# Patient Record
Sex: MALE | Race: WHITE | Employment: UNEMPLOYED
[De-identification: names, ages, dates, MRNs, and addresses within clinical notes are randomized per-mention and may not be internally consistent; named-entity substitution may affect disease eponyms.]

---

## 2017-02-24 ENCOUNTER — OFFICE VISIT (OUTPATIENT)
Dept: FAMILY MEDICINE CLINIC | Facility: CLINIC | Age: 77
End: 2017-02-24

## 2017-02-24 VITALS
HEART RATE: 90 BPM | WEIGHT: 240.38 LBS | DIASTOLIC BLOOD PRESSURE: 60 MMHG | OXYGEN SATURATION: 95 % | SYSTOLIC BLOOD PRESSURE: 104 MMHG | BODY MASS INDEX: 30.86 KG/M2

## 2017-02-24 DIAGNOSIS — J40 BRONCHITIS: ICD-10-CM

## 2017-02-24 DIAGNOSIS — R05.9 COUGH: Primary | ICD-10-CM

## 2017-02-24 PROCEDURE — 99213 OFFICE O/P EST LOW 20 MIN: CPT | Performed by: FAMILY MEDICINE

## 2017-02-24 RX ORDER — AMOXICILLIN 875 MG/1
875 TABLET, COATED ORAL 2 TIMES DAILY
Qty: 20 TABLET | Refills: 0 | Status: SHIPPED | OUTPATIENT
Start: 2017-02-24 | End: 2017-03-06

## 2017-02-24 RX ORDER — BENZONATATE 200 MG/1
200 CAPSULE ORAL 3 TIMES DAILY PRN
Qty: 30 CAPSULE | Refills: 0 | Status: SHIPPED | OUTPATIENT
Start: 2017-02-24 | End: 2017-03-06

## 2017-03-15 ENCOUNTER — HOSPITAL ENCOUNTER (OUTPATIENT)
Age: 77
Setting detail: SPECIMEN
Discharge: HOME | End: 2017-03-15

## 2017-03-15 LAB — TRIGL SERPL-MCNC: 188 MG/DL

## 2017-03-15 PROCEDURE — 84478 ASSAY OF TRIGLYCERIDES: CPT

## 2017-03-21 ENCOUNTER — HOSPITAL ENCOUNTER (OUTPATIENT)
Age: 77
Setting detail: SPECIMEN
Discharge: HOME | End: 2017-03-21

## 2017-03-21 LAB — AMMONIA: 48 UMOL/L (ref 16–60)

## 2017-03-21 PROCEDURE — 87086 URINE CULTURE/COLONY COUNT: CPT

## 2017-03-21 PROCEDURE — 87088 URINE BACTERIA CULTURE: CPT

## 2017-03-21 PROCEDURE — 87186 SC STD MICRODIL/AGAR DIL: CPT

## 2017-03-21 PROCEDURE — 82140 ASSAY OF AMMONIA: CPT

## 2017-03-23 LAB
CULTURE: ABNORMAL
CULTURE: ABNORMAL
Lab: ABNORMAL
Lab: ABNORMAL
ORGANISM: ABNORMAL
SPECIMEN DESCRIPTION: ABNORMAL
SPECIMEN DESCRIPTION: ABNORMAL
STATUS: ABNORMAL

## 2017-04-03 ENCOUNTER — HOSPITAL ENCOUNTER (OUTPATIENT)
Age: 77
Setting detail: SPECIMEN
Discharge: HOME | End: 2017-04-03
Payer: MEDICARE

## 2017-04-03 LAB — TSH SERPL DL<=0.05 MIU/L-ACNC: 6.69 MIU/L (ref 0.3–5)

## 2017-04-03 PROCEDURE — 84443 ASSAY THYROID STIM HORMONE: CPT

## 2017-05-02 RX ORDER — DIFLUNISAL 500 MG/1
TABLET, FILM COATED ORAL
Qty: 180 TABLET | Refills: 3 | Status: SHIPPED | OUTPATIENT
Start: 2017-05-02 | End: 2017-05-17 | Stop reason: SDUPTHER

## 2017-05-05 RX ORDER — LISINOPRIL 20 MG/1
TABLET ORAL
Qty: 90 TABLET | Refills: 0 | Status: SHIPPED | OUTPATIENT
Start: 2017-05-05 | End: 2017-05-22

## 2017-05-17 RX ORDER — DIFLUNISAL 500 MG/1
TABLET, FILM COATED ORAL
Qty: 180 TABLET | Refills: 3 | Status: SHIPPED | OUTPATIENT
Start: 2017-05-17 | End: 2017-12-14 | Stop reason: ALTCHOICE

## 2017-05-22 ENCOUNTER — OFFICE VISIT (OUTPATIENT)
Dept: FAMILY MEDICINE CLINIC | Age: 77
End: 2017-05-22
Payer: MEDICARE

## 2017-05-22 VITALS
BODY MASS INDEX: 28.44 KG/M2 | SYSTOLIC BLOOD PRESSURE: 112 MMHG | DIASTOLIC BLOOD PRESSURE: 66 MMHG | OXYGEN SATURATION: 97 % | HEART RATE: 63 BPM | WEIGHT: 221.6 LBS | HEIGHT: 74 IN

## 2017-05-22 DIAGNOSIS — K56.60 INTESTINAL OBSTRUCTION, UNSPECIFIED TYPE: Primary | ICD-10-CM

## 2017-05-22 PROCEDURE — 99213 OFFICE O/P EST LOW 20 MIN: CPT | Performed by: FAMILY MEDICINE

## 2017-05-22 ASSESSMENT — PATIENT HEALTH QUESTIONNAIRE - PHQ9
1. LITTLE INTEREST OR PLEASURE IN DOING THINGS: 0
SUM OF ALL RESPONSES TO PHQ QUESTIONS 1-9: 0
SUM OF ALL RESPONSES TO PHQ9 QUESTIONS 1 & 2: 0
2. FEELING DOWN, DEPRESSED OR HOPELESS: 0

## 2017-08-03 RX ORDER — LISINOPRIL 20 MG/1
TABLET ORAL
Qty: 90 TABLET | Refills: 0 | Status: SHIPPED | OUTPATIENT
Start: 2017-08-03 | End: 2018-02-14 | Stop reason: ALTCHOICE

## 2017-12-07 ENCOUNTER — TELEPHONE (OUTPATIENT)
Dept: FAMILY MEDICINE CLINIC | Age: 77
End: 2017-12-07

## 2017-12-07 NOTE — TELEPHONE ENCOUNTER
The patient was notified of the doctors response. Says he will go to the ED. No further questions at this time.

## 2017-12-14 ENCOUNTER — OFFICE VISIT (OUTPATIENT)
Dept: FAMILY MEDICINE CLINIC | Age: 77
End: 2017-12-14
Payer: MEDICARE

## 2017-12-14 VITALS
HEIGHT: 74 IN | WEIGHT: 227.38 LBS | SYSTOLIC BLOOD PRESSURE: 118 MMHG | BODY MASS INDEX: 29.18 KG/M2 | DIASTOLIC BLOOD PRESSURE: 70 MMHG | HEART RATE: 84 BPM | OXYGEN SATURATION: 88 %

## 2017-12-14 DIAGNOSIS — K25.0 ACUTE GASTRIC ULCER WITH HEMORRHAGE: Primary | ICD-10-CM

## 2017-12-14 LAB
ALBUMIN SERPL-MCNC: 4 G/DL
ALP BLD-CCNC: 63 U/L
ALT SERPL-CCNC: 15 U/L
ANION GAP SERPL CALCULATED.3IONS-SCNC: 11 MMOL/L
AST SERPL-CCNC: 16 U/L
BASOPHILS ABSOLUTE: 0.1 /ΜL
BASOPHILS RELATIVE PERCENT: 1.1 %
BILIRUB SERPL-MCNC: 0.5 MG/DL (ref 0.1–1.4)
BUN BLDV-MCNC: 26 MG/DL
CALCIUM SERPL-MCNC: 9.3 MG/DL
CHLORIDE BLD-SCNC: 107 MMOL/L
CO2: 22 MMOL/L
CREAT SERPL-MCNC: 1.65 MG/DL
EOSINOPHILS ABSOLUTE: 0.3 /ΜL
EOSINOPHILS RELATIVE PERCENT: 5.1 %
GFR CALCULATED: 41
GLUCOSE BLD-MCNC: 146 MG/DL
HCT VFR BLD CALC: 31.7 % (ref 41–53)
HEMOGLOBIN: 10.8 G/DL (ref 13.5–17.5)
LYMPHOCYTES ABSOLUTE: 1.2 /ΜL
LYMPHOCYTES RELATIVE PERCENT: 18.3 %
MCH RBC QN AUTO: 29 PG
MCHC RBC AUTO-ENTMCNC: 34 G/DL
MCV RBC AUTO: 85 FL
MONOCYTES ABSOLUTE: 0.4 /ΜL
MONOCYTES RELATIVE PERCENT: 5.7 %
NEUTROPHILS ABSOLUTE: 4.4 /ΜL
NEUTROPHILS RELATIVE PERCENT: 69.8 %
PDW BLD-RTO: 17 %
PLATELET # BLD: 214 K/ΜL
PMV BLD AUTO: 8.5 FL
POTASSIUM SERPL-SCNC: 4.3 MMOL/L
RBC # BLD: 3.72 10^6/ΜL
SODIUM BLD-SCNC: 140 MMOL/L
TOTAL PROTEIN: 6.8
WBC # BLD: 6.3 10^3/ML

## 2017-12-14 PROCEDURE — 99214 OFFICE O/P EST MOD 30 MIN: CPT | Performed by: FAMILY MEDICINE

## 2017-12-14 RX ORDER — OMEPRAZOLE 20 MG/1
20 CAPSULE, DELAYED RELEASE ORAL DAILY
Qty: 30 CAPSULE | Refills: 11 | Status: SHIPPED | OUTPATIENT
Start: 2017-12-14 | End: 2018-02-14 | Stop reason: ALTCHOICE

## 2017-12-14 RX ORDER — ACETAMINOPHEN 500 MG
1000 TABLET ORAL EVERY 4 HOURS PRN
COMMUNITY

## 2017-12-20 DIAGNOSIS — K25.0 ACUTE GASTRIC ULCER WITH HEMORRHAGE: ICD-10-CM

## 2018-02-14 ENCOUNTER — OFFICE VISIT (OUTPATIENT)
Dept: FAMILY MEDICINE CLINIC | Age: 78
End: 2018-02-14
Payer: MEDICARE

## 2018-02-14 VITALS
BODY MASS INDEX: 30.04 KG/M2 | WEIGHT: 234 LBS | DIASTOLIC BLOOD PRESSURE: 75 MMHG | SYSTOLIC BLOOD PRESSURE: 107 MMHG | HEART RATE: 56 BPM

## 2018-02-14 DIAGNOSIS — M19.042 PRIMARY OSTEOARTHRITIS OF LEFT HAND: Primary | ICD-10-CM

## 2018-02-14 PROCEDURE — 99213 OFFICE O/P EST LOW 20 MIN: CPT | Performed by: FAMILY MEDICINE

## 2018-02-14 NOTE — PROGRESS NOTES
Visit Information    Have you changed or started any medications since your last visit including any over-the-counter medicines, vitamins, or herbal medicines? no   Have you stopped taking any of your medications? Is so, why? -  no  Are you having any side effects from any of your medications? - no    Have you seen any other physician or provider since your last visit?  no   Have you had any other diagnostic tests since your last visit?  no   Have you been seen in the emergency room and/or had an admission in a hospital since we last saw you?  no   Have you had your routine dental cleaning in the past 6 months? No    Do you have an active MyChart account? If no, what is the barrier?   No    Patient Care Team:  Traci Duran MD as PCP - General (Family Medicine)  Khari Olivarez MD as Consulting Physician (Urology)  Luzma Orona MD as Surgeon (General Surgery)    Medical History Review  Past Medical, Family, and Social History reviewed and  contribute to the patient presenting condition    Health Maintenance   Topic Date Due    DTaP/Tdap/Td vaccine (1 - Tdap) 03/09/1959    Zostavax vaccine  03/09/2000    Pneumococcal low/med risk (2 of 2 - PPSV23) 05/03/2017    Flu vaccine (1) 09/25/2018 (Originally 9/1/2017)    Potassium monitoring  12/14/2018    Creatinine monitoring  12/14/2018    Lipid screen  05/09/2021

## 2018-02-14 NOTE — PROGRESS NOTES
Subjective:  Melissa Interiano presents for   Chief Complaint   Patient presents with    Hoarse    Blood Pressure Check     low BP Tuesday. Better today. Dizziness on m[place] but feels better today. Feels normal today    Had been sick the past few days  His bp reads 10 points lower than ours    Has some hand stiffness since he could't take nsaids anymore due to gi bleed. Patient Active Problem List   Diagnosis    Bladder cancer    DJD (degenerative joint disease)    Essential hypertension       Objective:  Physical Exam   Vitals:   Vitals:    02/14/18 1110 02/14/18 1114   BP: 134/76 107/75   Pulse: 56    Weight: 234 lb (106.1 kg)      Wt Readings from Last 3 Encounters:   02/14/18 234 lb (106.1 kg)   12/14/17 227 lb 6 oz (103.1 kg)   05/22/17 221 lb 9.6 oz (100.5 kg)     Ht Readings from Last 3 Encounters:   12/14/17 6' 2\" (1.88 m)   05/22/17 6' 2\" (1.88 m)   11/22/16 6' 2\" (1.88 m)     Body mass index is 30.04 kg/m². Constitutional: He is oriented to person, place, and time. He appears well-developed and well-nourished and in no acute distress. Answers all my questions appropriately. Head: Normocephalic and atraumatic. Eyes:conjunctiva appear normal.  Right Ear: External ear normal. TM is clear  Left Ear: External ear normal. TM is clear  Nose: pink, non-edematous mucosa. No polyps. No septal deviation  Throat: no erythema, tonsillar hypertrophy or exudate. No ulcerations noted. Lips/Teeth/Gums all appear normal.  Neck: Normal range of motion. Neck supple. No tracheal deviation present. No abnormal lymphadenopathy. No JVD noted. Carotids are clear bilaterally. No thyroid masses noted. Heart: RRR without murmur. No S3, S4, or gallop noted. Chest: Clear to auscultation bilaterally. Good breath sounds noted. No rales, wheezes, or rhonchi noted. No respiratory retractions noted. Wall has symmetrical movement with respirations. Assessment:   Encounter Diagnosis   Name Primary?     Primary osteoarthritis of left hand Yes         Plan:   Use tylnol, heat and bengay    Push fluds and food    Continue chekcing bp

## 2018-04-27 ENCOUNTER — OFFICE VISIT (OUTPATIENT)
Dept: FAMILY MEDICINE CLINIC | Age: 78
End: 2018-04-27
Payer: MEDICARE

## 2018-04-27 VITALS
DIASTOLIC BLOOD PRESSURE: 72 MMHG | SYSTOLIC BLOOD PRESSURE: 124 MMHG | WEIGHT: 235.4 LBS | HEART RATE: 64 BPM | BODY MASS INDEX: 30.22 KG/M2 | OXYGEN SATURATION: 98 %

## 2018-04-27 DIAGNOSIS — K43.9 HERNIA OF ABDOMINAL WALL: ICD-10-CM

## 2018-04-27 DIAGNOSIS — Z87.19 H/O: GI BLEED: ICD-10-CM

## 2018-04-27 DIAGNOSIS — M19.90 OSTEOARTHRITIS, UNSPECIFIED OSTEOARTHRITIS TYPE, UNSPECIFIED SITE: Primary | ICD-10-CM

## 2018-04-27 DIAGNOSIS — R05.9 COUGH: ICD-10-CM

## 2018-04-27 PROCEDURE — 99214 OFFICE O/P EST MOD 30 MIN: CPT | Performed by: FAMILY MEDICINE

## 2018-04-27 RX ORDER — AMOXICILLIN 875 MG/1
875 TABLET, COATED ORAL 2 TIMES DAILY
Qty: 20 TABLET | Refills: 0 | Status: SHIPPED | OUTPATIENT
Start: 2018-04-27 | End: 2018-05-07

## 2018-04-27 RX ORDER — CELECOXIB 200 MG/1
200 CAPSULE ORAL DAILY PRN
Qty: 30 CAPSULE | Refills: 11 | Status: SHIPPED | OUTPATIENT
Start: 2018-04-27 | End: 2018-06-28

## 2018-04-27 RX ORDER — OMEPRAZOLE 20 MG/1
20 CAPSULE, DELAYED RELEASE ORAL DAILY
Qty: 30 CAPSULE | Refills: 11 | Status: SHIPPED | OUTPATIENT
Start: 2018-04-27 | End: 2018-06-28

## 2018-06-28 ENCOUNTER — OFFICE VISIT (OUTPATIENT)
Dept: FAMILY MEDICINE CLINIC | Age: 78
End: 2018-06-28
Payer: MEDICARE

## 2018-06-28 VITALS
WEIGHT: 238 LBS | SYSTOLIC BLOOD PRESSURE: 138 MMHG | OXYGEN SATURATION: 97 % | DIASTOLIC BLOOD PRESSURE: 80 MMHG | BODY MASS INDEX: 30.56 KG/M2 | HEART RATE: 66 BPM

## 2018-06-28 DIAGNOSIS — M19.90 OSTEOARTHRITIS, UNSPECIFIED OSTEOARTHRITIS TYPE, UNSPECIFIED SITE: Primary | ICD-10-CM

## 2018-06-28 DIAGNOSIS — K43.2 INCISIONAL HERNIA OF ANTERIOR ABDOMINAL WALL WITHOUT OBSTRUCTION OR GANGRENE: Chronic | ICD-10-CM

## 2018-06-28 DIAGNOSIS — N28.89 RENAL MASS: Chronic | ICD-10-CM

## 2018-06-28 PROCEDURE — 99213 OFFICE O/P EST LOW 20 MIN: CPT | Performed by: FAMILY MEDICINE

## 2018-06-28 ASSESSMENT — PATIENT HEALTH QUESTIONNAIRE - PHQ9
1. LITTLE INTEREST OR PLEASURE IN DOING THINGS: 0
SUM OF ALL RESPONSES TO PHQ QUESTIONS 1-9: 0
2. FEELING DOWN, DEPRESSED OR HOPELESS: 0
SUM OF ALL RESPONSES TO PHQ9 QUESTIONS 1 & 2: 0

## 2018-11-02 ENCOUNTER — HOSPITAL ENCOUNTER (EMERGENCY)
Age: 78
Discharge: HOME | End: 2018-11-02
Attending: EMERGENCY MEDICINE
Payer: MEDICARE

## 2018-11-02 ENCOUNTER — APPOINTMENT (OUTPATIENT)
Dept: GENERAL RADIOLOGY | Age: 78
End: 2018-11-02
Payer: MEDICARE

## 2018-11-02 ENCOUNTER — TELEPHONE (OUTPATIENT)
Dept: FAMILY MEDICINE CLINIC | Age: 78
End: 2018-11-02

## 2018-11-02 VITALS
RESPIRATION RATE: 16 BRPM | TEMPERATURE: 99 F | HEART RATE: 62 BPM | SYSTOLIC BLOOD PRESSURE: 150 MMHG | WEIGHT: 238 LBS | BODY MASS INDEX: 30.54 KG/M2 | OXYGEN SATURATION: 100 % | HEIGHT: 74 IN | DIASTOLIC BLOOD PRESSURE: 90 MMHG

## 2018-11-02 DIAGNOSIS — M19.031 PRIMARY OSTEOARTHRITIS OF RIGHT WRIST: ICD-10-CM

## 2018-11-02 DIAGNOSIS — S66.911A WRIST STRAIN, RIGHT, INITIAL ENCOUNTER: Primary | ICD-10-CM

## 2018-11-02 PROCEDURE — 99283 EMERGENCY DEPT VISIT LOW MDM: CPT

## 2018-11-02 PROCEDURE — 73110 X-RAY EXAM OF WRIST: CPT

## 2018-11-02 RX ORDER — ONDANSETRON 4 MG/1
4 TABLET, ORALLY DISINTEGRATING ORAL EVERY 8 HOURS PRN
Qty: 20 TABLET | Refills: 0 | Status: SHIPPED | OUTPATIENT
Start: 2018-11-02 | End: 2019-09-10

## 2018-11-02 RX ORDER — OXYCODONE HYDROCHLORIDE AND ACETAMINOPHEN 5; 325 MG/1; MG/1
1-2 TABLET ORAL EVERY 6 HOURS PRN
Qty: 20 TABLET | Refills: 0 | Status: SHIPPED | OUTPATIENT
Start: 2018-11-02 | End: 2018-11-07

## 2018-11-02 ASSESSMENT — PAIN SCALES - GENERAL: PAINLEVEL_OUTOF10: 8

## 2018-11-05 ENCOUNTER — TELEPHONE (OUTPATIENT)
Dept: FAMILY MEDICINE CLINIC | Age: 78
End: 2018-11-05

## 2018-11-05 NOTE — TELEPHONE ENCOUNTER
[facility] Initial Follow Up Call    Outreach made within 2 business days of discharge: Yes    Patient: Efrain Ayala Patient : 1940   MRN: [id number]  Reason for Admission: There are no discharge diagnoses documented for the most recent discharge. Discharge Date: 18       Spoke with: Yanna Cortés    Discharge department/facility: [facility]    TCM Interactive Patient Contact:  Was patient able to fill all prescriptions: Yes  Was patient instructed to bring all medications to the follow-up visit: Yes  Is patient taking all medications as directed in the discharge summary? Yes  Does patient understand their discharge instructions: Yes  Does patient have questions or concerns that need addressed prior to 7-14 day follow up office visit: no    Scheduled appointment with PCP within 7-14 days    Follow Up  No future appointments.     Marian Slaughter, [address]

## 2018-11-06 NOTE — TELEPHONE ENCOUNTER
1.  He needs to take the maximum dose of tylenol as listed on the bottle he has. 2.use otc lidocaine on hands as directed.   3.  Use heat on the handsfor 30 minutes qid    Do the above for 2 weeks then call back

## 2019-09-10 ENCOUNTER — OFFICE VISIT (OUTPATIENT)
Dept: FAMILY MEDICINE CLINIC | Age: 79
End: 2019-09-10
Payer: MEDICARE

## 2019-09-10 VITALS
DIASTOLIC BLOOD PRESSURE: 80 MMHG | OXYGEN SATURATION: 96 % | BODY MASS INDEX: 31.97 KG/M2 | HEART RATE: 68 BPM | WEIGHT: 249 LBS | SYSTOLIC BLOOD PRESSURE: 120 MMHG

## 2019-09-10 DIAGNOSIS — R42 DIZZINESS: ICD-10-CM

## 2019-09-10 DIAGNOSIS — R49.0 HOARSENESS: ICD-10-CM

## 2019-09-10 DIAGNOSIS — K21.9 GASTROESOPHAGEAL REFLUX DISEASE WITHOUT ESOPHAGITIS: ICD-10-CM

## 2019-09-10 DIAGNOSIS — R06.09 DOE (DYSPNEA ON EXERTION): Primary | ICD-10-CM

## 2019-09-10 DIAGNOSIS — K43.9 ABDOMINAL WALL HERNIA AT PREVIOUS STOMA SITE: ICD-10-CM

## 2019-09-10 DIAGNOSIS — R11.0 NAUSEA: ICD-10-CM

## 2019-09-10 PROCEDURE — 99214 OFFICE O/P EST MOD 30 MIN: CPT | Performed by: FAMILY MEDICINE

## 2019-09-10 RX ORDER — OMEPRAZOLE 20 MG/1
20 CAPSULE, DELAYED RELEASE ORAL DAILY
Qty: 30 CAPSULE | Refills: 11 | Status: SHIPPED | OUTPATIENT
Start: 2019-09-10 | End: 2020-07-15

## 2019-09-10 ASSESSMENT — PATIENT HEALTH QUESTIONNAIRE - PHQ9
SUM OF ALL RESPONSES TO PHQ QUESTIONS 1-9: 1
SUM OF ALL RESPONSES TO PHQ QUESTIONS 1-9: 1
2. FEELING DOWN, DEPRESSED OR HOPELESS: 1
1. LITTLE INTEREST OR PLEASURE IN DOING THINGS: 0
SUM OF ALL RESPONSES TO PHQ9 QUESTIONS 1 & 2: 1

## 2019-09-10 NOTE — PROGRESS NOTES
Subjective:  Nate Peterson presents for   Chief Complaint   Patient presents with    Shortness of Breath     with short distance walking.  Dizziness     w/ positional changes.  Hoarse     hoarse throat x 3 month    Emesis     sometimes his food comes back up. even when drinking water    Pain     rt. knee,left shoulder and hands ache. These sx have been going on for 3-4 months. Nothing is diffeernt. He can do all of his daily chores and he feels fien. He states he can walk without any problem    Gets some gerd sx when eating at hs. Eating food definitly creates this issues. He never did go to [facility] per dr. 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 Constitutional: He is oriented to person, place, and time. He appears well-developed and well-nourished and in no acute distress. Answers all my questions appropriately. Head: Normocephalic and atraumatic. Eyes:conjunctiva appear normal.  Right Ear: External ear normal. TM is clear  Left Ear: External ear normal. TM is clear  Nose: pink, non-edematous mucosa. No polyps. No septal deviation  Throat: no erythema, tonsillar hypertrophy or exudate. No ulcerations noted. Lips/Teeth/Gums all appear normal.  Neck: Normal range of motion. Neck supple. No tracheal deviation present. No abnormal lymphadenopathy.   No symptoms are worse after you eat a certain food, you may want to stop eating that food to see if your symptoms get better. · Do not smoke or chew tobacco. Smoking can make GERD worse. If you need help quitting, talk to your doctor about stop-smoking programs and medicines. These can increase your chances of quitting for good. · If you have GERD symptoms at night, raise the head of your bed 6 to 8 inches by putting the frame on blocks or placing a foam wedge under the head of your mattress. (Adding extra pillows does not work.)  · Do not wear tight clothing around your middle. · Lose weight if you need to. Losing just 5 to 10 pounds can help. When should you call for help? Call your doctor now or seek immediate medical care if:    · You have new or different belly pain.     · Your stools are black and tarlike or have streaks of blood.    Watch closely for changes in your health, and be sure to contact your doctor if:    · Your symptoms have not improved after 2 days.     · Food seems to catch in your throat or chest.   Where can you learn more? Go to https://Demandforce.EatWith. org and sign in to your [employer] account. Enter S822 in the [employer] box to learn more about \"Gastroesophageal Reflux Disease (GERD): Care Instructions. \"     If you do not have an account, please click on the \"Sign Up Now\" link. Current as of: November 7, 2018  Content Version: 12.1  © 4063-5457 [employer]. Care instructions adapted under license by [facility] ([facility]). If you have questions about a medical condition or this instruction, always ask your healthcare professional. [place] [address] any warranty or liability for your use of this information. Refer to [facility] in [address] . Dr. Ludwig akins these sx are very vague and his description is very vague. , will start testing nad go form there    He is to call me in 2 weeks about the gered.

## 2019-09-12 ENCOUNTER — HOSPITAL ENCOUNTER (OUTPATIENT)
Age: 79
Discharge: HOME | End: 2019-09-14
Payer: MEDICARE

## 2019-09-12 ENCOUNTER — HOSPITAL ENCOUNTER (OUTPATIENT)
Age: 79
Discharge: HOME | End: 2019-09-12
Payer: MEDICARE

## 2019-09-12 ENCOUNTER — HOSPITAL ENCOUNTER (OUTPATIENT)
Dept: GENERAL RADIOLOGY | Age: 79
Discharge: HOME | End: 2019-09-14
Payer: MEDICARE

## 2019-09-12 DIAGNOSIS — R06.09 DOE (DYSPNEA ON EXERTION): ICD-10-CM

## 2019-09-12 LAB
ABSOLUTE EOS #: 0.29 K/UL (ref 0–0.44)
ABSOLUTE IMMATURE GRANULOCYTE: <0.03 K/UL (ref 0–0.3)
ABSOLUTE LYMPH #: 1.08 K/UL (ref 1.1–3.7)
ABSOLUTE MONO #: 0.44 K/UL (ref 0.1–1.2)
ALBUMIN SERPL-MCNC: 4.1 G/DL (ref 3.5–5.2)
ALBUMIN/GLOBULIN RATIO: 1.3 (ref 1–2.5)
ALP BLD-CCNC: 62 U/L (ref 40–129)
ALT SERPL-CCNC: 34 U/L (ref 5–41)
ANION GAP SERPL CALCULATED.3IONS-SCNC: 14 MMOL/L (ref 9–17)
AST SERPL-CCNC: 40 U/L
BASOPHILS # BLD: 1 % (ref 0–2)
BASOPHILS ABSOLUTE: 0.06 K/UL (ref 0–0.2)
BILIRUB SERPL-MCNC: 0.69 MG/DL (ref 0.3–1.2)
BUN BLDV-MCNC: 28 MG/DL (ref 8–23)
BUN/CREAT BLD: ABNORMAL (ref 9–20)
CALCIUM SERPL-MCNC: 9.7 MG/DL (ref 8.6–10.4)
CHLORIDE BLD-SCNC: 106 MMOL/L (ref 98–107)
CO2: 22 MMOL/L (ref 20–31)
CREAT SERPL-MCNC: 1.54 MG/DL (ref 0.7–1.2)
DIFFERENTIAL TYPE: ABNORMAL
EOSINOPHILS RELATIVE PERCENT: 4 % (ref 1–4)
GFR AFRICAN AMERICAN: 53 ML/MIN
GFR NON-AFRICAN AMERICAN: 44 ML/MIN
GFR SERPL CREATININE-BSD FRML MDRD: ABNORMAL ML/MIN/{1.73_M2}
GFR SERPL CREATININE-BSD FRML MDRD: ABNORMAL ML/MIN/{1.73_M2}
GLUCOSE BLD-MCNC: 153 MG/DL (ref 70–99)
HCT VFR BLD CALC: 46.5 % (ref 40.7–50.3)
HEMOGLOBIN: 14.5 G/DL (ref 13–17)
IMMATURE GRANULOCYTES: 0 %
LYMPHOCYTES # BLD: 15 % (ref 24–43)
MCH RBC QN AUTO: 27.6 PG (ref 25.2–33.5)
MCHC RBC AUTO-ENTMCNC: 31.2 G/DL (ref 28.4–34.8)
MCV RBC AUTO: 88.6 FL (ref 82.6–102.9)
MONOCYTES # BLD: 6 % (ref 3–12)
NRBC AUTOMATED: 0 PER 100 WBC
PDW BLD-RTO: 15.4 % (ref 11.8–14.4)
PLATELET # BLD: 198 K/UL (ref 138–453)
PLATELET ESTIMATE: ABNORMAL
PMV BLD AUTO: 10.7 FL (ref 8.1–13.5)
POTASSIUM SERPL-SCNC: 4.6 MMOL/L (ref 3.7–5.3)
RBC # BLD: 5.25 M/UL (ref 4.21–5.77)
RBC # BLD: ABNORMAL 10*6/UL
SEG NEUTROPHILS: 74 % (ref 36–65)
SEGMENTED NEUTROPHILS ABSOLUTE COUNT: 5.22 K/UL (ref 1.5–8.1)
SODIUM BLD-SCNC: 142 MMOL/L (ref 135–144)
TOTAL PROTEIN: 7.3 G/DL (ref 6.4–8.3)
TSH SERPL DL<=0.05 MIU/L-ACNC: 3.26 MIU/L (ref 0.3–5)
WBC # BLD: 7.1 K/UL (ref 3.5–11.3)
WBC # BLD: ABNORMAL 10*3/UL

## 2019-09-12 PROCEDURE — 85025 COMPLETE CBC W/AUTO DIFF WBC: CPT

## 2019-09-12 PROCEDURE — 71046 X-RAY EXAM CHEST 2 VIEWS: CPT

## 2019-09-12 PROCEDURE — 84443 ASSAY THYROID STIM HORMONE: CPT

## 2019-09-12 PROCEDURE — 36415 COLL VENOUS BLD VENIPUNCTURE: CPT

## 2019-09-12 PROCEDURE — 80053 COMPREHEN METABOLIC PANEL: CPT

## 2019-10-07 PROBLEM — N20.1 RIGHT URETERAL CALCULUS: Status: ACTIVE | Noted: 2019-10-07

## 2019-10-07 PROBLEM — N20.0 BILATERAL KIDNEY STONES: Status: ACTIVE | Noted: 2019-10-07

## 2019-10-08 ENCOUNTER — OFFICE VISIT (OUTPATIENT)
Dept: FAMILY MEDICINE CLINIC | Age: 79
End: 2019-10-08
Payer: MEDICARE

## 2019-10-08 VITALS
BODY MASS INDEX: 31.63 KG/M2 | SYSTOLIC BLOOD PRESSURE: 124 MMHG | HEART RATE: 63 BPM | WEIGHT: 246.38 LBS | DIASTOLIC BLOOD PRESSURE: 70 MMHG | OXYGEN SATURATION: 97 %

## 2019-10-08 DIAGNOSIS — R06.02 SOB (SHORTNESS OF BREATH) ON EXERTION: ICD-10-CM

## 2019-10-08 DIAGNOSIS — R05.9 COUGH: Primary | ICD-10-CM

## 2019-10-08 DIAGNOSIS — N28.89 RENAL MASS: ICD-10-CM

## 2019-10-08 PROCEDURE — 99214 OFFICE O/P EST MOD 30 MIN: CPT | Performed by: FAMILY MEDICINE

## 2020-01-06 ENCOUNTER — OFFICE VISIT (OUTPATIENT)
Dept: FAMILY MEDICINE CLINIC | Age: 80
End: 2020-01-06
Payer: MEDICARE

## 2020-01-06 VITALS
HEART RATE: 66 BPM | WEIGHT: 239.4 LBS | DIASTOLIC BLOOD PRESSURE: 82 MMHG | OXYGEN SATURATION: 98 % | SYSTOLIC BLOOD PRESSURE: 124 MMHG | BODY MASS INDEX: 30.74 KG/M2

## 2020-01-06 PROCEDURE — 99213 OFFICE O/P EST LOW 20 MIN: CPT | Performed by: FAMILY MEDICINE

## 2020-01-06 PROCEDURE — 90688 IIV4 VACCINE SPLT 0.5 ML IM: CPT | Performed by: FAMILY MEDICINE

## 2020-01-06 PROCEDURE — G0008 ADMIN INFLUENZA VIRUS VAC: HCPCS | Performed by: FAMILY MEDICINE

## 2020-01-06 ASSESSMENT — PATIENT HEALTH QUESTIONNAIRE - PHQ9
2. FEELING DOWN, DEPRESSED OR HOPELESS: 0
SUM OF ALL RESPONSES TO PHQ QUESTIONS 1-9: 0
1. LITTLE INTEREST OR PLEASURE IN DOING THINGS: 0
SUM OF ALL RESPONSES TO PHQ9 QUESTIONS 1 & 2: 0
SUM OF ALL RESPONSES TO PHQ QUESTIONS 1-9: 0

## 2020-01-06 NOTE — PROGRESS NOTES
Subjective:  Jackson Khan presents for   Chief Complaint   Patient presents with    Dizziness    Cough    Pharyngitis     Started 12/26. Is much bettter. Slight cough    But still has laryngitis for the past 3-4 months    Gets food stuck in his throat with eating and has to vomitn daily. 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 Objective:  Physical Exam   Vitals:   Vitals:    01/06/20 1330   BP: 124/82   Pulse: 66   SpO2: 98%   Weight: 239 lb 6.4 oz (108.6 kg)     Wt Readings from Last 3 Encounters:   01/06/20 239 lb 6.4 oz (108.6 kg)   10/09/19 249 lb (112.9 kg)   10/08/19 246 lb 6 oz (111.8 kg)     Ht Readings from Last 3 Encounters:   10/09/19 6' 2\" (1.88 m)   10/07/19 6' 2\" (1.88 m)   11/02/18 6' 2\" (1.88 m)     Body mass index is 30.74 kg/m². Constitutional: He is oriented to person, place, and time. He appears well-developed and well-nourished and in no acute distress. Answers all my questions appropriately. Head: Normocephalic and atraumatic. Eyes:conjunctiva appear normal.  Nose: pink, non-edematous mucosa. No polyps. No septal deviation  Throat: no erythema, tonsillar hypertrophy or exudate. No ulcerations noted. Lips/Teeth/Gums all appear normal.  Neck: Normal range of motion. Neck supple. No tracheal deviation present. No abnormal lymphadenopathy. No JVD noted. Carotids are clear bilaterally. No thyroid masses noted. Heart: RRR . No S3, S4, or gallop noted. Chest: Clear to auscultation bilaterally. Good breath sounds noted. No rales, wheezes, or rhonchi noted. No respiratory retractions noted. Wall has symmetrical movement with respirations.     Abdomen: No distension

## 2020-01-27 ENCOUNTER — HOSPITAL ENCOUNTER (OUTPATIENT)
Age: 80
Setting detail: SPECIMEN
Discharge: HOME | End: 2020-01-27
Payer: MEDICARE

## 2020-02-05 LAB — SURGICAL PATHOLOGY REPORT: NORMAL

## 2020-06-29 ENCOUNTER — TELEPHONE (OUTPATIENT)
Dept: FAMILY MEDICINE CLINIC | Age: 80
End: 2020-06-29

## 2020-06-29 NOTE — TELEPHONE ENCOUNTER
Patient came into the office today stating that he has had a cough for 2-3 months. He is coughing up phlem and sometimes will loose his voice. He stated he saw  for this before. He was referred to gastro previously. He stated that he tried a medication they prescribed which didn't work so gastro changed his medication. He stated he never took that medication because of the side effects and warnings of the medication but did not notify gastro. He then stated that he is short of breath when walking short distance at home in his house. He stated this is something new. Informed him that he needs to let gastro know about the medication he never started. Advised he be seen at the flu clinic since we are not able to see patients with the symptoms he is having. Pt. Verbally understood and was given a copy of the flu clinic locations.

## 2020-07-15 ENCOUNTER — HOSPITAL ENCOUNTER (OUTPATIENT)
Dept: GENERAL RADIOLOGY | Age: 80
Discharge: HOME | End: 2020-07-17
Payer: MEDICARE

## 2020-07-15 ENCOUNTER — HOSPITAL ENCOUNTER (OUTPATIENT)
Age: 80
Discharge: HOME | End: 2020-07-15
Payer: MEDICARE

## 2020-07-15 ENCOUNTER — OFFICE VISIT (OUTPATIENT)
Dept: FAMILY MEDICINE CLINIC | Age: 80
End: 2020-07-15
Payer: MEDICARE

## 2020-07-15 ENCOUNTER — HOSPITAL ENCOUNTER (OUTPATIENT)
Age: 80
Discharge: HOME | End: 2020-07-17
Payer: MEDICARE

## 2020-07-15 VITALS
WEIGHT: 235.5 LBS | DIASTOLIC BLOOD PRESSURE: 72 MMHG | OXYGEN SATURATION: 98 % | SYSTOLIC BLOOD PRESSURE: 110 MMHG | HEART RATE: 68 BPM | TEMPERATURE: 97.9 F | BODY MASS INDEX: 30.24 KG/M2

## 2020-07-15 LAB
ABSOLUTE EOS #: 0.24 K/UL (ref 0–0.44)
ABSOLUTE IMMATURE GRANULOCYTE: <0.03 K/UL (ref 0–0.3)
ABSOLUTE LYMPH #: 1.25 K/UL (ref 1.1–3.7)
ABSOLUTE MONO #: 0.57 K/UL (ref 0.1–1.2)
ALBUMIN SERPL-MCNC: 3.9 G/DL (ref 3.5–5.2)
ALBUMIN/GLOBULIN RATIO: 1.1 (ref 1–2.5)
ALP BLD-CCNC: 64 U/L (ref 40–129)
ALT SERPL-CCNC: 17 U/L (ref 5–41)
ANION GAP SERPL CALCULATED.3IONS-SCNC: 16 MMOL/L (ref 9–17)
AST SERPL-CCNC: 24 U/L
BASOPHILS # BLD: 1 % (ref 0–2)
BASOPHILS ABSOLUTE: 0.06 K/UL (ref 0–0.2)
BILIRUB SERPL-MCNC: 0.47 MG/DL (ref 0.3–1.2)
BUN BLDV-MCNC: 29 MG/DL (ref 8–23)
BUN/CREAT BLD: ABNORMAL (ref 9–20)
CALCIUM SERPL-MCNC: 9.6 MG/DL (ref 8.6–10.4)
CHLORIDE BLD-SCNC: 104 MMOL/L (ref 98–107)
CO2: 19 MMOL/L (ref 20–31)
CREAT SERPL-MCNC: 2.12 MG/DL (ref 0.7–1.2)
DIFFERENTIAL TYPE: ABNORMAL
EOSINOPHILS RELATIVE PERCENT: 3 % (ref 1–4)
GFR AFRICAN AMERICAN: 37 ML/MIN
GFR NON-AFRICAN AMERICAN: 30 ML/MIN
GFR SERPL CREATININE-BSD FRML MDRD: ABNORMAL ML/MIN/{1.73_M2}
GFR SERPL CREATININE-BSD FRML MDRD: ABNORMAL ML/MIN/{1.73_M2}
GLUCOSE BLD-MCNC: 100 MG/DL (ref 70–99)
HCT VFR BLD CALC: 43.1 % (ref 40.7–50.3)
HEMOGLOBIN: 13.3 G/DL (ref 13–17)
IMMATURE GRANULOCYTES: 0 %
LYMPHOCYTES # BLD: 15 % (ref 24–43)
MCH RBC QN AUTO: 27.5 PG (ref 25.2–33.5)
MCHC RBC AUTO-ENTMCNC: 30.9 G/DL (ref 28.4–34.8)
MCV RBC AUTO: 89 FL (ref 82.6–102.9)
MONOCYTES # BLD: 7 % (ref 3–12)
NRBC AUTOMATED: 0 PER 100 WBC
PDW BLD-RTO: 16.3 % (ref 11.8–14.4)
PLATELET # BLD: 235 K/UL (ref 138–453)
PLATELET ESTIMATE: ABNORMAL
PMV BLD AUTO: 10.1 FL (ref 8.1–13.5)
POTASSIUM SERPL-SCNC: 4.4 MMOL/L (ref 3.7–5.3)
PREALBUMIN: 20.7 MG/DL (ref 20–40)
RBC # BLD: 4.84 M/UL (ref 4.21–5.77)
RBC # BLD: ABNORMAL 10*6/UL
SEG NEUTROPHILS: 74 % (ref 36–65)
SEGMENTED NEUTROPHILS ABSOLUTE COUNT: 5.98 K/UL (ref 1.5–8.1)
SODIUM BLD-SCNC: 139 MMOL/L (ref 135–144)
TOTAL PROTEIN: 7.5 G/DL (ref 6.4–8.3)
TSH SERPL DL<=0.05 MIU/L-ACNC: 2.83 MIU/L (ref 0.3–5)
WBC # BLD: 8.1 K/UL (ref 3.5–11.3)
WBC # BLD: ABNORMAL 10*3/UL

## 2020-07-15 PROCEDURE — 90715 TDAP VACCINE 7 YRS/> IM: CPT | Performed by: FAMILY MEDICINE

## 2020-07-15 PROCEDURE — 99214 OFFICE O/P EST MOD 30 MIN: CPT | Performed by: FAMILY MEDICINE

## 2020-07-15 PROCEDURE — 85025 COMPLETE CBC W/AUTO DIFF WBC: CPT

## 2020-07-15 PROCEDURE — 84134 ASSAY OF PREALBUMIN: CPT

## 2020-07-15 PROCEDURE — 84443 ASSAY THYROID STIM HORMONE: CPT

## 2020-07-15 PROCEDURE — 36415 COLL VENOUS BLD VENIPUNCTURE: CPT

## 2020-07-15 PROCEDURE — 90471 IMMUNIZATION ADMIN: CPT | Performed by: FAMILY MEDICINE

## 2020-07-15 PROCEDURE — 71046 X-RAY EXAM CHEST 2 VIEWS: CPT

## 2020-07-15 PROCEDURE — 80053 COMPREHEN METABOLIC PANEL: CPT

## 2020-07-15 RX ORDER — AMOXICILLIN AND CLAVULANATE POTASSIUM 875; 125 MG/1; MG/1
1 TABLET, FILM COATED ORAL 2 TIMES DAILY
Qty: 20 TABLET | Refills: 0 | Status: SHIPPED | OUTPATIENT
Start: 2020-07-15 | End: 2020-07-25

## 2020-07-15 NOTE — PROGRESS NOTES
normal. TM is clear  Left Ear: External ear normal. TM is clear  Nose: pink, non-edematous mucosa. No polyps. No septal deviation  Throat: no erythema, tonsillar hypertrophy or exudate. No ulcerations noted. Lips/Teeth/Gums all appear normal.  Neck: Normal range of motion. Neck supple. No tracheal deviation present. No abnormal lymphadenopathy. No JVD noted. Carotids are clear bilaterally. No thyroid masses noted. Heart: RRR without murmur. No S3, S4, or gallop noted. Chest: Clear to auscultation bilaterally. Good breath sounds noted. No rales, wheezes, or rhonchi noted. No respiratory retractions noted. Wall has symmetrical movement with respirations. No pedal edema    Assessment:   Encounter Diagnoses   Name Primary?  Cough Yes    Dizziness     Hoarse voice quality     Weight loss          Plan:   Medications Discontinued During This Encounter   Medication Reason    omeprazole (PRILOSEC) 20 MG delayed release capsule      THE ABOVE NOTED DISCONTINUED MEDS MAY ONLY BE FROM 'CLEANING UP' THE MED LIST AND WERE NOT ACTUALLY CANCELED;  SEE CHART FOR DETAILS!   Orders Placed This Encounter   Medications    amoxicillin-clavulanate (AUGMENTIN) 875-125 MG per tablet     Sig: Take 1 tablet by mouth 2 times daily for 10 days     Dispense:  20 tablet     Refill:  0     Orders Placed This Encounter   Procedures    XR CHEST STANDARD (2 VW)     Standing Status:   Future     Standing Expiration Date:   7/15/2021     Order Specific Question:   Reason for exam:     Answer:   See ICDM-10 code attached    Tdap (age 6y and older) IM ([address])    CBC Auto Differential     Standing Status:   Future     Standing Expiration Date:   7/15/2021    Comprehensive Metabolic Panel     Standing Status:   Future     Standing Expiration Date:   7/15/2021    TSH with Reflex     Standing Status:   Future     Standing Expiration Date:   7/15/2021    Prealbumin     Standing Status:   Future     Standing Expiration Date:

## 2020-09-10 ENCOUNTER — OFFICE VISIT (OUTPATIENT)
Dept: FAMILY MEDICINE CLINIC | Age: 80
End: 2020-09-10
Payer: MEDICARE

## 2020-09-10 VITALS
SYSTOLIC BLOOD PRESSURE: 104 MMHG | BODY MASS INDEX: 30.3 KG/M2 | WEIGHT: 236 LBS | DIASTOLIC BLOOD PRESSURE: 70 MMHG | OXYGEN SATURATION: 98 % | TEMPERATURE: 97.3 F | HEART RATE: 80 BPM

## 2020-09-10 PROCEDURE — 99214 OFFICE O/P EST MOD 30 MIN: CPT | Performed by: FAMILY MEDICINE

## 2020-09-10 RX ORDER — ALBUTEROL SULFATE 90 UG/1
2 POWDER, METERED RESPIRATORY (INHALATION) EVERY 4 HOURS PRN
Qty: 1 INHALER | Refills: 11 | Status: SHIPPED | OUTPATIENT
Start: 2020-09-10 | End: 2022-03-10

## 2020-09-10 ASSESSMENT — PATIENT HEALTH QUESTIONNAIRE - PHQ9
SUM OF ALL RESPONSES TO PHQ QUESTIONS 1-9: 0
SUM OF ALL RESPONSES TO PHQ QUESTIONS 1-9: 0
1. LITTLE INTEREST OR PLEASURE IN DOING THINGS: 0
2. FEELING DOWN, DEPRESSED OR HOPELESS: 0
SUM OF ALL RESPONSES TO PHQ9 QUESTIONS 1 & 2: 0

## 2020-09-10 NOTE — PROGRESS NOTES
Subjective:  Gloria Reed presents for   Chief Complaint   Patient presents with    Cough     has not gone away. sometimes is productive.  Hoarse     still has hoarse voice.  Dizziness     has improved but still has it.    head is fine. No pnd. Minimal drainage. No head pressure. Rarely blows his nose. No sob. no wheezing  No change in his activity due to his lungs. No cough at night. After the antibiotic nothing really        Will spit ups hard little balls on occ. He is conscious of eating routinely and now his weight has stabilized. 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 · Respiratory: no cough, pleuritic chest pain, dyspnea, or wheezing  · Cardiovascular: no pain, CROWELL, orthopnea, palpitations or claudication  · Gastrointestinal: no chronic nausea, vomiting, heartburn, diarrhea, constipation, bloating, or abdominal pain. No bloody or black stools. Objective:  Physical Exam   Vitals:   Vitals:    09/10/20 1303   BP: 104/70   Pulse: 80   Temp: 97.3 °F (36.3 °C)   TempSrc: Temporal   SpO2: 98%   Weight: 236 lb (107 kg)     Wt Readings from Last 3 Encounters:   09/10/20 236 lb (107 kg)   07/15/20 235 lb 8 oz (106.8 kg)   01/06/20 239 lb 6.4 oz (108.6 kg)     Ht Readings from Last 3 Encounters:   10/09/19 6' 2\" (1.88 m)   10/07/19 6' 2\" (1.88 m)   11/02/18 6' 2\" (1.88 m)     Body mass index is 30.3 kg/m². Constitutional: He is oriented to person, place, and time. He appears well-developed and well-nourished and in no acute distress. Answers all my questions appropriately. Head: Normocephalic and atraumatic.    Eyes:conjunctiva appear normal.  Right Ear: External ear normal. TM is clear  Left Ear: External ear normal. TM is clear  Nose: pink, non-edematous mucosa. No polyps. No septal deviation  Throat: no erythema, tonsillar hypertrophy or exudate. No ulcerations noted. Lips/Teeth/Gums all appear normal.  Neck: Normal range of motion. Neck supple. No tracheal deviation present. No abnormal lymphadenopathy. No JVD noted. Carotids are clear bilaterally. No thyroid masses noted. Heart: RRR without murmur. No S3, S4, or gallop noted. Chest: Clear to auscultation bilaterally. Good breath sounds noted. No rales, wheezes, or rhonchi noted. No respiratory retractions noted. Wall has symmetrical movement with respirations. Assessment:   Encounter Diagnoses   Name Primary?  Cough Yes    Hoarse voice quality          Plan:   There are no discontinued medications. THE ABOVE NOTED DISCONTINUED MEDS MAY ONLY BE FROM 'CLEANING UP' THE MED LIST AND WERE NOT ACTUALLY CANCELED;  SEE CHART FOR DETAILS! Orders Placed This Encounter   Medications    albuterol sulfate (PROAIR RESPICLICK) 991 (90 Base) MCG/ACT aerosol powder inhalation     Sig: Inhale 2 puffs into the lungs every 4 hours as needed for Wheezing or Shortness of Breath     Dispense:  1 Inhaler     Refill:  11     Orders Placed This Encounter   Procedures   Chris Corrigan, Ida Ballard DO, Pulmonology, [place]     Referral Priority:   Routine     Referral Type:   Eval and Treat     Referral Reason:   Specialty Services Required     Referred to Provider:   Adan Frias DO     Requested Specialty:   Pulmonology     Number of Visits Requested:   1     Return in about 6 months (around 3/10/2021). There are no Patient Instructions on file for this visit.   Data Unavailable      Use the inhaler till he see's pulm    Normally I would order a pft , btu due to covid can't do that

## 2020-09-14 ENCOUNTER — TELEPHONE (OUTPATIENT)
Dept: FAMILY MEDICINE CLINIC | Age: 80
End: 2020-09-14

## 2020-09-14 RX ORDER — ALBUTEROL SULFATE 90 UG/1
2 AEROSOL, METERED RESPIRATORY (INHALATION) 4 TIMES DAILY PRN
Qty: 3 INHALER | Refills: 1 | Status: ON HOLD | OUTPATIENT
Start: 2020-09-14 | End: 2021-05-01

## 2020-09-14 NOTE — TELEPHONE ENCOUNTER
Received notification from [employer] that ProAir RespiClick powder is not covered. Spoke with Ceasar Siddiqui at [facility] who stated that there is no generic. Albuterol Sulfate(HFAA) and levalbuterol Tartrate are covered. How would you like to proceed? Rejection notification is attached.

## 2020-09-14 NOTE — TELEPHONE ENCOUNTER
MIREYA stating that one of his medications was not covered by his insurance but  sent one in that is covered. Any questions to call our office.

## 2020-09-16 ENCOUNTER — TELEPHONE (OUTPATIENT)
Dept: FAMILY MEDICINE CLINIC | Age: 80
End: 2020-09-16

## 2020-09-16 NOTE — TELEPHONE ENCOUNTER
Pt needs another referral for a pulmonologist. Enoc Hendrix, who he was referred to, is not in his insurance network.     Pt has [facility] [facility] ESSENTIAL/PLUS

## 2020-09-21 ENCOUNTER — TELEPHONE (OUTPATIENT)
Dept: FAMILY MEDICINE CLINIC | Age: 80
End: 2020-09-21

## 2020-10-30 ENCOUNTER — OFFICE VISIT (OUTPATIENT)
Dept: FAMILY MEDICINE CLINIC | Age: 80
End: 2020-10-30
Payer: MEDICARE

## 2020-10-30 VITALS
SYSTOLIC BLOOD PRESSURE: 122 MMHG | TEMPERATURE: 97.2 F | DIASTOLIC BLOOD PRESSURE: 78 MMHG | HEART RATE: 71 BPM | BODY MASS INDEX: 29.38 KG/M2 | WEIGHT: 228.8 LBS

## 2020-10-30 PROCEDURE — 99213 OFFICE O/P EST LOW 20 MIN: CPT | Performed by: FAMILY MEDICINE

## 2020-10-30 ASSESSMENT — PATIENT HEALTH QUESTIONNAIRE - PHQ9
SUM OF ALL RESPONSES TO PHQ9 QUESTIONS 1 & 2: 0
1. LITTLE INTEREST OR PLEASURE IN DOING THINGS: 0
SUM OF ALL RESPONSES TO PHQ QUESTIONS 1-9: 0
2. FEELING DOWN, DEPRESSED OR HOPELESS: 0
SUM OF ALL RESPONSES TO PHQ QUESTIONS 1-9: 0
SUM OF ALL RESPONSES TO PHQ QUESTIONS 1-9: 0

## 2020-10-30 NOTE — PROGRESS NOTES
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 Constitutional: He is oriented to person, place, and time. He appears well-developed and well-nourished and in no acute distress. Answers all my questions appropriately. Head: Normocephalic and atraumatic. Eyes:conjunctiva appear normal.  Nose: pink, non-edematous mucosa. No polyps. No septal deviation  Throat: no erythema, tonsillar hypertrophy or exudate. No ulcerations noted. Lips/Teeth/Gums all appear normal.  Neck: Normal range of motion. Neck supple. No tracheal deviation present. No abnormal lymphadenopathy. No JVD noted. Carotids are clear bilaterally. No thyroid masses noted. Heart: RRR without murmur. No S3, S4, or gallop noted. Chest: Clear to auscultation bilaterally. Good breath sounds noted. No rales, wheezes, or rhonchi noted. No respiratory retractions noted. Wall has symmetrical movement with respirations. Assessment:   Encounter Diagnoses   Name Primary?     Cough Yes  Hoarse voice quality          Plan:   lewis does want to see a new pumonologist    There are no discontinued medications. THE ABOVE NOTED DISCONTINUED MEDS MAY ONLY BE FROM 'CLEANING UP' THE MED LIST AND WERE NOT ACTUALLY CANCELED;  SEE CHART FOR DETAILS! No orders of the defined types were placed in this encounter. Orders Placed This Encounter   Procedures    Amb External Referral To Pulmonology     Referral Priority:   Routine     Referral Reason:   Specialty Services Required     Referred to Provider:   Marco A Reilly MD     Requested Specialty:   Pulmonology     Number of Visits Requested:   1     No follow-ups on file. There are no Patient Instructions on file for this visit.   Data Unavailable

## 2020-11-10 ENCOUNTER — TELEPHONE (OUTPATIENT)
Dept: FAMILY MEDICINE CLINIC | Age: 80
End: 2020-11-10

## 2020-11-10 NOTE — TELEPHONE ENCOUNTER
Patient came into office stating his cough has not gotten better, he can not talk well due to his cough and he has seen the pulmonologist but they said he is okay.

## 2020-11-10 NOTE — TELEPHONE ENCOUNTER
When he was here on 10/30, he told me this and I referred him to another pulmonologist, dr. Stacia Tesfaye -  I still haven't received anything from the first pulmonologist.    Has he not heard from  yet?

## 2020-11-13 ENCOUNTER — TELEPHONE (OUTPATIENT)
Dept: FAMILY MEDICINE CLINIC | Age: 80
End: 2020-11-13

## 2020-11-13 NOTE — TELEPHONE ENCOUNTER
LMOM to call our office to f/u up in regards to pulmonologist.    Per Leah Bruno he has reviewed the note from  and he agrees with his eval and proposal work up. He wants him to stay with him and finish the work up.

## 2021-04-13 ENCOUNTER — TELEPHONE (OUTPATIENT)
Dept: FAMILY MEDICINE CLINIC | Age: 81
End: 2021-04-13

## 2021-04-30 ENCOUNTER — APPOINTMENT (OUTPATIENT)
Dept: CT IMAGING | Age: 81
DRG: 329 | End: 2021-04-30
Payer: MEDICARE

## 2021-04-30 ENCOUNTER — HOSPITAL ENCOUNTER (INPATIENT)
Age: 81
LOS: 14 days | Discharge: NURSING, REHAB OR LONG-TERM CARE | DRG: 329 | End: 2021-05-14
Attending: EMERGENCY MEDICINE | Admitting: INTERNAL MEDICINE
Payer: MEDICARE

## 2021-04-30 ENCOUNTER — TELEPHONE (OUTPATIENT)
Dept: FAMILY MEDICINE CLINIC | Age: 81
End: 2021-04-30

## 2021-04-30 ENCOUNTER — APPOINTMENT (OUTPATIENT)
Dept: GENERAL RADIOLOGY | Age: 81
DRG: 329 | End: 2021-04-30
Payer: MEDICARE

## 2021-04-30 ENCOUNTER — ANESTHESIA EVENT (OUTPATIENT)
Dept: OPERATING ROOM | Age: 81
DRG: 329 | End: 2021-04-30
Payer: MEDICARE

## 2021-04-30 ENCOUNTER — ANESTHESIA (OUTPATIENT)
Dept: OPERATING ROOM | Age: 81
DRG: 329 | End: 2021-04-30
Payer: MEDICARE

## 2021-04-30 VITALS — OXYGEN SATURATION: 98 % | DIASTOLIC BLOOD PRESSURE: 72 MMHG | SYSTOLIC BLOOD PRESSURE: 116 MMHG | TEMPERATURE: 97.7 F

## 2021-04-30 DIAGNOSIS — K56.600 PARTIAL SMALL BOWEL OBSTRUCTION (HCC): Primary | ICD-10-CM

## 2021-04-30 DIAGNOSIS — N18.9 CHRONIC KIDNEY DISEASE, UNSPECIFIED CKD STAGE: ICD-10-CM

## 2021-04-30 DIAGNOSIS — K43.6 VENTRAL HERNIA WITH BOWEL OBSTRUCTION: ICD-10-CM

## 2021-04-30 PROBLEM — N17.9 AKI (ACUTE KIDNEY INJURY) (HCC): Status: ACTIVE | Noted: 2021-04-30

## 2021-04-30 LAB
-: ABNORMAL
ABSOLUTE EOS #: 0.22 K/UL (ref 0–0.44)
ABSOLUTE IMMATURE GRANULOCYTE: 0.03 K/UL (ref 0–0.3)
ABSOLUTE LYMPH #: 1.1 K/UL (ref 1.1–3.7)
ABSOLUTE MONO #: 0.46 K/UL (ref 0.1–1.2)
ALBUMIN SERPL-MCNC: 4.1 G/DL (ref 3.5–5.2)
ALBUMIN/GLOBULIN RATIO: NORMAL (ref 1–2.5)
ALP BLD-CCNC: 77 U/L (ref 40–129)
ALT SERPL-CCNC: 11 U/L (ref 5–41)
AMORPHOUS: ABNORMAL
ANION GAP SERPL CALCULATED.3IONS-SCNC: 15 MMOL/L (ref 9–17)
AST SERPL-CCNC: 16 U/L
BACTERIA: ABNORMAL
BASOPHILS # BLD: 1 % (ref 0–2)
BASOPHILS ABSOLUTE: 0.06 K/UL (ref 0–0.2)
BILIRUB SERPL-MCNC: 0.35 MG/DL (ref 0.3–1.2)
BILIRUBIN DIRECT: 0.08 MG/DL
BILIRUBIN URINE: NEGATIVE
BILIRUBIN, INDIRECT: 0.27 MG/DL (ref 0–1)
BUN BLDV-MCNC: 42 MG/DL (ref 8–23)
BUN/CREAT BLD: 15 (ref 9–20)
CALCIUM SERPL-MCNC: 9.7 MG/DL (ref 8.6–10.4)
CASTS UA: ABNORMAL /LPF
CHLORIDE BLD-SCNC: 106 MMOL/L (ref 98–107)
CO2: 17 MMOL/L (ref 20–31)
COLOR: YELLOW
COMMENT UA: ABNORMAL
CREAT SERPL-MCNC: 2.85 MG/DL (ref 0.7–1.2)
CRYSTALS, UA: ABNORMAL /HPF
DIFFERENTIAL TYPE: ABNORMAL
EOSINOPHILS RELATIVE PERCENT: 3 % (ref 1–4)
EPITHELIAL CELLS UA: ABNORMAL /HPF (ref 0–5)
GFR AFRICAN AMERICAN: 26 ML/MIN
GFR NON-AFRICAN AMERICAN: 21 ML/MIN
GFR SERPL CREATININE-BSD FRML MDRD: ABNORMAL ML/MIN/{1.73_M2}
GFR SERPL CREATININE-BSD FRML MDRD: ABNORMAL ML/MIN/{1.73_M2}
GLOBULIN: NORMAL G/DL (ref 1.5–3.8)
GLUCOSE BLD-MCNC: 131 MG/DL (ref 70–99)
GLUCOSE BLD-MCNC: 156 MG/DL (ref 75–110)
GLUCOSE URINE: NEGATIVE
HCT VFR BLD CALC: 39.1 % (ref 40.7–50.3)
HEMOGLOBIN: 12.1 G/DL (ref 13–17)
IMMATURE GRANULOCYTES: 0 %
KETONES, URINE: NEGATIVE
LACTIC ACID: 0.9 MMOL/L (ref 0.5–2.2)
LACTIC ACID: 1 MMOL/L (ref 0.5–2.2)
LEUKOCYTE ESTERASE, URINE: ABNORMAL
LIPASE: 36 U/L (ref 13–60)
LYMPHOCYTES # BLD: 14 % (ref 24–43)
MCH RBC QN AUTO: 27.6 PG (ref 25.2–33.5)
MCHC RBC AUTO-ENTMCNC: 30.9 G/DL (ref 28.4–34.8)
MCV RBC AUTO: 89.1 FL (ref 82.6–102.9)
MONOCYTES # BLD: 6 % (ref 3–12)
MUCUS: ABNORMAL
NITRITE, URINE: NEGATIVE
NRBC AUTOMATED: 0 PER 100 WBC
OTHER OBSERVATIONS UA: ABNORMAL
PDW BLD-RTO: 16.5 % (ref 11.8–14.4)
PH UA: 8.5 (ref 5–8)
PLATELET # BLD: 246 K/UL (ref 138–453)
PLATELET ESTIMATE: ABNORMAL
PMV BLD AUTO: 9.3 FL (ref 8.1–13.5)
POTASSIUM SERPL-SCNC: 4.5 MMOL/L (ref 3.7–5.3)
PROTEIN UA: ABNORMAL
RBC # BLD: 4.39 M/UL (ref 4.21–5.77)
RBC # BLD: ABNORMAL 10*6/UL
RBC UA: ABNORMAL /HPF (ref 0–2)
RENAL EPITHELIAL, UA: ABNORMAL /HPF
SARS-COV-2, RAPID: NOT DETECTED
SEG NEUTROPHILS: 76 % (ref 36–65)
SEGMENTED NEUTROPHILS ABSOLUTE COUNT: 6.29 K/UL (ref 1.5–8.1)
SODIUM BLD-SCNC: 138 MMOL/L (ref 135–144)
SPECIFIC GRAVITY UA: 1.01 (ref 1–1.03)
SPECIMEN DESCRIPTION: NORMAL
TOTAL PROTEIN: 8.2 G/DL (ref 6.4–8.3)
TRICHOMONAS: ABNORMAL
TURBIDITY: ABNORMAL
URINE HGB: ABNORMAL
UROBILINOGEN, URINE: NORMAL
WBC # BLD: 8.2 K/UL (ref 3.5–11.3)
WBC # BLD: ABNORMAL 10*3/UL
WBC UA: ABNORMAL /HPF (ref 0–5)
YEAST: ABNORMAL

## 2021-04-30 PROCEDURE — 2500000003 HC RX 250 WO HCPCS: Performed by: NURSE ANESTHETIST, CERTIFIED REGISTERED

## 2021-04-30 PROCEDURE — 0D9670Z DRAINAGE OF STOMACH WITH DRAINAGE DEVICE, VIA NATURAL OR ARTIFICIAL OPENING: ICD-10-PCS | Performed by: FAMILY MEDICINE

## 2021-04-30 PROCEDURE — 3700000000 HC ANESTHESIA ATTENDED CARE: Performed by: SURGERY

## 2021-04-30 PROCEDURE — 6370000000 HC RX 637 (ALT 250 FOR IP): Performed by: STUDENT IN AN ORGANIZED HEALTH CARE EDUCATION/TRAINING PROGRAM

## 2021-04-30 PROCEDURE — 0DN80ZZ RELEASE SMALL INTESTINE, OPEN APPROACH: ICD-10-PCS | Performed by: SURGERY

## 2021-04-30 PROCEDURE — 0WQF0ZZ REPAIR ABDOMINAL WALL, OPEN APPROACH: ICD-10-PCS | Performed by: SURGERY

## 2021-04-30 PROCEDURE — 80076 HEPATIC FUNCTION PANEL: CPT

## 2021-04-30 PROCEDURE — 83690 ASSAY OF LIPASE: CPT

## 2021-04-30 PROCEDURE — 74176 CT ABD & PELVIS W/O CONTRAST: CPT

## 2021-04-30 PROCEDURE — 2709999900 HC NON-CHARGEABLE SUPPLY: Performed by: SURGERY

## 2021-04-30 PROCEDURE — 2720000010 HC SURG SUPPLY STERILE: Performed by: SURGERY

## 2021-04-30 PROCEDURE — 96374 THER/PROPH/DIAG INJ IV PUSH: CPT

## 2021-04-30 PROCEDURE — 2500000003 HC RX 250 WO HCPCS: Performed by: STUDENT IN AN ORGANIZED HEALTH CARE EDUCATION/TRAINING PROGRAM

## 2021-04-30 PROCEDURE — 2580000003 HC RX 258: Performed by: NURSE PRACTITIONER

## 2021-04-30 PROCEDURE — 99223 1ST HOSP IP/OBS HIGH 75: CPT | Performed by: NURSE PRACTITIONER

## 2021-04-30 PROCEDURE — 6360000002 HC RX W HCPCS

## 2021-04-30 PROCEDURE — 74018 RADEX ABDOMEN 1 VIEW: CPT

## 2021-04-30 PROCEDURE — 96375 TX/PRO/DX INJ NEW DRUG ADDON: CPT

## 2021-04-30 PROCEDURE — 3600000002 HC SURGERY LEVEL 2 BASE: Performed by: SURGERY

## 2021-04-30 PROCEDURE — 2580000003 HC RX 258: Performed by: STUDENT IN AN ORGANIZED HEALTH CARE EDUCATION/TRAINING PROGRAM

## 2021-04-30 PROCEDURE — 99283 EMERGENCY DEPT VISIT LOW MDM: CPT

## 2021-04-30 PROCEDURE — 6360000002 HC RX W HCPCS: Performed by: EMERGENCY MEDICINE

## 2021-04-30 PROCEDURE — 6360000002 HC RX W HCPCS: Performed by: STUDENT IN AN ORGANIZED HEALTH CARE EDUCATION/TRAINING PROGRAM

## 2021-04-30 PROCEDURE — 83605 ASSAY OF LACTIC ACID: CPT

## 2021-04-30 PROCEDURE — 88307 TISSUE EXAM BY PATHOLOGIST: CPT

## 2021-04-30 PROCEDURE — 0DTA0ZZ RESECTION OF JEJUNUM, OPEN APPROACH: ICD-10-PCS | Performed by: SURGERY

## 2021-04-30 PROCEDURE — 7100000000 HC PACU RECOVERY - FIRST 15 MIN: Performed by: SURGERY

## 2021-04-30 PROCEDURE — 2000000000 HC ICU R&B

## 2021-04-30 PROCEDURE — 6360000002 HC RX W HCPCS: Performed by: NURSE PRACTITIONER

## 2021-04-30 PROCEDURE — 2580000003 HC RX 258: Performed by: EMERGENCY MEDICINE

## 2021-04-30 PROCEDURE — 3700000001 HC ADD 15 MINUTES (ANESTHESIA): Performed by: SURGERY

## 2021-04-30 PROCEDURE — 2580000003 HC RX 258: Performed by: NURSE ANESTHETIST, CERTIFIED REGISTERED

## 2021-04-30 PROCEDURE — 3600000012 HC SURGERY LEVEL 2 ADDTL 15MIN: Performed by: SURGERY

## 2021-04-30 PROCEDURE — 82947 ASSAY GLUCOSE BLOOD QUANT: CPT

## 2021-04-30 PROCEDURE — 80048 BASIC METABOLIC PNL TOTAL CA: CPT

## 2021-04-30 PROCEDURE — 6360000002 HC RX W HCPCS: Performed by: NURSE ANESTHETIST, CERTIFIED REGISTERED

## 2021-04-30 PROCEDURE — 96376 TX/PRO/DX INJ SAME DRUG ADON: CPT

## 2021-04-30 PROCEDURE — 81001 URINALYSIS AUTO W/SCOPE: CPT

## 2021-04-30 PROCEDURE — 7100000001 HC PACU RECOVERY - ADDTL 15 MIN: Performed by: SURGERY

## 2021-04-30 PROCEDURE — 87635 SARS-COV-2 COVID-19 AMP PRB: CPT

## 2021-04-30 PROCEDURE — P9041 ALBUMIN (HUMAN),5%, 50ML: HCPCS | Performed by: NURSE ANESTHETIST, CERTIFIED REGISTERED

## 2021-04-30 PROCEDURE — 85025 COMPLETE CBC W/AUTO DIFF WBC: CPT

## 2021-04-30 RX ORDER — ONDANSETRON 2 MG/ML
INJECTION INTRAMUSCULAR; INTRAVENOUS PRN
Status: DISCONTINUED | OUTPATIENT
Start: 2021-04-30 | End: 2021-04-30 | Stop reason: SDUPTHER

## 2021-04-30 RX ORDER — 0.9 % SODIUM CHLORIDE 0.9 %
1000 INTRAVENOUS SOLUTION INTRAVENOUS ONCE
Status: COMPLETED | OUTPATIENT
Start: 2021-04-30 | End: 2021-04-30

## 2021-04-30 RX ORDER — MIDAZOLAM HYDROCHLORIDE 1 MG/ML
INJECTION INTRAMUSCULAR; INTRAVENOUS PRN
Status: DISCONTINUED | OUTPATIENT
Start: 2021-04-30 | End: 2021-04-30 | Stop reason: SDUPTHER

## 2021-04-30 RX ORDER — ALBUTEROL SULFATE 90 UG/1
2 AEROSOL, METERED RESPIRATORY (INHALATION) EVERY 4 HOURS PRN
Status: DISCONTINUED | OUTPATIENT
Start: 2021-04-30 | End: 2021-05-14 | Stop reason: HOSPADM

## 2021-04-30 RX ORDER — SODIUM CHLORIDE 9 MG/ML
25 INJECTION, SOLUTION INTRAVENOUS PRN
Status: DISCONTINUED | OUTPATIENT
Start: 2021-04-30 | End: 2021-05-14 | Stop reason: HOSPADM

## 2021-04-30 RX ORDER — SODIUM CHLORIDE 0.9 % (FLUSH) 0.9 %
10 SYRINGE (ML) INJECTION PRN
Status: DISCONTINUED | OUTPATIENT
Start: 2021-04-30 | End: 2021-05-04 | Stop reason: SDUPTHER

## 2021-04-30 RX ORDER — ACETAMINOPHEN 160 MG/5ML
1000 SOLUTION ORAL EVERY 8 HOURS SCHEDULED
Status: DISCONTINUED | OUTPATIENT
Start: 2021-04-30 | End: 2021-05-06

## 2021-04-30 RX ORDER — LIDOCAINE HYDROCHLORIDE 20 MG/ML
INJECTION, SOLUTION EPIDURAL; INFILTRATION; INTRACAUDAL; PERINEURAL PRN
Status: DISCONTINUED | OUTPATIENT
Start: 2021-04-30 | End: 2021-04-30

## 2021-04-30 RX ORDER — SODIUM CHLORIDE 9 MG/ML
INJECTION, SOLUTION INTRAVENOUS CONTINUOUS
Status: DISCONTINUED | OUTPATIENT
Start: 2021-04-30 | End: 2021-04-30

## 2021-04-30 RX ORDER — ALBUMIN, HUMAN INJ 5% 5 %
SOLUTION INTRAVENOUS PRN
Status: DISCONTINUED | OUTPATIENT
Start: 2021-04-30 | End: 2021-04-30 | Stop reason: SDUPTHER

## 2021-04-30 RX ORDER — LIDOCAINE 4 G/G
2 PATCH TOPICAL EVERY 12 HOURS
Status: DISCONTINUED | OUTPATIENT
Start: 2021-04-30 | End: 2021-05-14 | Stop reason: HOSPADM

## 2021-04-30 RX ORDER — PROPOFOL 10 MG/ML
INJECTION, EMULSION INTRAVENOUS PRN
Status: DISCONTINUED | OUTPATIENT
Start: 2021-04-30 | End: 2021-04-30 | Stop reason: SDUPTHER

## 2021-04-30 RX ORDER — FENTANYL CITRATE 50 UG/ML
INJECTION, SOLUTION INTRAMUSCULAR; INTRAVENOUS PRN
Status: DISCONTINUED | OUTPATIENT
Start: 2021-04-30 | End: 2021-04-30 | Stop reason: SDUPTHER

## 2021-04-30 RX ORDER — SODIUM CHLORIDE 0.9 % (FLUSH) 0.9 %
5-40 SYRINGE (ML) INJECTION EVERY 12 HOURS SCHEDULED
Status: DISCONTINUED | OUTPATIENT
Start: 2021-04-30 | End: 2021-05-14 | Stop reason: HOSPADM

## 2021-04-30 RX ORDER — SODIUM CHLORIDE, SODIUM LACTATE, POTASSIUM CHLORIDE, CALCIUM CHLORIDE 600; 310; 30; 20 MG/100ML; MG/100ML; MG/100ML; MG/100ML
INJECTION, SOLUTION INTRAVENOUS CONTINUOUS
Status: DISCONTINUED | OUTPATIENT
Start: 2021-04-30 | End: 2021-05-01

## 2021-04-30 RX ORDER — METHOCARBAMOL 500 MG/1
500 TABLET, FILM COATED ORAL EVERY 6 HOURS SCHEDULED
Status: DISCONTINUED | OUTPATIENT
Start: 2021-04-30 | End: 2021-05-05

## 2021-04-30 RX ORDER — ONDANSETRON 2 MG/ML
4 INJECTION INTRAMUSCULAR; INTRAVENOUS EVERY 6 HOURS PRN
Status: DISCONTINUED | OUTPATIENT
Start: 2021-04-30 | End: 2021-05-14 | Stop reason: HOSPADM

## 2021-04-30 RX ORDER — SUCCINYLCHOLINE/SOD CL,ISO/PF 100 MG/5ML
SYRINGE (ML) INTRAVENOUS PRN
Status: DISCONTINUED | OUTPATIENT
Start: 2021-04-30 | End: 2021-04-30 | Stop reason: SDUPTHER

## 2021-04-30 RX ORDER — ONDANSETRON 2 MG/ML
4 INJECTION INTRAMUSCULAR; INTRAVENOUS ONCE
Status: COMPLETED | OUTPATIENT
Start: 2021-04-30 | End: 2021-04-30

## 2021-04-30 RX ORDER — SODIUM CHLORIDE 9 MG/ML
25 INJECTION, SOLUTION INTRAVENOUS PRN
Status: DISCONTINUED | OUTPATIENT
Start: 2021-04-30 | End: 2021-04-30

## 2021-04-30 RX ORDER — HYDROMORPHONE HYDROCHLORIDE 1 MG/ML
1 INJECTION, SOLUTION INTRAMUSCULAR; INTRAVENOUS; SUBCUTANEOUS ONCE
Status: COMPLETED | OUTPATIENT
Start: 2021-04-30 | End: 2021-04-30

## 2021-04-30 RX ORDER — KETAMINE HCL IN NACL, ISO-OSM 100MG/10ML
SYRINGE (ML) INJECTION PRN
Status: DISCONTINUED | OUTPATIENT
Start: 2021-04-30 | End: 2021-04-30 | Stop reason: SDUPTHER

## 2021-04-30 RX ORDER — ONDANSETRON 2 MG/ML
4 INJECTION INTRAMUSCULAR; INTRAVENOUS EVERY 6 HOURS PRN
Status: DISCONTINUED | OUTPATIENT
Start: 2021-04-30 | End: 2021-04-30

## 2021-04-30 RX ORDER — ACETAMINOPHEN 325 MG/1
200 TABLET ORAL PRN
Status: ON HOLD | COMMUNITY
End: 2021-05-01

## 2021-04-30 RX ORDER — SODIUM CHLORIDE 0.9 % (FLUSH) 0.9 %
5-40 SYRINGE (ML) INJECTION EVERY 12 HOURS SCHEDULED
Status: DISCONTINUED | OUTPATIENT
Start: 2021-04-30 | End: 2021-05-04 | Stop reason: SDUPTHER

## 2021-04-30 RX ORDER — VECURONIUM BROMIDE 1 MG/ML
INJECTION, POWDER, LYOPHILIZED, FOR SOLUTION INTRAVENOUS PRN
Status: DISCONTINUED | OUTPATIENT
Start: 2021-04-30 | End: 2021-04-30 | Stop reason: SDUPTHER

## 2021-04-30 RX ORDER — PHENYLEPHRINE HCL IN 0.9% NACL 1 MG/10 ML
SYRINGE (ML) INTRAVENOUS PRN
Status: DISCONTINUED | OUTPATIENT
Start: 2021-04-30 | End: 2021-04-30 | Stop reason: SDUPTHER

## 2021-04-30 RX ORDER — POTASSIUM CHLORIDE 20 MEQ/1
40 TABLET, EXTENDED RELEASE ORAL PRN
Status: DISCONTINUED | OUTPATIENT
Start: 2021-04-30 | End: 2021-05-01

## 2021-04-30 RX ORDER — SODIUM CHLORIDE 9 MG/ML
INJECTION, SOLUTION INTRAVENOUS CONTINUOUS PRN
Status: DISCONTINUED | OUTPATIENT
Start: 2021-04-30 | End: 2021-04-30 | Stop reason: SDUPTHER

## 2021-04-30 RX ORDER — NICOTINE 21 MG/24HR
1 PATCH, TRANSDERMAL 24 HOURS TRANSDERMAL DAILY PRN
Status: CANCELLED | OUTPATIENT
Start: 2021-04-30

## 2021-04-30 RX ORDER — POTASSIUM CHLORIDE 7.45 MG/ML
10 INJECTION INTRAVENOUS PRN
Status: DISCONTINUED | OUTPATIENT
Start: 2021-04-30 | End: 2021-05-01

## 2021-04-30 RX ORDER — OXYCODONE HYDROCHLORIDE 5 MG/1
5 TABLET ORAL EVERY 4 HOURS PRN
Status: DISCONTINUED | OUTPATIENT
Start: 2021-04-30 | End: 2021-05-03

## 2021-04-30 RX ORDER — SODIUM CHLORIDE 0.9 % (FLUSH) 0.9 %
5-40 SYRINGE (ML) INJECTION PRN
Status: DISCONTINUED | OUTPATIENT
Start: 2021-04-30 | End: 2021-05-14 | Stop reason: HOSPADM

## 2021-04-30 RX ORDER — HYDROMORPHONE HYDROCHLORIDE 1 MG/ML
1 INJECTION, SOLUTION INTRAMUSCULAR; INTRAVENOUS; SUBCUTANEOUS
Status: DISCONTINUED | OUTPATIENT
Start: 2021-04-30 | End: 2021-04-30

## 2021-04-30 RX ORDER — HEPARIN SODIUM 5000 [USP'U]/ML
5000 INJECTION, SOLUTION INTRAVENOUS; SUBCUTANEOUS EVERY 8 HOURS SCHEDULED
Status: DISCONTINUED | OUTPATIENT
Start: 2021-05-01 | End: 2021-05-14 | Stop reason: HOSPADM

## 2021-04-30 RX ORDER — MAGNESIUM SULFATE 1 G/100ML
1000 INJECTION INTRAVENOUS PRN
Status: DISCONTINUED | OUTPATIENT
Start: 2021-04-30 | End: 2021-05-01

## 2021-04-30 RX ADMIN — ALBUMIN (HUMAN) 500 ML: 12.5 INJECTION, SOLUTION INTRAVENOUS at 18:37

## 2021-04-30 RX ADMIN — Medication 100 MCG: at 18:56

## 2021-04-30 RX ADMIN — SODIUM CHLORIDE, POTASSIUM CHLORIDE, SODIUM LACTATE AND CALCIUM CHLORIDE: 600; 310; 30; 20 INJECTION, SOLUTION INTRAVENOUS at 20:27

## 2021-04-30 RX ADMIN — ONDANSETRON 4 MG: 2 INJECTION INTRAMUSCULAR; INTRAVENOUS at 16:25

## 2021-04-30 RX ADMIN — METHOCARBAMOL TABLETS 500 MG: 500 TABLET, COATED ORAL at 21:56

## 2021-04-30 RX ADMIN — Medication 200 MCG: at 18:29

## 2021-04-30 RX ADMIN — SODIUM CHLORIDE: 9 INJECTION, SOLUTION INTRAVENOUS at 17:56

## 2021-04-30 RX ADMIN — Medication 200 MCG: at 18:05

## 2021-04-30 RX ADMIN — ONDANSETRON 4 MG: 2 INJECTION, SOLUTION INTRAMUSCULAR; INTRAVENOUS at 18:43

## 2021-04-30 RX ADMIN — HYDROMORPHONE HYDROCHLORIDE 1 MG: 1 INJECTION, SOLUTION INTRAMUSCULAR; INTRAVENOUS; SUBCUTANEOUS at 14:55

## 2021-04-30 RX ADMIN — HYDROMORPHONE HYDROCHLORIDE 0.5 MG: 1 INJECTION, SOLUTION INTRAMUSCULAR; INTRAVENOUS; SUBCUTANEOUS at 20:20

## 2021-04-30 RX ADMIN — PROPOFOL 160 MG: 10 INJECTION, EMULSION INTRAVENOUS at 17:24

## 2021-04-30 RX ADMIN — CEFOXITIN SODIUM 2000 MG: 2 POWDER, FOR SOLUTION INTRAVENOUS at 16:26

## 2021-04-30 RX ADMIN — FAMOTIDINE 10 MG: 10 INJECTION, SOLUTION INTRAVENOUS at 21:57

## 2021-04-30 RX ADMIN — SODIUM CHLORIDE: 9 INJECTION, SOLUTION INTRAVENOUS at 16:26

## 2021-04-30 RX ADMIN — Medication 50 MG: at 17:38

## 2021-04-30 RX ADMIN — SODIUM CHLORIDE: 9 INJECTION, SOLUTION INTRAVENOUS at 17:15

## 2021-04-30 RX ADMIN — ONDANSETRON 4 MG: 2 INJECTION INTRAMUSCULAR; INTRAVENOUS at 12:39

## 2021-04-30 RX ADMIN — SODIUM CHLORIDE, PRESERVATIVE FREE 10 ML: 5 INJECTION INTRAVENOUS at 21:56

## 2021-04-30 RX ADMIN — HYDROMORPHONE HYDROCHLORIDE 0.5 MG: 1 INJECTION, SOLUTION INTRAMUSCULAR; INTRAVENOUS; SUBCUTANEOUS at 12:40

## 2021-04-30 RX ADMIN — Medication 100 MCG: at 17:23

## 2021-04-30 RX ADMIN — Medication 100 MG: at 17:24

## 2021-04-30 RX ADMIN — Medication 200 MCG: at 18:18

## 2021-04-30 RX ADMIN — Medication 200 MCG: at 17:58

## 2021-04-30 RX ADMIN — Medication 200 MCG: at 18:12

## 2021-04-30 RX ADMIN — SODIUM CHLORIDE, PRESERVATIVE FREE 10 ML: 5 INJECTION INTRAVENOUS at 22:02

## 2021-04-30 RX ADMIN — ACETAMINOPHEN ORAL SOLUTION 1000 MG: 325 SOLUTION ORAL at 21:56

## 2021-04-30 RX ADMIN — OXYCODONE 5 MG: 5 TABLET ORAL at 22:55

## 2021-04-30 RX ADMIN — SUGAMMADEX 200 MG: 100 INJECTION, SOLUTION INTRAVENOUS at 18:47

## 2021-04-30 RX ADMIN — SODIUM CHLORIDE 1000 ML: 9 INJECTION, SOLUTION INTRAVENOUS at 12:39

## 2021-04-30 RX ADMIN — VECURONIUM BROMIDE 3 MG: 1 INJECTION, POWDER, LYOPHILIZED, FOR SOLUTION INTRAVENOUS at 18:02

## 2021-04-30 RX ADMIN — MIDAZOLAM 2 MG: 1 INJECTION INTRAMUSCULAR; INTRAVENOUS at 17:23

## 2021-04-30 RX ADMIN — HYDROMORPHONE HYDROCHLORIDE 0.5 MG: 1 INJECTION, SOLUTION INTRAMUSCULAR; INTRAVENOUS; SUBCUTANEOUS at 23:24

## 2021-04-30 RX ADMIN — HYDROMORPHONE HYDROCHLORIDE 1 MG: 1 INJECTION, SOLUTION INTRAMUSCULAR; INTRAVENOUS; SUBCUTANEOUS at 14:12

## 2021-04-30 RX ADMIN — VECURONIUM BROMIDE 5 MG: 1 INJECTION, POWDER, LYOPHILIZED, FOR SOLUTION INTRAVENOUS at 17:32

## 2021-04-30 RX ADMIN — Medication 100 MCG: at 17:50

## 2021-04-30 ASSESSMENT — PAIN DESCRIPTION - LOCATION
LOCATION: ABDOMEN
LOCATION: ABDOMEN

## 2021-04-30 ASSESSMENT — PAIN DESCRIPTION - PROGRESSION
CLINICAL_PROGRESSION: NOT CHANGED
CLINICAL_PROGRESSION: NOT CHANGED

## 2021-04-30 ASSESSMENT — PULMONARY FUNCTION TESTS
PIF_VALUE: 19
PIF_VALUE: 19
PIF_VALUE: 20
PIF_VALUE: 19
PIF_VALUE: 17
PIF_VALUE: 19
PIF_VALUE: 0
PIF_VALUE: 18
PIF_VALUE: 20
PIF_VALUE: 20
PIF_VALUE: 17
PIF_VALUE: 19
PIF_VALUE: 19
PIF_VALUE: 2
PIF_VALUE: 19
PIF_VALUE: 19
PIF_VALUE: 16
PIF_VALUE: 19
PIF_VALUE: 18
PIF_VALUE: 13
PIF_VALUE: 19
PIF_VALUE: 20
PIF_VALUE: 16
PIF_VALUE: 0
PIF_VALUE: 20
PIF_VALUE: 17
PIF_VALUE: 19
PIF_VALUE: 15
PIF_VALUE: 19
PIF_VALUE: 20
PIF_VALUE: 19
PIF_VALUE: 18
PIF_VALUE: 1
PIF_VALUE: 19
PIF_VALUE: 20
PIF_VALUE: 17
PIF_VALUE: 16
PIF_VALUE: 17
PIF_VALUE: 19
PIF_VALUE: 20
PIF_VALUE: 20
PIF_VALUE: 19
PIF_VALUE: 16
PIF_VALUE: 20
PIF_VALUE: 19

## 2021-04-30 ASSESSMENT — ENCOUNTER SYMPTOMS
RHINORRHEA: 0
SHORTNESS OF BREATH: 0
COUGH: 0
DIARRHEA: 0
EYE DISCHARGE: 0
NAUSEA: 0
SORE THROAT: 0
EYE REDNESS: 0
COLOR CHANGE: 0
VOMITING: 0
SHORTNESS OF BREATH: 0
ABDOMINAL PAIN: 1

## 2021-04-30 ASSESSMENT — PAIN DESCRIPTION - DESCRIPTORS
DESCRIPTORS: ACHING
DESCRIPTORS: ACHING

## 2021-04-30 ASSESSMENT — PAIN DESCRIPTION - ORIENTATION
ORIENTATION: RIGHT;LOWER
ORIENTATION: MID

## 2021-04-30 ASSESSMENT — PAIN DESCRIPTION - FREQUENCY
FREQUENCY: CONTINUOUS
FREQUENCY: CONTINUOUS

## 2021-04-30 ASSESSMENT — PAIN SCALES - GENERAL
PAINLEVEL_OUTOF10: 10
PAINLEVEL_OUTOF10: 9
PAINLEVEL_OUTOF10: 9

## 2021-04-30 ASSESSMENT — PAIN DESCRIPTION - ONSET
ONSET: ON-GOING
ONSET: ON-GOING

## 2021-04-30 NOTE — H&P
[facility]  Office: [address], DO, Alex Welch, DO, [place], DO, Kailee Bautista Blood, DO, Raissa Metzger MD, Oni Ryan MD, Felix Beal MD, Abiodun Duval MD, Ruth Dougherty MD, Nettie Sprague MD, Kateryna Rushing MD, Jc Magallon MD, Anahi Robertson, DO, Hi Barney MD, Jose Andrew, DO, Kandis Padilla MD,  Karlie nOeill DO, Brien Chauhan MD, Poncho Alegria MD, Sarkis Sylvester MD, Zak Elkins MD, Gaston Gallagher, [facility], [facility]ah, CNP, Jason Aguayo, CNP, Jeannie Croft, CNS, Scarlet Santana, CNP, Emy Kimbrough, CNP, Clifton Salgado, CNP, Velasquez Walker, CNP, Dipika Monsalve, CNP, Pilar Rodriguez PA-C, Daniella Palmer, [facility], Felix Monsalve, CNP, Stephon Osborn, CNP, Ingris Hooper, CNP, Maya Watson, CNP, Seamus Gomez, CNP, Frances Mock, [facility]    HISTORY AND PHYSICAL EXAMINATION            Date:   4/30/2021  Patient name:  Juan David Zhao  Date of admission:  4/30/2021 12:15 PM  MRN:   [id number]  Account:  [de-identified]  YOB: 1940  PCP:    Megan Grissom MD  Room:   [place] [id number]  Code Status:    Full Code    Chief Complaint:     Chief Complaint   Patient presents with    Hernia     LOWER RIGHT ABDOMEN. recent increase in pain and growth       History Obtained From:     patient    History of Present Illness:     Juan David Zhao is a 80 y.o. Non-/non  male  with a history of ventral hernia who presents progressively worsening RLQ pain and increase in size of hernia and is admitted to the hospital for the management of Ventral hernia with bowel obstruction noted on CT abd/pelvis. States it has gotten progressively bigger over several years but he had severe pain that started last night with some intermittent nausea but no vomiting. He denies any fever chills, chest pain, shortness of breath.  He does have a known history of a right renal mass with cryoablation in 2019, kidneys stones and bladder and prostate cancer in 2006, s/p prostatectomy and chemo. General surgery was consulted in ED and suggested manual reduction which was unsuccessful. Rapid covid ordered for presurgical testing which was negative     Creatinine noted to be 2.85, based on review of labs in epic he appears to have some history of CKD although patient denies, likely CKD 3. Past Medical History:     Past Medical History:   Diagnosis Date    Arthritis     Caffeine use     2 coffee, 2 cans soda/day    Cancer [facility]) 2006    bladder et prostate, 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    GERD (gastroesophageal reflux disease)     Hernia, inguinal, right     Hypertension     Kidney stone     Presence of urostomy ([facility] [address]     Retinal detachment         Past Surgical History:     Past Surgical History:   Procedure Laterality Date    BLADDER REMOVAL  2006    cancer, s/p urostomy     CATARACT REMOVAL Bilateral     COLONOSCOPY      EYE SURGERY Right 2000    macular hole    HERNIA REPAIR      x2    INCISIONAL HERNIA REPAIR  12/7/2015    parastomal hernia repair - Dr. Diego Farooq  11/2014   [address]  2006    removal    VITRECTOMY Left 02/02/16        Medications Prior to Admission:     Prior to Admission medications    Medication Sig Start Date End Date Taking?  Authorizing Provider   acetaminophen (TYLENOL) 325 MG tablet Take 200 mg by mouth as needed    Historical Provider, MD   albuterol sulfate HFA (VENTOLIN HFA) 108 (90 Base) MCG/ACT inhaler Inhale 2 puffs into the lungs 4 times daily as needed for Wheezing 9/14/20   Grayson Kim MD   albuterol sulfate (PROAIR RESPICLICK) 199 (90 Base) MCG/ACT aerosol powder inhalation Inhale 2 puffs into the lungs every 4 hours as needed for Wheezing or Shortness of Breath 9/10/20   Grayson Kim MD   acetaminophen (TYLENOL) 500 MG tablet Take 500 mg by mouth daily 2 tablers once daily    Historical Provider, MD        Allergies:     Nsaids and Tetracyclines & related    Social History:     Tobacco:    reports that he quit smoking about 41 years ago. His smoking use included cigarettes. He has a 10.00 pack-year smoking history. He has never used smokeless tobacco.  Alcohol:      reports no history of alcohol use. Drug Use:  reports no history of drug use. Family History:     Family History   Problem Relation Age of Onset    Diabetes Mother     Diabetes Sister        Review of Systems:     Positive and Negative as described in HPI. CONSTITUTIONAL:  negative for fevers, chills, sweats, fatigue, weight loss  HEENT:  negative for vision, hearing changes, runny nose, throat pain  RESPIRATORY:  negative for shortness of breath, cough, congestion, wheezing  CARDIOVASCULAR:  negative for chest pain, palpitations  GASTROINTESTINAL: Positive for nausea, abdominal pain. Negative for vomiting, diarrhea, constipation, change in bowel habits  GENITOURINARY:  negative for difficulty of urination, burning with urination, frequency   INTEGUMENT:  negative for rash, skin lesions, easy bruising   HEMATOLOGIC/LYMPHATIC:  negative for swelling/edema   ALLERGIC/IMMUNOLOGIC:  negative for urticaria , itching  ENDOCRINE:  negative increase in drinking, increase in urination, hot or cold intolerance  MUSCULOSKELETAL:  negative joint pains, muscle aches, swelling of joints  NEUROLOGICAL:  negative for headaches, dizziness, lightheadedness, numbness, pain, tingling extremities  BEHAVIOR/PSYCH:  negative for depression, anxiety    Physical Exam:   BP (!) 128/94   Pulse 74   Temp 98.1 °F (36.7 °C) (Oral)   Resp 18   Ht 6' 1\" (1.854 m)   Wt 225 lb (102.1 kg)   SpO2 100%   BMI 29.69 kg/m²   Temp (24hrs), Av.1 °F (36.7 °C), Min:98.1 °F (36.7 °C), Max:98.1 °F (36.7 °C)    No results for input(s): POCGLU in the last 72 hours.   No intake or output data in the 24 hours ending 21 0953    General Appearance:  alert, appears uncomfortable but in no acute distress  Mental status: oriented to person, place, and time  Head:  normocephalic, atraumatic  Eye: no icterus, redness, pupils equal and reactive, extraocular eye movements intact, conjunctiva clear  Ear: normal external ear, no discharge, hearing intact  Nose:  no drainage noted  Mouth: mucous membranes moist  Neck: supple, no carotid bruits, thyroid not palpable  Lungs: Bilateral equal air entry, clear to auscultation, no wheezing, rales or rhonchi, normal effort  Cardiovascular: normal rate, regular rhythm, no murmur, gallop, rub.   Abdomen: Soft, very large right lower abdominal hernia, tender to palpation, + guarding, normal bowel sounds, no hepatomegaly or splenomegaly  Neurologic: There are no new focal motor or sensory deficits, normal muscle tone and bulk, no abnormal sensation, normal speech, cranial nerves II through XII grossly intact  Skin: No gross lesions, rashes, bruising or bleeding on exposed skin area  Extremities:  peripheral pulses palpable, no pedal edema or calf pain with palpation  Psych: normal affect     Investigations:      Laboratory Testing:  Recent Results (from the past 24 hour(s))   Basic Metabolic Panel    Collection Time: 04/30/21 12:30 PM   Result Value Ref Range    Glucose 131 (H) 70 - 99 mg/dL    BUN 42 (H) 8 - 23 mg/dL    CREATININE 2.85 (H) 0.70 - 1.20 mg/dL    Bun/Cre Ratio 15 9 - 20    Calcium 9.7 8.6 - 10.4 mg/dL    Sodium 138 135 - 144 mmol/L    Potassium 4.5 3.7 - 5.3 mmol/L    Chloride 106 98 - 107 mmol/L    CO2 17 (L) 20 - 31 mmol/L    Anion Gap 15 9 - 17 mmol/L    GFR Non-African American 21 (L) >60 mL/min    GFR  26 (L) >60 mL/min    GFR Comment          GFR Staging NOT REPORTED    CBC Auto Differential    Collection Time: 04/30/21 12:30 PM   Result Value Ref Range    WBC 8.2 3.5 - 11.3 k/uL    RBC 4.39 4.21 - 5.77 m/uL    Hemoglobin 12.1 (L) 13.0 - 17.0 g/dL    Hematocrit 39.1 (L) 40.7 - 50.3 %    MCV 89.1 82.6 - 102.9 fL    MCH 27.6 25.2 - 33.5 pg MCHC 30.9 28.4 - 34.8 g/dL    RDW 16.5 (H) 11.8 - 14.4 %    Platelets 614 373 - 355 k/uL    MPV 9.3 8.1 - 13.5 fL    NRBC Automated 0.0 0.0 per 100 WBC    Differential Type NOT REPORTED     Seg Neutrophils 76 (H) 36 - 65 %    Lymphocytes 14 (L) 24 - 43 %    Monocytes 6 3 - 12 %    Eosinophils % 3 1 - 4 %    Basophils 1 0 - 2 %    Immature Granulocytes 0 0 %    Segs Absolute 6.29 1.50 - 8.10 k/uL    Absolute Lymph # 1.10 1.10 - 3.70 k/uL    Absolute Mono # 0.46 0.10 - 1.20 k/uL    Absolute Eos # 0.22 0.00 - 0.44 k/uL    Basophils Absolute 0.06 0.00 - 0.20 k/uL    Absolute Immature Granulocyte 0.03 0.00 - 0.30 k/uL    WBC Morphology NOT REPORTED     RBC Morphology ANISOCYTOSIS PRESENT     Platelet Estimate NOT REPORTED    Hepatic Function Panel    Collection Time: 04/30/21 12:30 PM   Result Value Ref Range    Albumin 4.1 3.5 - 5.2 g/dL    Alkaline Phosphatase 77 40 - 129 U/L    ALT 11 5 - 41 U/L    AST 16 <40 U/L    Total Bilirubin 0.35 0.3 - 1.2 mg/dL    Bilirubin, Direct 0.08 <0.31 mg/dL    Bilirubin, Indirect 0.27 0.00 - 1.00 mg/dL    Total Protein 8.2 6.4 - 8.3 g/dL    Globulin NOT REPORTED 1.5 - 3.8 g/dL    Albumin/Globulin Ratio NOT REPORTED 1.0 - 2.5   Lipase    Collection Time: 04/30/21 12:30 PM   Result Value Ref Range    Lipase 36 13 - 60 U/L   Lactic Acid    Collection Time: 04/30/21 12:30 PM   Result Value Ref Range    Lactic Acid 1.0 0.5 - 2.2 mmol/L   Urinalysis    Collection Time: 04/30/21  1:35 PM   Result Value Ref Range    Color, UA YELLOW YELLOW    Turbidity UA TURBID (A) CLEAR    Glucose, Ur NEGATIVE NEGATIVE    Bilirubin Urine NEGATIVE NEGATIVE    Ketones, Urine NEGATIVE NEGATIVE    Specific Gravity, UA 1.015 1.005 - 1.030    Urine Hgb 3+ (A) NEGATIVE    pH, UA 8.5 (H) 5.0 - 8.0    Protein, UA 2+ (A) NEGATIVE    Urobilinogen, Urine Normal Normal    Nitrite, Urine NEGATIVE NEGATIVE    Leukocyte Esterase, Urine MOD (A) NEGATIVE    Urinalysis Comments NOT REPORTED    Microscopic Urinalysis Collection Time: 04/30/21  1:35 PM   Result Value Ref Range    -          WBC, UA 10 TO 20 0 - 5 /HPF    RBC, UA 5 TO 10 0 - 2 /HPF    Casts UA NOT REPORTED /LPF    Crystals, UA NOT REPORTED None /HPF    Epithelial Cells UA 0 TO 2 0 - 5 /HPF    Renal Epithelial, UA NOT REPORTED 0 /HPF    Bacteria, UA MANY (A) None    Mucus, UA NOT REPORTED None    Trichomonas, UA NOT REPORTED None    Amorphous, UA NOT REPORTED None    Other Observations UA NOT REPORTED NOT REQ. Yeast, UA NOT REPORTED None   Lactic Acid    Collection Time: 04/30/21  2:15 PM   Result Value Ref Range    Lactic Acid 0.9 0.5 - 2.2 mmol/L   COVID-19, Rapid    Collection Time: 04/30/21  3:00 PM    Specimen: Nasopharyngeal Swab   Result Value Ref Range    Specimen Description . NASOPHARYNGEAL SWAB     SARS-CoV-2, Rapid Not Detected Not Detected       Imaging/Diagnostics:  Ct Abdomen Pelvis Wo Contrast Additional Contrast? None    Result Date: 4/30/2021  1. Partial small bowel obstruction with a transition point located at the mouth of the ventral hernia. 2. Extensive stone burden identified in the bilateral kidneys, left greater than right. 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 3. Enlargement of the right upper pole mass, likely representing renal cell carcinoma. 4. Enlarged left para-aortic lymph node measuring 27 x 20 mm. Finding is nonspecific and may represent metastatic disease or reactive adenopathy. 5. Severe diverticulosis. 6. Cholelithiasis. Assessment :      Hospital Problems           Last Modified POA    * (Principal) Ventral hernia with bowel obstruction 4/30/2021 Yes    Essential hypertension (Chronic) 4/30/2021 Yes    Renal mass (Chronic) 4/30/2021 Yes    KARMA (acute kidney injury) ([facility] [address] 4/30/2021 Yes          Plan:     Patient status inpatient in the Med/Surge    1.  Resume

## 2021-04-30 NOTE — CARE COORDINATION
Case Management Initial Discharge Plan  Levon Carbone,         Readmission Risk              Risk of Unplanned Readmission:        0             Met with:patient or family member patient and son Leslye Warren and daughter in law to discuss discharge plans. Information verified: address, contacts, phone number, , insurance Yes  PCP: Nick Esteban MD    Date of last visit: saw another provider today that called EMS    Insurance Provider: [employer]    Discharge Planning  Current Residence:   house  Living Arrangements:   alone   Home has 1 stories/2-3 stairs to climb  Support Systems:   family  Current Services PTA:   no Supplier: none  Patient able to perform ADL's:Independent  DME used to aid ambulation prior to admission: 2ww, wheelchair, cane, grab bars bathroom  During admission: none    Potential Assistance Needed:   home care vs snf    Pharmacy: [facility]   Potential Assistance Purchasing Medications:   no  Does patient want to participate in local refill/ meds to beds program?   no    Patient agreeable to home care: tbd  [place] of choice provided:  yes      Type of Home Care Services:     Patient expects to be discharged to:   home    Prior SNF/Rehab Placement and Facility: no  Agreeable to SNF/Rehab: tbd  [place] of choice provided: yes   Evaluation: yes    Expected Discharge date:   21  Follow Up Appointment: Best Day/ Time:      Transportation provider: family vs [facility]  Transportation arrangements needed for discharge: tbd    Discharge Plan:   Patient lives at home alone in 1 story home with 2-3 steps to enter. Patient was independent with adl's and has dme at home. Per patient's son, patient has had several falls in the last 30-60 days. They stated patient will take Tylenol pm to help him sleep because the cat bugs him. Son stated patient will wake up an hour later and take another one.  They stated patient will wake up on the floor or be found on the floor and not know how he got there. Family stated they have thrown away many Tylenol pm bottles but patient will go out and buy another one. Son stated that patient's pcp is Dr. Frederick Salinas and without an appt will drive to his office to be seen. Son stated if Dr. Frederick Salinas is not in the office he will drive to another Dr. Jamaal Montesinos and is what happened today. Due to patient's Hernia and pain level, EMS was called and brought in to ED. Patient is [facility] and ex-wife Martina Longoria is POA-POA/Living Will paperwork requested. Family notified Jan. Patient is admitted for Hernia. Home care vs snf.         Electronically signed by Georgianne Nissen, LSW on 4/30/21 at 3:02 PM EDT

## 2021-04-30 NOTE — CONSULTS
General Surgery:  Consult Note        PATIENT NAME: French Felix   YOB: 1940    ADMISSION DATE: 4/30/2021 12:15 PM     Consulted Physician: Dr. Brandon Villeda DATE: 4/30/2021    Chief Complaint:  Abdominal pain  Consult Regarding:  Incarcerated parastomal hernia     HISTORY OF PRESENT ILLNESS:  The patient is a 80 y.o. male  H/o ckd, L RCC s/p ablation, bladder ca s/p cystectomy w/ creation of ileoconduit in 2006, sbo s/p exlap sbr, recurrent parastomal hernia presents with progressively enlarging parastomal hernia which now measures 20 x 25 cm in the right lower quadrant. Pt states its always large but after lifting a trailer on Monday it has progressively gotten large, becoming hard and very painful yesterday. Patient is seen and evaluated in the emergency department at [facility].  Was unable to sleep last night. Today pain worsening and it was rock hard prompting him to seek evaluation. He has been nauseated, and started having emesis in the ED. Last bm yesterday, no flatus since. Pain is worsened with movement or touch. No alleviating factors. Has been evaluated at [place] with prior attempt at parastomal hernia repair. Denies fever, chills, cp, sob. Lives on own, still drives. Denies prior history of MI, CVA, or pulmonary disease. Only takes tylenol.        Past Medical History:        Diagnosis Date    Arthritis     Caffeine use     2 coffee, 2 cans soda/day    Cancer [facility]) 2006    bladder et prostate, 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    GERD (gastroesophageal reflux disease)     Hernia, inguinal, right     Hypertension     Kidney stone     Presence of urostomy ([address]     Retinal detachment        Past Surgical History:        Procedure Laterality Date    BLADDER REMOVAL  2006    cancer, s/p urostomy     CATARACT REMOVAL Bilateral     COLONOSCOPY      EYE SURGERY Right 2000    macular hole    HERNIA REPAIR      x2    INCISIONAL HERNIA REPAIR  12/7/2015    parastomal hernia repair - Dr. Daniel Frias  2014   [address]  2006    removal    VITRECTOMY Left 16       Medications Prior to Admission: See epic list  Not in a hospital admission. Allergies:  Nsaids and Tetracyclines & related    Social History:   Social History     Socioeconomic History    Marital status:      Spouse name: Not on file    Number of children: 2    Years of education: 11.5    Highest education level: Not on file   Occupational History    Occupation: retired   Social Needs    Financial resource strain: Not on file    Food insecurity     Worry: Not on file     Inability: Not on file   [employer] needs     Medical: Not on file     Non-medical: Not on file   Tobacco Use    Smoking status: Former Smoker     Packs/day: 0.50     Years: 20.00     Pack years: 10.00     Types: Cigarettes     Quit date: 1979     Years since quittin.8    Smokeless tobacco: Never Used    Tobacco comment: quit in    Substance and Sexual Activity    Alcohol use: No     Alcohol/week: 0.0 standard drinks    Drug use: No    Sexual activity: Not on file   Lifestyle    Physical activity     Days per week: Not on file     Minutes per session: Not on file    Stress: Not on file   Relationships    Social connections     Talks on phone: Not on file     Gets together: Not on file     Attends Yazidi service: Not on file     Active member of club or organization: Not on file     Attends meetings of clubs or organizations: Not on file     Relationship status: Not on file    Intimate partner violence     Fear of current or ex partner: Not on file     Emotionally abused: Not on file     Physically abused: Not on file     Forced sexual activity: Not on file   Other Topics Concern    Not on file   Social History Narrative    Diet - terrible.   [employer] every morning, goes to [employer]    Caffeine intake per day - 2 cups per day q am    Exercise pattern - none, 246 04/30/2021    MCV 89.1 04/30/2021    MCH 27.6 04/30/2021    MCHC 30.9 04/30/2021    RDW 16.5 04/30/2021    LYMPHOPCT 14 04/30/2021    LYMPHOPCT 18.3 12/14/2017    MONOPCT 6 04/30/2021    MONOPCT 5.7 12/14/2017    EOSPCT 5.1 12/14/2017    BASOPCT 1 04/30/2021    BASOPCT 1.1 12/14/2017    MONOSABS 0.46 04/30/2021    MONOSABS 0.4 12/14/2017    LYMPHSABS 1.10 04/30/2021    LYMPHSABS 1.2 12/14/2017    EOSABS 0.22 04/30/2021    EOSABS 0.3 12/14/2017    BASOSABS 0.06 04/30/2021    DIFFTYPE NOT REPORTED 04/30/2021     CMP:    Lab Results   Component Value Date     04/30/2021    K 4.5 04/30/2021     04/30/2021    CO2 17 04/30/2021    BUN 42 04/30/2021    CREATININE 2.85 04/30/2021    GFRAA 26 04/30/2021    LABGLOM 21 04/30/2021    GLUCOSE 131 04/30/2021    PROT 8.2 04/30/2021    LABALBU 4.1 04/30/2021    CALCIUM 9.7 04/30/2021    BILITOT 0.35 04/30/2021    ALKPHOS 77 04/30/2021    AST 16 04/30/2021    ALT 11 04/30/2021     BMP:    Lab Results   Component Value Date     04/30/2021    K 4.5 04/30/2021     04/30/2021    CO2 17 04/30/2021    BUN 42 04/30/2021    LABALBU 4.1 04/30/2021    CREATININE 2.85 04/30/2021    CALCIUM 9.7 04/30/2021    GFRAA 26 04/30/2021    LABGLOM 21 04/30/2021    GLUCOSE 131 04/30/2021     Hepatic Function Panel:    Lab Results   Component Value Date    ALKPHOS 77 04/30/2021    ALT 11 04/30/2021    AST 16 04/30/2021    PROT 8.2 04/30/2021    BILITOT 0.35 04/30/2021    BILIDIR 0.08 04/30/2021    IBILI 0.27 04/30/2021    LABALBU 4.1 04/30/2021     PT/INR:    Lab Results   Component Value Date    PROTIME 10.6 05/17/2016    INR 1.0 05/17/2016     Troponin:  No results found for: TROPONINI  Last 3 Troponin:  No results found for: TROPONINI  AMYLASE:  No results found for: AMYLASE  LIPASE:    Lab Results   Component Value Date    LIPASE 36 04/30/2021       Pertinent Radiology:   Ct Abdomen Pelvis Wo Contrast Additional Contrast? None    Result Date: 4/30/2021  EXAMINATION: CT OF THE ABDOMEN AND PELVIS WITHOUT CONTRAST, 4/30/2021 1:08 pm TECHNIQUE: CT of the abdomen and pelvis was performed without the administration of intravenous contrast. Multiplanar reformatted images are provided for review. Dose modulation, iterative reconstruction, and/or weight based adjustment of the mA/kV was utilized to reduce the radiation dose to as low as reasonably achievable. COMPARISON: December 9, 2016 HISTORY: ORDERING SYSTEM PROVIDED HISTORY:  Right lower abdominal hernia. TECHNOLOGIST PROVIDED HISTORY: Right lower abdominal hernia. Decision Support Exception - unselect if not a suspected or confirmed emergency medical condition->Emergency Medical Condition (MA) Reason for Exam:  RLQ pain swelling, hernia. Acuity: Acute Type of Exam: Initial FINDINGS: Lower Chest:  High density material is identified in the bilateral lower lobes which may represent aspirated barium. Organs:  Cholelithiasis is present. No intra or extrahepatic biliary dilatation. The liver, spleen, pancreas, and adrenal glands demonstrate no acute abnormality. 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 Coarse calcifications in the bilateral adrenal glands may represent granulomatous disease or sequelae of infection. Right kidney demonstrates interval enlargement of a right upper pole mass measuring 5.2 x 4.6 cm, previously 4.3 x 4.2 cm. No obvious solid left renal mass. A left renal cyst measures 3.7 cm. Mild right hydronephrosis and hydroureter are present. No obstructing stone. Significant stone burden is identified in the right kidney. Staghorn calculi are identified in the left kidney with mild hydronephrosis and hydroureter. Uroepithelial wall thickening is also seen. Inflammatory changes are seen along the collecting system. GI/Bowel: The stomach is normal.  Small bowel is fluid-filled with mild distention, measuring up to 3.6 cm. Portion of the small bowel is located in a large right-sided hernia.   Findings seem to be related to a partial small bowel obstruction with a transition point near the hiatal hernia (series 2, image 125). The small bowel distal to this is decompressed. The colon is normal in caliber without evidence of wall thickening or obstruction. Severe diverticulosis. Normal appendix. Pelvis:  Status post cystectomy and prostatectomy. An ileal conduit is seen in the right lower quadrant. Peritoneum/Retroperitoneum: Inflammatory stranding is seen in the mesentery located in the ventral hernia. No loculated fluid collections to suggest abscess. Aorta and its branches are normal in course and caliber with severe atherosclerosis. Enlarged left para-aortic lymph node measures 27 x 20 mm. Bones/Soft Tissues: No acute or aggressive osseous lesion. 1. Partial small bowel obstruction with a transition point located at the mouth of the ventral hernia. 2. Extensive stone burden identified in the bilateral kidneys, left greater than right. 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 3. Enlargement of the right upper pole mass, likely representing renal cell carcinoma. 4. Enlarged left para-aortic lymph node measuring 27 x 20 mm. Finding is nonspecific and may represent metastatic disease or reactive adenopathy. 5. Severe diverticulosis. 6. Cholelithiasis. Xr Abdomen For Ng/og/ne Tube Placement    Result Date: 4/30/2021  EXAMINATION: ONE SUPINE XRAY VIEW(S) OF THE ABDOMEN 4/30/2021 4:06 pm COMPARISON: Abdomen pelvis CT 04/30/2021, acute abdominal series 10/27/2006 HISTORY: ORDERING SYSTEM PROVIDED HISTORY: Confirmation of course of NG/OG/NE tube and location of tip of tube TECHNOLOGIST PROVIDED HISTORY: Confirmation of course of NG/OG/NE tube and location of tip of tube Portable? ->Yes Reason for Exam: Confirmation of course of NG/OG/NE tube and location of tip of tube Acuity: Acute Type of Exam: Initial FINDINGS: New gastric tube with the tip looping in the distal thoracic esophagus and directed superiorly near the level of the sideport. Overlying heart monitor leads. Exclusion of the lower abdomen and entire pelvis from the field of view. Calcification or other metallic density projecting over the lung bases especially on the left, suggesting prior aspiration. Nonobstructive included bowel gas pattern. No included stool. No included abdominal calcifications. No acute fracture. A newly placed gastric tube loops in the distal thoracic esophagus, where both the tip and sideport are located. Recommend repositioning before use. ASSESSMENT:  Active Hospital Problems    Diagnosis Date Noted    Ventral hernia with bowel obstruction [K43.6] 04/30/2021    KARMA (acute kidney injury) ([facility] [address] [N17.9] 04/30/2021    Renal mass [N28.89] 06/28/2018    Essential hypertension [I10] 05/03/2016       1. 79 yo M with incarcerated possible strangulated parastomal hernia   2. CKD w/ KARMA    Plan:  - Admission to medicine/ICU  - OR for emergent exploratory laparotomy. The risk, benefits and alternatives were discussed with the patient and his family. All questions answered and informed consent signed. Risk including bleeding, infection, scar pain, injury to sb, colon, ileal conduit, need for additional surgery, heart attack, prolonged intubation, stroke and ultimately death discussed.   - NGT to LIWS  - NPO  - IVF  - 2g cefoxitin   - rec nephrology consult   - Post op recommend admission to ICU    Electronically signed by Stefany Navarrete DO  on 4/30/2021 at 4:28 PM   I attest that I was present with the resident emergency department at [facility] during the patient's evaluation and agree with the description of findings and plan as dictated above.   Terry Varela MD

## 2021-04-30 NOTE — ED PROVIDER NOTES
EMERGENCY DEPARTMENT ENCOUNTER    Pt Name: Wayne Barahona  MRN: [id number]  [place] 1940  Date of evaluation: 4/30/21  CHIEF COMPLAINT       Chief Complaint   Patient presents with    Hernia     LOWER RIGHT ABDOMEN. recent increase in pain and growth     HISTORY OF PRESENT ILLNESS   This is an 80-year-old male with a history of previous bladder surgery that presents with complaints of an increasing incisional hernia size and pain. Patient states over the past few months he has been having some increasing pain and discomfort and swelling in the right lower abdomen where he has a large hernia. The patient describes his symptoms as severe, he describes his pain is severe, his pain is without any specific aggravating alleviating factors. He denies any nausea or vomiting at this time, he has no fevers or chills. He describes no bloody stool. REVIEW OF SYSTEMS     Review of Systems   Constitutional: Negative for chills and fever. HENT: Negative for rhinorrhea and sore throat. Eyes: Negative for discharge, redness and visual disturbance. Respiratory: Negative for cough and shortness of breath. Cardiovascular: Negative for chest pain, palpitations and leg swelling. Gastrointestinal: Positive for abdominal pain. Negative for diarrhea, nausea and vomiting. Genitourinary: Negative for dysuria and hematuria. Musculoskeletal: Negative for arthralgias, myalgias and neck pain. Skin: Negative for color change and rash. Neurological: Negative for seizures, weakness and headaches. Psychiatric/Behavioral: Negative for hallucinations, self-injury and suicidal ideas.      PASTMEDICAL HISTORY     Past Medical History:   Diagnosis Date    Arthritis     Caffeine use     2 coffee, 2 cans soda/day    Cancer [facility]) 2006    bladder et prostate, 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    GERD (gastroesophageal reflux disease)     Hernia, inguinal, right     Hypertension     Kidney stone     Presence of urostomy 41.8    Smokeless tobacco: Never Used    Tobacco comment: quit in    Substance Use Topics    Alcohol use: No     Alcohol/week: 0.0 standard drinks    Drug use: No     PHYSICAL EXAM     INITIAL VITALS: BP (!) 128/94   Pulse 74   Temp 98.1 °F (36.7 °C) (Oral)   Resp 18   Ht 6' 1\" (1.854 m)   Wt 225 lb (102.1 kg)   SpO2 100%   BMI 29.69 kg/m²    Physical Exam  Constitutional:       Appearance: Normal appearance. He is well-developed. He is not ill-appearing or toxic-appearing. HENT:      Head: Normocephalic and atraumatic. Eyes:      Conjunctiva/sclera: Conjunctivae normal.      Pupils: Pupils are equal, round, and reactive to light. Neck:      Musculoskeletal: Normal range of motion and neck supple. Trachea: Trachea normal.   Cardiovascular:      Rate and Rhythm: Normal rate and regular rhythm. Heart sounds: S1 normal and S2 normal. No murmur. Pulmonary:      Effort: Pulmonary effort is normal. No accessory muscle usage or respiratory distress. Breath sounds: Normal breath sounds. Chest:      Chest wall: No deformity or tenderness. Abdominal:      General: Bowel sounds are normal. There is no distension or abdominal bruit. Palpations: Abdomen is not rigid. Tenderness: There is abdominal tenderness in the right lower quadrant. There is guarding. There is no rebound. Negative signs include Sy's sign and McBurney's sign. Skin:     General: Skin is warm. Findings: No rash. Neurological:      Mental Status: He is alert and oriented to person, place, and time. GCS: GCS eye subscore is 4. GCS verbal subscore is 5. GCS motor subscore is 6. Psychiatric:         Speech: Speech normal.         MEDICAL DECISION MAKIN-year-old male presents with complaints of a right lower abdominal hernia that is increasing in pain and size. Plan is basic labs CT scan and reevaluation.     4:12 PM EDT  Evaluated by general surgery, plan is for operative intervention later today,  Will provide 2 g of IV cefoxitin for antibiotic prophylaxis. CRITICAL CARE:       PROCEDURES:    Procedures    DIAGNOSTIC RESULTS   EKG:All EKG's are interpreted by the Emergency Department Physician who either signs or Co-signs this chart in the absence of a cardiologist.        RADIOLOGY:All plain film, CT, MRI, and formal ultrasound images (except ED bedside ultrasound) are read by the radiologist, see reports below, unless otherwisenoted in MDM or here. CT ABDOMEN PELVIS WO CONTRAST Additional Contrast? None   Preliminary Result   1. Partial small bowel obstruction with a transition point located at the   mouth of the ventral hernia. 2. Extensive stone burden identified in the bilateral kidneys, left greater   than right. Uroepithelial wall thickening with inflammatory stranding is   seen involving the left collecting system which may be related to underlying   infection or chronic inflammation related to ileal conduit. 3. Enlargement of the right upper pole mass, likely representing renal cell   carcinoma. 4. Enlarged left para-aortic lymph node measuring 27 x 20 mm. Finding is   nonspecific and may represent metastatic disease or reactive adenopathy. 5. Severe diverticulosis. 6. Cholelithiasis. XR ABDOMEN FOR NG/OG/NE TUBE PLACEMENT    (Results Pending)     LABS: All lab results were reviewed by myself, and all abnormals are listed below.   Labs Reviewed   BASIC METABOLIC PANEL - Abnormal; Notable for the following components:       Result Value    Glucose 131 (*)     BUN 42 (*)     CREATININE 2.85 (*)     CO2 17 (*)     GFR Non- 21 (*)     GFR  26 (*)     All other components within normal limits   CBC WITH AUTO DIFFERENTIAL - Abnormal; Notable for the following components:    Hemoglobin 12.1 (*)     Hematocrit 39.1 (*)     RDW 16.5 (*)     Seg Neutrophils 76 (*)     Lymphocytes 14 (*)     All other components within normal limits   URINALYSIS - Abnormal; Notable for the following components:    Turbidity UA TURBID (*)     Urine Hgb 3+ (*)     pH, UA 8.5 (*)     Protein, UA 2+ (*)     Leukocyte Esterase, Urine MOD (*)     All other components within normal limits   MICROSCOPIC URINALYSIS - Abnormal; Notable for the following components:    Bacteria, UA MANY (*)     All other components within normal limits   COVID-19, RAPID   HEPATIC FUNCTION PANEL   LIPASE   LACTIC ACID   LACTIC ACID       EMERGENCY DEPARTMENTCOURSE:         Vitals:    Vitals:    04/30/21 1215   BP: (!) 128/94   Pulse: 74   Resp: 18   Temp: 98.1 °F (36.7 °C)   TempSrc: Oral   SpO2: 100%   Weight: 225 lb (102.1 kg)   Height: 6' 1\" (1.854 m)       The patient was given the following medications while in the emergency department:  Orders Placed This Encounter   Medications    0.9 % sodium chloride bolus    HYDROmorphone (DILAUDID) injection 0.5 mg    ondansetron (ZOFRAN) injection 4 mg    HYDROmorphone HCl PF (DILAUDID) injection 1 mg    albuterol sulfate  (90 Base) MCG/ACT inhaler 2 puff    sodium chloride flush 0.9 % injection 5-40 mL    sodium chloride flush 0.9 % injection 10 mL    0.9 % sodium chloride infusion    OR Linked Order Group     potassium chloride (KLOR-CON M) extended release tablet 40 mEq     potassium bicarb-citric acid (EFFER-K) effervescent tablet 40 mEq     potassium chloride 10 mEq/100 mL IVPB (Peripheral Line)    magnesium sulfate 1000 mg in dextrose 5% 100 mL IVPB    famotidine (PEPCID) injection 20 mg    enoxaparin (LOVENOX) injection 30 mg    0.9 % sodium chloride infusion    ondansetron (ZOFRAN) injection 4 mg    OR Linked Order Group     HYDROmorphone (DILAUDID) injection 0.5 mg     HYDROmorphone HCl PF (DILAUDID) injection 1 mg    cefOXitin (MEFOXIN) 2000 mg in dextrose 5% 50 mL (mini-bag)     Order Specific Question:   Antimicrobial Indications     Answer:   Intra-Abdominal Infection     CONSULTS:  IP CONSULT TO GENERAL SURGERY  IP CONSULT TO HOSPITALIST    FINAL IMPRESSION      1. Partial small bowel obstruction ([address]    2. Ventral hernia with bowel obstruction    3. Chronic kidney disease, unspecified CKD stage          DISPOSITION/PLAN   DISPOSITION Admitted 04/30/2021 02:40:57 PM      PATIENT REFERRED TO:  No follow-up provider specified.   DISCHARGE MEDICATIONS:  New Prescriptions    No medications on file     Shawnee Jones MD  Attending Emergency Physician                   Shanwee Jones MD  04/30/21 3016

## 2021-04-30 NOTE — ANESTHESIA PRE PROCEDURE
magnesium sulfate 1000 mg in dextrose 5% 100 mL IVPB  1,000 mg Intravenous PRN Mary Nay, APRN - NP        famotidine (PEPCID) injection 20 mg  20 mg Intravenous Nightly Mary Nay, APRN - NP        0.9 % sodium chloride infusion   Intravenous Continuous Norlin Strang, APRN -  mL/hr at 04/30/21 1626 New Bag at 04/30/21 1626    ondansetron (ZOFRAN) injection 4 mg  4 mg Intravenous Q6H PRN Mary Nay, APRN - NP   4 mg at 04/30/21 1625    HYDROmorphone (DILAUDID) injection 0.5 mg  0.5 mg Intravenous Q3H PRN Mary Nay, APRN - NP        Or    HYDROmorphone HCl PF (DILAUDID) injection 1 mg  1 mg Intravenous Q3H PRN Norlin Strang, APRN - NP   1 mg at 04/30/21 1455    [START ON 5/1/2021] heparin (porcine) injection 5,000 Units  5,000 Units Subcutaneous 3 times per day Mary Nay, APRN - NP         Current Outpatient Medications   Medication Sig Dispense Refill    acetaminophen (TYLENOL) 325 MG tablet Take 200 mg by mouth as needed      albuterol sulfate HFA (VENTOLIN HFA) 108 (90 Base) MCG/ACT inhaler Inhale 2 puffs into the lungs 4 times daily as needed for Wheezing 3 Inhaler 1    albuterol sulfate (PROAIR RESPICLICK) 026 (90 Base) MCG/ACT aerosol powder inhalation Inhale 2 puffs into the lungs every 4 hours as needed for Wheezing or Shortness of Breath 1 Inhaler 11    acetaminophen (TYLENOL) 500 MG tablet Take 500 mg by mouth daily 2 tablers once daily         Allergies:     Allergies   Allergen Reactions    Nsaids Anaphylaxis     GI bleed    Tetracyclines & Related        Problem List:    Patient Active Problem List   Diagnosis Code    Bladder cancer C67.9    DJD (degenerative joint disease) M19.90    Essential hypertension I10    Renal mass N28.89    Incisional hernia of anterior abdominal wall without obstruction or gangrene K43.2    Bilateral kidney stones N20.0    Right ureteral calculus N20.1    Partial small bowel obstruction ([address] K56.600    Ventral hernia with bowel obstruction K43.6    GERD (gastroesophageal reflux disease) K21.9    KARMA (acute kidney injury) ([facility] N17.9       Past Medical History:        Diagnosis Date    Arthritis     Caffeine use     2 coffee, 2 cans soda/day    Cancer [facility]) 2006    bladder et prostate, 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    GERD (gastroesophageal reflux disease)     Hernia, inguinal, right     Hypertension     Kidney stone     Presence of urostomy ([facility] [facility]     Retinal detachment        Past Surgical History:        Procedure Laterality Date    BLADDER REMOVAL  2006    cancer, s/p urostomy     CATARACT REMOVAL Bilateral     COLONOSCOPY      EYE SURGERY Right     macular hole    HERNIA REPAIR      x2    INCISIONAL HERNIA REPAIR  2015    parastomal hernia repair - Dr. Terry Peabody  2014   [address]  2006    removal    VITRECTOMY Left 16       Social History:    Social History     Tobacco Use    Smoking status: Former Smoker     Packs/day: 0.50     Years: 20.00     Pack years: 10.00     Types: Cigarettes     Quit date: 1979     Years since quittin.8    Smokeless tobacco: Never Used    Tobacco comment: quit in    Substance Use Topics    Alcohol use: No     Alcohol/week: 0.0 standard drinks                                Counseling given: Not Answered  Comment: quit in       Vital Signs (Current):   Vitals:    21 1215 21 1614   BP: (!) 128/94 (!) 173/106   Pulse: 74 95   Resp: 18 18   Temp: 98.1 °F (36.7 °C)    TempSrc: Oral    SpO2: 100% 96%   Weight: 225 lb (102.1 kg)    Height: 6' 1\" (1.854 m)                                               BP Readings from Last 3 Encounters:   21 (!) 173/106   10/30/20 122/78   09/10/20 104/70       NPO Status:                                                                                 BMI:   Wt Readings from Last 3 Encounters:   21 225 lb (102.1 kg)   10/30/20 228 lb 12.8 oz (103.8 kg) 09/10/20 236 lb (107 kg)     Body mass index is 29.69 kg/m². CBC:   Lab Results   Component Value Date    WBC 8.2 04/30/2021    RBC 4.39 04/30/2021    HGB 12.1 04/30/2021    HCT 39.1 04/30/2021    MCV 89.1 04/30/2021    RDW 16.5 04/30/2021     04/30/2021       CMP:   Lab Results   Component Value Date     04/30/2021    K 4.5 04/30/2021     04/30/2021    CO2 17 04/30/2021    BUN 42 04/30/2021    CREATININE 2.85 04/30/2021    GFRAA 26 04/30/2021    LABGLOM 21 04/30/2021    GLUCOSE 131 04/30/2021    PROT 8.2 04/30/2021    CALCIUM 9.7 04/30/2021    BILITOT 0.35 04/30/2021    ALKPHOS 77 04/30/2021    AST 16 04/30/2021    ALT 11 04/30/2021       POC Tests: No results for input(s): POCGLU, POCNA, POCK, POCCL, POCBUN, POCHEMO, POCHCT in the last 72 hours.     Coags:   Lab Results   Component Value Date    PROTIME 10.6 05/17/2016    INR 1.0 05/17/2016       HCG (If Applicable): No results found for: PREGTESTUR, PREGSERUM, HCG, HCGQUANT     ABGs: No results found for: PHART, PO2ART, LQP7JIX, LEN8WCJ, BEART, Y3VUBVVO     Type & Screen (If Applicable):  No results found for: LABABO, LABRH    Drug/Infectious Status (If Applicable):  No results found for: HIV, HEPCAB    COVID-19 Screening (If Applicable):   Lab Results   Component Value Date    COVID19 Not Detected 04/30/2021           Anesthesia Evaluation    Airway: Mallampati: I  TM distance: >3 FB   Neck ROM: full  Mouth opening: > = 3 FB Dental:          Pulmonary:       (-) shortness of breath                           Cardiovascular:    (+) hypertension:,     (-)  angina and murmur      Rhythm: regular    Echocardiogram reviewed                  Neuro/Psych:      (-) CVA           GI/Hepatic/Renal:   (+) renal disease: CRI,           Endo/Other:                     Abdominal:           Vascular:                                        Anesthesia Plan      general     ASA 3             Anesthetic plan and risks discussed with

## 2021-04-30 NOTE — OP NOTE
concerning for incarcerated hernia. On exam patient was peritoneal concern for strangulated hernia it was recommended that he undergo exploratory laparotomy and any indicated procedure. The risk, benefits alternatives were discussed with him all questions were answered and informed consent was signed. Patient was taken back to the operating room and transferred the operating room table in the supine position. Bilateral lower extremity sequential compression devices were applied and activated. He underwent general anesthesia per anesthesia guidelines. Antibiotics were administered according to SCIP guidelines. A timeout was called identifying correct patient, position and procedure to be performed and verified by all present. His abdomen was then prepped and draped in usual sterile fashion. A midline incision was made superior to the umbilicus with a #63 blade scalpel. Dissection was carried down through subcutaneous tissue with Bovie electro cautery. Sutures were noted from his prior laparotomy these were removed with hemostats. The fascia was grasped on the left and right side of midline elevated and entered sharply. Upon entry there was no adhesions along the superior aspect of the incision. Incision was opened up the entire length and then we were able to open it inferior to the umbilicus as there was no midline adhesions. Upon entry there was hemorrhagic ascites fluid. The bowel was noted to be dilated and a large portion of the small intestines was noted to be within the parastomal hernia. With pushing from external and pulling on small bowel internally it was partially reduced there was apparent to be a acute component with an area of necrosis. A warm lap was placed around the bowel and this was monitored. There was a chronic component to the parastomal hernia as a segment of the small bowel was adhered within the hernia sac.   In order to help free this up we made a centimeter counterincision in the midline of the hernia sac on the right lower abdomen. The adhesions small bowel the hernia sac were carefully lysed requiring greater than two thirds of the 1.5-hour operation. The complete hernia was then reduced. The small bowel was ran from the ligament of Treitz to the terminal ileum. The area of focal necrosis did not appear to resolve and we elected to resect this section of small bowel. Prior to resection we lysed extensive adhesions throughout the small bowel within the hernia sac. Once all adhesions were freed up we turned our attention to the small bowel resection. The small bowel was looped on itself with a focal area of necrosis at this point  a ERICK-75 millimeter with blue load was placed through the mesentery of the proximal and distal loop. The small bowel was transected and LigaSure used to divide the mesentery specimen was handed off for pathologic evaluation. The bowel was aligned in an isoperistaltic fashion and enterotomy made at the corner of the staple lines. An additional blue load with a 75 mm ERICK stapler was used to make the anastomosis along the antimesenteric of the small bowel. The common enterotomy was then closed with a third staple cartridge . The staple line was then reinforced with 3-0 silk suture in Lembert fashion. A crotch stitch was applied of 3-0 silk proximally and distally. The mesenteric defect was closed with 3-0 Vicryl suture. The anastomosis was palpated noted to be widely patent to 3 finger breaths. We then turned our attention we ran the small bowel from the ligament of Treitz to the terminal ileum. The ileal conduit was noted to be intact. This time we are able to straighten the ileal conduit and we placed a Walter with in the conduit and got a large amount of urine return. We turned our attention to repairing the parastomal hernia. The hernia was repaired  primarily using 1 Prolene suture in figure-of-eight fashion.   Multiple several interrupted figure-of-eight sutures were applied approximating the muscle and peritoneum with snug closure around the ileal conduit. It was closed in order to only fit a DeBakey through the fascia and ileal conduit. This was done we freed up the omentum and brought it down over the midline incision and turned our attention to closing the abdomen. Prior to closure we placed a 10 Polish flat MARIE drain within the hernia sac brought out through a counterincision in the right lateral inferior aspect of the sac. The drain was secured with a 3-0 nylon. The abdomen was suction and we ensured hemostasis. We then closed the midline incision with 2 number 0 looped PDS suture in running fashion from superior to inferior and inferior to superior. The skin was then approximated with staples. The skin incision over the hernia sac was closed in 2 layers. First the dermal layer was approximated with 3-0 Vicryl suture. The skin was then approximated with staples. Sterile dressing was applied to both incisions. The Walter catheter was removed and ileostomy appliance applied. The patient was then extubated and transported to the PACU in stable condition. Instrument sponge and needle counts were correct. Dr. Milton Brittle was scrubbed and present for the entirety of the procedure. Sandoval [id number]    Electronically signed by Supa Love DO on 4/30/2021 at 7:18 PM   I attest that I was present throughout the operation and agree with the description of procedure and findings as stated above.   Sharif Cardenas MD

## 2021-04-30 NOTE — TELEPHONE ENCOUNTER
Patient stopped into the office today after being advised to see PCP from Urology office. He came in c/o pain due to hernia and said it felt like something was going to explode in that area. He c/o nausea and confusion. He mentioned he knew he was on [address] and the address specifically but doesn't remember how he got here even though he drove himself. He was evaluated by Dr. Kaykay Spring who said it looked like an incarcerated hernia and wanted him to be taken by EMS to hospital. Patient agreed and EMS was called and he was transported to [facility] [address].

## 2021-04-30 NOTE — ANESTHESIA POSTPROCEDURE EVALUATION
Department of Anesthesiology  Postprocedure Note    Patient: Chris Burkett  MRN: [id number]  YOB: 1940  Date of evaluation: 4/30/2021  Time:  7:06 PM     Procedure Summary     Date: 04/30/21 Room / Location: [address] [place] [address] / [facility] - INPATIENT    Anesthesia Start: 1719 Anesthesia Stop: 1906    Procedure: EXPLORATORY LAPAROTOMY, EXTENSIVE LYSIS OF ADHESIONS, SMALL BOWEL RESECTION, REDUCTION OF STRANGULATED HERNIA (N/A Abdomen) Diagnosis: (DX INCARCERATED PARASTOMAL HERNIA)    Surgeons: Carmella Samuels DO Responsible Provider: Yannick Main MD    Anesthesia Type: general ASA Status: 3          Anesthesia Type: general    Nedra Phase I:      Nedra Phase II:      Last vitals: Reviewed and per EMR flowsheets.        Anesthesia Post Evaluation    Complications: no

## 2021-04-30 NOTE — ED NOTES
Bed: 15  Expected date:   Expected time:   Means of arrival:   Comments:     Kumar Noriega [facility]  04/30/21 [id number]

## 2021-05-01 ENCOUNTER — APPOINTMENT (OUTPATIENT)
Dept: ULTRASOUND IMAGING | Age: 81
DRG: 329 | End: 2021-05-01
Payer: MEDICARE

## 2021-05-01 PROBLEM — D64.9 NORMOCHROMIC NORMOCYTIC ANEMIA: Status: ACTIVE | Noted: 2021-05-01

## 2021-05-01 LAB
ABSOLUTE EOS #: 0 K/UL (ref 0–0.44)
ABSOLUTE IMMATURE GRANULOCYTE: 0 K/UL (ref 0–0.3)
ABSOLUTE LYMPH #: 0.65 K/UL (ref 1.1–3.7)
ABSOLUTE MONO #: 0.47 K/UL (ref 0.1–1.2)
ANION GAP SERPL CALCULATED.3IONS-SCNC: 15 MMOL/L (ref 9–17)
BASOPHILS # BLD: 0 % (ref 0–2)
BASOPHILS ABSOLUTE: 0 K/UL (ref 0–0.2)
BUN BLDV-MCNC: 49 MG/DL (ref 8–23)
BUN/CREAT BLD: 14 (ref 9–20)
CALCIUM SERPL-MCNC: 8.5 MG/DL (ref 8.6–10.4)
CHLORIDE BLD-SCNC: 113 MMOL/L (ref 98–107)
CO2: 14 MMOL/L (ref 20–31)
CREAT SERPL-MCNC: 3.62 MG/DL (ref 0.7–1.2)
CREATININE URINE: 38.3 MG/DL (ref 39–259)
DIFFERENTIAL TYPE: ABNORMAL
EOSINOPHILS RELATIVE PERCENT: 0 % (ref 1–4)
FERRITIN: 338 UG/L (ref 30–400)
GFR AFRICAN AMERICAN: 20 ML/MIN
GFR NON-AFRICAN AMERICAN: 16 ML/MIN
GFR SERPL CREATININE-BSD FRML MDRD: ABNORMAL ML/MIN/{1.73_M2}
GFR SERPL CREATININE-BSD FRML MDRD: ABNORMAL ML/MIN/{1.73_M2}
GLUCOSE BLD-MCNC: 157 MG/DL (ref 70–99)
HCT VFR BLD CALC: 38.5 % (ref 40.7–50.3)
HEMOGLOBIN: 11.5 G/DL (ref 13–17)
IMMATURE GRANULOCYTES: 0 %
IRON SATURATION: 10 % (ref 20–55)
IRON: 19 UG/DL (ref 59–158)
LYMPHOCYTES # BLD: 7 % (ref 24–43)
MAGNESIUM: 1.8 MG/DL (ref 1.6–2.6)
MCH RBC QN AUTO: 27.1 PG (ref 25.2–33.5)
MCHC RBC AUTO-ENTMCNC: 29.9 G/DL (ref 28.4–34.8)
MCV RBC AUTO: 90.8 FL (ref 82.6–102.9)
MONOCYTES # BLD: 5 % (ref 3–12)
NRBC AUTOMATED: 0 PER 100 WBC
PDW BLD-RTO: 16.8 % (ref 11.8–14.4)
PLATELET # BLD: 227 K/UL (ref 138–453)
PLATELET ESTIMATE: ABNORMAL
PMV BLD AUTO: 9.3 FL (ref 8.1–13.5)
POTASSIUM SERPL-SCNC: 5.3 MMOL/L (ref 3.7–5.3)
RBC # BLD: 4.24 M/UL (ref 4.21–5.77)
RBC # BLD: ABNORMAL 10*6/UL
SEG NEUTROPHILS: 88 % (ref 36–65)
SEGMENTED NEUTROPHILS ABSOLUTE COUNT: 8.18 K/UL (ref 1.5–8.1)
SODIUM BLD-SCNC: 142 MMOL/L (ref 135–144)
SODIUM,UR: 119 MMOL/L
TOTAL CK: 102 U/L (ref 39–308)
TOTAL IRON BINDING CAPACITY: 192 UG/DL (ref 250–450)
TOTAL PROTEIN, URINE: 970 MG/DL
UNSATURATED IRON BINDING CAPACITY: 173 UG/DL (ref 112–347)
URIC ACID: 7 MG/DL (ref 3.4–7)
WBC # BLD: 9.3 K/UL (ref 3.5–11.3)
WBC # BLD: ABNORMAL 10*3/UL

## 2021-05-01 PROCEDURE — 82728 ASSAY OF FERRITIN: CPT

## 2021-05-01 PROCEDURE — 80048 BASIC METABOLIC PNL TOTAL CA: CPT

## 2021-05-01 PROCEDURE — 2500000003 HC RX 250 WO HCPCS: Performed by: STUDENT IN AN ORGANIZED HEALTH CARE EDUCATION/TRAINING PROGRAM

## 2021-05-01 PROCEDURE — 85025 COMPLETE CBC W/AUTO DIFF WBC: CPT

## 2021-05-01 PROCEDURE — 6360000002 HC RX W HCPCS: Performed by: STUDENT IN AN ORGANIZED HEALTH CARE EDUCATION/TRAINING PROGRAM

## 2021-05-01 PROCEDURE — 2580000003 HC RX 258: Performed by: SURGERY

## 2021-05-01 PROCEDURE — 6360000002 HC RX W HCPCS: Performed by: SURGERY

## 2021-05-01 PROCEDURE — 6370000000 HC RX 637 (ALT 250 FOR IP): Performed by: SURGERY

## 2021-05-01 PROCEDURE — 83550 IRON BINDING TEST: CPT

## 2021-05-01 PROCEDURE — 99232 SBSQ HOSP IP/OBS MODERATE 35: CPT | Performed by: INTERNAL MEDICINE

## 2021-05-01 PROCEDURE — 36415 COLL VENOUS BLD VENIPUNCTURE: CPT

## 2021-05-01 PROCEDURE — 84300 ASSAY OF URINE SODIUM: CPT

## 2021-05-01 PROCEDURE — 6370000000 HC RX 637 (ALT 250 FOR IP): Performed by: STUDENT IN AN ORGANIZED HEALTH CARE EDUCATION/TRAINING PROGRAM

## 2021-05-01 PROCEDURE — 84156 ASSAY OF PROTEIN URINE: CPT

## 2021-05-01 PROCEDURE — 82570 ASSAY OF URINE CREATININE: CPT

## 2021-05-01 PROCEDURE — P9047 ALBUMIN (HUMAN), 25%, 50ML: HCPCS | Performed by: SURGERY

## 2021-05-01 PROCEDURE — 83540 ASSAY OF IRON: CPT

## 2021-05-01 PROCEDURE — 2580000003 HC RX 258: Performed by: NURSE PRACTITIONER

## 2021-05-01 PROCEDURE — 2060000000 HC ICU INTERMEDIATE R&B

## 2021-05-01 PROCEDURE — 2580000003 HC RX 258: Performed by: STUDENT IN AN ORGANIZED HEALTH CARE EDUCATION/TRAINING PROGRAM

## 2021-05-01 PROCEDURE — 82550 ASSAY OF CK (CPK): CPT

## 2021-05-01 PROCEDURE — 83735 ASSAY OF MAGNESIUM: CPT

## 2021-05-01 PROCEDURE — 76775 US EXAM ABDO BACK WALL LIM: CPT

## 2021-05-01 PROCEDURE — 84550 ASSAY OF BLOOD/URIC ACID: CPT

## 2021-05-01 RX ORDER — ALBUMIN (HUMAN) 12.5 G/50ML
25 SOLUTION INTRAVENOUS EVERY 8 HOURS
Status: COMPLETED | OUTPATIENT
Start: 2021-05-01 | End: 2021-05-02

## 2021-05-01 RX ORDER — DEXTROSE AND SODIUM CHLORIDE 5; .45 G/100ML; G/100ML
INJECTION, SOLUTION INTRAVENOUS CONTINUOUS
Status: DISCONTINUED | OUTPATIENT
Start: 2021-05-01 | End: 2021-05-02

## 2021-05-01 RX ORDER — BISACODYL 10 MG
10 SUPPOSITORY, RECTAL RECTAL DAILY
Status: COMPLETED | OUTPATIENT
Start: 2021-05-01 | End: 2021-05-05

## 2021-05-01 RX ORDER — METOCLOPRAMIDE HYDROCHLORIDE 5 MG/ML
5 INJECTION INTRAMUSCULAR; INTRAVENOUS EVERY 6 HOURS
Status: COMPLETED | OUTPATIENT
Start: 2021-05-01 | End: 2021-05-04

## 2021-05-01 RX ADMIN — ACETAMINOPHEN ORAL SOLUTION 1000 MG: 325 SOLUTION ORAL at 14:52

## 2021-05-01 RX ADMIN — METOCLOPRAMIDE 5 MG: 5 INJECTION, SOLUTION INTRAMUSCULAR; INTRAVENOUS at 14:52

## 2021-05-01 RX ADMIN — ACETAMINOPHEN ORAL SOLUTION 1000 MG: 325 SOLUTION ORAL at 06:06

## 2021-05-01 RX ADMIN — METHOCARBAMOL TABLETS 500 MG: 500 TABLET, COATED ORAL at 18:23

## 2021-05-01 RX ADMIN — ALBUMIN (HUMAN) 25 G: 0.25 INJECTION, SOLUTION INTRAVENOUS at 09:06

## 2021-05-01 RX ADMIN — METOCLOPRAMIDE 5 MG: 5 INJECTION, SOLUTION INTRAMUSCULAR; INTRAVENOUS at 09:08

## 2021-05-01 RX ADMIN — HEPARIN SODIUM 5000 UNITS: 5000 INJECTION INTRAVENOUS; SUBCUTANEOUS at 20:38

## 2021-05-01 RX ADMIN — HYDROMORPHONE HYDROCHLORIDE 0.5 MG: 1 INJECTION, SOLUTION INTRAMUSCULAR; INTRAVENOUS; SUBCUTANEOUS at 05:03

## 2021-05-01 RX ADMIN — METHOCARBAMOL TABLETS 500 MG: 500 TABLET, COATED ORAL at 00:08

## 2021-05-01 RX ADMIN — OXYCODONE 5 MG: 5 TABLET ORAL at 09:05

## 2021-05-01 RX ADMIN — OXYCODONE 5 MG: 5 TABLET ORAL at 16:27

## 2021-05-01 RX ADMIN — BISACODYL 10 MG: 10 SUPPOSITORY RECTAL at 09:07

## 2021-05-01 RX ADMIN — FAMOTIDINE 10 MG: 10 INJECTION, SOLUTION INTRAVENOUS at 20:38

## 2021-05-01 RX ADMIN — ACETAMINOPHEN ORAL SOLUTION 1000 MG: 325 SOLUTION ORAL at 20:39

## 2021-05-01 RX ADMIN — SODIUM CHLORIDE, PRESERVATIVE FREE 10 ML: 5 INJECTION INTRAVENOUS at 09:24

## 2021-05-01 RX ADMIN — ALBUMIN (HUMAN) 25 G: 0.25 INJECTION, SOLUTION INTRAVENOUS at 16:26

## 2021-05-01 RX ADMIN — METHOCARBAMOL TABLETS 500 MG: 500 TABLET, COATED ORAL at 11:44

## 2021-05-01 RX ADMIN — OXYCODONE 5 MG: 5 TABLET ORAL at 02:57

## 2021-05-01 RX ADMIN — METOCLOPRAMIDE 5 MG: 5 INJECTION, SOLUTION INTRAMUSCULAR; INTRAVENOUS at 20:38

## 2021-05-01 RX ADMIN — ONDANSETRON 4 MG: 2 INJECTION INTRAMUSCULAR; INTRAVENOUS at 02:57

## 2021-05-01 RX ADMIN — SODIUM CHLORIDE, PRESERVATIVE FREE 10 ML: 5 INJECTION INTRAVENOUS at 09:16

## 2021-05-01 RX ADMIN — METHOCARBAMOL TABLETS 500 MG: 500 TABLET, COATED ORAL at 06:06

## 2021-05-01 RX ADMIN — FAMOTIDINE 10 MG: 10 INJECTION, SOLUTION INTRAVENOUS at 09:06

## 2021-05-01 RX ADMIN — HEPARIN SODIUM 5000 UNITS: 5000 INJECTION INTRAVENOUS; SUBCUTANEOUS at 14:50

## 2021-05-01 RX ADMIN — DEXTROSE AND SODIUM CHLORIDE: 5; 450 INJECTION, SOLUTION INTRAVENOUS at 08:30

## 2021-05-01 RX ADMIN — HYDROMORPHONE HYDROCHLORIDE 0.5 MG: 1 INJECTION, SOLUTION INTRAMUSCULAR; INTRAVENOUS; SUBCUTANEOUS at 21:37

## 2021-05-01 RX ADMIN — SODIUM CHLORIDE, PRESERVATIVE FREE 10 ML: 5 INJECTION INTRAVENOUS at 09:15

## 2021-05-01 RX ADMIN — SODIUM CHLORIDE, POTASSIUM CHLORIDE, SODIUM LACTATE AND CALCIUM CHLORIDE: 600; 310; 30; 20 INJECTION, SOLUTION INTRAVENOUS at 06:05

## 2021-05-01 ASSESSMENT — PAIN SCALES - GENERAL
PAINLEVEL_OUTOF10: 9
PAINLEVEL_OUTOF10: 7
PAINLEVEL_OUTOF10: 10
PAINLEVEL_OUTOF10: 3
PAINLEVEL_OUTOF10: 5
PAINLEVEL_OUTOF10: 5
PAINLEVEL_OUTOF10: 9
PAINLEVEL_OUTOF10: 3
PAINLEVEL_OUTOF10: 6
PAINLEVEL_OUTOF10: 6

## 2021-05-01 ASSESSMENT — PAIN - FUNCTIONAL ASSESSMENT
PAIN_FUNCTIONAL_ASSESSMENT: PREVENTS OR INTERFERES WITH ALL ACTIVE AND SOME PASSIVE ACTIVITIES
PAIN_FUNCTIONAL_ASSESSMENT: PREVENTS OR INTERFERES WITH ALL ACTIVE AND SOME PASSIVE ACTIVITIES

## 2021-05-01 ASSESSMENT — PAIN DESCRIPTION - ORIENTATION
ORIENTATION: MID

## 2021-05-01 ASSESSMENT — PAIN DESCRIPTION - LOCATION
LOCATION: ABDOMEN

## 2021-05-01 ASSESSMENT — PAIN DESCRIPTION - PROGRESSION
CLINICAL_PROGRESSION: GRADUALLY IMPROVING
CLINICAL_PROGRESSION: NOT CHANGED

## 2021-05-01 ASSESSMENT — PAIN DESCRIPTION - PAIN TYPE
TYPE: ACUTE PAIN;SURGICAL PAIN

## 2021-05-01 ASSESSMENT — PAIN DESCRIPTION - FREQUENCY
FREQUENCY: CONTINUOUS

## 2021-05-01 ASSESSMENT — PAIN DESCRIPTION - ONSET
ONSET: ON-GOING
ONSET: ON-GOING

## 2021-05-01 ASSESSMENT — PAIN DESCRIPTION - DESCRIPTORS
DESCRIPTORS: ACHING;SHARP;STABBING
DESCRIPTORS: ACHING;STABBING

## 2021-05-01 NOTE — PROGRESS NOTES
[facility]  Office: [address], DO, Denisha Dines, DO, Savita Lorelei, DO, Smiley White Blood, DO, Amilcar Berrios MD, Sosa Langley MD, Geovanna Winchester MD, Ming Rondon MD, Vinnie Toledo MD, Diana Goode MD, Sulema Llanos MD, Radha Jones MD, Annemarie Silver, DO, Ben Sherman MD, Pratima Beal DO, Shanel Jimenez MD,  Tatum Prado DO, Marianela Bernal MD, Maryanne Loza MD, Corinna Del Toro MD, Rory Rockwell MD, Patrick Quinones [facility], [facility], CNP, Brennan Amas, CNP, Ya Pulse, CNS, Gustavo Sanchezed, CNP, Josefina Swift, CNP, Skyla Boateng, CNP, Carey Pulse, CNP, Marilyn Loza, CNP, Kathy Sierra PA-C, [place] , [facility], [place]), Paul Willett, CNP, Tahira Duval, CNP, Marcel Rm, CNP, Reji Amaro, CNP, Telly Fuentes, [facility], Cory Rubio, [facility]    Progress Note    5/1/2021    10:57 AM    Name:   Lucero Kruse  MRN:     [id number]     Acct:      [de-identified]   Room:   [address] [place] [address] Day:  1  Admit Date:  4/30/2021 12:15 PM    PCP:   Froylan Wills MD  Code Status:  Full Code    Subjective:     C/C:   Chief Complaint   Patient presents with    Hernia     LOWER RIGHT ABDOMEN. recent increase in pain and growth     Interval History Status: improved. Pt seen and examined this morning. POD # 1 from ex lap with extensive lysis of adhesions, small bowel resection, and reduction of strangulated hernia. He is feeling well this morning. Sitting up in bed. Complaining of incisional pain at his surgical site. He is complaining of dry mouth. Vitals stable. Takes no chronic medications besides tylenol twice daily.     Brief History:     80 y.o. male with a history of L RCC s/p ablation, bladder cancer s/p cystectomy w/ creation of ileoconduit in 2006, sbo s/p exlap sbr, recurrent parastomal hernia presents with progressively enlarging parastomal hernia which now measures 20 x 25 cm in the right lower quadrant. Pt states its always large but after lifting a trailer on Monday it has progressively gotten larger, becoming hard and very painful on the day prior to admission. Pain worsening and it was rock hard prompting him to seek evaluation. He has been nauseated, and started having emesis in the ED. Last bm yesterday, no flatus since. Pain is worsened with movement or touch. No alleviating factors. Has been evaluated at [place] with prior attempt at parastomal hernia repair. Review of Systems:     Constitutional:  negative for chills, fevers, sweats  Respiratory:  negative for cough, dyspnea on exertion, shortness of breath, wheezing  HENT: reports dry mouth  Cardiovascular:  negative for chest pain, chest pressure/discomfort, lower extremity edema, palpitations  Gastrointestinal: reports pain at his incisional site; negative for  constipation, diarrhea, nausea, vomiting  Neurological:  negative for dizziness, headache    Medications: Allergies:     Allergies   Allergen Reactions    Nsaids Anaphylaxis     GI bleed    Tetracyclines & Related        Current Meds:   Scheduled Meds:    bisacodyl  10 mg Rectal Daily    albumin human  25 g Intravenous Q8H    metoclopramide  5 mg Intravenous Q6H    sodium chloride flush  5-40 mL Intravenous 2 times per day    heparin (porcine)  5,000 Units Subcutaneous 3 times per day    sodium chloride flush  5-40 mL Intravenous 2 times per day    acetaminophen  1,000 mg Oral 3 times per day    methocarbamol  500 mg Oral 4 times per day    lidocaine  2 patch Topical Q12H    famotidine (PEPCID) injection  10 mg Intravenous BID     Continuous Infusions:    dextrose 5 % and 0.45 % NaCl      sodium chloride       PRN Meds: albuterol sulfate HFA, sodium chloride flush, sodium chloride flush, sodium chloride, oxyCODONE, HYDROmorphone, ondansetron    Data:     Past Medical History:   has a past medical history of Arthritis, Caffeine use, Cancer ([address] GERD (gastroesophageal reflux disease), Hernia, inguinal, right, Hypertension, Kidney stone, Presence of urostomy ([address] and Retinal detachment. Social History:   reports that he quit smoking about 41 years ago. His smoking use included cigarettes. He has a 10.00 pack-year smoking history. He has never used smokeless tobacco. He reports that he does not drink alcohol or use drugs. Family History:   Family History   Problem Relation Age of Onset    Diabetes Mother     Diabetes Sister        Vitals:  BP 99/67   Pulse 83   Temp 98.9 °F (37.2 °C) (Oral)   Resp 27   Ht 6' 1\" (1.854 m)   Wt 225 lb (102.1 kg)   SpO2 93%   BMI 29.69 kg/m²   Temp (24hrs), Av.6 °F (36.4 °C), Min:97.3 °F (36.3 °C), Max:98.9 °F (37.2 °C)    Recent Labs     21   POCGLU 156*       I/O (24Hr):     Intake/Output Summary (Last 24 hours) at 2021 1057  Last data filed at 2021 0924  Gross per 24 hour   Intake 2267 ml   Output 1345 ml   Net 922 ml       Labs:  Hematology:  Recent Labs     21  0501   WBC 8.2 9.3   RBC 4.39 4.24   HGB 12.1* 11.5*   HCT 39.1* 38.5*   MCV 89.1 90.8   MCH 27.6 27.1   MCHC 30.9 29.9   RDW 16.5* 16.8*    227   MPV 9.3 9.3     Chemistry:  Recent Labs     21  0501    142   K 4.5 5.3    113*   CO2 17* 14*   GLUCOSE 131* 157*   BUN 42* 49*   CREATININE 2.85* 3.62*   MG  --  1.8   ANIONGAP 15 15   LABGLOM 21* 16*   GFRAA 26* 20*   CALCIUM 9.7 8.5*   CKTOTAL  --  102     Recent Labs     21  1230 216 21  0501   PROT 8.2  --   --    LABALBU 4.1  --   --    AST 16  --   --    ALT 11  --   --    ALKPHOS 77  --   --    BILITOT 0.35  --   --    BILIDIR 0.08  --   --    LIPASE 36  --   --    URICACID  --   --  7.0   POCGLU  --  156*  --      ABG:No results found for: POCPH, PHART, PH, POCPCO2, OUA8NFW, PCO2, POCPO2, PO2ART, PO2, POCHCO3, UZW3PIG, HCO3, NBEA, PBEA, BEART, BE, THGBART, THB, AXX3CCY, YIMC9XZG, R7CLTCHZ, O2SAT, FIO2  Lab Results   Component Value Date/Time    SPECIAL  03/21/2017 03:00 AM      Performed at [facility] - INPATIENT [address], [address] [facility] [phone] 03/21/2017 03:00 AM     Lab Results   Component Value Date/Time    CULTURE (A) 03/21/2017 03:00 AM     VANCOMYCIN RESISTANT ENTEROCOCCUS FAECIUM >040336 CFU/ML    CULTURE  03/21/2017 03:00 AM     Performed at [facility], [address] [place] [address] [phone]       Radiology:  Ct Abdomen Pelvis Wo Contrast Additional Contrast? None    Result Date: 4/30/2021  1. Partial small bowel obstruction with a transition point located at the mouth of the ventral hernia. 2. Extensive stone burden identified in the bilateral kidneys, left greater than right. 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 3. Enlargement of the right upper pole mass, likely representing renal cell carcinoma. 4. Enlarged left para-aortic lymph node measuring 27 x 20 mm. Finding is nonspecific and may represent metastatic disease or reactive adenopathy. 5. Severe diverticulosis. 6. Cholelithiasis. Xr Abdomen For Ng/og/ne Tube Placement    Result Date: 4/30/2021  A newly placed gastric tube loops in the distal thoracic esophagus, where both the tip and sideport are located. Recommend repositioning before use. Us Retroperitoneal Limited    Result Date: 5/1/2021  1. Recently demonstrated suspicious lesion arising from the superior pole the right kidney is not well delineated on this exam. 2.  Right hydronephrosis and nephrolithiasis again demonstrated. 3.  Multiple stones throughout the left intrarenal collecting system, which likely obscures the associated hydronephrosis demonstrated on recent CT exam. 4.  Cholelithiasis.        Physical Examination:        General appearance:  alert, cooperative and no distress, elderly  gentleman  HENT: right nare NG tube present, oral mucosa dry, EOMI, PERRLA, sclera are anti-icteric  Mental Status:  oriented to person, place and time and normal affect  Lungs:  clear to auscultation bilaterally, normal effort  Heart:  regular rate and rhythm, no murmur  Abdomen:  soft, nontender, nondistended, normal bowel sounds, no masses, hepatomegaly, splenomegaly, right lower quadrant ileostomy present, midline incision present with surgical gauze covering   Extremities:  no edema, redness, tenderness in the calves  Skin:  no gross lesions, rashes, induration    Assessment:        Hospital Problems           Last Modified POA    * (Principal) Ventral hernia with bowel obstruction 5/1/2021 Yes    KARMA (acute kidney injury) ([address] 5/1/2021 Yes    Essential hypertension (Chronic) 5/1/2021 Yes    Renal mass (Chronic) 5/1/2021 Yes    Normochromic normocytic anemia 5/1/2021 Yes          Plan:        1. Strangulated ventral hernia - POD # 1 from ex lap with small bowel resection and reduction of strangulated hernia - postop care per general surgery  2. Acute kidney injury - nephrology following. Creat worsening today. Continue IVF at present. Urine studies ordered. CPK/uric acid WNL. Monitor urine output  3. History of bladder cancer / RCC with creation of ileostomy  4. Normo/normo anemia - monitor daily CBC. Check iron studies  5. Pain control - lidocaine patch, prn dilaudid, prn roxicodone  6. Bowel regimen - dulcolax suppository, reglan IV  7.  PT/OT    Marialuisa Salamanca,   5/1/2021  10:57 AM

## 2021-05-01 NOTE — PROGRESS NOTES
[facility] GENERAL SURGERY  PROGRESS NOTE  Date: 21  Patient Name: Renea Velasco  MRN: [id number]    Hospital Problem List:  Patient Active Problem List   Diagnosis    Bladder cancer    DJD (degenerative joint disease)    Essential hypertension    Renal mass    Incisional hernia of anterior abdominal wall without obstruction or gangrene    Bilateral kidney stones    Right ureteral calculus    Partial small bowel obstruction (HCC)    Ventral hernia with bowel obstruction    GERD (gastroesophageal reflux disease)    KARMA (acute kidney injury) ([facility] [address]       SUBJECTIVE  Patient seen and chart reviewed. Postop day 1 from ex lap, FELICE, reduction of parastomal hernia, and small bowel resection. No acute events overnight. Afebrile, normotensive, normal sinus rhythm, and saturating >95% on RA. Ileal conduit with 700 cc urine recorded. MARIE drain with 40 cc serosang out overnight. Denies fever or chills  Denies SOB or cough  Denies palpitations or CP  +abdominal pain, denies nausea, vomiting, or diarrhea    OBJECTIVE    Vitals:  Temp  Av.6 °F (36.4 °C)  Min: 97.3 °F (36.3 °C)  Max: 98.9 °F (97.0 °C)  Systolic (59XWQ), GRA:205 , Min:80 , VUX:560   Diastolic (36DJZ), BZC:24, Min:52, Max:106  Pulse  Av.8  Min: 74  Max: 115  Resp  Av.7  Min: 0  Max: 31  SpO2: 93 % SpO2  Av.6 %  Min: 86 %  Max: 100 %    Physical Exam:  Gen: No acute distress. A&Ox3. HEENT:  NC/AT. Conjunctiva moist without icterus. Ears are symmetric. Nares are patent. Oral mucus membranes are moist.  Neck:  Supple. No LAD. CV:  Regular rate and rhythm. Warm, well perfused. Resp:  Equal chest rise bilaterally. No wheezes, stridor, or increased work of breathing. Abd:  Soft, appropriately tender to palpation, no rebound, no guarding, or peritoneal signs. Ileal conduit with 700 cc urine. MARIE drain in place with 40 cc serosanguineous fluid. Neuro: Motor and sensory grossly intact. Ext:  Warm, dry, and well perfused. Limbs without apparent deformity. Skin:  No rashes or lesions. I/O's                                                                             I/O last 3 completed shifts: In: 1300 [I.V.:1300]  Out: 740 [Urine:700; Drains:40]     Labs    CBC:   Recent Labs     04/30/21  1230 05/01/21  0501   WBC 8.2 9.3   HGB 12.1* 11.5*   HCT 39.1* 38.5*   MCV 89.1 90.8    227     BMP:   Recent Labs     04/30/21  1230 05/01/21  0501    142   K 4.5 5.3    113*   CO2 17* 14*   BUN 42* 49*   CREATININE 2.85* 3.62*   GLUCOSE 131* 157*     COAGS: No results for input(s): PTT, INR in the last 72 hours. Invalid input(s): PT    Radiology:  Ct Abdomen Pelvis Wo Contrast Additional Contrast? None    Result Date: 4/30/2021  1. Partial small bowel obstruction with a transition point located at the mouth of the ventral hernia. 2. Extensive stone burden identified in the bilateral kidneys, left greater than right. 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 3. Enlargement of the right upper pole mass, likely representing renal cell carcinoma. 4. Enlarged left para-aortic lymph node measuring 27 x 20 mm. Finding is nonspecific and may represent metastatic disease or reactive adenopathy. 5. Severe diverticulosis. 6. Cholelithiasis. Xr Abdomen For Ng/og/ne Tube Placement    Result Date: 4/30/2021  A newly placed gastric tube loops in the distal thoracic esophagus, where both the tip and sideport are located. Recommend repositioning before use. ASSESSMENT  80year old male w/ recurrent parastomal hernia presenting with incarcerated/strangulated parastomal hernia s/p ex lap, FELICE, reduction of parastomal hernia, and small bowel resection (4/30/21-Bhargav). PLAN  -Pain and nausea control PRN  -Encourage IS, deep breathing, and cough.   -Encourage up to chair and ambulation as able. -Monitor I/O's  -Continue NGT to LIWS. Monitor for return of bowel function  -Continue NPO on mIVF at this time until evidence of bowel function.  -Continue MARIE to bulb suction. Monitor output. -Monitor urine output from ileal conduit.   -Appreciate medical management    Moises Bamberger, MD  General Surgery PGY3  Available via [employer]

## 2021-05-01 NOTE — PLAN OF CARE
Problem: Falls - Risk of:  Goal: Will remain free from falls  Description: Will remain free from falls  Outcome: Ongoing  Goal: Absence of physical injury  Description: Absence of physical injury  Outcome: Ongoing     Problem: Skin Integrity:  Goal: Will show no infection signs and symptoms  Description: Will show no infection signs and symptoms  Outcome: Ongoing  Goal: Absence of new skin breakdown  Description: Absence of new skin breakdown  Outcome: Ongoing  Goal: Signs of wound healing will improve  Description: Signs of wound healing will improve  Outcome: Ongoing     Problem: Infection - Surgical Site:  Goal: Will show no infection signs and symptoms  Description: Will show no infection signs and symptoms  Outcome: Ongoing     Problem: Activity:  Goal: Ability to tolerate increased activity will improve  Description: Ability to tolerate increased activity will improve  Outcome: Ongoing     Problem:  Bowel/Gastric:  Goal: Gastrointestinal status for postoperative course will improve  Description: Gastrointestinal status for postoperative course will improve  Outcome: Ongoing     Problem: Coping:  Goal: Level of anxiety will decrease  Description: Level of anxiety will decrease  Outcome: Ongoing     Problem: Sensory:  Goal: Pain level will decrease  Description: Pain level will decrease  Outcome: Ongoing

## 2021-05-01 NOTE — PROGRESS NOTES
General Surgery Progress Note    Subjective:     Patient without complaints. No nausea or vomiting. Slightly low urine output from ileal conduit. Increased creatinine this morning. Dressings clean and intact. MARIE stripped with serosanguineous output. Scheduled Meds:   bisacodyl  10 mg Rectal Daily    albumin human  25 g Intravenous Q8H    metoclopramide  5 mg Intravenous Q6H    sodium chloride flush  5-40 mL Intravenous 2 times per day    heparin (porcine)  5,000 Units Subcutaneous 3 times per day    sodium chloride flush  5-40 mL Intravenous 2 times per day    acetaminophen  1,000 mg Oral 3 times per day    methocarbamol  500 mg Oral 4 times per day    lidocaine  2 patch Topical Q12H    famotidine (PEPCID) injection  10 mg Intravenous BID     Continuous Infusions:   sodium chloride      lactated ringers 125 mL/hr at 05/01/21 0605     PRN Meds:albuterol sulfate HFA, sodium chloride flush, sodium chloride flush, sodium chloride, oxyCODONE, HYDROmorphone, ondansetron    Objective:   BP 99/67   Pulse 83   Temp 98.9 °F (37.2 °C) (Oral)   Resp 27   Ht 6' 1\" (1.854 m)   Wt 225 lb (102.1 kg)   SpO2 93%   BMI 29.69 kg/m²     I/O last 3 completed shifts: In: 1300 [I.V.:1300]  Out: 740 [Urine:700; Drains:40]    Chest: Breath sounds were clear and equal with no rales, wheezes, or rhonchi. Respiratory effort was normal with no retractions or use of accessory muscles. Cardiovascular: Heart sounds were normal with a regular rate and rhythm. There were no murmurs or gallops. Abdomen: Dressings were clean and intact. Urostomy with slightly bloody urine output. MARIE with serosanguineous output.     LABS:  Lab Results   Component Value Date    WBC 9.3 05/01/2021    RBC 4.24 05/01/2021    HGB 11.5 05/01/2021    HCT 38.5 05/01/2021    MCV 90.8 05/01/2021    MCH 27.1 05/01/2021    MCHC 29.9 05/01/2021    RDW 16.8 05/01/2021     05/01/2021    MPV 9.3 05/01/2021       Lab Results   Component Value Date     05/01/2021    K 5.3 05/01/2021     05/01/2021    CO2 14 05/01/2021    BUN 49 05/01/2021    CREATININE 3.62 05/01/2021    GLUCOSE 157 05/01/2021    CALCIUM 8.5 05/01/2021       Assessment/Plan:   1. Status post reduction and repair of strangulated parastomal hernia with small bowel resection. NG tube with clear output. 2. Dressings intact. Increased creatinine. Continue IV fluids increase activity out of bed to chair. 3. Could move to progressive unit.         Electronically signed by Nayeli Santoyo DO  on 5/1/2021 at 8:37 AM

## 2021-05-01 NOTE — PROGRESS NOTES
The potassium/magnesium sliding scale has been automatically discontinued per [facility] approved policy because the patient has decreased renal function (CrCl<30 ml/min). The patient's current K/Mag levels are currently:    Recent Labs     04/30/21  1230 05/01/21  0501   K 4.5 5.3   MG  --  1.8       Estimated Creatinine Clearance: 20 mL/min (A) (based on SCr of 3.62 mg/dL (H)). For patients with decreased renal function (below 30ml/min) needing potassium/magnesium supplementation, please order individual bolus doses with appropriate monitoring. Please contact the inpatient pharmacy at [phone] with any concerns. Thank you.     TRINA DOWNING  5/1/2021  6:33 AM

## 2021-05-01 NOTE — PROGRESS NOTES
Transitions of Care Pharmacy Service   Medication Review    The patient's list of current home medications has been reviewed and updated. Source(s) of information: Patient    Please feel free to call with any questions about this encounter. Thank you. Regan Cabrales, [address] [facility]  Transitions of Care Pharmacy Service  Phone:  [phone]  Fax: [phone]      Prior to Admission medications    Medication Sig Start Date End Date Taking?  Authorizing Provider   acetaminophen (TYLENOL) 500 MG tablet Take 500-1,000 mg by mouth every 4 hours as needed for Pain    Yes Historical Provider, MD           albuterol sulfate HFA (VENTOLIN HFA) 108 (90 Base) MCG/ACT inhaler Inhale 2 puffs into the lungs 4 times daily as needed for Wheezing 9/14/20 5/1/21  Shane Sadler MD

## 2021-05-01 NOTE — PLAN OF CARE
Problem: Falls - Risk of:  Goal: Will remain free from falls  Description: Will remain free from falls  Outcome: Ongoing  Patient has remained free from falls this shift. Bed locked, low and bed alarm activated. Problem: Infection - Surgical Site:  Goal: Will show no infection signs and symptoms  Description: Will show no infection signs and symptoms  Outcome: Ongoing   Surgical site, well approximated, scant sanginous drainage between staples. Surgical dressing in place. Problem: Activity:  Goal: Ability to tolerate increased activity will improve  Description: Ability to tolerate increased activity will improve  Outcome: Ongoing  Patient very painful, not tolerating rolling or boosting in bed with out significant pain. Problem: Bowel/Gastric:  Goal: Gastrointestinal status for postoperative course will improve  Description: Gastrointestinal status for postoperative course will improve  Outcome: Ongoing  Hypoactive bowel sounds, no bowel movements or flatulence this shift.

## 2021-05-02 ENCOUNTER — APPOINTMENT (OUTPATIENT)
Dept: GENERAL RADIOLOGY | Age: 81
DRG: 329 | End: 2021-05-02
Payer: MEDICARE

## 2021-05-02 PROBLEM — G92.8 TOXIC METABOLIC ENCEPHALOPATHY: Status: ACTIVE | Noted: 2021-05-02

## 2021-05-02 PROBLEM — D50.9 IRON DEFICIENCY ANEMIA: Status: ACTIVE | Noted: 2021-05-02

## 2021-05-02 LAB
ABSOLUTE EOS #: 0.13 K/UL (ref 0–0.44)
ABSOLUTE IMMATURE GRANULOCYTE: 0.07 K/UL (ref 0–0.3)
ABSOLUTE LYMPH #: 0.52 K/UL (ref 1.1–3.7)
ABSOLUTE MONO #: 0.33 K/UL (ref 0.1–1.2)
ALBUMIN SERPL-MCNC: 3.8 G/DL (ref 3.5–5.2)
ALBUMIN/GLOBULIN RATIO: NORMAL (ref 1–2.5)
ALP BLD-CCNC: 55 U/L (ref 40–129)
ALT SERPL-CCNC: 11 U/L (ref 5–41)
AMMONIA: 21 UMOL/L (ref 16–60)
ANION GAP SERPL CALCULATED.3IONS-SCNC: 15 MMOL/L (ref 9–17)
AST SERPL-CCNC: 19 U/L
BASOPHILS # BLD: 0 % (ref 0–2)
BASOPHILS ABSOLUTE: 0 K/UL (ref 0–0.2)
BILIRUB SERPL-MCNC: 0.5 MG/DL (ref 0.3–1.2)
BILIRUBIN DIRECT: 0.29 MG/DL
BILIRUBIN, INDIRECT: 0.21 MG/DL (ref 0–1)
BUN BLDV-MCNC: 55 MG/DL (ref 8–23)
BUN/CREAT BLD: 14 (ref 9–20)
CALCIUM SERPL-MCNC: 8.7 MG/DL (ref 8.6–10.4)
CHLORIDE BLD-SCNC: 111 MMOL/L (ref 98–107)
CO2: 14 MMOL/L (ref 20–31)
CREAT SERPL-MCNC: 3.99 MG/DL (ref 0.7–1.2)
DIFFERENTIAL TYPE: ABNORMAL
EOSINOPHILS RELATIVE PERCENT: 2 % (ref 1–4)
FIO2: 32
FOLATE: 4 NG/ML
GFR AFRICAN AMERICAN: 18 ML/MIN
GFR NON-AFRICAN AMERICAN: 15 ML/MIN
GFR SERPL CREATININE-BSD FRML MDRD: ABNORMAL ML/MIN/{1.73_M2}
GFR SERPL CREATININE-BSD FRML MDRD: ABNORMAL ML/MIN/{1.73_M2}
GLOBULIN: NORMAL G/DL (ref 1.5–3.8)
GLUCOSE BLD-MCNC: 144 MG/DL (ref 70–99)
HCT VFR BLD CALC: 33.7 % (ref 40.7–50.3)
HEMOGLOBIN: 9.9 G/DL (ref 13–17)
IMMATURE GRANULOCYTES: 1 %
LACTIC ACID, WHOLE BLOOD: NORMAL MMOL/L (ref 0.7–2.1)
LACTIC ACID: 1.1 MMOL/L (ref 0.5–2.2)
LYMPHOCYTES # BLD: 8 % (ref 24–43)
MCH RBC QN AUTO: 27 PG (ref 25.2–33.5)
MCHC RBC AUTO-ENTMCNC: 29.4 G/DL (ref 28.4–34.8)
MCV RBC AUTO: 92.1 FL (ref 82.6–102.9)
MONOCYTES # BLD: 5 % (ref 3–12)
NEGATIVE BASE EXCESS, ART: 11 (ref 0–2)
NRBC AUTOMATED: 0 PER 100 WBC
O2 DEVICE/FLOW/%: ABNORMAL
PATIENT TEMP: ABNORMAL
PDW BLD-RTO: 16.8 % (ref 11.8–14.4)
PLATELET # BLD: 171 K/UL (ref 138–453)
PLATELET ESTIMATE: ABNORMAL
PMV BLD AUTO: 9.3 FL (ref 8.1–13.5)
POC HCO3: 16.9 MMOL/L (ref 22–27)
POC O2 SATURATION: 90 %
POC PCO2 TEMP: ABNORMAL MM HG
POC PCO2: 44 MM HG (ref 32–45)
POC PH TEMP: ABNORMAL
POC PH: 7.19 (ref 7.35–7.45)
POC PO2 TEMP: ABNORMAL MM HG
POC PO2: 74 MM HG (ref 75–95)
POSITIVE BASE EXCESS, ART: ABNORMAL (ref 0–2)
POTASSIUM SERPL-SCNC: 4.9 MMOL/L (ref 3.7–5.3)
RBC # BLD: 3.66 M/UL (ref 4.21–5.77)
RBC # BLD: ABNORMAL 10*6/UL
SEG NEUTROPHILS: 84 % (ref 36–65)
SEGMENTED NEUTROPHILS ABSOLUTE COUNT: 5.45 K/UL (ref 1.5–8.1)
SODIUM BLD-SCNC: 140 MMOL/L (ref 135–144)
TCO2 (CALC), ART: 18 MMOL/L (ref 23–28)
TOTAL PROTEIN: 7.3 G/DL (ref 6.4–8.3)
TSH SERPL DL<=0.05 MIU/L-ACNC: 1.91 MIU/L (ref 0.3–5)
VITAMIN B-12: 224 PG/ML (ref 232–1245)
WBC # BLD: 6.5 K/UL (ref 3.5–11.3)
WBC # BLD: ABNORMAL 10*3/UL

## 2021-05-02 PROCEDURE — 99233 SBSQ HOSP IP/OBS HIGH 50: CPT | Performed by: INTERNAL MEDICINE

## 2021-05-02 PROCEDURE — P9047 ALBUMIN (HUMAN), 25%, 50ML: HCPCS | Performed by: SURGERY

## 2021-05-02 PROCEDURE — 36415 COLL VENOUS BLD VENIPUNCTURE: CPT

## 2021-05-02 PROCEDURE — 6360000002 HC RX W HCPCS: Performed by: INTERNAL MEDICINE

## 2021-05-02 PROCEDURE — 6370000000 HC RX 637 (ALT 250 FOR IP): Performed by: STUDENT IN AN ORGANIZED HEALTH CARE EDUCATION/TRAINING PROGRAM

## 2021-05-02 PROCEDURE — 2000000000 HC ICU R&B

## 2021-05-02 PROCEDURE — 82803 BLOOD GASES ANY COMBINATION: CPT

## 2021-05-02 PROCEDURE — 2700000000 HC OXYGEN THERAPY PER DAY

## 2021-05-02 PROCEDURE — 80076 HEPATIC FUNCTION PANEL: CPT

## 2021-05-02 PROCEDURE — 2500000003 HC RX 250 WO HCPCS: Performed by: STUDENT IN AN ORGANIZED HEALTH CARE EDUCATION/TRAINING PROGRAM

## 2021-05-02 PROCEDURE — 36600 WITHDRAWAL OF ARTERIAL BLOOD: CPT

## 2021-05-02 PROCEDURE — 84443 ASSAY THYROID STIM HORMONE: CPT

## 2021-05-02 PROCEDURE — 85025 COMPLETE CBC W/AUTO DIFF WBC: CPT

## 2021-05-02 PROCEDURE — 74018 RADEX ABDOMEN 1 VIEW: CPT

## 2021-05-02 PROCEDURE — 2500000003 HC RX 250 WO HCPCS: Performed by: INTERNAL MEDICINE

## 2021-05-02 PROCEDURE — 80048 BASIC METABOLIC PNL TOTAL CA: CPT

## 2021-05-02 PROCEDURE — 94761 N-INVAS EAR/PLS OXIMETRY MLT: CPT

## 2021-05-02 PROCEDURE — 6370000000 HC RX 637 (ALT 250 FOR IP): Performed by: SURGERY

## 2021-05-02 PROCEDURE — 6360000002 HC RX W HCPCS: Performed by: SURGERY

## 2021-05-02 PROCEDURE — 2580000003 HC RX 258: Performed by: SURGERY

## 2021-05-02 PROCEDURE — 87040 BLOOD CULTURE FOR BACTERIA: CPT

## 2021-05-02 PROCEDURE — 82746 ASSAY OF FOLIC ACID SERUM: CPT

## 2021-05-02 PROCEDURE — 6360000002 HC RX W HCPCS: Performed by: STUDENT IN AN ORGANIZED HEALTH CARE EDUCATION/TRAINING PROGRAM

## 2021-05-02 PROCEDURE — 2580000003 HC RX 258: Performed by: INTERNAL MEDICINE

## 2021-05-02 PROCEDURE — 71045 X-RAY EXAM CHEST 1 VIEW: CPT

## 2021-05-02 PROCEDURE — 82607 VITAMIN B-12: CPT

## 2021-05-02 PROCEDURE — 83605 ASSAY OF LACTIC ACID: CPT

## 2021-05-02 PROCEDURE — 82140 ASSAY OF AMMONIA: CPT

## 2021-05-02 RX ORDER — FUROSEMIDE 10 MG/ML
40 INJECTION INTRAMUSCULAR; INTRAVENOUS ONCE
Status: COMPLETED | OUTPATIENT
Start: 2021-05-02 | End: 2021-05-02

## 2021-05-02 RX ADMIN — FAMOTIDINE 10 MG: 10 INJECTION, SOLUTION INTRAVENOUS at 08:58

## 2021-05-02 RX ADMIN — METOCLOPRAMIDE 5 MG: 5 INJECTION, SOLUTION INTRAMUSCULAR; INTRAVENOUS at 14:47

## 2021-05-02 RX ADMIN — METHOCARBAMOL TABLETS 500 MG: 500 TABLET, COATED ORAL at 08:57

## 2021-05-02 RX ADMIN — HEPARIN SODIUM 5000 UNITS: 5000 INJECTION INTRAVENOUS; SUBCUTANEOUS at 05:51

## 2021-05-02 RX ADMIN — METHOCARBAMOL TABLETS 500 MG: 500 TABLET, COATED ORAL at 01:52

## 2021-05-02 RX ADMIN — METOCLOPRAMIDE 5 MG: 5 INJECTION, SOLUTION INTRAMUSCULAR; INTRAVENOUS at 04:17

## 2021-05-02 RX ADMIN — METOCLOPRAMIDE 5 MG: 5 INJECTION, SOLUTION INTRAMUSCULAR; INTRAVENOUS at 08:57

## 2021-05-02 RX ADMIN — HEPARIN SODIUM 5000 UNITS: 5000 INJECTION INTRAVENOUS; SUBCUTANEOUS at 22:00

## 2021-05-02 RX ADMIN — ACETAMINOPHEN ORAL SOLUTION 1000 MG: 325 SOLUTION ORAL at 14:10

## 2021-05-02 RX ADMIN — ALBUMIN (HUMAN) 25 G: 0.25 INJECTION, SOLUTION INTRAVENOUS at 01:47

## 2021-05-02 RX ADMIN — METOCLOPRAMIDE 5 MG: 5 INJECTION, SOLUTION INTRAMUSCULAR; INTRAVENOUS at 21:00

## 2021-05-02 RX ADMIN — SODIUM BICARBONATE 50 MEQ: 84 INJECTION INTRAVENOUS at 14:53

## 2021-05-02 RX ADMIN — FAMOTIDINE 10 MG: 10 INJECTION, SOLUTION INTRAVENOUS at 21:00

## 2021-05-02 RX ADMIN — ACETAMINOPHEN ORAL SOLUTION 1000 MG: 325 SOLUTION ORAL at 05:50

## 2021-05-02 RX ADMIN — BISACODYL 10 MG: 10 SUPPOSITORY RECTAL at 08:57

## 2021-05-02 RX ADMIN — DEXTROSE AND SODIUM CHLORIDE: 5; 450 INJECTION, SOLUTION INTRAVENOUS at 05:54

## 2021-05-02 RX ADMIN — HEPARIN SODIUM 5000 UNITS: 5000 INJECTION INTRAVENOUS; SUBCUTANEOUS at 14:10

## 2021-05-02 RX ADMIN — ACETAMINOPHEN ORAL SOLUTION 1000 MG: 325 SOLUTION ORAL at 22:00

## 2021-05-02 RX ADMIN — FUROSEMIDE 40 MG: 10 INJECTION, SOLUTION INTRAMUSCULAR; INTRAVENOUS at 14:47

## 2021-05-02 RX ADMIN — SODIUM BICARBONATE: 84 INJECTION INTRAVENOUS at 11:10

## 2021-05-02 ASSESSMENT — PAIN SCALES - GENERAL
PAINLEVEL_OUTOF10: 0
PAINLEVEL_OUTOF10: 0
PAINLEVEL_OUTOF10: 6
PAINLEVEL_OUTOF10: 0
PAINLEVEL_OUTOF10: 6
PAINLEVEL_OUTOF10: 0
PAINLEVEL_OUTOF10: 0

## 2021-05-02 ASSESSMENT — PAIN DESCRIPTION - PAIN TYPE: TYPE: SURGICAL PAIN

## 2021-05-02 NOTE — PROGRESS NOTES
Surgical resident rounding on the patient. Writer updated the MD on the patient's status- including his increasing confusion and hematuria. No new orders were received after the MD was notified. Writer asked specifically about SQ Heparin- MD instructed writer to continue. Writer was instructed to limit the use of narcotics and encourage rest.  The bed alarm is on at all times.

## 2021-05-02 NOTE — CONSULTS
Reason for Consult:  Acute kidney injury. Requesting Physician:  Mel Rey DO    HISTORY OF PRESENT ILLNESS:    The patient is a 80 y.o. male had multiple abdominal surgeries presented with enlarging incarcerated/strangulated parastomal hernia, who underwent exploratory laparotomy with reduction of the hernia and small bowel resection on 4/30/2021. He had history of bladder cancer s/p cystectomy and ileoconduit formation in 2006. His baseline creatinine has been between 1.5-2.1, it was 2.85 at presentation and increased this morning to 3.99. Patient had a CT scan of the abdomen upon presentation that was done without contrast, it was remarkable for extensive stone burden in both kidneys greater on the left, it showed uroepithelial wall thickening with inflammatory stranding involving the left collecting system, was also remarkable for enlargement of the right upper pole mass. Renal ultrasound was done yesterday showed right hydronephrosis and nephrolithiasis.   Patient has been nonoliguric, urine output over the last 24 hours was 2.7 L      Review Of Systems:      Unable to obtain as he is confused  Past Medical History:   Diagnosis Date    Arthritis     Caffeine use     2 coffee, 2 cans soda/day    Cancer [facility]) 2006    bladder et prostate, 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    GERD (gastroesophageal reflux disease)     Hernia, inguinal, right     Hypertension     Kidney stone     Presence of urostomy ([address]     Retinal detachment        Past Surgical History:   Procedure Laterality Date    BLADDER REMOVAL  2006    cancer, s/p urostomy     CATARACT REMOVAL Bilateral     COLONOSCOPY      EYE SURGERY Right 2000    macular hole    HERNIA REPAIR      x2    INCISIONAL HERNIA REPAIR  12/7/2015    parastomal hernia repair - Dr. Guicho Truong  11/2014   [address] [facility]  2006    removal    VITRECTOMY Left 02/02/16       Prior to Admission medications    Medication Sig Start Date End Date Taking?  Authorizing Provider   acetaminophen (TYLENOL) 500 MG tablet Take 500-1,000 mg by mouth every 4 hours as needed for Pain    Yes Historical Provider, MD   albuterol sulfate (PROAIR RESPICLICK) 661 (90 Base) MCG/ACT aerosol powder inhalation Inhale 2 puffs into the lungs every 4 hours as needed for Wheezing or Shortness of Breath 9/10/20   Gladys Tuttle MD       Scheduled Meds:   bisacodyl  10 mg Rectal Daily    metoclopramide  5 mg Intravenous Q6H    sodium chloride flush  5-40 mL Intravenous 2 times per day    heparin (porcine)  5,000 Units Subcutaneous 3 times per day    sodium chloride flush  5-40 mL Intravenous 2 times per day    acetaminophen  1,000 mg Oral 3 times per day    [Held by provider] methocarbamol  500 mg Oral 4 times per day    lidocaine  2 patch Topical Q12H    famotidine (PEPCID) injection  10 mg Intravenous BID     Continuous Infusions:   sodium bicarbonate infusion 100 mL/hr at 21 1110    sodium chloride       PRN Meds:albuterol sulfate HFA, sodium chloride flush, sodium chloride flush, sodium chloride, oxyCODONE, HYDROmorphone, ondansetron    Allergies   Allergen Reactions    Nsaids Anaphylaxis     GI bleed    Tetracyclines & Related        Social History     Socioeconomic History    Marital status:      Spouse name: Not on file    Number of children: 2    Years of education: 11.5    Highest education level: Not on file   Occupational History    Occupation: retired   Social Needs    Financial resource strain: Not on file    Food insecurity     Worry: Not on file     Inability: Not on file   [employer] needs     Medical: Not on file     Non-medical: Not on file   Tobacco Use    Smoking status: Former Smoker     Packs/day: 0.50     Years: 20.00     Pack years: 10.00     Types: Cigarettes     Quit date: 1979     Years since quittin.8    Smokeless tobacco: Never Used    Tobacco comment: quit in    Substance and Sexual Activity palpation. Musculoskeletal: No cyanosis or clubbing. Integumentary: Pink, warm and dry. Free from rash or lesions. CNS: Confused face symmetrical. No tremor.    Psych: Unable to assess continued confusion    Data:  CBC:   Lab Results   Component Value Date    WBC 6.5 05/02/2021    HGB 9.9 (L) 05/02/2021    HCT 33.7 (L) 05/02/2021    MCV 92.1 05/02/2021     05/02/2021     BMP:    Lab Results   Component Value Date     05/02/2021     05/01/2021     04/30/2021    K 4.9 05/02/2021    K 5.3 05/01/2021    K 4.5 04/30/2021     (H) 05/02/2021     (H) 05/01/2021     04/30/2021    CO2 14 (L) 05/02/2021    CO2 14 (L) 05/01/2021    CO2 17 (L) 04/30/2021    BUN 55 (H) 05/02/2021    BUN 49 (H) 05/01/2021    BUN 42 (H) 04/30/2021    CREATININE 3.99 (H) 05/02/2021    CREATININE 3.62 (H) 05/01/2021    CREATININE 2.85 (H) 04/30/2021    GLUCOSE 144 (H) 05/02/2021    GLUCOSE 157 (H) 05/01/2021    GLUCOSE 131 (H) 04/30/2021     CMP:   Lab Results   Component Value Date     05/02/2021    K 4.9 05/02/2021     05/02/2021    CO2 14 05/02/2021    BUN 55 05/02/2021    CREATININE 3.99 05/02/2021    GLUCOSE 144 05/02/2021    CALCIUM 8.7 05/02/2021    PROT 8.2 04/30/2021    LABALBU 4.1 04/30/2021    BILITOT 0.35 04/30/2021    ALKPHOS 77 04/30/2021    AST 16 04/30/2021    ALT 11 04/30/2021      Hepatic:   Lab Results   Component Value Date    AST 16 04/30/2021    AST 24 07/15/2020    AST 40 (H) 09/12/2019    ALT 11 04/30/2021    ALT 17 07/15/2020    ALT 34 09/12/2019    BILITOT 0.35 04/30/2021    BILITOT 0.47 07/15/2020    BILITOT 0.69 09/12/2019    ALKPHOS 77 04/30/2021    ALKPHOS 64 07/15/2020    ALKPHOS 62 09/12/2019     BNP: No results found for: BNP  Lipids:   Lab Results   Component Value Date    CHOL 139 05/09/2016    HDL 26 (A) 05/09/2016     INR:   Lab Results   Component Value Date    INR 1.0 05/17/2016     PTH: No results found for: PTH  Phosphorus:  No results found for: PHOS  Ionized Calcium: No results found for: IONCA  Magnesium:   Lab Results   Component Value Date    MG 1.8 05/01/2021     Albumin:   Lab Results   Component Value Date    LABALBU 4.1 04/30/2021     Last 3 CK, CKMB, Troponin: @LABRCNT(CKTOTAL:3,CKMB:3,TROPONINI:3)       URINE:)No results found for: Kaylynn Zimmerman     Radiology:   Renal ultrasound  1.  Recently demonstrated suspicious lesion arising from the superior pole   the right kidney is not well delineated on this exam.       2.  Right hydronephrosis and nephrolithiasis again demonstrated.       3.  Multiple stones throughout the left intrarenal collecting system, which   likely obscures the associated hydronephrosis demonstrated on recent CT exam.       4.  Cholelithiasis. Assessment:  1. Acute kidney injury, most likely secondary to ATN in the setting of ileus and incarcerated hernia  2. Hydronephrosis, most likely chronic, will need further evaluation by urology  3. Metabolic acidosis  4. S/p hernia repair  5. Anemia  6. History of bladder cancer and renal cell carcinoma, s/p cystectomy and creation of ileaoconduit  7. History of renal mass s/p cryoablation in 2019  8. CKD 3b    Plan:  Creatinine seems to be plateauing  Change IV fluids to isotonic sodium bicarb solution, at 100 mL/h  Urine studies are suggestive of ATN  Consult urology for further evaluation of hydronephrosis and kidney mass. Confusion is most likely related to opiates  Avoid nephrotoxic drugs and IV contrast exposure. Follow up chemistries ordered for AM.    Thank you for the consultation. Please do not hesitate to contact us for any further questions/concerns. We will continue to follow along with you.        Electronically signed by Ricardo Long MD  on 5/2/2021 at 11:16 AM

## 2021-05-02 NOTE — PROGRESS NOTES
[facility]  Office: [address], DO, Farzad Thacker, DO, Jaquan Rui, DO, Chikis Feng Bajwa, DO, Brian Lee MD, Alvino Jay MD, Brian Piña MD, Yamilet Varner MD, Pablo Pizano MD, Raúl Mendes MD, Yosvany Dietz MD, Vinny Vitale MD, Brianda Barney, DO, Savanah Cooper MD, Delma Ruiz DO, Chato Archuleta MD,  César Giordano DO, Yusef Blake MD, Derald Jeans, MD, Kyree Haley MD, Val Agosto MD, Adriana Melchor, [facility], [facility], [facility], Clifford Kruse, CNP, Cuong Mackenzie, CNS, Rena Das, CNP, Starr Keen, CNP, Fanny Liao, CNP, Suzan Hirsch, CNP, Bogdan Pendleton, CNP, Wang Grey PA-C, Carlyle Rhodes, [facility], Elodia Jimenez, CNP, Amalia Victor, CNP, Tunde Camarillo, CNP, Antoine Holter, CNP, Brando Spears, CNP, [facility]    Progress Note    5/2/2021    1:04 PM    Name:   Daniel Harrell  MRN:     [id number]     [place]:      [de-identified]   Room:   [facility] Day:  2  Admit Date:  4/30/2021 12:15 PM    PCP:   Barbara Ruiz MD  Code Status:  Full Code    Subjective:     C/C:   Chief Complaint   Patient presents with    Hernia     LOWER RIGHT ABDOMEN. recent increase in pain and growth     Interval History Status: Significantly worsened. Pt seen and examined this morning. POD # 2 from ex lap with extensive lysis of adhesions, small bowel resection, and reduction of strangulated hernia. He has become confused and more restless over the last 24 hours. His speech is now garbled. He is also hypoxic, requiring supplemental oxygen. He did receive Dilaudid over the past 24 hours. Currently requiring 2 L of oxygen via nasal cannula.     Brief History:     80 y.o. male with a history of L RCC s/p ablation, bladder cancer s/p cystectomy w/ creation of ileoconduit in 2006, sbo s/p exlap sbr, recurrent parastomal hernia presents with progressively enlarging parastomal hernia pack-year smoking history. He has never used smokeless tobacco. He reports that he does not drink alcohol or use drugs. Family History:   Family History   Problem Relation Age of Onset    Diabetes Mother     Diabetes Sister        Vitals:  BP (!) 142/71   Pulse 114   Temp 99.3 °F (37.4 °C) (Oral)   Resp 28   Ht 6' 1\" (1.854 m)   Wt 254 lb 3 oz (115.3 kg)   SpO2 91%   BMI 33.54 kg/m²   Temp (24hrs), Av.2 °F (36.8 °C), Min:97.3 °F (36.3 °C), Max:99.3 °F (37.4 °C)    Recent Labs     21   POCGLU 156*       I/O (24Hr):     Intake/Output Summary (Last 24 hours) at 2021 1304  Last data filed at 2021 1109  Gross per 24 hour   Intake 2175 ml   Output 2791 ml   Net -616 ml       Labs:  Hematology:  Recent Labs     21  1230 21  0501 21  0602   WBC 8.2 9.3 6.5   RBC 4.39 4.24 3.66*   HGB 12.1* 11.5* 9.9*   HCT 39.1* 38.5* 33.7*   MCV 89.1 90.8 92.1   MCH 27.6 27.1 27.0   MCHC 30.9 29.9 29.4   RDW 16.5* 16.8* 16.8*    227 171   MPV 9.3 9.3 9.3     Chemistry:  Recent Labs     21  1230 21  0501 21  0602    142 140   K 4.5 5.3 4.9    113* 111*   CO2 17* 14* 14*   GLUCOSE 131* 157* 144*   BUN 42* 49* 55*   CREATININE 2.85* 3.62* 3.99*   MG  --  1.8  --    ANIONGAP 15 15 15   LABGLOM 21* 16* 15*   GFRAA 26* 20* 18*   CALCIUM 9.7 8.5* 8.7   CKTOTAL  --  102  --      Recent Labs     21  1230 21  19221  0501   PROT 8.2  --   --    LABALBU 4.1  --   --    AST 16  --   --    ALT 11  --   --    ALKPHOS 77  --   --    BILITOT 0.35  --   --    BILIDIR 0.08  --   --    LIPASE 36  --   --    URICACID  --   --  7.0   POCGLU  --  156*  --      ABG:No results found for: POCPH, PHART, PH, POCPCO2, NWH2QPX, PCO2, POCPO2, PO2ART, PO2, POCHCO3, MJM1FCY, HCO3, NBEA, PBEA, BEART, BE, THGBART, THB, UWY4YKU, FLWU2BWS, C8TBZOVL, O2SAT, FIO2  Lab Results   Component Value Date/Time    SPECIAL  2017 03:00 AM      Performed at [facility] [address] Aaron Swann, [address] [facility] [phone] 03/21/2017 03:00 AM     Lab Results   Component Value Date/Time    CULTURE (A) 03/21/2017 03:00 AM     VANCOMYCIN RESISTANT ENTEROCOCCUS FAECIUM >629060 CFU/ML    CULTURE  03/21/2017 03:00 AM     Performed at [facility], [address] [place] [address] [phone]       Radiology:  Ct Abdomen Pelvis Wo Contrast Additional Contrast? None    Result Date: 4/30/2021  1. Partial small bowel obstruction with a transition point located at the mouth of the ventral hernia. 2. Extensive stone burden identified in the bilateral kidneys, left greater than right. 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 3. Enlargement of the right upper pole mass, likely representing renal cell carcinoma. 4. Enlarged left para-aortic lymph node measuring 27 x 20 mm. Finding is nonspecific and may represent metastatic disease or reactive adenopathy. 5. Severe diverticulosis. 6. Cholelithiasis. Xr Abdomen For Ng/og/ne Tube Placement    Result Date: 4/30/2021  A newly placed gastric tube loops in the distal thoracic esophagus, where both the tip and sideport are located. Recommend repositioning before use. Us Retroperitoneal Limited    Result Date: 5/1/2021  1. Recently demonstrated suspicious lesion arising from the superior pole the right kidney is not well delineated on this exam. 2.  Right hydronephrosis and nephrolithiasis again demonstrated. 3.  Multiple stones throughout the left intrarenal collecting system, which likely obscures the associated hydronephrosis demonstrated on recent CT exam. 4.  Cholelithiasis.        Physical Examination:        General appearance:  alert, cooperative and in mild distress, elderly  gentleman  HENT: right nare NG tube present, oral mucosa dry, EOMI, PERRLA, sclera are anti-icteric  Mental Status:  oriented to person only today  Lungs: Diminished breath sounds, no wheezing, short and shallow breathing. Heart: Tachycardia with regular rhythm, no murmur  Abdomen:  soft, nontender, nondistended, normal bowel sounds, no masses, hepatomegaly, splenomegaly, right lower quadrant ileostomy present, midline incision with staples present. Incision is clean/dry/intact  Extremities:  no edema, redness, tenderness in the calves  Skin:  no gross lesions, rashes, induration    Assessment:        Hospital Problems           Last Modified POA    * (Principal) Ventral hernia with bowel obstruction 5/1/2021 Yes    KARMA (acute kidney injury) ([facility] [address] 5/1/2021 Yes    Toxic metabolic encephalopathy 8/0/0802 No    Essential hypertension (Chronic) 5/1/2021 Yes    Renal mass (Chronic) 5/1/2021 Yes    Normochromic normocytic anemia 5/1/2021 Yes    Iron deficiency anemia 5/2/2021 Yes          Plan:        1. Strangulated ventral hernia - POD # 2 from ex lap with small bowel resection and reduction of strangulated hernia - postop care per general surgery  2. Acute kidney injury - nephrology consulted today due to incorrect physician placed in initial consult. Discussed with nephrology. Fluids changed to bicarbonate containing fluids. Urine studies ordered. CPK/uric acid WNL. Monitor urine output (over 3 L out overnight)  3. AMS - TME likely secondary to IV dilaudid in the setting of KARMA. Possibly with CO2 retention. Checking ABG, CXR, ammonia, vitamin B12, folate, lactic acid. 4. History of bladder cancer / RCC with creation of ileostomy  5. Urology consult today for nephrolithiasis and hydronephrosis  6. Normo/normo anemia - MAHESH suggested. Start FeSo4 at discharge  7. Pain control - lidocaine patch, prn dilaudid, prn roxicodone (holding these secondary to AMS)  8. Bowel regimen - dulcolax suppository, reglan IV  9. PT/OT when able  10.  Will discuss with family today    Michael Mantle, DO  5/2/2021  1:04 PM

## 2021-05-02 NOTE — PROGRESS NOTES
[facility] GENERAL SURGERY  PROGRESS NOTE  Date: 21  Patient Name: Jenkins Romberg  MRN: [id number]    Hospital Problem List:  Patient Active Problem List   Diagnosis    Bladder cancer    DJD (degenerative joint disease)    Essential hypertension    Renal mass    Incisional hernia of anterior abdominal wall without obstruction or gangrene    Bilateral kidney stones    Right ureteral calculus    Partial small bowel obstruction (HCC)    Ventral hernia with bowel obstruction    GERD (gastroesophageal reflux disease)    KARMA (acute kidney injury) ([facility] [address]    Normochromic normocytic anemia       SUBJECTIVE  Patient seen and chart reviewed. Confused overnight but directable. Afebrile, normotensive, normal sinus rhythm, and saturating >95% on 2 L NC. Urostomy with some blood tinged urine in the bag. Urine output was 2710. Pain controlled.  cc out over past 24 hours. Drains put out 41 cc serosanguineous. Hemoglobin 9.9 from 11.5. Creatinine up to 3.99 and BUN up to 55 today. Denies fever or chills  Denies SOB or cough  Denies palpitations or CP  +abdominal pain, denies nausea, vomiting, or diarrhea    OBJECTIVE    Vitals:  Temp  Av.4 °F (36.9 °C)  Min: 97.7 °F (36.5 °C)  Max: 99 °F (87.3 °C)  Systolic (61JOI), YES:707 , Min:92 , CFP:845   Diastolic (35VIY), SQZ:81, Min:62, Max:83  Pulse  Av.6  Min: 77  Max: 92  Resp  Av.1  Min: 16  Max: 27  SpO2: 96 % SpO2  Av.8 %  Min: 86 %  Max: 97 %    Physical Exam:  Gen: No acute distress. A&Ox3. HEENT:  NC/AT. Conjunctiva moist without icterus. Ears are symmetric. Nares are patent. Oral mucus membranes are moist.  Neck:  Supple. No LAD. CV:  Regular rate and rhythm. Warm, well perfused. Resp:  Equal chest rise bilaterally. No wheezes, stridor, or increased work of breathing. Abd:  Soft, appropriately tender to palpation, no rebound, no guarding, or peritoneal signs. Ileal conduit with some scant hematuria noted.  MARIE drain in place containing serosanguineous fluid. Neuro: Motor and sensory grossly intact. Ext:  Warm, dry, and well perfused. Limbs without apparent deformity. Skin:  No rashes or lesions. I/O's                                                                             I/O last 3 completed shifts: In: 2032 [I.V.:2032]  Out: 1961 [Urine:1610; Emesis/NG output:310; Drains:41]     Labs    CBC:   Recent Labs     04/30/21  1230 05/01/21  0501   WBC 8.2 9.3   HGB 12.1* 11.5*   HCT 39.1* 38.5*   MCV 89.1 90.8    227     BMP:   Recent Labs     04/30/21  1230 05/01/21  0501    142   K 4.5 5.3    113*   CO2 17* 14*   BUN 42* 49*   CREATININE 2.85* 3.62*   GLUCOSE 131* 157*     COAGS: No results for input(s): PTT, INR in the last 72 hours. Invalid input(s): PT    Radiology:  Ct Abdomen Pelvis Wo Contrast Additional Contrast? None    Result Date: 4/30/2021  1. Partial small bowel obstruction with a transition point located at the mouth of the ventral hernia. 2. Extensive stone burden identified in the bilateral kidneys, left greater than right. 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 3. Enlargement of the right upper pole mass, likely representing renal cell carcinoma. 4. Enlarged left para-aortic lymph node measuring 27 x 20 mm. Finding is nonspecific and may represent metastatic disease or reactive adenopathy. 5. Severe diverticulosis. 6. Cholelithiasis. Xr Abdomen For Ng/og/ne Tube Placement    Result Date: 4/30/2021  A newly placed gastric tube loops in the distal thoracic esophagus, where both the tip and sideport are located. Recommend repositioning before use.        ASSESSMENT  80year old male w/ recurrent parastomal hernia presenting with incarcerated/strangulated parastomal hernia s/p ex lap, FELICE, reduction of parastomal hernia, and

## 2021-05-02 NOTE — CARE COORDINATION
Discharge planning    Patient chart reviewed. Appreciate prior ED SS iniital  assessment. Per SS patient lives at home alone but has had several falls in last 1-2 months. He has 2ww , w/c, cane and grab bars. Patient admitted with ventral hernia with SBO. General surgery is following and patient is s/p reduction and repair of strangulated hernia and small bowel resection. Patient currently with NG tube and on 2 liters of 02. He has surgical incision with urostomy & MARIE. Urostomy is not new per night RN. Also per RN patient has had episodes of confusion. SS also notes on assessment patient randomly driving to doctor office to be seen with out an appointment. Therapy pending and will anticipate need for home care vs snf and follow up with patient later in day.

## 2021-05-02 NOTE — FLOWSHEET NOTE
Patient expressed no major needs or prayers.  leaves prayer card for possible follow up.     05/02/21 8100   Encounter Summary   Services provided to: Patient   Referral/Consult From: [facility]   (5-2-21)   Complexity of Encounter Low   Length of Encounter 15 minutes   Spiritual Assessment Completed Yes   Routine   Type Initial   Assessment Passive   Intervention Explored feelings, thoughts, concerns; Discussed illness/injury and it's impact   Outcome Expressed feelings of rodney, peace, and/or awe

## 2021-05-02 NOTE — PROGRESS NOTES
Physical Therapy  DATE: 2021    NAME: Renea Velasco  MRN: [id number]   : 1940    Patient not seen this date for Physical Therapy due to:  [] Blood transfusion in progress  [x] Cancel by RN  [] Hemodialysis  []  Refusal by Patient   [] Spine Precautions   [] Strict Bedrest  [] Surgery  [] Testing      [x] Other RN wanted patient to rest due to increased confusion        [] PT being discontinued at this time. Patient independent. No further needs. [] PT being discontinued at this time as the patient has been transferred to hospice care. No further needs.     Minh Rebolledo, PT

## 2021-05-02 NOTE — FLOWSHEET NOTE
Pt moved to room 2031 via bed . Benny Culp Left message with son Aristides Black notifying of change in room to 2031 and to call back PCU with any questions.  Left phone number

## 2021-05-02 NOTE — FLOWSHEET NOTE
At approximately 1815 writer/RN discovered that patient had removed his nasogastric tube as well as removed his oxygen. Pt was confused and could not be reoriented. A new 16 fr NGT was placed in the left nare. Dr. Yash Ferguson was made aware and writer/RN received an order for portable Xray to confirm placement as well as an order for bilateral soft wrist restraints. Portable Xray was ordered and bilateral soft wrist restraints were applied.

## 2021-05-02 NOTE — FLOWSHEET NOTE
Notified Dr Brian Cast and Dr Maribell Osborne upon rounding of pt's restlessness and state of intermittent confusion. .informed of pulling tubes and attempting to get out of bed.  Continue to monitor closely

## 2021-05-03 ENCOUNTER — ANESTHESIA (OUTPATIENT)
Dept: OPERATING ROOM | Age: 81
DRG: 329 | End: 2021-05-03
Payer: MEDICARE

## 2021-05-03 ENCOUNTER — APPOINTMENT (OUTPATIENT)
Dept: GENERAL RADIOLOGY | Age: 81
DRG: 329 | End: 2021-05-03
Payer: MEDICARE

## 2021-05-03 ENCOUNTER — ANESTHESIA EVENT (OUTPATIENT)
Dept: OPERATING ROOM | Age: 81
DRG: 329 | End: 2021-05-03
Payer: MEDICARE

## 2021-05-03 ENCOUNTER — APPOINTMENT (OUTPATIENT)
Dept: INTERVENTIONAL RADIOLOGY/VASCULAR | Age: 81
DRG: 329 | End: 2021-05-03
Payer: MEDICARE

## 2021-05-03 VITALS — OXYGEN SATURATION: 85 % | DIASTOLIC BLOOD PRESSURE: 58 MMHG | SYSTOLIC BLOOD PRESSURE: 110 MMHG

## 2021-05-03 PROBLEM — N13.39 OTHER HYDRONEPHROSIS: Status: ACTIVE | Noted: 2021-05-03

## 2021-05-03 PROBLEM — E53.8 VITAMIN B 12 DEFICIENCY: Status: ACTIVE | Noted: 2021-05-03

## 2021-05-03 PROBLEM — E87.20 METABOLIC ACIDOSIS: Status: ACTIVE | Noted: 2021-05-03

## 2021-05-03 PROBLEM — J96.01 ACUTE RESPIRATORY FAILURE WITH HYPOXIA (HCC): Status: ACTIVE | Noted: 2021-05-03

## 2021-05-03 PROBLEM — E53.8 FOLATE DEFICIENCY: Status: ACTIVE | Noted: 2021-05-03

## 2021-05-03 LAB
ABSOLUTE EOS #: 0.13 K/UL (ref 0–0.4)
ABSOLUTE IMMATURE GRANULOCYTE: 0.06 K/UL (ref 0–0.3)
ABSOLUTE LYMPH #: 0.57 K/UL (ref 1–4.8)
ABSOLUTE MONO #: 0.38 K/UL (ref 0.2–0.8)
ANION GAP SERPL CALCULATED.3IONS-SCNC: 15 MMOL/L (ref 9–17)
BASOPHILS # BLD: 0 %
BASOPHILS ABSOLUTE: 0 K/UL (ref 0–0.2)
BUN BLDV-MCNC: 61 MG/DL (ref 8–23)
BUN/CREAT BLD: 15 (ref 9–20)
CALCIUM SERPL-MCNC: 9.3 MG/DL (ref 8.6–10.4)
CHLORIDE BLD-SCNC: 112 MMOL/L (ref 98–107)
CO2: 17 MMOL/L (ref 20–31)
CREAT SERPL-MCNC: 4.01 MG/DL (ref 0.7–1.2)
DIFFERENTIAL TYPE: ABNORMAL
EOSINOPHILS RELATIVE PERCENT: 2 % (ref 1–4)
GFR AFRICAN AMERICAN: 18 ML/MIN
GFR NON-AFRICAN AMERICAN: 14 ML/MIN
GFR SERPL CREATININE-BSD FRML MDRD: ABNORMAL ML/MIN/{1.73_M2}
GFR SERPL CREATININE-BSD FRML MDRD: ABNORMAL ML/MIN/{1.73_M2}
GLUCOSE BLD-MCNC: 104 MG/DL (ref 75–110)
GLUCOSE BLD-MCNC: 124 MG/DL (ref 70–99)
HCT VFR BLD CALC: 33.8 % (ref 40.7–50.3)
HEMOGLOBIN: 10 G/DL (ref 13–17)
IMMATURE GRANULOCYTES: 1 %
INR BLD: 1.2
LYMPHOCYTES # BLD: 9 % (ref 24–44)
MCH RBC QN AUTO: 27 PG (ref 25.2–33.5)
MCHC RBC AUTO-ENTMCNC: 29.6 G/DL (ref 28.4–34.8)
MCV RBC AUTO: 91.1 FL (ref 82.6–102.9)
MONOCYTES # BLD: 6 % (ref 1–7)
NRBC AUTOMATED: 0 PER 100 WBC
PARTIAL THROMBOPLASTIN TIME: 35.8 SEC (ref 23.9–33.8)
PDW BLD-RTO: 16.7 % (ref 11.8–14.4)
PLATELET # BLD: 225 K/UL (ref 138–453)
PLATELET ESTIMATE: ABNORMAL
PMV BLD AUTO: 9.5 FL (ref 8.1–13.5)
POTASSIUM SERPL-SCNC: 4.6 MMOL/L (ref 3.7–5.3)
PROTHROMBIN TIME: 14.8 SEC (ref 11.5–14.2)
RBC # BLD: 3.71 M/UL (ref 4.21–5.77)
RBC # BLD: ABNORMAL 10*6/UL
SEG NEUTROPHILS: 82 % (ref 36–66)
SEGMENTED NEUTROPHILS ABSOLUTE COUNT: 5.16 K/UL (ref 1.8–7.7)
SODIUM BLD-SCNC: 144 MMOL/L (ref 135–144)
WBC # BLD: 6.3 K/UL (ref 3.5–11.3)
WBC # BLD: ABNORMAL 10*3/UL

## 2021-05-03 PROCEDURE — 85610 PROTHROMBIN TIME: CPT

## 2021-05-03 PROCEDURE — 7100000001 HC PACU RECOVERY - ADDTL 15 MIN: Performed by: SPECIALIST

## 2021-05-03 PROCEDURE — 0TJB8ZZ INSPECTION OF BLADDER, VIA NATURAL OR ARTIFICIAL OPENING ENDOSCOPIC: ICD-10-PCS | Performed by: SPECIALIST

## 2021-05-03 PROCEDURE — 6360000002 HC RX W HCPCS: Performed by: SPECIALIST

## 2021-05-03 PROCEDURE — 6360000002 HC RX W HCPCS: Performed by: SURGERY

## 2021-05-03 PROCEDURE — 2500000003 HC RX 250 WO HCPCS: Performed by: STUDENT IN AN ORGANIZED HEALTH CARE EDUCATION/TRAINING PROGRAM

## 2021-05-03 PROCEDURE — 50433 PLMT NEPHROURETERAL CATHETER: CPT | Performed by: RADIOLOGY

## 2021-05-03 PROCEDURE — 2580000003 HC RX 258: Performed by: SPECIALIST

## 2021-05-03 PROCEDURE — 36415 COLL VENOUS BLD VENIPUNCTURE: CPT

## 2021-05-03 PROCEDURE — 2500000003 HC RX 250 WO HCPCS: Performed by: NURSE ANESTHETIST, CERTIFIED REGISTERED

## 2021-05-03 PROCEDURE — 7100000000 HC PACU RECOVERY - FIRST 15 MIN: Performed by: SPECIALIST

## 2021-05-03 PROCEDURE — 83516 IMMUNOASSAY NONANTIBODY: CPT

## 2021-05-03 PROCEDURE — 99233 SBSQ HOSP IP/OBS HIGH 50: CPT | Performed by: INTERNAL MEDICINE

## 2021-05-03 PROCEDURE — 2709999900 IR GUIDED NEPHROSTOMY CATH PLACEMENT LEFT

## 2021-05-03 PROCEDURE — 3600000012 HC SURGERY LEVEL 2 ADDTL 15MIN: Performed by: SPECIALIST

## 2021-05-03 PROCEDURE — C1769 GUIDE WIRE: HCPCS | Performed by: SPECIALIST

## 2021-05-03 PROCEDURE — 2580000003 HC RX 258: Performed by: NURSE PRACTITIONER

## 2021-05-03 PROCEDURE — 74018 RADEX ABDOMEN 1 VIEW: CPT

## 2021-05-03 PROCEDURE — 2580000003 HC RX 258: Performed by: STUDENT IN AN ORGANIZED HEALTH CARE EDUCATION/TRAINING PROGRAM

## 2021-05-03 PROCEDURE — 6360000002 HC RX W HCPCS: Performed by: STUDENT IN AN ORGANIZED HEALTH CARE EDUCATION/TRAINING PROGRAM

## 2021-05-03 PROCEDURE — 6360000004 HC RX CONTRAST MEDICATION: Performed by: RADIOLOGY

## 2021-05-03 PROCEDURE — 2060000000 HC ICU INTERMEDIATE R&B

## 2021-05-03 PROCEDURE — 80048 BASIC METABOLIC PNL TOTAL CA: CPT

## 2021-05-03 PROCEDURE — 82941 ASSAY OF GASTRIN: CPT

## 2021-05-03 PROCEDURE — 3700000000 HC ANESTHESIA ATTENDED CARE: Performed by: SPECIALIST

## 2021-05-03 PROCEDURE — 85025 COMPLETE CBC W/AUTO DIFF WBC: CPT

## 2021-05-03 PROCEDURE — 6370000000 HC RX 637 (ALT 250 FOR IP): Performed by: STUDENT IN AN ORGANIZED HEALTH CARE EDUCATION/TRAINING PROGRAM

## 2021-05-03 PROCEDURE — 3600000002 HC SURGERY LEVEL 2 BASE: Performed by: SPECIALIST

## 2021-05-03 PROCEDURE — 85730 THROMBOPLASTIN TIME PARTIAL: CPT

## 2021-05-03 PROCEDURE — 86340 INTRINSIC FACTOR ANTIBODY: CPT

## 2021-05-03 PROCEDURE — 0TJ53ZZ INSPECTION OF KIDNEY, PERCUTANEOUS APPROACH: ICD-10-PCS | Performed by: RADIOLOGY

## 2021-05-03 PROCEDURE — 2580000003 HC RX 258: Performed by: NURSE ANESTHETIST, CERTIFIED REGISTERED

## 2021-05-03 PROCEDURE — 6360000002 HC RX W HCPCS: Performed by: INTERNAL MEDICINE

## 2021-05-03 PROCEDURE — 6360000002 HC RX W HCPCS: Performed by: NURSE ANESTHETIST, CERTIFIED REGISTERED

## 2021-05-03 PROCEDURE — 2500000003 HC RX 250 WO HCPCS: Performed by: SPECIALIST

## 2021-05-03 PROCEDURE — 2709999900 HC NON-CHARGEABLE SUPPLY: Performed by: SPECIALIST

## 2021-05-03 PROCEDURE — 6370000000 HC RX 637 (ALT 250 FOR IP): Performed by: SPECIALIST

## 2021-05-03 PROCEDURE — 3700000001 HC ADD 15 MINUTES (ANESTHESIA): Performed by: SPECIALIST

## 2021-05-03 PROCEDURE — 82947 ASSAY GLUCOSE BLOOD QUANT: CPT

## 2021-05-03 PROCEDURE — 6370000000 HC RX 637 (ALT 250 FOR IP): Performed by: SURGERY

## 2021-05-03 RX ORDER — HYDRALAZINE HYDROCHLORIDE 20 MG/ML
5 INJECTION INTRAMUSCULAR; INTRAVENOUS EVERY 10 MIN PRN
Status: DISCONTINUED | OUTPATIENT
Start: 2021-05-03 | End: 2021-05-03 | Stop reason: HOSPADM

## 2021-05-03 RX ORDER — SODIUM CHLORIDE 9 MG/ML
INJECTION, SOLUTION INTRAVENOUS CONTINUOUS PRN
Status: DISCONTINUED | OUTPATIENT
Start: 2021-05-03 | End: 2021-05-03 | Stop reason: SDUPTHER

## 2021-05-03 RX ORDER — LANOLIN ALCOHOL/MO/W.PET/CERES
3 CREAM (GRAM) TOPICAL NIGHTLY
Status: DISCONTINUED | OUTPATIENT
Start: 2021-05-03 | End: 2021-05-14 | Stop reason: HOSPADM

## 2021-05-03 RX ORDER — PROPOFOL 10 MG/ML
INJECTION, EMULSION INTRAVENOUS PRN
Status: DISCONTINUED | OUTPATIENT
Start: 2021-05-03 | End: 2021-05-03 | Stop reason: SDUPTHER

## 2021-05-03 RX ORDER — FUROSEMIDE 10 MG/ML
40 INJECTION INTRAMUSCULAR; INTRAVENOUS ONCE
Status: COMPLETED | OUTPATIENT
Start: 2021-05-03 | End: 2021-05-03

## 2021-05-03 RX ORDER — LABETALOL HYDROCHLORIDE 5 MG/ML
5 INJECTION, SOLUTION INTRAVENOUS EVERY 10 MIN PRN
Status: DISCONTINUED | OUTPATIENT
Start: 2021-05-03 | End: 2021-05-03 | Stop reason: HOSPADM

## 2021-05-03 RX ORDER — ONDANSETRON 2 MG/ML
4 INJECTION INTRAMUSCULAR; INTRAVENOUS
Status: DISCONTINUED | OUTPATIENT
Start: 2021-05-03 | End: 2021-05-03 | Stop reason: HOSPADM

## 2021-05-03 RX ORDER — HYDROMORPHONE HYDROCHLORIDE 1 MG/ML
0.25 INJECTION, SOLUTION INTRAMUSCULAR; INTRAVENOUS; SUBCUTANEOUS EVERY 5 MIN PRN
Status: DISCONTINUED | OUTPATIENT
Start: 2021-05-03 | End: 2021-05-03 | Stop reason: HOSPADM

## 2021-05-03 RX ORDER — OXYCODONE HYDROCHLORIDE 5 MG/1
2.5 TABLET ORAL EVERY 6 HOURS PRN
Status: DISCONTINUED | OUTPATIENT
Start: 2021-05-03 | End: 2021-05-11

## 2021-05-03 RX ORDER — LIDOCAINE HYDROCHLORIDE 20 MG/ML
INJECTION, SOLUTION EPIDURAL; INFILTRATION; INTRACAUDAL; PERINEURAL PRN
Status: DISCONTINUED | OUTPATIENT
Start: 2021-05-03 | End: 2021-05-03 | Stop reason: SDUPTHER

## 2021-05-03 RX ADMIN — PROPOFOL 30 MG: 10 INJECTION, EMULSION INTRAVENOUS at 12:18

## 2021-05-03 RX ADMIN — SODIUM CHLORIDE, PRESERVATIVE FREE 10 ML: 5 INJECTION INTRAVENOUS at 09:23

## 2021-05-03 RX ADMIN — METOCLOPRAMIDE 5 MG: 5 INJECTION, SOLUTION INTRAMUSCULAR; INTRAVENOUS at 17:45

## 2021-05-03 RX ADMIN — SODIUM CHLORIDE, PRESERVATIVE FREE 10 ML: 5 INJECTION INTRAVENOUS at 20:48

## 2021-05-03 RX ADMIN — FAMOTIDINE 10 MG: 10 INJECTION, SOLUTION INTRAVENOUS at 09:16

## 2021-05-03 RX ADMIN — SODIUM CHLORIDE, PRESERVATIVE FREE 10 ML: 5 INJECTION INTRAVENOUS at 20:47

## 2021-05-03 RX ADMIN — IOPAMIDOL 3 ML: 612 INJECTION, SOLUTION INTRAVENOUS at 16:56

## 2021-05-03 RX ADMIN — HEPARIN SODIUM 5000 UNITS: 5000 INJECTION INTRAVENOUS; SUBCUTANEOUS at 05:57

## 2021-05-03 RX ADMIN — SODIUM CHLORIDE, PRESERVATIVE FREE 10 ML: 5 INJECTION INTRAVENOUS at 09:17

## 2021-05-03 RX ADMIN — SODIUM CHLORIDE: 9 INJECTION, SOLUTION INTRAVENOUS at 12:09

## 2021-05-03 RX ADMIN — FUROSEMIDE 40 MG: 10 INJECTION, SOLUTION INTRAMUSCULAR; INTRAVENOUS at 09:15

## 2021-05-03 RX ADMIN — ACETAMINOPHEN ORAL SOLUTION 1000 MG: 325 SOLUTION ORAL at 20:34

## 2021-05-03 RX ADMIN — PROPOFOL 30 MG: 10 INJECTION, EMULSION INTRAVENOUS at 12:28

## 2021-05-03 RX ADMIN — IOPAMIDOL 3 ML: 612 INJECTION, SOLUTION INTRAVENOUS at 16:43

## 2021-05-03 RX ADMIN — BISACODYL 10 MG: 10 SUPPOSITORY RECTAL at 09:24

## 2021-05-03 RX ADMIN — METOCLOPRAMIDE 5 MG: 5 INJECTION, SOLUTION INTRAMUSCULAR; INTRAVENOUS at 03:00

## 2021-05-03 RX ADMIN — HEPARIN SODIUM 5000 UNITS: 5000 INJECTION INTRAVENOUS; SUBCUTANEOUS at 21:35

## 2021-05-03 RX ADMIN — PROPOFOL 20 MG: 10 INJECTION, EMULSION INTRAVENOUS at 12:25

## 2021-05-03 RX ADMIN — LIDOCAINE HYDROCHLORIDE 100 MG: 20 INJECTION, SOLUTION EPIDURAL; INFILTRATION; INTRACAUDAL; PERINEURAL at 12:18

## 2021-05-03 RX ADMIN — SODIUM CHLORIDE, PRESERVATIVE FREE 10 ML: 5 INJECTION INTRAVENOUS at 20:46

## 2021-05-03 RX ADMIN — PROPOFOL 20 MG: 10 INJECTION, EMULSION INTRAVENOUS at 12:21

## 2021-05-03 RX ADMIN — METOCLOPRAMIDE 5 MG: 5 INJECTION, SOLUTION INTRAMUSCULAR; INTRAVENOUS at 20:34

## 2021-05-03 RX ADMIN — FAMOTIDINE 10 MG: 10 INJECTION, SOLUTION INTRAVENOUS at 20:34

## 2021-05-03 RX ADMIN — ACETAMINOPHEN ORAL SOLUTION 1000 MG: 325 SOLUTION ORAL at 06:25

## 2021-05-03 RX ADMIN — METOCLOPRAMIDE 5 MG: 5 INJECTION, SOLUTION INTRAMUSCULAR; INTRAVENOUS at 09:16

## 2021-05-03 ASSESSMENT — PULMONARY FUNCTION TESTS
PIF_VALUE: 1
PIF_VALUE: 0
PIF_VALUE: 1
PIF_VALUE: 0
PIF_VALUE: 1

## 2021-05-03 ASSESSMENT — PAIN SCALES - GENERAL
PAINLEVEL_OUTOF10: 0

## 2021-05-03 ASSESSMENT — PAIN - FUNCTIONAL ASSESSMENT: PAIN_FUNCTIONAL_ASSESSMENT: FLACC

## 2021-05-03 ASSESSMENT — LIFESTYLE VARIABLES: SMOKING_STATUS: 0

## 2021-05-03 NOTE — ANESTHESIA PRE PROCEDURE
Department of Anesthesiology  Preprocedure Note       Name:  Federico Kay   Age:  80 y.o.  :  1940                                          MRN:  [id number]         Date:  5/3/2021      Surgeon: Baldo Maza):  Rosalinda Duval MD    Procedure: Procedure(s):  CYSTOSCOPY CATH PLACEMENT - FLEX SCOPE    Medications prior to admission:   Prior to Admission medications    Medication Sig Start Date End Date Taking?  Authorizing Provider   acetaminophen (TYLENOL) 500 MG tablet Take 500-1,000 mg by mouth every 4 hours as needed for Pain    Yes Historical Provider, MD   albuterol sulfate (PROAIR RESPICLICK) 669 (90 Base) MCG/ACT aerosol powder inhalation Inhale 2 puffs into the lungs every 4 hours as needed for Wheezing or Shortness of Breath 9/10/20   Vida Kirkpatrick MD       Current medications:    Current Facility-Administered Medications   Medication Dose Route Frequency Provider Last Rate Last Admin    [Held by provider] oxyCODONE (ROXICODONE) immediate release tablet 2.5 mg  2.5 mg Oral Q6H PRN Analilia J Imel, DO        melatonin tablet 3 mg  3 mg Oral Nightly Analilia J Imel, DO        magnesium hydroxide (MILK OF MAGNESIA) 400 MG/5ML suspension 30 mL  30 mL Oral Once Analilia J Imel, DO        bisacodyl (DULCOLAX) suppository 10 mg  10 mg Rectal Daily Maria Luz Durant, DO   10 mg at 21 5310    metoclopramide (REGLAN) injection 5 mg  5 mg Intravenous Q6H Andres Durant, DO   5 mg at 21 0916    albuterol sulfate  (90 Base) MCG/ACT inhaler 2 puff  2 puff Inhalation Q4H PRN Mary Nay, APRN - NP        sodium chloride flush 0.9 % injection 5-40 mL  5-40 mL Intravenous 2 times per day Marbin [place], APRN - NP   10 mL at 21 0923    sodium chloride flush 0.9 % injection 10 mL  10 mL Intravenous PRN Marbin Viktor, APRN - NP   10 mL at 21 0916    heparin (porcine) injection 5,000 Units  5,000 Units Subcutaneous 3 times per day Kosta Penta, DO   5,000 Units at 21 3910    sodium chloride flush 0.9 % injection 5-40 mL  5-40 mL Intravenous 2 times per day Achilles Devante, DO   10 mL at 05/03/21 8709    sodium chloride flush 0.9 % injection 5-40 mL  5-40 mL Intravenous PRN Achilles Devante, DO        0.9 % sodium chloride infusion  25 mL Intravenous PRN Achilles Devante, DO        acetaminophen (TYLENOL) 160 MG/5ML solution 1,000 mg  1,000 mg Oral 3 times per day Achilles Devante, DO   1,000 mg at 05/03/21 1269    [Held by provider] methocarbamol (ROBAXIN) tablet 500 mg  500 mg Oral 4 times per day Achilles Devante, DO   500 mg at 05/02/21 0857    lidocaine 4 % external patch 2 patch  2 patch Topical Q12H Achilles Devante, DO   2 patch at 05/03/21 2691    [Held by provider] HYDROmorphone (DILAUDID) injection 0.5 mg  0.5 mg Intravenous Q3H PRN Achilles Devante, DO   0.5 mg at 05/01/21 2137    ondansetron (ZOFRAN) injection 4 mg  4 mg Intravenous Q6H PRN Achilles Devante, DO   4 mg at 05/01/21 0257    famotidine (PEPCID) injection 10 mg  10 mg Intravenous BID Achilles Devante, DO   10 mg at 05/03/21 2966       Allergies:     Allergies   Allergen Reactions    Nsaids Anaphylaxis     GI bleed    Tetracyclines & Related        Problem List:    Patient Active Problem List   Diagnosis Code    Bladder cancer C67.9    DJD (degenerative joint disease) M19.90    Essential hypertension I10    Renal mass N28.89    Incisional hernia of anterior abdominal wall without obstruction or gangrene K43.2    Bilateral kidney stones N20.0    Right ureteral calculus N20.1    Partial small bowel obstruction ([address] K56.600    Ventral hernia with bowel obstruction K43.6    GERD (gastroesophageal reflux disease) K21.9    KARMA (acute kidney injury) ([address] N17.9    Normochromic normocytic anemia D64.9    Iron deficiency anemia D50.9    Toxic metabolic encephalopathy G77    Other hydronephrosis N13.39    Vitamin B 12 deficiency E53.8    Folate deficiency E53.8    Acute respiratory failure with hypoxia ([facility] [address] I85.54    Metabolic acidosis O04.7       Past Medical History:        Diagnosis Date    Arthritis     Caffeine use     2 coffee, 2 cans soda/day    Cancer [facility]) 2006    bladder et prostate, 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    GERD (gastroesophageal reflux disease)     Hernia, inguinal, right     Hypertension     Kidney stone     Presence of urostomy ([facility] [address]     Retinal detachment        Past Surgical History:        Procedure Laterality Date    BLADDER REMOVAL  2006    cancer, s/p urostomy     CATARACT REMOVAL Bilateral     COLONOSCOPY      EYE SURGERY Right 2000    macular hole    HERNIA REPAIR      x2    INCISIONAL HERNIA REPAIR  2015    parastomal hernia repair - Dr. Evie Robles  2014    LAPAROSCOPY N/A 2021    EXPLORATORY LAPAROTOMY, EXTENSIVE LYSIS OF ADHESIONS, SMALL BOWEL RESECTION, REDUCTION OF STRANGULATED HERNIA performed by Kan Guerrero DO at [address] [place] [address]      removal    VITRECTOMY Left 16       Social History:    Social History     Tobacco Use    Smoking status: Former Smoker     Packs/day: 0.50     Years: 20.00     Pack years: 10.00     Types: Cigarettes     Quit date: 1979     Years since quittin.8    Smokeless tobacco: Never Used    Tobacco comment: quit in    Substance Use Topics    Alcohol use: No     Alcohol/week: 0.0 standard drinks                                Counseling given: Not Answered  Comment: quit in       Vital Signs (Current):   Vitals:    21 0900 21 1000 21 1049 21 1100   BP: 123/74  137/84 130/83   Pulse: 79 85 89 86   Resp:  15   Temp:  97.9 °F (36.6 °C)     TempSrc:  Oral     SpO2: 96%   97%   Weight:       Height:                                                  BP Readings from Last 3 Encounters:   21 130/83   21 116/72   10/30/20 122/78       NPO Status:                                                                                 BMI: Wt Readings from Last 3 Encounters:   05/02/21 254 lb 3 oz (115.3 kg)   10/30/20 228 lb 12.8 oz (103.8 kg)   09/10/20 236 lb (107 kg)     Body mass index is 33.54 kg/m².     CBC:   Lab Results   Component Value Date    WBC 6.3 05/03/2021    RBC 3.71 05/03/2021    HGB 10.0 05/03/2021    HCT 33.8 05/03/2021    MCV 91.1 05/03/2021    RDW 16.7 05/03/2021     05/03/2021       CMP:   Lab Results   Component Value Date     05/03/2021    K 4.6 05/03/2021     05/03/2021    CO2 17 05/03/2021    BUN 61 05/03/2021    CREATININE 4.01 05/03/2021    GFRAA 18 05/03/2021    LABGLOM 14 05/03/2021    GLUCOSE 124 05/03/2021    PROT 7.3 05/02/2021    CALCIUM 9.3 05/03/2021    BILITOT 0.50 05/02/2021    ALKPHOS 55 05/02/2021    AST 19 05/02/2021    ALT 11 05/02/2021       POC Tests:   Recent Labs     04/30/21  1926   POCGLU 156*       Coags:   Lab Results   Component Value Date    PROTIME 10.6 05/17/2016    INR 1.0 05/17/2016       HCG (If Applicable): No results found for: PREGTESTUR, PREGSERUM, HCG, HCGQUANT     ABGs: No results found for: PHART, PO2ART, SXK7PKY, FJH0CEQ, BEART, J3QLCWRE     Type & Screen (If Applicable):  No results found for: LABABO, LABRH    Drug/Infectious Status (If Applicable):  No results found for: HIV, HEPCAB    COVID-19 Screening (If Applicable):   Lab Results   Component Value Date    COVID19 Not Detected 04/30/2021           Anesthesia Evaluation  Patient summary reviewed and Nursing notes reviewed no history of anesthetic complications:   Airway: Mallampati: III  TM distance: >3 FB   Neck ROM: full  Mouth opening: > = 3 FB Dental: normal exam         Pulmonary:normal exam  breath sounds clear to auscultation      (-) COPD, asthma, sleep apnea and not a current smoker                          ROS comment: Acute respiratory failure with hypoxia   Cardiovascular:    (+) hypertension:,     (-) past MI, CAD, CABG/stent, dysrhythmias,  angina and  CHF (echo 2019 normal LV fcn asc aorta appears mod dilated)    ECG reviewed  Rhythm: regular  Rate: normal  Echocardiogram reviewed                  Neuro/Psych:   (+) psychiatric history (Toxic metabolic encephalopathy):   (-) seizures, TIA and CVA            ROS comment: transferred to the ICU yesterday afternoon for acidosis and confusion GI/Hepatic/Renal:   (+) GERD (NGT in place):, renal disease (history of L RCC s/p ablation, bladder cancer s/p cystectomy w/ creation of ileoconduit in 2006; Worsening renal failure ): ARF,      (-) liver disease      ROS comment: POD # 3 from ex lap with extensive lysis of adhesions, small bowel resection, and reduction of strangulated hernia. Endo/Other:    (+) blood dyscrasia (Iron deficiency anemia)::., .    (-) diabetes mellitus, hypothyroidism, hyperthyroidism               Abdominal:   (+) obese,         Vascular:                                        Anesthesia Plan      MAC     ASA 4     (Full consent obtained via phone from son/hpradha, Bhupendra Brewer, also DNR suspended perioperatively per son/hpoa)  Induction: intravenous. Anesthetic plan and risks discussed with patient, child/children and healthcare power of . Plan discussed with CRNA.                   Robert Garcia MD   5/3/2021

## 2021-05-03 NOTE — PROGRESS NOTES
[facility]  Office: [address], DO, Maciellin Ganser, DO, Elidia Juana, DO, Edmundo Hester Blood, DO, Gabriela Woodard MD, Venessa Becker MD, Wayne Trimble MD, Marialuisa Sotelo MD, Randy Ferrer MD, Echo Panda MD, Casandra Weiner MD, Gareth Collins MD, Bob Giang, DO, Reena Luna MD, Owen Kuo, DO, Abby Nguyen MD,  Braden Manzo, DO, Sunny Belle MD, Pierre Macdonald MD, Axel Hansen MD, Africa Charles MD, Karon Nguyen, [facility], [facility] Cas[facility], CNP, Colleen Hassan, CNP, Adolph Morales, CNS, Ashley Carlson, CNP, Marybeth Orozco, CNP, Jaclyn Mcfarlane, CNP, Jocelynn Drew, CNP, Renetta Wayne, CNP, Arielle Childress, PA-C, Sha Rosales, [facility], Hema Kulkarni, CNP, Cherry Johnson, CNP, Brice Smith, CNP, Cem Rayo, CNP, Campos Pacheco, CNP, Marilin Gomezdler, [facility]    Progress Note    5/3/2021    8:46 AM    Name:   Jennie Gutiérrez  MRN:     [id number]     [place]:      [de-identified]   Room:   2031/2031-01   Day:  3  Admit Date:  4/30/2021 12:15 PM    PCP:   Meagan Mathis MD  Code Status:  DNR-CCA    Subjective:     C/C:   Chief Complaint   Patient presents with    Hernia     LOWER RIGHT ABDOMEN. recent increase in pain and growth     Interval History Status: Significantly worsened. Pt seen and examined this morning. POD # 3 from ex lap with extensive lysis of adhesions, small bowel resection, and reduction of strangulated hernia. He was transferred to the ICU yesterday afternoon for acidosis and confusion. Discussed with patient's daughter-in-law who relays that this exact thing (renal failure, respiratory failure, encephalopathy) all happened after his last surgery approximately two years ago. Overnight, he was placed in bilateral wrist restraints, as he had removed his NG tube upon arrival to the floor. Since that time, his mentation has slightly improved. He his having sensible conversations at times.  He also has been weaned down in regards to his oxygen requirements. Brief History:     80 y.o. male with a history of L RCC s/p ablation, bladder cancer s/p cystectomy w/ creation of ileoconduit in 2006, sbo s/p exlap sbr, recurrent parastomal hernia presents with progressively enlarging parastomal hernia which now measures 20 x 25 cm in the right lower quadrant. Pt states its always large but after lifting a trailer on Monday it has progressively gotten larger, becoming hard and very painful on the day prior to admission. Pain worsening and it was rock hard prompting him to seek evaluation. He has been nauseated, and started having emesis in the ED. Last bm yesterday, no flatus since. Pain is worsened with movement or touch. No alleviating factors. Has been evaluated at [place] with prior attempt at parastomal hernia repair. Worsening renal failure on postop day #1 and 2. Nephrology consulted. Urology consulted as well secondary to hydronephrosis and nephrolithiasis. Urology attempted to dilate the patient's urostomy without success. To OR today. Review of Systems:     Unable to obtain today as patient is confused    Medications: Allergies:     Allergies   Allergen Reactions    Nsaids Anaphylaxis     GI bleed    Tetracyclines & Related        Current Meds:   Scheduled Meds:    melatonin  3 mg Oral Nightly    magnesium hydroxide  30 mL Oral Once    bisacodyl  10 mg Rectal Daily    metoclopramide  5 mg Intravenous Q6H    sodium chloride flush  5-40 mL Intravenous 2 times per day    heparin (porcine)  5,000 Units Subcutaneous 3 times per day    sodium chloride flush  5-40 mL Intravenous 2 times per day    acetaminophen  1,000 mg Oral 3 times per day    [Held by provider] methocarbamol  500 mg Oral 4 times per day    lidocaine  2 patch Topical Q12H    famotidine (PEPCID) injection  10 mg Intravenous BID     Continuous Infusions:    sodium chloride       PRN Meds: oxyCODONE, albuterol sulfate HFA, sodium chloride flush, sodium chloride flush, sodium chloride, HYDROmorphone, ondansetron    Data:     Past Medical History:   has a past medical history of Arthritis, Caffeine use, Cancer ([facility] [address] GERD (gastroesophageal reflux disease), Hernia, inguinal, right, Hypertension, Kidney stone, Presence of urostomy ([facility] [address] and Retinal detachment. Social History:   reports that he quit smoking about 41 years ago. His smoking use included cigarettes. He has a 10.00 pack-year smoking history. He has never used smokeless tobacco. He reports that he does not drink alcohol or use drugs. Family History:   Family History   Problem Relation Age of Onset    Diabetes Mother     Diabetes Sister        Vitals:  /77   Pulse 89   Temp 97.7 °F (36.5 °C) (Temporal)   Resp 20   Ht 6' 1\" (1.854 m)   Wt 254 lb 3 oz (115.3 kg)   SpO2 96%   BMI 33.54 kg/m²   Temp (24hrs), Av.9 °F (36.6 °C), Min:97.1 °F (36.2 °C), Max:99.3 °F (37.4 °C)    Recent Labs     21  1926   POCGLU 156*       I/O (24Hr):     Intake/Output Summary (Last 24 hours) at 5/3/2021 0846  Last data filed at 5/3/2021 0400  Gross per 24 hour   Intake 30 ml   Output 2320 ml   Net -2290 ml       Labs:  Hematology:  Recent Labs     21  0501 21  0602 21  0352   WBC 9.3 6.5 6.3   RBC 4.24 3.66* 3.71*   HGB 11.5* 9.9* 10.0*   HCT 38.5* 33.7* 33.8*   MCV 90.8 92.1 91.1   MCH 27.1 27.0 27.0   MCHC 29.9 29.4 29.6   RDW 16.8* 16.8* 16.7*    171 225   MPV 9.3 9.3 9.5     Chemistry:  Recent Labs     21  0501 21  0602 21  1249 21  0352    140  --  144   K 5.3 4.9  --  4.6   * 111*  --  112*   CO2 14* 14*  --  17*   GLUCOSE 157* 144*  --  124*   BUN 49* 55*  --  61*   CREATININE 3.62* 3.99*  --  4.01*   MG 1.8  --   --   --    ANIONGAP 15 15  --  15   LABGLOM 16* 15*  --  14*   GFRAA 20* 18*  --  18*   CALCIUM 8.5* 8.7  --  9.3   CKTOTAL 102  --   --   --    LACTACIDWB  --   --  NOT REPORTED  -- Recent Labs     04/30/21  1230 04/30/21  1926 05/01/21  0501 05/02/21  1249   PROT 8.2  --   --  7.3   LABALBU 4.1  --   --  3.8   TSH  --   --   --  1.91   AST 16  --   --  19   ALT 11  --   --  11   ALKPHOS 77  --   --  55   BILITOT 0.35  --   --  0.50   BILIDIR 0.08  --   --  0.29   AMMONIA  --   --   --  21   LIPASE 36  --   --   --    URICACID  --   --  7.0  --    POCGLU  --  156*  --   --      ABG:  Lab Results   Component Value Date    POCPH 7.19 05/02/2021    POCPCO2 44 05/02/2021    POCPO2 74 05/02/2021    POCHCO3 16.9 05/02/2021    NBEA 11 05/02/2021    PBEA NOT REPORTED 05/02/2021    DVB0PLD 18 05/02/2021    MWGI2HEG 90 05/02/2021    FIO2 32.0 05/02/2021     Lab Results   Component Value Date/Time    SPECIAL LT [facility] 05/02/2021 12:58 PM     Lab Results   Component Value Date/Time    CULTURE NO GROWTH 15 HOURS 05/02/2021 12:58 PM       Radiology:  Ct Abdomen Pelvis Wo Contrast Additional Contrast? None    Result Date: 4/30/2021  1. Partial small bowel obstruction with a transition point located at the mouth of the ventral hernia. 2. Extensive stone burden identified in the bilateral kidneys, left greater than right. 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 3. Enlargement of the right upper pole mass, likely representing renal cell carcinoma. 4. Enlarged left para-aortic lymph node measuring 27 x 20 mm. Finding is nonspecific and may represent metastatic disease or reactive adenopathy. 5. Severe diverticulosis. 6. Cholelithiasis. Xr Abdomen For Ng/og/ne Tube Placement    Result Date: 4/30/2021  A newly placed gastric tube loops in the distal thoracic esophagus, where both the tip and sideport are located. Recommend repositioning before use. Us Retroperitoneal Limited    Result Date: 5/1/2021  1.   Recently demonstrated suspicious lesion arising from the superior pole the right kidney is not well delineated on this exam. 2.  Right hydronephrosis and nephrolithiasis again demonstrated. 3.  Multiple stones throughout the left intrarenal collecting system, which likely obscures the associated hydronephrosis demonstrated on recent CT exam. 4.  Cholelithiasis. Physical Examination:        General appearance:  alert, cooperative and in mild distress, elderly  gentleman  HENT: NG tube present, oral mucosa dry, EOMI, PERRLA, sclera are anti-icteric  Mental Status:  oriented to person only   Lungs: Diminished breath sounds, but clear. No ronchi, no wheezing  Heart: Regular rate with regular rhythm, no murmur  Abdomen:  soft, nontender, nondistended, normal bowel sounds, no masses, hepatomegaly, splenomegaly, right lower quadrant ileostomy present, midline incision with staples present. Incision is clean/dry/intact  Extremities:  no edema, redness, tenderness in the calves  Skin:  no gross lesions, rashes, induration    Assessment:        Hospital Problems           Last Modified POA    * (Principal) Ventral hernia with bowel obstruction 5/1/2021 Yes    KARMA (acute kidney injury) ([address] 5/1/2021 Yes    Toxic metabolic encephalopathy 7/8/5072 No    Other hydronephrosis 5/3/2021 Yes    Acute respiratory failure with hypoxia ([address] 9/8/1327 No    Metabolic acidosis 9/8/5481 No    Essential hypertension (Chronic) 5/1/2021 Yes    Renal mass (Chronic) 5/1/2021 Yes    Normochromic normocytic anemia 5/1/2021 Yes    Iron deficiency anemia 5/2/2021 Yes    Vitamin B 12 deficiency 5/3/2021 Yes    Folate deficiency 5/3/2021 Yes          Plan:        1. Strangulated ventral hernia - POD # 3 from ex lap with small bowel resection and reduction of strangulated hernia - postop care per general surgery. Possibly starting trickle feeds today. 2. Acute kidney injury - appreciate nephrology input. HL IVF yesterday due to worsening confusion and respiratory status. Bicarb push was given. Lasix was given as well. Urine studies reviewed. CPK/uric acid WNL. Monitor urine output (over 2.5 L out overnight)  3. Toxic metabolic encephalopathy - Likely secondary to IV dilaudid in the setting of renal failure. Folate and Vitamin B12 deficiency noted. 4. Metabolic acidosis - secondary to renal failure. Bicarbonate was given on 5/2.  5. Hydronephrosis - to OR today with urology to attempt decompression of ureters. Patient may need nephrostomy tubes if this fails. 6. Vitamin B12 and folate deficiency - begin replacement once tolerating PO intake. Pernicious anemia workup ordered  7. History of bladder cancer / RCC with creation of ileostomy  8. Iron deficiency anemia - Start FeSo4 once tolerating oral intake  9. Bowel regimen - dulcolax suppository, reglan IV  10. PT/OT when able  11. OK to transfer back to stepdown  12. Discussed with family on 5/2. Patient has a DNR in place. They relay that this exact clinical scenario occurred two years ago after his last hernia surgery.     Bronwyn Beltran, DO  5/3/2021  8:46 AM

## 2021-05-03 NOTE — PROGRESS NOTES
Ubaldo Ng MD FACS    Urology Progress Note    Subjective: Patient confused and a little combative. Unable to give any further history.     Patient Vitals for the past 24 hrs:   BP Temp Temp src Pulse Resp SpO2   05/03/21 0600 136/77 -- -- 89 20 96 %   05/03/21 0500 121/76 -- -- 88 22 --   05/03/21 0419 -- 97.7 °F (36.5 °C) Temporal -- -- --   05/03/21 0400 128/71 97.7 °F (36.5 °C) Temporal 86 17 96 %   05/03/21 0300 126/75 -- -- 88 19 95 %   05/03/21 0200 102/68 -- -- 92 16 95 %   05/03/21 0100 139/79 -- -- 98 21 92 %   05/03/21 0017 -- 97.1 °F (36.2 °C) Temporal -- -- --   05/03/21 0000 116/79 97.2 °F (36.2 °C) Temporal 90 18 100 %   05/02/21 2300 128/82 -- -- 97 26 --   05/02/21 2200 (!) 141/88 -- -- 97 25 --   05/02/21 2100 (!) 141/84 -- -- 93 26 96 %   05/02/21 2024 -- 97.6 °F (36.4 °C) Temporal -- -- --   05/02/21 2000 (!) 143/84 97.5 °F (36.4 °C) -- 93 21 98 %   05/02/21 1900 113/70 -- -- 89 22 99 %   05/02/21 1800 110/63 -- -- 93 24 91 %   05/02/21 1700 122/72 -- -- 92 24 94 %   05/02/21 1640 116/72 98.7 °F (37.1 °C) Temporal 92 18 94 %   05/02/21 1532 125/68 98.5 °F (36.9 °C) Oral 95 18 96 %   05/02/21 1139 (!) 142/71 99.3 °F (37.4 °C) Oral 114 28 91 %   05/02/21 0752 110/73 97.3 °F (36.3 °C) Oral 105 20 93 %       Intake/Output Summary (Last 24 hours) at 5/3/2021 0750  Last data filed at 5/3/2021 0400  Gross per 24 hour   Intake 30 ml   Output 2520 ml   Net -2490 ml       Recent Labs     05/01/21  0501 05/02/21  0602 05/03/21  0352   WBC 9.3 6.5 6.3   HGB 11.5* 9.9* 10.0*   HCT 38.5* 33.7* 33.8*   MCV 90.8 92.1 91.1    171 225     Recent Labs     05/01/21  0501 05/02/21  0602 05/03/21  0352    140 144   K 5.3 4.9 4.6   * 111* 112*   CO2 14* 14* 17*   BUN 49* 55* 61*   CREATININE 3.62* 3.99* 4.01*       Recent Labs     04/30/21  1335   COLORU YELLOW   PHUR 8.5*   WBCUA 10 TO 20   RBCUA 5 TO 10   MUCUS NOT REPORTED   TRICHOMONAS NOT REPORTED   YEAST NOT REPORTED   BACTERIA MANY*   SPECGRAV 1.015   LEUKOCYTESUR MOD*   UROBILINOGEN Normal   BILIRUBINUR NEGATIVE       Additional Lab/culture results:    Physical Exam: Attempted to place a indwelling holder catheter via his ileal conduit but this was unsuccessful. Will attempt later today in OR using flexible cystoscopy. Interval Imaging Findings:  4/30/21 CT-scan of abdomen and pelvis independently reviewed and radiology report verified demonstrating:     Right kidney demonstrates interval enlargement of a right upper pole mass   measuring 5.2 x 4.6 cm, previously 4.3 x 4.2 cm.  No obvious solid left renal   mass.  A left renal cyst measures 3.7 cm.       Mild right hydronephrosis and hydroureter are present.  No obstructing stone. Significant stone burden is identified in the right kidney.       Staghorn calculi are identified in the left kidney with mild hydronephrosis   and hydroureter.  Uroepithelial wall thickening is also seen.  Inflammatory   changes are seen along the collecting system.         Review of 4/30/21 CT shows bilateral hydronephrosis down to a dilated ileal conduit which suggests obstruction at the level of the fascia. Impression:    Patient Active Problem List   Diagnosis    Bladder cancer    DJD (degenerative joint disease)    Essential hypertension    Renal mass    Incisional hernia of anterior abdominal wall without obstruction or gangrene    Bilateral kidney stones    Right ureteral calculus    Partial small bowel obstruction (HCC)    Ventral hernia with bowel obstruction    GERD (gastroesophageal reflux disease)    KARMA (acute kidney injury) ([address]    Normochromic normocytic anemia    Iron deficiency anemia    Toxic metabolic encephalopathy       Plan:   Review of 4/30/21 CT shows bilateral hydronephrosis down to a dilated ileal conduit which suggests obstruction at the level of the fascia. Attempted to place a indwelling holder catheter via his ileal conduit but this was unsuccessful.   Will attempt later today in OR at 12 noon using flexible cystoscopy. If successful, his renal function should improve dramatically. If unsuccessful, he will need bilateral percutaneous nephrostomy tubes. Thank you for allowing me to participate in the care of this patient. Feel free to contact me at [phone] if you have any questions or concerns.      Rahat Ramírez  7:50 AM 5/3/2021

## 2021-05-03 NOTE — PROGRESS NOTES
Physical Therapy  DATE: 5/3/2021    NAME: Xiomara Boggs  MRN: [id number]   : 1940    Patient not seen this date for Physical Therapy due to:  [] Blood transfusion in progress  [] Cancel by RN  [] Hemodialysis  []  Refusal by Patient   [] Spine Precautions   [] Strict Bedrest  [x] Surgery, out for EGD  [] Testing      [] Other        [] PT being discontinued at this time. Patient independent. No further needs. [] PT being discontinued at this time as the patient has been transferred to hospice care. No further needs.     [address], PT

## 2021-05-03 NOTE — PROGRESS NOTES
Patient transferred from OR to PCU room 1005. VSS. Connected to heart monitor. Bilateral wrist restraints in place. NG left nare noted. Staples to abdomen C/D/I. Urostomy connected to holder bag and site C/D/I.  MARIE site right abdomen C/D/I. Patient is drowsy but arouses easily. Confusion. Oxygen sats WNL's on 4L/NC. Will monitor patient closely.

## 2021-05-03 NOTE — ANESTHESIA POSTPROCEDURE EVALUATION
Department of Anesthesiology  Postprocedure Note    Patient: Grace Stephen  MRN: [id number]  YOB: 1940  Date of evaluation: 5/3/2021  Time:  12:47 PM     Procedure Summary     Date: 05/03/21 Room / Location: [facility] - INPATIENT    Anesthesia Start: 1209 Anesthesia Stop: 3215    Procedure: CYSTOSCOPY (N/A ) Diagnosis: (DX BOWEL OBSTRUCTION)    Surgeons: Cade Schroeder MD Responsible Provider: Marilia Doe MD    Anesthesia Type: MAC ASA Status: 4          Anesthesia Type: MAC    Nedra Phase I: Nedra Score: 9    Nedra Phase II:      Last vitals: Reviewed and per EMR flowsheets.        Anesthesia Post Evaluation    Patient location during evaluation: PACU  Patient participation: complete - patient cannot participate  Level of consciousness: confused  Airway patency: patent  Nausea & Vomiting: no vomiting and no nausea  Complications: no  Cardiovascular status: hemodynamically stable  Respiratory status: acceptable  Hydration status: stable

## 2021-05-03 NOTE — CONSULTS
Chely [facility]  Urology Consultation    Patient:  Renea Velasco  MRN: [id number]  YOB: 1940    CHIEF COMPLAINT:  Patient with ileal loop urinary diversion, with stones and hydronephrosis of the right kidney    HISTORY OF PRESENT ILLNESS:   The patient is a 80 y.o. male who presents symptoms as mentioned above, with acute kidney injury, enlarging parastomal hernia, this patient has had prior hernia repair by Dr. Mateus Reyna     Patient's old records, notes and chart reviewed and summarized above.     Past Medical History:    Past Medical History:   Diagnosis Date    Arthritis     Caffeine use     2 coffee, 2 cans soda/day    Cancer [facility]) 2006    bladder et prostate, 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    GERD (gastroesophageal reflux disease)     Hernia, inguinal, right     Hypertension     Kidney stone     Presence of urostomy ([address]     Retinal detachment        Past Surgical History:    Past Surgical History:   Procedure Laterality Date    BLADDER REMOVAL  2006    cancer, s/p urostomy     CATARACT REMOVAL Bilateral     COLONOSCOPY      EYE SURGERY Right 2000    macular hole    HERNIA REPAIR      x2    INCISIONAL HERNIA REPAIR  12/7/2015    parastomal hernia repair - Dr. Terry Peabody  11/2014   [address]  2006    removal    VITRECTOMY Left 02/02/16     Previous  surgery: radical cystectomy prostatectomy with ileal loop urinary diversion     Medications:    Scheduled Meds:   bisacodyl  10 mg Rectal Daily    metoclopramide  5 mg Intravenous Q6H    sodium chloride flush  5-40 mL Intravenous 2 times per day    heparin (porcine)  5,000 Units Subcutaneous 3 times per day    sodium chloride flush  5-40 mL Intravenous 2 times per day    acetaminophen  1,000 mg Oral 3 times per day    [Held by provider] methocarbamol  500 mg Oral 4 times per day    lidocaine  2 patch Topical Q12H    famotidine (PEPCID) injection  10 mg Intravenous BID     Continuous Infusions:   sodium chloride       PRN Meds:.albuterol sulfate HFA, sodium chloride flush, sodium chloride flush, sodium chloride, oxyCODONE, HYDROmorphone, ondansetron    Allergies:  Nsaids and Tetracyclines & related    Social History:    Social History     Socioeconomic History    Marital status:      Spouse name: Not on file    Number of children: 2    Years of education: 11.5    Highest education level: Not on file   Occupational History    Occupation: retired   Social Needs    Financial resource strain: Not on file    Food insecurity     Worry: Not on file     Inability: Not on file   Incentive Targeting needs     Medical: Not on file     Non-medical: Not on file   Tobacco Use    Smoking status: Former Smoker     Packs/day: 0.50     Years: 20.00     Pack years: 10.00     Types: Cigarettes     Quit date: 1979     Years since quittin.8    Smokeless tobacco: Never Used    Tobacco comment: quit in    Substance and Sexual Activity    Alcohol use: No     Alcohol/week: 0.0 standard drinks    Drug use: No    Sexual activity: Not on file   Lifestyle    Physical activity     Days per week: Not on file     Minutes per session: Not on file    Stress: Not on file   Relationships    Social connections     Talks on phone: Not on file     Gets together: Not on file     Attends Confucianist service: Not on file     Active member of club or organization: Not on file     Attends meetings of clubs or organizations: Not on file     Relationship status: Not on file    Intimate partner violence     Fear of current or ex partner: Not on file     Emotionally abused: Not on file     Physically abused: Not on file     Forced sexual activity: Not on file   Other Topics Concern    Not on file   Social History Narrative    Diet - terrible.   [employer] every morning, goes to [employer]    Caffeine intake per day - 2 cups per day q am    Exercise pattern - none, but care for self and house    Living situation - ranch and a cat. Family History:    Family History   Problem Relation Age of Onset    Diabetes Mother     Diabetes Sister      Previous Urologic Family history: none    REVIEW OF SYSTEMS:  Constitutional: negative  Eyes: negative  Respiratory: negative  Cardiovascular: negative  Gastrointestinal: negative  Genitourinary: see HPI  Musculoskeletal: negative  Skin: negative   Neurological: negative  Hematological/Lymphatic: negative  Psychological: negative    Physical Exam:    This a 80 y.o. male   Patient Vitals for the past 24 hrs:   BP Temp Temp src Pulse Resp SpO2 Weight   05/02/21 1900 113/70 -- -- 89 22 99 % --   05/02/21 1800 110/63 -- -- 93 24 91 % --   05/02/21 1700 122/72 -- -- 92 24 94 % --   05/02/21 1640 116/72 98.7 °F (37.1 °C) Temporal 92 18 94 % --   05/02/21 1532 125/68 98.5 °F (36.9 °C) Oral 95 18 96 % --   05/02/21 1139 (!) 142/71 99.3 °F (37.4 °C) Oral 114 28 91 % --   05/02/21 0752 110/73 97.3 °F (36.3 °C) Oral 105 20 93 % --   05/02/21 0347 113/62 97.7 °F (36.5 °C) Oral 92 20 96 % 254 lb 3 oz (115.3 kg)   05/01/21 2346 114/63 98.1 °F (36.7 °C) Oral 80 20 93 % --     Constitutional: Patient in no acute distress; Neuro: alert and oriented to person place and time. Psych: Mood and affect normal.  Skin: Normal  Lungs: Respiratory effort normal  Cardiovascular:  Normal peripheral pulses  Abdomen: Soft, non-tender, non-distended with no CVA, flank pain, hepatosplenomegaly or hernia. Kidneys normal.  Bladder non-tender and not distended.   Lymphatics: no palpable lymphadenopathy  Penis normal and circumcised  Urethral meatus normal  Scrotal exam normal  Testicles normal bilaterally  Epididymis normal bilaterally  Radical cystectomy prostatectomy with ileal loop urinary diversion and bag covering stoma collecting urine properly   LABS:  Recent Labs     04/30/21  1230 05/01/21  0501 05/02/21  0602   WBC 8.2 9.3 6.5   HGB 12.1* 11.5* 9.9*   HCT 39.1* 38.5* 33.7*   MCV 89.1 90.8 92.1    227 171 Recent Labs     04/30/21  1230 05/01/21  0501 05/02/21  0602    142 140   K 4.5 5.3 4.9    113* 111*   CO2 17* 14* 14*   BUN 42* 49* 55*   CREATININE 2.85* 3.62* 3.99*     Lab Results   Component Value Date    PSA <0.10 01/07/2012       Additional Lab/culture results:    Urinalysis:   Recent Labs     04/30/21  1335   COLORU YELLOW   PHUR 8.5*   [address]   RBCUA 5 TO 10   MUCUS NOT REPORTED   TRICHOMONAS NOT REPORTED   YEAST NOT REPORTED   BACTERIA MANY*   SPECGRAV 1.015   LEUKOCYTESUR MOD*   UROBILINOGEN Normal   BILIRUBINUR NEGATIVE        -----------------------------------------------------------------  Imaging Results:  Organs:  Cholelithiasis is present.  No intra or extrahepatic biliary   dilatation.  The liver, spleen, pancreas, and adrenal glands demonstrate no   acute abnormality.  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  Coarse   calcifications in the bilateral adrenal glands may represent granulomatous   disease or sequelae of infection.       Right kidney demonstrates interval enlargement of a right upper pole mass   measuring 5.2 x 4.6 cm, previously 4.3 x 4.2 cm.  No obvious solid left renal   mass.  A left renal cyst measures 3.7 cm.       Mild right hydronephrosis and hydroureter are present.  No obstructing stone. Significant stone burden is identified in the right kidney.       Staghorn calculi are identified in the left kidney with mild hydronephrosis   and hydroureter.  Uroepithelial wall thickening is also seen.  Inflammatory   changes are seen along the collecting system.       GI/Bowel:  The stomach is normal.  Small bowel is fluid-filled with mild   distention, measuring up to 3.6 cm.  Portion of the small bowel is located in   a large right-sided hernia.  Findings seem to be related to a partial small   bowel obstruction with a transition point near the hiatal hernia (series 2,   image 125).   The small bowel distal to this is decompressed.       The colon is normal in caliber without evidence of wall thickening or   obstruction.  Severe diverticulosis.       Normal appendix.       Pelvis:  Status post cystectomy and prostatectomy.  An ileal conduit is seen   in the right lower quadrant.       Peritoneum/Retroperitoneum: Inflammatory stranding is seen in the mesentery   located in the ventral hernia.  No loculated fluid collections to suggest   abscess.  Aorta and its branches are normal in course and caliber with severe   atherosclerosis.       Enlarged left para-aortic lymph node measures 27 x 20 mm.       Bones/Soft Tissues: No acute or aggressive osseous lesion.           Impression   1. Partial small bowel obstruction with a transition point located at the   mouth of the ventral hernia. 2. Extensive stone burden identified in the bilateral kidneys, left greater   than right.  Uroepithelial wall thickening with inflammatory stranding is   seen involving the left collecting system which may be related to underlying   infection or chronic inflammation related to ileal conduit. 3. Enlargement of the right upper pole mass, likely representing renal cell   carcinoma. 4. Enlarged left para-aortic lymph node measuring 27 x 20 mm.  Finding is   nonspecific and may represent metastatic disease or reactive adenopathy. 5. Severe diverticulosis.    6. Cholelithiasis         Assessment and Plan   Impression: bladder cancer with ileal loop urinary diversion, history of stone disease, history of renal mass adenopathy as mentioned above measuring 2 cm para-aortic   Patient Active Problem List   Diagnosis    Bladder cancer    DJD (degenerative joint disease)    Essential hypertension    Renal mass    Incisional hernia of anterior abdominal wall without obstruction or gangrene    Bilateral kidney stones    Right ureteral calculus    Partial small bowel obstruction (HCC)    Ventral hernia with bowel obstruction    GERD (gastroesophageal reflux disease)    [facility]acute kidney [facility]) [address]    Normochromic normocytic anemia    Iron deficiency anemia    Toxic metabolic encephalopathy       Plan: will discuss with Dr Karmen De La Cruz his primary urologist    Katina Mae  8:21 PM 5/2/2021

## 2021-05-03 NOTE — FLOWSHEET NOTE
Pt's son Pepe Kohler notified per his request and updated on IR procedure, informed that only the left side was completed today r/t pt's agitation and combativeness son informed that pt would return to IR tomorrow to have right sided tube placed.

## 2021-05-03 NOTE — CONSULTS
Reason for follow-up :  Acute kidney injury. Subjective  Patient seen and examined at the bedside  Patient has a bilateral hydronephrosis and urology plans noted   Patient serum creatinine remains elevated due to the above reasons    HISTORY OF PRESENT ILLNESS:    The patient is a 80 y.o. male had multiple abdominal surgeries presented with enlarging incarcerated/strangulated parastomal hernia, who underwent exploratory laparotomy with reduction of the hernia and small bowel resection on 4/30/2021. He had history of bladder cancer s/p cystectomy and ileoconduit formation in 2006. His baseline creatinine has been between 1.5-2.1, it was 2.85 at presentation and increased this morning to 3.99. Patient had a CT scan of the abdomen upon presentation that was done without contrast, it was remarkable for extensive stone burden in both kidneys greater on the left, it showed uroepithelial wall thickening with inflammatory stranding involving the left collecting system, was also remarkable for enlargement of the right upper pole mass. Renal ultrasound was done yesterday showed right hydronephrosis and nephrolithiasis.   Patient has been nonoliguric, urine output over the last 24 hours was 2.7 L      Review Of Systems:      Unable to obtain as he is confused  Past Medical History:   Diagnosis Date    Arthritis     Caffeine use     2 coffee, 2 cans soda/day    Cancer [facility]) 2006    bladder et prostate, 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    GERD (gastroesophageal reflux disease)     Hernia, inguinal, right     Hypertension     Kidney stone     Presence of urostomy ([address]     Retinal detachment        Past Surgical History:   Procedure Laterality Date    BLADDER REMOVAL  2006    cancer, s/p urostomy     CATARACT REMOVAL Bilateral     COLONOSCOPY      EYE SURGERY Right 2000    macular hole    HERNIA REPAIR      x2    INCISIONAL HERNIA REPAIR  12/7/2015    parastomal hernia repair - Dr. Mesha Alberts 11/2014    LAPAROSCOPY N/A 4/30/2021    EXPLORATORY LAPAROTOMY, EXTENSIVE LYSIS OF ADHESIONS, SMALL BOWEL RESECTION, REDUCTION OF STRANGULATED HERNIA performed by Cristy Andrews DO at [address] [place] [address]    removal    VITRECTOMY Left 02/02/16       Prior to Admission medications    Medication Sig Start Date End Date Taking?  Authorizing Provider   acetaminophen (TYLENOL) 500 MG tablet Take 500-1,000 mg by mouth every 4 hours as needed for Pain    Yes Historical Provider, MD   albuterol sulfate (PROAIR RESPICLICK) 672 (90 Base) MCG/ACT aerosol powder inhalation Inhale 2 puffs into the lungs every 4 hours as needed for Wheezing or Shortness of Breath 9/10/20   Erik Putnam MD       Scheduled Meds:   melatonin  3 mg Oral Nightly    magnesium hydroxide  30 mL Oral Once    bisacodyl  10 mg Rectal Daily    metoclopramide  5 mg Intravenous Q6H    sodium chloride flush  5-40 mL Intravenous 2 times per day    heparin (porcine)  5,000 Units Subcutaneous 3 times per day    sodium chloride flush  5-40 mL Intravenous 2 times per day    acetaminophen  1,000 mg Oral 3 times per day    [Held by provider] methocarbamol  500 mg Oral 4 times per day    lidocaine  2 patch Topical Q12H    famotidine (PEPCID) injection  10 mg Intravenous BID     Continuous Infusions:   sodium chloride       PRN Meds:[Held by provider] oxyCODONE, albuterol sulfate HFA, sodium chloride flush, sodium chloride flush, sodium chloride, [Held by provider] HYDROmorphone, ondansetron    Allergies   Allergen Reactions    Nsaids Anaphylaxis     GI bleed    Tetracyclines & Related        Social History     Socioeconomic History    Marital status:      Spouse name: Not on file    Number of children: 2    Years of education: 11.5    Highest education level: Not on file   Occupational History    Occupation: retired   Social Needs    Financial resource strain: Not on file    Food insecurity     Worry: Not on file and moist oral mucosa. Neck supple. No JVD. Chest: Bilateral air entry, clear to auscultation, no wheezing, rhonchi or rales. Cardiovascular: RRR, S1S2, no murmur, rub or gallop. No lower extremity edema. Abdomen: Soft, non tender to palpation. Musculoskeletal: No cyanosis or clubbing. Integumentary: Pink, warm and dry. Free from rash or lesions. CNS: Confused face symmetrical. No tremor.      Data:  CBC:   Lab Results   Component Value Date    WBC 6.3 05/03/2021    HGB 10.0 (L) 05/03/2021    HCT 33.8 (L) 05/03/2021    MCV 91.1 05/03/2021     05/03/2021     BMP:    Lab Results   Component Value Date     05/03/2021     05/02/2021     05/01/2021    K 4.6 05/03/2021    K 4.9 05/02/2021    K 5.3 05/01/2021     (H) 05/03/2021     (H) 05/02/2021     (H) 05/01/2021    CO2 17 (L) 05/03/2021    CO2 14 (L) 05/02/2021    CO2 14 (L) 05/01/2021    BUN 61 (H) 05/03/2021    BUN 55 (H) 05/02/2021    BUN 49 (H) 05/01/2021    CREATININE 4.01 (H) 05/03/2021    CREATININE 3.99 (H) 05/02/2021    CREATININE 3.62 (H) 05/01/2021    GLUCOSE 124 (H) 05/03/2021    GLUCOSE 144 (H) 05/02/2021    GLUCOSE 157 (H) 05/01/2021     CMP:   Lab Results   Component Value Date     05/03/2021    K 4.6 05/03/2021     05/03/2021    CO2 17 05/03/2021    BUN 61 05/03/2021    CREATININE 4.01 05/03/2021    GLUCOSE 124 05/03/2021    CALCIUM 9.3 05/03/2021    PROT 7.3 05/02/2021    LABALBU 3.8 05/02/2021    BILITOT 0.50 05/02/2021    ALKPHOS 55 05/02/2021    AST 19 05/02/2021    ALT 11 05/02/2021      Hepatic:   Lab Results   Component Value Date    AST 19 05/02/2021    AST 16 04/30/2021    AST 24 07/15/2020    ALT 11 05/02/2021    ALT 11 04/30/2021    ALT 17 07/15/2020    BILITOT 0.50 05/02/2021    BILITOT 0.35 04/30/2021    BILITOT 0.47 07/15/2020    ALKPHOS 55 05/02/2021    ALKPHOS 77 04/30/2021    ALKPHOS 64 07/15/2020     BNP: No results found for: BNP  Lipids:   Lab Results   Component Value Date    CHOL 139 05/09/2016    HDL 26 (A) 05/09/2016     INR:   Lab Results   Component Value Date    INR 1.0 05/17/2016     PTH: No results found for: PTH  Phosphorus:  No results found for: PHOS  Ionized Calcium: No results found for: IONCA  Magnesium:   Lab Results   Component Value Date    MG 1.8 05/01/2021     Albumin:   Lab Results   Component Value Date    LABALBU 3.8 05/02/2021     Last 3 CK, CKMB, Troponin: @LABRCNT(CKTOTAL:3,CKMB:3,TROPONINI:3)       URINE:)No results found for: [facility]     Radiology:   Renal ultrasound  1.  Recently demonstrated suspicious lesion arising from the superior pole   the right kidney is not well delineated on this exam.       2.  Right hydronephrosis and nephrolithiasis again demonstrated.       3.  Multiple stones throughout the left intrarenal collecting system, which   likely obscures the associated hydronephrosis demonstrated on recent CT exam.       4.  Cholelithiasis. Assessment:  1. Acute kidney injury, most likely secondary to ATN in the setting of ileus and incarcerated hernia  2. Hydronephrosis, most likely chronic, will need further evaluation by urology  3. Metabolic acidosis  4. S/p hernia repair  5. Anemia  6. History of bladder cancer and renal cell carcinoma, s/p cystectomy and creation of ileaoconduit  7. History of renal mass s/p cryoablation in 2019  8. CKD 3b    Plan:    Urology intervention planned for bilateral hydronephrosis down to the level of dilated ileal conduit  If not successful then percutaneous nephrostomy tubes will be needed   Although urine studies suggestive of ATN however major component at this stage seems to be obstructive uropathy  Continue IV fluids   Some degree of metabolic encephalopathy present   Avoid nephrotoxic drugs and IV contrast exposure.   Follow up chemistries ordered for AM.    Electronically signed by Jcarlos Elmore MD  on 5/3/2021 at 12:05 PM

## 2021-05-03 NOTE — PLAN OF CARE
Nutrition Problem #1: Altered GI function  Intervention: Food and/or Nutrient Delivery: Continue NPO, Start Oral Nutrition Supplement  Nutritional Goals: Enteral nutrition will meet greater than 85% of estimated nutrition needs

## 2021-05-03 NOTE — BRIEF OP NOTE
Brief Postoperative Note    Wayne Barahona  YOB: 1940  [id number]    Pre-operative Diagnosis: H/o bladder cancer s/p cystectomy with large parastomal ventral hernia and bilateral nephrolithiasis, including staghorn on left; bilat hydro with evidence inflammation or infection on the left. Uncooperative patient. Post-operative Diagnosis: Same    Procedure: Left PCN    Medications Given: none    Anesthesia: Local    Surgeons/Assistants: Andres Kamara MD    Estimated Blood Loss: minimal    Complications: none    Specimens: were not obtained    Findings: 10 F Left PCN inserted under US and fluoro with considerable difficulty due to uncooperativeness of the patient. Right side will be attempted tomorrow. PCN sutured to the skin.        Electronically signed by Andres Kamara on 5/3/2021 at 5:19 PM

## 2021-05-03 NOTE — PLAN OF CARE
Problem: Falls - Risk of:  Goal: Will remain free from falls  Description: Will remain free from falls  Outcome: Ongoing  Goal: Absence of physical injury  Description: Absence of physical injury  Outcome: Ongoing     Problem: Skin Integrity:  Goal: Will show no infection signs and symptoms  Description: Will show no infection signs and symptoms  Outcome: Ongoing  Goal: Absence of new skin breakdown  Description: Absence of new skin breakdown  Outcome: Ongoing  Goal: Signs of wound healing will improve  Description: Signs of wound healing will improve  Outcome: Ongoing     Problem: Infection - Surgical Site:  Goal: Will show no infection signs and symptoms  Description: Will show no infection signs and symptoms  Outcome: Ongoing     Problem: Activity:  Goal: Ability to tolerate increased activity will improve  Description: Ability to tolerate increased activity will improve  Outcome: Ongoing     Problem:  Bowel/Gastric:  Goal: Gastrointestinal status for postoperative course will improve  Description: Gastrointestinal status for postoperative course will improve  Outcome: Ongoing     Problem: Coping:  Goal: Level of anxiety will decrease  Description: Level of anxiety will decrease  Outcome: Ongoing     Problem: Sensory:  Goal: Pain level will decrease  Description: Pain level will decrease  Outcome: Ongoing     Problem: Pain:  Goal: Pain level will decrease  Description: Pain level will decrease  Outcome: Ongoing  Goal: Control of acute pain  Description: Control of acute pain  Outcome: Ongoing  Goal: Control of chronic pain  Description: Control of chronic pain  Outcome: Ongoing     Problem: Non-Violent Restraints  Goal: Removal from restraints as soon as assessed to be safe  Outcome: Ongoing  Goal: No harm/injury to patient while restraints in use  Outcome: Ongoing  Goal: Patient's dignity will be maintained  Outcome: Ongoing

## 2021-05-03 NOTE — OP NOTE
Appearance Brown 05/02/21 1021   Residual Volume (ml) 0 ml 05/02/21 1021   Output (mL) 10 ml 05/01/21 1600       [REMOVED] Urethral Catheter Non-latex;Straight-tip 16 fr (Removed)       Findings: Ileal conduit stoma obstructed by a blue suture. Unable to pass cystoscope or catheter into proximal ileal conduit    Detailed Description of Procedure:   Cystoscopy Operative Note (5/3/21)  The patient was prepped and draped in the usual sterile fashion. The flexible cystoscope was advanced through the opening of the ileal conduit. The distal end was patent but farther inside the conduit lumen could not be visualized and thus, no indwelling holder catheter could be placed. In this area was a blue suture which appears to be obstructing the ileal conduit outflow. The cystoscope was removed. The patient tolerated the procedure well. Discussed with IR, will need emergent placement of bilateral percutaneous nephrostomy tubes. Discussed with Dr. Blanca Funez.         Electronically signed by Antionette Llanos MD on 5/3/2021 at 12:37 PM

## 2021-05-03 NOTE — CARE COORDINATION
Patient in OR for a cystoscopy. PT/OT to eval. Probable SNF placement. Will speak with patient when he returns to the floor.

## 2021-05-03 NOTE — PROGRESS NOTES
Comprehensive Nutrition Assessment    Type and Reason for Visit:  Initial    Nutrition Recommendations/Plan:   1. Continue NPO status  2. Start trickle feed Osmolite 1.5 Gigi at 10 mL/hr (360 kcal and 15 gm of protein)  3. Recommend goal rate Osmolite 1.5 Gigi goal rate 63 mL/hr (2268 kcal, 95 gm of protein and 1512 mL total volume). Water flush 200 mL 6x/day  4. Monitor tube feeding start, tolerance, tube feeding advancement and labs    Nutrition Assessment:  Patient admission is related to partial small bowel obstruction. Patient is status post exploratory laparotomy with reduction of the hernia and small bowel resection (4/30/2021). Patient will have cystoscopy today. Patient is NPO and trickle tube feeds will start today. Will start Osmolite 1.5 Gigi at 10 mL/hr. Recommend goal rate of 63 mL/hr (2268 kcal, 95 gm of protein and 1512 mL total volume). Monitor tube feeding start, tolerance, tube feeding advancement and labs. Malnutrition Assessment:  Malnutrition Status:  Insufficient data      Estimated Daily Nutrient Needs:  Energy (kcal):  2295 kcal ([place]. Jeor 1.2); Weight Used for Energy Requirements:  Current     Protein (g):  100-117 gm (1.2-1.4 gm/kg); Weight Used for Protein Requirements:  Ideal        Fluid (ml/day):  2300 mL/day; Method Used for Fluid Requirements:  1 ml/kcal      Nutrition Related Findings:  No edema. GI: abdominal bloating and distention. NG tube. Urostomy.  Status post exploratory laparotomy with reduction of the hernia and small bowel resection on 4/30/2021      Wounds:  None       Current Nutrition Therapies:    DIET TUBE FEED CONTINUOUS/CYCLIC NPO; 1.5 Calorie with Fiber; Nasoenteric; Continuous; 10; 10; Exceptions are: Ice Chips, Sips of Water with Meds  Current Tube Feeding (TF) Orders:  · Feeding Route: Nasogastric  · Formula: 1.5 Calorie without Fiber  · Schedule: Continuous  · Water Flushes: 350 mL flush 6x/day  · Current TF & Flush Orders Provides: 360 kcal and 15 gm

## 2021-05-03 NOTE — PROGRESS NOTES
General Surgery:  Daily Progress Note                   PATIENT NAME: Chris Burkett     TODAY'S DATE: 5/3/2021, 7:02 AM  CC:  Confusion    SUBJECTIVE:     Pt seen and examined at bedside. Remains confused concerning for delirium versus metabolic encephalopathy uptrending BUN. Urine output clearing up. Minimal out of NG tube. OBJECTIVE:   VITALS:  /77   Pulse 89   Temp 97.7 °F (36.5 °C) (Temporal)   Resp 20   Ht 6' 1\" (1.854 m)   Wt 254 lb 3 oz (115.3 kg)   SpO2 96%   BMI 33.54 kg/m²      INTAKE/OUTPUT:      Intake/Output Summary (Last 24 hours) at 5/3/2021 4060  Last data filed at 5/3/2021 0400  Gross per 24 hour   Intake 30 ml   Output 2520 ml   Net -2490 ml       PHYSICAL EXAM:  General Appearance: Awake, confused, restless  HEENT:  Normocephalic, atraumatic, mucus membranes moist.  NG tube in place  Heart: Heart regular rate and rhythm  Lungs: No acute distress  Abdomen: Soft, nondistended. Tenderness to palpation around midline incision. Incision clean, dry, intact. Extremities: No cyanosis, pitting edema, rashes noted. Skin: Skin color, texture, turgor normal. No rashes or lesions.     Data:  CBC with Differential:    Lab Results   Component Value Date    WBC 6.3 05/03/2021    RBC 3.71 05/03/2021    HGB 10.0 05/03/2021    HCT 33.8 05/03/2021     05/03/2021    MCV 91.1 05/03/2021    MCH 27.0 05/03/2021    MCHC 29.6 05/03/2021    RDW 16.7 05/03/2021    LYMPHOPCT PENDING 05/03/2021    LYMPHOPCT 18.3 12/14/2017    MONOPCT PENDING 05/03/2021    MONOPCT 5.7 12/14/2017    EOSPCT 5.1 12/14/2017    BASOPCT PENDING 05/03/2021    BASOPCT 1.1 12/14/2017    MONOSABS PENDING 05/03/2021    MONOSABS 0.4 12/14/2017    LYMPHSABS PENDING 05/03/2021    LYMPHSABS 1.2 12/14/2017    EOSABS PENDING 05/03/2021    EOSABS 0.3 12/14/2017    BASOSABS PENDING 05/03/2021    DIFFTYPE NOT REPORTED 05/03/2021     BMP:    Lab Results   Component Value Date     05/03/2021    K 4.6 05/03/2021

## 2021-05-04 LAB
ABSOLUTE EOS #: 0.32 K/UL (ref 0–0.4)
ABSOLUTE IMMATURE GRANULOCYTE: 0 K/UL (ref 0–0.3)
ABSOLUTE LYMPH #: 0.26 K/UL (ref 1–4.8)
ABSOLUTE MONO #: 0.26 K/UL (ref 0.2–0.8)
ANION GAP SERPL CALCULATED.3IONS-SCNC: 16 MMOL/L (ref 9–17)
BASOPHILS # BLD: 0 %
BASOPHILS ABSOLUTE: 0 K/UL (ref 0–0.2)
BUN BLDV-MCNC: 81 MG/DL (ref 8–23)
BUN/CREAT BLD: 20 (ref 9–20)
CALCIUM SERPL-MCNC: 9.3 MG/DL (ref 8.6–10.4)
CHLORIDE BLD-SCNC: 115 MMOL/L (ref 98–107)
CO2: 18 MMOL/L (ref 20–31)
CREAT SERPL-MCNC: 4.14 MG/DL (ref 0.7–1.2)
DIFFERENTIAL TYPE: ABNORMAL
EOSINOPHILS RELATIVE PERCENT: 5 % (ref 1–4)
GFR AFRICAN AMERICAN: 17 ML/MIN
GFR NON-AFRICAN AMERICAN: 14 ML/MIN
GFR SERPL CREATININE-BSD FRML MDRD: ABNORMAL ML/MIN/{1.73_M2}
GFR SERPL CREATININE-BSD FRML MDRD: ABNORMAL ML/MIN/{1.73_M2}
GLUCOSE BLD-MCNC: 123 MG/DL (ref 75–110)
GLUCOSE BLD-MCNC: 123 MG/DL (ref 75–110)
GLUCOSE BLD-MCNC: 128 MG/DL (ref 75–110)
GLUCOSE BLD-MCNC: 131 MG/DL (ref 75–110)
GLUCOSE BLD-MCNC: 135 MG/DL (ref 70–99)
GLUCOSE BLD-MCNC: 186 MG/DL (ref 75–110)
HCT VFR BLD CALC: 34.1 % (ref 40.7–50.3)
HEMOGLOBIN: 10.3 G/DL (ref 13–17)
IMMATURE GRANULOCYTES: 0 %
LYMPHOCYTES # BLD: 4 % (ref 24–44)
MCH RBC QN AUTO: 27.4 PG (ref 25.2–33.5)
MCHC RBC AUTO-ENTMCNC: 30.2 G/DL (ref 28.4–34.8)
MCV RBC AUTO: 90.7 FL (ref 82.6–102.9)
MONOCYTES # BLD: 4 % (ref 1–7)
MORPHOLOGY: ABNORMAL
NRBC AUTOMATED: 0 PER 100 WBC
PDW BLD-RTO: 16.3 % (ref 11.8–14.4)
PLATELET # BLD: 220 K/UL (ref 138–453)
PLATELET ESTIMATE: ABNORMAL
PMV BLD AUTO: 9.2 FL (ref 8.1–13.5)
POTASSIUM SERPL-SCNC: 4.2 MMOL/L (ref 3.7–5.3)
RBC # BLD: 3.76 M/UL (ref 4.21–5.77)
RBC # BLD: ABNORMAL 10*6/UL
SEG NEUTROPHILS: 87 % (ref 36–66)
SEGMENTED NEUTROPHILS ABSOLUTE COUNT: 5.56 K/UL (ref 1.8–7.7)
SODIUM BLD-SCNC: 149 MMOL/L (ref 135–144)
SURGICAL PATHOLOGY REPORT: NORMAL
WBC # BLD: 6.4 K/UL (ref 3.5–11.3)
WBC # BLD: ABNORMAL 10*3/UL

## 2021-05-04 PROCEDURE — 6370000000 HC RX 637 (ALT 250 FOR IP): Performed by: STUDENT IN AN ORGANIZED HEALTH CARE EDUCATION/TRAINING PROGRAM

## 2021-05-04 PROCEDURE — 6360000002 HC RX W HCPCS: Performed by: SPECIALIST

## 2021-05-04 PROCEDURE — 6370000000 HC RX 637 (ALT 250 FOR IP): Performed by: SPECIALIST

## 2021-05-04 PROCEDURE — 36415 COLL VENOUS BLD VENIPUNCTURE: CPT

## 2021-05-04 PROCEDURE — 2580000003 HC RX 258: Performed by: SPECIALIST

## 2021-05-04 PROCEDURE — 99233 SBSQ HOSP IP/OBS HIGH 50: CPT | Performed by: STUDENT IN AN ORGANIZED HEALTH CARE EDUCATION/TRAINING PROGRAM

## 2021-05-04 PROCEDURE — 2500000003 HC RX 250 WO HCPCS: Performed by: SPECIALIST

## 2021-05-04 PROCEDURE — 2060000000 HC ICU INTERMEDIATE R&B

## 2021-05-04 PROCEDURE — 85025 COMPLETE CBC W/AUTO DIFF WBC: CPT

## 2021-05-04 PROCEDURE — 80048 BASIC METABOLIC PNL TOTAL CA: CPT

## 2021-05-04 PROCEDURE — 82947 ASSAY GLUCOSE BLOOD QUANT: CPT

## 2021-05-04 RX ORDER — OLANZAPINE 5 MG/1
5 TABLET ORAL NIGHTLY
Status: COMPLETED | OUTPATIENT
Start: 2021-05-04 | End: 2021-05-04

## 2021-05-04 RX ORDER — ALPRAZOLAM 0.5 MG/1
0.5 TABLET ORAL ONCE
Status: DISCONTINUED | OUTPATIENT
Start: 2021-05-04 | End: 2021-05-04

## 2021-05-04 RX ORDER — DIPHENHYDRAMINE HYDROCHLORIDE 50 MG/ML
25 INJECTION INTRAMUSCULAR; INTRAVENOUS ONCE
Status: DISCONTINUED | OUTPATIENT
Start: 2021-05-04 | End: 2021-05-04

## 2021-05-04 RX ORDER — ALPRAZOLAM 0.5 MG/1
0.5 TABLET ORAL NIGHTLY PRN
Status: DISCONTINUED | OUTPATIENT
Start: 2021-05-04 | End: 2021-05-04

## 2021-05-04 RX ADMIN — BISACODYL 10 MG: 10 SUPPOSITORY RECTAL at 07:38

## 2021-05-04 RX ADMIN — Medication 3 MG: at 19:44

## 2021-05-04 RX ADMIN — FAMOTIDINE 10 MG: 10 INJECTION, SOLUTION INTRAVENOUS at 19:43

## 2021-05-04 RX ADMIN — SODIUM CHLORIDE, PRESERVATIVE FREE 10 ML: 5 INJECTION INTRAVENOUS at 19:44

## 2021-05-04 RX ADMIN — ACETAMINOPHEN ORAL SOLUTION 1000 MG: 325 SOLUTION ORAL at 21:04

## 2021-05-04 RX ADMIN — Medication 3 MG: at 00:30

## 2021-05-04 RX ADMIN — SODIUM CHLORIDE, PRESERVATIVE FREE 10 ML: 5 INJECTION INTRAVENOUS at 08:00

## 2021-05-04 RX ADMIN — SODIUM CHLORIDE, PRESERVATIVE FREE 10 ML: 5 INJECTION INTRAVENOUS at 07:55

## 2021-05-04 RX ADMIN — METOCLOPRAMIDE 5 MG: 5 INJECTION, SOLUTION INTRAMUSCULAR; INTRAVENOUS at 03:10

## 2021-05-04 RX ADMIN — HEPARIN SODIUM 5000 UNITS: 5000 INJECTION INTRAVENOUS; SUBCUTANEOUS at 21:05

## 2021-05-04 RX ADMIN — HEPARIN SODIUM 5000 UNITS: 5000 INJECTION INTRAVENOUS; SUBCUTANEOUS at 13:54

## 2021-05-04 RX ADMIN — FAMOTIDINE 10 MG: 10 INJECTION, SOLUTION INTRAVENOUS at 07:38

## 2021-05-04 RX ADMIN — ACETAMINOPHEN ORAL SOLUTION 1000 MG: 325 SOLUTION ORAL at 13:55

## 2021-05-04 RX ADMIN — OLANZAPINE 5 MG: 5 TABLET, FILM COATED ORAL at 19:44

## 2021-05-04 ASSESSMENT — PAIN SCALES - GENERAL
PAINLEVEL_OUTOF10: 3
PAINLEVEL_OUTOF10: 4
PAINLEVEL_OUTOF10: 4

## 2021-05-04 NOTE — PLAN OF CARE
Problem: Falls - Risk of:  Goal: Will remain free from falls  Description: Will remain free from falls  Outcome: Ongoing     Problem: Falls - Risk of:  Goal: Absence of physical injury  Description: Absence of physical injury  Outcome: Ongoing     Problem: Skin Integrity:  Goal: Will show no infection signs and symptoms  Description: Will show no infection signs and symptoms  Outcome: Ongoing     Problem: Skin Integrity:  Goal: Absence of new skin breakdown  Description: Absence of new skin breakdown  Outcome: Ongoing     Problem: Infection - Surgical Site:  Goal: Will show no infection signs and symptoms  Description: Will show no infection signs and symptoms  Outcome: Ongoing     Problem:  Bowel/Gastric:  Goal: Gastrointestinal status for postoperative course will improve  Description: Gastrointestinal status for postoperative course will improve  Outcome: Ongoing     Problem: Sensory:  Goal: Pain level will decrease  Description: Pain level will decrease  Outcome: Ongoing     Problem: Non-Violent Restraints  Goal: Removal from restraints as soon as assessed to be safe  Outcome: Ongoing  Goal: No harm/injury to patient while restraints in use  Outcome: Ongoing  Goal: Patient's dignity will be maintained  Outcome: Ongoing

## 2021-05-04 NOTE — CARE COORDINATION
Social Work-Spoke with patient's son regarding dc plans. Discussed short term SNF for rehab. He is agreeable. The Plan for Transition of Care is related to the following treatment goals: SNF with PT/OT and skilled nursing    The Patient and/or patient representative son, Bhupendra Brewer was provided with a choice of provider and agrees   with the discharge plan. [x] Yes [] No    Freedom of choice list was provided with basic dialogue that supports the patient's individualized plan of care/goals, treatment preferences and shares the quality data associated with the providers. [x] Yes [] No. Patient has been at [facility] and he would like a referral sent there.  Muriel Urbina

## 2021-05-04 NOTE — PROGRESS NOTES
Physical Therapy  DATE: 2021    NAME: Eben Tomlinson  MRN: [id number]   : 1940    Patient not seen this date for Physical Therapy due to:  [] Blood transfusion in progress  [x] Cancel by RN, pt is 1:1 supervision & in [employer]ish wrist restraints, N/A for PT this date  [] Hemodialysis  []  Refusal by Patient   [] Spine Precautions   [] Strict Bedrest  [] Surgery  [] Testing      [] Other        [] PT being discontinued at this time. Patient independent. No further needs. [] PT being discontinued at this time as the patient has been transferred to hospice care. No further needs.     [address], PT

## 2021-05-04 NOTE — CONSULTS
Reason for follow-up :  Acute kidney injury. Subjective    Patient seen and examined at the bedside  He is confused somewhat restless  Urology notes reviewed  Cystoscopy assisted Walter catheter could not be passed  Patient needs a percutaneous nephrostomy tube placed  Patient is currently restless and hence IR unable to do the procedure    HISTORY OF PRESENT ILLNESS:    The patient is a 80 y.o. male had multiple abdominal surgeries presented with enlarging incarcerated/strangulated parastomal hernia, who underwent exploratory laparotomy with reduction of the hernia and small bowel resection on 4/30/2021. He had history of bladder cancer s/p cystectomy and ileoconduit formation in 2006. His baseline creatinine has been between 1.5-2.1, it was 2.85 at presentation and increased this morning to 3.99. Patient had a CT scan of the abdomen upon presentation that was done without contrast, it was remarkable for extensive stone burden in both kidneys greater on the left, it showed uroepithelial wall thickening with inflammatory stranding involving the left collecting system, was also remarkable for enlargement of the right upper pole mass. Renal ultrasound was done yesterday showed right hydronephrosis and nephrolithiasis.   Patient has been nonoliguric, urine output over the last 24 hours was 2.7 L      Review Of Systems:      Unable to obtain as he is confused  Past Medical History:   Diagnosis Date    Arthritis     Caffeine use     2 coffee, 2 cans soda/day    Cancer [facility]) 2006    bladder et prostate, 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    GERD (gastroesophageal reflux disease)     Hernia, inguinal, right     Hypertension     Kidney stone     Presence of urostomy ([address]     Retinal detachment        Past Surgical History:   Procedure Laterality Date    BLADDER REMOVAL  2006    cancer, s/p urostomy     CATARACT REMOVAL Bilateral     COLONOSCOPY      CYSTOSCOPY N/A 5/3/2021    CYSTOSCOPY performed by Scott Pierson MD at [facility] Right 2000    macular hole    HERNIA REPAIR      x2    INCISIONAL HERNIA REPAIR  12/7/2015    parastomal hernia repair - Dr. Pb Forde  11/2014    LAPAROSCOPY N/A 4/30/2021    EXPLORATORY LAPAROTOMY, EXTENSIVE LYSIS OF ADHESIONS, SMALL BOWEL RESECTION, REDUCTION OF STRANGULATED HERNIA [facility] [address]  2006    removal    VITRECTOMY Left 02/02/16       Prior to Admission medications    Medication Sig Start Date End Date Taking?  Authorizing Provider   acetaminophen (TYLENOL) 500 MG tablet Take 500-1,000 mg by mouth every 4 hours as needed for Pain    Yes Historical Provider, MD   albuterol sulfate (PROAIR RESPICLICK) 496 (90 Base) MCG/ACT aerosol powder inhalation Inhale 2 puffs into the lungs every 4 hours as needed for Wheezing or Shortness of Breath 9/10/20   Paulette Martins MD       Scheduled Meds:   diphenhydrAMINE  25 mg Intravenous Once    melatonin  3 mg Oral Nightly    magnesium hydroxide  30 mL Oral Once    bisacodyl  10 mg Rectal Daily    sodium chloride flush  5-40 mL Intravenous 2 times per day    heparin (porcine)  5,000 Units Subcutaneous 3 times per day    sodium chloride flush  5-40 mL Intravenous 2 times per day    acetaminophen  1,000 mg Oral 3 times per day    [Held by provider] methocarbamol  500 mg Oral 4 times per day    lidocaine  2 patch Topical Q12H    famotidine (PEPCID) injection  10 mg Intravenous BID     Continuous Infusions:   sodium chloride       PRN Meds:[Held by provider] oxyCODONE, albuterol sulfate HFA, sodium chloride flush, sodium chloride flush, sodium chloride, [Held by provider] HYDROmorphone, ondansetron    Allergies   Allergen Reactions    Nsaids Anaphylaxis     GI bleed    Tetracyclines & Related        Social History     Socioeconomic History    Marital status:      Spouse name: Not on file    Number of children: 2    Years of education: 11.5    Highest education level: Not on file   Occupational History    Occupation: retired   Social Needs    Financial resource strain: Not on file    Food insecurity     Worry: Not on file     Inability: Not on file   [employer] needs     Medical: Not on file     Non-medical: Not on file   Tobacco Use    Smoking status: Former Smoker     Packs/day: 0.50     Years: 20.00     Pack years: 10.00     Types: Cigarettes     Quit date: 1979     Years since quittin.8    Smokeless tobacco: Never Used    Tobacco comment: quit in    Substance and Sexual Activity    Alcohol use: No     Alcohol/week: 0.0 standard drinks    Drug use: No    Sexual activity: Not on file   Lifestyle    Physical activity     Days per week: Not on file     Minutes per session: Not on file    Stress: Not on file   Relationships    Social connections     Talks on phone: Not on file     Gets together: Not on file     Attends Judaism service: Not on file     Active member of club or organization: Not on file     Attends meetings of clubs or organizations: Not on file     Relationship status: Not on file    Intimate partner violence     Fear of current or ex partner: Not on file     Emotionally abused: Not on file     Physically abused: Not on file     Forced sexual activity: Not on file   Other Topics Concern    Not on file   Social History Narrative    Diet - terrible. [employer] every morning, goes to [employer]    Caffeine intake per day - 2 cups per day q am    Exercise pattern - none, but care for self and house    Living situation - ranch and a cat.        Family History   Problem Relation Age of Onset    Diabetes Mother     Diabetes Sister          Physical Exam:  Vitals:    21 0414 21 0614 21 0913 21 1120   BP: (!) 141/85  (!) 142/83 123/72   Pulse: 95  89 84   Resp:    Temp: 98.1 °F (36.7 °C)  98.2 °F (36.8 °C) 97.9 °F (36.6 °C)   TempSrc: Axillary  Oral Oral   SpO2: 97%  95% 96%   Weight:  243 lb (110.2 kg)     Height:         I/O last 3 completed shifts: In: 311 [I.V.:200; NG/GT:111]  Out: 2487 [Urine:2465; Drains:22]    General:  Awake, confused not in distress. Restless . HEENT: Atraumatic, normocephalic. Anicteric sclera. Pink and moist oral mucosa. Neck supple. No JVD. Chest: Bilateral air entry, clear to auscultation, no wheezing, rhonchi or rales. Cardiovascular: RRR, S1S2, no murmur, rub or gallop. No lower extremity edema. Abdomen: Soft, non tender to palpation. Musculoskeletal: No cyanosis or clubbing. Integumentary: Pink, warm and dry. Free from rash or lesions. CNS: Confused face symmetrical. No tremor.      Data:  CBC:   Lab Results   Component Value Date    WBC 6.4 05/04/2021    HGB 10.3 (L) 05/04/2021    HCT 34.1 (L) 05/04/2021    MCV 90.7 05/04/2021     05/04/2021     BMP:    Lab Results   Component Value Date     (H) 05/04/2021     05/03/2021     05/02/2021    K 4.2 05/04/2021    K 4.6 05/03/2021    K 4.9 05/02/2021     (H) 05/04/2021     (H) 05/03/2021     (H) 05/02/2021    CO2 18 (L) 05/04/2021    CO2 17 (L) 05/03/2021    CO2 14 (L) 05/02/2021    BUN 81 (H) 05/04/2021    BUN 61 (H) 05/03/2021    BUN 55 (H) 05/02/2021    CREATININE 4.14 (H) 05/04/2021    CREATININE 4.01 (H) 05/03/2021    CREATININE 3.99 (H) 05/02/2021    GLUCOSE 135 (H) 05/04/2021    GLUCOSE 124 (H) 05/03/2021    GLUCOSE 144 (H) 05/02/2021     CMP:   Lab Results   Component Value Date     05/04/2021    K 4.2 05/04/2021     05/04/2021    CO2 18 05/04/2021    BUN 81 05/04/2021    CREATININE 4.14 05/04/2021    GLUCOSE 135 05/04/2021    CALCIUM 9.3 05/04/2021    PROT 7.3 05/02/2021    LABALBU 3.8 05/02/2021    BILITOT 0.50 05/02/2021    ALKPHOS 55 05/02/2021    AST 19 05/02/2021    ALT 11 05/02/2021      Hepatic:   Lab Results   Component Value Date    AST 19 05/02/2021    AST 16 04/30/2021    AST 24 07/15/2020    ALT 11 05/02/2021    ALT 11 04/30/2021    ALT 17 07/15/2020    BILITOT 0.50 05/02/2021    BILITOT 0.35 04/30/2021    BILITOT 0.47 07/15/2020    ALKPHOS 55 05/02/2021    ALKPHOS 77 04/30/2021    ALKPHOS 64 07/15/2020     BNP: No results found for: BNP  Lipids:   Lab Results   Component Value Date    CHOL 139 05/09/2016    HDL 26 (A) 05/09/2016     INR:   Lab Results   Component Value Date    INR 1.2 05/03/2021    INR 1.0 05/17/2016     PTH: No results found for: PTH  Phosphorus:  No results found for: PHOS  Ionized Calcium: No results found for: IONCA  Magnesium:   Lab Results   Component Value Date    MG 1.8 05/01/2021     Albumin:   Lab Results   Component Value Date    LABALBU 3.8 05/02/2021     Last 3 CK, CKMB, Troponin: @LABRCNT(CKTOTAL:3,CKMB:3,TROPONINI:3)       URINE:)No results found for: Henrry Regan     Radiology:   Renal ultrasound  1.  Recently demonstrated suspicious lesion arising from the superior pole   the right kidney is not well delineated on this exam.       2.  Right hydronephrosis and nephrolithiasis again demonstrated.       3.  Multiple stones throughout the left intrarenal collecting system, which   likely obscures the associated hydronephrosis demonstrated on recent CT exam.       4.  Cholelithiasis. Assessment:    1. Acute kidney injury secondary to obstructive uropathy  2. Obstructive uropathy  3. Metabolic acidosis  4. S/p hernia repair  5. Anemia  6. Bladder cancer and renal cell carcinoma, s/p cystectomy and creation of ileaoconduit  7. Enlarging right renal mass s/p cryoablation in 2019  8. Rt Sinclair  9. Left staghorn calculi  10.  Metabolic encephalopathy multifactorial    Plan:    Patient does have obstructive uropathy but in addition likely has a component of ATN as well   Patient needs right percutaneous nephrostomy tube per IR   Patient has multifactorial issues including renal cell cancer with enlarging renal mass, encephalopathy obstructive uropathy, as well as ATN   Patient's renal functions may not significantly improve in the setting of ATN even after right percutaneous nephrostomy tube  Patient's global goals of care may need to be addressed. Patient may benefit from kidney replacement therapy with the intermittent hemodialysis if we are to continue full supportive and treatment.    Follow up labs     Electronically signed by Vianney Berrios MD  on 5/4/2021 at 12:09 PM

## 2021-05-04 NOTE — PROGRESS NOTES
DIFFTYPE NOT REPORTED 05/04/2021     BMP:    Lab Results   Component Value Date     05/04/2021    K 4.2 05/04/2021     05/04/2021    CO2 18 05/04/2021    BUN 81 05/04/2021    LABALBU 3.8 05/02/2021    CREATININE 4.14 05/04/2021    CALCIUM 9.3 05/04/2021    GFRAA 17 05/04/2021    LABGLOM 14 05/04/2021    GLUCOSE 135 05/04/2021       Radiology Review:      ASSESSMENT:  Active Hospital Problems    Diagnosis Date Noted    Other hydronephrosis [N13.39] 05/03/2021     Priority: High    Acute respiratory failure with hypoxia (HCC) [J96.01] 05/03/2021     Priority: High    Metabolic acidosis [J44.5] 05/03/2021     Priority: High    Toxic metabolic encephalopathy [W30] 05/02/2021     Priority: High    Vitamin B 12 deficiency [E53.8] 05/03/2021     Priority: Medium    Folate deficiency [E53.8] 05/03/2021     Priority: Medium    Iron deficiency anemia [D50.9] 05/02/2021     Priority: Medium    Normochromic normocytic anemia [D64.9] 05/01/2021     Priority: Medium    Ventral hernia with bowel obstruction [K43.6] 04/30/2021    KARMA (acute kidney injury) ([facility] [address] [N17.9] 04/30/2021    Renal mass [N28.89] 06/28/2018    Essential hypertension [I10] 05/03/2016     80year old male w/ recurrent parastomal hernia presenting with incarcerated/strangulated parastomal hernia s/p ex lap, FELICE, reduction of parastomal hernia, and small bowel resection (4/30/21-Bhargav). Confused  KARMA    Plan:  1. Diet: Keep n.p.o. for plans for right nephrostomy tube. 2. Recommend sleep hygiene  3. Delirium/agitation likely multifactorial with metabolic derangements of BUN elevated to 81 today, lack of sleep, multiple medications. 4. Recommend melatonin at night. If patient still restless tonight, would recommend 50 mg of trazodone for sleep. 5. Keep awake during the day and up to a chair  6. Appreciate nephrology and urology recommendations  7. General surgery to follow along.     Electronically signed by DO astrid Leonard 5/4/2021 at 6:45 AM

## 2021-05-04 NOTE — FLOWSHEET NOTE
Pt scheduled to have right sided neph tube placed by IR upon exam pt is restless, confused and agitated pt has sitter at bedside who states that pt is constantly attempting to get out of bed and is too confused to reorient. Pt's mental status and agitation discussed with IR physician who states that procedure will need to be postponed until pt can safely lay on exam table without moving. Charge nurse informed that IR should be notified when pt is calmer.

## 2021-05-04 NOTE — PROGRESS NOTES
[facility]  Office: [address], DO, Jeannie Berman, DO, Kennerdellmarc Leo, DO, Oscar Bajwa, DO, Kanika Dash MD, Omar Cevallos MD, Inocencia Crain MD, Maira Quinones MD, Arielle Pineda MD, Damaris Whittington MD, Anna Maxwell MD, Westley Crooks MD, Laura Lazo, DO, Trudi Stubbs MD, Mennie Holter, DO, Mackenzie Lincoln MD,  Guilherme Rendon, DO, Chitra Roque MD, Luanne Keller MD, Harshad Cummins MD, aMtty Meadows MD, Jyoti Barnard, [facility], [facility] Hermelindo, CNP, Davion Nunn, [facility], Saniya Hollins, CNS, Annika Salinas, CNP, Izzy Branch, CNP, Eloy Zurita, CNP, Marbin Hoang, CNP, Maura Alexis, CNP, KESHIA Wu-C, Jaja Vallecillo, [facility] [place], Peggy Perez, CNP, Brooks Kelley, CNP, Sebastian Cardenas, CNP, Kelly Santos, CNP, Christian Luke, [facility], Crystal Combs [facility]    Progress Note    5/4/2021    11:16 AM    Name:   Federico Kay  MRN:     [id number]     [place]:      [de-identified]   Room:   [address] [place] [address] Day:  4  Admit Date:  4/30/2021 12:15 PM    PCP:   Vida Kirkpatrick MD  Code Status:  DNR-CCA    Subjective:     C/C:   Chief Complaint   Patient presents with    Hernia     LOWER RIGHT ABDOMEN. recent increase in pain and growth     Interval History Status: worsened. Patient seen and examined currently requiring restraints due to agitation. Constantly trying to get out of bed. Will not answer my questions, is a little bit jumpy when I tried to reorient the patient verbally. Brief History:     40-year-old male past medical history of left renal cell carcinoma status post ablation, bladder cancer status post cystectomy with creation of ileal conduit in 2006, prior small bowel obstruction and exploratory laparotomy, recurrent hernia presents with incarcerated parastomal hernia requiring ex lap with small bowel resection, reduction of strangulated hernia, lysis of adhesions performed on 4/30/2021.   Patient also has ongoing problem of acute kidney injury and moderate urine output however worsening creatinine. .  Patient was taken for cystoscopy on 5/3/2021 which showed obstruction of ileal conduit due to prior suture. Patient underwent left PCN insertion on 5/3/2021, with a plan for right-sided PCN to be determined. Patient also combative and agitated after anesthesia and procedures from yesterday. Review of Systems:     Unable to obtain due to lack of cooperation. Medications: Allergies: Allergies   Allergen Reactions    Nsaids Anaphylaxis     GI bleed    Tetracyclines & Related        Current Meds:   Scheduled Meds:    diphenhydrAMINE  25 mg Intravenous Once    melatonin  3 mg Oral Nightly    magnesium hydroxide  30 mL Oral Once    bisacodyl  10 mg Rectal Daily    sodium chloride flush  5-40 mL Intravenous 2 times per day    heparin (porcine)  5,000 Units Subcutaneous 3 times per day    sodium chloride flush  5-40 mL Intravenous 2 times per day    acetaminophen  1,000 mg Oral 3 times per day    [Held by provider] methocarbamol  500 mg Oral 4 times per day    lidocaine  2 patch Topical Q12H    famotidine (PEPCID) injection  10 mg Intravenous BID     Continuous Infusions:    sodium chloride       PRN Meds: [Held by provider] oxyCODONE, albuterol sulfate HFA, sodium chloride flush, sodium chloride flush, sodium chloride, [Held by provider] HYDROmorphone, ondansetron    Data:     Past Medical History:   has a past medical history of Arthritis, Caffeine use, Cancer ([facility] [address] GERD (gastroesophageal reflux disease), Hernia, inguinal, right, Hypertension, Kidney stone, Presence of urostomy ([facility] [address] and Retinal detachment. Social History:   reports that he quit smoking about 41 years ago. His smoking use included cigarettes. He has a 10.00 pack-year smoking history. He has never used smokeless tobacco. He reports that he does not drink alcohol or use drugs.      Family History:   Family History   Problem Relation Age of Onset    Diabetes Mother     Diabetes Sister        Vitals:  BP (!) 142/83   Pulse 89   Temp 98.2 °F (36.8 °C) (Oral)   Resp 18   Ht 6' 1\" (1.854 m)   Wt 243 lb (110.2 kg)   SpO2 95%   BMI 32.06 kg/m²   Temp (24hrs), Av.8 °F (36.6 °C), Min:97.2 °F (36.2 °C), Max:98.2 °F (36.8 °C)    Recent Labs     21  0015 21  0555 21  0709   POCGLU 104 123* 131* 123*       I/O (24Hr):     Intake/Output Summary (Last 24 hours) at 2021 1116  Last data filed at 2021 0801  Gross per 24 hour   Intake 411 ml   Output 2637 ml   Net -2226 ml       Labs:  Hematology:  Recent Labs     21  0621  0352 21  1353 21  0517   WBC 6.5 6.3  --  6.4   RBC 3.66* 3.71*  --  3.76*   HGB 9.9* 10.0*  --  10.3*   HCT 33.7* 33.8*  --  34.1*   MCV 92.1 91.1  --  90.7   MCH 27.0 27.0  --  27.4   MCHC 29.4 29.6  --  30.2   RDW 16.8* 16.7*  --  16.3*    225  --  220   MPV 9.3 9.5  --  9.2   INR  --   --  1.2  --      Chemistry:  Recent Labs     21  0621  1249 21  0352 21  0517     --  144 149*   K 4.9  --  4.6 4.2   *  --  112* 115*   CO2 14*  --  17* 18*   GLUCOSE 144*  --  124* 135*   BUN 55*  --  61* 81*   CREATININE 3.99*  --  4.01* 4.14*   ANIONGAP 15  --  15 16   LABGLOM 15*  --  14* 14*   GFRAA 18*  --  18* 17*   CALCIUM 8.7  --  9.3 9.3   LACTACIDWB  --  NOT REPORTED  --   --      Recent Labs     21  1249 21  0015 21  0555 21  0709   PROT 7.3  --   --   --   --    LABALBU 3.8  --   --   --   --    TSH 1.91  --   --   --   --    AST 19  --   --   --   --    ALT 11  --   --   --   --    ALKPHOS 55  --   --   --   --    BILITOT 0.50  --   --   --   --    BILIDIR 0.29  --   --   --   --    AMMONIA 21  --   --   --   --    POCGLU  --  104 123* 131* 123*     ABG:  Lab Results   Component Value Date    POCPH 7.19 2021    POCPCO2 44 2021    POCPO2 74 2021    POCHCO3 there is a telemetry leads within the midline, consider moving prior to repeat x-ray. Addition, please consider centering the x-ray over the GE junction epigastric region. Xr Abdomen For Ng/og/ne Tube Placement    Result Date: 4/30/2021  A newly placed gastric tube loops in the distal thoracic esophagus, where both the tip and sideport are located. Recommend repositioning before use. Us Retroperitoneal Limited    Result Date: 5/1/2021  1. Recently demonstrated suspicious lesion arising from the superior pole the right kidney is not well delineated on this exam. 2.  Right hydronephrosis and nephrolithiasis again demonstrated. 3.  Multiple stones throughout the left intrarenal collecting system, which likely obscures the associated hydronephrosis demonstrated on recent CT exam. 4.  Cholelithiasis. Physical Examination:        General appearance: Chronically ill-appearing, agitated  Mental Status: Oriented to person, not place not time  Lungs:  clear to auscultation bilaterally, normal effort  Heart:  regular rate and rhythm, no murmur  Abdomen:  soft, nontender, abdominal binder in place, well-healing incision  Extremities:  no edema, redness, tenderness in the calves  Skin:  no gross lesions, rashes, induration    Assessment:        Hospital Problems           Last Modified POA    * (Principal) Ventral hernia with bowel obstruction 5/1/2021 Yes    Toxic metabolic encephalopathy 2/0/1618 No    Other hydronephrosis 5/3/2021 Yes    Acute respiratory failure with hypoxia (HCC) 3/5/6687 No    Metabolic acidosis 1/7/8176 No    Normochromic normocytic anemia 5/1/2021 Yes    Iron deficiency anemia 5/2/2021 Yes    Vitamin B 12 deficiency 5/3/2021 Yes    Folate deficiency 5/3/2021 Yes    Essential hypertension (Chronic) 5/1/2021 Yes    Renal mass (Chronic) 5/1/2021 Yes    KARMA (acute kidney injury) ([address] 5/1/2021 Yes          Plan:        1.  Strangulated ventral hernia status post exploratory laparotomy small bowel resection on 4/30/2021. Appreciate general surgery recommendations. We will try to start tube feeds again if no intervention planned by radiology  2. Acute kidney injury, hydronephrosis, nonoliguric, past history of renal cell carcinoma, bladder cell cancer, underwent cystoscopy on 5/3/2021, and left PCN on 5/3/2021. [address] nephrology, urology, interventional radiology recommendations. It appears that patient does have adequate urine output from left nephrostomy tube, urostomy also draining some urine. Plan for right-sided nephrostomy tube hopefully tomorrow. Appreciate nephrology recommendations and concern for possibly needing dialysis if nephrostomy tubes do not work to improve kidney function  3. B12, iron, folate deficiency. Continue replacement  4. Acute delirium secondary to anesthesia , renal failure, metabolic encephalopathy. Continue to reorient patient, will try 1 dose of Zyprexa to see if it will help tonight. 5. Patient is currently DNR CCA. 6. I did call the son today and did discuss the current status of patient, as well as possible need of dialysis. Son is not sure if patient would want, however would like some time to think about dialysis. Patient son also told me that his father has had issues in the past after his last hernia surgery where he was requiring restraints and that possibly Ativan may have worked. I discussed my concern of using possibly Ativan because it may make his delirium worse. I have discussed my concerns about patient's overall status as well as what his road to recovery would be. Son states he understands wants to keep him a DNR CCA and will continue to reevaluate patient status and will continue to monitor renal function.     Drew Kellogg MD  5/4/2021  11:16 AM

## 2021-05-05 ENCOUNTER — ANESTHESIA EVENT (OUTPATIENT)
Dept: INTERVENTIONAL RADIOLOGY/VASCULAR | Age: 81
DRG: 329 | End: 2021-05-05
Payer: MEDICARE

## 2021-05-05 ENCOUNTER — APPOINTMENT (OUTPATIENT)
Dept: INTERVENTIONAL RADIOLOGY/VASCULAR | Age: 81
DRG: 329 | End: 2021-05-05
Payer: MEDICARE

## 2021-05-05 ENCOUNTER — ANESTHESIA (OUTPATIENT)
Dept: INTERVENTIONAL RADIOLOGY/VASCULAR | Age: 81
DRG: 329 | End: 2021-05-05
Payer: MEDICARE

## 2021-05-05 VITALS — OXYGEN SATURATION: 89 % | DIASTOLIC BLOOD PRESSURE: 85 MMHG | SYSTOLIC BLOOD PRESSURE: 121 MMHG

## 2021-05-05 LAB
ABSOLUTE EOS #: 0.13 K/UL (ref 0–0.44)
ABSOLUTE IMMATURE GRANULOCYTE: 0.06 K/UL (ref 0–0.3)
ABSOLUTE LYMPH #: 0.91 K/UL (ref 1.1–3.7)
ABSOLUTE MONO #: 0.66 K/UL (ref 0.1–1.2)
ANION GAP SERPL CALCULATED.3IONS-SCNC: 18 MMOL/L (ref 9–17)
BASOPHILS # BLD: 0 % (ref 0–2)
BASOPHILS ABSOLUTE: <0.03 K/UL (ref 0–0.2)
BUN BLDV-MCNC: 100 MG/DL (ref 8–23)
BUN/CREAT BLD: 24 (ref 9–20)
CALCIUM SERPL-MCNC: 9.8 MG/DL (ref 8.6–10.4)
CHLORIDE BLD-SCNC: 117 MMOL/L (ref 98–107)
CO2: 16 MMOL/L (ref 20–31)
CREAT SERPL-MCNC: 4.09 MG/DL (ref 0.7–1.2)
DIFFERENTIAL TYPE: ABNORMAL
EOSINOPHILS RELATIVE PERCENT: 2 % (ref 1–4)
GASTRIC PARIETAL CELL ANTIBODY: 2 UNITS (ref 0–24.9)
GASTRIN: 54 PG/ML (ref 0–100)
GFR AFRICAN AMERICAN: 17 ML/MIN
GFR NON-AFRICAN AMERICAN: 14 ML/MIN
GFR SERPL CREATININE-BSD FRML MDRD: ABNORMAL ML/MIN/{1.73_M2}
GFR SERPL CREATININE-BSD FRML MDRD: ABNORMAL ML/MIN/{1.73_M2}
GLUCOSE BLD-MCNC: 117 MG/DL (ref 75–110)
GLUCOSE BLD-MCNC: 117 MG/DL (ref 75–110)
GLUCOSE BLD-MCNC: 142 MG/DL (ref 70–99)
HCT VFR BLD CALC: 37.7 % (ref 40.7–50.3)
HEMOGLOBIN: 11.2 G/DL (ref 13–17)
IMMATURE GRANULOCYTES: 1 %
INTRINSIC FACTOR AB: NEGATIVE
LYMPHOCYTES # BLD: 11 % (ref 24–43)
MCH RBC QN AUTO: 26.9 PG (ref 25.2–33.5)
MCHC RBC AUTO-ENTMCNC: 29.7 G/DL (ref 28.4–34.8)
MCV RBC AUTO: 90.6 FL (ref 82.6–102.9)
MONOCYTES # BLD: 8 % (ref 3–12)
NRBC AUTOMATED: 0 PER 100 WBC
PDW BLD-RTO: 16.2 % (ref 11.8–14.4)
PLATELET # BLD: 269 K/UL (ref 138–453)
PLATELET ESTIMATE: ABNORMAL
PMV BLD AUTO: 9.2 FL (ref 8.1–13.5)
POTASSIUM SERPL-SCNC: 4.2 MMOL/L (ref 3.7–5.3)
RBC # BLD: 4.16 M/UL (ref 4.21–5.77)
RBC # BLD: ABNORMAL 10*6/UL
SEG NEUTROPHILS: 78 % (ref 36–65)
SEGMENTED NEUTROPHILS ABSOLUTE COUNT: 6.38 K/UL (ref 1.5–8.1)
SODIUM BLD-SCNC: 151 MMOL/L (ref 135–144)
WBC # BLD: 8.2 K/UL (ref 3.5–11.3)
WBC # BLD: ABNORMAL 10*3/UL

## 2021-05-05 PROCEDURE — 3700000000 HC ANESTHESIA ATTENDED CARE

## 2021-05-05 PROCEDURE — 2500000003 HC RX 250 WO HCPCS: Performed by: SPECIALIST

## 2021-05-05 PROCEDURE — 99232 SBSQ HOSP IP/OBS MODERATE 35: CPT | Performed by: STUDENT IN AN ORGANIZED HEALTH CARE EDUCATION/TRAINING PROGRAM

## 2021-05-05 PROCEDURE — 50432 PLMT NEPHROSTOMY CATHETER: CPT | Performed by: RADIOLOGY

## 2021-05-05 PROCEDURE — 0T9030Z DRAINAGE OF RIGHT KIDNEY WITH DRAINAGE DEVICE, PERCUTANEOUS APPROACH: ICD-10-PCS | Performed by: RADIOLOGY

## 2021-05-05 PROCEDURE — 7100000001 HC PACU RECOVERY - ADDTL 15 MIN

## 2021-05-05 PROCEDURE — 93005 ELECTROCARDIOGRAM TRACING: CPT | Performed by: STUDENT IN AN ORGANIZED HEALTH CARE EDUCATION/TRAINING PROGRAM

## 2021-05-05 PROCEDURE — 36415 COLL VENOUS BLD VENIPUNCTURE: CPT

## 2021-05-05 PROCEDURE — 6370000000 HC RX 637 (ALT 250 FOR IP): Performed by: SPECIALIST

## 2021-05-05 PROCEDURE — C1729 CATH, DRAINAGE: HCPCS

## 2021-05-05 PROCEDURE — 2580000003 HC RX 258: Performed by: SURGERY

## 2021-05-05 PROCEDURE — 2060000000 HC ICU INTERMEDIATE R&B

## 2021-05-05 PROCEDURE — 3700000001 HC ADD 15 MINUTES (ANESTHESIA)

## 2021-05-05 PROCEDURE — 2580000003 HC RX 258: Performed by: SPECIALIST

## 2021-05-05 PROCEDURE — 82947 ASSAY GLUCOSE BLOOD QUANT: CPT

## 2021-05-05 PROCEDURE — 2580000003 HC RX 258: Performed by: INTERNAL MEDICINE

## 2021-05-05 PROCEDURE — 2500000003 HC RX 250 WO HCPCS

## 2021-05-05 PROCEDURE — 6360000002 HC RX W HCPCS: Performed by: SPECIALIST

## 2021-05-05 PROCEDURE — 80048 BASIC METABOLIC PNL TOTAL CA: CPT

## 2021-05-05 PROCEDURE — 85025 COMPLETE CBC W/AUTO DIFF WBC: CPT

## 2021-05-05 PROCEDURE — 2500000003 HC RX 250 WO HCPCS: Performed by: INTERNAL MEDICINE

## 2021-05-05 PROCEDURE — 6360000002 HC RX W HCPCS

## 2021-05-05 PROCEDURE — 7100000000 HC PACU RECOVERY - FIRST 15 MIN

## 2021-05-05 RX ORDER — PROPOFOL 10 MG/ML
INJECTION, EMULSION INTRAVENOUS PRN
Status: DISCONTINUED | OUTPATIENT
Start: 2021-05-05 | End: 2021-05-05 | Stop reason: SDUPTHER

## 2021-05-05 RX ORDER — PROPOFOL 10 MG/ML
INJECTION, EMULSION INTRAVENOUS CONTINUOUS PRN
Status: DISCONTINUED | OUTPATIENT
Start: 2021-05-05 | End: 2021-05-05 | Stop reason: SDUPTHER

## 2021-05-05 RX ORDER — ONDANSETRON 2 MG/ML
4 INJECTION INTRAMUSCULAR; INTRAVENOUS
Status: ACTIVE | OUTPATIENT
Start: 2021-05-05 | End: 2021-05-05

## 2021-05-05 RX ORDER — DEXTROSE AND SODIUM CHLORIDE 5; .45 G/100ML; G/100ML
INJECTION, SOLUTION INTRAVENOUS CONTINUOUS
Status: DISCONTINUED | OUTPATIENT
Start: 2021-05-05 | End: 2021-05-05

## 2021-05-05 RX ORDER — ACETAMINOPHEN 325 MG/1
650 TABLET ORAL EVERY 4 HOURS PRN
Status: DISCONTINUED | OUTPATIENT
Start: 2021-05-05 | End: 2021-05-14 | Stop reason: HOSPADM

## 2021-05-05 RX ORDER — KETAMINE HYDROCHLORIDE 100 MG/ML
INJECTION, SOLUTION INTRAMUSCULAR; INTRAVENOUS PRN
Status: DISCONTINUED | OUTPATIENT
Start: 2021-05-05 | End: 2021-05-05 | Stop reason: SDUPTHER

## 2021-05-05 RX ADMIN — PROPOFOL 20 MG: 10 INJECTION, EMULSION INTRAVENOUS at 15:26

## 2021-05-05 RX ADMIN — DEXTROSE AND SODIUM CHLORIDE: 5; 450 INJECTION, SOLUTION INTRAVENOUS at 07:33

## 2021-05-05 RX ADMIN — KETAMINE HYDROCHLORIDE 5 MG: 100 INJECTION INTRAMUSCULAR; INTRAVENOUS at 15:30

## 2021-05-05 RX ADMIN — PROPOFOL 50 MCG/KG/MIN: 10 INJECTION, EMULSION INTRAVENOUS at 15:26

## 2021-05-05 RX ADMIN — KETAMINE HYDROCHLORIDE 5 MG: 100 INJECTION INTRAMUSCULAR; INTRAVENOUS at 15:40

## 2021-05-05 RX ADMIN — FAMOTIDINE 10 MG: 10 INJECTION, SOLUTION INTRAVENOUS at 10:04

## 2021-05-05 RX ADMIN — KETAMINE HYDROCHLORIDE 5 MG: 100 INJECTION INTRAMUSCULAR; INTRAVENOUS at 16:00

## 2021-05-05 RX ADMIN — SODIUM CHLORIDE, PRESERVATIVE FREE 10 ML: 5 INJECTION INTRAVENOUS at 10:04

## 2021-05-05 RX ADMIN — Medication 3 MG: at 20:54

## 2021-05-05 RX ADMIN — KETAMINE HYDROCHLORIDE 5 MG: 100 INJECTION INTRAMUSCULAR; INTRAVENOUS at 16:05

## 2021-05-05 RX ADMIN — KETAMINE HYDROCHLORIDE 5 MG: 100 INJECTION INTRAMUSCULAR; INTRAVENOUS at 15:45

## 2021-05-05 RX ADMIN — KETAMINE HYDROCHLORIDE 5 MG: 100 INJECTION INTRAMUSCULAR; INTRAVENOUS at 16:10

## 2021-05-05 RX ADMIN — KETAMINE HYDROCHLORIDE 10 MG: 100 INJECTION INTRAMUSCULAR; INTRAVENOUS at 15:26

## 2021-05-05 RX ADMIN — KETAMINE HYDROCHLORIDE 5 MG: 100 INJECTION INTRAMUSCULAR; INTRAVENOUS at 15:35

## 2021-05-05 RX ADMIN — HEPARIN SODIUM 5000 UNITS: 5000 INJECTION INTRAVENOUS; SUBCUTANEOUS at 20:55

## 2021-05-05 RX ADMIN — SODIUM BICARBONATE 400 ML: 84 INJECTION INTRAVENOUS at 16:21

## 2021-05-05 RX ADMIN — KETAMINE HYDROCHLORIDE 5 MG: 100 INJECTION INTRAMUSCULAR; INTRAVENOUS at 15:50

## 2021-05-05 RX ADMIN — FAMOTIDINE 10 MG: 10 INJECTION, SOLUTION INTRAVENOUS at 20:54

## 2021-05-05 RX ADMIN — BISACODYL 10 MG: 10 SUPPOSITORY RECTAL at 07:33

## 2021-05-05 RX ADMIN — SODIUM BICARBONATE: 84 INJECTION INTRAVENOUS at 14:04

## 2021-05-05 ASSESSMENT — PULMONARY FUNCTION TESTS
PIF_VALUE: 1

## 2021-05-05 ASSESSMENT — PAIN SCALES - GENERAL
PAINLEVEL_OUTOF10: 0
PAINLEVEL_OUTOF10: 0

## 2021-05-05 NOTE — PLAN OF CARE
Problem: Falls - Risk of:  Goal: Will remain free from falls  Description: Will remain free from falls  Outcome: Ongoing     Problem: Skin Integrity:  Goal: Absence of new skin breakdown  Description: Absence of new skin breakdown  Outcome: Ongoing     Problem: Infection - Surgical Site:  Goal: Will show no infection signs and symptoms  Description: Will show no infection signs and symptoms  Outcome: Ongoing     Problem:  Bowel/Gastric:  Goal: Gastrointestinal status for postoperative course will improve  Description: Gastrointestinal status for postoperative course will improve  Outcome: Ongoing     Problem: Non-Violent Restraints  Goal: Removal from restraints as soon as assessed to be safe  Outcome: Ongoing     Problem: Non-Violent Restraints  Goal: No harm/injury to patient while restraints in use  Outcome: Ongoing     Problem: Non-Violent Restraints  Goal: Patient's dignity will be maintained  Outcome: Ongoing  Problem: Nutrition  Goal: Optimal nutrition therapy  Outcome: Ongoing

## 2021-05-05 NOTE — PLAN OF CARE
Problem: Falls - Risk of:  Goal: Will remain free from falls  Description: Will remain free from falls  5/5/2021 1322 by Abraham Renteria RN  Outcome: Ongoing     Problem: Skin Integrity:  Goal: Will show no infection signs and symptoms  Description: Will show no infection signs and symptoms  5/5/2021 1322 by Abraham Renteria RN  Outcome: Ongoing     Problem: Infection - Surgical Site:  Goal: Will show no infection signs and symptoms  Description: Will show no infection signs and symptoms  5/5/2021 1322 by Abraham Renteria RN  Outcome: Ongoing     Problem:  Bowel/Gastric:  Goal: Gastrointestinal status for postoperative course will improve  Description: Gastrointestinal status for postoperative course will improve  5/5/2021 1322 by Abraham Renteria RN  Outcome: Ongoing     Problem: Non-Violent Restraints  Goal: Removal from restraints as soon as assessed to be safe  5/5/2021 1322 by Abraham Renteria RN  Outcome: Ongoing     Problem: Non-Violent Restraints  Goal: No harm/injury to patient while restraints in use  5/5/2021 1322 by Abraham Renteria RN  Outcome: Ongoing     Problem: Non-Violent Restraints  Goal: Patient's dignity will be maintained  5/5/2021 1322 by Abraham Renteria RN  Outcome: Ongoing

## 2021-05-05 NOTE — PROGRESS NOTES
General Surgery:  Daily Progress Note                   PATIENT NAME: Tomas Fuller     TODAY'S DATE: 5/5/2021, 6:56 AM  CC:  Confusion    SUBJECTIVE:     Pt seen and examined at bedside. Did not receive nephrostomy tube yesterday due to continued agitation. Patient still restless this morning with no sleep per bedside sitter. No bowel function documented. OBJECTIVE:   VITALS:  /69   Pulse 72   Temp 98 °F (36.7 °C) (Axillary)   Resp 18   Ht 6' 1\" (1.854 m)   Wt 238 lb 4.8 oz (108.1 kg)   SpO2 96%   BMI 31.44 kg/m²      INTAKE/OUTPUT:      Intake/Output Summary (Last 24 hours) at 5/5/2021 0656  Last data filed at 5/5/2021 0531  Gross per 24 hour   Intake 525 ml   Output 2580 ml   Net -2055 ml       PHYSICAL EXAM:  General Appearance: Awake, confused, restless and agitated  HEENT:  Normocephalic, atraumatic, mucus membranes moist.  NG tube in place  Heart: Heart regular rate and rhythm  Lungs: No acute distress  Abdomen: Soft, nondistended. Tenderness to palpation around midline incision. Incision clean, dry, intact. Left nephrostomy with clear urine. Extremities: No cyanosis, pitting edema, rashes noted. Skin: Skin color, texture, turgor normal. No rashes or lesions.     Data:  CBC with Differential:    Lab Results   Component Value Date    WBC 8.2 05/05/2021    RBC 4.16 05/05/2021    HGB 11.2 05/05/2021    HCT 37.7 05/05/2021     05/05/2021    MCV 90.6 05/05/2021    MCH 26.9 05/05/2021    MCHC 29.7 05/05/2021    RDW 16.2 05/05/2021    LYMPHOPCT 11 05/05/2021    LYMPHOPCT 18.3 12/14/2017    MONOPCT 8 05/05/2021    MONOPCT 5.7 12/14/2017    EOSPCT 5.1 12/14/2017    BASOPCT 0 05/05/2021    BASOPCT 1.1 12/14/2017    MONOSABS 0.66 05/05/2021    MONOSABS 0.4 12/14/2017    LYMPHSABS 0.91 05/05/2021    LYMPHSABS 1.2 12/14/2017    EOSABS 0.13 05/05/2021    EOSABS 0.3 12/14/2017    BASOSABS <0.03 05/05/2021    DIFFTYPE NOT REPORTED 05/05/2021     BMP:    Lab Results   Component Value Date  05/05/2021    K 4.2 05/05/2021     05/05/2021    CO2 16 05/05/2021     05/05/2021    LABALBU 3.8 05/02/2021    CREATININE 4.09 05/05/2021    CALCIUM 9.8 05/05/2021    GFRAA 17 05/05/2021    LABGLOM 14 05/05/2021    GLUCOSE 142 05/05/2021       Radiology Review:      ASSESSMENT:  Active Hospital Problems    Diagnosis Date Noted    Other hydronephrosis [N13.39] 05/03/2021     Priority: High    Acute respiratory failure with hypoxia (HCC) [J96.01] 05/03/2021     Priority: High    Metabolic acidosis [M13.4] 05/03/2021     Priority: High    Toxic metabolic encephalopathy [L61] 05/02/2021     Priority: High    Vitamin B 12 deficiency [E53.8] 05/03/2021     Priority: Medium    Folate deficiency [E53.8] 05/03/2021     Priority: Medium    Iron deficiency anemia [D50.9] 05/02/2021     Priority: Medium    Normochromic normocytic anemia [D64.9] 05/01/2021     Priority: Medium    Ventral hernia with bowel obstruction [K43.6] 04/30/2021    KARMA (acute kidney injury) ([facility] [address] [N17.9] 04/30/2021    Renal mass [N28.89] 06/28/2018    Essential hypertension [I10] 05/03/2016     80year old male w/ recurrent parastomal hernia presenting with incarcerated/strangulated parastomal hernia s/p ex lap, FELICE, reduction of parastomal hernia, and small bowel resection (4/30/21-Bhargav). Confused  KARMA    Plan:  1. Diet: From surgery standpoint, needs nutrition. If no procedures planned, recommend tube feeds via NGT. 2. Recommend sleep hygiene  3. Delirium/agitation likely multifactorial with metabolic derangements of BUN elevated to 100 today, lack of sleep, multiple medications - nephrology consulted but son may not agree to HD. 4. Recommend melatonin at night. If patient still restless tonight, would recommend 50 mg of trazodone for sleep. 5. Keep awake during the day and up to a chair  6. Appreciate nephrology and urology recommendations  7. General surgery to follow along.     Electronically signed by Becky Estrada NAJMA Bailey DO  on 5/5/2021 at 6:56 AM

## 2021-05-05 NOTE — PROGRESS NOTES
Anand Albarran MD FACS    Urology Progress Note    Subjective: Patient agitated and confused. Patient Vitals for the past 24 hrs:   BP Temp Temp src Pulse Resp SpO2 Weight   05/05/21 0729 132/83 97.7 °F (36.5 °C) Axillary 73 18 97 % --   05/05/21 0627 -- -- -- -- -- -- 238 lb 4.8 oz (108.1 kg)   05/05/21 0455 134/69 98 °F (36.7 °C) Axillary 72 18 96 % --   05/05/21 0038 130/74 97.3 °F (36.3 °C) Axillary 71 18 96 % --   05/04/21 1932 136/77 97.9 °F (36.6 °C) Axillary 76 20 93 % --   05/04/21 1543 129/77 97.2 °F (36.2 °C) Oral 77 18 95 % --   05/04/21 1120 123/72 97.9 °F (36.6 °C) Oral 84 18 96 % --   05/04/21 0913 (!) 142/83 98.2 °F (36.8 °C) Oral 89 18 95 % --       Intake/Output Summary (Last 24 hours) at 5/5/2021 0753  Last data filed at 5/5/2021 0531  Gross per 24 hour   Intake 525 ml   Output 2580 ml   Net -2055 ml       Recent Labs     05/03/21  0352 05/04/21  0517 05/05/21  0530   WBC 6.3 6.4 8.2   HGB 10.0* 10.3* 11.2*   HCT 33.8* 34.1* 37.7*   MCV 91.1 90.7 90.6    220 269     Recent Labs     05/03/21  0352 05/04/21  0517 05/05/21  0530    149* 151*   K 4.6 4.2 4.2   * 115* 117*   CO2 17* 18* 16*   BUN 61* 81* 100*   CREATININE 4.01* 4.14* 4.09*       No results for input(s): COLORU, PHUR, LABCAST, WBCUA, RBCUA, MUCUS, TRICHOMONAS, YEAST, BACTERIA, CLARITYU, SPECGRAV, LEUKOCYTESUR, UROBILINOGEN, BILIRUBINUR, BLOODU in the last 72 hours. Invalid input(s): NITRATE, GLUCOSEUKETONESUAMORPHOUS    Additional Lab/culture results:    Physical Exam: Agitated and confused.   Urine clear via Left percutaneous nephrostomy tube     Interval Imaging Findings:    Impression:    Patient Active Problem List   Diagnosis    Bladder cancer    DJD (degenerative joint disease)    Essential hypertension    Renal mass    Incisional hernia of anterior abdominal wall without obstruction or gangrene    Bilateral kidney stones    Right ureteral calculus    Partial small bowel obstruction ([address]  Ventral hernia with bowel obstruction    GERD (gastroesophageal reflux disease)    KARMA (acute kidney injury) (HCC)    Normochromic normocytic anemia    Iron deficiency anemia    Toxic metabolic encephalopathy    Other hydronephrosis    Vitamin B 12 deficiency    Folate deficiency    Acute respiratory failure with hypoxia (HCC)    Metabolic acidosis       Plan:   Patient still confused and there is little improvement in renal function despite Left percutaneous nephrostomy tube. Recommend a Right percutaneous nephrostomy tube be placed today even if that means that IR does this under anesthesia. Thank you for allowing me to participate in the care of this patient. Feel free to contact me at [phone] if you have any questions or concerns.      Emiliano Mercado  7:53 AM 5/5/2021

## 2021-05-05 NOTE — PROGRESS NOTES
strangulated hernia, lysis of adhesions performed on 4/30/2021. Patient also has ongoing problem of acute kidney injury and moderate urine output however worsening creatinine. .  Patient was taken for cystoscopy on 5/3/2021 which showed obstruction of ileal conduit due to prior suture. Patient underwent left PCN insertion on 5/3/2021, with a plan for right-sided PCN to be determined. Patient also combative and agitated after anesthesia and procedures from yesterday. Review of Systems:     Unable to obtain due to lack of cooperation. Medications: Allergies: Allergies   Allergen Reactions    Nsaids Anaphylaxis     GI bleed    Tetracyclines & Related        Current Meds:   Scheduled Meds:    melatonin  3 mg Oral Nightly    magnesium hydroxide  30 mL Oral Once    heparin (porcine)  5,000 Units Subcutaneous 3 times per day    sodium chloride flush  5-40 mL Intravenous 2 times per day    acetaminophen  1,000 mg Oral 3 times per day    lidocaine  2 patch Topical Q12H    famotidine (PEPCID) injection  10 mg Intravenous BID     Continuous Infusions:    dextrose 5 % and 0.45 % NaCl 75 mL/hr at 05/05/21 0733    sodium chloride       PRN Meds: [Held by provider] oxyCODONE, albuterol sulfate HFA, sodium chloride flush, sodium chloride, ondansetron    Data:     Past Medical History:   has a past medical history of Arthritis, Caffeine use, Cancer ([address] GERD (gastroesophageal reflux disease), Hernia, inguinal, right, Hypertension, Kidney stone, Presence of urostomy ([address] and Retinal detachment. Social History:   reports that he quit smoking about 41 years ago. His smoking use included cigarettes. He has a 10.00 pack-year smoking history. He has never used smokeless tobacco. He reports that he does not drink alcohol or use drugs.      Family History:   Family History   Problem Relation Age of Onset    Diabetes Mother     Diabetes Sister        Vitals:  /83   Pulse 73   Temp 97.7 °F (36.5 °C) (Axillary)   Resp 18   Ht 6' 1\" (1.854 m)   Wt 238 lb 4.8 oz (108.1 kg)   SpO2 97%   BMI 31.44 kg/m²   Temp (24hrs), Av.6 °F (36.4 °C), Min:97.2 °F (36.2 °C), Max:98 °F (36.7 °C)    Recent Labs     21  0709 21  1110 21  1806 21  0041   POCGLU 123* 186* 128* 117*       I/O (24Hr): Intake/Output Summary (Last 24 hours) at 2021 1135  Last data filed at 2021 1033  Gross per 24 hour   Intake 525 ml   Output 1905 ml   Net -1380 ml       Labs:  Hematology:  Recent Labs     21  0352 21  1353 21  0517 21  0530   WBC 6.3  --  6.4 8.2   RBC 3.71*  --  3.76* 4.16*   HGB 10.0*  --  10.3* 11.2*   HCT 33.8*  --  34.1* 37.7*   MCV 91.1  --  90.7 90.6   MCH 27.0  --  27.4 26.9   MCHC 29.6  --  30.2 29.7   RDW 16.7*  --  16.3* 16.2*     --  220 269   MPV 9.5  --  9.2 9.2   INR  --  1.2  --   --      Chemistry:  Recent Labs     21  1249 21  0352 21  0517 21  0530   NA  --  144 149* 151*   K  --  4.6 4.2 4.2   CL  --  112* 115* 117*   CO2  --  17* 18* 16*   GLUCOSE  --  124* 135* 142*   BUN  --  61* 81* 100*   CREATININE  --  4.01* 4.14* 4.09*   ANIONGAP  --  15 16 18*   LABGLOM  --  14* 14* 14*   GFRAA  --  18* 17* 17*   CALCIUM  --  9.3 9.3 9.8   LACTACIDWB NOT REPORTED  --   --   --      Recent Labs     21  1249 21  1249 21  0015 21  0555 21  0709 21  1110 21  1806 21  0041   PROT 7.3  --   --   --   --   --   --   --    LABALBU 3.8  --   --   --   --   --   --   --    TSH 1.91  --   --   --   --   --   --   --    AST 19  --   --   --   --   --   --   --    ALT 11  --   --   --   --   --   --   --    ALKPHOS 55  --   --   --   --   --   --   --    BILITOT 0.50  --   --   --   --   --   --   --    BILIDIR 0.29  --   --   --   --   --   --   --    AMMONIA 21  --   --   --   --   --   --   --    POCGLU  --    < > 123* 131* 123* 186* 128* 117*    < > = values in this interval not displayed. Essential hypertension (Chronic) 5/1/2021 Yes    Renal mass (Chronic) 5/1/2021 Yes    KARMA (acute kidney injury) ([facility] [address] 5/1/2021 Yes          Plan:        1. Strangulated ventral hernia status post exploratory laparotomy small bowel resection on 4/30/2021. Appreciate general surgery recommendations. We will try to start tube feeds again if no intervention planned by radiology  2. Acute kidney injury, hydronephrosis, nonoliguric, past history of renal cell carcinoma, bladder cell cancer, underwent cystoscopy on 5/3/2021, and left PCN on 5/3/2021. [address] nephrology, urology, interventional radiology recommendations. Patient needs right sided nephrostomy tube, is less agitated than yesterday, appreciate urology recommendations, would need nephrostomy tube placed with general anesthesia. Concern for possibly needing dialysis if nephrostomy tubes do not work to improve kidney function  3. B12, iron, folate deficiency. Continue replacement  4. Acute delirium secondary to anesthesia , renal failure, metabolic encephalopathy. Slightly less agitated today, continue zyprexa again  5. Patient is DNR CCA.   6. PT/OT when mentation improves    Gilberto Nicholas MD  5/5/2021  11:35 AM

## 2021-05-05 NOTE — ANESTHESIA PRE PROCEDURE
Department of Anesthesiology  Preprocedure Note       Name:  Natali Mackay   Age:  80 y.o.  :  1940                                          MRN:  [id number]         Date:  2021      Surgeon: * No surgeons listed *    Procedure: * No procedures listed *    Medications prior to admission:   Prior to Admission medications    Medication Sig Start Date End Date Taking?  Authorizing Provider   acetaminophen (TYLENOL) 500 MG tablet Take 500-1,000 mg by mouth every 4 hours as needed for Pain    Yes Historical Provider, MD   albuterol sulfate (PROAIR RESPICLICK) 540 (90 Base) MCG/ACT aerosol powder inhalation Inhale 2 puffs into the lungs every 4 hours as needed for Wheezing or Shortness of Breath 9/10/20   Marielle Rodriguez MD       Current medications:    Current Facility-Administered Medications   Medication Dose Route Frequency Provider Last Rate Last Admin    sodium bicarbonate 50 mEq in dextrose 5 % 1,000 mL infusion   Intravenous Continuous Will Magaña  mL/hr at 21 1404 400 mL at 21 1621    acetaminophen (TYLENOL) tablet 650 mg  650 mg Oral Q4H PRN Karuna Agarwal MD        [Held by provider] oxyCODONE (ROXICODONE) immediate release tablet 2.5 mg  2.5 mg Oral Q6H PRN Roxana Evans MD        melatonin tablet 3 mg  3 mg Oral Nightly Roxana Evans MD   3 mg at 21 194    magnesium hydroxide (MILK OF MAGNESIA) 400 MG/5ML suspension 30 mL  30 mL Oral Once Roxana Evans MD        albuterol sulfate  (90 Base) MCG/ACT inhaler 2 puff  2 puff Inhalation Q4H PRN Roxana Evans MD        heparin (porcine) injection 5,000 Units  5,000 Units Subcutaneous 3 times per day Roxana Evans MD   5,000 Units at 21 2105    sodium chloride flush 0.9 % injection 5-40 mL  5-40 mL Intravenous 2 times per day Roxana Evans MD   10 mL at 21 1004    sodium chloride flush 0.9 % injection 5-40 mL  5-40 mL Intravenous PRN Roxana Evans MD        0.9 % sodium chloride infusion  25 mL Intravenous PRN Emiliano Mercado MD        acetaminophen (TYLENOL) 160 MG/5ML solution 1,000 mg  1,000 mg Oral 3 times per day Emiliano Mercado MD   1,000 mg at 05/04/21 2104    lidocaine 4 % external patch 2 patch  2 patch Topical Q12H Emiliano Mercado MD   2 patch at 05/05/21 0733    ondansetron (ZOFRAN) injection 4 mg  4 mg Intravenous Q6H PRN Emiliano Mercado MD   4 mg at 05/01/21 0257    famotidine (PEPCID) injection 10 mg  10 mg Intravenous BID Emiliano Mercado MD   10 mg at 05/05/21 1004       Allergies:     Allergies   Allergen Reactions    Nsaids Anaphylaxis     GI bleed    Tetracyclines & Related        Problem List:    Patient Active Problem List   Diagnosis Code    Bladder cancer C67.9    DJD (degenerative joint disease) M19.90    Essential hypertension I10    Renal mass N28.89    Incisional hernia of anterior abdominal wall without obstruction or gangrene K43.2    Bilateral kidney stones N20.0    Right ureteral calculus N20.1    Partial small bowel obstruction ([address] K56.600    Ventral hernia with bowel obstruction K43.6    GERD (gastroesophageal reflux disease) K21.9    KARMA (acute kidney injury) ([address] N17.9    Normochromic normocytic anemia D64.9    Iron deficiency anemia D50.9    Toxic metabolic encephalopathy W02    Other hydronephrosis N13.39    Vitamin B 12 deficiency E53.8    Folate deficiency E53.8    Acute respiratory failure with hypoxia (HCC) Z73.83    Metabolic acidosis S76.6       Past Medical History:        Diagnosis Date    Arthritis     Caffeine use     2 coffee, 2 cans soda/day    Cancer [facility]) 2006    bladder et prostate, 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    GERD (gastroesophageal reflux disease)     Hernia, inguinal, right     Hypertension     Kidney stone     Presence of urostomy ([address]     Retinal detachment        Past Surgical History:        Procedure Laterality Date    BLADDER REMOVAL  2006 cancer, s/p urostomy     CATARACT REMOVAL Bilateral     COLONOSCOPY      CYSTOSCOPY N/A 5/3/2021    CYSTOSCOPY performed by Scott Pierson MD at [address] Right 2000    macular hole    HERNIA REPAIR      x2    INCISIONAL HERNIA REPAIR  2015    parastomal hernia repair - Dr. Lola Cabezas  2014    IR NEPHROSTOMY PERCUTANEOUS LEFT  5/3/2021    IR NEPHROSTOMY PERCUTANEOUS LEFT 5/3/2021 [facility]    LAPAROSCOPY N/A 2021    EXPLORATORY LAPAROTOMY, EXTENSIVE LYSIS OF ADHESIONS, SMALL BOWEL RESECTION, REDUCTION OF STRANGULATED HERNIA performed by Rosio Verde DO at [facility].  2006    removal    VITRECTOMY Left 16       Social History:    Social History     Tobacco Use    Smoking status: Former Smoker     Packs/day: 0.50     Years: 20.00     Pack years: 10.00     Types: Cigarettes     Quit date: 1979     Years since quittin.8    Smokeless tobacco: Never Used    Tobacco comment: quit in    Substance Use Topics    Alcohol use: No     Alcohol/week: 0.0 standard drinks                                Counseling given: Not Answered  Comment: quit in       Vital Signs (Current):   Vitals:    21 0627 21 0729 21 1159 21 1628   BP:  132/83 130/86 (!) 144/74   Pulse:  73 77 84   Resp:  18 16 17   Temp:  97.7 °F (36.5 °C) 97.5 °F (36.4 °C) 97 °F (36.1 °C)   TempSrc:  Axillary Axillary Temporal   SpO2:  97% 94% 93%   Weight: 238 lb 4.8 oz (108.1 kg)      Height:                                                  BP Readings from Last 3 Encounters:   21 (!) 144/74   21 121/85   21 (!) 110/58       NPO Status:                                                                                 BMI:   Wt Readings from Last 3 Encounters:   21 238 lb 4.8 oz (108.1 kg)   10/30/20 228 lb 12.8 oz (103.8 kg)   09/10/20 236 lb (107 kg)     Body mass index is 31.44 kg/m².     CBC: Lab Results   Component Value Date    WBC 8.2 05/05/2021    RBC 4.16 05/05/2021    HGB 11.2 05/05/2021    HCT 37.7 05/05/2021    MCV 90.6 05/05/2021    RDW 16.2 05/05/2021     05/05/2021       CMP:   Lab Results   Component Value Date     05/05/2021    K 4.2 05/05/2021     05/05/2021    CO2 16 05/05/2021     05/05/2021    CREATININE 4.09 05/05/2021    GFRAA 17 05/05/2021    LABGLOM 14 05/05/2021    GLUCOSE 142 05/05/2021    PROT 7.3 05/02/2021    CALCIUM 9.8 05/05/2021    BILITOT 0.50 05/02/2021    ALKPHOS 55 05/02/2021    AST 19 05/02/2021    ALT 11 05/02/2021       POC Tests:   Recent Labs     05/05/21  0041   POCGLU 117*       Coags:   Lab Results   Component Value Date    PROTIME 14.8 05/03/2021    INR 1.2 05/03/2021    APTT 35.8 05/03/2021       HCG (If Applicable): No results found for: PREGTESTUR, PREGSERUM, HCG, HCGQUANT     ABGs: No results found for: PHART, PO2ART, VRR2POZ, PIP7RCW, BEART, U8DIEQIO     Type & Screen (If Applicable):  No results found for: LABABO, LABRH    Drug/Infectious Status (If Applicable):  No results found for: HIV, HEPCAB    COVID-19 Screening (If Applicable):   Lab Results   Component Value Date    COVID19 Not Detected 04/30/2021           Anesthesia Evaluation    Airway: Mallampati: Unable to assess / NA        Dental:          Pulmonary:                              Cardiovascular:    (+) hypertension:,                   Neuro/Psych:               GI/Hepatic/Renal:             Endo/Other:                     Abdominal:           Vascular:                                        Anesthesia Plan      MAC     ASA 4 - emergent             Anesthetic plan and risks discussed with healthcare power of  and child/children.                       Oziel Sosa MD   5/5/2021

## 2021-05-05 NOTE — PROGRESS NOTES
Reason for follow-up :  Acute kidney injury. Subjective    Patient seen and examined at the bedside  He is confused   Creatinine showed no significant change, BUN increased to 100  Serum sodium increased to 151  Made 2.5 L from the left nephrostomy tube  Urology notes reviewed    HISTORY OF PRESENT ILLNESS:    The patient is a 80 y.o. male had multiple abdominal surgeries presented with enlarging incarcerated/strangulated parastomal hernia, who underwent exploratory laparotomy with reduction of the hernia and small bowel resection on 4/30/2021. He had history of bladder cancer s/p cystectomy and ileoconduit formation in 2006. His baseline creatinine has been between 1.5-2.1, it was 2.85 at presentation and increased this morning to 3.99. Patient had a CT scan of the abdomen upon presentation that was done without contrast, it was remarkable for extensive stone burden in both kidneys greater on the left, it showed uroepithelial wall thickening with inflammatory stranding involving the left collecting system, was also remarkable for enlargement of the right upper pole mass. Renal ultrasound was done yesterday showed right hydronephrosis and nephrolithiasis.   Patient has been nonoliguric, urine output over the last 24 hours was 2.7 L      Review Of Systems:      Unable to obtain as he is confused   Scheduled Meds:   melatonin  3 mg Oral Nightly    magnesium hydroxide  30 mL Oral Once    heparin (porcine)  5,000 Units Subcutaneous 3 times per day    sodium chloride flush  5-40 mL Intravenous 2 times per day    acetaminophen  1,000 mg Oral 3 times per day    lidocaine  2 patch Topical Q12H    famotidine (PEPCID) injection  10 mg Intravenous BID     Continuous Infusions:   sodium bicarbonate infusion      sodium chloride       PRN Meds:[Held by provider] oxyCODONE, albuterol sulfate HFA, sodium chloride flush, sodium chloride, ondansetron     Physical Exam:  Vitals:    05/05/21 0455 05/05/21 0627 05/05/21 ALKPHOS 55 05/02/2021    AST 19 05/02/2021    ALT 11 05/02/2021      Hepatic:   Lab Results   Component Value Date    AST 19 05/02/2021    AST 16 04/30/2021    AST 24 07/15/2020    ALT 11 05/02/2021    ALT 11 04/30/2021    ALT 17 07/15/2020    BILITOT 0.50 05/02/2021    BILITOT 0.35 04/30/2021    BILITOT 0.47 07/15/2020    ALKPHOS 55 05/02/2021    ALKPHOS 77 04/30/2021    ALKPHOS 64 07/15/2020     BNP: No results found for: BNP  Lipids:   Lab Results   Component Value Date    CHOL 139 05/09/2016    HDL 26 (A) 05/09/2016     INR:   Lab Results   Component Value Date    INR 1.2 05/03/2021    INR 1.0 05/17/2016     PTH: No results found for: PTH  Phosphorus:  No results found for: PHOS  Ionized Calcium: No results found for: IONCA  Magnesium:   Lab Results   Component Value Date    MG 1.8 05/01/2021     Albumin:   Lab Results   Component Value Date    LABALBU 3.8 05/02/2021     Last 3 CK, CKMB, Troponin: @LABRCNT(CKTOTAL:3,CKMB:3,TROPONINI:3)       URINE:)No results found for: Isela Turner     Radiology:   Renal ultrasound  1.  Recently demonstrated suspicious lesion arising from the superior pole   the right kidney is not well delineated on this exam.       2.  Right hydronephrosis and nephrolithiasis again demonstrated.       3.  Multiple stones throughout the left intrarenal collecting system, which   likely obscures the associated hydronephrosis demonstrated on recent CT exam.       4.  Cholelithiasis. Assessment:    1. Acute kidney injury secondary to obstructive uropathy  2. Obstructive uropathy  3. Metabolic acidosis  4. S/p hernia repair  5. Anemia  6. Bladder cancer and renal cell carcinoma, s/p cystectomy and creation of ileaoconduit  7. Enlarging right renal mass s/p cryoablation in 2019  8. Rt [place]  9. Left staghorn calculi  10. Metabolic encephalopathy multifactorial  11.   Hypernatremia    Plan:  Patient seems to be dehydrated by physical exam and based on his blood work  Will start hypotonic sodium bicarb replacement  Will need a right nephrostomy tube placement  We will recheck labs in the afternoon  If renal function and azotemia continue to get worse we may consider starting dialysis  Patient does have obstructive uropathy but in addition likely has a component of ATN as well   Patient has multifactorial issues including renal cell cancer with enlarging renal mass, encephalopathy obstructive uropathy, as well as ATN   Patient's global goals of care may need to be addressed.    Follow up labs     Electronically signed by Brian Pedroaz MD  on 5/5/2021 at 1:53 PM

## 2021-05-05 NOTE — FLOWSHEET NOTE
Pt to IR to have right sided neph tube placed please see anesthesia notes for vitals and medications given

## 2021-05-06 LAB
ABSOLUTE EOS #: 0.06 K/UL (ref 0–0.44)
ABSOLUTE IMMATURE GRANULOCYTE: 0.11 K/UL (ref 0–0.3)
ABSOLUTE LYMPH #: 0.91 K/UL (ref 1.1–3.7)
ABSOLUTE MONO #: 0.78 K/UL (ref 0.1–1.2)
ANION GAP SERPL CALCULATED.3IONS-SCNC: 16 MMOL/L (ref 9–17)
ANION GAP SERPL CALCULATED.3IONS-SCNC: 17 MMOL/L (ref 9–17)
BASOPHILS # BLD: 0 % (ref 0–2)
BASOPHILS ABSOLUTE: 0.04 K/UL (ref 0–0.2)
BUN BLDV-MCNC: 101 MG/DL (ref 8–23)
BUN BLDV-MCNC: 104 MG/DL (ref 8–23)
BUN/CREAT BLD: 28 (ref 9–20)
BUN/CREAT BLD: 30 (ref 9–20)
CALCIUM SERPL-MCNC: 9.4 MG/DL (ref 8.6–10.4)
CALCIUM SERPL-MCNC: 9.5 MG/DL (ref 8.6–10.4)
CHLORIDE BLD-SCNC: 116 MMOL/L (ref 98–107)
CHLORIDE BLD-SCNC: 119 MMOL/L (ref 98–107)
CO2: 18 MMOL/L (ref 20–31)
CO2: 20 MMOL/L (ref 20–31)
CREAT SERPL-MCNC: 3.39 MG/DL (ref 0.7–1.2)
CREAT SERPL-MCNC: 3.71 MG/DL (ref 0.7–1.2)
DIFFERENTIAL TYPE: ABNORMAL
EKG ATRIAL RATE: 78 BPM
EKG Q-T INTERVAL: 374 MS
EKG QRS DURATION: 86 MS
EKG QTC CALCULATION (BAZETT): 439 MS
EKG R AXIS: 0 DEGREES
EKG T AXIS: 11 DEGREES
EKG VENTRICULAR RATE: 83 BPM
EOSINOPHILS RELATIVE PERCENT: 1 % (ref 1–4)
GFR AFRICAN AMERICAN: 19 ML/MIN
GFR AFRICAN AMERICAN: 21 ML/MIN
GFR NON-AFRICAN AMERICAN: 16 ML/MIN
GFR NON-AFRICAN AMERICAN: 18 ML/MIN
GFR SERPL CREATININE-BSD FRML MDRD: ABNORMAL ML/MIN/{1.73_M2}
GLUCOSE BLD-MCNC: 128 MG/DL (ref 75–110)
GLUCOSE BLD-MCNC: 150 MG/DL (ref 75–110)
GLUCOSE BLD-MCNC: 165 MG/DL (ref 70–99)
GLUCOSE BLD-MCNC: 168 MG/DL (ref 70–99)
HCT VFR BLD CALC: 38 % (ref 40.7–50.3)
HEMOGLOBIN: 11.4 G/DL (ref 13–17)
IMMATURE GRANULOCYTES: 1 %
LYMPHOCYTES # BLD: 10 % (ref 24–43)
MCH RBC QN AUTO: 27.2 PG (ref 25.2–33.5)
MCHC RBC AUTO-ENTMCNC: 30 G/DL (ref 28.4–34.8)
MCV RBC AUTO: 90.7 FL (ref 82.6–102.9)
MONOCYTES # BLD: 8 % (ref 3–12)
NRBC AUTOMATED: 0 PER 100 WBC
PDW BLD-RTO: 16.3 % (ref 11.8–14.4)
PLATELET # BLD: 263 K/UL (ref 138–453)
PLATELET ESTIMATE: ABNORMAL
PMV BLD AUTO: 9.4 FL (ref 8.1–13.5)
POTASSIUM SERPL-SCNC: 4.2 MMOL/L (ref 3.7–5.3)
POTASSIUM SERPL-SCNC: 4.3 MMOL/L (ref 3.7–5.3)
RBC # BLD: 4.19 M/UL (ref 4.21–5.77)
RBC # BLD: ABNORMAL 10*6/UL
SEG NEUTROPHILS: 80 % (ref 36–65)
SEGMENTED NEUTROPHILS ABSOLUTE COUNT: 7.69 K/UL (ref 1.5–8.1)
SODIUM BLD-SCNC: 152 MMOL/L (ref 135–144)
SODIUM BLD-SCNC: 154 MMOL/L (ref 135–144)
WBC # BLD: 9.6 K/UL (ref 3.5–11.3)
WBC # BLD: ABNORMAL 10*3/UL

## 2021-05-06 PROCEDURE — 99232 SBSQ HOSP IP/OBS MODERATE 35: CPT | Performed by: STUDENT IN AN ORGANIZED HEALTH CARE EDUCATION/TRAINING PROGRAM

## 2021-05-06 PROCEDURE — 82947 ASSAY GLUCOSE BLOOD QUANT: CPT

## 2021-05-06 PROCEDURE — 2500000003 HC RX 250 WO HCPCS: Performed by: SPECIALIST

## 2021-05-06 PROCEDURE — 93010 ELECTROCARDIOGRAM REPORT: CPT | Performed by: INTERNAL MEDICINE

## 2021-05-06 PROCEDURE — 2060000000 HC ICU INTERMEDIATE R&B

## 2021-05-06 PROCEDURE — 6370000000 HC RX 637 (ALT 250 FOR IP): Performed by: STUDENT IN AN ORGANIZED HEALTH CARE EDUCATION/TRAINING PROGRAM

## 2021-05-06 PROCEDURE — 85025 COMPLETE CBC W/AUTO DIFF WBC: CPT

## 2021-05-06 PROCEDURE — 2580000003 HC RX 258: Performed by: INTERNAL MEDICINE

## 2021-05-06 PROCEDURE — 2500000003 HC RX 250 WO HCPCS: Performed by: INTERNAL MEDICINE

## 2021-05-06 PROCEDURE — 6370000000 HC RX 637 (ALT 250 FOR IP): Performed by: SPECIALIST

## 2021-05-06 PROCEDURE — 6360000002 HC RX W HCPCS: Performed by: SPECIALIST

## 2021-05-06 PROCEDURE — 80048 BASIC METABOLIC PNL TOTAL CA: CPT

## 2021-05-06 PROCEDURE — 36415 COLL VENOUS BLD VENIPUNCTURE: CPT

## 2021-05-06 PROCEDURE — 2580000003 HC RX 258: Performed by: SPECIALIST

## 2021-05-06 RX ORDER — ACETAMINOPHEN 160 MG/5ML
1000 SOLUTION ORAL EVERY 12 HOURS SCHEDULED
Status: DISCONTINUED | OUTPATIENT
Start: 2021-05-06 | End: 2021-05-14 | Stop reason: HOSPADM

## 2021-05-06 RX ORDER — BISACODYL 10 MG
10 SUPPOSITORY, RECTAL RECTAL DAILY
Status: COMPLETED | OUTPATIENT
Start: 2021-05-06 | End: 2021-05-10

## 2021-05-06 RX ADMIN — FAMOTIDINE 10 MG: 10 INJECTION, SOLUTION INTRAVENOUS at 21:45

## 2021-05-06 RX ADMIN — FAMOTIDINE 10 MG: 10 INJECTION, SOLUTION INTRAVENOUS at 07:21

## 2021-05-06 RX ADMIN — SODIUM CHLORIDE, PRESERVATIVE FREE 10 ML: 5 INJECTION INTRAVENOUS at 07:26

## 2021-05-06 RX ADMIN — MAGNESIUM HYDROXIDE 30 ML: 400 SUSPENSION ORAL at 07:21

## 2021-05-06 RX ADMIN — ACETAMINOPHEN ORAL SOLUTION 1000 MG: 325 SOLUTION ORAL at 21:45

## 2021-05-06 RX ADMIN — BISACODYL 10 MG: 10 SUPPOSITORY RECTAL at 07:21

## 2021-05-06 RX ADMIN — HEPARIN SODIUM 5000 UNITS: 5000 INJECTION INTRAVENOUS; SUBCUTANEOUS at 21:46

## 2021-05-06 RX ADMIN — SODIUM BICARBONATE: 84 INJECTION INTRAVENOUS at 07:49

## 2021-05-06 RX ADMIN — HEPARIN SODIUM 5000 UNITS: 5000 INJECTION INTRAVENOUS; SUBCUTANEOUS at 06:25

## 2021-05-06 RX ADMIN — SODIUM BICARBONATE: 84 INJECTION INTRAVENOUS at 01:07

## 2021-05-06 RX ADMIN — Medication 3 MG: at 21:46

## 2021-05-06 ASSESSMENT — PAIN SCALES - GENERAL
PAINLEVEL_OUTOF10: 0
PAINLEVEL_OUTOF10: 0

## 2021-05-06 NOTE — PROGRESS NOTES
Comprehensive Nutrition Assessment    Type and Reason for Visit:  Consult(Tube feedings order and management)    Nutrition Recommendations/Plan:   1. Continue NPO status  2. Start Osmolite 1.5 Gigi goal rate 61 mL/hr (2196 kcal, 92 gm of protein and 1464 mL total volume). Water flush 180 mL 6x/day  3. Monitor tube feeding start, tolerance, tube feeding advancement and labs    Nutrition Assessment:  Patient is status post bilateral hydro and nephrolithiasis (5/5). Nutrition consult received for tube feedings order and management. Will start Osmolite 1.5 Gigi today. Recommend goal rate 61 mL/hr continuously which provides 2196 kcal, 92 gm of protein and 1464 mL total volume. Monitor tube feeding start, tolerance, tube feeding advancement and labs. Malnutrition Assessment:  Malnutrition Status:  Insufficient data        Estimated Daily Nutrient Needs:  Energy (kcal):  2184 kcal ([place]-St. Jeor 1.2 factor); Weight Used for Energy Requirements:  Current     Protein (g):  100-117 gm (1.2-1.4 gm/kg); Weight Used for Protein Requirements:  Ideal        Fluid (ml/day):  2200 mL/day; Method Used for Fluid Requirements:  1 ml/kcal      Nutrition Related Findings:  No edema. S/P Bilateral hydro and nephrolithiasis. Right nephrostomy tube (5/5). NG tube feedings      Wounds:  None       Current Nutrition Therapies:    DIET TUBE FEED CONTINUOUS/CYCLIC NPO; STANDARD WITHOUT FIBER; Nasoenteric; Continuous; 15; 61; 24; Exceptions are: [employer], Sips of Water with Meds    Anthropometric Measures:  · Height: 6' 1\" (185.4 cm)  · Current Body Weight: 234 lb (106.1 kg)   · Admission Body Weight: 225 lb (102.1 kg)(stated)    · Usual Body Weight: 235 lb (106.6 kg)     · Ideal Body Weight: 184 lbs; % Ideal Body Weight 127.2 %   · BMI: 30.9  · BMI Categories: Obese Class 1 (BMI 30.0-34. 9)       Nutrition Diagnosis:   · Altered GI function related to altered GI function(partial bowel obstruction) as evidenced by GI abnormality, NPO or clear liquid status due to medical condition, nutrition support - enteral nutrition      Nutrition Interventions:   Food and/or Nutrient Delivery:  Start Oral Nutrition Supplement, Continue NPO  Nutrition Education/Counseling:  Education not indicated   Coordination of Nutrition Care:  Continue to monitor while inpatient    Goals:  Enteral nutrition will meet greater than 85% of estimated nutrition needs       Nutrition Monitoring and Evaluation:   Food/Nutrient Intake Outcomes:  Enteral Nutrition Intake/Tolerance  Physical Signs/Symptoms Outcomes:  Biochemical Data, Fluid Status or Edema, Skin, Weight, Nausea or Vomiting, Diarrhea     Discharge Planning:     Too soon to determine         Ash YUN, RDN, LDN  Lead Clinical Dietitian  RD Office Phone [phone]

## 2021-05-06 NOTE — PROGRESS NOTES
in 2006, prior small bowel obstruction and exploratory laparotomy, recurrent hernia presents with incarcerated parastomal hernia requiring ex lap with small bowel resection, reduction of strangulated hernia, lysis of adhesions performed on 4/30/2021. Patient also has ongoing problem of acute kidney injury and moderate urine output however worsening creatinine. .  Patient was taken for cystoscopy on 5/3/2021 which showed obstruction of ileal conduit due to prior suture. Patient underwent left PCN insertion on 5/3/2021. Right-sided nephrostomy tube placed 5/5/2021. Patient still in acute renal failure and is requiring bicarb drip and having issues with his mentation and requiring tube feeds. Review of Systems:     Unable to obtain due to lack of cooperation. Medications: Allergies: Allergies   Allergen Reactions    Nsaids Anaphylaxis     GI bleed    Tetracyclines & Related        Current Meds:   Scheduled Meds:    bisacodyl  10 mg Rectal Daily    acetaminophen  1,000 mg Oral 2 times per day    melatonin  3 mg Oral Nightly    heparin (porcine)  5,000 Units Subcutaneous 3 times per day    sodium chloride flush  5-40 mL Intravenous 2 times per day    lidocaine  2 patch Topical Q12H    famotidine (PEPCID) injection  10 mg Intravenous BID     Continuous Infusions:    sodium bicarbonate infusion 150 mL/hr at 05/06/21 0749    sodium chloride       PRN Meds: acetaminophen, [Held by provider] oxyCODONE, albuterol sulfate HFA, sodium chloride flush, sodium chloride, ondansetron    Data:     Past Medical History:   has a past medical history of Arthritis, Caffeine use, Cancer ([address] GERD (gastroesophageal reflux disease), Hernia, inguinal, right, Hypertension, Kidney stone, Presence of urostomy ([address] and Retinal detachment. Social History:   reports that he quit smoking about 41 years ago. His smoking use included cigarettes. He has a 10.00 pack-year smoking history.  He has never used smokeless tobacco. He reports that he does not drink alcohol or use drugs. Family History:   Family History   Problem Relation Age of Onset    Diabetes Mother     Diabetes Sister        Vitals:  /69   Pulse 69   Temp 98.1 °F (36.7 °C) (Axillary)   Resp 20   Ht 6' 1\" (1.854 m)   Wt 234 lb 1.6 oz (106.2 kg)   SpO2 93%   BMI 30.89 kg/m²   Temp (24hrs), Av.6 °F (36.4 °C), Min:96.5 °F (35.8 °C), Max:98.4 °F (36.9 °C)    Recent Labs     21  1806 21  0041 21  0702   POCGLU 128* 117* 117* 150*       I/O (24Hr):     Intake/Output Summary (Last 24 hours) at 2021 1022  Last data filed at 2021 0815  Gross per 24 hour   Intake 1635 ml   Output 2360 ml   Net -725 ml       Labs:  Hematology:  Recent Labs     21  1353 21  0634   WBC  --  6.4 8.2 9.6   RBC  --  3.76* 4.16* 4.19*   HGB  --  10.3* 11.2* 11.4*   HCT  --  34.1* 37.7* 38.0*   MCV  --  90.7 90.6 90.7   MCH  --  27.4 26.9 27.2   MCHC  --  30.2 29.7 30.0   RDW  --  16.3* 16.2* 16.3*   PLT  --  220 269 263   MPV  --  9.2 9.2 9.4   INR 1.2  --   --   --      Chemistry:  Recent Labs     21  0521  0521  0634   * 151* 154*   K 4.2 4.2 4.2   * 117* 119*   CO2 18* 16* 18*   GLUCOSE 135* 142* 165*   BUN 81* 100* 104*   CREATININE 4.14* 4.09* 3.71*   ANIONGAP 16 18* 17   LABGLOM 14* 14* 16*   GFRAA 17* 17* 19*   CALCIUM 9.3 9.8 9.4     Recent Labs     21  0709 21  1110 21  1806 21  0041 21  0702   POCGLU 123* 186* 128* 117* 117* 150*     ABG:  Lab Results   Component Value Date    POCPH 7.19 2021    POCPCO2 44 2021    POCPO2 74 2021    POCHCO3 16.9 2021    NBEA 11 2021    PBEA NOT REPORTED 2021    DZL2WHQ 18 2021    ZHKZ4BUM 90 2021    FIO2 32.0 2021     Lab Results   Component Value Date/Time    [facility] 2021 12:58 PM     Lab Results   Component Value Date/Time    CULTURE NO GROWTH 4 DAYS 05/02/2021 12:58 PM       Radiology:  Ct Abdomen Pelvis Wo Contrast Additional Contrast? None    Result Date: 4/30/2021  1. Partial small bowel obstruction with a transition point located at the mouth of the ventral hernia. 2. Extensive stone burden identified in the bilateral kidneys, left greater than right. 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 3. Enlargement of the right upper pole mass, likely representing renal cell carcinoma. 4. Enlarged left para-aortic lymph node measuring 27 x 20 mm. Finding is nonspecific and may represent metastatic disease or reactive adenopathy. 5. Severe diverticulosis. 6. Cholelithiasis. Xr Chest (single View Frontal)    Result Date: 5/2/2021  Shallow inflation. Right basilar opacity may represent developing consolidation in the appropriate clinical setting. The enteric tube courses off the field of view in the upper abdomen. Ir Guided Nephrostomy Cath Placement Left    Result Date: 5/3/2021  Difficult but successful percutaneous left nephrostomy tube placement, as above. Right PCN will be attempted tomorrow. Xr Abdomen For Ng/og/ne Tube Placement    Result Date: 5/3/2021  Enteric tube in the stomach as above. No evidence of bowel obstruction. Xr Abdomen For Ng/og/ne Tube Placement    Result Date: 5/2/2021  Satisfactory position enteric tube     Xr Abdomen For Ng/og/ne Tube Placement    Result Date: 5/2/2021  Enteric tube tip suspected to be at the level the GE junction. Consider advancement 5-10 cm. Additionally there is a telemetry leads within the midline, consider moving prior to repeat x-ray. Addition, please consider centering the x-ray over the GE junction epigastric region.      Xr Abdomen For Ng/og/ne Tube Placement    Result Date: 4/30/2021  A newly placed gastric tube loops in the distal thoracic esophagus, where both the tip and sideport are located. Recommend repositioning before use. Us Retroperitoneal Limited    Result Date: 5/1/2021  1. Recently demonstrated suspicious lesion arising from the superior pole the right kidney is not well delineated on this exam. 2.  Right hydronephrosis and nephrolithiasis again demonstrated. 3.  Multiple stones throughout the left intrarenal collecting system, which likely obscures the associated hydronephrosis demonstrated on recent CT exam. 4.  Cholelithiasis. Physical Examination:        General appearance: Chronically ill-appearing, drowsy  Mental Status: not oriented to person place nor time  Lungs:  clear to auscultation bilaterally, normal effort  Heart:  regular rate and rhythm, no murmur  Abdomen:  soft, nontender, abdominal binder in place, well-healing incisions nephrostomy tubes both draining well. Left-sided clear jordyn fluid right-sided slightly darker brown in color. Extremities:  no edema, redness, tenderness in the calves  Skin:  2 incisones, both held with stables, ostomy tube in place and appears to be draining. Left nephrostomy tube draining well. Assessment:        Hospital Problems           Last Modified POA    * (Principal) Ventral hernia with bowel obstruction 5/1/2021 Yes    Toxic metabolic encephalopathy 2/8/8219 No    Other hydronephrosis 5/3/2021 Yes    Acute respiratory failure with hypoxia ([address] 8/3/4963 No    Metabolic acidosis 5/5/3903 No    Normochromic normocytic anemia 5/1/2021 Yes    Iron deficiency anemia 5/2/2021 Yes    Vitamin B 12 deficiency 5/3/2021 Yes    Folate deficiency 5/3/2021 Yes    Essential hypertension (Chronic) 5/1/2021 Yes    Renal mass (Chronic) 5/1/2021 Yes    KARMA (acute kidney injury) ([address] 5/1/2021 Yes          Plan:        1. Strangulated ventral hernia status post exploratory laparotomy small bowel resection on 4/30/2021.   Appreciate general surgery recommendations. Monitor mentation. We will try to start tube feeds today to help with patient's nutrition  2. Acute kidney injury, hydronephrosis, nonoliguric, past history of renal cell carcinoma, bladder cell cancer, underwent cystoscopy on 5/3/2021, and left PCN on 5/3/2021. Status post right PCN 5/5/2021. [address] nephrology, urology, interventional radiology recommendations. 3. B12, iron, folate deficiency. Continue replacement  4. Acute delirium secondary to anesthesia , renal failure, metabolic encephalopathy. Continue to monitor  5. Patient is DNR CCA. 6. PT/OT when mentation improves  7.  Can try bedside swallow if his mentation improves    Moris Sheppard MD  5/6/2021  10:22 AM

## 2021-05-06 NOTE — PROGRESS NOTES
BMP:    Lab Results   Component Value Date     05/05/2021    K 4.2 05/05/2021     05/05/2021    CO2 16 05/05/2021     05/05/2021    LABALBU 3.8 05/02/2021    CREATININE 4.09 05/05/2021    CALCIUM 9.8 05/05/2021    GFRAA 17 05/05/2021    LABGLOM 14 05/05/2021    GLUCOSE 142 05/05/2021       Radiology Review:      ASSESSMENT:  Active Hospital Problems    Diagnosis Date Noted    Other hydronephrosis [N13.39] 05/03/2021     Priority: High    Acute respiratory failure with hypoxia (HCC) [J96.01] 05/03/2021     Priority: High    Metabolic acidosis [K99.7] 05/03/2021     Priority: High    Toxic metabolic encephalopathy [P31] 05/02/2021     Priority: High    Vitamin B 12 deficiency [E53.8] 05/03/2021     Priority: Medium    Folate deficiency [E53.8] 05/03/2021     Priority: Medium    Iron deficiency anemia [D50.9] 05/02/2021     Priority: Medium    Normochromic normocytic anemia [D64.9] 05/01/2021     Priority: Medium    Ventral hernia with bowel obstruction [K43.6] 04/30/2021    KARMA (acute kidney injury) ([address] [N17.9] 04/30/2021    Renal mass [N28.89] 06/28/2018    Essential hypertension [I10] 05/03/2016     80year old male w/ recurrent parastomal hernia presenting with incarcerated/strangulated parastomal hernia s/p ex lap, FELICE, reduction of parastomal hernia, and small bowel resection (4/30/21-Bhargav). Confused  KARMA    Plan:  1. Diet: From surgery standpoint, needs nutrition. If no procedures planned, recommend tube feeds via NGT. 2. Recommend sleep hygiene  3. Defer treatment with nephrostomy tubes to urology and need for HD to nephrology. 4. Will give suppository and laxative today through NGT  5. From surgery standpoint, we will continue to follow along.    6. Medical management per primary team    Electronically signed by Leeanne Gipson DO  on 5/6/2021 at 6:23 AM

## 2021-05-06 NOTE — FLOWSHEET NOTE
Situation:  Writer visited with Mr. Nat Pavon and Family member. Manish Falcon, was at bedside. Assessment:  Mr. Nat Pavon was lying in bed. His PCA, Claudia Lundberg, was sitting next to him. His daughter-in-law was sitting next to him on the other side of the bed. Patient looked at Fazal Marcio. His ability to communicate has been affected by his condition. DIL and PCA shared that Pt is in the process of recovering. DIL noted that this is the first day that Pt has been able to have visitors. PCA and DIL offered assurance to Pt. PCA asked Pt if he would like prayer. He nodded his head. PCA and DIL joined as writer offered prayer. DIL affirmed Pt's strengths, noting that he likes to be active and wants to return home to his cat. Intervention:  Writer provided supportive presence and explored Pt's and Family's coping and needs; affirmed Pt's and Family's strengths; offered words of support and encouragement; prayed for Pt and Family. Outcome:  DIL and Pt thanked writer. 05/06/21 1543   Encounter Summary   Services provided to: Patient and family together   Referral/Consult From: [facility] Family members; Children   Continue Visiting   (5/6/21)   Complexity of Encounter Low   Length of Encounter 15 minutes   Spiritual Assessment Completed Yes   Routine   Type Follow up   Assessment Calm; Approachable; Hopeful;Coping   Intervention Active listening;Explored feelings, thoughts, concerns;Explored coping resources;Sustaining presence/ Ministry of presence; Discussed meaning/purpose;Prayer   Outcome Coping;Expressed feelings/needs/concerns;Engaged in conversation;Expressed gratitude     Electronically signed by Florentin Simeon [address] [facility] [address], [address] [facility]  5/6/2021  3:45 PM

## 2021-05-06 NOTE — PROGRESS NOTES
Reason for follow-up :  Acute kidney injury. Subjective    Patient seen and examined at the bedside  Had right nephrostomy tube placed yesterday under general anesthesia  Had about 610 mL output from the right nephrostomy  And 1600 mL from the left nephrostomy  Serum sodium increased to 154. Free water replacement was started  Creatinine improved to 3.71 from 4.09  BUN increased from 100-1 04  Bicarb is improving  Ental status is improving but continued to have confusion    HISTORY OF PRESENT ILLNESS:    The patient is a 80 y.o. male had multiple abdominal surgeries presented with enlarging incarcerated/strangulated parastomal hernia, who underwent exploratory laparotomy with reduction of the hernia and small bowel resection on 4/30/2021. He had history of bladder cancer s/p cystectomy and ileoconduit formation in 2006. His baseline creatinine has been between 1.5-2.1, it was 2.85 at presentation and increased this morning to 3.99. Patient had a CT scan of the abdomen upon presentation that was done without contrast, it was remarkable for extensive stone burden in both kidneys greater on the left, it showed uroepithelial wall thickening with inflammatory stranding involving the left collecting system, was also remarkable for enlargement of the right upper pole mass. Renal ultrasound was done yesterday showed right hydronephrosis and nephrolithiasis.   Patient has been nonoliguric, urine output over the last 24 hours was 2.7 L      Review Of Systems:      Unable to obtain as he is confused   Scheduled Meds:   bisacodyl  10 mg Rectal Daily    acetaminophen  1,000 mg Oral 2 times per day    melatonin  3 mg Oral Nightly    heparin (porcine)  5,000 Units Subcutaneous 3 times per day    sodium chloride flush  5-40 mL Intravenous 2 times per day    lidocaine  2 patch Topical Q12H    famotidine (PEPCID) injection  10 mg Intravenous BID     Continuous Infusions:   sodium bicarbonate infusion 100 mL/hr at cryoablation in 2019  8. [place]  9. Left staghorn calculi  10. Metabolic encephalopathy multifactorial  11. Hypernatremia    Plan:  Rechecking BMP   Continue free water replacement   Continue hypotonic sodium bicarb replacement   Renal function is improving  Azotemia is expected to improve with volume replacement  No indication for renal replacement therapy   patient does have obstructive uropathy but in addition likely has a component of ATN as well   Patient has multifactorial issues including renal cell cancer with enlarging renal mass, encephalopathy obstructive uropathy, as well as ATN   Patient's global goals of care may need to be addressed.    Follow up labs     Electronically signed by Dorene Devi MD  on 5/6/2021 at 3:44 PM

## 2021-05-07 LAB
ABSOLUTE EOS #: 0.2 K/UL (ref 0–0.44)
ABSOLUTE IMMATURE GRANULOCYTE: 0.08 K/UL (ref 0–0.3)
ABSOLUTE LYMPH #: 1.03 K/UL (ref 1.1–3.7)
ABSOLUTE MONO #: 0.58 K/UL (ref 0.1–1.2)
ANION GAP SERPL CALCULATED.3IONS-SCNC: 15 MMOL/L (ref 9–17)
BASOPHILS # BLD: 0 % (ref 0–2)
BASOPHILS ABSOLUTE: 0.03 K/UL (ref 0–0.2)
BUN BLDV-MCNC: 99 MG/DL (ref 8–23)
BUN/CREAT BLD: 29 (ref 9–20)
CALCIUM SERPL-MCNC: 8.8 MG/DL (ref 8.6–10.4)
CHLORIDE BLD-SCNC: 116 MMOL/L (ref 98–107)
CO2: 22 MMOL/L (ref 20–31)
CREAT SERPL-MCNC: 3.38 MG/DL (ref 0.7–1.2)
DIFFERENTIAL TYPE: ABNORMAL
EOSINOPHILS RELATIVE PERCENT: 2 % (ref 1–4)
GFR AFRICAN AMERICAN: 21 ML/MIN
GFR NON-AFRICAN AMERICAN: 18 ML/MIN
GFR SERPL CREATININE-BSD FRML MDRD: ABNORMAL ML/MIN/{1.73_M2}
GFR SERPL CREATININE-BSD FRML MDRD: ABNORMAL ML/MIN/{1.73_M2}
GLUCOSE BLD-MCNC: 136 MG/DL (ref 75–110)
GLUCOSE BLD-MCNC: 155 MG/DL (ref 75–110)
GLUCOSE BLD-MCNC: 161 MG/DL (ref 75–110)
GLUCOSE BLD-MCNC: 187 MG/DL (ref 75–110)
GLUCOSE BLD-MCNC: 192 MG/DL (ref 70–99)
GLUCOSE BLD-MCNC: 192 MG/DL (ref 75–110)
HCT VFR BLD CALC: 36 % (ref 40.7–50.3)
HEMOGLOBIN: 10.7 G/DL (ref 13–17)
IMMATURE GRANULOCYTES: 1 %
LYMPHOCYTES # BLD: 11 % (ref 24–43)
MCH RBC QN AUTO: 27.3 PG (ref 25.2–33.5)
MCHC RBC AUTO-ENTMCNC: 29.7 G/DL (ref 28.4–34.8)
MCV RBC AUTO: 91.8 FL (ref 82.6–102.9)
MONOCYTES # BLD: 6 % (ref 3–12)
NRBC AUTOMATED: 0 PER 100 WBC
PDW BLD-RTO: 16.1 % (ref 11.8–14.4)
PLATELET # BLD: 217 K/UL (ref 138–453)
PLATELET ESTIMATE: ABNORMAL
PMV BLD AUTO: 9.3 FL (ref 8.1–13.5)
POTASSIUM SERPL-SCNC: 4 MMOL/L (ref 3.7–5.3)
RBC # BLD: 3.92 M/UL (ref 4.21–5.77)
RBC # BLD: ABNORMAL 10*6/UL
SEG NEUTROPHILS: 80 % (ref 36–65)
SEGMENTED NEUTROPHILS ABSOLUTE COUNT: 7.37 K/UL (ref 1.5–8.1)
SODIUM BLD-SCNC: 153 MMOL/L (ref 135–144)
WBC # BLD: 9.3 K/UL (ref 3.5–11.3)
WBC # BLD: ABNORMAL 10*3/UL

## 2021-05-07 PROCEDURE — 6370000000 HC RX 637 (ALT 250 FOR IP): Performed by: STUDENT IN AN ORGANIZED HEALTH CARE EDUCATION/TRAINING PROGRAM

## 2021-05-07 PROCEDURE — 2580000003 HC RX 258: Performed by: INTERNAL MEDICINE

## 2021-05-07 PROCEDURE — 2500000003 HC RX 250 WO HCPCS: Performed by: SPECIALIST

## 2021-05-07 PROCEDURE — 6370000000 HC RX 637 (ALT 250 FOR IP): Performed by: SPECIALIST

## 2021-05-07 PROCEDURE — 2060000000 HC ICU INTERMEDIATE R&B

## 2021-05-07 PROCEDURE — 99232 SBSQ HOSP IP/OBS MODERATE 35: CPT | Performed by: STUDENT IN AN ORGANIZED HEALTH CARE EDUCATION/TRAINING PROGRAM

## 2021-05-07 PROCEDURE — 2580000003 HC RX 258: Performed by: SPECIALIST

## 2021-05-07 PROCEDURE — 2500000003 HC RX 250 WO HCPCS: Performed by: INTERNAL MEDICINE

## 2021-05-07 PROCEDURE — 80048 BASIC METABOLIC PNL TOTAL CA: CPT

## 2021-05-07 PROCEDURE — 85025 COMPLETE CBC W/AUTO DIFF WBC: CPT

## 2021-05-07 PROCEDURE — 82947 ASSAY GLUCOSE BLOOD QUANT: CPT

## 2021-05-07 PROCEDURE — 36415 COLL VENOUS BLD VENIPUNCTURE: CPT

## 2021-05-07 PROCEDURE — 6360000002 HC RX W HCPCS: Performed by: SPECIALIST

## 2021-05-07 RX ORDER — POLYETHYLENE GLYCOL 3350 17 G/17G
17 POWDER, FOR SOLUTION ORAL DAILY
Status: DISCONTINUED | OUTPATIENT
Start: 2021-05-07 | End: 2021-05-14 | Stop reason: HOSPADM

## 2021-05-07 RX ORDER — DEXTROSE MONOHYDRATE 50 MG/ML
INJECTION, SOLUTION INTRAVENOUS CONTINUOUS
Status: DISCONTINUED | OUTPATIENT
Start: 2021-05-07 | End: 2021-05-08

## 2021-05-07 RX ADMIN — FAMOTIDINE 10 MG: 10 INJECTION, SOLUTION INTRAVENOUS at 21:25

## 2021-05-07 RX ADMIN — FAMOTIDINE 10 MG: 10 INJECTION, SOLUTION INTRAVENOUS at 10:14

## 2021-05-07 RX ADMIN — HEPARIN SODIUM 5000 UNITS: 5000 INJECTION INTRAVENOUS; SUBCUTANEOUS at 06:13

## 2021-05-07 RX ADMIN — ACETAMINOPHEN ORAL SOLUTION 1000 MG: 325 SOLUTION ORAL at 10:13

## 2021-05-07 RX ADMIN — ACETAMINOPHEN ORAL SOLUTION 1000 MG: 325 SOLUTION ORAL at 21:24

## 2021-05-07 RX ADMIN — POLYETHYLENE GLYCOL (3350) 17 G: 17 POWDER, FOR SOLUTION ORAL at 10:14

## 2021-05-07 RX ADMIN — Medication 3 MG: at 21:25

## 2021-05-07 RX ADMIN — SODIUM BICARBONATE: 84 INJECTION INTRAVENOUS at 02:29

## 2021-05-07 RX ADMIN — SODIUM CHLORIDE, PRESERVATIVE FREE 10 ML: 5 INJECTION INTRAVENOUS at 21:25

## 2021-05-07 RX ADMIN — DEXTROSE MONOHYDRATE: 50 INJECTION, SOLUTION INTRAVENOUS at 21:24

## 2021-05-07 RX ADMIN — HEPARIN SODIUM 5000 UNITS: 5000 INJECTION INTRAVENOUS; SUBCUTANEOUS at 21:25

## 2021-05-07 RX ADMIN — HEPARIN SODIUM 5000 UNITS: 5000 INJECTION INTRAVENOUS; SUBCUTANEOUS at 14:53

## 2021-05-07 RX ADMIN — SODIUM BICARBONATE: 84 INJECTION INTRAVENOUS at 14:53

## 2021-05-07 RX ADMIN — BISACODYL 10 MG: 10 SUPPOSITORY RECTAL at 10:14

## 2021-05-07 RX ADMIN — SODIUM CHLORIDE, PRESERVATIVE FREE 10 ML: 5 INJECTION INTRAVENOUS at 10:14

## 2021-05-07 ASSESSMENT — ENCOUNTER SYMPTOMS
COUGH: 0
DIARRHEA: 0
EYE REDNESS: 0
VOICE CHANGE: 0
BACK PAIN: 0
CONSTIPATION: 1
APNEA: 0
EYE DISCHARGE: 0
SORE THROAT: 0
NAUSEA: 0
ABDOMINAL DISTENTION: 0

## 2021-05-07 ASSESSMENT — PAIN SCALES - GENERAL
PAINLEVEL_OUTOF10: 0
PAINLEVEL_OUTOF10: 1

## 2021-05-07 NOTE — PLAN OF CARE
Problem: Falls - Risk of:  Goal: Will remain free from falls  Description: Will remain free from falls  5/7/2021 0215 by Jasmin Cortés RN  Outcome: Ongoing  Note: Fall risk assessment completed. Patient instructed to use call light. Bed locked and in lowest position, side rails up 2/4, call light and bedside table within reach, clutter removed, and non-skid footwear on when pt out of bed. Hourly rounds will continue. Bed alarm on at this time     Problem: Skin Integrity:  Goal: Will show no infection signs and symptoms  Description: Will show no infection signs and symptoms  5/7/2021 0213 by Jasmin Cortés RN  Outcome: Ongoing     Problem: Skin Integrity:  Goal: Absence of new skin breakdown  Description: Absence of new skin breakdown  5/7/2021 0213 by Jasmin Cortés RN  Outcome: Ongoing  Note: Turn and reposition in bed, pressure reducing mattress, monitoring skin with every assessment and prn.        Problem: Infection - Surgical Site:  Goal: Will show no infection signs and symptoms  Description: Will show no infection signs and symptoms  5/7/2021 0213 by Jasmin Cortés RN  Outcome: Ongoing     Problem: Non-Violent Restraints  Goal: Removal from restraints as soon as assessed to be safe  5/7/2021 0213 by Jasmin Cortés RN  Outcome: Ongoing     Problem: Non-Violent Restraints  Goal: No harm/injury to patient while restraints in use  5/7/2021 0213 by Jasmin Cortés RN  Outcome: Ongoing     Problem: Non-Violent Restraints  Goal: Patient's dignity will be maintained  5/7/2021 0213 by aJsmin Cortés RN  Outcome: Ongoing

## 2021-05-07 NOTE — PROGRESS NOTES
05/07/2021    BUN 99 05/07/2021    LABALBU 3.8 05/02/2021    CREATININE 3.38 05/07/2021    CALCIUM 8.8 05/07/2021    GFRAA 21 05/07/2021    LABGLOM 18 05/07/2021    GLUCOSE 192 05/07/2021       Radiology Review:      ASSESSMENT:  Active Hospital Problems    Diagnosis Date Noted    Other hydronephrosis [N13.39] 05/03/2021     Priority: High    Acute respiratory failure with hypoxia (HCC) [J96.01] 05/03/2021     Priority: High    Metabolic acidosis [R05.4] 05/03/2021     Priority: High    Toxic metabolic encephalopathy [T85] 05/02/2021     Priority: High    Vitamin B 12 deficiency [E53.8] 05/03/2021     Priority: Medium    Folate deficiency [E53.8] 05/03/2021     Priority: Medium    Iron deficiency anemia [D50.9] 05/02/2021     Priority: Medium    Normochromic normocytic anemia [D64.9] 05/01/2021     Priority: Medium    Ventral hernia with bowel obstruction [K43.6] 04/30/2021    KARMA (acute kidney injury) ([facility] [address] [N17.9] 04/30/2021    Renal mass [N28.89] 06/28/2018    Essential hypertension [I10] 05/03/2016     80year old male w/ recurrent parastomal hernia presenting with incarcerated/strangulated parastomal hernia s/p ex lap, FELICE, reduction of parastomal hernia, and small bowel resection (4/30/21-Bhargav). Confused  KARMA    Plan:  1. Diet: Continue NGT and tube feeds  2. Recommend sleep hygiene  3. Defer treatment with nephrostomy tubes to urology and need for HD to nephrology. 4. Continue suppository daily and will add miralax through NGT  5. From surgery standpoint, we will continue to follow along.    6. Medical management per primary team    Electronically signed by Delicia Medrano DO  on 5/7/2021 at 6:47 AM

## 2021-05-07 NOTE — PROGRESS NOTES
HFA, sodium chloride flush, sodium chloride, ondansetron    Data:     Past Medical History:   has a past medical history of Arthritis, Caffeine use, Cancer ([facility] [address] GERD (gastroesophageal reflux disease), Hernia, inguinal, right, Hypertension, Kidney stone, Presence of urostomy ([facility] [address] and Retinal detachment. Social History:   reports that he quit smoking about 41 years ago. His smoking use included cigarettes. He has a 10.00 pack-year smoking history. He has never used smokeless tobacco. He reports that he does not drink alcohol or use drugs. Family History:   Family History   Problem Relation Age of Onset    Diabetes Mother     Diabetes Sister        Vitals:  /67   Pulse 63   Temp 97.6 °F (36.4 °C) (Oral)   Resp 18   Ht 6' 1\" (1.854 m)   Wt 248 lb 1 oz (112.5 kg)   SpO2 93%   BMI 32.73 kg/m²   Temp (24hrs), Av.8 °F (36.6 °C), Min:97.1 °F (36.2 °C), Max:98.3 °F (36.8 °C)    Recent Labs     21  0702 21  1107 21  0007 21  0742   POCGLU 150* 128* 155* 161*       I/O (24Hr):     Intake/Output Summary (Last 24 hours) at 2021 1115  Last data filed at 2021 0914  Gross per 24 hour   Intake 4354 ml   Output 2490 ml   Net 1864 ml       Labs:  Hematology:  Recent Labs     21  0530 21  0634 21  0529   WBC 8.2 9.6 9.3   RBC 4.16* 4.19* 3.92*   HGB 11.2* 11.4* 10.7*   HCT 37.7* 38.0* 36.0*   MCV 90.6 90.7 91.8   MCH 26.9 27.2 27.3   MCHC 29.7 30.0 29.7   RDW 16.2* 16.3* 16.1*    263 217   MPV 9.2 9.4 9.3     Chemistry:  Recent Labs     21  0634 21  1545 21  0529   * 152* 153*   K 4.2 4.3 4.0   * 116* 116*   CO2 18* 20 22   GLUCOSE 165* 168* 192*   * 101* 99*   CREATININE 3.71* 3.39* 3.38*   ANIONGAP 17 16 15   LABGLOM 16* 18* 18*   GFRAA 19* 21* 21*   CALCIUM 9.4 9.5 8.8     Recent Labs     21  0041 21  0702 21  1107 21  0007 21  0742   POCGLU 117* 117* 150* 128* 155* 161*     ABG:  Lab Results   Component Value Date    POCPH 7.19 05/02/2021    POCPCO2 44 05/02/2021    POCPO2 74 05/02/2021    POCHCO3 16.9 05/02/2021    NBEA 11 05/02/2021    PBEA NOT REPORTED 05/02/2021    ACH3NCD 18 05/02/2021    IKED9LWT 90 05/02/2021    FIO2 32.0 05/02/2021     Lab Results   Component Value Date/Time    [facility] 05/02/2021 12:58 PM     Lab Results   Component Value Date/Time    CULTURE NO GROWTH 5 DAYS 05/02/2021 12:58 PM       Radiology:  Ct Abdomen Pelvis Wo Contrast Additional Contrast? None    Result Date: 4/30/2021  1. Partial small bowel obstruction with a transition point located at the mouth of the ventral hernia. 2. Extensive stone burden identified in the bilateral kidneys, left greater than right. 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 3. Enlargement of the right upper pole mass, likely representing renal cell carcinoma. 4. Enlarged left para-aortic lymph node measuring 27 x 20 mm. Finding is nonspecific and may represent metastatic disease or reactive adenopathy. 5. Severe diverticulosis. 6. Cholelithiasis. Xr Chest (single View Frontal)    Result Date: 5/2/2021  Shallow inflation. Right basilar opacity may represent developing consolidation in the appropriate clinical setting. The enteric tube courses off the field of view in the upper abdomen. Ir Guided Nephrostomy Cath Placement Left    Result Date: 5/3/2021  Difficult but successful percutaneous left nephrostomy tube placement, as above. Right PCN will be attempted tomorrow. Xr Abdomen For Ng/og/ne Tube Placement    Result Date: 5/3/2021  Enteric tube in the stomach as above. No evidence of bowel obstruction.      Xr Abdomen For Ng/og/ne Tube Placement    Result Date: 5/2/2021  Satisfactory position enteric tube     Xr Abdomen For Ng/og/ne Tube Placement    Result Date: 5/2/2021  Enteric tube tip suspected to be at the level the GE junction. Consider advancement 5-10 cm. Additionally there is a telemetry leads within the midline, consider moving prior to repeat x-ray. Addition, please consider centering the x-ray over the GE junction epigastric region. Xr Abdomen For Ng/og/ne Tube Placement    Result Date: 4/30/2021  A newly placed gastric tube loops in the distal thoracic esophagus, where both the tip and sideport are located. Recommend repositioning before use. Us Retroperitoneal Limited    Result Date: 5/1/2021  1. Recently demonstrated suspicious lesion arising from the superior pole the right kidney is not well delineated on this exam. 2.  Right hydronephrosis and nephrolithiasis again demonstrated. 3.  Multiple stones throughout the left intrarenal collecting system, which likely obscures the associated hydronephrosis demonstrated on recent CT exam. 4.  Cholelithiasis. Physical Examination:        General appearance: Chronically ill-appearing, alert, mentation better, NG tube in place  Mental Status:alert to person and place, not time  Lungs:  clear to auscultation bilaterally, normal effort  Heart:  regular rate and rhythm, no murmur  Abdomen:  soft, nontender, abdominal binder in place, well-healing incisions nephrostomy tubes both draining well. Left-sided clear jordyn fluid right-sided slightly darker brown in color. Extremities:  no edema, redness, tenderness in the calves  Skin:  2 incisones, both held with stables, ostomy tube in place and appears to be draining. Left nephrostomy tube draining well.      Assessment:        Hospital Problems           Last Modified POA    * (Principal) Ventral hernia with bowel obstruction 5/1/2021 Yes    Toxic metabolic encephalopathy 3/4/3057 No    Other hydronephrosis 5/3/2021 Yes    Acute respiratory failure with hypoxia (HCC) 1/6/7104 No    Metabolic acidosis 6/4/0742 No    Normochromic normocytic anemia 5/1/2021 Yes    Iron deficiency anemia 5/2/2021 Yes    Vitamin B 12 deficiency 5/3/2021 Yes    Folate deficiency 5/3/2021 Yes    Essential hypertension (Chronic) 5/1/2021 Yes    Renal mass (Chronic) 5/1/2021 Yes    KARMA (acute kidney injury) ([facility] [address] 5/1/2021 Yes          Plan:        1. Strangulated ventral hernia status post exploratory laparotomy small bowel resection on 4/30/2021. Appreciate general surgery recommendations. Monitor mentation. Continue tube feeds  2. Acute kidney injury, hydronephrosis, nonoliguric, past history of renal cell carcinoma, bladder cell cancer, underwent cystoscopy on 5/3/2021, and left PCN on 5/3/2021. Status post right PCN 5/5/2021. [address] nephrology, urology, interventional radiology recommendations. Free water flushes, bicarb drip. 3. B12, iron, folate deficiency. Continue replacement  4. Acute delirium secondary to anesthesia , renal failure, metabolic encephalopathy. Continue to monitor  5. Patient is DNR CCA. 6. PT/OT  7.  Can try bedside swallow if his mentation improves    Efrem Penn MD  5/7/2021  11:15 AM

## 2021-05-07 NOTE — PLAN OF CARE
Problem: Skin Integrity:  Goal: Will show no infection signs and symptoms  Description: Will show no infection signs and symptoms  5/7/2021 1118 by Charlene Da Silva RN  Outcome: Ongoing  Turn and reposition in bed, pressure reducing mattress, monitoring skin with every assessment and prn.

## 2021-05-07 NOTE — PROGRESS NOTES
Reason for follow-up :  Acute kidney injury. Subjective  Patient seen and examined at the bedside  Had right nephrostomy tube placed 5/5 under general anesthesia  Serum sodium ranging 151-154. Free water replacement was started  Creatinine improved to 3.38 from 3.71  BUN decreased from 104 to 99  Bicarb is improving  Mental status is improving but continued to have confusion    HISTORY OF PRESENT ILLNESS:    The patient is a 80 y.o. male had multiple abdominal surgeries presented with enlarging incarcerated/strangulated parastomal hernia, who underwent exploratory laparotomy with reduction of the hernia and small bowel resection on 4/30/2021. He had history of bladder cancer s/p cystectomy and ileoconduit formation in 2006. His baseline creatinine has been between 1.5-2.1, it was 2.85 at presentation and increased this morning to 3.99. Patient had a CT scan of the abdomen upon presentation that was done without contrast, it was remarkable for extensive stone burden in both kidneys greater on the left, it showed uroepithelial wall thickening with inflammatory stranding involving the left collecting system, was also remarkable for enlargement of the right upper pole mass. Renal ultrasound was done yesterday showed right hydronephrosis and nephrolithiasis.   Patient has been nonoliguric, urine output over the last 24 hours was 2.7 L      Review Of Systems:   Unable to obtain as he is confused     Scheduled Meds:   polyethylene glycol  17 g Oral Daily    bisacodyl  10 mg Rectal Daily    acetaminophen  1,000 mg Oral 2 times per day    melatonin  3 mg Oral Nightly    heparin (porcine)  5,000 Units Subcutaneous 3 times per day    sodium chloride flush  5-40 mL Intravenous 2 times per day    lidocaine  2 patch Topical Q12H    famotidine (PEPCID) injection  10 mg Intravenous BID     Continuous Infusions:   sodium bicarbonate infusion 100 mL/hr at 05/07/21 1453    sodium chloride       PRN Meds:acetaminophen, [Held by provider] oxyCODONE, albuterol sulfate HFA, sodium chloride flush, sodium chloride, ondansetron     Physical Exam:  Vitals:    05/07/21 0635 05/07/21 0715 05/07/21 1142 05/07/21 1549   BP:  130/67 115/70 122/73   Pulse:  63 68 67   Resp:  18 18 18   Temp:  97.6 °F (36.4 °C) 97.6 °F (36.4 °C) 98.2 °F (36.8 °C)   TempSrc:  Oral Oral Oral   SpO2:  93% 95% 96%   Weight: 248 lb 1 oz (112.5 kg)      Height:         I/O last 3 completed shifts: In: 0950 [I.V.:2985; NG/GT:1709]  Out: 2560 [Urine:2550; Drains:10]    General:  Awake, confused not in distress. Restless . HEENT: Atraumatic, normocephalic. Anicteric sclera. Pink and moist oral mucosa. Neck supple. No JVD. Chest: Bilateral air entry, clear to auscultation, no wheezing, rhonchi or rales. Cardiovascular: RRR, S1S2, no murmur, rub or gallop. No lower extremity edema. Abdomen: Soft, non tender to palpation. Musculoskeletal: No cyanosis or clubbing. Integumentary: Pink, warm and dry. Free from rash or lesions. CNS: Confused face symmetrical. No tremor.      Data:  CBC:   Lab Results   Component Value Date    WBC 9.3 05/07/2021    HGB 10.7 (L) 05/07/2021    HCT 36.0 (L) 05/07/2021    MCV 91.8 05/07/2021     05/07/2021     BMP:    Lab Results   Component Value Date     (H) 05/07/2021     (H) 05/06/2021     (H) 05/06/2021    K 4.0 05/07/2021    K 4.3 05/06/2021    K 4.2 05/06/2021     (H) 05/07/2021     (H) 05/06/2021     (H) 05/06/2021    CO2 22 05/07/2021    CO2 20 05/06/2021    CO2 18 (L) 05/06/2021    BUN 99 (HH) 05/07/2021     (HH) 05/06/2021     (HH) 05/06/2021    CREATININE 3.38 (H) 05/07/2021    CREATININE 3.39 (H) 05/06/2021    CREATININE 3.71 (H) 05/06/2021    GLUCOSE 192 (H) 05/07/2021    GLUCOSE 168 (H) 05/06/2021    GLUCOSE 165 (H) 05/06/2021     CMP:   Lab Results   Component Value Date     05/07/2021    K 4.0 05/07/2021     05/07/2021    CO2 22 05/07/2021    BUN 99 05/07/2021    CREATININE 3.38 05/07/2021    GLUCOSE 192 05/07/2021    CALCIUM 8.8 05/07/2021    PROT 7.3 05/02/2021    LABALBU 3.8 05/02/2021    BILITOT 0.50 05/02/2021    ALKPHOS 55 05/02/2021    AST 19 05/02/2021    ALT 11 05/02/2021      Hepatic:   Lab Results   Component Value Date    AST 19 05/02/2021    AST 16 04/30/2021    AST 24 07/15/2020    ALT 11 05/02/2021    ALT 11 04/30/2021    ALT 17 07/15/2020    BILITOT 0.50 05/02/2021    BILITOT 0.35 04/30/2021    BILITOT 0.47 07/15/2020    ALKPHOS 55 05/02/2021    ALKPHOS 77 04/30/2021    ALKPHOS 64 07/15/2020     BNP: No results found for: BNP  Lipids:   Lab Results   Component Value Date    CHOL 139 05/09/2016    HDL 26 (A) 05/09/2016     INR:   Lab Results   Component Value Date    INR 1.2 05/03/2021    INR 1.0 05/17/2016     PTH: No results found for: PTH  Phosphorus:  No results found for: PHOS  Ionized Calcium: No results found for: IONCA  Magnesium:   Lab Results   Component Value Date    MG 1.8 05/01/2021     Albumin:   Lab Results   Component Value Date    LABALBU 3.8 05/02/2021     Last 3 CK, CKMB, Troponin: @LABRCNT(CKTOTAL:3,CKMB:3,TROPONINI:3)       URINE:)No results found for: Jas Milligan     Radiology:   Reviewed.     Assessment:  Acute kidney injury secondary to obstructive uropathy and ATN, Cr slowly improving  Hypernatremia  Metabolic acidosis  S/p hernia repair  Anemia  Bladder cancer and renal cell carcinoma, s/p cystectomy and creation of ileal conduit  Enlarging right renal mass s/p cryoablation in 2019  R hydronephrosis s/p PCN placement  Left staghorn calculi  Metabolic encephalopathy multifactorial      Plan:  Continue free water replacement   Will change IVF to D5W at 100 ml/hr  Azotemia is expected to improve with volume replacement  No indication for renal replacement therapy   Patient has multifactorial issues including renal cell cancer with enlarging renal mass, encephalopathy obstructive uropathy, as well as ATN   Patient's global goals of care may need to be addressed.    Follow up labs     Electronically signed by Ingris Campos MD  on 5/7/2021 at 7:16 PM

## 2021-05-08 LAB
ABSOLUTE EOS #: 0.24 K/UL (ref 0–0.44)
ABSOLUTE IMMATURE GRANULOCYTE: 0.11 K/UL (ref 0–0.3)
ABSOLUTE LYMPH #: 0.92 K/UL (ref 1.1–3.7)
ABSOLUTE MONO #: 0.56 K/UL (ref 0.1–1.2)
ANION GAP SERPL CALCULATED.3IONS-SCNC: 12 MMOL/L (ref 9–17)
ANION GAP SERPL CALCULATED.3IONS-SCNC: 12 MMOL/L (ref 9–17)
ANION GAP SERPL CALCULATED.3IONS-SCNC: 13 MMOL/L (ref 9–17)
BASOPHILS # BLD: 0 % (ref 0–2)
BASOPHILS ABSOLUTE: 0.04 K/UL (ref 0–0.2)
BUN BLDV-MCNC: 85 MG/DL (ref 8–23)
BUN BLDV-MCNC: 85 MG/DL (ref 8–23)
BUN BLDV-MCNC: 92 MG/DL (ref 8–23)
BUN/CREAT BLD: 30 (ref 9–20)
BUN/CREAT BLD: 30 (ref 9–20)
BUN/CREAT BLD: 31 (ref 9–20)
CALCIUM SERPL-MCNC: 9 MG/DL (ref 8.6–10.4)
CHLORIDE BLD-SCNC: 110 MMOL/L (ref 98–107)
CHLORIDE BLD-SCNC: 111 MMOL/L (ref 98–107)
CHLORIDE BLD-SCNC: 113 MMOL/L (ref 98–107)
CO2: 25 MMOL/L (ref 20–31)
CO2: 25 MMOL/L (ref 20–31)
CO2: 26 MMOL/L (ref 20–31)
CREAT SERPL-MCNC: 2.73 MG/DL (ref 0.7–1.2)
CREAT SERPL-MCNC: 2.82 MG/DL (ref 0.7–1.2)
CREAT SERPL-MCNC: 3.09 MG/DL (ref 0.7–1.2)
CULTURE: NORMAL
CULTURE: NORMAL
DIFFERENTIAL TYPE: ABNORMAL
EOSINOPHILS RELATIVE PERCENT: 3 % (ref 1–4)
GFR AFRICAN AMERICAN: 24 ML/MIN
GFR AFRICAN AMERICAN: 26 ML/MIN
GFR AFRICAN AMERICAN: 27 ML/MIN
GFR NON-AFRICAN AMERICAN: 20 ML/MIN
GFR NON-AFRICAN AMERICAN: 22 ML/MIN
GFR NON-AFRICAN AMERICAN: 23 ML/MIN
GFR SERPL CREATININE-BSD FRML MDRD: ABNORMAL ML/MIN/{1.73_M2}
GLUCOSE BLD-MCNC: 155 MG/DL (ref 75–110)
GLUCOSE BLD-MCNC: 163 MG/DL (ref 75–110)
GLUCOSE BLD-MCNC: 174 MG/DL (ref 75–110)
GLUCOSE BLD-MCNC: 177 MG/DL (ref 75–110)
GLUCOSE BLD-MCNC: 181 MG/DL (ref 75–110)
GLUCOSE BLD-MCNC: 194 MG/DL (ref 70–99)
GLUCOSE BLD-MCNC: 198 MG/DL (ref 70–99)
GLUCOSE BLD-MCNC: 226 MG/DL (ref 70–99)
HCT VFR BLD CALC: 35.1 % (ref 40.7–50.3)
HEMOGLOBIN: 10.4 G/DL (ref 13–17)
IMMATURE GRANULOCYTES: 1 %
LYMPHOCYTES # BLD: 10 % (ref 24–43)
Lab: NORMAL
Lab: NORMAL
MAGNESIUM: 2.6 MG/DL (ref 1.6–2.6)
MCH RBC QN AUTO: 27.1 PG (ref 25.2–33.5)
MCHC RBC AUTO-ENTMCNC: 29.6 G/DL (ref 28.4–34.8)
MCV RBC AUTO: 91.4 FL (ref 82.6–102.9)
MONOCYTES # BLD: 6 % (ref 3–12)
NRBC AUTOMATED: 0 PER 100 WBC
PDW BLD-RTO: 15.9 % (ref 11.8–14.4)
PHOSPHORUS: 3.6 MG/DL (ref 2.5–4.5)
PLATELET # BLD: 208 K/UL (ref 138–453)
PLATELET ESTIMATE: ABNORMAL
PMV BLD AUTO: 9.5 FL (ref 8.1–13.5)
POTASSIUM SERPL-SCNC: 3.9 MMOL/L (ref 3.7–5.3)
POTASSIUM SERPL-SCNC: 4 MMOL/L (ref 3.7–5.3)
POTASSIUM SERPL-SCNC: 4.1 MMOL/L (ref 3.7–5.3)
RBC # BLD: 3.84 M/UL (ref 4.21–5.77)
RBC # BLD: ABNORMAL 10*6/UL
SEG NEUTROPHILS: 80 % (ref 36–65)
SEGMENTED NEUTROPHILS ABSOLUTE COUNT: 7.03 K/UL (ref 1.5–8.1)
SODIUM BLD-SCNC: 147 MMOL/L (ref 135–144)
SODIUM BLD-SCNC: 148 MMOL/L (ref 135–144)
SODIUM BLD-SCNC: 152 MMOL/L (ref 135–144)
SPECIMEN DESCRIPTION: NORMAL
SPECIMEN DESCRIPTION: NORMAL
WBC # BLD: 8.9 K/UL (ref 3.5–11.3)
WBC # BLD: ABNORMAL 10*3/UL

## 2021-05-08 PROCEDURE — 2580000003 HC RX 258: Performed by: SPECIALIST

## 2021-05-08 PROCEDURE — 2500000003 HC RX 250 WO HCPCS: Performed by: SPECIALIST

## 2021-05-08 PROCEDURE — 6370000000 HC RX 637 (ALT 250 FOR IP): Performed by: SPECIALIST

## 2021-05-08 PROCEDURE — 97167 OT EVAL HIGH COMPLEX 60 MIN: CPT

## 2021-05-08 PROCEDURE — 83735 ASSAY OF MAGNESIUM: CPT

## 2021-05-08 PROCEDURE — 85025 COMPLETE CBC W/AUTO DIFF WBC: CPT

## 2021-05-08 PROCEDURE — 6370000000 HC RX 637 (ALT 250 FOR IP): Performed by: STUDENT IN AN ORGANIZED HEALTH CARE EDUCATION/TRAINING PROGRAM

## 2021-05-08 PROCEDURE — 97535 SELF CARE MNGMENT TRAINING: CPT

## 2021-05-08 PROCEDURE — 82947 ASSAY GLUCOSE BLOOD QUANT: CPT

## 2021-05-08 PROCEDURE — 80048 BASIC METABOLIC PNL TOTAL CA: CPT

## 2021-05-08 PROCEDURE — 97112 NEUROMUSCULAR REEDUCATION: CPT

## 2021-05-08 PROCEDURE — 36415 COLL VENOUS BLD VENIPUNCTURE: CPT

## 2021-05-08 PROCEDURE — 6360000002 HC RX W HCPCS: Performed by: SPECIALIST

## 2021-05-08 PROCEDURE — 99232 SBSQ HOSP IP/OBS MODERATE 35: CPT | Performed by: STUDENT IN AN ORGANIZED HEALTH CARE EDUCATION/TRAINING PROGRAM

## 2021-05-08 PROCEDURE — 84100 ASSAY OF PHOSPHORUS: CPT

## 2021-05-08 PROCEDURE — 2060000000 HC ICU INTERMEDIATE R&B

## 2021-05-08 PROCEDURE — 97163 PT EVAL HIGH COMPLEX 45 MIN: CPT

## 2021-05-08 RX ORDER — NICOTINE POLACRILEX 4 MG
15 LOZENGE BUCCAL PRN
Status: DISCONTINUED | OUTPATIENT
Start: 2021-05-08 | End: 2021-05-14 | Stop reason: HOSPADM

## 2021-05-08 RX ORDER — DEXTROSE MONOHYDRATE 50 MG/ML
100 INJECTION, SOLUTION INTRAVENOUS PRN
Status: DISCONTINUED | OUTPATIENT
Start: 2021-05-08 | End: 2021-05-14 | Stop reason: HOSPADM

## 2021-05-08 RX ORDER — DEXTROSE MONOHYDRATE 25 G/50ML
12.5 INJECTION, SOLUTION INTRAVENOUS PRN
Status: DISCONTINUED | OUTPATIENT
Start: 2021-05-08 | End: 2021-05-14 | Stop reason: HOSPADM

## 2021-05-08 RX ADMIN — SODIUM CHLORIDE, PRESERVATIVE FREE 10 ML: 5 INJECTION INTRAVENOUS at 20:32

## 2021-05-08 RX ADMIN — ACETAMINOPHEN ORAL SOLUTION 1000 MG: 325 SOLUTION ORAL at 20:24

## 2021-05-08 RX ADMIN — INSULIN LISPRO 2 UNITS: 100 INJECTION, SOLUTION INTRAVENOUS; SUBCUTANEOUS at 08:44

## 2021-05-08 RX ADMIN — INSULIN LISPRO 2 UNITS: 100 INJECTION, SOLUTION INTRAVENOUS; SUBCUTANEOUS at 12:19

## 2021-05-08 RX ADMIN — INSULIN LISPRO 2 UNITS: 100 INJECTION, SOLUTION INTRAVENOUS; SUBCUTANEOUS at 23:26

## 2021-05-08 RX ADMIN — Medication 3 MG: at 20:26

## 2021-05-08 RX ADMIN — FAMOTIDINE 10 MG: 10 INJECTION, SOLUTION INTRAVENOUS at 20:25

## 2021-05-08 RX ADMIN — INSULIN LISPRO 2 UNITS: 100 INJECTION, SOLUTION INTRAVENOUS; SUBCUTANEOUS at 20:25

## 2021-05-08 RX ADMIN — HEPARIN SODIUM 5000 UNITS: 5000 INJECTION INTRAVENOUS; SUBCUTANEOUS at 15:23

## 2021-05-08 RX ADMIN — INSULIN LISPRO 2 UNITS: 100 INJECTION, SOLUTION INTRAVENOUS; SUBCUTANEOUS at 16:37

## 2021-05-08 RX ADMIN — FAMOTIDINE 10 MG: 10 INJECTION, SOLUTION INTRAVENOUS at 08:44

## 2021-05-08 RX ADMIN — ACETAMINOPHEN ORAL SOLUTION 1000 MG: 325 SOLUTION ORAL at 08:43

## 2021-05-08 RX ADMIN — HEPARIN SODIUM 5000 UNITS: 5000 INJECTION INTRAVENOUS; SUBCUTANEOUS at 20:25

## 2021-05-08 RX ADMIN — HEPARIN SODIUM 5000 UNITS: 5000 INJECTION INTRAVENOUS; SUBCUTANEOUS at 06:13

## 2021-05-08 RX ADMIN — POLYETHYLENE GLYCOL (3350) 17 G: 17 POWDER, FOR SOLUTION ORAL at 08:44

## 2021-05-08 RX ADMIN — BISACODYL 10 MG: 10 SUPPOSITORY RECTAL at 10:04

## 2021-05-08 ASSESSMENT — PAIN SCALES - GENERAL
PAINLEVEL_OUTOF10: 3
PAINLEVEL_OUTOF10: 0
PAINLEVEL_OUTOF10: 0

## 2021-05-08 NOTE — PROGRESS NOTES
05/08/2021    CO2 26 05/08/2021    BUN 92 05/08/2021    LABALBU 3.8 05/02/2021    CREATININE 3.09 05/08/2021    CALCIUM 9.0 05/08/2021    GFRAA 24 05/08/2021    LABGLOM 20 05/08/2021    GLUCOSE 226 05/08/2021       Radiology Review:      ASSESSMENT:  Active Hospital Problems    Diagnosis Date Noted    Other hydronephrosis [N13.39] 05/03/2021     Priority: High    Acute respiratory failure with hypoxia (HCC) [J96.01] 05/03/2021     Priority: High    Metabolic acidosis [V71.3] 05/03/2021     Priority: High    Toxic metabolic encephalopathy [U05] 05/02/2021     Priority: High    Vitamin B 12 deficiency [E53.8] 05/03/2021     Priority: Medium    Folate deficiency [E53.8] 05/03/2021     Priority: Medium    Iron deficiency anemia [D50.9] 05/02/2021     Priority: Medium    Normochromic normocytic anemia [D64.9] 05/01/2021     Priority: Medium    Ventral hernia with bowel obstruction [K43.6] 04/30/2021    KARMA (acute kidney injury) ([facility] [address] [N17.9] 04/30/2021    Renal mass [N28.89] 06/28/2018    Essential hypertension [I10] 05/03/2016     80year old male w/ recurrent parastomal hernia presenting with incarcerated/strangulated parastomal hernia s/p ex lap, FELICE, reduction of parastomal hernia, and small bowel resection (4/30/21-Bhargav). Confused  KARMA    Plan:  1. Diet: Continue NGT and tube feeds  2. Recommend sleep hygiene  3. Defer treatment with nephrostomy tubes to urology and need for HD to nephrology. 4. Continue suppository daily and will add miralax through NGT  1. Will order enema today  5. From surgery standpoint, we will continue to follow along.    6. Medical management per primary team    Electronically signed by Geoffry Bence, DO  on 5/8/2021 at 6:58 AM

## 2021-05-08 NOTE — PROGRESS NOTES
Writer updated Dr. Mitchell Valencia on bmp results. Order received to stop d5 and call with bmp results @1800.

## 2021-05-08 NOTE — PROGRESS NOTES
detachment. Social History:   reports that he quit smoking about 41 years ago. His smoking use included cigarettes. He has a 10.00 pack-year smoking history. He has never used smokeless tobacco. He reports that he does not drink alcohol or use drugs. Family History:   Family History   Problem Relation Age of Onset    Diabetes Mother     Diabetes Sister        Vitals:  /60   Pulse 59   Temp 97.5 °F (36.4 °C) (Axillary)   Resp 18   Ht 6' 1\" (1.854 m)   Wt 245 lb (111.1 kg)   SpO2 93%   BMI 32.32 kg/m²   Temp (24hrs), Av.8 °F (36.6 °C), Min:97.5 °F (36.4 °C), Max:98.4 °F (36.9 °C)    Recent Labs     21  1221 21  1738 21  0725   POCGLU 136* 187* 192* 181*       I/O (24Hr):     Intake/Output Summary (Last 24 hours) at 2021 1023  Last data filed at 2021 0630  Gross per 24 hour   Intake 400 ml   Output 1945 ml   Net -1545 ml       Labs:  Hematology:  Recent Labs     21  0634 21  0529 21  0536   WBC 9.6 9.3 8.9   RBC 4.19* 3.92* 3.84*   HGB 11.4* 10.7* 10.4*   HCT 38.0* 36.0* 35.1*   MCV 90.7 91.8 91.4   MCH 27.2 27.3 27.1   MCHC 30.0 29.7 29.6   RDW 16.3* 16.1* 15.9*    217 208   MPV 9.4 9.3 9.5     Chemistry:  Recent Labs     21  1545 21  0529 21  0536   * 153* 152*   K 4.3 4.0 4.0   * 116* 113*   CO2 20 22 26   GLUCOSE 168* 192* 226*   * 99* 92*   CREATININE 3.39* 3.38* 3.09*   MG  --   --  2.6   ANIONGAP 16 15 13   LABGLOM 18* 18* 20*   GFRAA 21* 21* 24*   CALCIUM 9.5 8.8 9.0   PHOS  --   --  3.6     Recent Labs     21  0007 21  0742 21  1221 21  1738 21  0725   POCGLU 155* 161* 136* 187* 192* 181*     ABG:  Lab Results   Component Value Date    POCPH 7.19 2021    POCPCO2 44 2021    POCPO2 74 2021    POCHCO3 16.9 2021    NBEA 11 2021    PBEA NOT REPORTED 2021    LFT3GMZ 18 2021    CHYA1CGB 90 2021 FIO2 32.0 05/02/2021     Lab Results   Component Value Date/Time    SPECIAL LT [facility] 05/02/2021 12:58 PM     Lab Results   Component Value Date/Time    CULTURE NO GROWTH 6 DAYS 05/02/2021 12:58 PM       Radiology:  Ct Abdomen Pelvis Wo Contrast Additional Contrast? None    Result Date: 4/30/2021  1. Partial small bowel obstruction with a transition point located at the mouth of the ventral hernia. 2. Extensive stone burden identified in the bilateral kidneys, left greater than right. 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 3. Enlargement of the right upper pole mass, likely representing renal cell carcinoma. 4. Enlarged left para-aortic lymph node measuring 27 x 20 mm. Finding is nonspecific and may represent metastatic disease or reactive adenopathy. 5. Severe diverticulosis. 6. Cholelithiasis. Xr Chest (single View Frontal)    Result Date: 5/2/2021  Shallow inflation. Right basilar opacity may represent developing consolidation in the appropriate clinical setting. The enteric tube courses off the field of view in the upper abdomen. Ir Guided Nephrostomy Cath Placement Left    Result Date: 5/3/2021  Difficult but successful percutaneous left nephrostomy tube placement, as above. Right PCN will be attempted tomorrow. Xr Abdomen For Ng/og/ne Tube Placement    Result Date: 5/3/2021  Enteric tube in the stomach as above. No evidence of bowel obstruction. Xr Abdomen For Ng/og/ne Tube Placement    Result Date: 5/2/2021  Satisfactory position enteric tube     Xr Abdomen For Ng/og/ne Tube Placement    Result Date: 5/2/2021  Enteric tube tip suspected to be at the level the GE junction. Consider advancement 5-10 cm. Additionally there is a telemetry leads within the midline, consider moving prior to repeat x-ray.   Addition, please consider centering the x-ray over the GE junction epigastric region. Xr Abdomen For Ng/og/ne Tube Placement    Result Date: 4/30/2021  A newly placed gastric tube loops in the distal thoracic esophagus, where both the tip and sideport are located. Recommend repositioning before use. Us Retroperitoneal Limited    Result Date: 5/1/2021  1. Recently demonstrated suspicious lesion arising from the superior pole the right kidney is not well delineated on this exam. 2.  Right hydronephrosis and nephrolithiasis again demonstrated. 3.  Multiple stones throughout the left intrarenal collecting system, which likely obscures the associated hydronephrosis demonstrated on recent CT exam. 4.  Cholelithiasis. Physical Examination:        General appearance: Chronically ill-appearing, having periods of confusion  Mental Status:alert to person not place or time  Lungs:  clear to auscultation bilaterally, normal effort  Heart:  regular rate and rhythm, no murmur  Abdomen:  soft, nontender, abdominal binder in place, well-healing incisions nephrostomy tubes both draining well. Bilateral nephrostomy tubes clear or draining compared to yesterday  Extremities:  no edema, redness, tenderness in the calves  Skin:  2 incisions, both held with stables, ostomy tube in place and appears to be draining. Left nephrostomy tube draining well.      Assessment:        Hospital Problems           Last Modified POA    * (Principal) Ventral hernia with bowel obstruction 5/1/2021 Yes    Toxic metabolic encephalopathy 4/5/6483 No    Other hydronephrosis 5/3/2021 Yes    Acute respiratory failure with hypoxia (HCC) 0/1/3165 No    Metabolic acidosis 7/5/0155 No    Normochromic normocytic anemia 5/1/2021 Yes    Iron deficiency anemia 5/2/2021 Yes    Vitamin B 12 deficiency 5/3/2021 Yes    Folate deficiency 5/3/2021 Yes    Essential hypertension (Chronic) 5/1/2021 Yes    Renal mass (Chronic) 5/1/2021 Yes    KARMA (acute kidney injury) ([address] 5/1/2021 Yes Plan:        1. Strangulated ventral hernia status post exploratory laparotomy small bowel resection on 4/30/2021. Appreciate general surgery recommendations. Monitor mentation. Continue tube feeds, continue free water flushes  2. Acute kidney injury, hydronephrosis, nonoliguric, past history of renal cell carcinoma, bladder cell cancer, underwent cystoscopy on 5/3/2021, and left PCN on 5/3/2021. Status post right PCN 5/5/2021. [address] nephrology, urology, interventional radiology recommendations. Free water flushes  3. B12, iron, folate deficiency. Continue replacement  4. Acute delirium secondary to anesthesia , renal failure, metabolic encephalopathy. Continue to monitor  5. Patient is DNR CCA. 6. PT/OT  7. Continue to monitor mentation, suspect that patient will improve once creatinine improves and anesthetics wear off.     Aria Marrufo MD  5/8/2021  10:23 AM

## 2021-05-08 NOTE — PROGRESS NOTES
Intravenous BID     Continuous Infusions:   dextrose      dextrose 100 mL/hr at 05/07/21 2124    sodium chloride       PRN Meds:glucose, dextrose, glucagon (rDNA), dextrose, acetaminophen, [Held by provider] oxyCODONE, albuterol sulfate HFA, sodium chloride flush, sodium chloride, ondansetron     Physical Exam:  Vitals:    05/07/21 2334 05/08/21 0339 05/08/21 0610 05/08/21 0812   BP: (!) 100/56 127/67  117/60   Pulse: 68 60  59   Resp: 16 16  18   Temp: 97.7 °F (36.5 °C) 98.4 °F (36.9 °C)  97.5 °F (36.4 °C)   TempSrc: Oral Oral  Axillary   SpO2: 93% 92%  93%   Weight:   245 lb (111.1 kg)    Height:         I/O last 3 completed shifts: In: 2355 [I.V.:1293; NG/GT:1062]  Out: 2941 [Urine:2435; Drains:10]    General:  Awake, confused not in distress. Restless . HEENT: Atraumatic, normocephalic. Anicteric sclera. Pink and dry. Neck supple. No JVD. Chest: Bilateral air entry, clear to auscultation, no wheezing, rhonchi or rales. Cardiovascular: RRR, S1S2, no murmur, rub or gallop. No lower extremity edema. Abdomen: Soft, non tender to palpation. Musculoskeletal: No cyanosis or clubbing. Integumentary: Pink, warm and dry. Free from rash or lesions. CNS: Confused face symmetrical. No tremor.      Data:  CBC:   Lab Results   Component Value Date    WBC 8.9 05/08/2021    HGB 10.4 (L) 05/08/2021    HCT 35.1 (L) 05/08/2021    MCV 91.4 05/08/2021     05/08/2021     BMP:    Lab Results   Component Value Date     (H) 05/08/2021     (H) 05/07/2021     (H) 05/06/2021    K 4.0 05/08/2021    K 4.0 05/07/2021    K 4.3 05/06/2021     (H) 05/08/2021     (H) 05/07/2021     (H) 05/06/2021    CO2 26 05/08/2021    CO2 22 05/07/2021    CO2 20 05/06/2021    BUN 92 (HH) 05/08/2021    BUN 99 (HH) 05/07/2021     (HH) 05/06/2021    CREATININE 3.09 (H) 05/08/2021    CREATININE 3.38 (H) 05/07/2021    CREATININE 3.39 (H) 05/06/2021    GLUCOSE 226 (H) 05/08/2021    GLUCOSE 192 (H) 05/07/2021    GLUCOSE 168 (H) 05/06/2021     CMP:   Lab Results   Component Value Date     05/08/2021    K 4.0 05/08/2021     05/08/2021    CO2 26 05/08/2021    BUN 92 05/08/2021    CREATININE 3.09 05/08/2021    GLUCOSE 226 05/08/2021    CALCIUM 9.0 05/08/2021    PROT 7.3 05/02/2021    LABALBU 3.8 05/02/2021    BILITOT 0.50 05/02/2021    ALKPHOS 55 05/02/2021    AST 19 05/02/2021    ALT 11 05/02/2021      Hepatic:   Lab Results   Component Value Date    AST 19 05/02/2021    AST 16 04/30/2021    AST 24 07/15/2020    ALT 11 05/02/2021    ALT 11 04/30/2021    ALT 17 07/15/2020    BILITOT 0.50 05/02/2021    BILITOT 0.35 04/30/2021    BILITOT 0.47 07/15/2020    ALKPHOS 55 05/02/2021    ALKPHOS 77 04/30/2021    ALKPHOS 64 07/15/2020     BNP: No results found for: BNP  Lipids:   Lab Results   Component Value Date    CHOL 139 05/09/2016    HDL 26 (A) 05/09/2016     INR:   Lab Results   Component Value Date    INR 1.2 05/03/2021    INR 1.0 05/17/2016     PTH: No results found for: PTH  Phosphorus:    Lab Results   Component Value Date    PHOS 3.6 05/08/2021     Ionized Calcium: No results found for: IONCA  Magnesium:   Lab Results   Component Value Date    MG 2.6 05/08/2021     Albumin:   Lab Results   Component Value Date    LABALBU 3.8 05/02/2021     Last 3 CK, CKMB, Troponin: @LABRCNT(CKTOTAL:3,CKMB:3,TROPONINI:3)       URINE:)No results found for: Rasheeda Shanks     Radiology:   Reviewed.     Assessment:  Acute kidney injury secondary to obstructive uropathy and ATN, Cr slowly improving  Hypernatremia  Metabolic acidosis  S/p hernia repair  Anemia  Bladder cancer and renal cell carcinoma, s/p cystectomy and creation of ileal conduit  Enlarging right renal mass s/p cryoablation in 2019  R hydronephrosis s/p PCN placement  Left staghorn calculi  Metabolic encephalopathy multifactorial      Plan:  Continue free water replacement, is currently receiving approx 300 ml 4 x per day   Will increase D5W rate to 125 ml/hr  Azotemia is expected to improve with volume replacement  No indication for renal replacement therapy   Patient has multifactorial issues including renal cell cancer with enlarging renal mass, encephalopathy obstructive uropathy, as well as ATN   Patient's global goals of care may need to be addressed. Follow up labs every 6 hours  Further recommendations on hypernatremia per Dr Israel Edward  Electronically signed by MARKIE Zimmerman CNP  on 5/8/2021 at 9:03 AM     Patient seen and examined. Agree with above note. Above note was modified. We will continue to follow up with you. Electronically signed by Remi Mathew MD on 5/8/2021 at [facility].   Ph: [phone]

## 2021-05-08 NOTE — PROGRESS NOTES
Enema given with poor results, pt pulling on lines. States he needs to go to the bank. Pt. Bathed , repositioned and restrained.

## 2021-05-08 NOTE — PROGRESS NOTES
Physical Therapy    Facility/Department: [facility] PROGRESSIVE CARE  Initial Assessment    NAME: Janeth Pedroza  : 1940  MRN: [id number]    Date of Service: 2021    Discharge Recommendations:  Patient would benefit from continued therapy after discharge   Pt currently functioning below baseline. Would suggest additional therapy at time of discharge to maximize long term outcomes and prevent re-admission. Please refer to AM-PAC score for current level of function. Arelis Lincoln is a 80 y.o. Non-/non  male  with a history of ventral hernia who presents progressively worsening RLQ pain and increase in size of hernia and is admitted to the hospital for the management of Ventral hernia with bowel obstruction noted on CT abd/pelvis. States it has gotten progressively bigger over several years but he had severe pain that started last night with some intermittent nausea but no vomiting. He denies any fever chills, chest pain, shortness of breath. He does have a known history of a right renal mass with cryoablation in , kidneys stones and bladder and prostate cancer in 2006, s/p prostatectomy and chemo. \"  Procedure : IR NEPHROSTOMY PERCUTANEOUS RIGHT  RN reports patient is medically stable for therapy treatment this date. Chart reviewed prior to treatment and patient is agreeable for therapy. All lines intact and patient positioned comfortably at end of treatment. All patient needs addressed prior to ending therapy session. Re-assess transfers/gait when appropriate. Assessment   Body structures, Functions, Activity limitations: Decreased functional mobility ; Decreased cognition;Decreased endurance;Decreased ADL status; Decreased coordination;Decreased strength;Decreased balance;Decreased safe awareness  Assessment: Pt will benefit from continued skilled PT to address balance, strength, safe mobility and activity tolerance.   Specific instructions for Next Treatment: RE-ASSESS transfers/gait  Prognosis: Good  Decision Making: High Complexity  Exam: ROM, MMT, endurance, AM-PAC  Clinical Presentation: unstable  PT Education: PT Role;Plan of Care;General Safety; Functional Mobility Training  Patient Education: Pt not receptive towards education due to cognitive status. Barriers to Learning: cognitive status  REQUIRES PT FOLLOW UP: Yes  Activity Tolerance  Activity Tolerance: Patient limited by fatigue;Patient limited by cognitive status;Treatment limited secondary to agitation       Patient Diagnosis(es): The primary encounter diagnosis was Partial small bowel obstruction ([address] Diagnoses of Ventral hernia with bowel obstruction and Chronic kidney disease, unspecified CKD stage were also pertinent to this visit. has a past medical history of Arthritis, Caffeine use, Cancer ([address] GERD (gastroesophageal reflux disease), Hernia, inguinal, right, Hypertension, Kidney stone, Presence of urostomy ([address] and Retinal detachment. has a past surgical history that includes hernia repair; Bladder removal (2006); Prostate surgery (2006); Cataract removal (Bilateral); Colonoscopy; Eye surgery (Right, 2000); vitrectomy (Left, 02/02/16); Incisional hernia repair (12/7/2015); Incisional hernia repair (11/2014); laparoscopy (N/A, 4/30/2021); Cystoscopy (N/A, 5/3/2021); IR GUIDED NEPHROSTOMY CATH PLACEMENT LEFT (5/3/2021); and IR GUIDED NEPHROSTOMY CATH PLACEMENT RIGHT (5/5/2021).     Restrictions  Restrictions/Precautions  Restrictions/Precautions: Fall Risk, Up as Tolerated, General Precautions  Position Activity Restriction  Other position/activity restrictions: B nephrostomy tubes, MARIE drain, IV, NG tube, soft restraints  Vision/Hearing  Vision: (unable to fully assess)     Subjective  General  Chart Reviewed: Yes  Patient assessed for rehabilitation services?: Yes  Response To Previous Treatment: Not applicable  Family / Caregiver Present: No  Follows Commands: Within Functional Limits  Subjective  Subjective: Pt denies pain this date  Pain Screening  Patient Currently in Pain: Denies          Orientation  Orientation  Overall Orientation Status: Impaired  Orientation Level: Oriented to person;Disoriented to place; Disoriented to time;Disoriented to situation  Social/Functional History  Social/Functional History  Lives With: Alone  Type of Home: House  Home Equipment: Rolling walker  Additional Comments: pt is very confused, unable to provide any history. Per notes, pt lives alone and has DME at home  Cognition   Cognition  Overall Cognitive Status: Exceptions  Arousal/Alertness: Delayed responses to stimuli;Inconsistent responses to stimuli  Following Commands: Inconsistently follows commands  Attention Span: Unable to maintain attention  Memory: Decreased recall of biographical Information;Decreased recall of precautions;Decreased recall of recent events;Decreased short term memory  Safety Judgement: Decreased awareness of need for assistance;Decreased awareness of need for safety  Problem Solving: Assistance required to generate solutions;Assistance required to implement solutions;Decreased awareness of errors;Assistance required to correct errors made  Insights: Not aware of deficits  Initiation: Requires cues for all  Sequencing: Requires cues for all    Objective     Observation/Palpation  Posture: Poor  Observation: Pt is in bed, soft wrist restraints d/t pulling at tubes/NG. Very confused.  Attempting to reorient throughout eval.    AROM RLE (degrees)  RLE AROM: WFL  AROM LLE (degrees)  LLE AROM : WFL  AROM RUE (degrees)  RUE General AROM: see OT eval  AROM LUE (degrees)  LUE General AROM: see OT eval  Strength RLE  Comment: unable to fully assess MMT due to confusion and unable to follow commands, +SLR  Strength LLE  Comment: unable to fully assess MMT, + SLR  Strength RUE  Comment: see OT eval  Strength LUE  Comment: see OT eval  Tone RLE  RLE Tone: Normotonic  Tone LLE  LLE Tone: Normotonic  Sensation  Overall Sensation Status: (unable to assess)  Bed mobility  Supine to Sit: Maximum assistance;2 Person assistance;Dependent/Total  Sit to Supine: Maximum assistance;2 Person assistance;Dependent/Total  Scooting: Dependent/Total;Maximal assistance;2 Person assistance  Comment: Pt resistive to all movement, max verbal/tactile cues for sequencing/use of handrails. Pt is unable to follow all commands due to cognitive status. Pt boosted with HOB elevated >30 degrees and retraints back in place. Transfers  Comment: Not appropriate this date  Ambulation  Ambulation?: No     Balance  Posture: Poor  Sitting - Static: Poor  Sitting - Dynamic: Poor  Comments: Pt required max cues for safety while sitting EOB. MaxA required while sitting EOB as pt demo'd severe posterior lean with no self correction. Pt became agitated, attempting to pull at lines. Pt returned to supine in bed. Plan   Plan  Times per week: 1-2x day / 6-7 days per week  Specific instructions for Next Treatment: RE-ASSESS transfers/gait  Current Treatment Recommendations: Strengthening, Endurance Training, Neuromuscular Re-education, Patient/Caregiver Education & Training, Balance Training, Home Exercise Program, Functional Mobility Training, Safety Education & Training, Positioning(until re-assess is complete, current tx will be updated)  Safety Devices  Type of devices:  All fall risk precautions in place, Bed alarm in place, Call light within reach, Nurse notified, Left in bed  Restraints  Initially in place: Yes  Restraints: bilat UEs    OutComes Score  -PAC Score  AM-PAC Inpatient Mobility Raw Score : 8 (05/08/21 1321)  AM-PAC Inpatient T-Scale Score : 28.52 (05/08/21 1321)  Mobility Inpatient CMS 0-100% Score: 86.62 (05/08/21 1321)  Mobility Inpatient CMS G-Code Modifier : CM (05/08/21 1321)          Goals  Short term goals  Time Frame for Short term goals: 12 visits  Short term goal 1: Pt to be [place] for all bed mobility  Short term goal 2: Pt will tolerate 25mins of PT including therex, theract, gait training and NMR  Short term goal 3: When appropriate, transfers/gait to be re-assessed  Short term goal 4: Pt will improve sitting balance to Good to improve core strength and decrease fall risk  Patient Goals   Patient goals : Pt unable to state       Therapy Time   Individual Concurrent Group Co-treatment   Time In 1052+10min chart review/RN communication         Time Out 1107         Minutes 25             Co-treatment with OT warranted secondary to decreased safety and independence requiring 2 skilled therapy professionals to address individual discipline's goals.          Jori Multani, PT

## 2021-05-08 NOTE — PROGRESS NOTES
Occupational Therapy   Occupational Therapy Initial Assessment  Date: 2021   Patient Name: Chris Burkett  MRN: [id number]     : 1940    Date of Service: 2021    Discharge Recommendations:  Patient would benefit from continued therapy after discharge    Pt currently functioning below baseline. Would suggest additional therapy at time of discharge to maximize long term outcomes and prevent re-admission. Please refer to AM-PAC score for current level of function. This is an 80-year-old male with a history of previous bladder surgery that presents with complaints of an increasing incisional hernia size and pain. Patient states over the past few months he has been having some increasing pain and discomfort and swelling in the right lower abdomen where he has a large hernia. The patient describes his symptoms as severe, he describes his pain is severe, his pain is without any specific aggravating alleviating factors. He denies any nausea or vomiting at this time, he has no fevers or chills. He describes no bloody stool.         RN reports patient is medically stable for therapy treatment this date. Chart reviewed prior to treatment and patient is agreeable for therapy. All lines intact and patient positioned comfortably at end of treatment. All patient needs addressed prior to ending therapy session. Assessment   Performance deficits / Impairments: Decreased functional mobility ; Decreased ADL status; Decreased strength;Decreased safe awareness;Decreased cognition;Decreased endurance;Decreased sensation;Decreased balance;Decreased posture  Prognosis: Fair;Good  Decision Making: High Complexity  OT Education: OT Role;Plan of Care;Home Exercise Program;Precautions;Transfer Training;ADL Adaptive Strategies; Energy Conservation;Equipment; Family Education  REQUIRES OT FOLLOW UP: Yes  Activity Tolerance  Activity Tolerance: Treatment limited secondary to decreased cognition;Treatment limited secondary to agitation  Activity Tolerance: pt becoming somewhat agitated/resistive in sitting. Assisted back to bed  Safety Devices  Safety Devices in place: Yes  Type of devices: Call light within reach;Nurse notified; Left in bed;Gait belt;Patient at risk for falls; Bed alarm in place  Restraints  Initially in place: Yes  Restraints: solf wrist restraints           Patient Diagnosis(es): The primary encounter diagnosis was Partial small bowel obstruction ([address] Diagnoses of Ventral hernia with bowel obstruction and Chronic kidney disease, unspecified CKD stage were also pertinent to this visit. has a past medical history of Arthritis, Caffeine use, Cancer ([address] GERD (gastroesophageal reflux disease), Hernia, inguinal, right, Hypertension, Kidney stone, Presence of urostomy ([address] and Retinal detachment. has a past surgical history that includes hernia repair; Bladder removal (2006); Prostate surgery (2006); Cataract removal (Bilateral); Colonoscopy; Eye surgery (Right, 2000); vitrectomy (Left, 02/02/16); Incisional hernia repair (12/7/2015); Incisional hernia repair (11/2014); laparoscopy (N/A, 4/30/2021); Cystoscopy (N/A, 5/3/2021); IR GUIDED NEPHROSTOMY CATH PLACEMENT LEFT (5/3/2021); and IR GUIDED NEPHROSTOMY CATH PLACEMENT RIGHT (5/5/2021).            Restrictions  Restrictions/Precautions  Restrictions/Precautions: General Precautions, Fall Risk  Position Activity Restriction  Other position/activity restrictions: B nephrostomy tubes, MARIE drain, IV, NG tube, soft restraints    Subjective   General  Chart Reviewed: Yes  Patient assessed for rehabilitation services?: Yes  Family / Caregiver Present: No  Patient Currently in Pain: Denies  Vital Signs  Temp: 97.3 °F (36.3 °C)  Temp Source: Axillary  Pulse: 65  Heart Rate Source: Monitor  Resp: 18  BP: 117/63  BP Location: Right Arm  MAP (mmHg): 75  Patient Currently in Pain: Denies  Oxygen Therapy  SpO2: 93 %  O2 Device: None (Room air)  Social/Functional History  Social/Functional History  Lives With: Alone  Type of Home: House  Home Equipment: Rolling walker  Additional Comments: pt is very confused, unable to provide any history. Per notes, pt lives alone and has DME at home       Objective   Vision: (unable to fully assess)    Orientation  Overall Orientation Status: Impaired  Orientation Level: Oriented to person;Disoriented to situation;Disoriented to time;Disoriented to place(pt is very confused, thinks he in [place]/leaving now. Completely unaware of being in hospital)  Observation/Palpation  Posture: Poor  Observation: Pt is in bed, soft wrist restraints d/t pulling at tubes/NG. Very confused. Attempting to reorient throughout eval.  Balance  Sitting Balance: Maximum assistance(x2 to sit EOB. Pt pushing back heavily in resistance/lack of trunk control)  Standing Balance: Unable to assess(comment)(not safe to trial standing this date. Pt too confused and unable to maintain sitting balance.)  Functional Mobility  Functional Mobility Comments: unable to safely assess  ADL  Feeding: NPO(NG)  Grooming: Maximum assistance  UE Bathing: Maximum assistance;Dependent/Total  LE Bathing: Dependent/Total  UE Dressing: Maximum assistance;Dependent/Total  LE Dressing: Dependent/Total  Toileting: Dependent/Total  Additional Comments: Pt is unable to follow any directions to complete functional mob  Tone RUE  RUE Tone: Normotonic  Tone LUE  LUE Tone: Normotonic     Bed mobility  Supine to Sit: Maximum assistance;2 Person assistance;Dependent/Total  Sit to Supine: Maximum assistance;2 Person assistance;Dependent/Total  Scooting: Dependent/Total;Maximal assistance;2 Person assistance  Comment: pt is resistive to all movement, max verbal cues to attempt to assist with reaching for rails, etc. Pt is unable to follow through d/t cognition.   Transfers  Sit to stand: Unable to assess  Stand to sit: Unable to assess     Cognition  Overall Cognitive Status: Exceptions  Following Commands: Inconsistently follows commands  Attention Span: Unable to maintain attention  Memory: Decreased recall of biographical Information;Decreased recall of precautions;Decreased recall of recent events;Decreased short term memory  Safety Judgement: Decreased awareness of need for assistance;Decreased awareness of need for safety  Problem Solving: Assistance required to generate solutions;Assistance required to implement solutions;Decreased awareness of errors;Assistance required to correct errors made  Insights: Not aware of deficits  Initiation: Requires cues for all  Sequencing: Requires cues for all  Perception  Overall Perceptual Status: Impaired  Initiation: Hand over hand to initiate tasks  Motor Planning: Hand over hand to sequence tasks     Sensation  Overall Sensation Status: (unable to assess)        LUE PROM (degrees)  LUE General PROM: Pt is unable to follow directions for MMT/ROM.   LUE Strength  LUE Strength Comment: pt is unable to follow directions for MMT                   Plan   Plan  Times per week: 4-5x/week, 1-2x/day  Current Treatment Recommendations: Strengthening, Balance Training, Functional Mobility Training, Endurance Training, Safety Education & Training, Patient/Caregiver Education & Training, Self-Care / ADL, Positioning, Equipment Evaluation, Education, & procurement, Cognitive Reorientation, Cognitive/Perceptual Training       AM-PAC Inpatient Daily Activity Raw Score: 8 (05/08/21 1243)  AM-PAC Inpatient ADL T-Scale Score : 22.86 (05/08/21 1243)  ADL Inpatient CMS 0-100% Score: 85.69 (05/08/21 1243)  ADL Inpatient CMS G-Code Modifier : CM (05/08/21 1243)    Goals  Short term goals  Time Frame for Short term goals: by discharge, pt will  Short term goal 1: tolerate reassessment of ADL transfers/mobility  Short term goal 2: demo mod A x 1 for bed mobility with rails/controls  Short term goal 3: demo min A with simple grooming tasks following set up with min cues for initiation/sequecing  Short term goal 4: demo CGA with sitting balance at EOB x 15 min for inc. particiopation in ADL tasks  Short term goal 5: demo min A with UB ADLs at seated level and asssess LB when pt is able to stand  Patient Goals   Patient goals : not stated       Therapy Time   Individual Concurrent Group Co-treatment   Time In 1050         Time Out 1106(+10 min chart review)         Minutes 26          . treatment min: 12    Co-treatment with PT warranted secondary to decreased safety and independence requiring 2 skilled therapy professionals to address individual discipline's goals. OT addressing preparation for ADL transfer, sitting balance for increased ADL performance, sitting/activity tolerance, functional reaching, environmental safety/scanning, fall prevention, ability to sequence and follow directions and functional UE strength.     Ada Denny, OT

## 2021-05-09 LAB
ABSOLUTE EOS #: 0.2 K/UL (ref 0–0.44)
ABSOLUTE IMMATURE GRANULOCYTE: 0.1 K/UL (ref 0–0.3)
ABSOLUTE LYMPH #: 1.09 K/UL (ref 1.1–3.7)
ABSOLUTE MONO #: 0.59 K/UL (ref 0.1–1.2)
ANION GAP SERPL CALCULATED.3IONS-SCNC: 11 MMOL/L (ref 9–17)
ANION GAP SERPL CALCULATED.3IONS-SCNC: 12 MMOL/L (ref 9–17)
ANION GAP SERPL CALCULATED.3IONS-SCNC: 13 MMOL/L (ref 9–17)
ANION GAP SERPL CALCULATED.3IONS-SCNC: 15 MMOL/L (ref 9–17)
BASOPHILS # BLD: 0 % (ref 0–2)
BASOPHILS ABSOLUTE: 0 K/UL (ref 0–0.2)
BUN BLDV-MCNC: 84 MG/DL (ref 8–23)
BUN BLDV-MCNC: 85 MG/DL (ref 8–23)
BUN BLDV-MCNC: 89 MG/DL (ref 8–23)
BUN BLDV-MCNC: 90 MG/DL (ref 8–23)
BUN/CREAT BLD: 30 (ref 9–20)
BUN/CREAT BLD: 31 (ref 9–20)
CALCIUM SERPL-MCNC: 9 MG/DL (ref 8.6–10.4)
CALCIUM SERPL-MCNC: 9.3 MG/DL (ref 8.6–10.4)
CHLORIDE BLD-SCNC: 109 MMOL/L (ref 98–107)
CHLORIDE BLD-SCNC: 110 MMOL/L (ref 98–107)
CHLORIDE BLD-SCNC: 113 MMOL/L (ref 98–107)
CHLORIDE BLD-SCNC: 114 MMOL/L (ref 98–107)
CO2: 23 MMOL/L (ref 20–31)
CO2: 24 MMOL/L (ref 20–31)
CO2: 25 MMOL/L (ref 20–31)
CO2: 27 MMOL/L (ref 20–31)
CREAT SERPL-MCNC: 2.75 MG/DL (ref 0.7–1.2)
CREAT SERPL-MCNC: 2.82 MG/DL (ref 0.7–1.2)
CREAT SERPL-MCNC: 2.97 MG/DL (ref 0.7–1.2)
CREAT SERPL-MCNC: 3.03 MG/DL (ref 0.7–1.2)
DIFFERENTIAL TYPE: ABNORMAL
EOSINOPHILS RELATIVE PERCENT: 2 % (ref 1–4)
GFR AFRICAN AMERICAN: 24 ML/MIN
GFR AFRICAN AMERICAN: 25 ML/MIN
GFR AFRICAN AMERICAN: 26 ML/MIN
GFR AFRICAN AMERICAN: 27 ML/MIN
GFR NON-AFRICAN AMERICAN: 20 ML/MIN
GFR NON-AFRICAN AMERICAN: 20 ML/MIN
GFR NON-AFRICAN AMERICAN: 22 ML/MIN
GFR NON-AFRICAN AMERICAN: 22 ML/MIN
GFR SERPL CREATININE-BSD FRML MDRD: ABNORMAL ML/MIN/{1.73_M2}
GLUCOSE BLD-MCNC: 148 MG/DL (ref 75–110)
GLUCOSE BLD-MCNC: 155 MG/DL (ref 70–99)
GLUCOSE BLD-MCNC: 161 MG/DL (ref 75–110)
GLUCOSE BLD-MCNC: 170 MG/DL (ref 70–99)
GLUCOSE BLD-MCNC: 171 MG/DL (ref 75–110)
GLUCOSE BLD-MCNC: 173 MG/DL (ref 75–110)
GLUCOSE BLD-MCNC: 180 MG/DL (ref 75–110)
GLUCOSE BLD-MCNC: 206 MG/DL (ref 70–99)
GLUCOSE BLD-MCNC: 220 MG/DL (ref 70–99)
HCT VFR BLD CALC: 39.9 % (ref 40.7–50.3)
HEMOGLOBIN: 11.2 G/DL (ref 13–17)
IMMATURE GRANULOCYTES: 1 %
LYMPHOCYTES # BLD: 11 % (ref 24–43)
MAGNESIUM: 2.8 MG/DL (ref 1.6–2.6)
MCH RBC QN AUTO: 27.1 PG (ref 25.2–33.5)
MCHC RBC AUTO-ENTMCNC: 28.1 G/DL (ref 28.4–34.8)
MCV RBC AUTO: 96.6 FL (ref 82.6–102.9)
MONOCYTES # BLD: 6 % (ref 3–12)
MORPHOLOGY: ABNORMAL
NRBC AUTOMATED: 0 PER 100 WBC
PDW BLD-RTO: 16.1 % (ref 11.8–14.4)
PHOSPHORUS: 5.1 MG/DL (ref 2.5–4.5)
PLATELET # BLD: 221 K/UL (ref 138–453)
PLATELET ESTIMATE: ABNORMAL
PMV BLD AUTO: 10.1 FL (ref 8.1–13.5)
POTASSIUM SERPL-SCNC: 4.1 MMOL/L (ref 3.7–5.3)
POTASSIUM SERPL-SCNC: 4.6 MMOL/L (ref 3.7–5.3)
RBC # BLD: 4.13 M/UL (ref 4.21–5.77)
RBC # BLD: ABNORMAL 10*6/UL
SEG NEUTROPHILS: 80 % (ref 36–65)
SEGMENTED NEUTROPHILS ABSOLUTE COUNT: 7.92 K/UL (ref 1.5–8.1)
SODIUM BLD-SCNC: 146 MMOL/L (ref 135–144)
SODIUM BLD-SCNC: 148 MMOL/L (ref 135–144)
SODIUM BLD-SCNC: 150 MMOL/L (ref 135–144)
SODIUM BLD-SCNC: 152 MMOL/L (ref 135–144)
WBC # BLD: 9.9 K/UL (ref 3.5–11.3)
WBC # BLD: ABNORMAL 10*3/UL

## 2021-05-09 PROCEDURE — 2500000003 HC RX 250 WO HCPCS: Performed by: SPECIALIST

## 2021-05-09 PROCEDURE — 80048 BASIC METABOLIC PNL TOTAL CA: CPT

## 2021-05-09 PROCEDURE — 83735 ASSAY OF MAGNESIUM: CPT

## 2021-05-09 PROCEDURE — 85025 COMPLETE CBC W/AUTO DIFF WBC: CPT

## 2021-05-09 PROCEDURE — 6370000000 HC RX 637 (ALT 250 FOR IP): Performed by: STUDENT IN AN ORGANIZED HEALTH CARE EDUCATION/TRAINING PROGRAM

## 2021-05-09 PROCEDURE — 36415 COLL VENOUS BLD VENIPUNCTURE: CPT

## 2021-05-09 PROCEDURE — 84100 ASSAY OF PHOSPHORUS: CPT

## 2021-05-09 PROCEDURE — APPNB60 APP NON BILLABLE TIME 46-60 MINS: Performed by: NURSE PRACTITIONER

## 2021-05-09 PROCEDURE — 82947 ASSAY GLUCOSE BLOOD QUANT: CPT

## 2021-05-09 PROCEDURE — 2060000000 HC ICU INTERMEDIATE R&B

## 2021-05-09 PROCEDURE — 6370000000 HC RX 637 (ALT 250 FOR IP): Performed by: SPECIALIST

## 2021-05-09 PROCEDURE — 6360000002 HC RX W HCPCS: Performed by: SPECIALIST

## 2021-05-09 PROCEDURE — 99232 SBSQ HOSP IP/OBS MODERATE 35: CPT | Performed by: STUDENT IN AN ORGANIZED HEALTH CARE EDUCATION/TRAINING PROGRAM

## 2021-05-09 PROCEDURE — 2580000003 HC RX 258: Performed by: INTERNAL MEDICINE

## 2021-05-09 PROCEDURE — 2580000003 HC RX 258: Performed by: SPECIALIST

## 2021-05-09 PROCEDURE — 97530 THERAPEUTIC ACTIVITIES: CPT

## 2021-05-09 PROCEDURE — 97112 NEUROMUSCULAR REEDUCATION: CPT

## 2021-05-09 PROCEDURE — 93005 ELECTROCARDIOGRAM TRACING: CPT | Performed by: NURSE PRACTITIONER

## 2021-05-09 RX ORDER — DEXTROSE MONOHYDRATE 50 MG/ML
INJECTION, SOLUTION INTRAVENOUS CONTINUOUS
Status: DISCONTINUED | OUTPATIENT
Start: 2021-05-09 | End: 2021-05-11

## 2021-05-09 RX ADMIN — FAMOTIDINE 10 MG: 10 INJECTION, SOLUTION INTRAVENOUS at 09:07

## 2021-05-09 RX ADMIN — SODIUM CHLORIDE, PRESERVATIVE FREE 10 ML: 5 INJECTION INTRAVENOUS at 20:28

## 2021-05-09 RX ADMIN — INSULIN LISPRO 2 UNITS: 100 INJECTION, SOLUTION INTRAVENOUS; SUBCUTANEOUS at 15:44

## 2021-05-09 RX ADMIN — INSULIN LISPRO 2 UNITS: 100 INJECTION, SOLUTION INTRAVENOUS; SUBCUTANEOUS at 21:38

## 2021-05-09 RX ADMIN — SODIUM CHLORIDE, PRESERVATIVE FREE 10 ML: 5 INJECTION INTRAVENOUS at 09:09

## 2021-05-09 RX ADMIN — DEXTROSE MONOHYDRATE: 50 INJECTION, SOLUTION INTRAVENOUS at 21:38

## 2021-05-09 RX ADMIN — INSULIN LISPRO 2 UNITS: 100 INJECTION, SOLUTION INTRAVENOUS; SUBCUTANEOUS at 11:16

## 2021-05-09 RX ADMIN — FAMOTIDINE 10 MG: 10 INJECTION, SOLUTION INTRAVENOUS at 20:28

## 2021-05-09 RX ADMIN — BISACODYL 10 MG: 10 SUPPOSITORY RECTAL at 09:07

## 2021-05-09 RX ADMIN — HEPARIN SODIUM 5000 UNITS: 5000 INJECTION INTRAVENOUS; SUBCUTANEOUS at 21:38

## 2021-05-09 RX ADMIN — INSULIN LISPRO 2 UNITS: 100 INJECTION, SOLUTION INTRAVENOUS; SUBCUTANEOUS at 09:17

## 2021-05-09 RX ADMIN — POLYETHYLENE GLYCOL (3350) 17 G: 17 POWDER, FOR SOLUTION ORAL at 09:06

## 2021-05-09 RX ADMIN — INSULIN LISPRO 2 UNITS: 100 INJECTION, SOLUTION INTRAVENOUS; SUBCUTANEOUS at 03:19

## 2021-05-09 RX ADMIN — HEPARIN SODIUM 5000 UNITS: 5000 INJECTION INTRAVENOUS; SUBCUTANEOUS at 05:41

## 2021-05-09 RX ADMIN — HEPARIN SODIUM 5000 UNITS: 5000 INJECTION INTRAVENOUS; SUBCUTANEOUS at 14:45

## 2021-05-09 RX ADMIN — ACETAMINOPHEN ORAL SOLUTION 1000 MG: 325 SOLUTION ORAL at 09:16

## 2021-05-09 RX ADMIN — MAGNESIUM HYDROXIDE 30 ML: 400 SUSPENSION ORAL at 09:06

## 2021-05-09 RX ADMIN — ACETAMINOPHEN ORAL SOLUTION 1000 MG: 325 SOLUTION ORAL at 20:26

## 2021-05-09 RX ADMIN — Medication 3 MG: at 20:26

## 2021-05-09 ASSESSMENT — PAIN SCALES - GENERAL
PAINLEVEL_OUTOF10: 1
PAINLEVEL_OUTOF10: 0

## 2021-05-09 NOTE — PROGRESS NOTES
General Surgery:  Daily Progress Note                   PATIENT NAME: Levon Carbone     TODAY'S DATE: 5/9/2021, 8:21 AM  CC:  Confusion    SUBJECTIVE:     Pt seen and examined at bedside. Overnight had an episode of bradycardia down to the 20s subsequent desaturation to the mid 80s. Was easily aroused and his heart rate returned to the high 50s and placed on 2 L of nasal cannula which is O2 saturation rise to high 90s. Patient continues to be confused this morning however is oriented to himself. Not many complaints. Tolerating tube feeds. Not have any bowel movements. OBJECTIVE:   VITALS:  /70   Pulse 69   Temp 97.3 °F (36.3 °C) (Axillary)   Resp 20   Ht 6' 1\" (1.854 m)   Wt 245 lb 6.4 oz (111.3 kg)   SpO2 97%   BMI 32.38 kg/m²      INTAKE/OUTPUT:      Intake/Output Summary (Last 24 hours) at 5/9/2021 4647  Last data filed at 5/9/2021 0546  Gross per 24 hour   Intake 3526 ml   Output 1810 ml   Net 1716 ml       PHYSICAL EXAM:  General Appearance: Sleepy  HEENT:  Normocephalic, atraumatic, mucus membranes moist.  NG tube in place with tube feeds  Heart: Heart regular rate and rhythm  Lungs: No acute distress  Abdomen: Soft, nondistended. Does not appear to be tender to palpation. b/l nephrostomy tubes with dark fluid and sediment -- improved from yesterday. Extremities: No cyanosis, pitting edema, rashes noted. Skin: Skin color, texture, turgor normal. No rashes or lesions.     Data:  CBC with Differential:    Lab Results   Component Value Date    WBC 9.9 05/09/2021    RBC 4.13 05/09/2021    HGB 11.2 05/09/2021    HCT 39.9 05/09/2021     05/09/2021    MCV 96.6 05/09/2021    MCH 27.1 05/09/2021    MCHC 28.1 05/09/2021    RDW 16.1 05/09/2021    LYMPHOPCT 11 05/09/2021    LYMPHOPCT 18.3 12/14/2017    MONOPCT 6 05/09/2021    MONOPCT 5.7 12/14/2017    EOSPCT 5.1 12/14/2017    BASOPCT 0 05/09/2021    BASOPCT 1.1 12/14/2017    MONOSABS 0.59 05/09/2021    MONOSABS 0.4 12/14/2017 continue to follow along.    7. Medical management per primary team    Electronically signed by Aidee Lees DO  on 5/9/2021 at 8:21 AM

## 2021-05-09 NOTE — PROGRESS NOTES
Reason for follow-up :  Acute kidney injury. Subjective  Patient seen and examined at the bedside  Had right nephrostomy tube placed 5/5 under general anesthesia with clear yellow urine  Serum sodium with minimal change at 152 from 153. Free water replacement was started/gets about 300 ml/day  Tube feeding tolerating at 61 ml/hour  Creatinine continues to improved to 3.09 from 3.38   BUN continues to improve from 99 to 92  Bicarb is improving  Continues to have confusion    HISTORY OF PRESENT ILLNESS:    The patient is a 80 y.o. male had multiple abdominal surgeries presented with enlarging incarcerated/strangulated parastomal hernia, who underwent exploratory laparotomy with reduction of the hernia and small bowel resection on 4/30/2021. He had history of bladder cancer s/p cystectomy and ileoconduit formation in 2006. His baseline creatinine has been between 1.5-2.1, it was 2.85 at presentation and increased this morning to 3.99. Patient had a CT scan of the abdomen upon presentation that was done without contrast, it was remarkable for extensive stone burden in both kidneys greater on the left, it showed uroepithelial wall thickening with inflammatory stranding involving the left collecting system, was also remarkable for enlargement of the right upper pole mass. Renal ultrasound was done yesterday showed right hydronephrosis and nephrolithiasis.   Patient has been nonoliguric, urine output over the last 24 hours was 2.7 L      Review Of Systems:   Unable to obtain as he is confused     Scheduled Meds:   magnesium hydroxide  30 mL Per NG tube Daily    insulin lispro  0-12 Units Subcutaneous Q4H    polyethylene glycol  17 g Oral Daily    bisacodyl  10 mg Rectal Daily    acetaminophen  1,000 mg Oral 2 times per day    melatonin  3 mg Oral Nightly    heparin (porcine)  5,000 Units Subcutaneous 3 times per day    sodium chloride flush  5-40 mL Intravenous 2 times per day    lidocaine  2 patch Topical Q12H    famotidine (PEPCID) injection  10 mg Intravenous BID     Continuous Infusions:   dextrose      sodium chloride       PRN Meds:glucose, dextrose, glucagon (rDNA), dextrose, acetaminophen, [Held by provider] oxyCODONE, albuterol sulfate HFA, sodium chloride flush, sodium chloride, ondansetron     Physical Exam:  Vitals:    05/09/21 0722 05/09/21 1201 05/09/21 1724 05/09/21 1930   BP: 117/70 104/68 (!) 103/57 (!) 107/57   Pulse: 69 78 53 55   Resp: 20 20 18 20   Temp: 97.3 °F (36.3 °C) 98 °F (36.7 °C) 97.5 °F (36.4 °C) 97.5 °F (36.4 °C)   TempSrc: Axillary Axillary Axillary Oral   SpO2: 97% 98% 97% 98%   Weight:       Height:         I/O last 3 completed shifts: In: 4226 [I.V.:1000; NG/GT:3226]  Out: 2610 [Urine:2600; Drains:10]    General:  Awake, confused not in distress. Restless . HEENT: Atraumatic, normocephalic. Anicteric sclera. Pink and dry. Neck supple. No JVD. Chest: Bilateral air entry, clear to auscultation, no wheezing, rhonchi or rales. Cardiovascular: RRR, S1S2, no murmur, rub or gallop. No lower extremity edema. Abdomen: Soft, non tender to palpation. Musculoskeletal: No cyanosis or clubbing. Integumentary: Pink, warm and dry. Free from rash or lesions. CNS: Confused face symmetrical. No tremor.      Data:  CBC:   Lab Results   Component Value Date    WBC 9.9 05/09/2021    HGB 11.2 (L) 05/09/2021    HCT 39.9 (L) 05/09/2021    MCV 96.6 05/09/2021     05/09/2021     BMP:    Lab Results   Component Value Date     (H) 05/09/2021     (H) 05/09/2021     (H) 05/09/2021    K 4.6 05/09/2021    K 4.6 05/09/2021    K 4.6 05/09/2021     (H) 05/09/2021     (H) 05/09/2021     (H) 05/09/2021    CO2 27 05/09/2021    CO2 23 05/09/2021    CO2 24 05/09/2021    BUN 85 (H) 05/09/2021    BUN 89 (H) 05/09/2021    BUN 90 (H) 05/09/2021    CREATININE 2.82 (H) 05/09/2021    CREATININE 2.97 (H) 05/09/2021    CREATININE 3.03 (H) 05/09/2021    GLUCOSE 155 (H) 05/09/2021    GLUCOSE 206 (H) 05/09/2021    GLUCOSE 170 (H) 05/09/2021     CMP:   Lab Results   Component Value Date     05/09/2021    K 4.6 05/09/2021     05/09/2021    CO2 27 05/09/2021    BUN 85 05/09/2021    CREATININE 2.82 05/09/2021    GLUCOSE 155 05/09/2021    CALCIUM 9.3 05/09/2021    PROT 7.3 05/02/2021    LABALBU 3.8 05/02/2021    BILITOT 0.50 05/02/2021    ALKPHOS 55 05/02/2021    AST 19 05/02/2021    ALT 11 05/02/2021      Hepatic:   Lab Results   Component Value Date    AST 19 05/02/2021    AST 16 04/30/2021    AST 24 07/15/2020    ALT 11 05/02/2021    ALT 11 04/30/2021    ALT 17 07/15/2020    BILITOT 0.50 05/02/2021    BILITOT 0.35 04/30/2021    BILITOT 0.47 07/15/2020    ALKPHOS 55 05/02/2021    ALKPHOS 77 04/30/2021    ALKPHOS 64 07/15/2020     BNP: No results found for: BNP  Lipids:   Lab Results   Component Value Date    CHOL 139 05/09/2016    HDL 26 (A) 05/09/2016     INR:   Lab Results   Component Value Date    INR 1.2 05/03/2021    INR 1.0 05/17/2016     PTH: No results found for: PTH  Phosphorus:    Lab Results   Component Value Date    PHOS 5.1 05/09/2021     Ionized Calcium: No results found for: IONCA  Magnesium:   Lab Results   Component Value Date    MG 2.8 05/09/2021     Albumin:   Lab Results   Component Value Date    LABALBU 3.8 05/02/2021     Last 3 CK, CKMB, Troponin: @LABRCNT(CKTOTAL:3,CKMB:3,TROPONINI:3)       URINE:)No results found for: Brisa Chase     Radiology:   Reviewed.     Assessment:  Acute kidney injury secondary to obstructive uropathy and ATN, Cr slowly improving  Hypernatremia  Metabolic acidosis  S/p hernia repair  Anemia  Bladder cancer and renal cell carcinoma, s/p cystectomy and creation of ileal conduit  Enlarging right renal mass s/p cryoablation in 2019  R hydronephrosis s/p PCN placement  Left staghorn calculi  Metabolic encephalopathy multifactorial      Plan:  Continue free water replacement, is currently receiving approx 300 ml 4 x per day   Will

## 2021-05-09 NOTE — PROGRESS NOTES
[facility]  Office: [address], DO, Apollofabio Fuchs, DO, Nyla Askew, DO, Vinnie Gaona Blood, DO, Talya Bond MD, Nura Vega MD, Eboni Pop MD, Deisi Cortez MD, Barbra Parisi MD, Akosua Gabriel MD, Chikis Finnegan MD, Lu Nation MD, Lanette Kurtz, DO, Bri Beltran MD, Son Yeh DO, Casper Dubon MD,  Nirmala Hernandez, DO, Carolyn Delgado MD, Hayden Yuan MD, Shaylee Pham MD, Elysia Serrano MD, Henrietta King, [facility], [facility], CNP, Joey Adelr, CNP, Josef Aldridge, CNS, Gabriele Chinchilla, CNP, Thompson Lombard, CNP, Orlando Liner, CNP, Brad Taylor, CNP, Bijal Gutierrez, CNP, KESHIA Morris-C, Rafael German, [facility], Michael Hester, CNP, Dylan Menjivar, CNP, Herminio Abiola, CNP, Faith Eis, CNP, Joesph Hood, CNP, Rochelle Rawls, [facility]    Progress Note    5/9/2021    9:41 AM    Name:   Eben Tomlinson  MRN:     [id number]     Acct:      [de-identified]   Room:   [address] [place] [address] Day:  9  Admit Date:  4/30/2021 12:15 PM    PCP:   Grayson Kim MD  Code Status:  DNR-CCA    Subjective:     C/C:   Chief Complaint   Patient presents with    Hernia     LOWER RIGHT ABDOMEN. recent increase in pain and growth     Interval History Status: improvement  Patient examined bedside. Not alert to place or time. opens eyes to commands, more coherent with his answers today, responds with minimal sternal rub. Small bowel movement today    Brief History:     80-year-old male past medical history of left renal cell carcinoma status post ablation, bladder cancer status post cystectomy with creation of ileal conduit in 2006, prior small bowel obstruction and exploratory laparotomy, recurrent hernia presents with incarcerated parastomal hernia requiring ex lap with small bowel resection, reduction of strangulated hernia, lysis of adhesions performed on 4/30/2021.   Patient also has ongoing problem of acute kidney injury and moderate urine output however worsening creatinine. .  Patient was taken for cystoscopy on 5/3/2021 which showed obstruction of ileal conduit due to prior suture. Patient underwent left PCN insertion on 5/3/2021. Right-sided nephrostomy tube placed 5/5/2021. Patient still in acute renal failure and is requiring bicarb drip and having issues with his mentation and requiring tube feeds. Review of Systems:   Patient alert to person and place, not time, but seems to be more understanding today    Review of Systems   Unable to perform ROS: Mental status change           Medications: Allergies: Allergies   Allergen Reactions    Nsaids Anaphylaxis     GI bleed    Tetracyclines & Related        Current Meds:   Scheduled Meds:    magnesium hydroxide  30 mL Per NG tube Daily    insulin lispro  0-12 Units Subcutaneous Q4H    polyethylene glycol  17 g Oral Daily    bisacodyl  10 mg Rectal Daily    acetaminophen  1,000 mg Oral 2 times per day    melatonin  3 mg Oral Nightly    heparin (porcine)  5,000 Units Subcutaneous 3 times per day    sodium chloride flush  5-40 mL Intravenous 2 times per day    lidocaine  2 patch Topical Q12H    famotidine (PEPCID) injection  10 mg Intravenous BID     Continuous Infusions:    dextrose      sodium chloride       PRN Meds: glucose, dextrose, glucagon (rDNA), dextrose, acetaminophen, [Held by provider] oxyCODONE, albuterol sulfate HFA, sodium chloride flush, sodium chloride, ondansetron    Data:     Past Medical History:   has a past medical history of Arthritis, Caffeine use, Cancer ([facility] [address] GERD (gastroesophageal reflux disease), Hernia, inguinal, right, Hypertension, Kidney stone, Presence of urostomy ([facility] [address] and Retinal detachment. Social History:   reports that he quit smoking about 41 years ago. His smoking use included cigarettes. He has a 10.00 pack-year smoking history.  He has never used smokeless tobacco. He reports that he does not drink alcohol or use drugs. Family History:   Family History   Problem Relation Age of Onset    Diabetes Mother     Diabetes Sister        Vitals:  /70   Pulse 69   Temp 97.3 °F (36.3 °C) (Axillary)   Resp 20   Ht 6' 1\" (1.854 m)   Wt 245 lb 6.4 oz (111.3 kg)   SpO2 97%   BMI 32.38 kg/m²   Temp (24hrs), Av.5 °F (36.4 °C), Min:97.3 °F (36.3 °C), Max:97.9 °F (36.6 °C)    Recent Labs     21  2310 21  0306 21  0714   POCGLU 177* 163* 171* 148*       I/O (24Hr): Intake/Output Summary (Last 24 hours) at 2021 0941  Last data filed at 2021 0546  Gross per 24 hour   Intake 3526 ml   Output 1810 ml   Net 1716 ml       Labs:  Hematology:  Recent Labs     21  0529 21  0536 21  0611   WBC 9.3 8.9 9.9   RBC 3.92* 3.84* 4.13*   HGB 10.7* 10.4* 11.2*   HCT 36.0* 35.1* 39.9*   MCV 91.8 91.4 96.6   MCH 27.3 27.1 27.1   MCHC 29.7 29.6 28.1*   RDW 16.1* 15.9* 16.1*    208 221   MPV 9.3 9.5 10.1     Chemistry:  Recent Labs     21  0536 21  0536 21  1832 21  0032 21  0611   *   < > 148* 146* 150*   K 4.0   < > 4.1 4.1 4.6   *   < > 111* 109* 113*   CO2 26   < > 25 25 24   GLUCOSE 226*   < > 194* 220* 170*   BUN 92*   < > 85* 84* 90*   CREATININE 3.09*   < > 2.73* 2.75* 3.03*   MG 2.6  --   --   --  2.8*   ANIONGAP 13   < > 12 12 13   LABGLOM 20*   < > 23* 22* 20*   GFRAA 24*   < > 27* 27* 24*   CALCIUM 9.0   < > 9.0 9.0 9.0   PHOS 3.6  --   --   --  5.1*    < > = values in this interval not displayed.      Recent Labs     21  1205 21  1621 21  2020 21  2310 21  0306 21  0714   POCGLU 174* 155* 177* 163* 171* 148*     ABG:  Lab Results   Component Value Date    POCPH 7.19 2021    POCPCO2 44 2021    POCPO2 74 2021    POCHCO3 16.9 2021    NBEA 11 2021    PBEA NOT REPORTED 2021    XPI4LHL 18 2021    ZLIE9BRB 90 2021    FIO2 32.0 2021 Lab Results   Component Value Date/Time    SPECIAL LT [facility] 05/02/2021 12:58 PM     Lab Results   Component Value Date/Time    CULTURE NO GROWTH 6 DAYS 05/02/2021 12:58 PM       Radiology:  Ct Abdomen Pelvis Wo Contrast Additional Contrast? None    Result Date: 4/30/2021  1. Partial small bowel obstruction with a transition point located at the mouth of the ventral hernia. 2. Extensive stone burden identified in the bilateral kidneys, left greater than right. 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 3. Enlargement of the right upper pole mass, likely representing renal cell carcinoma. 4. Enlarged left para-aortic lymph node measuring 27 x 20 mm. Finding is nonspecific and may represent metastatic disease or reactive adenopathy. 5. Severe diverticulosis. 6. Cholelithiasis. Xr Chest (single View Frontal)    Result Date: 5/2/2021  Shallow inflation. Right basilar opacity may represent developing consolidation in the appropriate clinical setting. The enteric tube courses off the field of view in the upper abdomen. Ir Guided Nephrostomy Cath Placement Left    Result Date: 5/3/2021  Difficult but successful percutaneous left nephrostomy tube placement, as above. Right PCN will be attempted tomorrow. Xr Abdomen For Ng/og/ne Tube Placement    Result Date: 5/3/2021  Enteric tube in the stomach as above. No evidence of bowel obstruction. Xr Abdomen For Ng/og/ne Tube Placement    Result Date: 5/2/2021  Satisfactory position enteric tube     Xr Abdomen For Ng/og/ne Tube Placement    Result Date: 5/2/2021  Enteric tube tip suspected to be at the level the GE junction. Consider advancement 5-10 cm. Additionally there is a telemetry leads within the midline, consider moving prior to repeat x-ray.   Addition, please consider centering the x-ray over the GE junction epigastric region. Xr Abdomen For Ng/og/ne Tube Placement    Result Date: 4/30/2021  A newly placed gastric tube loops in the distal thoracic esophagus, where both the tip and sideport are located. Recommend repositioning before use. Us Retroperitoneal Limited    Result Date: 5/1/2021  1. Recently demonstrated suspicious lesion arising from the superior pole the right kidney is not well delineated on this exam. 2.  Right hydronephrosis and nephrolithiasis again demonstrated. 3.  Multiple stones throughout the left intrarenal collecting system, which likely obscures the associated hydronephrosis demonstrated on recent CT exam. 4.  Cholelithiasis. Physical Examination:        General appearance: Chronically ill-appearing, having periods of confusion, mentation better compared to yesterday  Mental Status:alert to person not place or time  Lungs:  clear to auscultation bilaterally, normal effort  Heart:  regular rate and rhythm, no murmur  Abdomen:  soft, nontender, abdominal binder in place, well-healing incisions nephrostomy tubes both draining well. Bilateral nephrostomy tubes clear or draining compared to yesterday  Extremities:  no edema, redness, tenderness in the calves  Skin:  2 incisions, both held with stables, ostomy tube in place and appears to be draining. Left nephrostomy tube draining well.      Assessment:        Hospital Problems           Last Modified POA    * (Principal) Ventral hernia with bowel obstruction 5/1/2021 Yes    Toxic metabolic encephalopathy 2/6/4764 No    Other hydronephrosis 5/3/2021 Yes    Acute respiratory failure with hypoxia (HCC) 8/4/3258 No    Metabolic acidosis 0/9/5195 No    Normochromic normocytic anemia 5/1/2021 Yes    Iron deficiency anemia 5/2/2021 Yes    Vitamin B 12 deficiency 5/3/2021 Yes    Folate deficiency 5/3/2021 Yes    Essential hypertension (Chronic) 5/1/2021 Yes    Renal mass (Chronic) 5/1/2021 Yes    KARMA (acute kidney injury) ([address]

## 2021-05-09 NOTE — PLAN OF CARE
[facility]  Office: [address], DO, Maciellin Ganser, DO, Elidia Juana, DO, Edmundo Murilloodore Blood, DO, Gabriela Woodard MD, Venessa Becker MD, Wayne Trimble MD, Marialuisa Sotelo MD, Randy Ferrer MD, Echo Panda MD, Casandra Weiner MD, Gareth Collins MD, Bob Giang, DO, Reena Luna MD, Owen Kuo, DO, Abby Nguyen MD,  Braden Manzo DO, Sunny Belle MD, Pierre Macdonald MD, Axel Hansen MD, Africa Charles MD, Karon Nguyen, CNP, [facility], CNP, Colleen Hassan, CNP, Adolph Morales, CNS, Ashley Carlson, CNP, Marybeth Orozco, CNP, Jaclyn Mcfarlane, CNP, Jocelynn Drew, CNP, Renetta Wayne, CNP, Arielle Childress PA-C, Sha Rosales, [facility], Hema Kulkarni, CNP, Cherry Johnson, CNP, Brice Smith, CNP, Cem Rayo, CNP, Campos Pacheco, CNP, Marilin Martinez, Theodore. [facility]   Nighttime In-House Nurse Practitioner      5/9/2021    2:22 AM    Name:   Jennie Gutiérrez  MRN:     [id number]     [place]:      [de-identified]   Room:   [address] [place] [address] Day:  9  Admit Date:  4/30/2021 12:15 PM    PCP:   Meagan Mathis MD  Code Status:  DNR-CCA    Called to the floor by staff to evaluate Jennie Gutiérrez in [address] [place] [address] at 2:22 AM with complaint of bradycardia. Assessment/Plan:     Asked to evaluate patient with report of bradycardia. RN reports that the patient's heart rate decreased into the 20's while he was sleeping. Upon entering the room the patient's oxygen saturation is 87% on room air. It recovered to 90% when the patient was woken up. The patient is oriented to self, nursing reports this is not an acute change. Vital signs: 87% on room air, /62, P 58. Patient recovered to 97% on 2L supplemental oxygen. Suspect there may be a componant of underlying sleep apnea. EKG and cardiology consult previously ordered by remote NP on call. Will add q4h neuro checks. Patient has continuous pulse oximetry in place.      Physical Examination:        General appearance:  alert, cooperative and no distress  Mental Status:  oriented to person, disoriented to place and time and normal affect  Lungs:  diminished to auscultation bilaterally, normal effort  Heart:  bradycardic rate and regular rhythm  Abdomen:  soft, nontender, nondistended, abdominal binder in place. Extremities:  no edema, redness, tenderness in the calves  Skin:  no gross lesions, rashes, induration    Problem List:        Hospital Problems           Last Modified POA    * (Principal) Ventral hernia with bowel obstruction 5/1/2021 Yes    Toxic metabolic encephalopathy 3/7/5250 No    Other hydronephrosis 5/3/2021 Yes    Acute respiratory failure with hypoxia (HCC) 6/6/0838 No    Metabolic acidosis 0/1/7648 No    Normochromic normocytic anemia 5/1/2021 Yes    Iron deficiency anemia 5/2/2021 Yes    Vitamin B 12 deficiency 5/3/2021 Yes    Folate deficiency 5/3/2021 Yes    Essential hypertension (Chronic) 5/1/2021 Yes    Renal mass (Chronic) 5/1/2021 Yes    KARMA (acute kidney injury) ([facility] [address] 5/1/2021 Yes          Medications: Allergies:     Allergies   Allergen Reactions    Nsaids Anaphylaxis     GI bleed    Tetracyclines & Related        Current Meds:   Scheduled Meds:    insulin lispro  0-12 Units Subcutaneous Q4H    polyethylene glycol  17 g Oral Daily    bisacodyl  10 mg Rectal Daily    acetaminophen  1,000 mg Oral 2 times per day    melatonin  3 mg Oral Nightly    heparin (porcine)  5,000 Units Subcutaneous 3 times per day    sodium chloride flush  5-40 mL Intravenous 2 times per day    lidocaine  2 patch Topical Q12H    famotidine (PEPCID) injection  10 mg Intravenous BID     Continuous Infusions:    dextrose      sodium chloride       PRN Meds: glucose, dextrose, glucagon (rDNA), dextrose, acetaminophen, [Held by provider] oxyCODONE, albuterol sulfate HFA, sodium chloride flush, sodium chloride, ondansetron    Data:     Past Medical History:   has a past medical history of Arthritis, Caffeine use, Cancer ([facility] [address] GERD (gastroesophageal reflux disease), Hernia, inguinal, right, Hypertension, Kidney stone, Presence of urostomy ([facility] [address] and Retinal detachment. Vitals:  /61   Pulse 57   Temp 97.3 °F (36.3 °C) (Axillary)   Resp 16   Ht 6' 1\" (1.854 m)   Wt 245 lb (111.1 kg)   SpO2 91%   BMI 32.32 kg/m²   Temp (24hrs), Av.7 °F (36.5 °C), Min:97.3 °F (36.3 °C), Max:98.4 °F (36.9 °C)    Recent Labs     21  1205 21  1621 21  2020 21  2310   POCGLU 174* 155* 177* 163*       I/O (24Hr): Intake/Output Summary (Last 24 hours) at 2021 0222  Last data filed at 2021 2101  Gross per 24 hour   Intake 2600 ml   Output 1750 ml   Net 850 ml       Labs:    Hematology:  Recent Labs     21  0634 21  0529 21  0536   WBC 9.6 9.3 8.9   RBC 4.19* 3.92* 3.84*   HGB 11.4* 10.7* 10.4*   HCT 38.0* 36.0* 35.1*   MCV 90.7 91.8 91.4   MCH 27.2 27.3 27.1   MCHC 30.0 29.7 29.6   RDW 16.3* 16.1* 15.9*    217 208   MPV 9.4 9.3 9.5   SEGS 80* 80* 80*   LYMPHOPCT 10* 11* 10*   MONOPCT 8 6 6   EOSRELPCT 1 2 3   BASOPCT 0 0 0     Chemistry:  Recent Labs     21  0536 21  1210 21  1832 21  0032   * 147* 148* 146*   K 4.0 3.9 4.1 4.1   * 110* 111* 109*   CO2 26 25 25 25   GLUCOSE 226* 198* 194* 220*   BUN 92* 85* 85* 84*   CREATININE 3.09* 2.82* 2.73* 2.75*   MG 2.6  --   --   --    ANIONGAP 13 12 12 12   LABGLOM 20* 22* 23* 22*   GFRAA 24* 26* 27* 27*   CALCIUM 9.0 9.0 9.0 9.0   PHOS 3.6  --   --   --      No results for input(s): PROT, LABALBU, EAG, I3AJEQS, M7ZRFDU, FT4, TSH, CORTISOL, PROLACTIN, AST, ALT, LDH, GGT, ALKPHOS, LABGGT, BILITOT, BILIDIR, AMMONIA, AMYLASE, LIPASE, LACTATE, CHOL, HDL, LDLCHOLESTEROL, CHOLHDLRATIO, TRIG, VLDL, PHENYTOIN, PHENYF, VALPROATE in the last 72 hours.   Glucose:  Recent Labs     21  1951 21  0725 21  1205 21  1621 21  2020 21  2310   POCGLU 192* 181* 174*

## 2021-05-09 NOTE — PLAN OF CARE
Ongoing  Goal: Mental status will be restored to baseline  Description: Mental status will be restored to baseline  Outcome: Ongoing     Problem: Injury - Risk of, Physical Injury:  Goal: Will remain free from falls  Description: Will remain free from falls  Outcome: Ongoing  Goal: Absence of physical injury  Description: Absence of physical injury  Outcome: Ongoing

## 2021-05-09 NOTE — PROGRESS NOTES
Physical Therapy  Facility/Department: [facility]  Daily Treatment Note  NAME: Natali Mackay  : 1940  MRN: [id number]    Date of Service: 2021    Discharge Recommendations:  Patient would benefit from continued therapy after discharge     Pt currently functioning below baseline. Would suggest additional therapy at time of discharge to maximize long term outcomes and prevent re-admission. Please refer to AM-PAC score for current level of function. Assessment   Body structures, Functions, Activity limitations: Decreased functional mobility ; Decreased cognition;Decreased endurance;Decreased ADL status; Decreased coordination;Decreased strength;Decreased balance;Decreased safe awareness  Assessment: Pt will benefit from continued skilled PT to address balance, strength, safe mobility and activity tolerance. Patient currently max x 2 for all bed mobility. Prognosis: Good  Decision Making: High Complexity  Patient Education: attempted to orient patient, but patient unable to retain information at this time. REQUIRES PT FOLLOW UP: Yes  Activity Tolerance  Activity Tolerance: Patient limited by cognitive status     Patient Diagnosis(es): The primary encounter diagnosis was Partial small bowel obstruction ([address] Diagnoses of Ventral hernia with bowel obstruction and Chronic kidney disease, unspecified CKD stage were also pertinent to this visit. has a past medical history of Arthritis, Caffeine use, Cancer ([address] GERD (gastroesophageal reflux disease), Hernia, inguinal, right, Hypertension, Kidney stone, Presence of urostomy ([address] and Retinal detachment. has a past surgical history that includes hernia repair; Bladder removal (); Prostate surgery (); Cataract removal (Bilateral); Colonoscopy; Eye surgery (Right, ); vitrectomy (Left, 16); Incisional hernia repair (2015); Incisional hernia repair (2014); laparoscopy (N/A, 2021); Cystoscopy (N/A, 5/3/2021);  IR GUIDED NEPHROSTOMY CATH PLACEMENT LEFT (5/3/2021); and IR GUIDED NEPHROSTOMY CATH PLACEMENT RIGHT (5/5/2021). Restrictions  Restrictions/Precautions  Restrictions/Precautions: Fall Risk, Up as Tolerated, General Precautions  Position Activity Restriction  Other position/activity restrictions: B nephrostomy tubes, MARIE drain, IV, NG tube, soft restraints  Subjective   General  Chart Reviewed: Yes  Response To Previous Treatment: Patient with no complaints from previous session. Family / Caregiver Present: No  Subjective  Subjective: Patient only minimally verbal this date. General Comment  Comments: Leti Henriquez RN reports patient medically appropriate for PT. Orientation  Orientation  Orientation Level: Oriented to place;Oriented to time;Oriented to situation;Oriented to person  Cognition   Cognition  Overall Cognitive Status: Exceptions  Arousal/Alertness: Inconsistent responses to stimuli  Following Commands: Does not follow commands  Attention Span: Unable to maintain attention  Memory: Decreased recall of biographical Information;Decreased recall of precautions;Decreased recall of recent events;Decreased short term memory  Safety Judgement: Decreased awareness of need for assistance;Decreased awareness of need for safety  Problem Solving: Decreased awareness of errors  Insights: Not aware of deficits  Initiation: Requires cues for all  Sequencing: Requires cues for all  Objective   Bed mobility  Rolling to Left: Maximum assistance;2 Person assistance;Dependent/Total  Supine to Sit: Maximum assistance;2 Person assistance;Dependent/Total  Sit to Supine: Maximum assistance;2 Person assistance;Dependent/Total  Scooting: Dependent/Total;Maximal assistance;2 Person assistance  Comment: Patient unable to follow verbal commands. Only minimally resistive to initial supine to sit.   Transfers  Comment: Not appropriate this date  Ambulation  Ambulation?: No  Neuromuscular Education  Neuromuscular Comments: Manual cues to facilitate upright posture, decrease posterior lean, and to try and get patient to lift head. Patient ,ax assist to lift head. Balance  Sitting - Static: Poor  Sitting - Dynamic: Poor  Comments: Pt required max verbal and manual cues for safety while sitting EOB. MaxA required while sitting EOB as pt demo'd moderate to severe posterior lean, patient did attempt to maintain seated balance after being upright for a few minutes. OutComes Score    AM-PAC Score  AM-PAC Inpatient Mobility Raw Score : 8 (05/09/21 1259)  AM-PAC Inpatient T-Scale Score : 28.52 (05/09/21 1259)  Mobility Inpatient CMS 0-100% Score: 86.62 (05/09/21 1259)  Mobility Inpatient CMS G-Code Modifier : CM (05/09/21 1259)          Goals  Short term goals  Time Frame for Short term goals: 12 visits  Short term goal 1: Pt to be [place] for all bed mobility  Short term goal 2: Pt will tolerate 25mins of PT including therex, theract, gait training and NMR  Short term goal 3: When appropriate, transfers/gait to be re-assessed  Short term goal 4: Pt will improve sitting balance to Good to improve core strength and decrease fall risk  Patient Goals   Patient goals : Pt unable to state    Plan    Plan  Times per week: 1-2x day / 6-7 days per week  Specific instructions for Next Treatment: RE-ASSESS transfers/gait  Current Treatment Recommendations: Strengthening, Endurance Training, Neuromuscular Re-education, Patient/Caregiver Education & Training, Balance Training, Home Exercise Program, Functional Mobility Training, Safety Education & Training, Positioning  Safety Devices  Type of devices: All fall risk precautions in place, Bed alarm in place, Call light within reach, Nurse notified, Left in bed  Restraints  Initially in place: Yes  Restraints: bilat UEs : restraints released for treatment and replaced at end of treatment.    Co-treatment with OT warranted secondary to decreased safety and independence requiring 2 skilled therapy professionals to address individual discipline's goals. PT addressing pre gait trunk strengthening, weight shifting prior to transfers and postural control in sitting.   Therapy Time   Individual Concurrent Group Co-treatment   Time In 0841         Time Out 0907         Minutes [address] [place]

## 2021-05-09 NOTE — PROGRESS NOTES
Occupational Therapy  Facility/Department: [facility] PROGRESSIVE CARE  Daily Treatment Note  NAME: Donna Rankin  : 1940  MRN: [id number]    Date of Service: 2021    Discharge Recommendations:  Patient would benefit from continued therapy after discharge      Pt currently functioning below baseline. Would suggest additional therapy at time of discharge to maximize long term outcomes and prevent re-admission. Please refer to AM-PAC score for current level of function. Assessment    Pt will require a reassessment for ADL transfers and functional mobility when appropriate. Performance deficits / Impairments: Decreased functional mobility ; Decreased ADL status; Decreased strength;Decreased safe awareness;Decreased cognition;Decreased endurance;Decreased sensation;Decreased balance;Decreased posture  Prognosis: Fair;Good  Decision Making: High Complexity  OT Education: OT Role;Plan of Care;Transfer Training;ADL Adaptive Strategies; Energy Conservation  Patient Education: safety in function, pressure relief, proper bed mobility tech, recommendations for continued therapy  Barriers to Learning: cogniton/fatigue  REQUIRES OT FOLLOW UP: Yes  Activity Tolerance  Activity Tolerance: Treatment limited secondary to decreased cognition;Patient limited by fatigue  Activity Tolerance: poor -  Safety Devices  Safety Devices in place: Yes  Type of devices: Call light within reach;Nurse notified; Left in bed;Gait belt;Patient at risk for falls; Bed alarm in place(elevated BLE's on pillow for skin integrity/edema mgmt. B wrist restraints placed on pt and RN in room at end of session)  Restraints  Initially in place: Yes  Restraints: solf wrist restraints         Patient Diagnosis(es): The primary encounter diagnosis was Partial small bowel obstruction ([facility] [address] Diagnoses of Ventral hernia with bowel obstruction and Chronic kidney disease, unspecified CKD stage were also pertinent to this visit.       has a past medical history of Arthritis, Caffeine use, Cancer ([facility] [address] GERD (gastroesophageal reflux disease), Hernia, inguinal, right, Hypertension, Kidney stone, Presence of urostomy ([facility] [address] and Retinal detachment. has a past surgical history that includes hernia repair; Bladder removal (2006); Prostate surgery (2006); Cataract removal (Bilateral); Colonoscopy; Eye surgery (Right, 2000); vitrectomy (Left, 02/02/16); Incisional hernia repair (12/7/2015); Incisional hernia repair (11/2014); laparoscopy (N/A, 4/30/2021); Cystoscopy (N/A, 5/3/2021); IR GUIDED NEPHROSTOMY CATH PLACEMENT LEFT (5/3/2021); and IR GUIDED NEPHROSTOMY CATH PLACEMENT RIGHT (5/5/2021). Restrictions  Restrictions/Precautions  Restrictions/Precautions: Fall Risk, Up as Tolerated, General Precautions  Position Activity Restriction  Other position/activity restrictions: B nephrostomy tubes, MARIE drain, IV, NG tube, soft restraints     Subjective   General  Chart Reviewed: Yes  Patient assessed for rehabilitation services?: Yes  Family / Caregiver Present: No  Subjective  Subjective: Patient only minimally verbal this date. Vital Signs  Patient Currently in Pain: No     Objective     Balance  Sitting Balance: Maximum assistance(x2 staff assist for safety and balance when sitting EOB.)  Standing Balance  Comment: Once EOB Pt had a posterior lean required physical assist/tactile cues for upright posture, lifting head and decreasing posterior lean. Functional Mobility  Functional Mobility Comments: N/T No safe or approriate this date  Bed mobility  Rolling to Left: Maximum assistance;2 Person assistance;Dependent/Total  Supine to Sit: Maximum assistance;2 Person assistance;Dependent/Total  Sit to Supine: Maximum assistance;2 Person assistance;Dependent/Total  Scooting: Dependent/Total;Maximal assistance;2 Person assistance  Comment: Pt resistive duirng initial supine to sit. Pt completed one roll <> for optimal positioning/linen adjustment.  Pt required MAX tactile assist/hand over hand assist/verbal cues for hand placement on bedrails, sequencing, initation, proper bed mobilty tech and awareness/assist with MULTIPLE lines all to increase safety. Boost pt up in bed Depx2 staff assist for optimal postioning. Cognition  Overall Cognitive Status: Exceptions  Arousal/Alertness: Inconsistent responses to stimuli  Following Commands: Does not follow commands  Attention Span: Unable to maintain attention  Memory: Decreased recall of biographical Information;Decreased recall of precautions;Decreased recall of recent events;Decreased short term memory  Safety Judgement: Decreased awareness of need for assistance;Decreased awareness of need for safety  Problem Solving: Decreased awareness of errors  Insights: Not aware of deficits  Initiation: Requires cues for all  Sequencing: Requires cues for all  Cognition Comment: Pt minimally verbal this session.      Perception  Overall Perceptual Status: Impaired  Initiation: Hand over hand to initiate tasks  Motor Planning: Hand over hand to sequence tasks              Plan   Plan  Times per week: 4-5x/week, 1-2x/day  Current Treatment Recommendations: Strengthening, Balance Training, Functional Mobility Training, Endurance Training, Safety Education & Training, Patient/Caregiver Education & Training, Self-Care / ADL, Positioning, Equipment Evaluation, Education, & procurement, Cognitive Reorientation, Cognitive/Perceptual Training    AM-PAC Score        AM-[facility] Inpatient Daily Activity Raw Score: 8 (05/09/21 1341)  AM-PAC Inpatient ADL T-Scale Score : 22.86 (05/09/21 1341)  ADL Inpatient CMS 0-100% Score: 85.69 (05/09/21 1341)  ADL Inpatient CMS G-Code Modifier : CM (05/09/21 1341)    Goals  Short term goals  Time Frame for Short term goals: by discharge, pt will  Short term goal 1: tolerate reassessment of ADL transfers/mobility  Short term goal 2: demo mod A x 1 for bed mobility with rails/controls  Short term goal 3: demo min A with simple grooming tasks following set up with min cues for initiation/sequecing  Short term goal 4: demo CGA with sitting balance at EOB x 15 min for inc. particiopation in ADL tasks  Short term goal 5: demo min A with UB ADLs at seated level and asssess LB when pt is able to stand  Patient Goals   Patient goals : not stated       Therapy Time   Individual Concurrent Group Co-treatment   Time In 0848         Time Out 0916         Minutes 28              Co-treatment with PT warranted secondary to decreased safety and independence requiring 2 skilled therapy professionals to address individual discipline's goals.        Katja Chua, OT

## 2021-05-10 ENCOUNTER — APPOINTMENT (OUTPATIENT)
Dept: GENERAL RADIOLOGY | Age: 81
DRG: 329 | End: 2021-05-10
Payer: MEDICARE

## 2021-05-10 LAB
ABSOLUTE EOS #: 0.24 K/UL (ref 0–0.44)
ABSOLUTE IMMATURE GRANULOCYTE: 0.08 K/UL (ref 0–0.3)
ABSOLUTE LYMPH #: 0.99 K/UL (ref 1.1–3.7)
ABSOLUTE MONO #: 0.55 K/UL (ref 0.1–1.2)
ANION GAP SERPL CALCULATED.3IONS-SCNC: 11 MMOL/L (ref 9–17)
ANION GAP SERPL CALCULATED.3IONS-SCNC: 12 MMOL/L (ref 9–17)
BASOPHILS # BLD: 0 % (ref 0–2)
BASOPHILS ABSOLUTE: 0.04 K/UL (ref 0–0.2)
BUN BLDV-MCNC: 84 MG/DL (ref 8–23)
BUN BLDV-MCNC: 85 MG/DL (ref 8–23)
BUN/CREAT BLD: 30 (ref 9–20)
BUN/CREAT BLD: 31 (ref 9–20)
CALCIUM SERPL-MCNC: 8.8 MG/DL (ref 8.6–10.4)
CALCIUM SERPL-MCNC: 9.2 MG/DL (ref 8.6–10.4)
CHLORIDE BLD-SCNC: 109 MMOL/L (ref 98–107)
CHLORIDE BLD-SCNC: 110 MMOL/L (ref 98–107)
CO2: 25 MMOL/L (ref 20–31)
CO2: 28 MMOL/L (ref 20–31)
CREAT SERPL-MCNC: 2.76 MG/DL (ref 0.7–1.2)
CREAT SERPL-MCNC: 2.84 MG/DL (ref 0.7–1.2)
DIFFERENTIAL TYPE: ABNORMAL
EKG ATRIAL RATE: 67 BPM
EKG P AXIS: 62 DEGREES
EKG P-R INTERVAL: 236 MS
EKG Q-T INTERVAL: 406 MS
EKG QRS DURATION: 108 MS
EKG QTC CALCULATION (BAZETT): 429 MS
EKG R AXIS: -12 DEGREES
EKG T AXIS: 9 DEGREES
EKG VENTRICULAR RATE: 67 BPM
EOSINOPHILS RELATIVE PERCENT: 2 % (ref 1–4)
GFR AFRICAN AMERICAN: 26 ML/MIN
GFR AFRICAN AMERICAN: 27 ML/MIN
GFR NON-AFRICAN AMERICAN: 22 ML/MIN
GFR NON-AFRICAN AMERICAN: 22 ML/MIN
GFR SERPL CREATININE-BSD FRML MDRD: ABNORMAL ML/MIN/{1.73_M2}
GLUCOSE BLD-MCNC: 156 MG/DL (ref 75–110)
GLUCOSE BLD-MCNC: 182 MG/DL (ref 75–110)
GLUCOSE BLD-MCNC: 183 MG/DL (ref 75–110)
GLUCOSE BLD-MCNC: 189 MG/DL (ref 70–99)
GLUCOSE BLD-MCNC: 190 MG/DL (ref 75–110)
GLUCOSE BLD-MCNC: 198 MG/DL (ref 75–110)
GLUCOSE BLD-MCNC: 201 MG/DL (ref 75–110)
GLUCOSE BLD-MCNC: 230 MG/DL (ref 70–99)
HCT VFR BLD CALC: 37.6 % (ref 40.7–50.3)
HEMOGLOBIN: 10.7 G/DL (ref 13–17)
IMMATURE GRANULOCYTES: 1 %
LV EF: 60 %
LVEF MODALITY: NORMAL
LYMPHOCYTES # BLD: 10 % (ref 24–43)
MAGNESIUM: 2.8 MG/DL (ref 1.6–2.6)
MCH RBC QN AUTO: 27.1 PG (ref 25.2–33.5)
MCHC RBC AUTO-ENTMCNC: 28.5 G/DL (ref 28.4–34.8)
MCV RBC AUTO: 95.2 FL (ref 82.6–102.9)
MONOCYTES # BLD: 6 % (ref 3–12)
NRBC AUTOMATED: 0 PER 100 WBC
PDW BLD-RTO: 16 % (ref 11.8–14.4)
PHOSPHORUS: 4.4 MG/DL (ref 2.5–4.5)
PLATELET # BLD: 210 K/UL (ref 138–453)
PLATELET ESTIMATE: ABNORMAL
PMV BLD AUTO: 10.3 FL (ref 8.1–13.5)
POTASSIUM SERPL-SCNC: 4.6 MMOL/L (ref 3.7–5.3)
POTASSIUM SERPL-SCNC: 4.9 MMOL/L (ref 3.7–5.3)
RBC # BLD: 3.95 M/UL (ref 4.21–5.77)
RBC # BLD: ABNORMAL 10*6/UL
SEG NEUTROPHILS: 81 % (ref 36–65)
SEGMENTED NEUTROPHILS ABSOLUTE COUNT: 8.12 K/UL (ref 1.5–8.1)
SODIUM BLD-SCNC: 147 MMOL/L (ref 135–144)
SODIUM BLD-SCNC: 148 MMOL/L (ref 135–144)
WBC # BLD: 10 K/UL (ref 3.5–11.3)
WBC # BLD: ABNORMAL 10*3/UL

## 2021-05-10 PROCEDURE — 6370000000 HC RX 637 (ALT 250 FOR IP): Performed by: STUDENT IN AN ORGANIZED HEALTH CARE EDUCATION/TRAINING PROGRAM

## 2021-05-10 PROCEDURE — 6370000000 HC RX 637 (ALT 250 FOR IP): Performed by: SPECIALIST

## 2021-05-10 PROCEDURE — 97530 THERAPEUTIC ACTIVITIES: CPT

## 2021-05-10 PROCEDURE — 85025 COMPLETE CBC W/AUTO DIFF WBC: CPT

## 2021-05-10 PROCEDURE — 99232 SBSQ HOSP IP/OBS MODERATE 35: CPT | Performed by: STUDENT IN AN ORGANIZED HEALTH CARE EDUCATION/TRAINING PROGRAM

## 2021-05-10 PROCEDURE — 92610 EVALUATE SWALLOWING FUNCTION: CPT

## 2021-05-10 PROCEDURE — 2500000003 HC RX 250 WO HCPCS: Performed by: SPECIALIST

## 2021-05-10 PROCEDURE — 2060000000 HC ICU INTERMEDIATE R&B

## 2021-05-10 PROCEDURE — 6360000002 HC RX W HCPCS: Performed by: SPECIALIST

## 2021-05-10 PROCEDURE — 93010 ELECTROCARDIOGRAM REPORT: CPT | Performed by: INTERNAL MEDICINE

## 2021-05-10 PROCEDURE — 2580000003 HC RX 258: Performed by: INTERNAL MEDICINE

## 2021-05-10 PROCEDURE — 36415 COLL VENOUS BLD VENIPUNCTURE: CPT

## 2021-05-10 PROCEDURE — 74018 RADEX ABDOMEN 1 VIEW: CPT

## 2021-05-10 PROCEDURE — 83735 ASSAY OF MAGNESIUM: CPT

## 2021-05-10 PROCEDURE — 93306 TTE W/DOPPLER COMPLETE: CPT

## 2021-05-10 PROCEDURE — 80048 BASIC METABOLIC PNL TOTAL CA: CPT

## 2021-05-10 PROCEDURE — 82947 ASSAY GLUCOSE BLOOD QUANT: CPT

## 2021-05-10 PROCEDURE — C8929 TTE W OR WO FOL WCON,DOPPLER: HCPCS

## 2021-05-10 PROCEDURE — 2580000003 HC RX 258: Performed by: SPECIALIST

## 2021-05-10 PROCEDURE — 97116 GAIT TRAINING THERAPY: CPT

## 2021-05-10 PROCEDURE — 6360000002 HC RX W HCPCS: Performed by: STUDENT IN AN ORGANIZED HEALTH CARE EDUCATION/TRAINING PROGRAM

## 2021-05-10 PROCEDURE — 97535 SELF CARE MNGMENT TRAINING: CPT

## 2021-05-10 PROCEDURE — 84100 ASSAY OF PHOSPHORUS: CPT

## 2021-05-10 RX ORDER — INSULIN GLARGINE 100 [IU]/ML
10 INJECTION, SOLUTION SUBCUTANEOUS DAILY
Status: DISCONTINUED | OUTPATIENT
Start: 2021-05-10 | End: 2021-05-14 | Stop reason: HOSPADM

## 2021-05-10 RX ORDER — MAGNESIUM CARB/ALUMINUM HYDROX 105-160MG
30 TABLET,CHEWABLE ORAL DAILY PRN
Status: DISCONTINUED | OUTPATIENT
Start: 2021-05-10 | End: 2021-05-14 | Stop reason: HOSPADM

## 2021-05-10 RX ADMIN — ACETAMINOPHEN ORAL SOLUTION 1000 MG: 325 SOLUTION ORAL at 21:02

## 2021-05-10 RX ADMIN — ACETAMINOPHEN ORAL SOLUTION 1000 MG: 325 SOLUTION ORAL at 09:49

## 2021-05-10 RX ADMIN — FAMOTIDINE 10 MG: 10 INJECTION, SOLUTION INTRAVENOUS at 21:01

## 2021-05-10 RX ADMIN — HEPARIN SODIUM 5000 UNITS: 5000 INJECTION INTRAVENOUS; SUBCUTANEOUS at 05:39

## 2021-05-10 RX ADMIN — BISACODYL 10 MG: 10 SUPPOSITORY RECTAL at 09:32

## 2021-05-10 RX ADMIN — INSULIN LISPRO 2 UNITS: 100 INJECTION, SOLUTION INTRAVENOUS; SUBCUTANEOUS at 21:52

## 2021-05-10 RX ADMIN — FAMOTIDINE 10 MG: 10 INJECTION, SOLUTION INTRAVENOUS at 09:27

## 2021-05-10 RX ADMIN — INSULIN LISPRO 2 UNITS: 100 INJECTION, SOLUTION INTRAVENOUS; SUBCUTANEOUS at 14:02

## 2021-05-10 RX ADMIN — INSULIN LISPRO 4 UNITS: 100 INJECTION, SOLUTION INTRAVENOUS; SUBCUTANEOUS at 05:39

## 2021-05-10 RX ADMIN — DEXTROSE MONOHYDRATE: 50 INJECTION, SOLUTION INTRAVENOUS at 21:59

## 2021-05-10 RX ADMIN — DEXTROSE MONOHYDRATE: 50 INJECTION, SOLUTION INTRAVENOUS at 11:50

## 2021-05-10 RX ADMIN — INSULIN LISPRO 2 UNITS: 100 INJECTION, SOLUTION INTRAVENOUS; SUBCUTANEOUS at 18:10

## 2021-05-10 RX ADMIN — INSULIN GLARGINE 10 UNITS: 100 INJECTION, SOLUTION SUBCUTANEOUS at 09:41

## 2021-05-10 RX ADMIN — SODIUM CHLORIDE, PRESERVATIVE FREE 10 ML: 5 INJECTION INTRAVENOUS at 09:29

## 2021-05-10 RX ADMIN — HEPARIN SODIUM 5000 UNITS: 5000 INJECTION INTRAVENOUS; SUBCUTANEOUS at 21:52

## 2021-05-10 RX ADMIN — POLYETHYLENE GLYCOL (3350) 17 G: 17 POWDER, FOR SOLUTION ORAL at 09:27

## 2021-05-10 RX ADMIN — MAGNESIUM HYDROXIDE 30 ML: 400 SUSPENSION ORAL at 09:27

## 2021-05-10 RX ADMIN — HEPARIN SODIUM 5000 UNITS: 5000 INJECTION INTRAVENOUS; SUBCUTANEOUS at 14:02

## 2021-05-10 RX ADMIN — PERFLUTREN 1.43 MG: 6.52 INJECTION, SUSPENSION INTRAVENOUS at 13:30

## 2021-05-10 RX ADMIN — Medication 3 MG: at 21:01

## 2021-05-10 RX ADMIN — INSULIN LISPRO 2 UNITS: 100 INJECTION, SOLUTION INTRAVENOUS; SUBCUTANEOUS at 01:32

## 2021-05-10 RX ADMIN — INSULIN LISPRO 2 UNITS: 100 INJECTION, SOLUTION INTRAVENOUS; SUBCUTANEOUS at 09:41

## 2021-05-10 ASSESSMENT — ENCOUNTER SYMPTOMS
ABDOMINAL DISTENTION: 0
PHOTOPHOBIA: 0
ABDOMINAL PAIN: 0
APNEA: 0
SINUS PRESSURE: 1
DIARRHEA: 0
SHORTNESS OF BREATH: 0
EYE DISCHARGE: 0
SORE THROAT: 0

## 2021-05-10 ASSESSMENT — PAIN DESCRIPTION - DESCRIPTORS: DESCRIPTORS: ACHING

## 2021-05-10 ASSESSMENT — PAIN SCALES - GENERAL
PAINLEVEL_OUTOF10: 0
PAINLEVEL_OUTOF10: 0

## 2021-05-10 ASSESSMENT — PAIN DESCRIPTION - FREQUENCY: FREQUENCY: INTERMITTENT

## 2021-05-10 NOTE — PLAN OF CARE
Problem: Falls - Risk of:  Goal: Will remain free from falls  Description: Will remain free from falls  Outcome: Ongoing   Patient remains free from falls and injuries, freq. Rounding, call light at reach, bed alarm armed, personal items within reach, bed in lowest position with wheels locked, intermittent confusion noted, no attempts to get out of bed unassisted, falling star program in place and patients safety maintained

## 2021-05-10 NOTE — PROGRESS NOTES
Reason for follow-up :  Acute kidney injury. Subjective    Patient seen and examined at the bedside. Patient has bilateral nephrostomy tubes. RN reported left nephrostomy tube urine output of 300 mL right nephrostomy tube urine output of 159 and urostomy output of 50 mL since this morning  Patient is pleasantly confused  Not agitated    Results for Cathy Gonzáles (MRN [id number]) as of 5/10/2021 10:34   Ref. Range 5/9/2021 06:11 5/9/2021 12:10 5/9/2021 17:58 5/9/2021 23:58 5/10/2021 05:30   Creatinine Latest Ref Range: 0.70 - 1.20 mg/dL 3.03 (H) 2.97 (H) 2.82 (H) 2.76 (H) 2.84 (H)       HISTORY OF PRESENT ILLNESS:    The patient is a 80 y.o. male had multiple abdominal surgeries presented with enlarging incarcerated/strangulated parastomal hernia, who underwent exploratory laparotomy with reduction of the hernia and small bowel resection on 4/30/2021. He had history of bladder cancer s/p cystectomy and ileoconduit formation in 2006. His baseline creatinine has been between 1.5-2.1, it was 2.85 at presentation and increased this morning to 3.99. Patient had a CT scan of the abdomen upon presentation that was done without contrast, it was remarkable for extensive stone burden in both kidneys greater on the left, it showed uroepithelial wall thickening with inflammatory stranding involving the left collecting system, was also remarkable for enlargement of the right upper pole mass. Renal ultrasound was done yesterday showed right hydronephrosis and nephrolithiasis.   Patient has been nonoliguric, urine output over the last 24 hours was 2.7 L      Review Of Systems:   Unable to obtain as he is confused     Scheduled Meds:   insulin glargine  10 Units Subcutaneous Daily    magnesium hydroxide  30 mL Per NG tube Daily    insulin lispro  0-12 Units Subcutaneous Q4H    polyethylene glycol  17 g Oral Daily    acetaminophen  1,000 mg Oral 2 times per day    melatonin  3 mg Oral Nightly    heparin (porcine) 5,000 Units Subcutaneous 3 times per day    sodium chloride flush  5-40 mL Intravenous 2 times per day    lidocaine  2 patch Topical Q12H    famotidine (PEPCID) injection  10 mg Intravenous BID     Continuous Infusions:   dextrose 100 mL/hr at 05/09/21 2138    dextrose      sodium chloride       PRN Meds:mineral oil, glucose, dextrose, glucagon (rDNA), dextrose, acetaminophen, [Held by provider] oxyCODONE, albuterol sulfate HFA, sodium chloride flush, sodium chloride, ondansetron     Physical Exam:  Vitals:    05/09/21 2343 05/10/21 0408 05/10/21 0527 05/10/21 0903   BP: (!) 121/55 (!) 108/59  (!) 95/41   Pulse: 54 68  60   Resp: 18 18  16   Temp: 98.1 °F (36.7 °C) 98.1 °F (36.7 °C)  97.3 °F (36.3 °C)   TempSrc: Oral Oral  Oral   SpO2: 97% 97%  100%   Weight:   241 lb 2 oz (109.4 kg)    Height:         I/O last 3 completed shifts: In: 2700 [NG/GT:2700]  Out: 1410 [Urine:1400; Drains:10]    General:  Awake, confused not in distress. HEENT: Atraumatic, normocephalic. Anicteric sclera. Pink and dry. Neck supple. No JVD. Chest: Bilateral air entry, clear to auscultation, no wheezing, rhonchi or rales. Cardiovascular: RRR, S1S2, no murmur, rub or gallop. No lower extremity edema. Abdomen: Soft, non tender to palpation. Musculoskeletal: No cyanosis or clubbing. Integumentary: Pink, warm and dry. Free from rash or lesions. CNS: Confused face symmetrical. No tremor.      Data:  CBC:   Lab Results   Component Value Date    WBC 10.0 05/10/2021    HGB 10.7 (L) 05/10/2021    HCT 37.6 (L) 05/10/2021    MCV 95.2 05/10/2021     05/10/2021     BMP:    Lab Results   Component Value Date     (H) 05/10/2021     (H) 05/09/2021     (H) 05/09/2021    K 4.6 05/10/2021    K 4.9 05/09/2021    K 4.6 05/09/2021     (H) 05/10/2021     (H) 05/09/2021     (H) 05/09/2021    CO2 25 05/10/2021    CO2 28 05/09/2021    CO2 27 05/09/2021    BUN 84 (H) 05/10/2021    BUN 85 (H) 05/09/2021 placement  Left staghorn calculi  Metabolic encephalopathy multifactorial    Plan:    Patient is showing improvement in terms of serum creatinine and EGFR   BUN is improving as well   We will continue free water supplementation with D5 water   Serum sodium slowly and appropriately improving  No indication for renal replacement therapy   Patient has multifactorial issues including renal cell cancer with enlarging renal mass, encephalopathy obstructive uropathy, as well as component of ATN   Decrease the frequency of follow-up labs to daily labs    Electronically signed by Jennifer Hooper MD on 5/10/2021 at 10:34 AM  [facility].   Ph: [phone]

## 2021-05-10 NOTE — FLOWSHEET NOTE
Dr Parminder Forde informed of results of bedside swallow study, informed that it is recommended that patient has a video swallow study

## 2021-05-10 NOTE — PLAN OF CARE
Siderails up x 2  Hourly rounding  Call light in reach  Instructed to call for assist before attempting out of bed. Remains free from falls and accidental injury at this time   Floor free from obstacles  Bed is locked and in lowest position  Adequate lighting provided  Bed alarm on, [place] star and Stay with Me signs posted    Checked for incontinence every 2 hours and prn. Pericare as needed. Assisted to reposition off back frequently. On waffle mattress. Heels off bed with pillows.

## 2021-05-10 NOTE — PLAN OF CARE
Nutrition Problem #1: Altered GI function  Intervention: Food and/or Nutrient Delivery: Continue NPO, Continue Current Tube Feeding  Nutritional Goals: Enteral nutrition will meet greater than 85% of estimated nutrition needs

## 2021-05-10 NOTE — PROGRESS NOTES
Nutrition Assessment     Type and Reason for Visit: Reassess    Nutrition Recommendations/Plan:   1. Diet advanced to Dysphagia Pureed; Renal. Tube feedings discontinued  2. Start Magic Cup 2x/day  3. Monitor p.o intakes, diet tolerance and labs. Nutrition Assessment:  Patient was NPO and receiving NG tube feeding Osmolite 1.5 Gigi at goal rate 61 mL/hr. Patient wants to eat and is status post bedside swallow study. Recommendation for Pureed solids, no liquids and modified barium swallow study. Monitor tube feeding rate, tolerance, labs and swallow study results. Malnutrition Assessment:  Malnutrition Status: Insufficient data    Estimated Daily Nutrient Needs:  Energy (kcal): 2184 kcal ([place]-St. Jeor 1.2 factor); Weight Used for Energy Requirements:  Other (Comment)(108.1 kg)     Protein (g): 100-117 gm (1.2-1.4 gm/kg); Weight Used for Protein Requirements:  Ideal        Fluid (ml/day): 2200 mL/day; Weight Used for Fluid Requirements:  1 ml/kcal      Nutrition Related Findings: No edema. S/P Bilateral hydro and nephrolithiasis. Right nephrostomy tube (5/5). NG tube feedings      Current Nutrition Therapies:    DIET DYSPHAGIA PUREED;  Renal    Anthropometric Measures:  · Height: 6' 1\" (185.4 cm)  · Current Body Wt: 241 lb (109.3 kg)   · BMI: 31.8    Nutrition Diagnosis:   · Altered GI function related to altered GI function(partial bowel obstruction) as evidenced by GI abnormality, NPO or clear liquid status due to medical condition, nutrition support - enteral nutrition      Nutrition Interventions:   Food and/or Nutrient Delivery:  Continue NPO, Continue Current Tube Feeding  Nutrition Education/Counseling:  Education not indicated   Coordination of Nutrition Care:  Continue to monitor while inpatient    Goals:  Enteral nutrition will meet greater than 85% of estimated nutrition needs       Nutrition Monitoring and Evaluation:   Food/Nutrient Intake Outcomes:  Enteral Nutrition Intake/Tolerance  Physical Signs/Symptoms Outcomes:  Biochemical Data, Fluid Status or Edema, Chewing or Swallowing, Weight, Skin     Discharge Planning:     Too soon to determine         Mavis YUN, RDN, LDN  Lead Clinical Dietitian  RD Office Phone [phone]

## 2021-05-10 NOTE — PROGRESS NOTES
Patient is more alert and talking with us. He was able to tell us that he needed his mouth suctioned. Will continue to monitor.

## 2021-05-10 NOTE — PROGRESS NOTES
Patient resting comfortably in bed. At the beginning of the writer's shift, I asked what he wanted to listen to on television? I suggest sports and he said no. I suggest History channel and he said that he liked that. He mostly is resting with his eyes closed. Continuous monitoring of patient as well as tubes and lines. He did have a small Bowel movement, which was dark brown in color. Placed a Mepilex on Coccyx. Will continue to monitor.

## 2021-05-10 NOTE — PROGRESS NOTES
Physical Therapy  Facility/Department: [facility] PROGRESSIVE CARE  Daily Treatment Note  NAME: Eben Tomlinson  : 1940  MRN: [id number]    Date of Service: 5/10/2021    Discharge Recommendations:  Patient would benefit from continued therapy after discharge     Pt presenting with new musculoskeletal dysfunction and would benefit from additional therapy at time of discharge. Please refer to the AM-PAC score for current functional status. Assessment   Body structures, Functions, Activity limitations: Decreased functional mobility ; Decreased cognition;Decreased endurance;Decreased ADL status; Decreased coordination;Decreased strength;Decreased balance;Decreased safe awareness  Assessment: Pt will benefit from continued skilled PT to address balance, strength, safe mobility and activity tolerance. Patient currently mod x 2 for all balance and pre gait activities. Specific instructions for Next Treatment: RE-ASSESS transfers/gait  Prognosis: Good  Decision Making: High Complexity  Exam: ROM, MMT, endurance, AM-PAC  Clinical Presentation: unstable  PT Education: PT Role;Plan of Care;General Safety; Functional Mobility Training  Patient Education: Patient educated on log roll techniques for safe bed mobility tasks. REQUIRES PT FOLLOW UP: Yes  Activity Tolerance  Activity Tolerance: Patient limited by cognitive status; Patient limited by endurance; Patient limited by pain; Patient limited by fatigue     Patient Diagnosis(es): The primary encounter diagnosis was Partial small bowel obstruction ([address] Diagnoses of Ventral hernia with bowel obstruction and Chronic kidney disease, unspecified CKD stage were also pertinent to this visit. has a past medical history of Arthritis, Caffeine use, Cancer ([address] GERD (gastroesophageal reflux disease), Hernia, inguinal, right, Hypertension, Kidney stone, Presence of urostomy ([address] and Retinal detachment.    has a past surgical history that includes hernia repair; Bladder removal becomes fixated on lines and bandages and trys to remove them requires increased reminders to leave them in place. Transfers  Sit to Stand: Minimal Assistance; Moderate Assistance;2 Person Assistance  Stand to sit: Minimal Assistance; Moderate Assistance;2 Person Assistance  Lateral Transfers: Minimal Assistance; Moderate Assistance;2 Person Assistance  Comment: Patient with initial anterior lean upon standing. Patient requires tactile and vc's for self correction. Patient requires min/mod of 2 assist to maximize balance and stability, patient relies on increased UE support on RW during all stance activities. Patient requires vc's and assist for proper placement of RW to promote lateral transfer to [address]. Patient requires increased assist for line management and awareness throughout treatment. 9  Ambulation  Ambulation?: Yes  Ambulation 1  Surface: level tile  Device: Rolling Walker  Assistance: Minimal assistance; Moderate assistance;2 Person assistance  Quality of Gait: Slow shuffling steps with education for progression and sequencing to move LE and for positioning of Assistive device. Gait Deviations: Slow Nadya;Decreased step height;Decreased step length;Shuffles  Distance: 2 feet to [address]  Comments: patient with anterior lean and requires vc's for posture correction to promote balance and stability to maximize independence in mobility tasks. Patient with fair to poor carry over. Stairs/Curb  Stairs?: No  Neuromuscular Education  NDT Treatment: Gait ;Lower extremity; Sitting;Standing  Neuromuscular Comments: [place] requires tactile and vc's to maximize balance and stability and decrease anterior lean to maintain stance posture and maximize mobility  Balance  Posture: Poor  Sitting - Static: Fair;-  Sitting - Dynamic: Poor;+  Standing - Static: Poor  Standing - Dynamic: Poor  Comments: Patient with increased posterior lean in sitting able to self correct with vc's.   Patient with increased anterior lean in standing requiring min to mod assist for safe stance activities to promote pre gait and balance awareness. Comment: Patient able to initiate bed mobility tasks to assist with pericare. AM-PAC Score  AM-PAC Inpatient Mobility Raw Score : 10 (05/10/21 1139)  AM-PAC Inpatient T-Scale Score : 32.29 (05/10/21 1139)  Mobility Inpatient CMS 0-100% Score: 76.75 (05/10/21 1139)  Mobility Inpatient CMS G-Code Modifier : CL (05/10/21 1139)          Goals  Short term goals  Time Frame for Short term goals: 12 visits  Short term goal 1: Pt to be [place] for all bed mobility  Short term goal 2: Pt will tolerate 25mins of PT including therex, theract, gait training and NMR  Short term goal 3: When appropriate, transfers/gait to be re-assessed  Short term goal 4: Pt will improve sitting balance to Good to improve core strength and decrease fall risk  Patient Goals   Patient goals : Pt unable to state    Plan    Plan  Times per week: 1-2x day / 6-7 days per week  Specific instructions for Next Treatment: RE-ASSESS transfers/gait  Current Treatment Recommendations: Strengthening, Endurance Training, Neuromuscular Re-education, Patient/Caregiver Education & Training, Balance Training, Home Exercise Program, Functional Mobility Training, Safety Education & Training, Positioning  Safety Devices  Type of devices:  All fall risk precautions in place, Bed alarm in place, Call light within reach, Nurse notified, Left in bed  Restraints  Initially in place: Yes  Restraints: bilat UEs     Therapy Time   Individual Concurrent Group Co-treatment   Time In [address] [place] [address]

## 2021-05-10 NOTE — FLOWSHEET NOTE
Family visiting at bedside, informed that patient had pulled NG tube out, writer in, NG tube reinserted, Dr Rivas Ramires notified and request for x-ray to verify placement, patient tolerated re-insertion without complaints, patient incontinent of lg soft stool, incontinent care provided at this time, tube feed held pending xray results

## 2021-05-10 NOTE — PROGRESS NOTES
05/10/2021    K 4.6 05/10/2021     05/10/2021    CO2 25 05/10/2021    BUN 84 05/10/2021    LABALBU 3.8 05/02/2021    CREATININE 2.84 05/10/2021    CALCIUM 8.8 05/10/2021    GFRAA 26 05/10/2021    LABGLOM 22 05/10/2021    GLUCOSE 230 05/10/2021       Radiology Review:      ASSESSMENT:  Active Hospital Problems    Diagnosis Date Noted    Other hydronephrosis [N13.39] 05/03/2021     Priority: High    Acute respiratory failure with hypoxia (HCC) [J96.01] 05/03/2021     Priority: High    Metabolic acidosis [G84.8] 05/03/2021     Priority: High    Toxic metabolic encephalopathy [L72] 05/02/2021     Priority: High    Vitamin B 12 deficiency [E53.8] 05/03/2021     Priority: Medium    Folate deficiency [E53.8] 05/03/2021     Priority: Medium    Iron deficiency anemia [D50.9] 05/02/2021     Priority: Medium    Normochromic normocytic anemia [D64.9] 05/01/2021     Priority: Medium    Ventral hernia with bowel obstruction [K43.6] 04/30/2021    KARMA (acute kidney injury) ([facility] [address] [N17.9] 04/30/2021    Renal mass [N28.89] 06/28/2018    Essential hypertension [I10] 05/03/2016     80year old male w/ recurrent parastomal hernia presenting with incarcerated/strangulated parastomal hernia s/p ex lap, FELICE, reduction of parastomal hernia, and small bowel resection (4/30/21-Bhargav). Confused  KARMA    Plan:  1. Diet: Continue NGT and tube feeds  2. Continue sleep hygiene   3. Defer treatment with nephrostomy tubes to urology and need for HD to nephrology. 4. Continue suppository daily and miralax through NGT daily  5. From surgery standpoint, we will continue to follow along.    6. Medical management per primary team    Electronically signed by Kaylah Triplett DO  on 5/10/2021 at 6:16 AM

## 2021-05-10 NOTE — PROGRESS NOTES
Occupational Therapy  Facility/Department: [facility] PROGRESSIVE CARE  Daily Treatment Note  NAME: Elliot Leary  : 1940  MRN: [id number]    Date of Service: 5/10/2021    Discharge Recommendations:  Patient would benefit from continued therapy after discharge     Pt currently functioning below baseline. Would suggest additional therapy at time of discharge to maximize long term outcomes and prevent re-admission. Please refer to AM-PAC score for current level of function. Assessment   Performance deficits / Impairments: Decreased functional mobility ; Decreased ADL status; Decreased strength;Decreased safe awareness;Decreased cognition;Decreased endurance;Decreased sensation;Decreased balance;Decreased posture  Prognosis: Fair;Good  OT Education: OT Role;Plan of Care;Transfer Training;ADL Adaptive Strategies; Energy Conservation  REQUIRES OT FOLLOW UP: Yes  Activity Tolerance  Activity Tolerance: Treatment limited secondary to decreased cognition;Patient limited by fatigue  Safety Devices  Safety Devices in place: Yes  Type of devices: Call light within reach;Nurse notified; Left in bed;Gait belt;Patient at risk for falls; Bed alarm in place  Restraints  Initially in place: Yes  Restraints: solf wrist restraints         Patient Diagnosis(es): The primary encounter diagnosis was Partial small bowel obstruction ([address] Diagnoses of Ventral hernia with bowel obstruction and Chronic kidney disease, unspecified CKD stage were also pertinent to this visit. has a past medical history of Arthritis, Caffeine use, Cancer ([address] GERD (gastroesophageal reflux disease), Hernia, inguinal, right, Hypertension, Kidney stone, Presence of urostomy ([address] and Retinal detachment. has a past surgical history that includes hernia repair; Bladder removal (); Prostate surgery (); Cataract removal (Bilateral); Colonoscopy; Eye surgery (Right, ); vitrectomy (Left, 16); Incisional hernia repair (2015);  Incisional assistance  Comment: Patient completed supine<>sit with MAX A x2. Hand over hand assist and MAX cues for patient to roll and bring UE to bed rail with Max cues to hold onto bed rail and MAX A to move BLE's toward EOB and off side of bed. MAX A to acheive good postioning seated EOB scooting forward. Transfers  Sit to stand: Moderate assistance;2 Person assistance  Stand to sit: Moderate assistance;2 Person assistance  Transfer Comments: Verbal cues for sequencing/technique, controlled sit<>stand, and RW safety      Cognition  Overall Cognitive Status: Exceptions  Arousal/Alertness: Inconsistent responses to stimuli;Delayed responses to stimuli  Following Commands: Inconsistently follows commands  Attention Span: Difficulty attending to directions; Attends with cues to redirect  Memory: Decreased recall of biographical Information;Decreased recall of precautions;Decreased recall of recent events;Decreased short term memory  Safety Judgement: Decreased awareness of need for assistance;Decreased awareness of need for safety  Problem Solving: Decreased awareness of errors;Assistance required to generate solutions;Assistance required to identify errors made;Assistance required to implement solutions;Assistance required to correct errors made  Insights: Not aware of deficits  Initiation: Requires cues for all  Sequencing: Requires cues for all     Perception  Overall Perceptual Status: Impaired  Initiation: Hand over hand to initiate tasks  Motor Planning: Hand over hand to sequence tasks      Plan   Plan  Times per week: 4-5x/week, 1-2x/day  Current Treatment Recommendations: Strengthening, Balance Training, Functional Mobility Training, Endurance Training, Safety Education & Training, Patient/Caregiver Education & Training, Self-Care / ADL, Positioning, Equipment Evaluation, Education, & procurement, Cognitive Reorientation, Cognitive/Perceptual Training  AM-PAC Score        AM-PAC Inpatient Daily Activity Raw Score: 8 (05/10/21 1122)  AM-PAC Inpatient ADL T-Scale Score : 22.86 (05/10/21 1122)  ADL Inpatient CMS 0-100% Score: 85.69 (05/10/21 1122)  ADL Inpatient CMS G-Code Modifier : CM (05/10/21 1122)    Goals  Short term goals  Time Frame for Short term goals: by discharge, pt will  Short term goal 1: demo ADL transfers and functional mobility to CGA with use of AD as needed.(modified)  Short term goal 2: demo mod A x 1 for bed mobility with rails/controls  Short term goal 3: demo min A with simple grooming tasks following set up with min cues for initiation/sequecing  Short term goal 4: demo CGA with sitting balance at EOB x 15 min for inc. participation in ADL tasks  Short term goal 5: demo min A with UB ADLs at seated level and asssess LB when pt is able to stand  Patient Goals   Patient goals : not stated       Therapy Time   Individual Concurrent Group Co-treatment   Time In       1040   Time Out       1104   Minutes       24     Co-treatment with PT warranted secondary to decreased safety and independence requiring 2 skilled therapy professionals to address individual discipline's goals. OT addressing preparation for ADL transfer, sitting balance for increased ADL performance, sitting/activity tolerance, functional reaching, environmental safety/scanning, fall prevention, functional mobility for ADL transfers, ability to sequence and follow directions and functional UE strength. Upon writer exit, call light within reach, pt retired to bed. All lines intact and patient positioned comfortably. All patient needs addressed prior to ending therapy session. Chart reviewed prior to treatment and patient is agreeable for therapy. RN reports patient is medically stable for therapy treatment this date.       ABHI Funez/STEPHANY

## 2021-05-10 NOTE — DISCHARGE INSTR - COC
Continuity of Care Form    Patient Name: Jonathan Roberto   :  1940  MRN:  [id number]    [facility] date:  2021  Discharge date:  2021    Code Status Order: DNR-CCA   Advance Directives:   Advance Care Flowsheet Documentation       Date/Time Healthcare Directive Type of Healthcare Directive Copy in [address] Agent's Name Healthcare Agent's Phone Number    21 0219  No, patient does not have an advance directive for healthcare treatment -- -- -- -- --            Admitting Physician:  Mala Barajas DO  PCP: Eileen Roy MD    Discharging Nurse: [facility] Unit/Room#: 1005/1005-01  Discharging Unit Phone Number: [phone]    Emergency Contact:   Extended Emergency Contact Information  Primary Emergency Contact: [address] [place] [address] Phone: [phone]  Relation: Child    Past Surgical History:  Past Surgical History:   Procedure Laterality Date    BLADDER REMOVAL  2006    cancer, s/p urostomy     CATARACT REMOVAL Bilateral     COLONOSCOPY      CYSTOSCOPY N/A 5/3/2021    CYSTOSCOPY performed by Cabrera Calero MD [place] [facility]    macular hole   [address]      x2    [facility]  2015    parastomal hernia repair - Dr. Juan Antonio Costello  2014    IR NEPHROSTOMY PERCUTANEOUS LEFT  5/3/2021    IR NEPHROSTOMY PERCUTANEOUS LEFT 5/3/2021 [facility]    IR NEPHROSTOMY PERCUTANEOUS RIGHT  2021    IR NEPHROSTOMY PERCUTANEOUS RIGHT 2021 Poncho Naqvi MD [facility]    LAPAROSCOPY N/A 2021    EXPLORATORY LAPAROTOMY, EXTENSIVE LYSIS OF ADHESIONS, SMALL BOWEL RESECTION, REDUCTION OF STRANGULATED HERNIA performed by Horace Prince DO at [facility]  2006    removal    VITRECTOMY Left 16       Immunization History:   Immunization History   Administered Date(s) Administered    Influenza Virus Vaccine 10/09/2012, 01/17/2014, 11/17/2015, 09/24/2020    Influenza, Jeremiah Fields, IM, (6 mo and older Fluzone, Flulaval, Fluarix and 3 yrs and older Afluria) 11/22/2016, 01/06/2020    Pneumococcal Conjugate 13-valent (Elois ) 05/03/2016    Tdap (Boostrix, Adacel) 07/15/2020       Active Problems:  Patient Active Problem List   Diagnosis Code    Bladder cancer C67.9    DJD (degenerative joint disease) M19.90    Essential hypertension I10    Renal mass N28.89    Incisional hernia of anterior abdominal wall without obstruction or gangrene K43.2    Bilateral kidney stones N20.0    Right ureteral calculus N20.1    Partial small bowel obstruction (HCC) K56.600    Ventral hernia with bowel obstruction K43.6    GERD (gastroesophageal reflux disease) K21.9    KARMA (acute kidney injury) [facility] N17.9    Normochromic normocytic anemia D64.9    Iron deficiency anemia D50.9    Toxic metabolic encephalopathy W87    Other hydronephrosis N13.39    Vitamin B 12 deficiency E53.8    Folate deficiency E53.8    Acute respiratory failure with hypoxia (HCC) W60.83    Metabolic acidosis A15.6       Isolation/Infection:   Isolation            Contact          Patient Infection Status       Infection Onset Added Last Indicated Last Indicated By Review Planned Expiration Resolved Resolved By    VRE  04/20/17 04/20/17 Sandi Reyes RN        Urine - 3/2017              Nurse Assessment:  Last Vital Signs: BP (!) 108/59   Pulse 68   Temp 98.1 °F (36.7 °C) (Oral)   Resp 18   Ht 6' 1\" (1.854 m)   Wt 245 lb 6.4 oz (111.3 kg)   SpO2 97%   BMI 32.38 kg/m²     Last documented pain score (0-10 scale): Pain Level: 1  Last Weight:   Wt Readings from Last 1 Encounters:   05/09/21 245 lb 6.4 oz (111.3 kg)     Mental Status:  alert, able to concentrate and follow conversation and very forgetful, disoriented to place and time    IV Access:  - None    Nursing Mobility/ADLs:  Walking   Assisted  Transfer  Assisted  Bathing  Assisted  Dressing 4/30    Readmission Risk Assessment Score:  Readmission Risk              Risk of Unplanned Readmission:        26           Discharging to Facility/ Agency    Name: Reena SCOTT:  [phone]  · F:  [phone]  · Address:  · Phone:  · Fax:    Dialysis Facility (if applicable)   · Name:  · Address:  · Dialysis Schedule:  · Phone:  · Fax:    / signature: Electronically signed by JOHANA Mccormick on 5/14/21 at 2:47 PM EDT    PHYSICIAN SECTION    Prognosis: Guarded    Condition at Discharge: Stable    Rehab Potential (if transferring to Rehab): Fair    Recommended Labs or Other Treatments After Discharge: PT/OT, nursing evaluation and treatment, monitoring of glucose. SLP evaluation and treatment, wound care, monitor Renal function, bilateral nephrostomy tubes are in placed. Physician Certification: I certify the above information and transfer of Xiomara Boggs  is necessary for the continuing treatment of the diagnosis listed and that he requires [place] for less 30 days.      Update Admission H&P: No change in H&P    PHYSICIAN SIGNATURE:  Electronically signed by Marty Kee DO on 5/14/21 at 2:33 PM EDT

## 2021-05-10 NOTE — PROGRESS NOTES
[facility]  Office: [address], DO, Cassidy Meadows, DO, Reena Shrestha, DO, Thomas Schroeder Blood, DO, Natan Duarte MD, Claudine Cummins MD, Sandra Mcgregor MD, Bruce Duckworth MD, Melisa Goldmann, MD, Dorothy Hyde MD, Abhi Becker MD, Shelbi Celestin MD, Pedro Branch DO, Ricarda Parker MD, Sidney Segovia DO, Raya Pastor MD,  Grace Sherman DO, Jose Estrada MD, Marta Mace MD, Gregg Kirkpatrick MD, Tyra Mercado MD, [facility], [facility], CNP, Roland Alvarez, CNP, Eric Cardona, CNS, Silke So, CNP, May Tapia, CNP, Elizabeth Yañez, CNP, Renea Hayes, CNP, Bakari Fox, CNP, Susannah Hernandez PA-C, Scott Rios, [facility], Kassy Sotelo, CNP, Sundar Alvarado, CNP, Jona Stewart, CNP, Agnes Ham, CNP, Delfin Singh, CNP, Claudette David, [facility]    Progress Note    5/10/2021    8:53 AM    Name:   Pamela Cherry  MRN:     [id number]     [place]:      [de-identified]   Room:   [address] [place] [address] Day:  10  Admit Date:  4/30/2021 12:15 PM    PCP:   Mariluz Gary MD  Code Status:  DNR-CCA    Subjective:     C/C:   Chief Complaint   Patient presents with    Hernia     LOWER RIGHT ABDOMEN. recent increase in pain and growth     Interval History Status: improvement  Patient examined bedside. Alert and oriented to person, place and time. Patient following all commands. States he is hungry wants some scrambled eggs and coffee. Not sure of the exact date, but knows he had surgery. Brief History:     26-year-old male past medical history of left renal cell carcinoma status post ablation, bladder cancer status post cystectomy with creation of ileal conduit in 2006, prior small bowel obstruction and exploratory laparotomy, recurrent hernia presents with incarcerated parastomal hernia requiring ex lap with small bowel resection, reduction of strangulated hernia, lysis of adhesions performed on 4/30/2021.   Patient also has ongoing problem of acute kidney injury and moderate urine output however worsening creatinine. .  Patient was taken for cystoscopy on 5/3/2021 which showed obstruction of ileal conduit due to prior suture. Patient underwent left PCN insertion on 5/3/2021. Right-sided nephrostomy tube placed 5/5/2021. Patient's mentation has slowly been improving and nephrostomy tubes have been draining well. Hypernatremia corrected with D5 and free water flushes    Review of Systems:   Patient alert to person and place, not time, but seems to be more understanding today    Review of Systems   Constitutional: Negative for activity change, chills and fatigue. HENT: Positive for sinus pressure. Negative for congestion and sore throat. Eyes: Negative for photophobia and discharge. Respiratory: Negative for apnea and shortness of breath. Cardiovascular: Negative for chest pain and palpitations. Gastrointestinal: Negative for abdominal distention, abdominal pain and diarrhea. Endocrine: Negative for cold intolerance and polyuria. Genitourinary: Negative for decreased urine volume and difficulty urinating. Musculoskeletal: Negative for arthralgias and joint swelling. Allergic/Immunologic: Negative for environmental allergies and immunocompromised state. Neurological: Negative for dizziness, weakness and numbness. Psychiatric/Behavioral: Negative for agitation and hallucinations. The patient is not hyperactive. Medications: Allergies:     Allergies   Allergen Reactions    Nsaids Anaphylaxis     GI bleed    Tetracyclines & Related        Current Meds:   Scheduled Meds:    insulin glargine  10 Units Subcutaneous Daily    magnesium hydroxide  30 mL Per NG tube Daily    insulin lispro  0-12 Units Subcutaneous Q4H    polyethylene glycol  17 g Oral Daily    bisacodyl  10 mg Rectal Daily    acetaminophen  1,000 mg Oral 2 times per day    melatonin  3 mg Oral Nightly    heparin (porcine) Chemistry:  Recent Labs     05/08/21  0536 05/08/21  0536 05/09/21  0611 05/09/21  0611 05/09/21  1758 05/09/21  2358 05/10/21  0530   *   < > 150*   < > 148* 148* 147*   K 4.0   < > 4.6   < > 4.6 4.9 4.6   *   < > 113*   < > 110* 109* 110*   CO2 26   < > 24   < > 27 28 25   GLUCOSE 226*   < > 170*   < > 155* 189* 230*   BUN 92*   < > 90*   < > 85* 85* 84*   CREATININE 3.09*   < > 3.03*   < > 2.82* 2.76* 2.84*   MG 2.6  --  2.8*  --   --   --  2.8*   ANIONGAP 13   < > 13   < > 11 11 12   LABGLOM 20*   < > 20*   < > 22* 22* 22*   GFRAA 24*   < > 24*   < > 26* 27* 26*   CALCIUM 9.0   < > 9.0   < > 9.3 9.2 8.8   PHOS 3.6  --  5.1*  --   --   --  4.4    < > = values in this interval not displayed. Recent Labs     05/09/21  0714 05/09/21  1057 05/09/21  1508 05/09/21  2103 05/10/21  0119 05/10/21  0530   POCGLU 148* 161* 180* 173* 183* 201*     ABG:  Lab Results   Component Value Date    POCPH 7.19 05/02/2021    POCPCO2 44 05/02/2021    POCPO2 74 05/02/2021    POCHCO3 16.9 05/02/2021    NBEA 11 05/02/2021    PBEA NOT REPORTED 05/02/2021    ESN7FHC 18 05/02/2021    KXMV1GIB 90 05/02/2021    FIO2 32.0 05/02/2021     Lab Results   Component Value Date/Time    [facility] 05/02/2021 12:58 PM     Lab Results   Component Value Date/Time    CULTURE NO GROWTH 6 DAYS 05/02/2021 12:58 PM       Radiology:  Ct Abdomen Pelvis Wo Contrast Additional Contrast? None    Result Date: 4/30/2021  1. Partial small bowel obstruction with a transition point located at the mouth of the ventral hernia. 2. Extensive stone burden identified in the bilateral kidneys, left greater than right. 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 3. Enlargement of the right upper pole mass, likely representing renal cell carcinoma.  4. Enlarged left para-aortic lymph node measuring 27 x 20 mm.  Finding is nonspecific and may represent metastatic disease or reactive adenopathy. 5. Severe diverticulosis. 6. Cholelithiasis. Xr Chest (single View Frontal)    Result Date: 5/2/2021  Shallow inflation. Right basilar opacity may represent developing consolidation in the appropriate clinical setting. The enteric tube courses off the field of view in the upper abdomen. Ir Guided Nephrostomy Cath Placement Left    Result Date: 5/3/2021  Difficult but successful percutaneous left nephrostomy tube placement, as above. Right PCN will be attempted tomorrow. Xr Abdomen For Ng/og/ne Tube Placement    Result Date: 5/3/2021  Enteric tube in the stomach as above. No evidence of bowel obstruction. Xr Abdomen For Ng/og/ne Tube Placement    Result Date: 5/2/2021  Satisfactory position enteric tube     Xr Abdomen For Ng/og/ne Tube Placement    Result Date: 5/2/2021  Enteric tube tip suspected to be at the level the GE junction. Consider advancement 5-10 cm. Additionally there is a telemetry leads within the midline, consider moving prior to repeat x-ray. Addition, please consider centering the x-ray over the GE junction epigastric region. Xr Abdomen For Ng/og/ne Tube Placement    Result Date: 4/30/2021  A newly placed gastric tube loops in the distal thoracic esophagus, where both the tip and sideport are located. Recommend repositioning before use. Us Retroperitoneal Limited    Result Date: 5/1/2021  1. Recently demonstrated suspicious lesion arising from the superior pole the right kidney is not well delineated on this exam. 2.  Right hydronephrosis and nephrolithiasis again demonstrated. 3.  Multiple stones throughout the left intrarenal collecting system, which likely obscures the associated hydronephrosis demonstrated on recent CT exam. 4.  Cholelithiasis.        Physical Examination:        General appearance: alert, oriented, following comamnds, mentation improved  Mental Status: alert and oriented to person, place and time  Lungs:  clear to auscultation bilaterally, normal effort  Heart:  regular rate and rhythm, no murmur  Abdomen:  soft, nontender, abdominal binder in place, well-healing incisions nephrostomy tubes both draining well. Bilateral nephrostomy tubes clear or draining compared to yesterday  Extremities:  no edema, redness, tenderness in the calves  Skin:  2 incisions, both held with stables, ostomy tube in place and appears to be draining. Left nephrostomy tube draining well. Assessment:        Hospital Problems           Last Modified POA    * (Principal) Ventral hernia with bowel obstruction 5/1/2021 Yes    Toxic metabolic encephalopathy 8/5/1612 No    Other hydronephrosis 5/3/2021 Yes    Acute respiratory failure with hypoxia ([address] 4/8/2325 No    Metabolic acidosis 5/8/5518 No    Normochromic normocytic anemia 5/1/2021 Yes    Iron deficiency anemia 5/2/2021 Yes    Vitamin B 12 deficiency 5/3/2021 Yes    Folate deficiency 5/3/2021 Yes    Essential hypertension (Chronic) 5/1/2021 Yes    Renal mass (Chronic) 5/1/2021 Yes    KARMA (acute kidney injury) ([address] 5/1/2021 Yes          Plan:        1. Strangulated ventral hernia status post exploratory laparotomy small bowel resection on 4/30/2021. Appreciate general surgery recommendations. Monitor mentation. Continue tube feeds, continue free water flushes  2. Acute kidney injury, hydronephrosis, nonoliguric, past history of renal cell carcinoma, bladder cell cancer, underwent cystoscopy on 5/3/2021, and left PCN on 5/3/2021. Status post right PCN 5/5/2021. [address] nephrology, urology, interventional radiology recommendations. Free water flushes, tube feeds. 3. B12, iron, folate deficiency. Continue replacement  4. Acute delirium secondary to anesthesia , renal failure, metabolic encephalopathy. Mentation has significantly improved. 5. Patient is DNR CCA. 6. PT/OT  7.  Suspected sleep apnea, 2L oxygen via nasal cannula overnight. 8. Bedside swallow evaluation,   9. adjust insulin requirements as needed  10. Updated son on the phone.       Cordelia Barthel, MD  5/10/2021  8:53 AM

## 2021-05-10 NOTE — PROGRESS NOTES
Speech Language Pathology  Facility/Department: [facility]   CLINICAL BEDSIDE SWALLOW EVALUATION    NAME: Federico Kay  : 1940  MRN: [id number]    ADMISSION DATE: 2021  ADMITTING DIAGNOSIS: has Bladder cancer; DJD (degenerative joint disease); Essential hypertension; Renal mass; Incisional hernia of anterior abdominal wall without obstruction or gangrene; Bilateral kidney stones; Right ureteral calculus; Partial small bowel obstruction ([address] Ventral hernia with bowel obstruction; GERD (gastroesophageal reflux disease); KARMA (acute kidney injury) ([address] Normochromic normocytic anemia; Iron deficiency anemia; Toxic metabolic encephalopathy; Other hydronephrosis; Vitamin B 12 deficiency; Folate deficiency; Acute respiratory failure with hypoxia ([address] and Metabolic acidosis on their problem list.    Date of Eval: 5/10/2021  Evaluating Therapist: Olive Calzada    Current Diet level:  Current Diet : NPO  Current Liquid Diet : NPO(with NG tube)      Primary Complaint: Federico Kay is a 80 y.o. Non-/non  male  with a history of ventral hernia who presents progressively worsening RLQ pain and increase in size of hernia and is admitted to the hospital for the management of Ventral hernia with bowel obstruction noted on CT abd/pelvis. States it has gotten progressively bigger over several years but he had severe pain that started last night with some intermittent nausea but no vomiting. He denies any fever chills, chest pain, shortness of breath. He does have a known history of a right renal mass with cryoablation in 2019, kidneys stones and bladder and prostate cancer in 2006, s/p prostatectomy and chemo.      General surgery was consulted in ED and suggested manual reduction which was unsuccessful. Rapid covid ordered for presurgical testing which was negative      Creatinine noted to be 2.85, based on review of labs in epic he appears to have some history of CKD although patient denies, likely CKD 3. Pain:  Pain Assessment  Pain Assessment: 0-10  Pain Level: 0  Patient's Stated Pain Goal: No pain  Pain Type: Surgical pain  Pain Location: Abdomen  Pain Orientation: Mid  Pain Descriptors: Aching  Pain Frequency: Intermittent  Pain Onset: On-going  Clinical Progression: Not changed  Functional Pain Assessment: Prevents or interferes with all active and some passive activities  Non-Pharmaceutical Pain Intervention(s): Emotional support  Response to Pain Intervention: None  Multiple Pain Sites: No  RASS Score: Alert and calm  Pain Assessment/FLACC  Pain Rating: FLACC (rest) - Face: no particular expression or smile  Pain Rating: FLACC (rest) - Legs: normal position or relaxed  Pain Rating: FLACC (rest) - Activity: lying quietly, normal position, moves easily  Pain Rating: FLACC (rest) - Cry: no cry (awake or asleep)  Pain Rating: FLACC (rest) - Consolability: content, relaxed  Score: FLACC (rest): 0    Reason for Referral  French Felix was referred for a bedside swallow evaluation to assess the efficiency of his swallow function, identify signs and symptoms of aspiration and make recommendations regarding safe dietary consistencies, effective compensatory strategies, and safe eating environment. Impression:  Pt. Presents with + safe swallow for Puree trials at this time. Recommend MBSS completion prior to diet initiation as pt, with + overt s/s of aspiration with thin trials followed by emesis. Dysphagia Outcome Severity Scale: Level 2: Moderate Severe dysphagia- Maximum assistance or maximum use of strategies with partial PO only     Treatment Plan  Requires SLP Intervention: Yes  Duration/Frequency of Treatment: 2-3X/week  D/C Recommendations:  To be determined       Recommended Diet and Intervention  Diet Solids Recommendation: Dysphagia Pureed (Dysphagia I)  Liquid Consistency Recommendation: NPO  Recommended Form of Meds: Via alternative means of nutrition  Recommendations: Modified barium swallow study       Compensatory Swallowing Strategies  Compensatory Swallowing Strategies: Eat/Feed slowly;Upright as possible for all oral intake;Small bites/sips    Treatment/Goals  Dysphagia Goals: The patient will tolerate instrumental swallowing procedure    General  Chart Reviewed: Yes  Behavior/Cognition: Alert; Cooperative  Follows Directions: Simple  Dentition: Some missing teeth  Patient Positioning: Upright in bed  Baseline Vocal Quality: Normal  Volitional Cough: Strong  Consistencies Administered: Dysphagia Soft and Bite-Sized (Dysphagia III); Dysphagia Pureed (Dysphagia I); Thin - straw           Vision/Hearing  Vision  Vision: Impaired  Hearing  Hearing: Within functional limits    Oral Motor Deficits  Oral/Motor  Oral Motor: Within functional limits    Oral Phase Dysfunction  Oral Phase  Oral Phase: WFL  Oral Phase  Oral Phase - Comment: Adequate mastication and oral manipulation functional for cosistencies tested     Indicators of Pharyngeal Phase Dysfunction   Pharyngeal Phase  Pharyngeal Phase: Exceptions  Pharyngeal Phase   Pharyngeal: + overt s/s of aspiraiton noted with thin liquid trials. No overt s/s of aspiration noted with Puree and Soft Solid trials.     Prognosis  Prognosis  Prognosis for safe diet advancement: fair  Individuals consulted  Consulted and agree with results and recommendations: Patient    Education  Patient Education: yes  Patient Education Response: Verbalizes understanding             Therapy Time  SLP Individual Minutes  Time In: 4524  Time Out: 8818  Minutes: [address] CHARITO Fermin  5/10/2021 2:25 PM

## 2021-05-11 ENCOUNTER — APPOINTMENT (OUTPATIENT)
Dept: GENERAL RADIOLOGY | Age: 81
DRG: 329 | End: 2021-05-11
Payer: MEDICARE

## 2021-05-11 LAB
ABSOLUTE EOS #: 0.25 K/UL (ref 0–0.44)
ABSOLUTE IMMATURE GRANULOCYTE: 0.08 K/UL (ref 0–0.3)
ABSOLUTE LYMPH #: 1.09 K/UL (ref 1.1–3.7)
ABSOLUTE MONO #: 0.55 K/UL (ref 0.1–1.2)
ANION GAP SERPL CALCULATED.3IONS-SCNC: 12 MMOL/L (ref 9–17)
BASOPHILS # BLD: 0 % (ref 0–2)
BASOPHILS ABSOLUTE: 0.04 K/UL (ref 0–0.2)
BUN BLDV-MCNC: 81 MG/DL (ref 8–23)
BUN/CREAT BLD: 28 (ref 9–20)
CALCIUM SERPL-MCNC: 8.7 MG/DL (ref 8.6–10.4)
CHLORIDE BLD-SCNC: 105 MMOL/L (ref 98–107)
CO2: 25 MMOL/L (ref 20–31)
CREAT SERPL-MCNC: 2.9 MG/DL (ref 0.7–1.2)
DIFFERENTIAL TYPE: ABNORMAL
EOSINOPHILS RELATIVE PERCENT: 2 % (ref 1–4)
GFR AFRICAN AMERICAN: 25 ML/MIN
GFR NON-AFRICAN AMERICAN: 21 ML/MIN
GFR SERPL CREATININE-BSD FRML MDRD: ABNORMAL ML/MIN/{1.73_M2}
GFR SERPL CREATININE-BSD FRML MDRD: ABNORMAL ML/MIN/{1.73_M2}
GLUCOSE BLD-MCNC: 130 MG/DL (ref 75–110)
GLUCOSE BLD-MCNC: 130 MG/DL (ref 75–110)
GLUCOSE BLD-MCNC: 141 MG/DL (ref 75–110)
GLUCOSE BLD-MCNC: 144 MG/DL (ref 75–110)
GLUCOSE BLD-MCNC: 163 MG/DL (ref 70–99)
GLUCOSE BLD-MCNC: 166 MG/DL (ref 75–110)
HCT VFR BLD CALC: 36.5 % (ref 40.7–50.3)
HEMOGLOBIN: 10.4 G/DL (ref 13–17)
IMMATURE GRANULOCYTES: 1 %
LYMPHOCYTES # BLD: 10 % (ref 24–43)
MAGNESIUM: 2.9 MG/DL (ref 1.6–2.6)
MCH RBC QN AUTO: 27 PG (ref 25.2–33.5)
MCHC RBC AUTO-ENTMCNC: 28.5 G/DL (ref 28.4–34.8)
MCV RBC AUTO: 94.8 FL (ref 82.6–102.9)
MONOCYTES # BLD: 5 % (ref 3–12)
NRBC AUTOMATED: 0 PER 100 WBC
PDW BLD-RTO: 15.9 % (ref 11.8–14.4)
PHOSPHORUS: 3.8 MG/DL (ref 2.5–4.5)
PLATELET # BLD: 217 K/UL (ref 138–453)
PLATELET ESTIMATE: ABNORMAL
PMV BLD AUTO: 10.6 FL (ref 8.1–13.5)
POTASSIUM SERPL-SCNC: 4.9 MMOL/L (ref 3.7–5.3)
RBC # BLD: 3.85 M/UL (ref 4.21–5.77)
RBC # BLD: ABNORMAL 10*6/UL
SEG NEUTROPHILS: 82 % (ref 36–65)
SEGMENTED NEUTROPHILS ABSOLUTE COUNT: 9.25 K/UL (ref 1.5–8.1)
SODIUM BLD-SCNC: 142 MMOL/L (ref 135–144)
WBC # BLD: 11.3 K/UL (ref 3.5–11.3)
WBC # BLD: ABNORMAL 10*3/UL

## 2021-05-11 PROCEDURE — 97535 SELF CARE MNGMENT TRAINING: CPT

## 2021-05-11 PROCEDURE — 6370000000 HC RX 637 (ALT 250 FOR IP): Performed by: STUDENT IN AN ORGANIZED HEALTH CARE EDUCATION/TRAINING PROGRAM

## 2021-05-11 PROCEDURE — 6370000000 HC RX 637 (ALT 250 FOR IP): Performed by: SPECIALIST

## 2021-05-11 PROCEDURE — 84100 ASSAY OF PHOSPHORUS: CPT

## 2021-05-11 PROCEDURE — 2060000000 HC ICU INTERMEDIATE R&B

## 2021-05-11 PROCEDURE — 85025 COMPLETE CBC W/AUTO DIFF WBC: CPT

## 2021-05-11 PROCEDURE — 6360000002 HC RX W HCPCS: Performed by: SPECIALIST

## 2021-05-11 PROCEDURE — 97129 THER IVNTJ 1ST 15 MIN: CPT

## 2021-05-11 PROCEDURE — 36415 COLL VENOUS BLD VENIPUNCTURE: CPT

## 2021-05-11 PROCEDURE — 80048 BASIC METABOLIC PNL TOTAL CA: CPT

## 2021-05-11 PROCEDURE — 74230 X-RAY XM SWLNG FUNCJ C+: CPT

## 2021-05-11 PROCEDURE — 2500000003 HC RX 250 WO HCPCS: Performed by: SPECIALIST

## 2021-05-11 PROCEDURE — 2580000003 HC RX 258: Performed by: INTERNAL MEDICINE

## 2021-05-11 PROCEDURE — 99232 SBSQ HOSP IP/OBS MODERATE 35: CPT | Performed by: NURSE PRACTITIONER

## 2021-05-11 PROCEDURE — 97530 THERAPEUTIC ACTIVITIES: CPT

## 2021-05-11 PROCEDURE — 92611 MOTION FLUOROSCOPY/SWALLOW: CPT

## 2021-05-11 PROCEDURE — 83735 ASSAY OF MAGNESIUM: CPT

## 2021-05-11 PROCEDURE — 82947 ASSAY GLUCOSE BLOOD QUANT: CPT

## 2021-05-11 PROCEDURE — 2580000003 HC RX 258: Performed by: SPECIALIST

## 2021-05-11 PROCEDURE — 97112 NEUROMUSCULAR REEDUCATION: CPT

## 2021-05-11 PROCEDURE — 74018 RADEX ABDOMEN 1 VIEW: CPT

## 2021-05-11 RX ADMIN — Medication 3 MG: at 22:44

## 2021-05-11 RX ADMIN — FAMOTIDINE 10 MG: 10 INJECTION, SOLUTION INTRAVENOUS at 09:09

## 2021-05-11 RX ADMIN — ACETAMINOPHEN ORAL SOLUTION 1000 MG: 325 SOLUTION ORAL at 09:03

## 2021-05-11 RX ADMIN — DEXTROSE MONOHYDRATE: 50 INJECTION, SOLUTION INTRAVENOUS at 05:53

## 2021-05-11 RX ADMIN — POLYETHYLENE GLYCOL (3350) 17 G: 17 POWDER, FOR SOLUTION ORAL at 09:03

## 2021-05-11 RX ADMIN — HEPARIN SODIUM 5000 UNITS: 5000 INJECTION INTRAVENOUS; SUBCUTANEOUS at 05:53

## 2021-05-11 RX ADMIN — INSULIN LISPRO 2 UNITS: 100 INJECTION, SOLUTION INTRAVENOUS; SUBCUTANEOUS at 02:04

## 2021-05-11 RX ADMIN — HEPARIN SODIUM 5000 UNITS: 5000 INJECTION INTRAVENOUS; SUBCUTANEOUS at 22:34

## 2021-05-11 RX ADMIN — FAMOTIDINE 10 MG: 10 INJECTION, SOLUTION INTRAVENOUS at 22:34

## 2021-05-11 RX ADMIN — SODIUM CHLORIDE, PRESERVATIVE FREE 10 ML: 5 INJECTION INTRAVENOUS at 21:11

## 2021-05-11 RX ADMIN — INSULIN LISPRO 2 UNITS: 100 INJECTION, SOLUTION INTRAVENOUS; SUBCUTANEOUS at 05:53

## 2021-05-11 RX ADMIN — SODIUM CHLORIDE, PRESERVATIVE FREE 10 ML: 5 INJECTION INTRAVENOUS at 09:10

## 2021-05-11 RX ADMIN — HEPARIN SODIUM 5000 UNITS: 5000 INJECTION INTRAVENOUS; SUBCUTANEOUS at 14:41

## 2021-05-11 ASSESSMENT — PAIN DESCRIPTION - PAIN TYPE: TYPE: ACUTE PAIN;SURGICAL PAIN

## 2021-05-11 ASSESSMENT — ENCOUNTER SYMPTOMS
EYE DISCHARGE: 0
ABDOMINAL PAIN: 0
SHORTNESS OF BREATH: 0
PHOTOPHOBIA: 0
APNEA: 0
DIARRHEA: 0
SORE THROAT: 0
ABDOMINAL DISTENTION: 0

## 2021-05-11 ASSESSMENT — PAIN SCALES - GENERAL
PAINLEVEL_OUTOF10: 0

## 2021-05-11 ASSESSMENT — PAIN DESCRIPTION - LOCATION: LOCATION: ABDOMEN

## 2021-05-11 NOTE — PLAN OF CARE
Problem: Falls - Risk of:  Goal: Will remain free from falls  Description: Will remain free from falls  5/10/2021 1825 by Taqueria Cool RN  Outcome: Ongoing  Goal: Absence of physical injury  Description: Absence of physical injury  Outcome: Ongoing     Problem: Skin Integrity:  Goal: Will show no infection signs and symptoms  Description: Will show no infection signs and symptoms  Outcome: Ongoing  Goal: Absence of new skin breakdown  Description: Absence of new skin breakdown  Outcome: Ongoing     Problem: Infection - Surgical Site:  Goal: Will show no infection signs and symptoms  Description: Will show no infection signs and symptoms  Outcome: Ongoing     Problem:  Bowel/Gastric:  Goal: Gastrointestinal status for postoperative course will improve  Description: Gastrointestinal status for postoperative course will improve  Outcome: Ongoing     Problem: Non-Violent Restraints  Goal: Removal from restraints as soon as assessed to be safe  Outcome: Ongoing  Goal: No harm/injury to patient while restraints in use  Outcome: Ongoing  Goal: Patient's dignity will be maintained  Outcome: Ongoing     Problem: Confusion - Acute:  Goal: Mental status will be restored to baseline  Description: Mental status will be restored to baseline  Outcome: Ongoing     Problem: Injury - Risk of, Physical Injury:  Goal: Will remain free from falls  Description: Will remain free from falls  5/10/2021 1825 by Taqueria Cool RN  Outcome: Ongoing  Goal: Absence of physical injury  Description: Absence of physical injury  Outcome: Ongoing

## 2021-05-11 NOTE — PROCEDURES
INSTRUMENTAL SWALLOW REPORT  MODIFIED BARIUM SWALLOW    NAME: Donna Rankin   : 1940  MRN: [id number]       Date of Eval: 2021        Radiologist: Dr. Akosua Bourgeois     Referring Diagnosis(es):      Past Medical History:  has a past medical history of Arthritis, Caffeine use, Cancer ([facility] GERD (gastroesophageal reflux disease), Hernia, inguinal, right, Hypertension, Kidney stone, Presence of urostomy ([facility] [address] and Retinal detachment. Past Surgical History:  has a past surgical history that includes hernia repair; Bladder removal (); Prostate surgery (); Cataract removal (Bilateral); Colonoscopy; Eye surgery (Right, ); vitrectomy (Left, 16); Incisional hernia repair (2015); Incisional hernia repair (2014); laparoscopy (N/A, 2021); Cystoscopy (N/A, 5/3/2021); IR GUIDED NEPHROSTOMY CATH PLACEMENT LEFT (5/3/2021); and IR GUIDED NEPHROSTOMY CATH PLACEMENT RIGHT (2021). Current Diet Solid Consistency: Dysphagia Soft and Bite-Sized (Dysphagia III)  Current Diet Liquid Consistency: Thin       Type of Study: Initial MBS    Recent CXR/CT of Chest: 2021    Impression   Shallow inflation.  Right basilar opacity may represent developing   consolidation in the appropriate clinical setting.       The enteric tube courses off the field of view in the upper abdomen. Patient Complaints/Reason for Referral:  Donna Rankin was referred for a MBS to assess the efficiency of his/her swallow function, assess for aspiration, and to make recommendations regarding safe dietary consistencies, effective compensatory strategies, and safe eating environment. Onset of problem:   Donna Rankin is a 80 y.o. Non-/non  male  with a history of ventral hernia who presents progressively worsening RLQ pain and increase in size of hernia and is admitted to the hospital for the management of Ventral hernia with bowel obstruction noted on CT abd/pelvis.   States it has gotten progressively bigger over several years but he had severe pain that started last night with some intermittent nausea but no vomiting. He denies any fever chills, chest pain, shortness of breath. He does have a known history of a right renal mass with cryoablation in 2019, kidneys stones and bladder and prostate cancer in 2006, s/p prostatectomy and chemo.      General surgery was consulted in ED and suggested manual reduction which was unsuccessful. Rapid covid ordered for presurgical testing which was negative      Creatinine noted to be 2.85, based on review of labs in epic he appears to have some history of CKD although patient denies, likely CKD 3. Behavior/Cognition/Vision/Hearing:  Behavior/Cognition: Alert; Cooperative  Vision: Within Functional Limits  Hearing: Within functional limits    Impressions:  Patient presents with safe swallow for Dysphagia III: Soft and Bite Sized diet with Moderately Thick (Honey) liquids with medications in puree as able as evidenced by no s/s of aspiration noted with consistencies tested. +deep penetration to the cords, +trace aspiration with throat clear with mildly-thick liquids. +trace flash penetration, no aspiration with pureed solids. +trace flash penetration with moderately-thick liquids. +mod-max residual with soft solid and regular solid, pt unable to clear residual with verbal cues for double swallow, however able to clear residual with liquid wash. Pt reports hx of emesis with thin liquids. Recommend alternate liquids and solids. Recommend small sips and bites, only feed when alert and awake and upright at 90 degrees for all PO intake. Recommend close monitoring for overt/clinical s/s of aspiration and D/C PO intake and complete Modified Barium Swallow Study should they occur. Results and recommendations reported to RN. Patient Position: Lateral and Patient Degrees: 90    Consistencies Administered: Dysphagia Soft and Bite-Sized (Dysphagia III); Reg solid;Dysphagia Pureed (Dysphagia I); Nectar straw;Honey teaspoon    Dysphagia Outcome Severity Scale: Level 3: Moderate dysphagia- Total assisstance, supervision or strategies. Two or more diet consistencies restricted  Penetration-Aspiration Scale (PAS): 7 - Material enters the airway, passes below the vocal folds, and is not ejected from the trachea despite effort    Recommended Diet:  Solid consistency: Dysphagia Soft and Bite-Sized (Dysphagia III)  Liquid consistency: Moderately Thick (Honey)  Medication administration: Meds in puree    Safe Swallow Protocol:  Compensatory Swallowing Strategies: Upright as possible for all oral intake;Small bites/sips; Alternate solids and liquids    Recommendations/Treatment  Requires SLP Intervention: Yes  D/C Recommendations: Further therapy recommended at discharge. Recommended Exercises:    Therapeutic Interventions: Diet tolerance monitoring;Oral motor exercises; Patient/Family education    Education: Images and recommendations were reviewed with pt and RN following this exam.   Patient Education: yes  Patient Education Response: Verbalizes understanding    Prognosis  Prognosis for safe diet advancement: fair  Duration/Frequency of Treatment  Duration/Frequency of Treatment: 1-2x per week      Goals:    Long Term:   To Maximize safety with intake, optimize nutrition/hydration and minimize risk for aspiration. Short Term:  Dysphagia Goals: The patient will tolerate recommended diet without observed clinical signs of aspiration; pt will complete OM/EX 10-20x per session    Oral Preparation / Oral Phase  Oral Phase: WFL  Oral Phase:   Puree: +premature spill to valleculae. Soft solid: +min extended mastication, +premature spill to valleculae. Regular solid: +mod extended mastication, +premature spill to valleculae. Mildly-thick and moderately-thick liquids: +premature spill to valleculae.  However, oral transit and bolus manipulation WFL for consistencies tested      Pharyngeal Phase  Pharyngeal Phase: Impaired  Pharyngeal:   Puree: +trace penetration, no aspiration x1; no penetration, no aspiration x1. +throat clear unrelated to aspiration. +mod residual in valleculae. Soft solid: No penetration, no aspiration. +throat clear unrelated to penetration or aspiration. +mod residual in valleculae, unable to clear with verbal cues for double swallow. Regular solid: +No penetration, no aspiration, +mod-max residual in vallecuale, unable to clear with cues for double swallow. Mildly-thick liquids: +deep penetration to the cords, +trace aspiration with throat clear and wet vocal quality, +mod residual in valleculae. Moderately-thick liquids: +trace penetration, no aspiration x1, no penetration, no aspiration x1. +min residual in valleculae.     Esophageal Phase  Esophageal Screen: WFL        Pain   Patient Currently in Pain: No  Pain Level: 0  Pain Type: Surgical pain  Pain Location: Abdomen      Therapy Time:   Individual Concurrent Group Co-treatment   Time In 1030         Time Out 1100         Minutes 30                   Amber Smith, 5/11/2021, 1:15 PM

## 2021-05-11 NOTE — PROGRESS NOTES
[facility]  Office: [address], DO, Concetta Rivero, DO, Shweta Gilliland, DO, Anil Bajwa, DO, Frankie Sharma MD, Danish Whitley MD, Dario Simeon MD, Abigail Delgado MD, Lelo Godoy MD, Bobby Silva MD, Home Suero MD, Peace Forrester MD, Rupal Rhodes DO, Zay Da Silva MD, Froylan Bui DO, Angelia Eduardo MD,  Venkat Donovan DO, Devon Valencia MD, Jose De Jesus Delgado MD, Jorden Childers MD, Reena Hector MD, Leah Frias, [facility], [facility], CNP, Francy Caro, CNP, Ruba Salvador, CNS, Suzy Kincaid, CNP, Ann Marie Le, CNP, Lauren Thomas, CNP, Black Rhodes, CNP, Jose Antonio Clinton, CNP, Steve Salgado PA-C, Swapna Cochran, [facility], Reina Akers, CNP, Radha Mccann, CNP, Jenn Faria, CNP, Nicoletta Kocher, CNP, Irineo Huerta, CNP, Jeff Siemens, [facility]    Progress Note    5/11/2021    10:30 AM    Name:   Donna Rankin  MRN:     [id number]     [place]:      [de-identified]   Room:   [address] [place] [address] Day:  11  Admit Date:  4/30/2021 12:15 PM    PCP:   Otoniel Hansen MD  Code Status:  DNR-CCA    Subjective:     C/C:   Chief Complaint   Patient presents with    Hernia     LOWER RIGHT ABDOMEN. recent increase in pain and growth     Interval History Status: improvement  Patient reports he is feeling better today. RN reports he attempted to pull NG tube out earlier this morning and had to be placed in restraints as he was only alert to self and was not following commands. During my assessment patient is alert and oriented x4 but is forgetful at times.     Brief History:     61-year-old male past medical history of left renal cell carcinoma status post ablation, bladder cancer status post cystectomy with creation of ileal conduit in 2006, prior small bowel obstruction and exploratory laparotomy, recurrent hernia presents with incarcerated parastomal hernia requiring ex lap with small bowel resection, reduction of strangulated hernia, lysis of adhesions performed on 4/30/2021. Patient also has ongoing problem of acute kidney injury and moderate urine output however worsening creatinine. .  Patient was taken for cystoscopy on 5/3/2021 which showed obstruction of ileal conduit due to prior suture. Patient underwent left PCN insertion on 5/3/2021. Right-sided nephrostomy tube placed 5/5/2021. Patient's mentation has slowly been improving and nephrostomy tubes have been draining well. Hypernatremia corrected with D5 and free water flushes    Review of Systems:   Patient alert to person and place, not time, but seems to be more understanding today    Review of Systems   Constitutional: Negative for activity change, chills and fatigue. HENT: Negative for congestion and sore throat. Eyes: Negative for photophobia and discharge. Respiratory: Negative for apnea and shortness of breath. Cardiovascular: Negative for chest pain and palpitations. Gastrointestinal: Negative for abdominal distention, abdominal pain and diarrhea. Endocrine: Negative for cold intolerance and polyuria. Genitourinary: Negative for decreased urine volume and difficulty urinating. Musculoskeletal: Negative for arthralgias and joint swelling. Allergic/Immunologic: Negative for environmental allergies and immunocompromised state. Neurological: Negative for dizziness, weakness and numbness. Psychiatric/Behavioral: Negative for agitation and hallucinations. The patient is not hyperactive. Medications: Allergies:     Allergies   Allergen Reactions    Nsaids Anaphylaxis     GI bleed    Tetracyclines & Related        Current Meds:   Scheduled Meds:    insulin glargine  10 Units Subcutaneous Daily    insulin lispro  0-12 Units Subcutaneous Q4H    polyethylene glycol  17 g Oral Daily    acetaminophen  1,000 mg Oral 2 times per day    melatonin  3 mg Oral Nightly    heparin (porcine)  5,000 Units Subcutaneous 3 times per day may represent developing consolidation in the appropriate clinical setting. The enteric tube courses off the field of view in the upper abdomen. Ir Guided Nephrostomy Cath Placement Left    Result Date: 5/3/2021  Difficult but successful percutaneous left nephrostomy tube placement, as above. Right PCN will be attempted tomorrow. Xr Abdomen For Ng/og/ne Tube Placement    Result Date: 5/3/2021  Enteric tube in the stomach as above. No evidence of bowel obstruction. Xr Abdomen For Ng/og/ne Tube Placement    Result Date: 5/2/2021  Satisfactory position enteric tube     Xr Abdomen For Ng/og/ne Tube Placement    Result Date: 5/2/2021  Enteric tube tip suspected to be at the level the GE junction. Consider advancement 5-10 cm. Additionally there is a telemetry leads within the midline, consider moving prior to repeat x-ray. Addition, please consider centering the x-ray over the GE junction epigastric region. Xr Abdomen For Ng/og/ne Tube Placement    Result Date: 4/30/2021  A newly placed gastric tube loops in the distal thoracic esophagus, where both the tip and sideport are located. Recommend repositioning before use. Us Retroperitoneal Limited    Result Date: 5/1/2021  1. Recently demonstrated suspicious lesion arising from the superior pole the right kidney is not well delineated on this exam. 2.  Right hydronephrosis and nephrolithiasis again demonstrated. 3.  Multiple stones throughout the left intrarenal collecting system, which likely obscures the associated hydronephrosis demonstrated on recent CT exam. 4.  Cholelithiasis.        Physical Examination:        General appearance: alert, oriented, following comamnds, mentation improved  Mental Status: alert and oriented to person, place and time  Lungs:  clear to auscultation bilaterally, normal effort  Heart:  regular rate and rhythm, no murmur  Abdomen:  soft, nontender, abdominal binder in place, well-healing incisions nephrostomy tubes both draining well. Bilateral nephrostomy tubes clear or draining compared to yesterday  Extremities:  no edema, redness, tenderness in the calves  Skin:  2 incisions, both held with stables, ostomy tube in place and appears to be draining. Left nephrostomy tube draining well. Assessment:        Hospital Problems           Last Modified POA    * (Principal) Ventral hernia with bowel obstruction 5/1/2021 Yes    Toxic metabolic encephalopathy 9/9/4093 No    Other hydronephrosis 5/3/2021 Yes    Acute respiratory failure with hypoxia ([address] 8/4/7708 No    Metabolic acidosis 9/3/2146 No    Normochromic normocytic anemia 5/1/2021 Yes    Iron deficiency anemia 5/2/2021 Yes    Vitamin B 12 deficiency 5/3/2021 Yes    Folate deficiency 5/3/2021 Yes    Essential hypertension (Chronic) 5/1/2021 Yes    Renal mass (Chronic) 5/1/2021 Yes    KARMA (acute kidney injury) ([address] 5/1/2021 Yes          Plan:        1. Strangulated ventral hernia status post exploratory laparotomy small bowel resection on 4/30/2021. Appreciate general surgery recommendations. Monitor mentation. Tube feeds discontinued. Failed swallow study yesterday, repeat swallow study this morning completed-patient is able to have soft and bite-size food with honey thick liquids will change diet accordingly  2. Acute kidney injury, hydronephrosis, nonoliguric, past history of renal cell carcinoma, bladder cell cancer, underwent cystoscopy on 5/3/2021, and left PCN on 5/3/2021. Status post right PCN 5/5/2021. [address] nephrology, urology, interventional radiology recommendations. Creatinine stable, continue to monitor intake and output with renal diet restrictions  3. B12, iron, folate deficiency. Continue replacement  4. Acute delirium secondary to anesthesia , renal failure, metabolic encephalopathy. Mentation continues to improve  5. Patient is DNR CCA. 6. Continue PT/OT, increase activity as tolerated  7.  Suspected sleep apnea, 2L oxygen via nasal cannula overnight. 8. Glycemic control, continue insulin sliding scale  9.  Plan discussed with patient and staff      MARKIE BURRELL NP  5/11/2021  10:50 AM

## 2021-05-11 NOTE — PROGRESS NOTES
General Surgery:  Daily Progress Note                   PATIENT NAME: Cande Boateng     TODAY'S DATE: 5/11/2021, 7:52 AM  CC:  Confusion    SUBJECTIVE:     Pt seen and examined at bedside. Three bm documented yesterday. Confusion resolving. Tolerated tube feeds yesterday. OBJECTIVE:   VITALS:  BP (!) 103/58   Pulse 51   Temp 97.7 °F (36.5 °C) (Oral)   Resp 18   Ht 6' 1\" (1.854 m)   Wt 242 lb (109.8 kg)   SpO2 98%   BMI 31.93 kg/m²      INTAKE/OUTPUT:      Intake/Output Summary (Last 24 hours) at 5/11/2021 0752  Last data filed at 5/11/2021 4674  Gross per 24 hour   Intake 3720 ml   Output 2616 ml   Net 1104 ml       PHYSICAL EXAM:  General Appearance: alert and more lucid today. HEENT:  Normocephalic, atraumatic, mucus membranes moist.  NG tube in place. Heart: Heart regular rate and rhythm  Lungs: No acute distress  Abdomen: Soft, nondistended, Non tender to palpation. Midline incision healing well. Area of slough noted around umbilicus. Extremities: No cyanosis, pitting edema, rashes noted. Skin: Skin color, texture, turgor normal. No rashes or lesions.     Data:  CBC with Differential:    Lab Results   Component Value Date    WBC 11.3 05/11/2021    RBC 3.85 05/11/2021    HGB 10.4 05/11/2021    HCT 36.5 05/11/2021     05/11/2021    MCV 94.8 05/11/2021    MCH 27.0 05/11/2021    MCHC 28.5 05/11/2021    RDW 15.9 05/11/2021    LYMPHOPCT 10 05/11/2021    LYMPHOPCT 18.3 12/14/2017    MONOPCT 5 05/11/2021    MONOPCT 5.7 12/14/2017    EOSPCT 5.1 12/14/2017    BASOPCT 0 05/11/2021    BASOPCT 1.1 12/14/2017    MONOSABS 0.55 05/11/2021    MONOSABS 0.4 12/14/2017    LYMPHSABS 1.09 05/11/2021    LYMPHSABS 1.2 12/14/2017    EOSABS 0.25 05/11/2021    EOSABS 0.3 12/14/2017    BASOSABS 0.04 05/11/2021    DIFFTYPE NOT REPORTED 05/11/2021     BMP:    Lab Results   Component Value Date     05/11/2021    K 4.9 05/11/2021     05/11/2021    CO2 25 05/11/2021    BUN 81 05/11/2021    LABALBU 3.8 05/02/2021    CREATININE 2.90 05/11/2021    CALCIUM 8.7 05/11/2021    GFRAA 25 05/11/2021    LABGLOM 21 05/11/2021    GLUCOSE 163 05/11/2021       Radiology Review:      ASSESSMENT:  Active Hospital Problems    Diagnosis Date Noted    Other hydronephrosis [N13.39] 05/03/2021     Priority: High    Acute respiratory failure with hypoxia (HCC) [J96.01] 05/03/2021     Priority: High    Metabolic acidosis [F25.6] 05/03/2021     Priority: High    Toxic metabolic encephalopathy [S26] 05/02/2021     Priority: High    Vitamin B 12 deficiency [E53.8] 05/03/2021     Priority: Medium    Folate deficiency [E53.8] 05/03/2021     Priority: Medium    Iron deficiency anemia [D50.9] 05/02/2021     Priority: Medium    Normochromic normocytic anemia [D64.9] 05/01/2021     Priority: Medium    Ventral hernia with bowel obstruction [K43.6] 04/30/2021    KARMA (acute kidney injury) ([facility] [address] [N17.9] 04/30/2021    Renal mass [N28.89] 06/28/2018    Essential hypertension [I10] 05/03/2016     80year old male w/ recurrent parastomal hernia presenting with incarcerated/strangulated parastomal hernia s/p ex lap, FELICE, reduction of parastomal hernia, and small bowel resection (4/30/21-Bhargav). Confused  KARMA - resolving    Plan:  1. Diet: Dysphagia diet started per primary. Monitor calorie intake. 2. Defer treatment with nephrostomy tubes to urology and need for HD to nephrology. 3. Continue suppository daily and miralax through NGT daily  4. From surgery standpoint, we will continue to follow along. No new recommendations at this point other than continue to advance diet as patient tolerates   5.  Medical management per primary team    Electronically signed by Garrett Savage DO  on 5/11/2021 at 7:52 AM

## 2021-05-11 NOTE — PROGRESS NOTES
Occupational Therapy  Facility/Department: [facility] PROGRESSIVE CARE  Daily Treatment Note  NAME: Gaye Baker  : 1940  MRN: [id number]    Date of Service: 2021    Discharge Recommendations:  Patient would benefit from continued therapy after discharge     Pt currently functioning below baseline. Would suggest additional therapy at time of discharge to maximize long term outcomes and prevent re-admission. Please refer to AM-PAC score for current level of function. Assessment   Performance deficits / Impairments: Decreased functional mobility ; Decreased ADL status; Decreased strength;Decreased safe awareness;Decreased cognition;Decreased endurance;Decreased sensation;Decreased balance;Decreased posture  Prognosis: Fair;Good  OT Education: OT Role;Plan of Care;Transfer Training;ADL Adaptive Strategies; Energy Conservation;Precautions  REQUIRES OT FOLLOW UP: Yes  Activity Tolerance  Activity Tolerance: Patient limited by fatigue;Patient Tolerated treatment well;Treatment limited secondary to decreased cognition  Safety Devices  Safety Devices in place: Yes  Type of devices: Call light within reach;Nurse notified; Left in bed;Gait belt;Patient at risk for falls; Bed alarm in place  Restraints  Initially in place: Yes  Restraints: soft wrist restraints         Patient Diagnosis(es): The primary encounter diagnosis was Partial small bowel obstruction ([address] Diagnoses of Ventral hernia with bowel obstruction and Chronic kidney disease, unspecified CKD stage were also pertinent to this visit. has a past medical history of Arthritis, Caffeine use, Cancer ([address] GERD (gastroesophageal reflux disease), Hernia, inguinal, right, Hypertension, Kidney stone, Presence of urostomy ([address] and Retinal detachment. has a past surgical history that includes hernia repair; Bladder removal (); Prostate surgery (); Cataract removal (Bilateral); Colonoscopy;  Eye surgery (Right, ); vitrectomy (Left, 16); Incisional hernia repair (12/7/2015); Incisional hernia repair (11/2014); laparoscopy (N/A, 4/30/2021); Cystoscopy (N/A, 5/3/2021); IR GUIDED NEPHROSTOMY CATH PLACEMENT LEFT (5/3/2021); and IR GUIDED NEPHROSTOMY CATH PLACEMENT RIGHT (5/5/2021). Restrictions  Restrictions/Precautions  Restrictions/Precautions: Fall Risk, Up as Tolerated, General Precautions  Required Braces or Orthoses?: No  Position Activity Restriction  Other position/activity restrictions: B nephrostomy tubes, MARIE drain, Abdominal inscision, IV, NG tube, soft restraints  Subjective   General  Chart Reviewed: Yes  Patient assessed for rehabilitation services?: Yes  Response to previous treatment: Patient with no complaints from previous session  Family / Caregiver Present: Yes(family in at end of session)  Subjective  Subjective: Patient very talkative this date      Orientation  Orientation  Overall Orientation Status: Impaired  Orientation Level: Oriented to person;Oriented to situation;Disoriented to time;Disoriented to place(Patient knew he is in the hospital, unsure which one. Stated the month was August year 2021, and able to identify his is in the hospital for his stomach. Pt was give correct answers to these questions and asked the same questions later pt retained info)  Objective    ADL  Feeding: NPO  Toileting: Maximum assistance(x2)  Additional Comments: MAX A x2 staff rolling L and R in bed for brief change and BM hygiene        Balance  Sitting Balance: Contact guard assistance(CGA initally, progressed to SBA)    Standing Balance: Moderate assistance(x2 for balance/safety)  Standing Balance  Time: standing tolerance ~1-2 minutes  Activity: static standing, weight shifting with RW  Comment: After first stand, patient became dizzy after ~1 minute and required to sit instantly.  Second stand with RW pt still slightly unsteady but no c/o dizziness    Functional Mobility  Functional - Mobility Device: Rolling Walker  Activity: Other  Assist Level: Moderate assistance(x2)  Functional Mobility Comments: Patient completed 3-4 lateral steps along bed side. Max A to advance RW with Max cues for sequencing/technique    Bed mobility  Bridging: Moderate assistance  Rolling to Left: Moderate assistance;2 Person assistance  Rolling to Right: Moderate assistance;2 Person assistance  Supine to Sit: Maximum assistance;2 Person assistance  Sit to Supine: Maximum assistance;2 Person assistance  Scooting: Maximal assistance  Comment: Patient required Max VCs and hand over hand assist to reach/roll for bed rail and maintain grasp on rail. Max A to bring vickie LE over EOB and Max A to achieve upright positioning. Max x A to scoot to EOB, Mod A for inital seated balance during to posterior lean with VCs and tactile cues for postural alignment. hand over hand assist with verbal cues for rolling L and R in bed    Transfers  Sit to stand: Moderate assistance;2 Person assistance  Stand to sit: Moderate assistance;2 Person assistance  Transfer Comments: Verbal cues for sequencing/technique, controlled sit<>stand, and RW safety      Cognition  Overall Cognitive Status: Exceptions  Arousal/Alertness: Appropriate responses to stimuli;Delayed responses to stimuli  Following Commands: Follows one step commands with increased time; Follows one step commands with repetition  Attention Span: Attends with cues to redirect  Memory: Decreased recall of biographical Information;Decreased recall of precautions;Decreased recall of recent events;Decreased short term memory  Safety Judgement: Decreased awareness of need for assistance;Decreased awareness of need for safety  Problem Solving: Decreased awareness of errors;Assistance required to generate solutions;Assistance required to identify errors made;Assistance required to implement solutions;Assistance required to correct errors made  Insights: Not aware of deficits  Initiation: Requires cues for all  Sequencing: Requires cues for all  Cognition Comment: Pt minimally verbal this session. Plan   Plan  Times per week: 4-5x/week, 1-2x/day  Times per day: Daily  Current Treatment Recommendations: Strengthening, Balance Training, Functional Mobility Training, Endurance Training, Safety Education & Training, Patient/Caregiver Education & Training, Self-Care / ADL, Positioning, Equipment Evaluation, Education, & procurement, Cognitive Reorientation, Cognitive/Perceptual Training  AM-PAC Score        AM-PAC Inpatient Daily Activity Raw Score: 8 (05/11/21 1240)  AM-PAC Inpatient ADL T-Scale Score : 22.86 (05/11/21 1240)  ADL Inpatient CMS 0-100% Score: 85.69 (05/11/21 1240)  ADL Inpatient CMS G-Code Modifier : CM (05/11/21 1240)    Goals  Short term goals  Time Frame for Short term goals: by discharge, pt will  Short term goal 1: demo ADL transfers and functional mobility to CGA with use of AD as needed  Short term goal 2: demo mod A x 1 for bed mobility with rails/controls  Short term goal 3: demo min A with simple grooming tasks following set up with min cues for initiation/sequecing  Short term goal 4: demo CGA with sitting balance at EOB x 15 min for inc. particiopation in ADL tasks  Short term goal 5: demo min A with UB ADLs at seated level and asssess LB when pt is able to stand  Patient Goals   Patient goals : not stated       Therapy Time   Individual Concurrent Group Co-treatment   Time In       46   Time Out       1149   Minutes       45     Co-treatment with PT warranted secondary to decreased safety and independence requiring 2 skilled therapy professionals to address individual discipline's goals. OT addressing preparation for ADL transfer, sitting balance for increased ADL performance, sitting/activity tolerance, functional reaching, environmental safety/scanning, fall prevention, functional mobility for ADL transfers, ability to sequence and follow directions and functional UE strength.   Upon writer exit, call light within reach, pt retired to bed. All lines intact and patient positioned comfortably. All patient needs addressed prior to ending therapy session. Chart reviewed prior to treatment and patient is agreeable for therapy. RN reports patient is medically stable for therapy treatment this date.       ABHI Wray/STEPHANY

## 2021-05-11 NOTE — PROGRESS NOTES
Reason for follow-up :  Acute kidney injury. Subjective    Patient seen and examined at the bedside. Bilateral percutaneous nephrostomy tubes prsent. Patient is pleasantly confused  Not agitated    Results for Nancy Bautista (MRN [id number]) as of 5/10/2021 10:34   Ref. Range 5/9/2021 06:11 5/9/2021 12:10 5/9/2021 17:58 5/9/2021 23:58 5/10/2021 05:30   Creatinine Latest Ref Range: 0.70 - 1.20 mg/dL 3.03 (H) 2.97 (H) 2.82 (H) 2.76 (H) 2.84 (H)       HISTORY OF PRESENT ILLNESS:    The patient is a 80 y.o. male had multiple abdominal surgeries presented with enlarging incarcerated/strangulated parastomal hernia, who underwent exploratory laparotomy with reduction of the hernia and small bowel resection on 4/30/2021. He had history of bladder cancer s/p cystectomy and ileoconduit formation in 2006. His baseline creatinine has been between 1.5-2.1, it was 2.85 at presentation and increased this morning to 3.99. Patient had a CT scan of the abdomen upon presentation that was done without contrast, it was remarkable for extensive stone burden in both kidneys greater on the left, it showed uroepithelial wall thickening with inflammatory stranding involving the left collecting system, was also remarkable for enlargement of the right upper pole mass. Renal ultrasound was done yesterday showed right hydronephrosis and nephrolithiasis.   Patient has been nonoliguric, urine output over the last 24 hours was 2.7 L      Review Of Systems:   Unable to obtain as he is confused     Scheduled Meds:   insulin glargine  10 Units Subcutaneous Daily    insulin lispro  0-12 Units Subcutaneous Q4H    polyethylene glycol  17 g Oral Daily    acetaminophen  1,000 mg Oral 2 times per day    melatonin  3 mg Oral Nightly    heparin (porcine)  5,000 Units Subcutaneous 3 times per day    sodium chloride flush  5-40 mL Intravenous 2 times per day    lidocaine  2 patch Topical Q12H    famotidine (PEPCID) injection  10 mg Intravenous BID     Continuous Infusions:   dextrose      sodium chloride       PRN Meds:mineral oil, glucose, dextrose, glucagon (rDNA), dextrose, acetaminophen, albuterol sulfate HFA, sodium chloride flush, sodium chloride, ondansetron     Physical Exam:  Vitals:    05/11/21 0617 05/11/21 0750 05/11/21 1116 05/11/21 1506   BP:  (!) 103/58 (!) 113/56 109/62   Pulse:  51 58 57   Resp:  18 20 20   Temp:  97.7 °F (36.5 °C) 97.6 °F (36.4 °C) 97.9 °F (36.6 °C)   TempSrc:  Oral Oral Oral   SpO2:  98% 98% 96%   Weight: 242 lb (109.8 kg)      Height:         I/O last 3 completed shifts: In: 9832 [I.V.:2200; NG/GT:1520]  Out: 2316 [QCRJX:3626; Emesis/NG output:361; Drains:10]    General:  Awake, confused not in distress. HEENT: Atraumatic, normocephalic. Anicteric sclera. Pink and dry. Neck supple. No JVD. Chest: Bilateral air entry, clear to auscultation, no wheezing, rhonchi or rales. Cardiovascular: RRR, S1S2, no murmur, rub or gallop. No lower extremity edema. Abdomen: Soft, non tender to palpation. Musculoskeletal: No cyanosis or clubbing. Integumentary: Pink, warm and dry. Free from rash or lesions. CNS: Confused face symmetrical. No tremor.      Data:  CBC:   Lab Results   Component Value Date    WBC 11.3 05/11/2021    HGB 10.4 (L) 05/11/2021    HCT 36.5 (L) 05/11/2021    MCV 94.8 05/11/2021     05/11/2021     BMP:    Lab Results   Component Value Date     05/11/2021     (H) 05/10/2021     (H) 05/09/2021    K 4.9 05/11/2021    K 4.6 05/10/2021    K 4.9 05/09/2021     05/11/2021     (H) 05/10/2021     (H) 05/09/2021    CO2 25 05/11/2021    CO2 25 05/10/2021    CO2 28 05/09/2021    BUN 81 (H) 05/11/2021    BUN 84 (H) 05/10/2021    BUN 85 (H) 05/09/2021    CREATININE 2.90 (H) 05/11/2021    CREATININE 2.84 (H) 05/10/2021    CREATININE 2.76 (H) 05/09/2021    GLUCOSE 163 (H) 05/11/2021    GLUCOSE 230 (H) 05/10/2021    GLUCOSE 189 (H) 05/09/2021     CMP:   Lab Results Component Value Date     05/11/2021    K 4.9 05/11/2021     05/11/2021    CO2 25 05/11/2021    BUN 81 05/11/2021    CREATININE 2.90 05/11/2021    GLUCOSE 163 05/11/2021    CALCIUM 8.7 05/11/2021    PROT 7.3 05/02/2021    LABALBU 3.8 05/02/2021    BILITOT 0.50 05/02/2021    ALKPHOS 55 05/02/2021    AST 19 05/02/2021    ALT 11 05/02/2021      Hepatic:   Lab Results   Component Value Date    AST 19 05/02/2021    AST 16 04/30/2021    AST 24 07/15/2020    ALT 11 05/02/2021    ALT 11 04/30/2021    ALT 17 07/15/2020    BILITOT 0.50 05/02/2021    BILITOT 0.35 04/30/2021    BILITOT 0.47 07/15/2020    ALKPHOS 55 05/02/2021    ALKPHOS 77 04/30/2021    ALKPHOS 64 07/15/2020     BNP: No results found for: BNP  Lipids:   Lab Results   Component Value Date    CHOL 139 05/09/2016    HDL 26 (A) 05/09/2016     INR:   Lab Results   Component Value Date    INR 1.2 05/03/2021    INR 1.0 05/17/2016     PTH: No results found for: PTH  Phosphorus:    Lab Results   Component Value Date    PHOS 3.8 05/11/2021     Ionized Calcium: No results found for: IONCA  Magnesium:   Lab Results   Component Value Date    MG 2.9 05/11/2021     Albumin:   Lab Results   Component Value Date    LABALBU 3.8 05/02/2021     Last 3 CK, CKMB, Troponin: @LABRCNT(CKTOTAL:3,CKMB:3,TROPONINI:3)       URINE:)No results found for: Rasheeda Shanks     Radiology:   Reviewed. Assessment:    Acute kidney injury secondary to obstructive uropathy and ATN s/p bilateral percutaneous nephrostomy. Hypernatremia  Metabolic acidosis-improved  S/p hernia repair  Anemia  Bladder cancer and renal cell carcinoma, s/p cystectomy and creation of ileal conduit  Enlarging right renal mass s/p cryoablation in 2019  R hydronephrosis s/p PCN placement  Left staghorn calculi  Metabolic encephalopathy multifactorial    Plan:    Patient Creat improved from 4.01 to 2.9.   Pt had obstructive uropathy addressed with BL PCNs and also had ATN as well along with underlying CKD  SCreat was 2.12 on 7/15/2020  We will continue free water supplementation with D5W. Serum sodium slowly and appropriately improving  No indication for renal replacement therapy. Patient has multifactorial issues including renal cell cancer with enlarging renal mass, encephalopathy obstructive uropathy, as well as component of ATN   DC planning ok from renal perspectives. Electronically signed by Rekha Neely MD on 5/11/2021 at 6:08 PM  [facility].   Ph: [phone]

## 2021-05-11 NOTE — PROGRESS NOTES
Physical Therapy  [facility]  Daily Treatment Note  NAME: Jenkins Romberg  : 1940  MRN: [id number]    Date of Service: 2021    Discharge Recommendations:  Patient would benefit from continued therapy after discharge   Pt currently functioning below baseline. Would suggest additional therapy at time of discharge to maximize long term outcomes and prevent re-admission. Please refer to AM-PAC score for current level of function. PT Equipment Recommendations  Equipment Needed: No    Assessment   Body structures, Functions, Activity limitations: Decreased functional mobility ; Decreased cognition;Decreased endurance;Decreased ADL status; Decreased coordination;Decreased strength;Decreased balance;Decreased safe awareness  Assessment: Pt will benefit from continued skilled PT to address balance, strength, safe mobility and activity tolerance. Patient currently mod x 2 for all balance and pre gait activities. Prognosis: Good  PT Education: PT Role;Plan of Care;General Safety; Functional Mobility Training  Activity Tolerance  Activity Tolerance: Patient limited by cognitive status; Patient limited by endurance; Patient limited by pain; Patient limited by fatigue     Patient Diagnosis(es): The primary encounter diagnosis was Partial small bowel obstruction ([address] Diagnoses of Ventral hernia with bowel obstruction and Chronic kidney disease, unspecified CKD stage were also pertinent to this visit. has a past medical history of Arthritis, Caffeine use, Cancer ([address] GERD (gastroesophageal reflux disease), Hernia, inguinal, right, Hypertension, Kidney stone, Presence of urostomy ([address] and Retinal detachment. has a past surgical history that includes hernia repair; Bladder removal (); Prostate surgery (); Cataract removal (Bilateral); Colonoscopy; Eye surgery (Right, ); vitrectomy (Left, 16); Incisional hernia repair (2015);  Incisional hernia repair (2014); laparoscopy (N/A, 4/30/2021); Cystoscopy (N/A, 5/3/2021); IR GUIDED NEPHROSTOMY CATH PLACEMENT LEFT (5/3/2021); and IR GUIDED NEPHROSTOMY CATH PLACEMENT RIGHT (5/5/2021). Restrictions  Restrictions/Precautions  Restrictions/Precautions: Fall Risk, Up as Tolerated, General Precautions  Required Braces or Orthoses?: No  Position Activity Restriction  Other position/activity restrictions: B nephrostomy tubes, MARIE drain, Abdominal inscision, IV, NG tube, soft restraints  Subjective   General  Chart Reviewed: Yes  Response To Previous Treatment: Patient with no complaints from previous session. Family / Caregiver Present: No  Subjective  Subjective: Patient agrees to PT treatment  General Comment  Comments: NADER Castro reports patient medically appropriate for PT. Pain Screening  Patient Currently in Pain: No  Vital Signs  Patient Currently in Pain: No       Orientation  Orientation  Overall Orientation Status: Impaired  Orientation Level: Oriented to place;Oriented to time;Oriented to situation;Oriented to person(Patient knew he as at a hospital but not which one)  Cognition   Cognition  Overall Cognitive Status: Exceptions  Arousal/Alertness: Inconsistent responses to stimuli;Delayed responses to stimuli  Following Commands: Inconsistently follows commands  Attention Span: Difficulty attending to directions; Attends with cues to redirect  Memory: Decreased recall of biographical Information;Decreased recall of precautions;Decreased recall of recent events;Decreased short term memory  Safety Judgement: Decreased awareness of need for assistance;Decreased awareness of need for safety  Problem Solving: Decreased awareness of errors;Assistance required to generate solutions;Assistance required to identify errors made;Assistance required to implement solutions;Assistance required to correct errors made  Insights: Not aware of deficits  Initiation: Requires cues for all  Sequencing: Requires cues for all  Cognition Comment: Pt minimally verbal this session. Objective   Bed mobility  Rolling to Left: Moderate assistance;2 Person assistance  Rolling to Right: Moderate assistance;2 Person assistance  Supine to Sit: Maximum assistance;2 Person assistance  Sit to Supine: Maximum assistance;2 Person assistance  Scooting: Maximal assistance  Comment: Patient required Max VCs and hand over hand assist to reach/roll for bed rail and maintain grasp on rail. Max A to bring vickie LE over EOB and Max A to achieve upright positioning. Max x A to scoot to EOB, Mod A for inital seated balance during to posterior lean with VCs and tactile cues for postural alignment. Transfers  Sit to Stand: Moderate Assistance;2 Person Assistance  Stand to sit: Moderate Assistance;2 Person Assistance  Lateral Transfers: Moderate Assistance;2 Person Assistance  Comment: Patient with initial anterior lean upon standing and flexed knees. Patient requires tactile and vc's for self correction. Patient requires Mod x 2 A to maximize balance and stability, patient relies on increased UE support on RW during all stance activities. Patient requires vc's and assist for proper placement of RW to promote lateral transfer to [facility]. Patient requires increased assist for line management and awareness throughout treatment. 9  Ambulation  Ambulation?: Yes  More Ambulation?: No  Ambulation 1  Surface: level tile  Device: Rolling Walker  Assistance: Moderate assistance;2 Person assistance  Quality of Gait: Slow shuffling steps with education for progression and sequencing to move LE and for positioning of Assistive device. Gait Deviations: Slow Nadya;Decreased step height;Decreased step length;Shuffles  Distance: 2 feet to [facility]  Comments: patient with anterior lean and requires vc's for posture correction to promote balance and stability to maximize independence in mobility tasks. Patient with fair to poor carry over.   Stairs/Curb  Stairs?: No  Neuromuscular Education  NDT Treatment: Gait ;Lower extremity; Sitting;Standing  Neuromuscular Comments: VCs req for proper breathing breonna (pursed lip breathing) during functional mobility. Tactile and VCs req for postural control during sit<>stands & amb to promote abdominal and erector spinae mm facilitation for increased stability and balance, decreasing kyphosis of the spine. Pt req VCs to correct for anterior translation of femur with squatting in addition to pressing firmly into ground with feet, to promote the appropriate body mechanics for sit<>stand transfers. Patient requires tactile and vc's to maximize balance and stability and decrease anterior lean to maintain stance posture and maximize mobility  Balance  Posture: Poor  Sitting - Static: Fair;-  Sitting - Dynamic: Poor;+  Standing - Static: Poor  Standing - Dynamic: Poor  Comments: Patient with increased posterior lean in sitting able to self correct with vc's. Patient with increased anterior lean in standing requiring min to mod assist for safe stance activities to promote pre gait and balance awareness. Comment: Rolled Lt <>Rt, Mod x 2 A for rolling and dependent for pericare and brief change.     AM-PAC Score  AM-PAC Inpatient Mobility Raw Score : 10 (05/11/21 1236)  AM-PAC Inpatient T-Scale Score : 32.29 (05/11/21 1236)  Mobility Inpatient CMS 0-100% Score: 76.75 (05/11/21 1236)  Mobility Inpatient CMS G-Code Modifier : CL (05/11/21 1236)          Goals  Short term goals  Time Frame for Short term goals: 12 visits  Short term goal 1: Pt to be [place] for all bed mobility  Short term goal 2: Pt will tolerate 25mins of PT including therex, theract, gait training and NMR  Short term goal 3: When appropriate, transfers/gait to be re-assessed  Short term goal 4: Pt will improve sitting balance to Good to improve core strength and decrease fall risk  Patient Goals   Patient goals : Pt unable to state    Plan    Plan  Times per week: 1-2x day / 6-7 days per week  Specific instructions for Next Treatment: RE-ASSESS transfers/gait  Current Treatment Recommendations: Strengthening, Endurance Training, Neuromuscular Re-education, Patient/Caregiver Education & Training, Balance Training, Home Exercise Program, Functional Mobility Training, Safety Education & Training, Positioning  Safety Devices  Type of devices: All fall risk precautions in place, Bed alarm in place, Call light within reach, Nurse notified, Left in bed  Restraints  Initially in place: Yes  Restraints: bilat UEs     Therapy Time   Individual Concurrent Group Co-treatment   Time In       0   Time Out       1   Minutes       45         Co-treatment with OT warranted secondary to decreased safety and independence requiring 2 skilled therapy professionals to address individual discipline's goals. PT addressing pre gait trunk strengthening, weight shifting prior to transfers, transfer training and postural control in sitting.   Lexx Kevin, PTA

## 2021-05-12 LAB
ABSOLUTE EOS #: 0.23 K/UL (ref 0–0.44)
ABSOLUTE IMMATURE GRANULOCYTE: 0.09 K/UL (ref 0–0.3)
ABSOLUTE LYMPH #: 1.17 K/UL (ref 1.1–3.7)
ABSOLUTE MONO #: 0.55 K/UL (ref 0.1–1.2)
ANION GAP SERPL CALCULATED.3IONS-SCNC: 13 MMOL/L (ref 9–17)
BASOPHILS # BLD: 0 % (ref 0–2)
BASOPHILS ABSOLUTE: 0.04 K/UL (ref 0–0.2)
BUN BLDV-MCNC: 80 MG/DL (ref 8–23)
BUN/CREAT BLD: 31 (ref 9–20)
CALCIUM SERPL-MCNC: 8.8 MG/DL (ref 8.6–10.4)
CHLORIDE BLD-SCNC: 105 MMOL/L (ref 98–107)
CO2: 25 MMOL/L (ref 20–31)
CREAT SERPL-MCNC: 2.62 MG/DL (ref 0.7–1.2)
DIFFERENTIAL TYPE: ABNORMAL
EOSINOPHILS RELATIVE PERCENT: 2 % (ref 1–4)
GFR AFRICAN AMERICAN: 29 ML/MIN
GFR NON-AFRICAN AMERICAN: 24 ML/MIN
GFR SERPL CREATININE-BSD FRML MDRD: ABNORMAL ML/MIN/{1.73_M2}
GFR SERPL CREATININE-BSD FRML MDRD: ABNORMAL ML/MIN/{1.73_M2}
GLUCOSE BLD-MCNC: 110 MG/DL (ref 75–110)
GLUCOSE BLD-MCNC: 122 MG/DL (ref 75–110)
GLUCOSE BLD-MCNC: 123 MG/DL (ref 75–110)
GLUCOSE BLD-MCNC: 125 MG/DL (ref 75–110)
GLUCOSE BLD-MCNC: 128 MG/DL (ref 75–110)
GLUCOSE BLD-MCNC: 143 MG/DL (ref 70–99)
GLUCOSE BLD-MCNC: 86 MG/DL (ref 75–110)
HCT VFR BLD CALC: 34.9 % (ref 40.7–50.3)
HEMOGLOBIN: 10.4 G/DL (ref 13–17)
IMMATURE GRANULOCYTES: 1 %
LYMPHOCYTES # BLD: 12 % (ref 24–43)
MAGNESIUM: 2.8 MG/DL (ref 1.6–2.6)
MCH RBC QN AUTO: 27.5 PG (ref 25.2–33.5)
MCHC RBC AUTO-ENTMCNC: 29.8 G/DL (ref 28.4–34.8)
MCV RBC AUTO: 92.3 FL (ref 82.6–102.9)
MONOCYTES # BLD: 6 % (ref 3–12)
NRBC AUTOMATED: 0 PER 100 WBC
PDW BLD-RTO: 16.1 % (ref 11.8–14.4)
PHOSPHORUS: 4.1 MG/DL (ref 2.5–4.5)
PLATELET # BLD: 229 K/UL (ref 138–453)
PLATELET ESTIMATE: ABNORMAL
PMV BLD AUTO: 10.6 FL (ref 8.1–13.5)
POTASSIUM SERPL-SCNC: 4.8 MMOL/L (ref 3.7–5.3)
RBC # BLD: 3.78 M/UL (ref 4.21–5.77)
RBC # BLD: ABNORMAL 10*6/UL
SEG NEUTROPHILS: 79 % (ref 36–65)
SEGMENTED NEUTROPHILS ABSOLUTE COUNT: 7.79 K/UL (ref 1.5–8.1)
SODIUM BLD-SCNC: 143 MMOL/L (ref 135–144)
WBC # BLD: 9.9 K/UL (ref 3.5–11.3)
WBC # BLD: ABNORMAL 10*3/UL

## 2021-05-12 PROCEDURE — 6360000002 HC RX W HCPCS: Performed by: SPECIALIST

## 2021-05-12 PROCEDURE — 93005 ELECTROCARDIOGRAM TRACING: CPT | Performed by: NURSE PRACTITIONER

## 2021-05-12 PROCEDURE — 83735 ASSAY OF MAGNESIUM: CPT

## 2021-05-12 PROCEDURE — 6370000000 HC RX 637 (ALT 250 FOR IP): Performed by: SPECIALIST

## 2021-05-12 PROCEDURE — 94761 N-INVAS EAR/PLS OXIMETRY MLT: CPT

## 2021-05-12 PROCEDURE — 36415 COLL VENOUS BLD VENIPUNCTURE: CPT

## 2021-05-12 PROCEDURE — 2580000003 HC RX 258: Performed by: SPECIALIST

## 2021-05-12 PROCEDURE — 85025 COMPLETE CBC W/AUTO DIFF WBC: CPT

## 2021-05-12 PROCEDURE — 2060000000 HC ICU INTERMEDIATE R&B

## 2021-05-12 PROCEDURE — 97129 THER IVNTJ 1ST 15 MIN: CPT

## 2021-05-12 PROCEDURE — 2500000003 HC RX 250 WO HCPCS: Performed by: SPECIALIST

## 2021-05-12 PROCEDURE — 6370000000 HC RX 637 (ALT 250 FOR IP): Performed by: STUDENT IN AN ORGANIZED HEALTH CARE EDUCATION/TRAINING PROGRAM

## 2021-05-12 PROCEDURE — 99232 SBSQ HOSP IP/OBS MODERATE 35: CPT | Performed by: NURSE PRACTITIONER

## 2021-05-12 PROCEDURE — 84100 ASSAY OF PHOSPHORUS: CPT

## 2021-05-12 PROCEDURE — 97530 THERAPEUTIC ACTIVITIES: CPT

## 2021-05-12 PROCEDURE — 82947 ASSAY GLUCOSE BLOOD QUANT: CPT

## 2021-05-12 PROCEDURE — 80048 BASIC METABOLIC PNL TOTAL CA: CPT

## 2021-05-12 PROCEDURE — 2700000000 HC OXYGEN THERAPY PER DAY

## 2021-05-12 PROCEDURE — 2580000003 HC RX 258: Performed by: NURSE PRACTITIONER

## 2021-05-12 RX ORDER — 0.9 % SODIUM CHLORIDE 0.9 %
1000 INTRAVENOUS SOLUTION INTRAVENOUS ONCE
Status: COMPLETED | OUTPATIENT
Start: 2021-05-12 | End: 2021-05-12

## 2021-05-12 RX ADMIN — HEPARIN SODIUM 5000 UNITS: 5000 INJECTION INTRAVENOUS; SUBCUTANEOUS at 06:53

## 2021-05-12 RX ADMIN — SODIUM CHLORIDE, PRESERVATIVE FREE 10 ML: 5 INJECTION INTRAVENOUS at 20:54

## 2021-05-12 RX ADMIN — FAMOTIDINE 10 MG: 10 INJECTION, SOLUTION INTRAVENOUS at 20:30

## 2021-05-12 RX ADMIN — INSULIN GLARGINE 10 UNITS: 100 INJECTION, SOLUTION SUBCUTANEOUS at 09:13

## 2021-05-12 RX ADMIN — SODIUM CHLORIDE 1000 ML: 9 INJECTION, SOLUTION INTRAVENOUS at 14:13

## 2021-05-12 RX ADMIN — HEPARIN SODIUM 5000 UNITS: 5000 INJECTION INTRAVENOUS; SUBCUTANEOUS at 20:31

## 2021-05-12 RX ADMIN — SODIUM CHLORIDE, PRESERVATIVE FREE 10 ML: 5 INJECTION INTRAVENOUS at 09:15

## 2021-05-12 RX ADMIN — HEPARIN SODIUM 5000 UNITS: 5000 INJECTION INTRAVENOUS; SUBCUTANEOUS at 14:13

## 2021-05-12 RX ADMIN — FAMOTIDINE 10 MG: 10 INJECTION, SOLUTION INTRAVENOUS at 09:12

## 2021-05-12 RX ADMIN — Medication 3 MG: at 20:32

## 2021-05-12 ASSESSMENT — ENCOUNTER SYMPTOMS
ABDOMINAL DISTENTION: 0
SORE THROAT: 0
PHOTOPHOBIA: 0
DIARRHEA: 0
SHORTNESS OF BREATH: 0
APNEA: 0
EYE DISCHARGE: 0
ABDOMINAL PAIN: 0

## 2021-05-12 ASSESSMENT — PAIN SCALES - GENERAL: PAINLEVEL_OUTOF10: 0

## 2021-05-12 NOTE — PLAN OF CARE
Problem: Falls - Risk of:  Goal: Will remain free from falls  Description: Will remain free from falls  5/12/2021 0145 by Donna Milan RN  Outcome: Ongoing     Problem: Falls - Risk of:  Goal: Absence of physical injury  Description: Absence of physical injury  5/12/2021 0145 by Donna Milan RN  Outcome: Ongoing     Problem: Skin Integrity:  Goal: Will show no infection signs and symptoms  Description: Will show no infection signs and symptoms  5/12/2021 0145 by Donna Milan RN  Outcome: Ongoing     Problem: Skin Integrity:  Goal: Absence of new skin breakdown  Description: Absence of new skin breakdown  5/12/2021 0145 by Donna Milan RN  Outcome: Ongoing     Problem: Skin Integrity:  Goal: Signs of wound healing will improve  Description: Signs of wound healing will improve  5/12/2021 0145 by Donna Milan RN  Outcome: Ongoing     Problem: Infection - Surgical Site:  Goal: Will show no infection signs and symptoms  Description: Will show no infection signs and symptoms  5/12/2021 0145 by Donna Milan RN  Outcome: Ongoing     Problem: Activity:  Goal: Ability to tolerate increased activity will improve  Description: Ability to tolerate increased activity will improve  5/12/2021 0145 by Donna Milan RN  Outcome: Ongoing     Problem:  Bowel/Gastric:  Goal: Gastrointestinal status for postoperative course will improve  Description: Gastrointestinal status for postoperative course will improve  5/12/2021 0145 by Donna Milan RN  Outcome: Ongoing     Problem: Coping:  Goal: Level of anxiety will decrease  Description: Level of anxiety will decrease  5/12/2021 0145 by Donna Milan RN  Outcome: Ongoing     Problem: Sensory:  Goal: Pain level will decrease  Description: Pain level will decrease  5/12/2021 0145 by Donna Milan RN  Outcome: Ongoing     Problem: Pain:  Goal: Pain level will decrease  Description: Pain level will decrease  5/12/2021 0145 by Donna Milan RN  Outcome: Ongoing Problem: Pain:  Goal: Control of acute pain  Description: Control of acute pain  5/12/2021 0145 by Carline Shah RN  Outcome: Ongoing       Problem: Non-Violent Restraints  Goal: Removal from restraints as soon as assessed to be safe  5/12/2021 0145 by Carline Shah RN  Outcome: Ongoing     Problem: Non-Violent Restraints  Goal: No harm/injury to patient while restraints in use  5/12/2021 0145 by Carline Shah RN  Outcome: Ongoing     Problem: Non-Violent Restraints  Goal: Patient's dignity will be maintained  5/12/2021 0145 by Carline Shah RN  Outcome: Ongoing     Problem: Confusion - Acute:  Goal: Absence of continued neurological deterioration signs and symptoms  Description: Absence of continued neurological deterioration signs and symptoms  5/12/2021 0145 by Carline Shah RN  Outcome: Ongoing     Problem: Confusion - Acute:  Goal: Mental status will be restored to baseline  Description: Mental status will be restored to baseline  5/12/2021 0145 by Carline Shah RN  Outcome: Ongoing     Problem: Discharge Planning:  Goal: Ability to perform activities of daily living will improve  Description: Ability to perform activities of daily living will improve  5/12/2021 0145 by Carline Shah RN  Outcome: Ongoing     Problem: Discharge Planning:  Goal: Participates in care planning  Description: Participates in care planning  5/12/2021 0145 by Carline Shah RN  Outcome: Ongoing     Problem: Injury - Risk of, Physical Injury:  Goal: Will remain free from falls  Description: Will remain free from falls  5/12/2021 0145 by Carline Shah RN  Outcome: Ongoing     Problem: Injury - Risk of, Physical Injury:  Goal: Absence of physical injury  Description: Absence of physical injury  5/12/2021 0145 by Carline Shah RN  Outcome: Ongoing     Problem: Mood - Altered:  Goal: Mood stable  Description: Mood stable  5/12/2021 0145 by Carline Shah RN  Outcome: Ongoing     Problem: Mood - Altered:  Goal: Absence of abusive behavior  Description: Absence of abusive behavior  5/12/2021 0145 by Holly Patricia RN  Outcome: Ongoing     Problem: Mood - Altered:  Goal: Verbalizations of feeling emotionally comfortable while being cared for will increase  Description: Verbalizations of feeling emotionally comfortable while being cared for will increase  5/12/2021 0145 by Holly Patricia RN  Outcome: Ongoing     Problem: Psychomotor Activity - Altered:  Goal: Absence of psychomotor disturbance signs and symptoms  Description: Absence of psychomotor disturbance signs and symptoms  5/12/2021 0145 by Holly Patricia RN  Outcome: Ongoing     Problem: Sensory Perception - Impaired:  Goal: Demonstrations of improved sensory functioning will increase  Description: Demonstrations of improved sensory functioning will increase  5/12/2021 0145 by Holly Patricia RN  Outcome: Ongoing     Problem: Sensory Perception - Impaired:  Goal: Decrease in sensory misperception frequency  Description: Decrease in sensory misperception frequency  5/12/2021 0145 by Holly Patricia RN  Outcome: Ongoing     Problem: Sensory Perception - Impaired:  Goal: Able to refrain from responding to false sensory perceptions  Description: Able to refrain from responding to false sensory perceptions  5/12/2021 0145 by Holly Patricia RN  Outcome: Ongoing     Problem: Sensory Perception - Impaired:  Goal: Demonstrates accurate environmental perceptions  Description: Demonstrates accurate environmental perceptions  5/12/2021 0145 by Holly Patricia RN  Outcome: Ongoing     Problem: Sensory Perception - Impaired:  Goal: Able to distinguish between reality-based and nonreality-based thinking  Description: Able to distinguish between reality-based and nonreality-based thinking  5/12/2021 0145 by Holly Patricia RN  Outcome: Ongoing     Problem: Sensory Perception - Impaired:  Goal: Able to interrupt nonreality-based thinking  Description: Able to interrupt nonreality-based

## 2021-05-12 NOTE — PROGRESS NOTES
Occupational Therapy  Facility/Department: [facility] PROGRESSIVE CARE  Daily Treatment Note  NAME: Chris Burkett  : 1940  MRN: [id number]    Date of Service: 2021    Discharge Recommendations:  Patient would benefit from continued therapy after discharge     Pt currently functioning below baseline. Would suggest additional therapy at time of discharge to maximize long term outcomes and prevent re-admission. Please refer to AM-PAC score for current level of function. Assessment   Performance deficits / Impairments: Decreased functional mobility ; Decreased ADL status; Decreased strength;Decreased safe awareness;Decreased cognition;Decreased endurance;Decreased sensation;Decreased balance;Decreased posture  Prognosis: Fair;Good  OT Education: OT Role;Plan of Care;Energy Conservation;Precautions  REQUIRES OT FOLLOW UP: Yes  Activity Tolerance  Activity Tolerance: Treatment limited secondary to decreased cognition;Patient limited by fatigue  Safety Devices  Safety Devices in place: Yes  Type of devices: Call light within reach;Nurse notified; Left in bed;Gait belt;Patient at risk for falls; Bed alarm in place         Patient Diagnosis(es): The primary encounter diagnosis was Partial small bowel obstruction ([address] Diagnoses of Ventral hernia with bowel obstruction and Chronic kidney disease, unspecified CKD stage were also pertinent to this visit. has a past medical history of Arthritis, Caffeine use, Cancer ([address] GERD (gastroesophageal reflux disease), Hernia, inguinal, right, Hypertension, Kidney stone, Presence of urostomy ([address] and Retinal detachment. has a past surgical history that includes hernia repair; Bladder removal (); Prostate surgery (); Cataract removal (Bilateral); Colonoscopy; Eye surgery (Right, ); vitrectomy (Left, 16); Incisional hernia repair (2015); Incisional hernia repair (2014); laparoscopy (N/A, 2021); Cystoscopy (N/A, 5/3/2021);  IR GUIDED NEPHROSTOMY CATH PLACEMENT LEFT (5/3/2021); and IR GUIDED NEPHROSTOMY CATH PLACEMENT RIGHT (5/5/2021). Restrictions  Restrictions/Precautions  Restrictions/Precautions: Fall Risk, Up as Tolerated, General Precautions  Required Braces or Orthoses?: No  Position Activity Restriction  Other position/activity restrictions: B nephrostomy tubes, MARIE drain, Abdominal inscision, IV, NG tube, soft restraints  Subjective   General  Chart Reviewed: Yes  Patient assessed for rehabilitation services?: Yes  Response to previous treatment: Patient with no complaints from previous session  Family / Caregiver Present: Yes(family in at end of session)  Subjective  Subjective: Patient much more confused this date and inconsistantly following commands      Orientation  Orientation  Orientation Level: Oriented to person;Disoriented to time;Disoriented to place; Disoriented to situation  Objective    ADL  Additional Comments: Patient not following commands this date      Bed mobility  Bridging: Unable to assess  Rolling to Left: Unable to assess  Rolling to Right: Unable to assess  Supine to Sit: Unable to assess  Sit to Supine: Unable to assess  Scooting: Dependent/Total;2 Person assistance;Maximal assistance(Simultaneous filing. User may not have seen previous data.)  Comment: Patient very confused, not following directions and often closing eyes and trying to sleep. attmpeted to provide hand over hand assist to initate bed mobility tasks, but patient unable to focus and assist with bed mobility. Patient very low in bed and required hand over hand assist to bring BLE's up and for patient to \"hug self\", put pt in [place] to boost patient up in bed to achieve optimal positining/comfort. RN notifed of patient cognition change(Simultaneous filing.  User may not have seen previous data.)      Cognition  Overall Cognitive Status: Exceptions  Arousal/Alertness: Delayed responses to stimuli;Inconsistent responses to stimuli  Following Commands: Follows one step commands with increased time; Follows one step commands with repetition; Inconsistently follows commands  Attention Span: Attends with cues to redirect  Memory: Decreased recall of biographical Information;Decreased recall of precautions;Decreased recall of recent events;Decreased short term memory  Safety Judgement: Decreased awareness of need for assistance;Decreased awareness of need for safety  Problem Solving: Decreased awareness of errors;Assistance required to generate solutions;Assistance required to identify errors made;Assistance required to implement solutions;Assistance required to correct errors made  Insights: Not aware of deficits  Initiation: Requires cues for all  Sequencing: Requires cues for all      Plan   Plan  Times per week: 4-5x/week, 1-2x/day  Times per day: Daily  Current Treatment Recommendations: Strengthening, Balance Training, Functional Mobility Training, Endurance Training, Safety Education & Training, Patient/Caregiver Education & Training, Self-Care / ADL, Positioning, Equipment Evaluation, Education, & procurement, Cognitive Reorientation, Cognitive/Perceptual Training    AM-PAC Score        AM-[facility] Inpatient Daily Activity Raw Score: 8 (05/11/21 1240)  AM-PAC Inpatient ADL T-Scale Score : 22.86 (05/11/21 1240)  ADL Inpatient CMS 0-100% Score: 85.69 (05/11/21 1240)  ADL Inpatient CMS G-Code Modifier : CM (05/11/21 1240)    Goals  Short term goals  Time Frame for Short term goals: by discharge, pt will  Short term goal 1: demo ADL transfers and functional mobility to CGA with use of AD as needed  Short term goal 2: demo mod A x 1 for bed mobility with rails/controls  Short term goal 3: demo min A with simple grooming tasks following set up with min cues for initiation/sequecing  Short term goal 4: demo CGA with sitting balance at EOB x 15 min for inc. particiopation in ADL tasks  Short term goal 5: demo min A with UB ADLs at seated level and asssess LB when pt is able to stand  Patient Goals   Patient goals : not stated       Therapy Time   Individual Concurrent Group Co-treatment   Time In       1304   Time Out       1326   Minutes       22     Upon writer exit, call light within reach, pt retired to bed. All lines intact and patient positioned comfortably. All patient needs addressed prior to ending therapy session. Chart reviewed prior to treatment and patient is agreeable for therapy. RN reports patient is medically stable for therapy treatment this date. Co-treatment with PT warranted secondary to decreased safety and independence requiring 2 skilled therapy professionals to address individual discipline's goals. OT addressing sitting balance for increased ADL performance, sitting/activity tolerance, functional reaching, environmental safety/scanning, fall prevention, ability to sequence and follow directions and functional UE strength.       ABHI Yee/STEPHANY

## 2021-05-12 NOTE — CARE COORDINATION
Social [facility] that patient does not have NG. Tanvi Garcia

## 2021-05-12 NOTE — PROGRESS NOTES
[facility]  Speech Language Pathology    Date: 5/12/2021  Patient Name: Leo Steiner  YOB: 1940   AGE: 80 y.o. MRN: [id number]        Patient Not Available for Speech Therapy     Due to:  [] Testing  [] Hemodialysis  [] Cancelled by RN  [] Surgery   [] Intubation/Sedation/Pain Medication  [] Medical instability  [x] Other: with other disciplines    Next scheduled treatment: as able  Completed by:  Elian Ramey M.S. [id number] [facility], [place], operated by [facility]

## 2021-05-12 NOTE — PROGRESS NOTES
Restriction  Other position/activity restrictions: B nephrostomy tubes, MARIE drain, Abdominal inscision, IV, NG tube, soft restraints  Subjective   General  Chart Reviewed: Yes  Response To Previous Treatment: Patient reporting fatigue but able to participate. Subjective  Subjective: Upon arrival OT staff attempting to wake patient up for treatment. Patient given extended reorienation, attempts to have patient participate with bed mobility or exercise but patient unable to follow commands or keep his eyes open longer than a few seconds. Spoke to RN about how this seems to be a change is status as patient was confused yesterday but able to particpate. General Comment  Comments: Suzanna Espinosa RN reports patient medically appropriate for PT. Orientation     Cognition   Cognition  Overall Cognitive Status: Exceptions  Arousal/Alertness: Delayed responses to stimuli;Inconsistent responses to stimuli  Following Commands: Follows one step commands with increased time; Follows one step commands with repetition; Inconsistently follows commands  Attention Span: Attends with cues to redirect  Memory: Decreased recall of biographical Information;Decreased recall of precautions;Decreased recall of recent events;Decreased short term memory  Safety Judgement: Decreased awareness of need for assistance;Decreased awareness of need for safety  Problem Solving: Decreased awareness of errors;Assistance required to generate solutions;Assistance required to identify errors made;Assistance required to implement solutions;Assistance required to correct errors made  Insights: Not aware of deficits  Initiation: Requires cues for all  Sequencing: Requires cues for all  Objective      Transfers  Sit to Stand: Unable to assess  Stand to sit: Unable to assess  Bed to Chair: Unable to assess  Stand Pivot Transfers: Unable to assess  Squat Pivot Transfers: Unable to assess  Lateral Transfers: Unable to assess  Car Transfer: Unable to assess  Ambulation  Ambulation?: No  More Ambulation?: No    AM-PAC Score  AM-PAC Inpatient Mobility Raw Score : 10 (05/12/21 1353)  AM-PAC Inpatient T-Scale Score : 32.29 (05/12/21 1353)  Mobility Inpatient CMS 0-100% Score: 76.75 (05/12/21 1353)  Mobility Inpatient CMS G-Code Modifier : CL (05/12/21 1353)          Goals  Short term goals  Time Frame for Short term goals: 12 visits  Short term goal 1: Pt to be [place] for all bed mobility  Short term goal 2: Pt will tolerate 25mins of PT including therex, theract, gait training and NMR  Short term goal 3: When appropriate, transfers/gait to be re-assessed  Short term goal 4: Pt will improve sitting balance to Good to improve core strength and decrease fall risk  Patient Goals   Patient goals : Pt unable to state    Plan    Plan  Times per week: 1-2x day / 6-7 days per week  Specific instructions for Next Treatment: RE-ASSESS transfers/gait  Current Treatment Recommendations: Strengthening, Endurance Training, Neuromuscular Re-education, Patient/Caregiver Education & Training, Balance Training, Home Exercise Program, Functional Mobility Training, Safety Education & Training, Positioning  Safety Devices  Type of devices: All fall risk precautions in place, Bed alarm in place, Call light within reach, Nurse notified, Left in bed  Restraints  Initially in place: No  Restraints: bilat UEs     Therapy Time   Individual Concurrent Group Co-treatment   Time In       1310   Time Out       1328   Minutes       18        Co-treatment with OT warranted secondary to decreased safety and independence requiring 2 skilled therapy professionals to address individual discipline's goals. PT addressing pre gait trunk strengthening, weight shifting prior to transfers, transfer training and postural control in sitting.     Reyez Lobe, PTA

## 2021-05-12 NOTE — PROGRESS NOTES
[facility]  Office: [address], DO, Denisha Dines, DO, Savita Lorelei, DO, Smiley White Blood, DO, Amilcar Berrios MD, Sosa Langley MD, Geovanna Winchester MD, Ming Rondon MD, Vinnie Toledo MD, Diana Goode MD, Sulema Llanos MD, Radha Jones MD, Annemarie Silver, DO, Ben Sherman MD, Pratima Beal DO, Shanel Jimenez MD,  Tatum Prado, DO, Marianela Bernal MD, Maryanne Loza MD, Corinna Del Toro MD, Rory Rockwell MD, Patrick Quinones [facility], [facility], CNP, Brennan Amas, CNP, Ya Pulse, CNS, Gustavo Haled, CNP, Josefina Swift, CNP, kSyla Boateng, CNP, Carey Pulse, CNP, Marilyn Loza, CNP, Kathy Sierra PA-C, [place] , [facility], Paul Willett, CNP, Tahira Duval, CNP, Marcel Rm, CNP, Reji Amaro, CNP, Telly Fuentes, [facility], Cory Rubio, [facility]    Progress Note    5/12/2021    10:30 AM    Name:   Lucero Kruse  MRN:     [id number]     [place]:      [de-identified]   Room:   [address] [place] [address] Day:  12  Admit Date:  4/30/2021 12:15 PM    PCP:   Froylan Wills MD  Code Status:  DNR-CCA    Subjective:     C/C:   Chief Complaint   Patient presents with    Hernia     LOWER RIGHT ABDOMEN. recent increase in pain and growth     Interval History Status: improvement  Patient is doing better today, NG tube was clamped yesterday and removed this morning he has been tolerating oral diet. He is still having some intermittent confusion but reoriented easily. Wrist restraints were discontinued.   Vital signs stable  Brief History:     45-year-old male past medical history of left renal cell carcinoma status post ablation, bladder cancer status post cystectomy with creation of ileal conduit in 2006, prior small bowel obstruction and exploratory laparotomy, recurrent hernia presents with incarcerated parastomal hernia requiring ex lap with small bowel resection, reduction of strangulated hernia, lysis of adhesions performed on 4/30/2021. Patient also has ongoing problem of acute kidney injury and moderate urine output however worsening creatinine. .  Patient was taken for cystoscopy on 5/3/2021 which showed obstruction of ileal conduit due to prior suture. Patient underwent left PCN insertion on 5/3/2021. Right-sided nephrostomy tube placed 5/5/2021. Patient's mentation has slowly been improving and nephrostomy tubes have been draining well. Hypernatremia corrected with D5 and free water flushes    Review of Systems:   Patient alert to person and place, not time, but seems to be more understanding today    Review of Systems   Constitutional: Negative for activity change, chills and fatigue. HENT: Negative for congestion and sore throat. Eyes: Negative for photophobia and discharge. Respiratory: Negative for apnea and shortness of breath. Cardiovascular: Negative for chest pain and palpitations. Gastrointestinal: Negative for abdominal distention, abdominal pain and diarrhea. Endocrine: Negative for cold intolerance and polyuria. Genitourinary: Negative for decreased urine volume and difficulty urinating. Musculoskeletal: Negative for arthralgias and joint swelling. Allergic/Immunologic: Negative for environmental allergies and immunocompromised state. Neurological: Negative for dizziness, weakness and numbness. Psychiatric/Behavioral: Negative for agitation and hallucinations. The patient is not hyperactive. Medications: Allergies:     Allergies   Allergen Reactions    Nsaids Anaphylaxis     GI bleed    Tetracyclines & Related        Current Meds:   Scheduled Meds:    insulin glargine  10 Units Subcutaneous Daily    insulin lispro  0-12 Units Subcutaneous Q4H    polyethylene glycol  17 g Oral Daily    acetaminophen  1,000 mg Oral 2 times per day    melatonin  3 mg Oral Nightly    heparin (porcine)  5,000 Units Subcutaneous 3 times per day    sodium chloride flush  5-40 mL Intravenous 2 times per day    lidocaine  2 patch Topical Q12H    famotidine (PEPCID) injection  10 mg Intravenous BID     Continuous Infusions:    dextrose      sodium chloride       PRN Meds: mineral oil, glucose, dextrose, glucagon (rDNA), dextrose, acetaminophen, albuterol sulfate HFA, sodium chloride flush, sodium chloride, ondansetron    Data:     Past Medical History:   has a past medical history of Arthritis, Caffeine use, Cancer ([facility] [address] GERD (gastroesophageal reflux disease), Hernia, inguinal, right, Hypertension, Kidney stone, Presence of urostomy ([facility] [address] and Retinal detachment. Social History:   reports that he quit smoking about 41 years ago. His smoking use included cigarettes. He has a 10.00 pack-year smoking history. He has never used smokeless tobacco. He reports that he does not drink alcohol or use drugs. Family History:   Family History   Problem Relation Age of Onset    Diabetes Mother     Diabetes Sister        Vitals:  /70   Pulse 53   Temp 97.5 °F (36.4 °C) (Axillary)   Resp 18   Ht 6' 1\" (1.854 m)   Wt 240 lb 6 oz (109 kg)   SpO2 96%   BMI 31.71 kg/m²   Temp (24hrs), Av.7 °F (36.5 °C), Min:97.5 °F (36.4 °C), Max:98.1 °F (36.7 °C)    Recent Labs     21  0200 21  0644 21  0800 21  1059   POCGLU 128* 125* 122* 123*       I/O (24Hr):     Intake/Output Summary (Last 24 hours) at 2021 1112  Last data filed at 2021 0800  Gross per 24 hour   Intake 800 ml   Output 2510 ml   Net -1710 ml       Labs:  Hematology:  Recent Labs     05/10/21  0530 21  0552 21  0433   WBC 10.0 11.3 9.9   RBC 3.95* 3.85* 3.78*   HGB 10.7* 10.4* 10.4*   HCT 37.6* 36.5* 34.9*   MCV 95.2 94.8 92.3   MCH 27.1 27.0 27.5   MCHC 28.5 28.5 29.8   RDW 16.0* 15.9* 16.1*    217 229   MPV 10.3 10.6 10.6     Chemistry:  Recent Labs     05/10/21  0530 21  0552 21  0433   * 142 143   K 4.6 4.9 4.8   * 105 105   CO2 25 25 25   GLUCOSE above.  Right PCN will be attempted tomorrow. Xr Abdomen For Ng/og/ne Tube Placement    Result Date: 5/3/2021  Enteric tube in the stomach as above. No evidence of bowel obstruction. Xr Abdomen For Ng/og/ne Tube Placement    Result Date: 5/2/2021  Satisfactory position enteric tube     Xr Abdomen For Ng/og/ne Tube Placement    Result Date: 5/2/2021  Enteric tube tip suspected to be at the level the GE junction. Consider advancement 5-10 cm. Additionally there is a telemetry leads within the midline, consider moving prior to repeat x-ray. Addition, please consider centering the x-ray over the GE junction epigastric region. Xr Abdomen For Ng/og/ne Tube Placement    Result Date: 4/30/2021  A newly placed gastric tube loops in the distal thoracic esophagus, where both the tip and sideport are located. Recommend repositioning before use. Us Retroperitoneal Limited    Result Date: 5/1/2021  1. Recently demonstrated suspicious lesion arising from the superior pole the right kidney is not well delineated on this exam. 2.  Right hydronephrosis and nephrolithiasis again demonstrated. 3.  Multiple stones throughout the left intrarenal collecting system, which likely obscures the associated hydronephrosis demonstrated on recent CT exam. 4.  Cholelithiasis. Physical Examination:        General appearance: alert, oriented, following comamnds, mentation improved  Mental Status: alert and oriented to person, place and time  Lungs:  clear to auscultation bilaterally, normal effort  Heart:  regular rate and rhythm, no murmur  Abdomen:  soft, nontender, abdominal binder in place, well-healing incisions nephrostomy tubes both draining well. Bilateral nephrostomy tubes clear or draining compared to yesterday  Extremities:  no edema, redness, tenderness in the calves  Skin:  2 incisions, both held with stables, ostomy tube in place and appears to be draining. Left nephrostomy tube draining well.      Assessment: Hospital Problems           Last Modified POA    * (Principal) Ventral hernia with bowel obstruction 5/1/2021 Yes    Toxic metabolic encephalopathy 4/7/4751 No    Other hydronephrosis 5/3/2021 Yes    Acute respiratory failure with hypoxia (HCC) 1/4/3825 No    Metabolic acidosis 7/8/9018 No    Normochromic normocytic anemia 5/1/2021 Yes    Iron deficiency anemia 5/2/2021 Yes    Vitamin B 12 deficiency 5/3/2021 Yes    Folate deficiency 5/3/2021 Yes    Essential hypertension (Chronic) 5/1/2021 Yes    Renal mass (Chronic) 5/1/2021 Yes    KARMA (acute kidney injury) ([address] 5/1/2021 Yes          Plan:        1. Strangulated ventral hernia status post exploratory laparotomy small bowel resection on 4/30/2021. Appreciate general surgery recommendations. NG tube removed, Monitor mentation. Continue soft and bite-size food with honey thick liquids. [facility] for discharge from their standput   2. Acute kidney injury, hydronephrosis, nonoliguric, past history of renal cell carcinoma, bladder cell cancer, underwent cystoscopy on 5/3/2021, and left PCN on 5/3/2021. Status post right PCN 5/5/2021. [address] nephrology, urology, interventional radiology recommendations. Creatinine stable, continue to monitor intake and output with renal diet restrictions, ok for discharge from their standput    3. B12, iron, folate deficiency. Continue replacement  4. Acute delirium secondary to anesthesia , renal failure, metabolic encephalopathy. Mentation continues to improve  5. Patient is DNR CCA. 6. Continue PT/OT, increase activity as tolerated  7. Suspected sleep apnea, 2L oxygen via nasal cannula overnight. 8. Glycemic control, continue insulin sliding scale  9. Referral sent to [facility], pre-CERT pending and patient will need to be free of restraints for 24 hours. Discharge to ECF when appropriate  10.  Plan discussed with patient and staff      MARKIE BURRELL NP  5/12/2021  11:12 AM

## 2021-05-12 NOTE — PROGRESS NOTES
Reason for follow-up :  Acute kidney injury. Subjective    Patient seen and examined at the bedside. Bilateral percutaneous nephrostomy tubes prsent. Mental status is back to normal, no confusion  Creatinine is down to 2.62    HISTORY OF PRESENT ILLNESS:    The patient is a 80 y.o. male had multiple abdominal surgeries presented with enlarging incarcerated/strangulated parastomal hernia, who underwent exploratory laparotomy with reduction of the hernia and small bowel resection on 4/30/2021. He had history of bladder cancer s/p cystectomy and ileoconduit formation in 2006. His baseline creatinine has been between 1.5-2.1, it was 2.85 at presentation and increased this morning to 3.99. Patient had a CT scan of the abdomen upon presentation that was done without contrast, it was remarkable for extensive stone burden in both kidneys greater on the left, it showed uroepithelial wall thickening with inflammatory stranding involving the left collecting system, was also remarkable for enlargement of the right upper pole mass. Renal ultrasound was done yesterday showed right hydronephrosis and nephrolithiasis.   Patient has been nonoliguric, urine output over the last 24 hours was 2.7 L     Scheduled Meds:   sodium chloride  1,000 mL Intravenous Once    insulin glargine  10 Units Subcutaneous Daily    insulin lispro  0-12 Units Subcutaneous Q4H    polyethylene glycol  17 g Oral Daily    acetaminophen  1,000 mg Oral 2 times per day    melatonin  3 mg Oral Nightly    heparin (porcine)  5,000 Units Subcutaneous 3 times per day    sodium chloride flush  5-40 mL Intravenous 2 times per day    lidocaine  2 patch Topical Q12H    famotidine (PEPCID) injection  10 mg Intravenous BID     Continuous Infusions:   dextrose      sodium chloride       PRN Meds:mineral oil, glucose, dextrose, glucagon (rDNA), dextrose, acetaminophen, albuterol sulfate HFA, sodium chloride flush, sodium chloride, ondansetron     Physical Exam:  Vitals:    05/12/21 1227 05/12/21 1230 05/12/21 1330 05/12/21 1456   BP: 88/66 107/79  115/74   Pulse: 61   62   Resp:    18   Temp:    97.5 °F (36.4 °C)   TempSrc:    Oral   SpO2:   94% 99%   Weight:       Height:         I/O last 3 completed shifts: In: 400 [NG/GT:400]  Out: 1 [Urine:2390; Drains:20]    General:  Awake, alert x3. HEENT: Atraumatic, normocephalic. Anicteric sclera. Pink and dry. Neck supple. No JVD. Chest: Bilateral air entry, clear to auscultation, no wheezing, rhonchi or rales. Cardiovascular: RRR, S1S2, no murmur, rub or gallop. No lower extremity edema. Abdomen: Soft, non tender to palpation. Musculoskeletal: No cyanosis or clubbing. Integumentary: Pink, warm and dry. Free from rash or lesions.   CNS: Answering questions appropriately  Data:  CBC:   Lab Results   Component Value Date    WBC 9.9 05/12/2021    HGB 10.4 (L) 05/12/2021    HCT 34.9 (L) 05/12/2021    MCV 92.3 05/12/2021     05/12/2021     BMP:    Lab Results   Component Value Date     05/12/2021     05/11/2021     (H) 05/10/2021    K 4.8 05/12/2021    K 4.9 05/11/2021    K 4.6 05/10/2021     05/12/2021     05/11/2021     (H) 05/10/2021    CO2 25 05/12/2021    CO2 25 05/11/2021    CO2 25 05/10/2021    BUN 80 (H) 05/12/2021    BUN 81 (H) 05/11/2021    BUN 84 (H) 05/10/2021    CREATININE 2.62 (H) 05/12/2021    CREATININE 2.90 (H) 05/11/2021    CREATININE 2.84 (H) 05/10/2021    GLUCOSE 143 (H) 05/12/2021    GLUCOSE 163 (H) 05/11/2021    GLUCOSE 230 (H) 05/10/2021     CMP:   Lab Results   Component Value Date     05/12/2021    K 4.8 05/12/2021     05/12/2021    CO2 25 05/12/2021    BUN 80 05/12/2021    CREATININE 2.62 05/12/2021    GLUCOSE 143 05/12/2021    CALCIUM 8.8 05/12/2021    PROT 7.3 05/02/2021    LABALBU 3.8 05/02/2021    BILITOT 0.50 05/02/2021    ALKPHOS 55 05/02/2021    AST 19 05/02/2021    ALT 11 05/02/2021      Hepatic:   Lab Results   Component MD on 5/12/2021 at 3:21 PM  [facility].   Ph: [phone]

## 2021-05-12 NOTE — PROGRESS NOTES
Writer and therapy noted that the patient became more lethargic and drowsy compared to this morning. Hard to arouse with low BP's. NP Mary notified and orders received to fluid bolus 1000 ml and perform EKG. Will continue to monitor.

## 2021-05-12 NOTE — CARE COORDINATION
Discharge planning    Chart reviewed. SS working on placement with [facility]. Nephrology following for KARMA due to obstructive uropathy and patient is s/p BL nephrostomy tubes. On 4/30 s/p exp. Lap, FELICE, reduction of hernia and small bowel resection. General surgery, nephrology are following. NG is clamped according to flow sheets.   General surgery has dysphagia diet started    MELISSA in epic and signed

## 2021-05-12 NOTE — PROGRESS NOTES
Patient has been tolerating oral diet and fluids, per surgery NGT okay to be removed. Patient tolerated removal well. Soft wrist restraints removed due to the NGT being his main area of focus. Will continue to monitor.

## 2021-05-12 NOTE — PLAN OF CARE
Problem: Falls - Risk of:  Goal: Will remain free from falls  Description: Will remain free from falls  Outcome: Ongoing  Note: Siderails up x 2  Hourly rounding  Call light in reach  Instructed to call for assist before attempting out of bed. Remains free from falls and accidental injury at this time   Floor free from obstacles  Bed is locked and in lowest position  Adequate lighting provided  Bed alarm on, Red Falling star and Stay with Me signs posted      Goal: Absence of physical injury  Description: Absence of physical injury  Outcome: Ongoing     Problem: Skin Integrity:  Goal: Will show no infection signs and symptoms  Description: Will show no infection signs and symptoms  Outcome: Ongoing  Goal: Absence of new skin breakdown  Description: Absence of new skin breakdown  Outcome: Ongoing  Goal: Signs of wound healing will improve  Description: Signs of wound healing will improve  Outcome: Ongoing     Problem: Infection - Surgical Site:  Goal: Will show no infection signs and symptoms  Description: Will show no infection signs and symptoms  Outcome: Ongoing     Problem: Activity:  Goal: Ability to tolerate increased activity will improve  Description: Ability to tolerate increased activity will improve  Outcome: Ongoing     Problem: Bowel/Gastric:  Goal: Gastrointestinal status for postoperative course will improve  Description: Gastrointestinal status for postoperative course will improve  Outcome: Ongoing     Problem: Coping:  Goal: Level of anxiety will decrease  Description: Level of anxiety will decrease  Outcome: Ongoing     Problem: Sensory:  Goal: Pain level will decrease  Description: Pain level will decrease  Outcome: Ongoing     Problem: Pain:  Description: Pain management should include both nonpharmacologic and pharmacologic interventions.   Goal: Pain level will decrease  Description: Pain level will decrease  Outcome: Ongoing  Goal: Control of acute pain  Description: Control of acute pain  Outcome: Ongoing  Goal: Control of chronic pain  Description: Control of chronic pain  Outcome: Ongoing     Problem: Non-Violent Restraints  Goal: Removal from restraints as soon as assessed to be safe  Outcome: Ongoing  Goal: No harm/injury to patient while restraints in use  Outcome: Ongoing  Goal: Patient's dignity will be maintained  Outcome: Ongoing     Problem: Nutrition  Goal: Optimal nutrition therapy  Outcome: Ongoing     Problem: Confusion - Acute:  Goal: Absence of continued neurological deterioration signs and symptoms  Description: Absence of continued neurological deterioration signs and symptoms  Outcome: Ongoing  Goal: Mental status will be restored to baseline  Description: Mental status will be restored to baseline  Outcome: Ongoing     Problem: Discharge Planning:  Goal: Ability to perform activities of daily living will improve  Description: Ability to perform activities of daily living will improve  Outcome: Ongoing  Goal: Participates in care planning  Description: Participates in care planning  Outcome: Ongoing     Problem: Injury - Risk of, Physical Injury:  Goal: Will remain free from falls  Description: Will remain free from falls  Outcome: Ongoing  Note: Siderails up x 2  Hourly rounding  Call light in reach  Instructed to call for assist before attempting out of bed.   Remains free from falls and accidental injury at this time   Floor free from obstacles  Bed is locked and in lowest position  Adequate lighting provided  Bed alarm on, Red Falling star and Stay with Me signs posted      Goal: Absence of physical injury  Description: Absence of physical injury  Outcome: Ongoing     Problem: Mood - Altered:  Goal: Mood stable  Description: Mood stable  Outcome: Ongoing  Goal: Absence of abusive behavior  Description: Absence of abusive behavior  Outcome: Ongoing  Goal: Verbalizations of feeling emotionally comfortable while being cared for will increase  Description: Verbalizations of feeling emotionally comfortable while being cared for will increase  Outcome: Ongoing     Problem: Psychomotor Activity - Altered:  Goal: Absence of psychomotor disturbance signs and symptoms  Description: Absence of psychomotor disturbance signs and symptoms  Outcome: Ongoing     Problem: Sensory Perception - Impaired:  Goal: Demonstrations of improved sensory functioning will increase  Description: Demonstrations of improved sensory functioning will increase  Outcome: Ongoing  Goal: Decrease in sensory misperception frequency  Description: Decrease in sensory misperception frequency  Outcome: Ongoing  Goal: Able to refrain from responding to false sensory perceptions  Description: Able to refrain from responding to false sensory perceptions  Outcome: Ongoing  Goal: Demonstrates accurate environmental perceptions  Description: Demonstrates accurate environmental perceptions  Outcome: Ongoing  Goal: Able to distinguish between reality-based and nonreality-based thinking  Description: Able to distinguish between reality-based and nonreality-based thinking  Outcome: Ongoing  Goal: Able to interrupt nonreality-based thinking  Description: Able to interrupt nonreality-based thinking  Outcome: Ongoing     Problem: Sleep Pattern Disturbance:  Goal: Appears well-rested  Description: Appears well-rested  Outcome: Ongoing     Problem: IP BALANCE  Goal: LTG - Patient will maintain balance to allow for safe/functional mobility  Outcome: Ongoing

## 2021-05-13 LAB
ABSOLUTE EOS #: 0.2 K/UL (ref 0–0.44)
ABSOLUTE IMMATURE GRANULOCYTE: 0.06 K/UL (ref 0–0.3)
ABSOLUTE LYMPH #: 0.98 K/UL (ref 1.1–3.7)
ABSOLUTE MONO #: 0.6 K/UL (ref 0.1–1.2)
ANION GAP SERPL CALCULATED.3IONS-SCNC: 12 MMOL/L (ref 9–17)
BASOPHILS # BLD: 0 % (ref 0–2)
BASOPHILS ABSOLUTE: 0.03 K/UL (ref 0–0.2)
BUN BLDV-MCNC: 75 MG/DL (ref 8–23)
BUN/CREAT BLD: 30 (ref 9–20)
CALCIUM SERPL-MCNC: 9 MG/DL (ref 8.6–10.4)
CHLORIDE BLD-SCNC: 107 MMOL/L (ref 98–107)
CO2: 25 MMOL/L (ref 20–31)
CREAT SERPL-MCNC: 2.5 MG/DL (ref 0.7–1.2)
DIFFERENTIAL TYPE: ABNORMAL
EKG ATRIAL RATE: 63 BPM
EKG P AXIS: 57 DEGREES
EKG P-R INTERVAL: 270 MS
EKG Q-T INTERVAL: 408 MS
EKG QRS DURATION: 78 MS
EKG QTC CALCULATION (BAZETT): 417 MS
EKG R AXIS: -19 DEGREES
EKG T AXIS: 64 DEGREES
EKG VENTRICULAR RATE: 63 BPM
EOSINOPHILS RELATIVE PERCENT: 2 % (ref 1–4)
GFR AFRICAN AMERICAN: 30 ML/MIN
GFR NON-AFRICAN AMERICAN: 25 ML/MIN
GFR SERPL CREATININE-BSD FRML MDRD: ABNORMAL ML/MIN/{1.73_M2}
GFR SERPL CREATININE-BSD FRML MDRD: ABNORMAL ML/MIN/{1.73_M2}
GLUCOSE BLD-MCNC: 109 MG/DL (ref 75–110)
GLUCOSE BLD-MCNC: 123 MG/DL (ref 75–110)
GLUCOSE BLD-MCNC: 124 MG/DL (ref 70–99)
GLUCOSE BLD-MCNC: 207 MG/DL (ref 75–110)
GLUCOSE BLD-MCNC: 93 MG/DL (ref 75–110)
GLUCOSE BLD-MCNC: 98 MG/DL (ref 75–110)
GLUCOSE BLD-MCNC: 99 MG/DL (ref 75–110)
HCT VFR BLD CALC: 36.8 % (ref 40.7–50.3)
HEMOGLOBIN: 10.8 G/DL (ref 13–17)
IMMATURE GRANULOCYTES: 1 %
LYMPHOCYTES # BLD: 11 % (ref 24–43)
MAGNESIUM: 2.7 MG/DL (ref 1.6–2.6)
MCH RBC QN AUTO: 27.3 PG (ref 25.2–33.5)
MCHC RBC AUTO-ENTMCNC: 29.3 G/DL (ref 28.4–34.8)
MCV RBC AUTO: 92.9 FL (ref 82.6–102.9)
MONOCYTES # BLD: 7 % (ref 3–12)
NRBC AUTOMATED: 0 PER 100 WBC
PDW BLD-RTO: 16.2 % (ref 11.8–14.4)
PHOSPHORUS: 4.6 MG/DL (ref 2.5–4.5)
PLATELET # BLD: 232 K/UL (ref 138–453)
PLATELET ESTIMATE: ABNORMAL
PMV BLD AUTO: 9.8 FL (ref 8.1–13.5)
POTASSIUM SERPL-SCNC: 4.6 MMOL/L (ref 3.7–5.3)
RBC # BLD: 3.96 M/UL (ref 4.21–5.77)
RBC # BLD: ABNORMAL 10*6/UL
SEG NEUTROPHILS: 79 % (ref 36–65)
SEGMENTED NEUTROPHILS ABSOLUTE COUNT: 7.03 K/UL (ref 1.5–8.1)
SODIUM BLD-SCNC: 144 MMOL/L (ref 135–144)
WBC # BLD: 8.9 K/UL (ref 3.5–11.3)
WBC # BLD: ABNORMAL 10*3/UL

## 2021-05-13 PROCEDURE — 2500000003 HC RX 250 WO HCPCS: Performed by: SPECIALIST

## 2021-05-13 PROCEDURE — 2060000000 HC ICU INTERMEDIATE R&B

## 2021-05-13 PROCEDURE — 6370000000 HC RX 637 (ALT 250 FOR IP): Performed by: STUDENT IN AN ORGANIZED HEALTH CARE EDUCATION/TRAINING PROGRAM

## 2021-05-13 PROCEDURE — 2700000000 HC OXYGEN THERAPY PER DAY

## 2021-05-13 PROCEDURE — 80048 BASIC METABOLIC PNL TOTAL CA: CPT

## 2021-05-13 PROCEDURE — 94761 N-INVAS EAR/PLS OXIMETRY MLT: CPT

## 2021-05-13 PROCEDURE — 97530 THERAPEUTIC ACTIVITIES: CPT

## 2021-05-13 PROCEDURE — 99232 SBSQ HOSP IP/OBS MODERATE 35: CPT | Performed by: NURSE PRACTITIONER

## 2021-05-13 PROCEDURE — 82947 ASSAY GLUCOSE BLOOD QUANT: CPT

## 2021-05-13 PROCEDURE — 6370000000 HC RX 637 (ALT 250 FOR IP): Performed by: SPECIALIST

## 2021-05-13 PROCEDURE — 6370000000 HC RX 637 (ALT 250 FOR IP): Performed by: NURSE PRACTITIONER

## 2021-05-13 PROCEDURE — 83735 ASSAY OF MAGNESIUM: CPT

## 2021-05-13 PROCEDURE — 84100 ASSAY OF PHOSPHORUS: CPT

## 2021-05-13 PROCEDURE — 85025 COMPLETE CBC W/AUTO DIFF WBC: CPT

## 2021-05-13 PROCEDURE — 97110 THERAPEUTIC EXERCISES: CPT

## 2021-05-13 PROCEDURE — 2580000003 HC RX 258: Performed by: SPECIALIST

## 2021-05-13 PROCEDURE — 97535 SELF CARE MNGMENT TRAINING: CPT

## 2021-05-13 PROCEDURE — 36415 COLL VENOUS BLD VENIPUNCTURE: CPT

## 2021-05-13 PROCEDURE — 6360000002 HC RX W HCPCS: Performed by: SPECIALIST

## 2021-05-13 RX ORDER — DOCUSATE SODIUM 100 MG/1
100 CAPSULE, LIQUID FILLED ORAL 2 TIMES DAILY
Status: DISCONTINUED | OUTPATIENT
Start: 2021-05-13 | End: 2021-05-14 | Stop reason: HOSPADM

## 2021-05-13 RX ADMIN — HEPARIN SODIUM 5000 UNITS: 5000 INJECTION INTRAVENOUS; SUBCUTANEOUS at 06:10

## 2021-05-13 RX ADMIN — FAMOTIDINE 10 MG: 10 INJECTION, SOLUTION INTRAVENOUS at 09:47

## 2021-05-13 RX ADMIN — POLYETHYLENE GLYCOL (3350) 17 G: 17 POWDER, FOR SOLUTION ORAL at 10:02

## 2021-05-13 RX ADMIN — FAMOTIDINE 10 MG: 10 INJECTION, SOLUTION INTRAVENOUS at 20:52

## 2021-05-13 RX ADMIN — SODIUM CHLORIDE, PRESERVATIVE FREE 10 ML: 5 INJECTION INTRAVENOUS at 09:53

## 2021-05-13 RX ADMIN — HEPARIN SODIUM 5000 UNITS: 5000 INJECTION INTRAVENOUS; SUBCUTANEOUS at 13:41

## 2021-05-13 RX ADMIN — INSULIN LISPRO 4 UNITS: 100 INJECTION, SOLUTION INTRAVENOUS; SUBCUTANEOUS at 13:41

## 2021-05-13 RX ADMIN — INSULIN GLARGINE 10 UNITS: 100 INJECTION, SOLUTION SUBCUTANEOUS at 10:03

## 2021-05-13 RX ADMIN — DOCUSATE SODIUM 100 MG: 100 CAPSULE, LIQUID FILLED ORAL at 20:51

## 2021-05-13 RX ADMIN — DOCUSATE SODIUM 100 MG: 100 CAPSULE, LIQUID FILLED ORAL at 11:02

## 2021-05-13 RX ADMIN — Medication 3 MG: at 20:51

## 2021-05-13 RX ADMIN — ACETAMINOPHEN ORAL SOLUTION 1000 MG: 325 SOLUTION ORAL at 20:51

## 2021-05-13 RX ADMIN — ACETAMINOPHEN ORAL SOLUTION 1000 MG: 325 SOLUTION ORAL at 09:44

## 2021-05-13 RX ADMIN — HEPARIN SODIUM 5000 UNITS: 5000 INJECTION INTRAVENOUS; SUBCUTANEOUS at 20:52

## 2021-05-13 ASSESSMENT — PAIN SCALES - GENERAL
PAINLEVEL_OUTOF10: 0
PAINLEVEL_OUTOF10: 6
PAINLEVEL_OUTOF10: 0

## 2021-05-13 ASSESSMENT — ENCOUNTER SYMPTOMS
ABDOMINAL DISTENTION: 0
SHORTNESS OF BREATH: 0
DIARRHEA: 0
SORE THROAT: 0
ABDOMINAL PAIN: 0
PHOTOPHOBIA: 0
APNEA: 0
EYE DISCHARGE: 0

## 2021-05-13 NOTE — CARE COORDINATION
Social Work-[facility] plans to deny patient due to that he did not participate in therapy yesterday and he was in restraints. Checked with nurse. The restraints were removed yesterday at 10 AM. Contacted PT to see patient early today. Patient is following commands today and feeding himself.  Kathy Sanchez

## 2021-05-13 NOTE — PROGRESS NOTES
Reason for follow-up :  Acute kidney injury. Subjective    Patient seen and examined at the bedside. Bilateral percutaneous nephrostomy tubes in place  Confused again today  No dyspnea noted  Creatinine is down to 2.5    HISTORY OF PRESENT ILLNESS:    The patient is a 80 y.o. male had multiple abdominal surgeries presented with enlarging incarcerated/strangulated parastomal hernia, who underwent exploratory laparotomy with reduction of the hernia and small bowel resection on 4/30/2021. He had history of bladder cancer s/p cystectomy and ileoconduit formation in 2006. His baseline creatinine has been between 1.5-2.1, it was 2.85 at presentation and increased this morning to 3.99. Patient had a CT scan of the abdomen upon presentation that was done without contrast, it was remarkable for extensive stone burden in both kidneys greater on the left, it showed uroepithelial wall thickening with inflammatory stranding involving the left collecting system, was also remarkable for enlargement of the right upper pole mass. Renal ultrasound was done yesterday showed right hydronephrosis and nephrolithiasis.   Patient has been nonoliguric, urine output over the last 24 hours was 2.7 L     Scheduled Meds:   docusate sodium  100 mg Oral BID    insulin lispro  0-12 Units Subcutaneous TID WC    insulin lispro  0-6 Units Subcutaneous Nightly    insulin glargine  10 Units Subcutaneous Daily    polyethylene glycol  17 g Oral Daily    acetaminophen  1,000 mg Oral 2 times per day    melatonin  3 mg Oral Nightly    heparin (porcine)  5,000 Units Subcutaneous 3 times per day    sodium chloride flush  5-40 mL Intravenous 2 times per day    lidocaine  2 patch Topical Q12H    famotidine (PEPCID) injection  10 mg Intravenous BID     Continuous Infusions:   dextrose      sodium chloride       PRN Meds:mineral oil, glucose, dextrose, glucagon (rDNA), dextrose, acetaminophen, albuterol sulfate HFA, sodium chloride flush, sodium chloride, ondansetron     Physical Exam:  Vitals:    05/13/21 0407 05/13/21 0636 05/13/21 0752 05/13/21 1208   BP: (!) 141/60  119/60 104/69   Pulse: 58  50 65   Resp: 16  16 16   Temp: 98.1 °F (36.7 °C)  98.4 °F (36.9 °C) 98.2 °F (36.8 °C)   TempSrc: Oral  Oral Oral   SpO2: 99%  100% 97%   Weight:  236 lb 2 oz (107.1 kg)     Height:         I/O last 3 completed shifts: In: 0157 [P.O.:660; I.V.:950]  Out: 2130 [Urine:2130]    General:  Awake, alert x3. HEENT: Atraumatic, normocephalic. Anicteric sclera. Pink and dry. Neck supple. No JVD. Chest: Bilateral air entry, clear to auscultation, no wheezing, rhonchi or rales. Cardiovascular: RRR, S1S2, no murmur, rub or gallop. No lower extremity edema. Abdomen: Soft, non tender to palpation. Musculoskeletal: No cyanosis or clubbing. Integumentary: Pink, warm and dry. Free from rash or lesions.   CNS: Answering questions appropriately  Data:  CBC:   Lab Results   Component Value Date    WBC 8.9 05/13/2021    HGB 10.8 (L) 05/13/2021    HCT 36.8 (L) 05/13/2021    MCV 92.9 05/13/2021     05/13/2021     BMP:    Lab Results   Component Value Date     05/13/2021     05/12/2021     05/11/2021    K 4.6 05/13/2021    K 4.8 05/12/2021    K 4.9 05/11/2021     05/13/2021     05/12/2021     05/11/2021    CO2 25 05/13/2021    CO2 25 05/12/2021    CO2 25 05/11/2021    BUN 75 (H) 05/13/2021    BUN 80 (H) 05/12/2021    BUN 81 (H) 05/11/2021    CREATININE 2.50 (H) 05/13/2021    CREATININE 2.62 (H) 05/12/2021    CREATININE 2.90 (H) 05/11/2021    GLUCOSE 124 (H) 05/13/2021    GLUCOSE 143 (H) 05/12/2021    GLUCOSE 163 (H) 05/11/2021     CMP:   Lab Results   Component Value Date     05/13/2021    K 4.6 05/13/2021     05/13/2021    CO2 25 05/13/2021    BUN 75 05/13/2021    CREATININE 2.50 05/13/2021    GLUCOSE 124 05/13/2021    CALCIUM 9.0 05/13/2021    PROT 7.3 05/02/2021    LABALBU 3.8 05/02/2021    BILITOT 0.50 05/02/2021    ALKPHOS 55 05/02/2021    AST 19 05/02/2021    ALT 11 05/02/2021      Hepatic:   Lab Results   Component Value Date    AST 19 05/02/2021    AST 16 04/30/2021    AST 24 07/15/2020    ALT 11 05/02/2021    ALT 11 04/30/2021    ALT 17 07/15/2020    BILITOT 0.50 05/02/2021    BILITOT 0.35 04/30/2021    BILITOT 0.47 07/15/2020    ALKPHOS 55 05/02/2021    ALKPHOS 77 04/30/2021    ALKPHOS 64 07/15/2020     BNP: No results found for: BNP  Lipids:   Lab Results   Component Value Date    CHOL 139 05/09/2016    HDL 26 (A) 05/09/2016     INR:   Lab Results   Component Value Date    INR 1.2 05/03/2021    INR 1.0 05/17/2016     PTH: No results found for: PTH  Phosphorus:    Lab Results   Component Value Date    PHOS 4.6 05/13/2021     Ionized Calcium: No results found for: IONCA  Magnesium:   Lab Results   Component Value Date    MG 2.7 05/13/2021     Albumin:   Lab Results   Component Value Date    LABALBU 3.8 05/02/2021     Last 3 CK, CKMB, Troponin: @LABRCNT(CKTOTAL:3,CKMB:3,TROPONINI:3)       URINE:)No results found for: [place]     Radiology:   Reviewed. Assessment:    Acute kidney injury secondary to obstructive uropathy and ATN s/p bilateral percutaneous nephrostomy. Hypernatremia  Metabolic acidosis-improved  S/p hernia repair  Anemia  Bladder cancer and renal cell carcinoma, s/p cystectomy and creation of ileal conduit  Enlarging right renal mass s/p cryoablation in 2019  R hydronephrosis s/p PCN placement  Left staghorn calculi  Metabolic encephalopathy multifactorial    Plan:    Creatinine continues to improve   Pt had obstructive uropathy addressed with BL PCNs and also had ATN as well along with underlying CKD 4  Encourage oral fluids  Serum sodium now normal  No indication for renal replacement therapy.    Patient has multifactorial issues including renal cell cancer with enlarging renal mass, encephalopathy obstructive uropathy, as well as component of ATN   DC planning ok from renal perspective    Electronically signed by Yolis Mejia MD on 5/13/2021 at 4:00 PM  [facility].   Ph: [phone]

## 2021-05-13 NOTE — PROGRESS NOTES
General Surgery:  Daily Progress Note                   PATIENT NAME: Elliot Leary     TODAY'S DATE: 5/13/2021, 6:31 AM  CC:  Confusion    SUBJECTIVE:     Pt seen and examined at bedside. Confused this morning talking about going to the bank. No documentation of bowel movement. OBJECTIVE:   VITALS:  BP (!) 141/60   Pulse 58   Temp 98.1 °F (36.7 °C) (Oral)   Resp 16   Ht 6' 1\" (1.854 m)   Wt 240 lb 6 oz (109 kg)   SpO2 99%   BMI 31.71 kg/m²      INTAKE/OUTPUT:      Intake/Output Summary (Last 24 hours) at 5/13/2021 0631  Last data filed at 5/13/2021 0403  Gross per 24 hour   Intake 1610 ml   Output 3240 ml   Net -1630 ml       PHYSICAL EXAM:  General Appearance: alert and oriented  HEENT:  Normocephalic, atraumatic, mucus membranes moist.  NG tube in place. Heart: Heart regular rate and rhythm  Lungs: No acute distress  Abdomen: Soft, nondistended, Non tender to palpation. Midline incision healing well. Extremities: No cyanosis, pitting edema, rashes noted. Skin: Skin color, texture, turgor normal. No rashes or lesions.     Data:  CBC with Differential:    Lab Results   Component Value Date    WBC 8.9 05/13/2021    RBC 3.96 05/13/2021    HGB 10.8 05/13/2021    HCT 36.8 05/13/2021     05/13/2021    MCV 92.9 05/13/2021    MCH 27.3 05/13/2021    MCHC 29.3 05/13/2021    RDW 16.2 05/13/2021    LYMPHOPCT 11 05/13/2021    LYMPHOPCT 18.3 12/14/2017    MONOPCT 7 05/13/2021    MONOPCT 5.7 12/14/2017    EOSPCT 5.1 12/14/2017    BASOPCT 0 05/13/2021    BASOPCT 1.1 12/14/2017    MONOSABS 0.60 05/13/2021    MONOSABS 0.4 12/14/2017    LYMPHSABS 0.98 05/13/2021    LYMPHSABS 1.2 12/14/2017    EOSABS 0.20 05/13/2021    EOSABS 0.3 12/14/2017    BASOSABS 0.03 05/13/2021    DIFFTYPE NOT REPORTED 05/13/2021     BMP:    Lab Results   Component Value Date     05/13/2021    K 4.6 05/13/2021     05/13/2021    CO2 25 05/13/2021    BUN 75 05/13/2021    LABALBU 3.8 05/02/2021    CREATININE 2.50 05/13/2021

## 2021-05-13 NOTE — PROGRESS NOTES
problem of acute kidney injury and moderate urine output however worsening creatinine. .  Patient was taken for cystoscopy on 5/3/2021 which showed obstruction of ileal conduit due to prior suture. Patient underwent left PCN insertion on 5/3/2021. Right-sided nephrostomy tube placed 5/5/2021. Patient's mentation has slowly been improving and nephrostomy tubes have been draining well. Hypernatremia corrected with D5 and free water flushes    Review of Systems:   Patient alert to person and place, not time, but seems to be more understanding today    Review of Systems   Constitutional: Negative for activity change, chills and fatigue. HENT: Negative for congestion and sore throat. Eyes: Negative for photophobia and discharge. Respiratory: Negative for apnea and shortness of breath. Cardiovascular: Negative for chest pain and palpitations. Gastrointestinal: Negative for abdominal distention, abdominal pain and diarrhea. Endocrine: Negative for cold intolerance and polyuria. Genitourinary: Negative for decreased urine volume and difficulty urinating. Musculoskeletal: Negative for arthralgias and joint swelling. Allergic/Immunologic: Negative for environmental allergies and immunocompromised state. Neurological: Negative for dizziness, weakness and numbness. Psychiatric/Behavioral: Negative for agitation and hallucinations. The patient is not hyperactive. Medications: Allergies:     Allergies   Allergen Reactions    Nsaids Anaphylaxis     GI bleed    Tetracyclines & Related        Current Meds:   Scheduled Meds:    insulin glargine  10 Units Subcutaneous Daily    insulin lispro  0-12 Units Subcutaneous Q4H    polyethylene glycol  17 g Oral Daily    acetaminophen  1,000 mg Oral 2 times per day    melatonin  3 mg Oral Nightly    heparin (porcine)  5,000 Units Subcutaneous 3 times per day    sodium chloride flush  5-40 mL Intravenous 2 times per day    lidocaine  2 patch Topical Q12H    famotidine (PEPCID) injection  10 mg Intravenous BID     Continuous Infusions:    dextrose      sodium chloride       PRN Meds: mineral oil, glucose, dextrose, glucagon (rDNA), dextrose, acetaminophen, albuterol sulfate HFA, sodium chloride flush, sodium chloride, ondansetron    Data:     Past Medical History:   has a past medical history of Arthritis, Caffeine use, Cancer ([facility] [address] GERD (gastroesophageal reflux disease), Hernia, inguinal, right, Hypertension, Kidney stone, Presence of urostomy ([facility] [address] and Retinal detachment. Social History:   reports that he quit smoking about 41 years ago. His smoking use included cigarettes. He has a 10.00 pack-year smoking history. He has never used smokeless tobacco. He reports that he does not drink alcohol or use drugs. Family History:   Family History   Problem Relation Age of Onset    Diabetes Mother     Diabetes Sister        Vitals:  /60   Pulse 50   Temp 98.4 °F (36.9 °C) (Oral)   Resp 16   Ht 6' 1\" (1.854 m)   Wt 236 lb 2 oz (107.1 kg)   SpO2 100%   BMI 31.15 kg/m²   Temp (24hrs), Av.9 °F (36.6 °C), Min:97.5 °F (36.4 °C), Max:98.4 °F (36.9 °C)    Recent Labs     21  1950 21  0136 21  0457 21  0734   POCGLU 86 109 123* 93       I/O (24Hr):     Intake/Output Summary (Last 24 hours) at 2021 0855  Last data filed at 2021 0403  Gross per 24 hour   Intake 1610 ml   Output 1990 ml   Net -380 ml       Labs:  Hematology:  Recent Labs     21  0552 21  0433 21  0525   WBC 11.3 9.9 8.9   RBC 3.85* 3.78* 3.96*   HGB 10.4* 10.4* 10.8*   HCT 36.5* 34.9* 36.8*   MCV 94.8 92.3 92.9   MCH 27.0 27.5 27.3   MCHC 28.5 29.8 29.3   RDW 15.9* 16.1* 16.2*    229 232   MPV 10.6 10.6 9.8     Chemistry:  Recent Labs     21  0552 21  0433 21  0525    143 144   K 4.9 4.8 4.6    105 107   CO2 25 25 25   GLUCOSE 163* 143* 124*   BUN 81* 80* 75*   CREATININE 2.90* 2.62* 2.50* Ng/og/ne Tube Placement    Result Date: 5/3/2021  Enteric tube in the stomach as above. No evidence of bowel obstruction. Xr Abdomen For Ng/og/ne Tube Placement    Result Date: 5/2/2021  Satisfactory position enteric tube     Xr Abdomen For Ng/og/ne Tube Placement    Result Date: 5/2/2021  Enteric tube tip suspected to be at the level the GE junction. Consider advancement 5-10 cm. Additionally there is a telemetry leads within the midline, consider moving prior to repeat x-ray. Addition, please consider centering the x-ray over the GE junction epigastric region. Xr Abdomen For Ng/og/ne Tube Placement    Result Date: 4/30/2021  A newly placed gastric tube loops in the distal thoracic esophagus, where both the tip and sideport are located. Recommend repositioning before use. Us Retroperitoneal Limited    Result Date: 5/1/2021  1. Recently demonstrated suspicious lesion arising from the superior pole the right kidney is not well delineated on this exam. 2.  Right hydronephrosis and nephrolithiasis again demonstrated. 3.  Multiple stones throughout the left intrarenal collecting system, which likely obscures the associated hydronephrosis demonstrated on recent CT exam. 4.  Cholelithiasis. Physical Examination:        General appearance: alert, oriented, following commands, mentation improved  Mental Status: alert and oriented to person, place and time  Lungs:  clear to auscultation bilaterally, normal effort  Heart:  regular rate and rhythm, no murmur  Abdomen:  soft, nontender, abdominal binder in place, well-healing incisions nephrostomy tubes both draining well. Bilateral nephrostomy tubes clear or draining compared to yesterday  Extremities:  no edema, redness, tenderness in the calves  Skin:  2 incisions, both held with stables, ostomy tube in place and appears to be draining. Left nephrostomy tube draining well.      Assessment:        Hospital Problems           Last Modified POA    * (Principal) Ventral hernia with bowel obstruction 5/1/2021 Yes    Toxic metabolic encephalopathy 9/7/7759 No    Other hydronephrosis 5/3/2021 Yes    Acute respiratory failure with hypoxia ([address] 9/7/6393 No    Metabolic acidosis 9/7/3481 No    Normochromic normocytic anemia 5/1/2021 Yes    Iron deficiency anemia 5/2/2021 Yes    Vitamin B 12 deficiency 5/3/2021 Yes    Folate deficiency 5/3/2021 Yes    Essential hypertension (Chronic) 5/1/2021 Yes    Renal mass (Chronic) 5/1/2021 Yes    KARMA (acute kidney injury) ([facility] [address] 5/1/2021 Yes          Plan:        1. Strangulated ventral hernia status post exploratory laparotomy small bowel resection on 4/30/2021. Appreciate general surgery recommendations. NG tube removed, Monitor mentation. Continue soft and bite-size food with honey thick liquids. [id number] for discharge from their standput   2. Acute kidney injury, hydronephrosis, nonoliguric, past history of renal cell carcinoma, bladder cell cancer, underwent cystoscopy on 5/3/2021, and left PCN on 5/3/2021. Status post right PCN 5/5/2021. [address] nephrology, urology, interventional radiology recommendations. Creatinine stable, continue to monitor intake and output with renal diet restrictions, ok for discharge from their standput    3. B12, iron, folate deficiency. Continue replacement  4. Acute delirium secondary to anesthesia , renal failure, metabolic encephalopathy. Mentation slowly improving  5. Patient is DNR CCA. 6. Continue PT/OT, increase activity as tolerated  7. Suspected sleep apnea, 2L oxygen via nasal cannula overnight. 8. Glycemic control, continue insulin sliding scale  9. Add colace BID, continue Miralax  10. Await pre-CERT, Discharge to ECF when appropriate  11.  Plan discussed with patient and staff      MARKIE Gerber NP  5/13/2021  8:55 AM

## 2021-05-13 NOTE — CARE COORDINATION
Social Work-Phone call from patient's daughter in law, [place]). She would like referral to to [facility]. Referral was sent. SCOTTY started.  Pedro Mora

## 2021-05-13 NOTE — CARE COORDINATION
Social Work-[facility]. They do not anticipate a bed until next week. Discussed with son other options. He is considering [facility], but would like to discuss with wife. Sent referral to [facility].  Jeancarlos Early

## 2021-05-13 NOTE — PLAN OF CARE
Problem: Falls - Risk of:  Goal: Will remain free from falls  Description: Will remain free from falls  5/13/2021 1312 by Ge Simmons RN  Outcome: Ongoing    Pt has been compliant today. Appears confused to details but communicating and responding in oriented manner. Cooperative with care. Fall precautions in place. Problem: Infection - Surgical Site:  Goal: Will show no infection signs and symptoms  Description: Will show no infection signs and symptoms  5/13/2021 1312 by Ge Simmons RN  Outcome: Ongoing    Surgical site, dry , intact and approximated. No redness or drainage.   VSS.   5/13/2021 0227 by Sandee Betancourt RN  Outcome: Ongoing     5/13/2021 0227 by Sandee Betancourt RN  Outcome: Ongoing

## 2021-05-13 NOTE — PROGRESS NOTES
Nutrition Assessment     Type and Reason for Visit: Reassess    Nutrition Recommendations/Plan:   1.) Continue current diet   2.) Continue Frozen oral supplement 2x/day   3.) Will monitor PO, weight, labs      Nutrition Assessment:  Pt was previously on tube feeds that ended 5/6. NG has been removed since then. Pt currently on a dysphagia diet soft and bite sized, honey thick liquids, renal with frozen oral supplements 2x/day. Pt reports poor appetite as his \"back and belly hurt\" r/t operations. Pt does report that he likes the magic cups that are being sent. Per flowsheet, pt ate 1-25% on 5/12. Writer encouraged PO intake. There has been a 6# weight loss in 2 days per weights in chart. This is a 2.5% weight loss. If PO intake continues to be poor with weight loss would recommend restarting nutrition support. Will continue to monitor PO, weight, labs,    Malnutrition Assessment:  Malnutrition Status: Insufficient data    Estimated Daily Nutrient Needs:  Energy (kcal): 2184 kcal ([place]-St. Jeor 1.2 factor); Weight Used for Energy Requirements:  Other (Comment)(108.1 kg)     Protein (g): 100-117 gm (1.2-1.4 gm/kg); Weight Used for Protein Requirements:  Ideal        Fluid (ml/day): 2200 mL/day; Weight Used for Fluid Requirements:  1 ml/kcal      Nutrition Related Findings: NG tube removed. Labs: creatinine-2.5, BUN-75, GFR-25, POC glu-. Small bowel resection 4/30/21. Current Nutrition Therapies:    Dietary Nutrition Supplements: Frozen Oral Supplement  DIET DYSPHAGIA PUREED; Dysphagia Soft and Bite-Sized; Moderately Thick (Honey);  Renal    Anthropometric Measures:  · Height: 6' 1\" (185.4 cm)  · Current Body Wt: 236 lb 2 oz (107.1 kg)   · BMI: 31.2    Nutrition Diagnosis:   · Inadequate protein-energy intake related to altered GI function, inadequate protein-energy intake, swallowing difficulty as evidenced by intake 0-25%, weight loss, GI abnormality    Nutrition Interventions:   Food and/or Nutrient

## 2021-05-13 NOTE — PROGRESS NOTES
repair (11/2014); laparoscopy (N/A, 4/30/2021); Cystoscopy (N/A, 5/3/2021); IR GUIDED NEPHROSTOMY CATH PLACEMENT LEFT (5/3/2021); and IR GUIDED NEPHROSTOMY CATH PLACEMENT RIGHT (5/5/2021). Restrictions  Restrictions/Precautions  Restrictions/Precautions: Fall Risk, Up as Tolerated, General Precautions  Required Braces or Orthoses?: No  Position Activity Restriction  Other position/activity restrictions: B nephrostomy tubes, MARIE drain, Abdominal inscision, IV, NG tube, soft restraints  Subjective   General  Chart Reviewed: Yes  Response To Previous Treatment: Patient reporting fatigue but able to participate. Family / Caregiver Present: No  Subjective  Subjective: Pt continues with confusion; pleasant. Orientation  Orientation  Overall Orientation Status: Impaired  Orientation Level: Oriented to person;Disoriented to place; Disoriented to time     Objective      Transfers  Sit to Stand: 2 Person Assistance;Maximum Assistance  Stand to sit: 2 Person Assistance;Maximum Assistance  Bed to Chair: 2 Person Assistance;Maximum assistance   Second transfer min assist +2. Better control. Ambulation  Ambulation?: Yes  Ambulation 1 x 2  Surface: level tile  Device: Rolling Walker  Assistance: Moderate assistance;2 Person assistance  Quality of Gait: shuffle steps from bed to chair. Distance limited by fatigue. Distance: 1 ft x 1. Comments: continues with retro leaning but following commands today w/ good demonstration of improved function as well. Balance  Posture: Fair  Sitting - Static: Fair  Sitting - Dynamic: Fair  Standing - Static: Fair;-  Standing - Dynamic: Fair;-  Comments: balance requiring r.walker and too unstable to perform static balance testing right now. Exercises  Comments: deep breathing x 10; ankle pumps x 10 ;  LAQ x 10 reps. Sensory stimulation and reorientation cueing: sunlight, music, history of patient. Comment: assisted w/ bed pan while sitting for bowel movement. standing tolerance ~ 3 minutes. Pt fatigues quickly. Co-treatment with OT warranted secondary to decreased safety and independence requiring 2 skilled therapy professionals to address individual discipline's goals. PT addressing pre gait trunk strengthening and transfer training. AM-PAC Score 15/24     Goals  Short term goals  Time Frame for Short term goals: 12 visits  Short term goal 1: Pt to be [place] for all bed mobility  Short term goal 2: Pt will tolerate 25mins of PT including therex, theract, gait training and NMR  Short term goal 3: When appropriate, transfers/gait to be re-assessed  Short term goal 4: Pt will improve sitting balance to Good to improve core strength and decrease fall risk  Patient Goals   Patient goals : Pt goal is to get back on his feet. Plan    Plan  Times per week: 1-2x day / 6-7 days per week  Specific instructions for Next Treatment: progress gait as able. Current Treatment Recommendations: Strengthening, Endurance Training, Neuromuscular Re-education, Patient/Caregiver Education & Training, Balance Training, Home Exercise Program, Functional Mobility Training, Safety Education & Training, Positioning  Safety Devices  Type of devices:  All fall risk precautions in place, Bed alarm in place, Call light within reach, Nurse notified, Left in bed  Restraints  Initially in place: No  Restraints: bilat UEs     Therapy Time   Individual Concurrent Group Co-treatment   Time In 1106         Time Out 1145         Minutes 44            SUZY STAPLES, PT

## 2021-05-13 NOTE — PROGRESS NOTES
Occupational Therapy  Facility/Department: [facility] PROGRESSIVE CARE  Daily Treatment Note  NAME: Paola Nix  : 1940  MRN: [id number]    Date of Service: 2021    Discharge Recommendations:  Patient would benefit from continued therapy after discharge   Pt currently functioning below baseline. Would suggest additional therapy at time of discharge to maximize long term outcomes and prevent re-admission. Please refer to AM-PAC score for current level of function. Assessment   Performance deficits / Impairments: Decreased functional mobility ; Decreased ADL status; Decreased strength;Decreased safe awareness;Decreased cognition;Decreased endurance;Decreased sensation;Decreased balance;Decreased posture  Prognosis: Fair  REQUIRES OT FOLLOW UP: Yes  Activity Tolerance  Activity Tolerance: Treatment limited secondary to decreased cognition  Safety Devices  Safety Devices in place: Yes  Type of devices: Left in chair(pt working with physical therapy upon writer's exit)         Patient Diagnosis(es): The primary encounter diagnosis was Partial small bowel obstruction ([address] Diagnoses of Ventral hernia with bowel obstruction and Chronic kidney disease, unspecified CKD stage were also pertinent to this visit. has a past medical history of Arthritis, Caffeine use, Cancer ([address] GERD (gastroesophageal reflux disease), Hernia, inguinal, right, Hypertension, Kidney stone, Presence of urostomy ([address] and Retinal detachment. has a past surgical history that includes hernia repair; Bladder removal (); Prostate surgery (); Cataract removal (Bilateral); Colonoscopy; Eye surgery (Right, ); vitrectomy (Left, 16); Incisional hernia repair (2015); Incisional hernia repair (2014); laparoscopy (N/A, 2021); Cystoscopy (N/A, 5/3/2021);  IR GUIDED NEPHROSTOMY CATH PLACEMENT LEFT (5/3/2021); and IR GUIDED NEPHROSTOMY CATH PLACEMENT RIGHT (5/5/2021). Restrictions  Restrictions/Precautions  Restrictions/Precautions: Fall Risk, Up as Tolerated, General Precautions  Required Braces or Orthoses?: No  Position Activity Restriction  Other position/activity restrictions: B nephrostomy tubes, MARIE drain, Abdominal inscision, IV, telemetry, cont SPO2 monitor  Subjective   General  Chart Reviewed: Yes  Patient assessed for rehabilitation services?: Yes  Response to previous treatment: Patient with no complaints from previous session  Family / Caregiver Present: No  Subjective  Subjective: Patient confused this date and inconsistantly following commands      Orientation  Orientation  Overall Orientation Status: Impaired  Orientation Level: Oriented to person;Disoriented to time;Disoriented to place; Disoriented to situation  Objective    ADL  LE Dressing: Dependent/Total  Toileting: Dependent/Total  Additional Comments: Pt on bed pan upon arrival with RN present. Pt is DEP with all LB CARE. Balance  Sitting Balance: Stand by assistance  Standing Balance: Moderate assistance(Min-Mod A with walker)  Standing Balance  Time: less than 1 min  Activity: static standing/transfer  Comment: Min-Mod A x2 for safety  Bed mobility  Rolling to Left: Maximum assistance  Rolling to Right: Maximum assistance  Supine to Sit: Maximum assistance;2 Person assistance  Scooting: Maximal assistance;2 Person assistance  Comment: Pt very confused and req'd MAX verbal and hand over hand cues to participate in bed mobility and log roll technique with poor follow thru. Transfers  Stand Step Transfers: Maximum assistance;2 Person assistance  Sit to stand: Moderate assistance;Maximum assistance;2 Person assistance  Stand to sit: Moderate assistance;Maximum assistance;2 Person assistance  Transfer Comments: Max verbal and hand over hand cues for hand placement, sequencing movements and overall safety/technique with poor follow thru.                        Cognition  Overall Cognitive goals  Time Frame for Short term goals: by discharge, pt will  Short term goal 1: demo ADL transfers and functional mobility to CGA with use of AD as needed  Short term goal 2: demo mod A x 1 for bed mobility with rails/controls  Short term goal 3: demo min A with simple grooming tasks following set up with min cues for initiation/sequecing  Short term goal 4: demo CGA with sitting balance at EOB x 15 min for inc. particiopation in ADL tasks  Short term goal 5: demo min A with UB ADLs at seated level and asssess LB when pt is able to stand  Patient Goals   Patient goals : not stated       Therapy Time   Individual Concurrent Group Co-treatment   Time In 1054     1106   Time Out 1106     1118   Minutes 12     12        Co-treatment with PT warranted secondary to decreased safety and independence requiring 2 skilled therapy professionals to address individual discipline's goals. OT addressing \"preparation for ADL transfer\",\"sitting balance for increased ADL performance\",\"sitting/activity tolerance\",\"functional reaching\",\"environmental safety/scanning\",\"fall prevention\",\"functional mobility for ADL transfers\",\"ability to sequence and follow directions\",\"functional UE strength\".       Shakira Heath LI

## 2021-05-13 NOTE — PLAN OF CARE
Absence of continued neurological deterioration signs and symptoms  Description: Absence of continued neurological deterioration signs and symptoms  Outcome: Ongoing  Goal: Mental status will be restored to baseline  Description: Mental status will be restored to baseline  Outcome: Ongoing     Problem: Discharge Planning:  Goal: Ability to perform activities of daily living will improve  Description: Ability to perform activities of daily living will improve  5/13/2021 0227 by Taylor Dhillon RN  Outcome: Ongoing    Goal: Participates in care planning  Description: Participates in care planning  Outcome: Ongoing     Problem: Injury - Risk of, Physical Injury:  Goal: Will remain free from falls  Description: Will remain free from falls  5/13/2021 0227 by Taylor Dhillon RN  Outcome: Ongoing    Goal: Absence of physical injury  Description: Absence of physical injury  5/13/2021 0227 by Taylor Dhillon RN  Outcome: Ongoing    Problem: Mood - Altered:  Goal: Mood stable  Description: Mood stable  Outcome: Ongoing  Goal: Absence of abusive behavior  Description: Absence of abusive behavior  Outcome: Ongoing  Goal: Verbalizations of feeling emotionally comfortable while being cared for will increase  Description: Verbalizations of feeling emotionally comfortable while being cared for will increase  Outcome: Ongoing     Problem: Psychomotor Activity - Altered:  Goal: Absence of psychomotor disturbance signs and symptoms  Description: Absence of psychomotor disturbance signs and symptoms  Outcome: Ongoing     Problem: Sensory Perception - Impaired:  Goal: Demonstrations of improved sensory functioning will increase  Description: Demonstrations of improved sensory functioning will increase  Outcome: Ongoing  Goal: Decrease in sensory misperception frequency  Description: Decrease in sensory misperception frequency  Outcome: Ongoing  Goal: Able to refrain from responding to false sensory perceptions  Description: Able to refrain from responding to false sensory perceptions  Outcome: Ongoing  Goal: Demonstrates accurate environmental perceptions  Description: Demonstrates accurate environmental perceptions  Outcome: Ongoing  Goal: Able to distinguish between reality-based and nonreality-based thinking  Description: Able to distinguish between reality-based and nonreality-based thinking  Outcome: Ongoing  Goal: Able to interrupt nonreality-based thinking  Description: Able to interrupt nonreality-based thinking  Outcome: Ongoing     Problem: Sleep Pattern Disturbance:  Goal: Appears well-rested  Description: Appears well-rested  Outcome: Ongoing     Problem: IP BALANCE  Goal: LTG - Patient will maintain balance to allow for safe/functional mobility  Outcome: Ongoing     Problem: IP MOBILITY  Goal: LTG - patient will ambulate community distance  Outcome: Ongoing

## 2021-05-14 VITALS
HEART RATE: 89 BPM | TEMPERATURE: 97.9 F | DIASTOLIC BLOOD PRESSURE: 71 MMHG | OXYGEN SATURATION: 99 % | WEIGHT: 233.7 LBS | SYSTOLIC BLOOD PRESSURE: 110 MMHG | BODY MASS INDEX: 30.97 KG/M2 | RESPIRATION RATE: 16 BRPM | HEIGHT: 73 IN

## 2021-05-14 PROBLEM — K43.6 VENTRAL HERNIA WITH BOWEL OBSTRUCTION: Status: RESOLVED | Noted: 2021-04-30 | Resolved: 2021-05-14

## 2021-05-14 PROBLEM — N17.9 AKI (ACUTE KIDNEY INJURY) (HCC): Status: RESOLVED | Noted: 2021-04-30 | Resolved: 2021-05-14

## 2021-05-14 PROBLEM — E87.20 METABOLIC ACIDOSIS: Status: RESOLVED | Noted: 2021-05-03 | Resolved: 2021-05-14

## 2021-05-14 LAB
ABSOLUTE EOS #: 0.17 K/UL (ref 0–0.44)
ABSOLUTE IMMATURE GRANULOCYTE: 0.07 K/UL (ref 0–0.3)
ABSOLUTE LYMPH #: 1.04 K/UL (ref 1.1–3.7)
ABSOLUTE MONO #: 0.74 K/UL (ref 0.1–1.2)
ANION GAP SERPL CALCULATED.3IONS-SCNC: 13 MMOL/L (ref 9–17)
BASOPHILS # BLD: 0 % (ref 0–2)
BASOPHILS ABSOLUTE: 0.04 K/UL (ref 0–0.2)
BUN BLDV-MCNC: 78 MG/DL (ref 8–23)
BUN/CREAT BLD: 28 (ref 9–20)
CALCIUM SERPL-MCNC: 9.3 MG/DL (ref 8.6–10.4)
CHLORIDE BLD-SCNC: 110 MMOL/L (ref 98–107)
CO2: 25 MMOL/L (ref 20–31)
CREAT SERPL-MCNC: 2.78 MG/DL (ref 0.7–1.2)
DIFFERENTIAL TYPE: ABNORMAL
EOSINOPHILS RELATIVE PERCENT: 2 % (ref 1–4)
GFR AFRICAN AMERICAN: 27 ML/MIN
GFR NON-AFRICAN AMERICAN: 22 ML/MIN
GFR SERPL CREATININE-BSD FRML MDRD: ABNORMAL ML/MIN/{1.73_M2}
GFR SERPL CREATININE-BSD FRML MDRD: ABNORMAL ML/MIN/{1.73_M2}
GLUCOSE BLD-MCNC: 120 MG/DL (ref 75–110)
GLUCOSE BLD-MCNC: 135 MG/DL (ref 70–99)
GLUCOSE BLD-MCNC: 195 MG/DL (ref 75–110)
GLUCOSE BLD-MCNC: 343 MG/DL (ref 75–110)
HCT VFR BLD CALC: 37.9 % (ref 40.7–50.3)
HEMOGLOBIN: 11.3 G/DL (ref 13–17)
IMMATURE GRANULOCYTES: 1 %
LYMPHOCYTES # BLD: 11 % (ref 24–43)
MAGNESIUM: 2.8 MG/DL (ref 1.6–2.6)
MCH RBC QN AUTO: 27.5 PG (ref 25.2–33.5)
MCHC RBC AUTO-ENTMCNC: 29.8 G/DL (ref 28.4–34.8)
MCV RBC AUTO: 92.2 FL (ref 82.6–102.9)
MONOCYTES # BLD: 8 % (ref 3–12)
NRBC AUTOMATED: 0 PER 100 WBC
PDW BLD-RTO: 16.4 % (ref 11.8–14.4)
PHOSPHORUS: 4.2 MG/DL (ref 2.5–4.5)
PLATELET # BLD: 280 K/UL (ref 138–453)
PLATELET ESTIMATE: ABNORMAL
PMV BLD AUTO: 10.4 FL (ref 8.1–13.5)
POTASSIUM SERPL-SCNC: 4.3 MMOL/L (ref 3.7–5.3)
RBC # BLD: 4.11 M/UL (ref 4.21–5.77)
RBC # BLD: ABNORMAL 10*6/UL
SEG NEUTROPHILS: 78 % (ref 36–65)
SEGMENTED NEUTROPHILS ABSOLUTE COUNT: 7.38 K/UL (ref 1.5–8.1)
SODIUM BLD-SCNC: 148 MMOL/L (ref 135–144)
WBC # BLD: 9.4 K/UL (ref 3.5–11.3)
WBC # BLD: ABNORMAL 10*3/UL

## 2021-05-14 PROCEDURE — 6360000002 HC RX W HCPCS: Performed by: NURSE PRACTITIONER

## 2021-05-14 PROCEDURE — 6370000000 HC RX 637 (ALT 250 FOR IP): Performed by: STUDENT IN AN ORGANIZED HEALTH CARE EDUCATION/TRAINING PROGRAM

## 2021-05-14 PROCEDURE — 2500000003 HC RX 250 WO HCPCS: Performed by: SPECIALIST

## 2021-05-14 PROCEDURE — 97530 THERAPEUTIC ACTIVITIES: CPT

## 2021-05-14 PROCEDURE — 6370000000 HC RX 637 (ALT 250 FOR IP): Performed by: SPECIALIST

## 2021-05-14 PROCEDURE — 83735 ASSAY OF MAGNESIUM: CPT

## 2021-05-14 PROCEDURE — 82947 ASSAY GLUCOSE BLOOD QUANT: CPT

## 2021-05-14 PROCEDURE — 6370000000 HC RX 637 (ALT 250 FOR IP): Performed by: NURSE PRACTITIONER

## 2021-05-14 PROCEDURE — 97129 THER IVNTJ 1ST 15 MIN: CPT

## 2021-05-14 PROCEDURE — 80048 BASIC METABOLIC PNL TOTAL CA: CPT

## 2021-05-14 PROCEDURE — 2580000003 HC RX 258: Performed by: SPECIALIST

## 2021-05-14 PROCEDURE — 6360000002 HC RX W HCPCS: Performed by: SPECIALIST

## 2021-05-14 PROCEDURE — 36415 COLL VENOUS BLD VENIPUNCTURE: CPT

## 2021-05-14 PROCEDURE — 84100 ASSAY OF PHOSPHORUS: CPT

## 2021-05-14 PROCEDURE — 97535 SELF CARE MNGMENT TRAINING: CPT

## 2021-05-14 PROCEDURE — 85025 COMPLETE CBC W/AUTO DIFF WBC: CPT

## 2021-05-14 PROCEDURE — 99239 HOSP IP/OBS DSCHRG MGMT >30: CPT | Performed by: NURSE PRACTITIONER

## 2021-05-14 RX ORDER — LIDOCAINE 4 G/G
2 PATCH TOPICAL EVERY 12 HOURS
Status: CANCELLED | DISCHARGE
Start: 2021-05-14

## 2021-05-14 RX ORDER — POLYETHYLENE GLYCOL 3350 17 G/17G
17 POWDER, FOR SOLUTION ORAL DAILY
Qty: 527 G | Refills: 1 | DISCHARGE
Start: 2021-05-15 | End: 2021-06-14

## 2021-05-14 RX ORDER — INSULIN GLARGINE 100 [IU]/ML
10 INJECTION, SOLUTION SUBCUTANEOUS DAILY
Qty: 1 VIAL | Refills: 3 | DISCHARGE
Start: 2021-05-14 | End: 2021-06-02

## 2021-05-14 RX ORDER — FENTANYL CITRATE 50 UG/ML
25 INJECTION, SOLUTION INTRAMUSCULAR; INTRAVENOUS ONCE
Status: COMPLETED | OUTPATIENT
Start: 2021-05-14 | End: 2021-05-14

## 2021-05-14 RX ORDER — LANOLIN ALCOHOL/MO/W.PET/CERES
3 CREAM (GRAM) TOPICAL NIGHTLY
Refills: 3 | DISCHARGE
Start: 2021-05-14 | End: 2021-06-30

## 2021-05-14 RX ORDER — PSEUDOEPHEDRINE HCL 30 MG
100 TABLET ORAL 2 TIMES DAILY
Qty: 60 CAPSULE | Refills: 0 | DISCHARGE
Start: 2021-05-14 | End: 2021-06-13

## 2021-05-14 RX ADMIN — HEPARIN SODIUM 5000 UNITS: 5000 INJECTION INTRAVENOUS; SUBCUTANEOUS at 12:47

## 2021-05-14 RX ADMIN — POLYETHYLENE GLYCOL (3350) 17 G: 17 POWDER, FOR SOLUTION ORAL at 10:47

## 2021-05-14 RX ADMIN — FENTANYL CITRATE 25 MCG: 50 INJECTION, SOLUTION INTRAMUSCULAR; INTRAVENOUS at 03:02

## 2021-05-14 RX ADMIN — INSULIN GLARGINE 10 UNITS: 100 INJECTION, SOLUTION SUBCUTANEOUS at 10:47

## 2021-05-14 RX ADMIN — ACETAMINOPHEN ORAL SOLUTION 1000 MG: 325 SOLUTION ORAL at 10:46

## 2021-05-14 RX ADMIN — ONDANSETRON 4 MG: 2 INJECTION INTRAMUSCULAR; INTRAVENOUS at 12:48

## 2021-05-14 RX ADMIN — INSULIN LISPRO 2 UNITS: 100 INJECTION, SOLUTION INTRAVENOUS; SUBCUTANEOUS at 12:48

## 2021-05-14 RX ADMIN — SODIUM CHLORIDE, PRESERVATIVE FREE 10 ML: 5 INJECTION INTRAVENOUS at 10:47

## 2021-05-14 RX ADMIN — HEPARIN SODIUM 5000 UNITS: 5000 INJECTION INTRAVENOUS; SUBCUTANEOUS at 06:19

## 2021-05-14 RX ADMIN — FAMOTIDINE 10 MG: 10 INJECTION, SOLUTION INTRAVENOUS at 10:47

## 2021-05-14 RX ADMIN — INSULIN LISPRO 8 UNITS: 100 INJECTION, SOLUTION INTRAVENOUS; SUBCUTANEOUS at 18:05

## 2021-05-14 RX ADMIN — SODIUM CHLORIDE, PRESERVATIVE FREE 10 ML: 5 INJECTION INTRAVENOUS at 00:13

## 2021-05-14 RX ADMIN — DOCUSATE SODIUM 100 MG: 100 CAPSULE, LIQUID FILLED ORAL at 10:46

## 2021-05-14 ASSESSMENT — ENCOUNTER SYMPTOMS
ABDOMINAL DISTENTION: 0
SORE THROAT: 0
DIARRHEA: 0
PHOTOPHOBIA: 0
EYE DISCHARGE: 0
ABDOMINAL PAIN: 0
SHORTNESS OF BREATH: 0
APNEA: 0

## 2021-05-14 ASSESSMENT — PAIN SCALES - GENERAL
PAINLEVEL_OUTOF10: 6
PAINLEVEL_OUTOF10: 8
PAINLEVEL_OUTOF10: 4
PAINLEVEL_OUTOF10: 0

## 2021-05-14 ASSESSMENT — PAIN DESCRIPTION - LOCATION: LOCATION: ABDOMEN

## 2021-05-14 NOTE — CARE COORDINATION
Social Work-Sent updates to [facility]. Mike Guzman will complete another onsite today.  Branden De La Fuente

## 2021-05-14 NOTE — CARE COORDINATION
Social work: spoke to pt and female family member to advise of pt leaving tonight for rehab at [facility]. Both agreeable. Attempted to reach son also.   Sariah christine

## 2021-05-14 NOTE — PLAN OF CARE
Problem: Falls - Risk of:  Goal: Will remain free from falls  Description: Will remain free from falls  Outcome: Ongoing  Siderails up x 2  Hourly rounding. Call light in reach. Instructed to call for assist before attempting out of bed. Remains free from falls and accidental injury at this time. Floor free from obstacles, and bed is locked and in lowest position. Adequate lighting provided. Bed alarm on. Fall sticker on wristband. Fall Sign posted in doorway      Goal: Absence of physical injury  Description: Absence of physical injury  Outcome: Ongoing     Problem: Skin Integrity:  Goal: Will show no infection signs and symptoms  Description: Will show no infection signs and symptoms  Outcome: Ongoing  Goal: Absence of new skin breakdown  Description: Absence of new skin breakdown  Outcome: Ongoing  Goal: Signs of wound healing will improve  Description: Signs of wound healing will improve  Outcome: Ongoing     Problem: Infection - Surgical Site:  Goal: Will show no infection signs and symptoms  Description: Will show no infection signs and symptoms  Outcome: Ongoing     Problem: Activity:  Goal: Ability to tolerate increased activity will improve  Description: Ability to tolerate increased activity will improve  Outcome: Ongoing     Problem:  Bowel/Gastric:  Goal: Gastrointestinal status for postoperative course will improve  Description: Gastrointestinal status for postoperative course will improve  Outcome: Ongoing     Problem: Coping:  Goal: Level of anxiety will decrease  Description: Level of anxiety will decrease  Outcome: Ongoing     Problem: Sensory:  Goal: Pain level will decrease  Description: Pain level will decrease  Outcome: Ongoing     Problem: Pain:  Goal: Pain level will decrease  Description: Pain level will decrease  Outcome: Ongoing  Goal: Control of acute pain  Description: Control of acute pain  Outcome: Ongoing  Goal: Control of chronic pain  Description: Control of chronic pain  Outcome: Ongoing     Problem: Nutrition  Goal: Optimal nutrition therapy  Outcome: Ongoing     Problem: Confusion - Acute:  Goal: Absence of continued neurological deterioration signs and symptoms  Description: Absence of continued neurological deterioration signs and symptoms  Outcome: Ongoing  Goal: Mental status will be restored to baseline  Description: Mental status will be restored to baseline  Outcome: Ongoing     Problem: Discharge Planning:  Goal: Ability to perform activities of daily living will improve  Description: Ability to perform activities of daily living will improve  Outcome: Ongoing  Goal: Participates in care planning  Description: Participates in care planning  Outcome: Ongoing     Problem: Injury - Risk of, Physical Injury:  Goal: Will remain free from falls  Description: Will remain free from falls  Outcome: Ongoing  Goal: Absence of physical injury  Description: Absence of physical injury  Outcome: Ongoing     Problem: Mood - Altered:  Goal: Mood stable  Description: Mood stable  Outcome: Ongoing  Goal: Absence of abusive behavior  Description: Absence of abusive behavior  Outcome: Ongoing  Goal: Verbalizations of feeling emotionally comfortable while being cared for will increase  Description: Verbalizations of feeling emotionally comfortable while being cared for will increase  Outcome: Ongoing     Problem: Psychomotor Activity - Altered:  Goal: Absence of psychomotor disturbance signs and symptoms  Description: Absence of psychomotor disturbance signs and symptoms  Outcome: Ongoing     Problem: Sensory Perception - Impaired:  Goal: Demonstrations of improved sensory functioning will increase  Description: Demonstrations of improved sensory functioning will increase  Outcome: Ongoing  Goal: Decrease in sensory misperception frequency  Description: Decrease in sensory misperception frequency  Outcome: Ongoing  Goal: Able to refrain from responding to false sensory perceptions  Description: Able to refrain from responding to false sensory perceptions  Outcome: Ongoing  Goal: Demonstrates accurate environmental perceptions  Description: Demonstrates accurate environmental perceptions  Outcome: Ongoing  Goal: Able to distinguish between reality-based and nonreality-based thinking  Description: Able to distinguish between reality-based and nonreality-based thinking  Outcome: Ongoing  Goal: Able to interrupt nonreality-based thinking  Description: Able to interrupt nonreality-based thinking  Outcome: Ongoing     Problem: Sleep Pattern Disturbance:  Goal: Appears well-rested  Description: Appears well-rested  Outcome: Ongoing     Problem: IP BALANCE  Goal: LTG - Patient will maintain balance to allow for safe/functional mobility  Outcome: Ongoing     Problem: IP MOBILITY  Goal: LTG - patient will ambulate community distance  Outcome: Ongoing

## 2021-05-14 NOTE — DISCHARGE SUMMARY
[facility]  Office: [address], DO, Mel Gallardo, DO, Beth James, DO, Tracie Betancur Blood, DO, Parker Gusman MD, Silke Billingsley MD, Danii Blackmon MD, Donna Henry MD, Mauricio Santo MD, Harshal Arevalo MD, Pavithra Delacruz MD, Elana Aceves MD, Ruddy Murphy, DO, Didi Bonilla MD, Hellen Ryan DO, Staica John MD,  Aftab Jones, DO, Isai Villa MD, Blane Paredes MD, Alan Aj MD, Kristi Tavarez MD, Gladys Robb, [facility], [facility], [facility], Basim Moreno, CNP, Rupert Good, CNS, Blanca Pringle, CNP, Radha Carrasco, CNP, Andra Urrutia, CNP, Nain Phillips, CNP, Bryon Víctor, CNP, Amaris Winters PA-C, Joaquim Essex, [facility], Oliverio Roy, CNP, Mg rBoussard, CNP, Cy Desai, CNP, Atiya Zhang, CNP, Justice Rodgers, CNP, Raine Giron, [facility]    Discharge Summary     Patient ID: Norman Toney  :  1940   MRN: [id number]     ACCOUNT:  [de-identified]   Patient's PCP: Vinnie Rowland MD  Admit Date: 2021   Discharge Date: 2021   Length of Stay: 14  Code Status:  DNR-CCA  Admitting Physician: Jolene Patricia DO  Discharge Physician: MARKIE Gerber - JOSE     Active Discharge Diagnoses:     Hospital Problem Lists:  Principal Problem (Resolved):    Ventral hernia with bowel obstruction  Active Problems:    Toxic metabolic encephalopathy    Other hydronephrosis    Acute respiratory failure with hypoxia (HCC)    Normochromic normocytic anemia    Iron deficiency anemia    Vitamin B 12 deficiency    Folate deficiency    Essential hypertension    Renal mass  Resolved Problems:    Metabolic acidosis    KARMA (acute kidney injury) [facility])      Admission Condition:  serious     Discharged Condition: stable    Hospital Stay:     Hospital Course:  Norman Toney is a 80 y.o. male with a past medical history of left renal cell carcinoma status post ablation, bladder cancer status post 05/14/2021    GFRAA 27 05/14/2021    GFR      05/14/2021    GFR NOT REPORTED 05/14/2021    PROT 7.3 05/02/2021    CALCIUM 9.3 05/14/2021     FLP:    Lab Results   Component Value Date    CHOL 139 05/09/2016    TRIG 188 03/15/2017    HDL 26 05/09/2016     U/A:    Lab Results   Component Value Date    COLORU YELLOW 04/30/2021    TURBIDITY TURBID 04/30/2021    SPECGRAV 1.015 04/30/2021    HGBUR 3+ 04/30/2021    PHUR 8.5 04/30/2021    PROTEINU 2+ 04/30/2021    GLUCOSEU NEGATIVE 04/30/2021    KETUA NEGATIVE 04/30/2021    BILIRUBINUR NEGATIVE 04/30/2021    BILIRUBINUR small 11/22/2016    UROBILINOGEN Normal 04/30/2021    NITRU NEGATIVE 04/30/2021    LEUKOCYTESUR MOD 04/30/2021     TSH:    Lab Results   Component Value Date    TSH 1.91 05/02/2021     Radiology:  Xr Abdomen (kub) (single Ap View)    Result Date: 5/11/2021  Enteric tube with tip position as above. If there is concern about position, small amount of contrast could be injected with a follow-up KUB. Xr Abdomen For Ng/og/ne Tube Placement    Result Date: 5/10/2021  Enteric tube in the stomach. New bilateral nephrostomy catheters. Ileus. Fl Modified Barium Swallow W Video    Result Date: 5/11/2021  1. Deep penetration to the cords with trace aspiration in throat clearing with the thick liquid substance by straw. 2. Trace penetration without aspiration with the honey thick liquid substance. 3. Trace penetration without aspiration with the pureed/pudding thick substance. Please see separate speech pathology report for full discussion of findings and recommendations. Consultations:    Consults:     Final Specialist Recommendations/Findings:   IP CONSULT TO GENERAL SURGERY  IP CONSULT TO NEPHROLOGY  IP CONSULT TO UROLOGY  IP CONSULT TO DIETITIAN      The patient was seen and examined on day of discharge and this discharge summary is in conjunction with any daily progress note from day of discharge. Discharge plan:     Disposition:  To a [facility] facility    Physician Follow Up:   Regine Batista, [address]  [address] [place]  [id number] [phone]    In 1 week  Post op follow up       Diet: Dysphagia soft and bite-size with honey thick liquids; renal diet     Activity: As tolerated    Discharge Medications:      Medication List      START taking these medications    docusate 100 MG Caps  Commonly known as: COLACE, DULCOLAX  Take 100 mg by mouth 2 times daily     insulin glargine 100 UNIT/ML injection vial  Commonly known as: LANTUS  Inject 10 Units into the skin daily     melatonin 3 MG Tabs tablet  Take 1 tablet by mouth nightly     polyethylene glycol 17 g packet  Commonly known as: GLYCOLAX  Take 17 g by mouth daily  Start taking on: May 15, 2021        CONTINUE taking these medications    acetaminophen 500 MG tablet  Commonly known as: TYLENOL     ProAir RespiClick 454 (90 Base) MCG/ACT aerosol powder inhalation  Generic drug: albuterol sulfate  Inhale 2 puffs into the lungs every 4 hours as needed for Wheezing or Shortness of Breath           Where to Get Your Medications      Information about where to get these medications is not yet available    Ask your nurse or doctor about these medications  · docusate 100 MG Caps  · insulin glargine 100 UNIT/ML injection vial  · melatonin 3 MG Tabs tablet  · polyethylene glycol 17 g packet         No discharge procedures on file. Time Spent on discharge is  39 mins in patient examination, evaluation, counseling as well as medication reconciliation, prescriptions for required medications, discharge plan and follow up. Electronically signed by   MARKIE Souza NP  5/14/2021  4:00 PM      Thank you Dr. Yash Desir MD for the opportunity to be involved in this patient's care.

## 2021-05-14 NOTE — PROGRESS NOTES
[facility]  Office: [address], DO, Cameron Richard, DO, Niladra Paris, DO, Ezequiel Covert Blood, DO, Ellen Doyle MD, Randy Sheikh MD, Bubba Santa MD, Eben Szymanski MD, Collette Denmark, MD, Austin Miranda MD, Jayce Mcdaniels MD, Ole Aguillon MD, Sulaiman Quintana, DO, Nimisha Rendon MD, Leo Campuzano, DO, Kristin Quiroz MD,  Mel Moreno, DO, Yesenia Coelho MD, Eliott Spatz, MD, Niurka Lopez MD, Laury Anand MD, Dominic Lang, [facility], [facility] Uriah, CNP, Abel Cruz, CNP, Maite Galvan, CNS, Zuleima Pearl, CNP, Billie Newman, CNP, Katharine Clarke, CNP, Maame La, CNP, Ayush Horne, CNP, ISABEL DsouzaC, Delilah Mcdonald, [facility], Duane Abed, CNP, Roby Camara, CNP, Ana Rankin, CNP, Alek Guerra, CNP, Naseem Mercado, CNP, [facility], [facility]    Progress Note    5/14/2021    10:30 AM    Name:   Jessica Salas  MRN:     [id number]     [place]:      [de-identified]   Room:   [address] [place] [address] Day:  15  Admit Date:  4/30/2021 12:15 PM    PCP:   Shorty Birmingham MD  Code Status:  DNR-CCA    Subjective:     C/C:   Chief Complaint   Patient presents with    Hernia     LOWER RIGHT ABDOMEN. recent increase in pain and growth     Interval History Status:  He is still having some intermittent confusion but reoriented easily. Vital signs stable. No new issues or complaints   Brief History:     80-year-old male past medical history of left renal cell carcinoma status post ablation, bladder cancer status post cystectomy with creation of ileal conduit in 2006, prior small bowel obstruction and exploratory laparotomy, recurrent hernia presents with incarcerated parastomal hernia requiring ex lap with small bowel resection, reduction of strangulated hernia, lysis of adhesions performed on 4/30/2021. Patient also has ongoing problem of acute kidney injury and moderate urine output however worsening creatinine. Mina Lennon Topical Q12H    famotidine (PEPCID) injection  10 mg Intravenous BID     Continuous Infusions:    dextrose      sodium chloride       PRN Meds: mineral oil, glucose, dextrose, glucagon (rDNA), dextrose, acetaminophen, albuterol sulfate HFA, sodium chloride flush, sodium chloride, ondansetron    Data:     Past Medical History:   has a past medical history of Arthritis, Caffeine use, Cancer ([facility] [address] GERD (gastroesophageal reflux disease), Hernia, inguinal, right, Hypertension, Kidney stone, Presence of urostomy ([facility] [address] and Retinal detachment. Social History:   reports that he quit smoking about 41 years ago. His smoking use included cigarettes. He has a 10.00 pack-year smoking history. He has never used smokeless tobacco. He reports that he does not drink alcohol or use drugs. Family History:   Family History   Problem Relation Age of Onset    Diabetes Mother     Diabetes Sister        Vitals:  BP (!) 148/71   Pulse 68   Temp 98 °F (36.7 °C) (Oral)   Resp 16   Ht 6' 1\" (1.854 m)   Wt 233 lb 11.2 oz (106 kg)   SpO2 92%   BMI 30.83 kg/m²   Temp (24hrs), Av °F (36.7 °C), Min:97.2 °F (36.2 °C), Max:98.4 °F (36.9 °C)    Recent Labs     21  1146 21  1631 21  1923 21  0720   POCGLU 207* 99 98 120*       I/O (24Hr):     Intake/Output Summary (Last 24 hours) at 2021 0859  Last data filed at 2021 0205  Gross per 24 hour   Intake --   Output 1520 ml   Net -1520 ml       Labs:  Hematology:  Recent Labs     21  0433 21  0525 21  0510   WBC 9.9 8.9 9.4   RBC 3.78* 3.96* 4.11*   HGB 10.4* 10.8* 11.3*   HCT 34.9* 36.8* 37.9*   MCV 92.3 92.9 92.2   MCH 27.5 27.3 27.5   MCHC 29.8 29.3 29.8   RDW 16.1* 16.2* 16.4*    232 280   MPV 10.6 9.8 10.4     Chemistry:  Recent Labs     21  0433 21  0525 21  0510    144 148*   K 4.8 4.6 4.3    107 110*   CO2 25 25 25   GLUCOSE 143* 124* 135*   BUN 80* 75* 78*   CREATININE 2.62* 2.50* 2.78* MG 2.8* 2.7* 2.8*   ANIONGAP 13 12 13   LABGLOM 24* 25* 22*   GFRAA 29* 30* 27*   CALCIUM 8.8 9.0 9.3   PHOS 4.1 4.6* 4.2     Recent Labs     05/13/21  0457 05/13/21  0734 05/13/21  1146 05/13/21  1631 05/13/21  1923 05/14/21  0720   POCGLU 123* 93 207* 99 98 120*     ABG:  Lab Results   Component Value Date    POCPH 7.19 05/02/2021    POCPCO2 44 05/02/2021    POCPO2 74 05/02/2021    POCHCO3 16.9 05/02/2021    NBEA 11 05/02/2021    PBEA NOT REPORTED 05/02/2021    IYA9LPK 18 05/02/2021    DWLS3HEI 90 05/02/2021    FIO2 32.0 05/02/2021     Lab Results   Component Value Date/Time    SPECIAL LT [facility] 05/02/2021 12:58 PM     Lab Results   Component Value Date/Time    CULTURE NO GROWTH 6 DAYS 05/02/2021 12:58 PM       Radiology:  Ct Abdomen Pelvis Wo Contrast Additional Contrast? None    Result Date: 4/30/2021  1. Partial small bowel obstruction with a transition point located at the mouth of the ventral hernia. 2. Extensive stone burden identified in the bilateral kidneys, left greater than right. 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 3. Enlargement of the right upper pole mass, likely representing renal cell carcinoma. 4. Enlarged left para-aortic lymph node measuring 27 x 20 mm. Finding is nonspecific and may represent metastatic disease or reactive adenopathy. 5. Severe diverticulosis. 6. Cholelithiasis. Xr Chest (single View Frontal)    Result Date: 5/2/2021  Shallow inflation. Right basilar opacity may represent developing consolidation in the appropriate clinical setting. The enteric tube courses off the field of view in the upper abdomen. Ir Guided Nephrostomy Cath Placement Left    Result Date: 5/3/2021  Difficult but successful percutaneous left nephrostomy tube placement, as above. Right PCN will be attempted tomorrow.      Xr Abdomen For Ng/og/ne Tube Placement    Result Date: 5/3/2021  Enteric tube in the stomach as above. No evidence of bowel obstruction. Xr Abdomen For Ng/og/ne Tube Placement    Result Date: 5/2/2021  Satisfactory position enteric tube     Xr Abdomen For Ng/og/ne Tube Placement    Result Date: 5/2/2021  Enteric tube tip suspected to be at the level the GE junction. Consider advancement 5-10 cm. Additionally there is a telemetry leads within the midline, consider moving prior to repeat x-ray. Addition, please consider centering the x-ray over the GE junction epigastric region. Xr Abdomen For Ng/og/ne Tube Placement    Result Date: 4/30/2021  A newly placed gastric tube loops in the distal thoracic esophagus, where both the tip and sideport are located. Recommend repositioning before use. Us Retroperitoneal Limited    Result Date: 5/1/2021  1. Recently demonstrated suspicious lesion arising from the superior pole the right kidney is not well delineated on this exam. 2.  Right hydronephrosis and nephrolithiasis again demonstrated. 3.  Multiple stones throughout the left intrarenal collecting system, which likely obscures the associated hydronephrosis demonstrated on recent CT exam. 4.  Cholelithiasis. Physical Examination:        General appearance: alert, oriented, following commands, mentation improved  Mental Status: alert and oriented to person, place and time  Lungs:  clear to auscultation bilaterally, normal effort  Heart:  regular rate and rhythm, no murmur  Abdomen:  soft, nontender, abdominal binder in place, well-healing incisions nephrostomy tubes both draining well. Bilateral nephrostomy tubes clear or draining compared to yesterday  Extremities:  no edema, redness, tenderness in the calves  Skin:  2 incisions, both held with stables, ostomy tube in place and appears to be draining. Left nephrostomy tube draining well.      Assessment:        Hospital Problems           Last Modified POA    * (Principal) Ventral hernia with bowel obstruction 5/1/2021 Yes    Toxic metabolic encephalopathy 5/9/5937 No    Other hydronephrosis 5/3/2021 Yes    Acute respiratory failure with hypoxia ([address] 0/5/4399 No    Metabolic acidosis 8/2/0832 No    Normochromic normocytic anemia 5/1/2021 Yes    Iron deficiency anemia 5/2/2021 Yes    Vitamin B 12 deficiency 5/3/2021 Yes    Folate deficiency 5/3/2021 Yes    Essential hypertension (Chronic) 5/1/2021 Yes    Renal mass (Chronic) 5/1/2021 Yes    KARMA (acute kidney injury) ([address] 5/1/2021 Yes          Plan:        1. Strangulated ventral hernia status post exploratory laparotomy small bowel resection on 4/30/2021. Appreciate general surgery recommendations. NG tube removed, Monitor mentation. Continue soft and bite-size food with honey thick liquids. Tom Becker for discharge from their standput   2. Acute kidney injury, hydronephrosis, nonoliguric, past history of renal cell carcinoma, bladder cell cancer, underwent cystoscopy on 5/3/2021, and left PCN on 5/3/2021. Status post right PCN 5/5/2021. [address] nephrology, urology, interventional radiology recommendations. Creatinine stable, continue to monitor intake and output with renal diet restrictions, ok for discharge from their standput    3. B12, iron, folate deficiency. Continue replacement  4. Acute delirium secondary to anesthesia , renal failure, metabolic encephalopathy. Mentation slowly improving  5. Patient is DNR CCA. 6. Continue PT/OT, increase activity as tolerated  7. Suspected sleep apnea, 2L oxygen via nasal cannula overnight. 8. Glycemic control, continue insulin sliding scale  9. Continue colace BID and  Miralax  10. Await pre-CERT, Discharge to ECF when appropriate  11.  Plan discussed with patient and staff      Pricilla Mo, APRN - NP  5/14/2021  8:59 AM

## 2021-05-14 NOTE — PROGRESS NOTES
Patient discharged via [facility].  Report called to facility by writer to Nathan TORRES completed and signed per writer and physician for Re[facility] of [employer]   Discharge packet given to EMS to deliver to RN receiving patient

## 2021-05-14 NOTE — PROGRESS NOTES
General Surgery:  Daily Progress Note                   PATIENT NAME: Renea Velasco     TODAY'S DATE: 5/14/2021, 6:47 AM  CC:  Confusion    SUBJECTIVE:     Pt seen and examined at bedside. Sitting up at side of bed today. Tolerated diet yesterday. No acute issues. OBJECTIVE:   VITALS:  /74   Pulse 56   Temp 97.2 °F (36.2 °C) (Oral)   Resp 16   Ht 6' 1\" (1.854 m)   Wt 233 lb 11.2 oz (106 kg)   SpO2 95%   BMI 30.83 kg/m²      INTAKE/OUTPUT:      Intake/Output Summary (Last 24 hours) at 5/14/2021 0647  Last data filed at 5/14/2021 0205  Gross per 24 hour   Intake --   Output 1520 ml   Net -1520 ml       PHYSICAL EXAM:  General Appearance: alert and awake. HEENT:  Normocephalic, atraumatic, mucus membranes moist.  NG tube in place. Heart: Heart regular rate and rhythm  Lungs: No acute distress  Abdomen: Soft, nondistended, Non tender to palpation. Midline incision healing well. Extremities: No cyanosis, pitting edema, rashes noted. Skin: Skin color, texture, turgor normal. No rashes or lesions.     Data:  CBC with Differential:    Lab Results   Component Value Date    WBC 9.4 05/14/2021    RBC 4.11 05/14/2021    HGB 11.3 05/14/2021    HCT 37.9 05/14/2021     05/14/2021    MCV 92.2 05/14/2021    MCH 27.5 05/14/2021    MCHC 29.8 05/14/2021    RDW 16.4 05/14/2021    LYMPHOPCT 11 05/14/2021    LYMPHOPCT 18.3 12/14/2017    MONOPCT 8 05/14/2021    MONOPCT 5.7 12/14/2017    EOSPCT 5.1 12/14/2017    BASOPCT 0 05/14/2021    BASOPCT 1.1 12/14/2017    MONOSABS 0.74 05/14/2021    MONOSABS 0.4 12/14/2017    LYMPHSABS 1.04 05/14/2021    LYMPHSABS 1.2 12/14/2017    EOSABS 0.17 05/14/2021    EOSABS 0.3 12/14/2017    BASOSABS 0.04 05/14/2021    DIFFTYPE NOT REPORTED 05/14/2021     BMP:    Lab Results   Component Value Date     05/14/2021    K 4.3 05/14/2021     05/14/2021    CO2 25 05/14/2021    BUN 78 05/14/2021    LABALBU 3.8 05/02/2021    CREATININE 2.78 05/14/2021    CALCIUM 9.3 05/14/2021    GFRAA 27 05/14/2021    LABGLOM 22 05/14/2021    GLUCOSE 135 05/14/2021       Radiology Review:      ASSESSMENT:  Active Hospital Problems    Diagnosis Date Noted    Other hydronephrosis [N13.39] 05/03/2021     Priority: High    Acute respiratory failure with hypoxia (HCC) [J96.01] 05/03/2021     Priority: High    Metabolic acidosis [O00.7] 05/03/2021     Priority: High    Toxic metabolic encephalopathy [M89] 05/02/2021     Priority: High    Vitamin B 12 deficiency [E53.8] 05/03/2021     Priority: Medium    Folate deficiency [E53.8] 05/03/2021     Priority: Medium    Iron deficiency anemia [D50.9] 05/02/2021     Priority: Medium    Normochromic normocytic anemia [D64.9] 05/01/2021     Priority: Medium    Ventral hernia with bowel obstruction [K43.6] 04/30/2021    KARMA (acute kidney injury) ([facility] [address] [N17.9] 04/30/2021    Renal mass [N28.89] 06/28/2018    Essential hypertension [I10] 05/03/2016     80year old male w/ recurrent parastomal hernia presenting with incarcerated/strangulated parastomal hernia s/p ex lap, FELICE, reduction of parastomal hernia, and small bowel resection (4/30/21-Bhargav). Confused - resolved  KARMA - resolving    Plan:  1. Diet: Dysphagia diet. Monitor calorie intake. 2. Defer treatment with nephrostomy tubes to urology   3. Continue miralax daily  4. Medical management per primary team. Chris Hanks to discharge to facility from surgery standpoint.     Electronically signed by Meldon Scheuermann, DO  on 5/14/2021 at 6:47 AM

## 2021-05-14 NOTE — PROGRESS NOTES
Occupational Therapy  Facility/Department: [facility] PROGRESSIVE CARE  Daily Treatment Note  NAME: Wayne Barahona  : 1940  MRN: [id number]    Date of Service: 2021    Discharge Recommendations:  Patient would benefit from continued therapy after discharge   Pt currently functioning below baseline. Would suggest additional therapy at time of discharge to maximize long term outcomes and prevent re-admission. Please refer to AM-PAC score for current level of function. Assessment   Performance deficits / Impairments: Decreased functional mobility ; Decreased ADL status; Decreased strength;Decreased safe awareness;Decreased cognition;Decreased endurance;Decreased sensation;Decreased balance;Decreased posture  Prognosis: Fair  REQUIRES OT FOLLOW UP: Yes  Activity Tolerance  Activity Tolerance: Treatment limited secondary to decreased cognition  Activity Tolerance: poor  Safety Devices  Safety Devices in place: Yes  Type of devices: All fall risk precautions in place; Left in chair;Call light within reach;Nurse notified; Chair alarm in place;Gait belt;Patient at risk for falls         Patient Diagnosis(es): The primary encounter diagnosis was Partial small bowel obstruction ([address] Diagnoses of Ventral hernia with bowel obstruction and Chronic kidney disease, unspecified CKD stage were also pertinent to this visit. has a past medical history of Arthritis, Caffeine use, Cancer ([address] GERD (gastroesophageal reflux disease), Hernia, inguinal, right, Hypertension, Kidney stone, Presence of urostomy ([address] and Retinal detachment. has a past surgical history that includes hernia repair; Bladder removal (); Prostate surgery (); Cataract removal (Bilateral); Colonoscopy; Eye surgery (Right, ); vitrectomy (Left, 16); Incisional hernia repair (2015); Incisional hernia repair (2014); laparoscopy (N/A, 2021); Cystoscopy (N/A, 5/3/2021);  IR GUIDED NEPHROSTOMY CATH PLACEMENT LEFT (5/3/2021); and IR GUIDED NEPHROSTOMY CATH PLACEMENT RIGHT (5/5/2021). Restrictions  Restrictions/Precautions  Restrictions/Precautions: Fall Risk, Up as Tolerated, General Precautions  Required Braces or Orthoses?: No  Position Activity Restriction  Other position/activity restrictions: B nephrostomy tubes, MARIE drain, Abdominal inscision, IV, telemetry, cont SPO2 monitor  Subjective   General  Chart Reviewed: Yes  Patient assessed for rehabilitation services?: Yes  Response to previous treatment: Patient with no complaints from previous session  Family / Caregiver Present: No  Subjective  Subjective: Patient confused this date and inconsistantly following commands  General Comment  Comments: Pt confused and stated he \"pooped out in the yard twice last night\" and does not realize he is in a hospital. When asked where are you pt responed \"[place]. \"      Orientation  Orientation  Overall Orientation Status: Impaired  Orientation Level: Oriented to person;Oriented to time;Disoriented to situation;Disoriented to place  Objective             Balance  Sitting Balance: Contact guard assistance  Standing Balance: Maximum assistance(Mod-Max A x2)  Standing Balance  Time: less than 1 min  Activity: static standing/transfer  Comment: Mod-Max A x2 for safety  Bed mobility  Rolling to Right: Maximum assistance  Supine to Sit: Maximum assistance;2 Person assistance  Comment: Max verbal/tactile/hand over hand cues to reach for bed rail, log roll technique and overall safety with poor follow thru. Pt has no awareness of drains, ostomy or abd incision. Transfers  Stand Step Transfers: Maximum assistance;2 Person assistance; Moderate assistance  Sit to stand:  Moderate assistance;Maximum assistance;2 Person assistance  Stand to sit: Moderate assistance;Maximum assistance;2 Person assistance  Transfer Comments: Max verbal and hand over hand cues for hand placement, sequencing movements and overall safety/technique with poor follow thru. Cognition  Overall Cognitive Status: Exceptions  Arousal/Alertness: Appropriate responses to stimuli;Delayed responses to stimuli;Inconsistent responses to stimuli  Following Commands: Follows one step commands with increased time; Follows one step commands with repetition; Inconsistently follows commands; Does not follow commands  Attention Span: Attends with cues to redirect; Difficulty attending to directions; Difficulty dividing attention  Memory: Decreased recall of biographical Information;Decreased recall of precautions;Decreased recall of recent events;Decreased short term memory  Safety Judgement: Decreased awareness of need for assistance;Decreased awareness of need for safety  Problem Solving: Decreased awareness of errors;Assistance required to generate solutions;Assistance required to identify errors made;Assistance required to implement solutions;Assistance required to correct errors made  Insights: Not aware of deficits  Initiation: Requires cues for all  Sequencing: Requires cues for all  Cognition Comment: Pt having difficulty processing/following commands.      Perception  Overall Perceptual Status: Impaired  Initiation: Hand over hand to initiate tasks  Motor Planning: Hand over hand to sequence tasks  Perseveration: Perseverates during conversation                                   Plan   Plan  Times per week: 4-5x/week, 1-2x/day  Times per day: Daily  Current Treatment Recommendations: Strengthening, Balance Training, Functional Mobility Training, Endurance Training, Safety Education & Training, Patient/Caregiver Education & Training, Self-Care / ADL, Positioning, Equipment Evaluation, Education, & procurement, Cognitive Reorientation, Cognitive/Perceptual Training                                               AM-PAC Score        AM-[facility] Inpatient Daily Activity Raw Score: 11 (05/14/21 1257)  AM-PAC Inpatient ADL T-Scale Score : 29.04 (05/14/21 1257)  ADL Inpatient CMS 0-100% Score: 70.42 (05/14/21 1257)  ADL Inpatient CMS G-Code Modifier : CL (05/14/21 1257)    Goals  Short term goals  Time Frame for Short term goals: by discharge, pt will  Short term goal 1: demo ADL transfers and functional mobility to CGA with use of AD as needed  Short term goal 2: demo mod A x 1 for bed mobility with rails/controls  Short term goal 3: demo min A with simple grooming tasks following set up with min cues for initiation/sequecing  Short term goal 4: demo CGA with sitting balance at EOB x 15 min for inc. particiopation in ADL tasks  Short term goal 5: demo min A with UB ADLs at seated level and asssess LB when pt is able to stand  Patient Goals   Patient goals : not stated       Therapy Time   Individual Concurrent Group Co-treatment   Time In       1109   Time Out       1150   Minutes       39       Writer spoke with both RN and rep from [address] [facility] [address] [facility] regarding pt's level of function and confusion.     LI Lawson

## 2021-05-14 NOTE — PROGRESS NOTES
Physical Therapy  Facility/Department: [facility] PROGRESSIVE CARE  Daily Treatment Note  NAME: Tomas Fuller  : 1940  MRN: [id number]    Date of Service: 2021    Discharge Recommendations:  Patient would benefit from continued therapy after discharge        Assessment   Body structures, Functions, Activity limitations: Decreased functional mobility ; Decreased cognition;Decreased endurance;Decreased ADL status; Decreased coordination;Decreased strength;Decreased balance;Decreased safe awareness  Assessment: pt progressing toward goals limited by fatigue   Activity Tolerance  Activity Tolerance: Patient limited by cognitive status; Patient Tolerated treatment well     Patient Diagnosis(es): The primary encounter diagnosis was Partial small bowel obstruction ([address] Diagnoses of Ventral hernia with bowel obstruction and Chronic kidney disease, unspecified CKD stage were also pertinent to this visit. has a past medical history of Arthritis, Caffeine use, Cancer ([address] GERD (gastroesophageal reflux disease), Hernia, inguinal, right, Hypertension, Kidney stone, Presence of urostomy ([address] and Retinal detachment. has a past surgical history that includes hernia repair; Bladder removal (); Prostate surgery (); Cataract removal (Bilateral); Colonoscopy; Eye surgery (Right, ); vitrectomy (Left, 16); Incisional hernia repair (2015); Incisional hernia repair (2014); laparoscopy (N/A, 2021); Cystoscopy (N/A, 5/3/2021); IR GUIDED NEPHROSTOMY CATH PLACEMENT LEFT (5/3/2021); and IR GUIDED NEPHROSTOMY CATH PLACEMENT RIGHT (2021).     Restrictions  Restrictions/Precautions  Restrictions/Precautions: Fall Risk, Up as Tolerated, General Precautions  Required Braces or Orthoses?: No  Position Activity Restriction  Other position/activity restrictions: B nephrostomy tubes, MARIE drain, Abdominal inscision, IV, telemetry, cont SPO2 monitor  Subjective   General  Chart Reviewed: mobility  transfers and functional LE strength. Upon writer exit, call light within reach, pt retired to bed. All lines intact and patient positioned comfortably. All patient needs addressed prior to ending therapy session. Chart reviewed prior to treatment and patient is agreeable for therapy. RN reports patient is medically stable for therapy treatment this date.        VINICIO WILCOX, PTA

## 2021-05-17 ENCOUNTER — HOSPITAL ENCOUNTER (EMERGENCY)
Age: 81
Discharge: HOME | End: 2021-05-17
Attending: EMERGENCY MEDICINE
Payer: MEDICARE

## 2021-05-17 ENCOUNTER — APPOINTMENT (OUTPATIENT)
Dept: INTERVENTIONAL RADIOLOGY/VASCULAR | Age: 81
End: 2021-05-17
Payer: MEDICARE

## 2021-05-17 VITALS
OXYGEN SATURATION: 94 % | SYSTOLIC BLOOD PRESSURE: 116 MMHG | TEMPERATURE: 97.7 F | DIASTOLIC BLOOD PRESSURE: 70 MMHG | WEIGHT: 233 LBS | HEIGHT: 73 IN | RESPIRATION RATE: 16 BRPM | BODY MASS INDEX: 30.88 KG/M2 | HEART RATE: 60 BPM

## 2021-05-17 DIAGNOSIS — T83.022A NEPHROSTOMY TUBE DISPLACED (HCC): Primary | ICD-10-CM

## 2021-05-17 LAB
-: ABNORMAL
ABSOLUTE EOS #: 0.25 K/UL (ref 0–0.44)
ABSOLUTE IMMATURE GRANULOCYTE: 0.03 K/UL (ref 0–0.3)
ABSOLUTE LYMPH #: 1.11 K/UL (ref 1.1–3.7)
ABSOLUTE MONO #: 0.59 K/UL (ref 0.1–1.2)
AMORPHOUS: ABNORMAL
ANION GAP SERPL CALCULATED.3IONS-SCNC: 16 MMOL/L (ref 9–17)
BACTERIA: ABNORMAL
BASOPHILS # BLD: 1 % (ref 0–2)
BASOPHILS ABSOLUTE: 0.04 K/UL (ref 0–0.2)
BILIRUBIN URINE: NEGATIVE
BUN BLDV-MCNC: 81 MG/DL (ref 8–23)
BUN/CREAT BLD: 23 (ref 9–20)
CALCIUM SERPL-MCNC: 8.8 MG/DL (ref 8.6–10.4)
CASTS UA: ABNORMAL /LPF
CHLORIDE BLD-SCNC: 107 MMOL/L (ref 98–107)
CO2: 22 MMOL/L (ref 20–31)
COLOR: YELLOW
COMMENT UA: ABNORMAL
CREAT SERPL-MCNC: 3.53 MG/DL (ref 0.7–1.2)
CRYSTALS, UA: ABNORMAL /HPF
DIFFERENTIAL TYPE: ABNORMAL
EOSINOPHILS RELATIVE PERCENT: 3 % (ref 1–4)
EPITHELIAL CELLS UA: ABNORMAL /HPF (ref 0–5)
GFR AFRICAN AMERICAN: 20 ML/MIN
GFR NON-AFRICAN AMERICAN: 17 ML/MIN
GFR SERPL CREATININE-BSD FRML MDRD: ABNORMAL ML/MIN/{1.73_M2}
GFR SERPL CREATININE-BSD FRML MDRD: ABNORMAL ML/MIN/{1.73_M2}
GLUCOSE BLD-MCNC: 130 MG/DL (ref 70–99)
GLUCOSE URINE: NEGATIVE
HCT VFR BLD CALC: 38.7 % (ref 40.7–50.3)
HEMOGLOBIN: 11.4 G/DL (ref 13–17)
IMMATURE GRANULOCYTES: 0 %
KETONES, URINE: NEGATIVE
LEUKOCYTE ESTERASE, URINE: ABNORMAL
LYMPHOCYTES # BLD: 15 % (ref 24–43)
MCH RBC QN AUTO: 27.3 PG (ref 25.2–33.5)
MCHC RBC AUTO-ENTMCNC: 29.5 G/DL (ref 28.4–34.8)
MCV RBC AUTO: 92.8 FL (ref 82.6–102.9)
MONOCYTES # BLD: 8 % (ref 3–12)
MUCUS: ABNORMAL
NITRITE, URINE: NEGATIVE
NRBC AUTOMATED: 0 PER 100 WBC
OTHER OBSERVATIONS UA: ABNORMAL
PDW BLD-RTO: 16.9 % (ref 11.8–14.4)
PH UA: 6.5 (ref 5–8)
PLATELET # BLD: 341 K/UL (ref 138–453)
PLATELET ESTIMATE: ABNORMAL
PMV BLD AUTO: 9.9 FL (ref 8.1–13.5)
POTASSIUM SERPL-SCNC: 4.6 MMOL/L (ref 3.7–5.3)
PROTEIN UA: ABNORMAL
RBC # BLD: 4.17 M/UL (ref 4.21–5.77)
RBC # BLD: ABNORMAL 10*6/UL
RBC UA: ABNORMAL /HPF (ref 0–2)
RENAL EPITHELIAL, UA: ABNORMAL /HPF
SEG NEUTROPHILS: 73 % (ref 36–65)
SEGMENTED NEUTROPHILS ABSOLUTE COUNT: 5.23 K/UL (ref 1.5–8.1)
SODIUM BLD-SCNC: 145 MMOL/L (ref 135–144)
SPECIFIC GRAVITY UA: 1.02 (ref 1–1.03)
TRICHOMONAS: ABNORMAL
TURBIDITY: ABNORMAL
URINE HGB: ABNORMAL
UROBILINOGEN, URINE: NORMAL
WBC # BLD: 7.3 K/UL (ref 3.5–11.3)
WBC # BLD: ABNORMAL 10*3/UL
WBC UA: ABNORMAL /HPF (ref 0–5)
YEAST: ABNORMAL

## 2021-05-17 PROCEDURE — 99284 EMERGENCY DEPT VISIT MOD MDM: CPT

## 2021-05-17 PROCEDURE — 81001 URINALYSIS AUTO W/SCOPE: CPT

## 2021-05-17 PROCEDURE — 50432 PLMT NEPHROSTOMY CATHETER: CPT

## 2021-05-17 PROCEDURE — 6360000004 HC RX CONTRAST MEDICATION: Performed by: RADIOLOGY

## 2021-05-17 PROCEDURE — 87186 SC STD MICRODIL/AGAR DIL: CPT

## 2021-05-17 PROCEDURE — 87086 URINE CULTURE/COLONY COUNT: CPT

## 2021-05-17 PROCEDURE — 2580000003 HC RX 258: Performed by: EMERGENCY MEDICINE

## 2021-05-17 PROCEDURE — 36415 COLL VENOUS BLD VENIPUNCTURE: CPT

## 2021-05-17 PROCEDURE — 80048 BASIC METABOLIC PNL TOTAL CA: CPT

## 2021-05-17 PROCEDURE — 85025 COMPLETE CBC W/AUTO DIFF WBC: CPT

## 2021-05-17 PROCEDURE — C1729 CATH, DRAINAGE: HCPCS

## 2021-05-17 PROCEDURE — 87077 CULTURE AEROBIC IDENTIFY: CPT

## 2021-05-17 PROCEDURE — C1769 GUIDE WIRE: HCPCS

## 2021-05-17 RX ORDER — SODIUM CHLORIDE 9 MG/ML
INJECTION, SOLUTION INTRAVENOUS CONTINUOUS
Status: DISCONTINUED | OUTPATIENT
Start: 2021-05-17 | End: 2021-05-17 | Stop reason: HOSPADM

## 2021-05-17 RX ADMIN — IOPAMIDOL 50 ML: 612 INJECTION, SOLUTION INTRAVENOUS at 09:08

## 2021-05-17 RX ADMIN — SODIUM CHLORIDE: 9 INJECTION, SOLUTION INTRAVENOUS at 06:45

## 2021-05-17 ASSESSMENT — ENCOUNTER SYMPTOMS
TROUBLE SWALLOWING: 0
SHORTNESS OF BREATH: 0
ABDOMINAL PAIN: 0
VOMITING: 0
EYE REDNESS: 0

## 2021-05-17 NOTE — OP NOTE
Brief Postoperative Note    Juan David Zhao  YOB: 1940  [id number]    Pre-operative Diagnosis: Nephrolithiasis, left PCN tube fell out    Post-operative Diagnosis: Same    Procedure: Placement of a new PCN tube    Anesthesia: Local    Surgeons/Assistants: Tai Chu MD    Estimated Blood Loss: less than 50     Complications: None    Specimens: Was Not Obtained    Findings: Tried to regain access from original PCN tube site, though could not identify distal connection between tract and collecting system. Had to do a new access. Placed a 10fr cath from a mid renal calyx access. Tube placed in the renal pelvis.     Electronically signed by Janeth Hanks MD on 5/17/2021 at 9:37 AM

## 2021-05-17 NOTE — ED NOTES
Bed: 14  Expected date:   Expected time:   Means of arrival:   Comments:  Linn Huerta      [employer]  05/17/21 6839

## 2021-05-17 NOTE — ED NOTES
Picked up by [facility] wheelchair transport. Report called to [employer].       Nikki Ortiz RN  05/17/21 2998

## 2021-05-17 NOTE — ED PROVIDER NOTES
EMERGENCY DEPARTMENT ENCOUNTER    Pt Name: Eben Tomlinson  MRN: [id number]  [place] 1940  Date of evaluation: 5/17/21  CHIEF COMPLAINT       Chief Complaint   Patient presents with    Other     left nephrostomy tube dislodged     HISTORY OF PRESENT ILLNESS   Patient is an 61-year-old male here with concern for dislodged nephrostomy tube. Patient with history of renal cancer bladder cancer status post removal with creation of ileal conduit and recent ventral hernia with bowel obstruction status post surgery as well as bilateral nephrostomy placement by IR. He was brought in by EMS from [facility]. They are unsure how the tube was dislodged. They report that the patient has some baseline altered mental status. He is reportedly a DNR CC but no paperwork was provided. Patient states he is feeling well. He is asking for water. He denies any chest pain shortness of breath fevers or chills or abdominal pain or vomiting. Denies any headache blurry vision. REVIEW OF SYSTEMS     Review of Systems   Constitutional: Negative for fever. HENT: Negative for trouble swallowing. Eyes: Negative for redness. Respiratory: Negative for shortness of breath. Cardiovascular: Negative for chest pain. Gastrointestinal: Negative for abdominal pain and vomiting. Genitourinary: Negative for difficulty urinating. Musculoskeletal: Negative for neck stiffness. Skin: Negative for rash. Neurological: Negative for seizures. Psychiatric/Behavioral: Negative for confusion.      PASTMEDICAL HISTORY     Past Medical History:   Diagnosis Date    Arthritis     Caffeine use     2 coffee, 2 cans soda/day    Cancer [facility]) 2006    bladder et prostate, 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    GERD (gastroesophageal reflux disease)     Hernia, inguinal, right     Hypertension     Kidney stone     Presence of urostomy ([facility] [address]     Retinal detachment      SURGICAL HISTORY       Past Surgical History:   Procedure Laterality Date  BLADDER REMOVAL  2006    cancer, s/p urostomy     CATARACT REMOVAL Bilateral     COLONOSCOPY      CYSTOSCOPY N/A 5/3/2021    CYSTOSCOPY performed by Ailin Piña MD at [facility] Right 2000    macular hole   [address]      x2    INCISIONAL HERNIA REPAIR  2015    parastomal hernia repair - Dr. Candida Maloney  2014    IR NEPHROSTOMY PERCUTANEOUS LEFT  5/3/2021    IR NEPHROSTOMY PERCUTANEOUS LEFT 5/3/2021 [facility]    IR NEPHROSTOMY PERCUTANEOUS RIGHT  2021    IR NEPHROSTOMY PERCUTANEOUS RIGHT 2021 Justin Yadav MD [facility]    LAPAROSCOPY N/A 2021    EXPLORATORY LAPAROTOMY, EXTENSIVE LYSIS OF ADHESIONS, SMALL BOWEL RESECTION, REDUCTION OF STRANGULATED HERNIA performed by Camacho Lal DO at [address] [place] [address]  2006    removal    VITRECTOMY Left 16     CURRENT MEDICATIONS       Previous Medications    ACETAMINOPHEN (TYLENOL) 500 MG TABLET    Take 500-1,000 mg by mouth every 4 hours as needed for Pain     ALBUTEROL SULFATE (PROAIR RESPICLICK) 727 (90 BASE) MCG/ACT AEROSOL POWDER INHALATION    Inhale 2 puffs into the lungs every 4 hours as needed for Wheezing or Shortness of Breath    DOCUSATE SODIUM (COLACE, DULCOLAX) 100 MG CAPS    Take 100 mg by mouth 2 times daily    INSULIN GLARGINE (LANTUS) 100 UNIT/ML INJECTION VIAL    Inject 10 Units into the skin daily    MELATONIN 3 MG TABS TABLET    Take 1 tablet by mouth nightly    POLYETHYLENE GLYCOL (GLYCOLAX) 17 G PACKET    Take 17 g by mouth daily     ALLERGIES     is allergic to nsaids and tetracyclines & related. FAMILY HISTORY     He indicated that his mother is . He indicated that only one of his two sisters is alive.      SOCIAL HISTORY       Social History     Tobacco Use    Smoking status: Former Smoker     Packs/day: 0.50     Years: 20.00     Pack years: 10.00     Types: Cigarettes     Quit date: 1979     Years since quittin.9    Smokeless tobacco: Never Used    Tobacco comment: quit in    Substance Use Topics    Alcohol use: No     Alcohol/week: 0.0 standard drinks    Drug use: No     PHYSICAL EXAM     INITIAL VITALS: /69   Pulse 63   Temp 97.7 °F (36.5 °C) (Oral)   Resp 16   Ht 6' 1\" (1.854 m)   Wt 233 lb (105.7 kg)   SpO2 98%   BMI 30.74 kg/m²    Physical Exam  Vitals and nursing note reviewed. Constitutional:       General: He is not in acute distress. Appearance: He is not ill-appearing, toxic-appearing or diaphoretic. HENT:      Head: Normocephalic and atraumatic. Eyes:      Extraocular Movements: Extraocular movements intact. Pupils: Pupils are equal, round, and reactive to light. Cardiovascular:      Rate and Rhythm: Normal rate and regular rhythm. Pulmonary:      Effort: Pulmonary effort is normal. No respiratory distress. Abdominal:      General: There is no distension. Palpations: Abdomen is soft. Tenderness: There is no abdominal tenderness. Comments: Urostomy site pink moist with urine output present no surrounding erythema or warmth. Vertical surgical incision with staples present there is no erythema or drainage   Genitourinary:     Comments: Right nephrostomy tube appears to be in place no drainage around the site of insertion, there is urine present in the bag. Left nephrostomy tube appears to be significantly out of place there is no urine output in the bag  Musculoskeletal:         General: No swelling. Normal range of motion. Cervical back: Normal range of motion and neck supple. No rigidity. Skin:     General: Skin is warm and dry. Capillary Refill: Capillary refill takes less than 2 seconds. Coloration: Skin is not jaundiced. Neurological:      General: No focal deficit present. Mental Status: He is alert. MEDICAL DECISION MAKING:          Please see ED Course below for MDM/ED course.     DDx: Alva Paul nephrostomy tube    All patient's question's and concerns were answered prior to disposition and patient and/or family expressed understanding and agreement of treatment plan. NIH STROKE SCALE:            PROCEDURES:    Procedures    DIAGNOSTIC RESULTS   EKG:All EKG's are interpreted by the Emergency Department Physician who either signs or Co-signs this chart in the absence of a cardiologist.    RADIOLOGY:All plain film, CT, MRI, and formal ultrasound images (except ED bedside ultrasound) are read by the radiologist, see reports below, unless otherwisenoted in MDM or here. No orders to display     LABS: All lab results were reviewed by myself, and all abnormals are listed below. Labs Reviewed   CBC WITH AUTO DIFFERENTIAL - Abnormal; Notable for the following components:       Result Value    RBC 4.17 (*)     Hemoglobin 11.4 (*)     Hematocrit 38.7 (*)     RDW 16.9 (*)     Seg Neutrophils 73 (*)     Lymphocytes 15 (*)     All other components within normal limits   BASIC METABOLIC PANEL - Abnormal; Notable for the following components:    Glucose 130 (*)     BUN 81 (*)     CREATININE 3.53 (*)     Bun/Cre Ratio 23 (*)     Sodium 145 (*)     GFR Non- 17 (*)     GFR  20 (*)     All other components within normal limits   URINE RT REFLEX TO CULTURE       EMERGENCY DEPARTMENTCOURSE:     Patient is an 59-year-old male here with concern for dislodged nephrostomy tube. Will get basic labs and a urine and an abdominal x-ray. Plan to speak with IR. Vitals:    Vitals:    05/17/21 0547   BP: 100/69   Pulse: 63   Resp: 16   Temp: 97.7 °F (36.5 °C)   TempSrc: Oral   SpO2: 98%   Weight: 233 lb (105.7 kg)   Height: 6' 1\" (1.854 m)       The patient was given the following medications while in the emergency department:  Orders Placed This Encounter   Medications    0.9 % sodium chloride infusion     CONSULTS:  None    FINAL IMPRESSION      1.  Nephrostomy tube displaced ([address]

## 2021-05-18 LAB
CULTURE: ABNORMAL
CULTURE: ABNORMAL
Lab: ABNORMAL
SPECIMEN DESCRIPTION: ABNORMAL

## 2021-05-27 ENCOUNTER — HOSPITAL ENCOUNTER (OUTPATIENT)
Dept: INTERVENTIONAL RADIOLOGY/VASCULAR | Age: 81
Discharge: HOME | End: 2021-05-29
Payer: MEDICARE

## 2021-05-27 VITALS
HEIGHT: 74 IN | OXYGEN SATURATION: 96 % | WEIGHT: 207 LBS | BODY MASS INDEX: 26.56 KG/M2 | HEART RATE: 56 BPM | DIASTOLIC BLOOD PRESSURE: 84 MMHG | TEMPERATURE: 97 F | SYSTOLIC BLOOD PRESSURE: 104 MMHG | RESPIRATION RATE: 17 BRPM

## 2021-05-27 DIAGNOSIS — N18.6 ESRD (END STAGE RENAL DISEASE) (HCC): ICD-10-CM

## 2021-05-27 LAB
GLUCOSE BLD-MCNC: 87 MG/DL (ref 75–110)
INR BLD: 1
PARTIAL THROMBOPLASTIN TIME: 29.6 SEC (ref 23.9–33.8)
PLATELET # BLD: 245 K/UL (ref 138–453)
PROTHROMBIN TIME: 13.3 SEC (ref 11.5–14.2)

## 2021-05-27 PROCEDURE — 99152 MOD SED SAME PHYS/QHP 5/>YRS: CPT | Performed by: RADIOLOGY

## 2021-05-27 PROCEDURE — 53899 UNLISTED PX URINARY SYSTEM: CPT | Performed by: RADIOLOGY

## 2021-05-27 PROCEDURE — 99153 MOD SED SAME PHYS/QHP EA: CPT | Performed by: RADIOLOGY

## 2021-05-27 PROCEDURE — 85049 AUTOMATED PLATELET COUNT: CPT

## 2021-05-27 PROCEDURE — C2617 STENT, NON-COR, TEM W/O DEL: HCPCS

## 2021-05-27 PROCEDURE — 6360000004 HC RX CONTRAST MEDICATION: Performed by: RADIOLOGY

## 2021-05-27 PROCEDURE — 2580000003 HC RX 258: Performed by: RADIOLOGY

## 2021-05-27 PROCEDURE — 6360000002 HC RX W HCPCS: Performed by: RADIOLOGY

## 2021-05-27 PROCEDURE — 85730 THROMBOPLASTIN TIME PARTIAL: CPT

## 2021-05-27 PROCEDURE — 7100000011 HC PHASE II RECOVERY - ADDTL 15 MIN

## 2021-05-27 PROCEDURE — 85610 PROTHROMBIN TIME: CPT

## 2021-05-27 PROCEDURE — 82947 ASSAY GLUCOSE BLOOD QUANT: CPT

## 2021-05-27 PROCEDURE — 7100000010 HC PHASE II RECOVERY - FIRST 15 MIN

## 2021-05-27 RX ORDER — ACETAMINOPHEN 325 MG/1
650 TABLET ORAL EVERY 4 HOURS PRN
Status: DISCONTINUED | OUTPATIENT
Start: 2021-05-27 | End: 2021-05-30 | Stop reason: HOSPADM

## 2021-05-27 RX ORDER — CIPROFLOXACIN 2 MG/ML
400 INJECTION, SOLUTION INTRAVENOUS
Status: CANCELLED | OUTPATIENT
Start: 2021-05-27 | End: 2021-05-27

## 2021-05-27 RX ORDER — FENTANYL CITRATE 50 UG/ML
INJECTION, SOLUTION INTRAMUSCULAR; INTRAVENOUS
Status: COMPLETED | OUTPATIENT
Start: 2021-05-27 | End: 2021-05-27

## 2021-05-27 RX ORDER — SODIUM CHLORIDE 9 MG/ML
INJECTION, SOLUTION INTRAVENOUS CONTINUOUS
Status: CANCELLED | OUTPATIENT
Start: 2021-05-27

## 2021-05-27 RX ORDER — MIDAZOLAM HYDROCHLORIDE 1 MG/ML
INJECTION INTRAMUSCULAR; INTRAVENOUS
Status: COMPLETED | OUTPATIENT
Start: 2021-05-27 | End: 2021-05-27

## 2021-05-27 RX ORDER — HYDROCODONE BITARTRATE AND ACETAMINOPHEN 5; 325 MG/1; MG/1
1 TABLET ORAL EVERY 8 HOURS PRN
Status: ON HOLD | COMMUNITY
End: 2021-06-12 | Stop reason: HOSPADM

## 2021-05-27 RX ORDER — SODIUM CHLORIDE 9 MG/ML
INJECTION, SOLUTION INTRAVENOUS CONTINUOUS
Status: DISCONTINUED | OUTPATIENT
Start: 2021-05-27 | End: 2021-05-27 | Stop reason: SDUPTHER

## 2021-05-27 RX ORDER — CIPROFLOXACIN 2 MG/ML
400 INJECTION, SOLUTION INTRAVENOUS
Status: COMPLETED | OUTPATIENT
Start: 2021-05-27 | End: 2021-05-27

## 2021-05-27 RX ORDER — SODIUM CHLORIDE 9 MG/ML
INJECTION, SOLUTION INTRAVENOUS CONTINUOUS
Status: DISCONTINUED | OUTPATIENT
Start: 2021-05-27 | End: 2021-05-30 | Stop reason: HOSPADM

## 2021-05-27 RX ADMIN — MIDAZOLAM 1 MG: 1 INJECTION INTRAMUSCULAR; INTRAVENOUS at 15:54

## 2021-05-27 RX ADMIN — IOPAMIDOL 50 ML: 612 INJECTION, SOLUTION INTRAVENOUS at 14:23

## 2021-05-27 RX ADMIN — CIPROFLOXACIN 400 MG: 2 INJECTION, SOLUTION INTRAVENOUS at 14:11

## 2021-05-27 RX ADMIN — MIDAZOLAM 0.5 MG: 1 INJECTION INTRAMUSCULAR; INTRAVENOUS at 15:20

## 2021-05-27 RX ADMIN — Medication 50 MCG: at 15:54

## 2021-05-27 RX ADMIN — MIDAZOLAM 0.5 MG: 1 INJECTION INTRAMUSCULAR; INTRAVENOUS at 15:07

## 2021-05-27 RX ADMIN — SODIUM CHLORIDE: 9 INJECTION, SOLUTION INTRAVENOUS at 12:52

## 2021-05-27 RX ADMIN — Medication 50 MCG: at 15:10

## 2021-05-27 ASSESSMENT — PAIN - FUNCTIONAL ASSESSMENT: PAIN_FUNCTIONAL_ASSESSMENT: PREVENTS OR INTERFERES WITH MANY ACTIVE NOT PASSIVE ACTIVITIES

## 2021-05-27 NOTE — POST SEDATION
Sedation Post Procedure Note    Patient Name: Janeth Pedroza   YOB: 1940  Room/Bed: Room/bed info not found  Medical Record Number: [id number]  Date: 5/27/2021   Time: 5:11 PM         Physicians/Assistants: Kuldeep Pfeiffer MD, MD    Procedure Performed:  Conversion to a 10 fr right nephroureteral stent; attempted left nephrostomy tube palcement    Post-Sedation Vital Signs:  Vitals:    05/27/21 1700   BP: 133/76   Pulse: 58   Resp: 16   Temp:    SpO2: 98%      Vital signs were reviewed and were stable after the procedure (see flow sheet for vitals)            Complications: none    Electronically signed by Kuldeep Pfeiffer MD on 5/27/2021 at 5:11 PM

## 2021-05-27 NOTE — H&P
Interval History and Physical    Pt Name: Grace Stephen  MRN: [id number]  YOB: 1940  Date of evaluation: 5/27/2021      [x] I have reviewed the progress note found in Epic by Krishna Lewis NP dated 05/14/2021 used for an Interval History and Physical note. [x] I have examined  Grace Stephen a 80 y.o., male who is scheduled for replacement of left nephrostomy tube in  by Dr. Khloe Santiago. The patient denies health changes since he was evaluated by Krishna Lewis NP on 05/14/2021. Pt states his left nephrostomy tube was accidentally pulled out 2-3 days ago when he stood up from a chair. Pt denies fever, chills, productive cough, SOB, chest pain, open sores, rashes, and wounds. Pt denies history of diabetes. Lantus was last taken on 05/26/2021. Pt denies hypoglycemic symptoms at this time. Pt denies taking blood thinning medications in the past 10 days. Vital signs: /74   Pulse 56   Temp 96.9 °F (36.1 °C) (Temporal)   Resp 16   Ht 6' 2\" (1.88 m)   Wt 207 lb (93.9 kg)   SpO2 99%   BMI 26.58 kg/m²      Allergies:  Nsaids and Tetracyclines & related    Past medical history, surgical history, social history, and family history were reviewed and updated in EPIC as indicated. Medications:    Prior to Admission medications    Medication Sig Start Date End Date Taking? Authorizing Provider   HYDROcodone-acetaminophen (NORCO) 5-325 MG per tablet Take 1 tablet by mouth every 8 hours as needed for Pain.    Yes Historical Provider, MD   insulin glargine (LANTUS) 100 UNIT/ML injection vial Inject 10 Units into the skin daily 5/14/21  Yes MARKIE Adams - NP   docusate sodium (COLACE, DULCOLAX) 100 MG CAPS Take 100 mg by mouth 2 times daily 5/14/21 6/13/21 Yes MARKIE Adams - NP   polyethylene glycol (GLYCOLAX) 17 g packet Take 17 g by mouth daily 5/15/21 6/14/21 Yes MARKIE Adams NP   melatonin 3 MG TABS tablet Take 1 tablet by mouth nightly 5/14/21  Yes MARKIE Adams NP   albuterol sulfate (PROAIR RESPICLICK) 428 (90 Base) MCG/ACT aerosol powder inhalation Inhale 2 puffs into the lungs every 4 hours as needed for Wheezing or Shortness of Breath 9/10/20   Stefania Jack MD   acetaminophen (TYLENOL) 500 MG tablet Take 500-1,000 mg by mouth every 4 hours as needed for Pain     Historical Provider, MD       This is a 80 y.o. male who is pleasant, cooperative, alert and oriented x 3, in no acute distress. Heart: Asymptomatic mild bradycardia. HR 56 BPM. Regular rhythm without murmur, gallop, or rub. Lungs: Normal respiratory effort, unlabored, and clear to auscultation without wheezes or rales bilaterally. Abdomen: Dry dressing on the mid-abdomen is clean, dry, and intact. Urostomy noted in the RLQ. Urine in the urostomy bag is yellow and clear. Soft, nontender, nondistended with active bowel sounds. Genitourinary: Right nephrostomy tube. Urine in the right nephrostomy bag is clear and yellow. Pedal pulses: are palpable bilaterally. Labs:  Recent Labs     05/27/21  1220 05/17/21  0625 05/03/21  1353 05/03/21  0352 05/02/21  1249   HGB  --  11.4*  --   --   --    HCT  --  38.7*  --   --   --    WBC  --  7.3  --   --   --    MCV  --  92.8  --   --   --     341   < >  --   --    NA  --  145*  --   --   --    K  --  4.6  --   --   --    CL  --  107  --   --   --    CO2  --  22  --   --   --    BUN  --  81*  --   --   --    CREATININE  --  3.53*  --   --   --    GLUCOSE  --  130*  --   --   --    INR 1.0  --   --    < >  --    PROTIME 13.3  --   --    < >  --    APTT 29.6  --   --    < >  --    AST  --   --   --   --  19   ALT  --   --   --   --  11   LABALBU  --   --   --   --  3.8    < > = values in this interval not displayed.        Recent Labs     04/30/21  1500   COVID19 Not Detected         Esme Sic, APRN - CNP  APRN, FNP-BC  Electronically signed 5/27/2021 at 12:48 PM      Krishna Lewis APRN - NP   Nurse Practitioner Internal Medicine   Progress Notes   Signed   Date of Service:  5/14/2021  8:59 AM               Signed        Expand AllCollapse All      Show:Clear all  [x]Manual[x]Template[x]Copied    Added by:  [x]MARKIE Adams NP    []Gerald for details  [place]  Office: [phone]  Licia Gosselin, DO, Lavonne Morales DO, Lebron Shane DO, Edin Bajwa DO, Enoch Strange MD, Eve Lazo MD, Yolis Ferraro MD, Mila Dumont MD, Lisa Kuo MD, Nickie Manuel MD, Raquel Lacy MD, Gabriela Ruiz MD, Kobi Giron DO, Inocencia Osborne MD, Latisha Braswell DO, Moris Sheppard MD,  Blu Phan DO, Ron Smith MD, Jeane Hightower MD, Brian Francois MD, Preeti Lopez MD, Hildegard Bumpers, [facility], [facility], CNP, Sacha Contreras, [facility], Madyson Quiroz, CNS, Serafin Sexton, CNP, Catherine Francois, CNP, Chitra Adrian, CNP, Alec Singer, CNP, Kareem Khalil, CNP, Kosta Fisher PA-C, Velma Hannah, [facility], Elvia Osorio, CNP, Quita Palomo, CNP, Mamta Anaya, CNP, Leopold Saras, CNP, Marsha Lazo, CNP, Vaughn Gallagher, [facility]     Progress Note     5/14/2021        10:30 AM     Name:             Janeth Pedroza  MRN:               [id number]                                    [place]:                [de-identified]   Room:             [address] [place] [address] Day:            15  Admit Date:     4/30/2021 12:15 PM                 PCP:                Yash Desir MD  Code Status:   DNR-CCA     Subjective:      C/C:        Chief Complaint   Patient presents with    Hernia       LOWER RIGHT ABDOMEN. recent increase in pain and growth      Interval History Status:  He is still having some intermittent confusion but reoriented easily. Vital signs stable.  No new issues or complaints   Brief History:      44-year-old male past medical history of left renal cell carcinoma status post ablation, bladder cancer status post cystectomy with creation of ileal conduit in 2006, prior small bowel obstruction and exploratory laparotomy, recurrent hernia presents with incarcerated parastomal hernia requiring ex lap with small bowel resection, reduction of strangulated hernia, lysis of adhesions performed on 4/30/2021. Patient also has ongoing problem of acute kidney injury and moderate urine output however worsening creatinine. .  Patient was taken for cystoscopy on 5/3/2021 which showed obstruction of ileal conduit due to prior suture. Patient underwent left PCN insertion on 5/3/2021. Right-sided nephrostomy tube placed 5/5/2021. Patient's mentation has slowly been improving and nephrostomy tubes have been draining well. Hypernatremia corrected with D5 and free water flushes     Review of Systems:   Patient alert to person and place, not time, but seems to be more understanding today     Review of Systems   Constitutional: Negative for activity change, chills and fatigue. HENT: Negative for congestion and sore throat. Eyes: Negative for photophobia and discharge. Respiratory: Negative for apnea and shortness of breath. Cardiovascular: Negative for chest pain and palpitations. Gastrointestinal: Negative for abdominal distention, abdominal pain and diarrhea. Endocrine: Negative for cold intolerance and polyuria. Genitourinary: Negative for decreased urine volume and difficulty urinating. Musculoskeletal: Negative for arthralgias and joint swelling. Allergic/Immunologic: Negative for environmental allergies and immunocompromised state. Neurological: Negative for dizziness, weakness and numbness. Psychiatric/Behavioral: Negative for agitation and hallucinations. The patient is not hyperactive. Medications: Allergies:           Allergies   Allergen Reactions    Nsaids Anaphylaxis       GI bleed    Tetracyclines & Related           Current Meds:   Scheduled Meds:   Scheduled Medications    docusate sodium  100 mg Oral BID  insulin lispro  0-12 Units Subcutaneous TID     insulin lispro  0-6 Units Subcutaneous Nightly    insulin glargine  10 Units Subcutaneous Daily    polyethylene glycol  17 g Oral Daily    acetaminophen  1,000 mg Oral 2 times per day    melatonin  3 mg Oral Nightly    heparin (porcine)  5,000 Units Subcutaneous 3 times per day    sodium chloride flush  5-40 mL Intravenous 2 times per day    lidocaine  2 patch Topical Q12H    famotidine (PEPCID) injection  10 mg Intravenous BID         Continuous Infusions:   Infusions Meds    dextrose      sodium chloride           PRN Meds:    PRN Medications   mineral oil, glucose, dextrose, glucagon (rDNA), dextrose, acetaminophen, albuterol sulfate HFA, sodium chloride flush, sodium chloride, ondansetron        Data:      Past Medical History:   has a past medical history of Arthritis, Caffeine use, Cancer ([facility] [address] GERD (gastroesophageal reflux disease), Hernia, inguinal, right, Hypertension, Kidney stone, Presence of urostomy ([facility] [address] and Retinal detachment. Social History:   reports that he quit smoking about 41 years ago. His smoking use included cigarettes. He has a 10.00 pack-year smoking history. He has never used smokeless tobacco. He reports that he does not drink alcohol or use drugs. Family History:   Family History         Family History   Problem Relation Age of Onset    Diabetes Mother      Diabetes Sister              Vitals:  BP (!) 148/71   Pulse 68   Temp 98 °F (36.7 °C) (Oral)   Resp 16   Ht 6' 1\" (1.854 m)   Wt 233 lb 11.2 oz (106 kg)   SpO2 92%   BMI 30.83 kg/m²   Temp (24hrs), Av °F (36.7 °C), Min:97.2 °F (36.2 °C), Max:98.4 °F (36.9 °C)            Recent Labs     21  1146 21  1631 21  1923 21  0720   POCGLU 207* 99 98 120*         I/O (24Hr):      Intake/Output Summary (Last 24 hours) at 2021 0859  Last data filed at 2021 0205      Gross per 24 hour   Intake --   Output 1520 ml   Net -1520 ml Labs:  Hematology:        Recent Labs     05/12/21  0433 05/13/21  0525 05/14/21  0510   WBC 9.9 8.9 9.4   RBC 3.78* 3.96* 4.11*   HGB 10.4* 10.8* 11.3*   HCT 34.9* 36.8* 37.9*   MCV 92.3 92.9 92.2   MCH 27.5 27.3 27.5   MCHC 29.8 29.3 29.8   RDW 16.1* 16.2* 16.4*    232 280   MPV 10.6 9.8 10.4      Chemistry:        Recent Labs     05/12/21  0433 05/13/21  0525 05/14/21  0510    144 148*   K 4.8 4.6 4.3    107 110*   CO2 25 25 25   GLUCOSE 143* 124* 135*   BUN 80* 75* 78*   CREATININE 2.62* 2.50* 2.78*   MG 2.8* 2.7* 2.8*   ANIONGAP 13 12 13   LABGLOM 24* 25* 22*   GFRAA 29* 30* 27*   CALCIUM 8.8 9.0 9.3   PHOS 4.1 4.6* 4.2               Recent Labs     05/13/21  0457 05/13/21  0734 05/13/21  1146 05/13/21  1631 05/13/21  1923 05/14/21  0720   POCGLU 123* 93 207* 99 98 120*      ABG:        Lab Results   Component Value Date     POCPH 7.19 05/02/2021     POCPCO2 44 05/02/2021     POCPO2 74 05/02/2021     POCHCO3 16.9 05/02/2021     NBEA 11 05/02/2021     PBEA NOT REPORTED 05/02/2021     TPX7MFU 18 05/02/2021     VIAX2VMD 90 05/02/2021     FIO2 32.0 05/02/2021            Lab Results   Component Value Date/Time     [facility] 05/02/2021 12:58 PM            Lab Results   Component Value Date/Time     CULTURE NO GROWTH 6 DAYS 05/02/2021 12:58 PM         Radiology:  Ct Abdomen Pelvis Wo Contrast Additional Contrast? None     Result Date: 4/30/2021  1. Partial small bowel obstruction with a transition point located at the mouth of the ventral hernia. 2. Extensive stone burden identified in the bilateral kidneys, left greater than right. 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 3. Enlargement of the right upper pole mass, likely representing renal cell carcinoma. 4. Enlarged left para-aortic lymph node measuring 27 x 20 mm.   Finding is and oriented to person, place and time  Lungs:  clear to auscultation bilaterally, normal effort  Heart:  regular rate and rhythm, no murmur  Abdomen:  soft, nontender, abdominal binder in place, well-healing incisions nephrostomy tubes both draining well. Bilateral nephrostomy tubes clear or draining compared to yesterday  Extremities:  no edema, redness, tenderness in the calves  Skin:  2 incisions, both held with stables, ostomy tube in place and appears to be draining. Left nephrostomy tube draining well. Assessment:                  Hospital Problems            Last Modified POA     * (Principal) Ventral hernia with bowel obstruction 5/1/2021 Yes     Toxic metabolic encephalopathy 0/4/6591 No     Other hydronephrosis 5/3/2021 Yes     Acute respiratory failure with hypoxia ([address] 3/6/5218 No     Metabolic acidosis 8/3/7087 No     Normochromic normocytic anemia 5/1/2021 Yes     Iron deficiency anemia 5/2/2021 Yes     Vitamin B 12 deficiency 5/3/2021 Yes     Folate deficiency 5/3/2021 Yes     Essential hypertension (Chronic) 5/1/2021 Yes     Renal mass (Chronic) 5/1/2021 Yes     KARMA (acute kidney injury) ([address] 5/1/2021 Yes             Plan:         1. Strangulated ventral hernia status post exploratory laparotomy small bowel resection on 4/30/2021. Appreciate general surgery recommendations. NG tube removed, Monitor mentation. Continue soft and bite-size food with honey thick liquids. [place]  for discharge from their standput   2. Acute kidney injury, hydronephrosis, nonoliguric, past history of renal cell carcinoma, bladder cell cancer, underwent cystoscopy on 5/3/2021, and left PCN on 5/3/2021. Status post right PCN 5/5/2021. [address] nephrology, urology, interventional radiology recommendations. Creatinine stable, continue to monitor intake and output with renal diet restrictions, ok for discharge from their standput    3. B12, iron, folate deficiency. Continue replacement  4.  Acute delirium secondary to anesthesia , renal failure, metabolic encephalopathy. Mentation slowly improving  5. Patient is DNR CCA. 6. Continue PT/OT, increase activity as tolerated  7. Suspected sleep apnea, 2L oxygen via nasal cannula overnight. 8. Glycemic control, continue insulin sliding scale  9. Continue colace BID and  Miralax  10. Await pre-CERT, Discharge to ECF when appropriate  11. Plan discussed with patient and staff        MARKIE BURRELL NP  5/14/2021  8:59 AM                · ED to Hosp-Admission (Discharged) on 4/30/2021  ·   ED to Hosp-Admission (Discharged) on 4/30/2021  ·   ·   Detailed Report  ·   · Note shared with patient  Progress Notes Info    Author Note Status Last Update User   MARKIE Barone NP Signed MARKIE Barone NP   Last Update Date/Time: 5/14/2021  2:56 PM   Chart Review Routing History    No routing history on file.

## 2021-05-27 NOTE — BRIEF OP NOTE
Brief Postoperative Note    Federico Kay  YOB: 1940  [id number]    Pre-operative Diagnosis: Obstructive uropathy; dislodged left nephrostomy tube X2 within the past month. Post-operative Diagnosis: Same    Procedure: Conversion to a 10 fr right nephroureteral stent; attempted left nephrostomy tube palcement    Anesthesia: Moderate Sedation    Surgeons/Assistants: Luiz    Estimated Blood Loss: less than 50     Complications: None    Specimens: Was Not Obtained    Findings: Antegrade left nephrostogram via lower access demonstrated prompt clearance of contrast into the ileal conduit. Due to large staghorn calculus and stone burden, could not negotiate the wire for new nephrostomy tube. The existing right nephrostomy also nearly out. This was converted to a 10 fr by 24 nephroureteral stent. Since there is no left ureteral obstruction the left collecting system will be drained through the contralateral side right nephroureteral stent. The above findings were d/w Dr. Karishma Turner who agreed with the plan and will f/u the patient in clinic the near future.     Electronically signed by Earline Rojas MD on 5/27/2021 at 5:11 PM

## 2021-05-27 NOTE — PRE SEDATION
Sedation Pre-Procedure Note    Patient Name: Renea Velasco   YOB: 1940  Room/Bed: Room/bed info not found  Medical Record Number: [id number]  Date: 5/27/2021   Time: 5:10 PM       Indication:  Obstructive uropathy reported to be at the ileal conduit    Consent: I have discussed with the patient and/or the patient representative the indication, alternatives, and the possible risks and/or complications of the planned procedure and the anesthesia methods. The patient and/or patient representative appear to understand and agree to proceed. Vital Signs:   Vitals:    05/27/21 1700   BP: 133/76   Pulse: 58   Resp: 16   Temp:    SpO2: 98%       Past Medical History:   has a past medical history of Arthritis, Caffeine use, Cancer ([address] Cancer of kidney, unspecified laterality ([address] GERD (gastroesophageal reflux disease), Hernia, inguinal, right, Hypertension, Kidney stone, Presence of urostomy ([address] and Retinal detachment. Past Surgical History:   has a past surgical history that includes hernia repair; Bladder removal (2006); Prostate surgery (2006); Cataract removal (Bilateral); Colonoscopy; Eye surgery (Right, 2000); vitrectomy (Left, 02/02/16); Incisional hernia repair (12/7/2015); Incisional hernia repair (11/2014); laparoscopy (N/A, 4/30/2021); Cystoscopy (N/A, 5/3/2021); IR GUIDED NEPHROSTOMY CATH PLACEMENT LEFT (5/3/2021); IR GUIDED NEPHROSTOMY CATH PLACEMENT RIGHT (5/5/2021); eye surgery; and knee surgery (Left). Medications:   Scheduled Meds:   Continuous Infusions:    sodium chloride 20 mL/hr at 05/27/21 1252     PRN Meds:   Home Meds:   Prior to Admission medications    Medication Sig Start Date End Date Taking? Authorizing Provider   HYDROcodone-acetaminophen (NORCO) 5-325 MG per tablet Take 1 tablet by mouth every 8 hours as needed for Pain.    Yes Historical Provider, MD   insulin glargine (LANTUS) 100 UNIT/ML injection vial Inject 10 Units into the skin daily 5/14/21  Yes [employer] MARKIE Tee - NP   docusate sodium (COLACE, DULCOLAX) 100 MG CAPS Take 100 mg by mouth 2 times daily 5/14/21 6/13/21 Yes Mary RONIT TeeN - NP   polyethylene glycol (GLYCOLAX) 17 g packet Take 17 g by mouth daily 5/15/21 6/14/21 Yes Mary RONIT TeeN - NP   melatonin 3 MG TABS tablet Take 1 tablet by mouth nightly 5/14/21  Yes Mary RONIT TeeN - NP   albuterol sulfate (PROAIR RESPICLICK) 701 (90 Base) MCG/ACT aerosol powder inhalation Inhale 2 puffs into the lungs every 4 hours as needed for Wheezing or Shortness of Breath 9/10/20   Meagan Mathis MD   acetaminophen (TYLENOL) 500 MG tablet Take 500-1,000 mg by mouth every 4 hours as needed for Pain     Historical Provider, MD     Coumadin Use Last 7 Days:  no  Antiplatelet drug therapy use last 7 days: no  Other anticoagulant use last 7 days: no  Additional Medication Information:  See med [facility]      Pre-Sedation Documentation and Exam:   I have reviewed the patient's history and review of systems.     Mallampati Airway Assessment:  Mallampati Class II - (soft palate, fauces & uvula are visible)    Prior History of Anesthesia Complications:   none    ASA Classification:  Class 2 - A normal healthy patient with mild systemic disease    Sedation/ Anesthesia Plan:   intravenous sedation    Medications Planned:   midazolam (Versed) intravenously and fentanyl intravenously    Patient is an appropriate candidate for plan of sedation: yes    Electronically signed by Laina Hoover MD on 5/27/2021 at 5:10 PM

## 2021-06-02 PROBLEM — E44.0 MODERATE MALNUTRITION (HCC): Status: ACTIVE | Noted: 2021-05-22

## 2021-06-02 PROBLEM — N18.30 CKD (CHRONIC KIDNEY DISEASE), STAGE III (HCC): Status: ACTIVE | Noted: 2017-02-26

## 2021-06-02 PROBLEM — Z93.6 S/P ILEAL CONDUIT (HCC): Status: ACTIVE | Noted: 2019-12-04

## 2021-06-02 PROBLEM — N13.30 BILATERAL HYDRONEPHROSIS: Status: ACTIVE | Noted: 2021-05-03

## 2021-06-02 PROBLEM — D41.00 NEOPLASM OF UNCERTAIN BEHAVIOR OF KIDNEY: Status: ACTIVE | Noted: 2021-06-02

## 2021-06-02 PROBLEM — C64.1 RENAL CANCER, RIGHT (HCC): Status: ACTIVE | Noted: 2021-06-02

## 2021-06-02 PROBLEM — T85.858A STENOSIS OF ILEAL CONDUIT STOMA: Status: ACTIVE | Noted: 2021-06-02

## 2021-06-08 ENCOUNTER — ANESTHESIA (OUTPATIENT)
Dept: OPERATING ROOM | Age: 81
DRG: 988 | End: 2021-06-08
Payer: MEDICARE

## 2021-06-08 ENCOUNTER — HOSPITAL ENCOUNTER (OUTPATIENT)
Age: 81
Setting detail: OUTPATIENT SURGERY
Discharge: HOME | DRG: 988 | End: 2021-06-08
Attending: SPECIALIST | Admitting: SPECIALIST
Payer: MEDICARE

## 2021-06-08 ENCOUNTER — ANESTHESIA EVENT (OUTPATIENT)
Dept: OPERATING ROOM | Age: 81
DRG: 988 | End: 2021-06-08
Payer: MEDICARE

## 2021-06-08 ENCOUNTER — APPOINTMENT (OUTPATIENT)
Dept: GENERAL RADIOLOGY | Age: 81
DRG: 988 | End: 2021-06-08
Attending: SPECIALIST
Payer: MEDICARE

## 2021-06-08 VITALS
TEMPERATURE: 98.1 F | OXYGEN SATURATION: 100 % | BODY MASS INDEX: 26.56 KG/M2 | RESPIRATION RATE: 22 BRPM | HEART RATE: 60 BPM | DIASTOLIC BLOOD PRESSURE: 72 MMHG | WEIGHT: 207 LBS | SYSTOLIC BLOOD PRESSURE: 110 MMHG | HEIGHT: 74 IN

## 2021-06-08 VITALS — SYSTOLIC BLOOD PRESSURE: 105 MMHG | DIASTOLIC BLOOD PRESSURE: 71 MMHG | OXYGEN SATURATION: 100 % | TEMPERATURE: 96.8 F

## 2021-06-08 LAB
ANION GAP: 11 MMOL/L (ref 7–16)
GFR NON-AFRICAN AMERICAN: 25 ML/MIN
GFR SERPL CREATININE-BSD FRML MDRD: 30 ML/MIN
GFR SERPL CREATININE-BSD FRML MDRD: ABNORMAL ML/MIN/{1.73_M2}
GLUCOSE BLD-MCNC: 92 MG/DL (ref 74–100)
POC BUN: 34 MG/DL (ref 8–26)
POC CHLORIDE: 107 MMOL/L (ref 98–107)
POC CREATININE: 2.49 MG/DL (ref 0.51–1.19)
POC HEMATOCRIT: 34 % (ref 41–53)
POC HEMOGLOBIN: 11.4 G/DL (ref 13.5–17.5)
POC IONIZED CALCIUM: 1.15 MMOL/L (ref 1.15–1.33)
POC POTASSIUM: 5.3 MMOL/L (ref 3.5–4.5)
POC SODIUM: 139 MMOL/L (ref 138–146)
POC TCO2: 22 MMOL/L (ref 22–30)

## 2021-06-08 PROCEDURE — 7100000040 HC SPAR PHASE II RECOVERY - FIRST 15 MIN: Performed by: SPECIALIST

## 2021-06-08 PROCEDURE — BT1G1ZZ FLUOROSCOPY OF ILEAL LOOP, URETERS AND KIDNEYS USING LOW OSMOLAR CONTRAST: ICD-10-PCS | Performed by: SPECIALIST

## 2021-06-08 PROCEDURE — 85014 HEMATOCRIT: CPT

## 2021-06-08 PROCEDURE — 0T788DZ DILATION OF BILATERAL URETERS WITH INTRALUMINAL DEVICE, VIA NATURAL OR ARTIFICIAL OPENING ENDOSCOPIC: ICD-10-PCS | Performed by: SPECIALIST

## 2021-06-08 PROCEDURE — 80051 ELECTROLYTE PANEL: CPT

## 2021-06-08 PROCEDURE — 6360000002 HC RX W HCPCS: Performed by: REGISTERED NURSE

## 2021-06-08 PROCEDURE — 7100000041 HC SPAR PHASE II RECOVERY - ADDTL 15 MIN: Performed by: SPECIALIST

## 2021-06-08 PROCEDURE — 3600000013 HC SURGERY LEVEL 3 ADDTL 15MIN: Performed by: SPECIALIST

## 2021-06-08 PROCEDURE — 6360000004 HC RX CONTRAST MEDICATION: Performed by: SPECIALIST

## 2021-06-08 PROCEDURE — 2500000003 HC RX 250 WO HCPCS: Performed by: REGISTERED NURSE

## 2021-06-08 PROCEDURE — 3600000003 HC SURGERY LEVEL 3 BASE: Performed by: SPECIALIST

## 2021-06-08 PROCEDURE — 82565 ASSAY OF CREATININE: CPT

## 2021-06-08 PROCEDURE — 2580000003 HC RX 258: Performed by: REGISTERED NURSE

## 2021-06-08 PROCEDURE — 2709999900 HC NON-CHARGEABLE SUPPLY: Performed by: SPECIALIST

## 2021-06-08 PROCEDURE — 0TC78ZZ EXTIRPATION OF MATTER FROM LEFT URETER, VIA NATURAL OR ARTIFICIAL OPENING ENDOSCOPIC: ICD-10-PCS | Performed by: SPECIALIST

## 2021-06-08 PROCEDURE — 2500000003 HC RX 250 WO HCPCS: Performed by: SPECIALIST

## 2021-06-08 PROCEDURE — 2580000003 HC RX 258: Performed by: SPECIALIST

## 2021-06-08 PROCEDURE — 3700000000 HC ANESTHESIA ATTENDED CARE: Performed by: SPECIALIST

## 2021-06-08 PROCEDURE — 0TC68ZZ EXTIRPATION OF MATTER FROM RIGHT URETER, VIA NATURAL OR ARTIFICIAL OPENING ENDOSCOPIC: ICD-10-PCS | Performed by: SPECIALIST

## 2021-06-08 PROCEDURE — 82330 ASSAY OF CALCIUM: CPT

## 2021-06-08 PROCEDURE — 3209999900 FLUORO FOR SURGICAL PROCEDURES

## 2021-06-08 PROCEDURE — 6360000002 HC RX W HCPCS: Performed by: SPECIALIST

## 2021-06-08 PROCEDURE — 84520 ASSAY OF UREA NITROGEN: CPT

## 2021-06-08 PROCEDURE — 82947 ASSAY GLUCOSE BLOOD QUANT: CPT

## 2021-06-08 PROCEDURE — 3700000001 HC ADD 15 MINUTES (ANESTHESIA): Performed by: SPECIALIST

## 2021-06-08 RX ORDER — MORPHINE SULFATE 2 MG/ML
2 INJECTION, SOLUTION INTRAMUSCULAR; INTRAVENOUS EVERY 5 MIN PRN
Status: DISCONTINUED | OUTPATIENT
Start: 2021-06-08 | End: 2021-06-08 | Stop reason: HOSPADM

## 2021-06-08 RX ORDER — FENTANYL CITRATE 50 UG/ML
25 INJECTION, SOLUTION INTRAMUSCULAR; INTRAVENOUS EVERY 5 MIN PRN
Status: DISCONTINUED | OUTPATIENT
Start: 2021-06-08 | End: 2021-06-08 | Stop reason: HOSPADM

## 2021-06-08 RX ORDER — LIDOCAINE HYDROCHLORIDE 10 MG/ML
INJECTION, SOLUTION EPIDURAL; INFILTRATION; INTRACAUDAL; PERINEURAL PRN
Status: DISCONTINUED | OUTPATIENT
Start: 2021-06-08 | End: 2021-06-08 | Stop reason: SDUPTHER

## 2021-06-08 RX ORDER — OXYCODONE HYDROCHLORIDE AND ACETAMINOPHEN 5; 325 MG/1; MG/1
2 TABLET ORAL PRN
Status: DISCONTINUED | OUTPATIENT
Start: 2021-06-08 | End: 2021-06-08 | Stop reason: HOSPADM

## 2021-06-08 RX ORDER — WATER 1000 ML/1000ML
INJECTION, SOLUTION INTRAVENOUS PRN
Status: DISCONTINUED | OUTPATIENT
Start: 2021-06-08 | End: 2021-06-08 | Stop reason: ALTCHOICE

## 2021-06-08 RX ORDER — FENTANYL CITRATE 50 UG/ML
INJECTION, SOLUTION INTRAMUSCULAR; INTRAVENOUS PRN
Status: DISCONTINUED | OUTPATIENT
Start: 2021-06-08 | End: 2021-06-08 | Stop reason: SDUPTHER

## 2021-06-08 RX ORDER — PROPOFOL 10 MG/ML
INJECTION, EMULSION INTRAVENOUS CONTINUOUS PRN
Status: DISCONTINUED | OUTPATIENT
Start: 2021-06-08 | End: 2021-06-08 | Stop reason: SDUPTHER

## 2021-06-08 RX ORDER — ONDANSETRON 2 MG/ML
4 INJECTION INTRAMUSCULAR; INTRAVENOUS
Status: DISCONTINUED | OUTPATIENT
Start: 2021-06-08 | End: 2021-06-08 | Stop reason: HOSPADM

## 2021-06-08 RX ORDER — FENTANYL CITRATE 50 UG/ML
50 INJECTION, SOLUTION INTRAMUSCULAR; INTRAVENOUS EVERY 5 MIN PRN
Status: DISCONTINUED | OUTPATIENT
Start: 2021-06-08 | End: 2021-06-08 | Stop reason: HOSPADM

## 2021-06-08 RX ORDER — DIPHENHYDRAMINE HYDROCHLORIDE 50 MG/ML
12.5 INJECTION INTRAMUSCULAR; INTRAVENOUS
Status: DISCONTINUED | OUTPATIENT
Start: 2021-06-08 | End: 2021-06-08 | Stop reason: HOSPADM

## 2021-06-08 RX ORDER — LABETALOL HYDROCHLORIDE 5 MG/ML
5 INJECTION, SOLUTION INTRAVENOUS EVERY 10 MIN PRN
Status: DISCONTINUED | OUTPATIENT
Start: 2021-06-08 | End: 2021-06-08 | Stop reason: HOSPADM

## 2021-06-08 RX ORDER — MAGNESIUM HYDROXIDE 1200 MG/15ML
LIQUID ORAL CONTINUOUS PRN
Status: COMPLETED | OUTPATIENT
Start: 2021-06-08 | End: 2021-06-08

## 2021-06-08 RX ORDER — MEPERIDINE HYDROCHLORIDE 50 MG/ML
12.5 INJECTION INTRAMUSCULAR; INTRAVENOUS; SUBCUTANEOUS EVERY 5 MIN PRN
Status: DISCONTINUED | OUTPATIENT
Start: 2021-06-08 | End: 2021-06-08 | Stop reason: HOSPADM

## 2021-06-08 RX ORDER — SODIUM CHLORIDE, SODIUM LACTATE, POTASSIUM CHLORIDE, CALCIUM CHLORIDE 600; 310; 30; 20 MG/100ML; MG/100ML; MG/100ML; MG/100ML
INJECTION, SOLUTION INTRAVENOUS CONTINUOUS PRN
Status: DISCONTINUED | OUTPATIENT
Start: 2021-06-08 | End: 2021-06-08 | Stop reason: SDUPTHER

## 2021-06-08 RX ADMIN — FENTANYL CITRATE 25 MCG: 50 INJECTION, SOLUTION INTRAMUSCULAR; INTRAVENOUS at 13:58

## 2021-06-08 RX ADMIN — CEFAZOLIN SODIUM 2000 MG: 10 INJECTION, POWDER, FOR SOLUTION INTRAVENOUS at 14:05

## 2021-06-08 RX ADMIN — SODIUM CHLORIDE, POTASSIUM CHLORIDE, SODIUM LACTATE AND CALCIUM CHLORIDE: 600; 310; 30; 20 INJECTION, SOLUTION INTRAVENOUS at 13:52

## 2021-06-08 RX ADMIN — FENTANYL CITRATE 25 MCG: 50 INJECTION, SOLUTION INTRAMUSCULAR; INTRAVENOUS at 14:32

## 2021-06-08 RX ADMIN — PROPOFOL 25 MCG/KG/MIN: 10 INJECTION, EMULSION INTRAVENOUS at 13:58

## 2021-06-08 RX ADMIN — LIDOCAINE HYDROCHLORIDE 50 MG: 10 INJECTION, SOLUTION EPIDURAL; INFILTRATION; INTRACAUDAL; PERINEURAL at 13:58

## 2021-06-08 ASSESSMENT — PULMONARY FUNCTION TESTS
PIF_VALUE: 1
PIF_VALUE: 0
PIF_VALUE: 1
PIF_VALUE: 0
PIF_VALUE: 1
PIF_VALUE: 3
PIF_VALUE: 1

## 2021-06-08 ASSESSMENT — PAIN SCALES - GENERAL
PAINLEVEL_OUTOF10: 0

## 2021-06-08 ASSESSMENT — PAIN - FUNCTIONAL ASSESSMENT: PAIN_FUNCTIONAL_ASSESSMENT: 0-10

## 2021-06-08 ASSESSMENT — LIFESTYLE VARIABLES: SMOKING_STATUS: 0

## 2021-06-08 NOTE — OP NOTE
loopogram and noted contrast that went from distal to proximal ileal conduit with a mild narrowing in between. Contrast then refluxed up both ureters indicating patency of the ureteral-enteric anastomoses. Finally, we attemped flxible looposcopy and still had difficulty finding the true lumen of the conduit, but were satisfied with the contrast which had entered the proximal conduit rather easily. Dr. Anastacia Vences was present and scrubbed for all critical portions of the procedure.       Follow-Up:   - We will arrange for removal of his right PCNT and monitor his Cr as the conduit now appears to be patent    Cecy Castellanos MD  Electronically signed on 6/8/2021 at 2:48 PM

## 2021-06-08 NOTE — H&P
Vanessa Kelley MD [facility]     Urology H&P     Patient:  Tomas Fuller  YOB: 1940  Date: 6/8/2021      HISTORY OF PRESENT ILLNESS:   The patient is a 80 y.o. male  The patient presents with ileal conduit stomal stenosis, Left staghorn renal calculus and Right kidney stones. He has a Right percutaneous nephrostomy tube keeping his ileal conduit unobstructed. Will attempt Cystoscopy and endoscopic cutting of a suture in his ileal conduit causing the obstruction under MAC. No Rx of his suspected Right renal cancer at this time. Will defer Rx of his bilateral kidney stones for now as well.     Summary of old records:   Gross hematuria  Right solid renal mass, 5 cm on 10/4/19 CT suspicious for cancer  Bilateral KS, L>R (1.8 cm on left) and Right 10 mm proximal ureteral calculus on 10/4/19 CT  \"Hostile abdomen\" with 3 previous parastomal hernia repairs with mesh, large residual parastomal hernia, previous midline surgery for Ex Lap for SBO and high likelihood of dense adhesions.   9/12/19 BUN/creat = 28/1.54     Additional History: none     Procedures Today: N/A     Urinalysis today:  No results found for this visit on 06/02/21.     Last several PSA's:        Lab Results   Component Value Date     PSA <0.10 01/07/2012         Last total testosterone:  No results found for: TESTOSTERONE     Last BUN and creatinine:        Lab Results   Component Value Date     BUN 81 (H) 05/17/2021            Lab Results   Component Value Date     CREATININE 3.53 (H) 05/17/2021         Last CBC:        Lab Results   Component Value Date     WBC 7.3 05/17/2021     HGB 11.4 (L) 05/17/2021     HCT 38.7 (L) 05/17/2021     MCV 92.8 05/17/2021      05/27/2021         Additional Lab/Culture results:   5/17/21 Urine C & S - Klebsiella Pneumoniae and Citrobacter Amalonaticus - in hospital.     Imaging Reviewed during this Office Visit: 4/30/21 CT abdomen and pelvis  Extensive stone burden identified in the bilateral kidneys, left greater   than right.  Uroepithelial wall thickening with inflammatory stranding is   seen involving the left collecting system which may be related to underlying   infection or chronic inflammation related to ileal conduit. 3. Enlargement of the right upper pole mass, likely representing renal cell         (results were independently reviewed by physician and radiology report verified)     Physical Exam:       /83   Pulse 62   Temp 97.3 °F (36.3 °C) (Temporal)   Resp 18   Ht 6' 2\" (1.88 m)   Wt 207 lb (93.9 kg)   SpO2 97%   BMI 26.58 kg/m²     Body mass index is 26.58 kg/m². Patient is a 80 y.o. male in no acute distress and alert and oriented to person, place and time. Lungs CTA  Heart R3 without murmur  Abdomen: Soft NT ND      Assessment and Plan   1. Stenosis of ileal conduit stoma, subsequent encounter    2. Bilateral hydronephrosis    3. Bilateral kidney stones    4. Renal cancer, right [facility])          Plan:   The patient presents with ileal conduit stomal stenosis, Left staghorn renal calculus and Right kidney stones. He has a Right percutaneous nephrostomy tube keeping his ileal conduit unobstructed. Will attempt Cystoscopy and endoscopic cutting of a suture in his ileal conduit causing the obstruction under MAC. No Rx of his suspected Right renal cancer at this time. Will defer Rx of his bilateral kidney stones for now as well.     Mónica Cantrell.  Braydon Harris MD FACS

## 2021-06-08 NOTE — ANESTHESIA PRE PROCEDURE
Department of Anesthesiology  Preprocedure Note       Name:  Cande Boateng   Age:  80 y.o.  :  1940                                          MRN:  [id number]         Date:  2021      Surgeon: Ashley Richter):  Lo Irving MD    Procedure: Procedure(s):  ILEOCONDUIT ENDOSCOPY WITH POSSIBLE OBSTRUCTIVE SUTURE, ENDO SCISSORS  FLEXIBLE CYSTO, URETEROSCOPY    Department of Anesthesiology  Pre-Anesthesia Evaluation/Consultation         Name:  Cande Boateng                                         Age:  80 y.o.   MRN:  [id number]             Medications  Current Facility-Administered Medications   Medication Dose Route Frequency Provider Last Rate Last Admin    ceFAZolin (ANCEF) 2000 mg in dextrose 5 % 50 mL IVPB  2,000 mg Intravenous Once Lo Irving MD           Allergies   Allergen Reactions    Nsaids Anaphylaxis     GI bleed    Tetracyclines & Related      Patient Active Problem List   Diagnosis    Bladder cancer    DJD (degenerative joint disease)    Essential hypertension    Renal mass    Incisional hernia of anterior abdominal wall without obstruction or gangrene    Bilateral kidney stones    Right ureteral calculus    Partial small bowel obstruction (HCC)    GERD (gastroesophageal reflux disease)    Normochromic normocytic anemia    Iron deficiency anemia    Toxic metabolic encephalopathy    Bilateral hydronephrosis    Vitamin B 12 deficiency    Folate deficiency    Acute respiratory failure with hypoxia (HCC)    Moderate malnutrition (HCC)    Neoplasm of uncertain behavior of kidney    S/P ileal conduit (HCC)    CKD (chronic kidney disease), stage III (HCC)    Stenosis of ileal conduit stoma    Renal cancer, right [address]     Past Medical History:   Diagnosis Date    Arthritis     Caffeine use     2 coffee, 2 cans soda/day    Cancer [facility])     bladder et prostate, 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, ileo conduit s/p cystectomy    Cancer of kidney, unspecified laterality ([address]  CKD (chronic kidney disease)     stage 4, Dr. Babatunde Rankin, Dr. Joel Chao communication deficit     COPD (chronic obstructive pulmonary disease) [address]     Diabetes mellitus ([address]     SQ insulin 3 times per week only for this    DNR (do not resuscitate) 06/07/2021    Paper work from [facility] [place] placed on chart    Dysphagia 05/2021    GERD (gastroesophageal reflux disease)     no meds for this    GI bleeding     with hemorrhage and perforation    Hernia, inguinal, right     Hyperlipidemia     no meds for this    Hypertension     no meds for this    Ileal conduit stomal stenosis 06/2021    Dr. Boy Blair impaired     using wheelchair, 2 person transfer    Muscle weakness    [facility] resident 5/21/2021- 6/7/2021    Currently resides [facility] [place] [phone]    PAF (paroxysmal atrial fibrillation) ([address]     last EKG Sinus, no known cardiologist     Presence of urostomy ([address]     ileoconduit, cystectomy    Retinal detachment      Past Surgical History:   Procedure Laterality Date    BLADDER REMOVAL  2006    cancer, s/p urostomy     CATARACT REMOVAL Bilateral     COLONOSCOPY      CYSTOSCOPY N/A 5/3/2021    CYSTOSCOPY performed by Yara Olivo MD at [address] [facility] Right 2000    macular hole    [id number]      x2    INCISIONAL HERNIA REPAIR  12/7/2015    parastomal hernia repair - Dr. Soraida Diggs  11/2014    IR NEPHROSTOMY PERCUTANEOUS LEFT  5/3/2021    IR NEPHROSTOMY PERCUTANEOUS LEFT 5/3/2021 [facility]    IR NEPHROSTOMY PERCUTANEOUS LEFT  5/27/2021    IR NEPHROSTOMY PERCUTANEOUS LEFT 5/27/2021 [facility]    IR NEPHROSTOMY PERCUTANEOUS RIGHT  5/5/2021    IR NEPHROSTOMY PERCUTANEOUS RIGHT 5/5/2021 Elmira Carpenter MD [facility]    KNEE SURGERY Left     LAPAROSCOPY N/A 4/30/2021    EXPLORATORY LAPAROTOMY, EXTENSIVE LYSIS OF ADHESIONS, SMALL BOWEL RESECTION, REDUCTION OF STRANGULATED HERNIA performed by Maxwell Kee DO at [address] [facility] [address]  2021    Left neph tube removal, Right neph tube stent placed   [address]  2006    removal    VITRECTOMY Left 16     Social History     Tobacco Use    Smoking status: Former Smoker     Packs/day: 0.50     Years: 20.00     Pack years: 10.00     Types: Cigarettes     Quit date: 1979     Years since quittin.9    Smokeless tobacco: Never Used    Tobacco comment: quit in    Substance Use Topics    Alcohol use: No     Alcohol/week: 0.0 standard drinks    Drug use: No         Vital Signs (Current)   Vitals:    21 1239   BP: 130/83   Pulse: 62   Resp: 18   Temp: 97.3 °F (36.3 °C)   SpO2: 97%     Vital Signs Statistics (for past 48 hrs)     Temp  Av.3 °F (36.3 °C)  Min: 97.3 °F (36.3 °C)   Min taken time: 21 1239  Max: 97.3 °F (36.3 °C)   Max taken time: 21 1239  Pulse  Av  Min: 58   Min taken time: 21 1239  Max: 58   Max taken time: 21 1239  Resp  Av  Min: 18   Min taken time: 21 1239  Max: 18   Max taken time: 21 1239  BP  Min: 130/83   Min taken time: 21 1239  Max: 130/83   Max taken time: 21 1239  SpO2  Av %  Min: 97 %   Min taken time: 21 1239  Max: 97 %   Max taken time: 21 1239  BP Readings from Last 3 Encounters:   21 130/83   21 121/72   21 104/84       BMI  Body mass index is 26.58 kg/m².     CBC   Lab Results   Component Value Date    WBC 7.3 2021    RBC 4.17 2021    HGB 11.4 2021    HCT 38.7 2021    MCV 92.8 2021    RDW 16.9 2021     2021       CMP    Lab Results   Component Value Date     2021    K 4.6 2021     2021    CO2 22 2021    BUN 81 2021    CREATININE 2.49 2021    CREATININE 3.53 2021    GFRAA 20 2021    LABGLOM 25 2021 GLUCOSE 130 05/17/2021    PROT 7.3 05/02/2021    CALCIUM 8.8 05/17/2021    BILITOT 0.50 05/02/2021    ALKPHOS 55 05/02/2021    AST 19 05/02/2021    ALT 11 05/02/2021       BMP    Lab Results   Component Value Date     05/17/2021    K 4.6 05/17/2021     05/17/2021    CO2 22 05/17/2021    BUN 81 05/17/2021    CREATININE 2.49 06/08/2021    CREATININE 3.53 05/17/2021    CALCIUM 8.8 05/17/2021    GFRAA 20 05/17/2021    LABGLOM 25 06/08/2021    GLUCOSE 130 05/17/2021       POC Testing  Recent Labs     06/08/21  1247   POCGLU 92   POCNA 139   POCK 5.3*   POCCL 107   POCBUN 34*   POCHEMO 11.4*   POCHCT 34*       Coags    Lab Results   Component Value Date    PROTIME 13.3 05/27/2021    INR 1.0 05/27/2021    APTT 29.6 05/27/2021       HCG (If Applicable) No results found for: PREGTESTUR, PREGSERUM, HCG, HCGQUANT     ABGs No results found for: PHART, PO2ART, XHU0RLB, GOJ8CDJ, BEART, I9TYQHOX     Type & Screen (If Applicable)  No results found for: LABABO, [address]    Radiology (If Applicable)    Cardiac Testing (If Applicable) EF 70%    EKG (If Applicable) inf,poss ant MI          Medications prior to admission:   Prior to Admission medications    Medication Sig Start Date End Date Taking? Authorizing Provider   HYDROcodone-acetaminophen (NORCO) 5-325 MG per tablet Take 1 tablet by mouth every 8 hours as needed for Pain.    Yes Historical Provider, MD   melatonin 3 MG TABS tablet Take 1 tablet by mouth nightly 5/14/21  Yes MARKIE Adams NP   acetaminophen (TYLENOL) 500 MG tablet Take 1,000 mg by mouth every 4 hours as needed for Pain    Yes Historical Provider, MD   SODIUM PHOSPHATES RE Place 1 Dose rectally as needed (for no BM x 5 days) enema    Historical Provider, MD   insulin lispro (HUMALOG) 100 UNIT/ML injection vial Inject into the skin three times a week Mornings on Monday , Wednesday and Fridays    Sliding scale : 201-250 = 2 units,  251-300 = 4 units, 301-350 = 6 units, 351-400 = 8 units Historical Provider, MD   bisacodyl (BISAC-EVAC) 10 MG suppository Place 10 mg rectally daily as needed for Other (no BM in 4 days)     Historical Provider, MD   magnesium hydroxide (MILK OF MAGNESIA) 400 MG/5ML suspension Take 30 mLs by mouth daily as needed for Constipation (no BM for 3 days)     Historical Provider, MD   docusate sodium (COLACE, DULCOLAX) 100 MG CAPS Take 100 mg by mouth 2 times daily 5/14/21 6/13/21  MARKIE Adams NP   polyethylene glycol (GLYCOLAX) 17 g packet Take 17 g by mouth daily 5/15/21 6/14/21  MARKIE Adams NP   albuterol sulfate (PROAIR RESPICLICK) 241 (90 Base) MCG/ACT aerosol powder inhalation Inhale 2 puffs into the lungs every 4 hours as needed for Wheezing or Shortness of Breath 9/10/20   Barbara Ruiz MD       Current medications:    Current Facility-Administered Medications   Medication Dose Route Frequency Provider Last Rate Last Admin    ceFAZolin (ANCEF) 2000 mg in dextrose 5 % 50 mL IVPB  2,000 mg Intravenous Once Rahat Ramírez MD           Allergies:     Allergies   Allergen Reactions    Nsaids Anaphylaxis     GI bleed    Tetracyclines & Related        Problem List:    Patient Active Problem List   Diagnosis Code    Bladder cancer C67.9    DJD (degenerative joint disease) M19.90    Essential hypertension I10    Renal mass N28.89    Incisional hernia of anterior abdominal wall without obstruction or gangrene K43.2    Bilateral kidney stones N20.0    Right ureteral calculus N20.1    Partial small bowel obstruction ([address] K56.600    GERD (gastroesophageal reflux disease) K21.9    Normochromic normocytic anemia D64.9    Iron deficiency anemia D50.9    Toxic metabolic encephalopathy Z03    Bilateral hydronephrosis N13.30    Vitamin B 12 deficiency E53.8    Folate deficiency E53.8    Acute respiratory failure with hypoxia (HCC) J96.01    Moderate malnutrition (HCC) E44.0    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    S/P ileal conduit (HCC) Z93.6    CKD (chronic kidney disease), stage III (HCC) N18.30    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    Renal cancer, right (HCC) C64.1       Past Medical History:        Diagnosis Date    Arthritis     Caffeine use     2 coffee, 2 cans soda/day    Cancer [facility]) 2006    bladder et prostate, 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, ileo conduit s/p cystectomy    Cancer of kidney, unspecified laterality [facility]     CKD (chronic kidney disease)     stage 4, Dr. Merline Yeager, Dr. Ackerman Grew communication deficit     COPD (chronic obstructive pulmonary disease) ([facility]     Diabetes mellitus ([facility]     SQ insulin 3 times per week only for this    DNR (do not resuscitate) 06/07/2021    Paper work from [facility] placed on chart    Dysphagia 05/2021    GERD (gastroesophageal reflux disease)     no meds for this    GI bleeding     with hemorrhage and perforation    Hernia, inguinal, right     Hyperlipidemia     no meds for this    Hypertension     no meds for this    Ileal conduit stomal stenosis 06/2021    Dr. Codi Livingston impaired     using wheelchair, 2 person transfer    Muscle weakness    [facility] resident 5/21/2021- 6/7/2021    [phone]    PAF (paroxysmal atrial fibrillation) ([facility]     last EKG Sinus, no known cardiologist     Presence of urostomy ([facility]     ileoconduit, cystectomy    Retinal detachment        Past Surgical History:        Procedure Laterality Date    BLADDER REMOVAL  2006    cancer, s/p urostomy     CATARACT REMOVAL Bilateral     COLONOSCOPY      CYSTOSCOPY N/A 5/3/2021    CYSTOSCOPY performed by Samantha Glez MD at [address] Right 2000    macular hole    [id number]      x2    INCISIONAL HERNIA REPAIR  12/7/2015    parastomal hernia repair - Dr. Mesha Alberts  11/2014    IR NEPHROSTOMY PERCUTANEOUS LEFT  5/3/2021    IR NEPHROSTOMY PERCUTANEOUS LEFT 5/3/2021 [facility]    IR NEPHROSTOMY PERCUTANEOUS LEFT  2021    IR NEPHROSTOMY PERCUTANEOUS LEFT 2021 [facility]    IR NEPHROSTOMY PERCUTANEOUS RIGHT  2021    IR NEPHROSTOMY PERCUTANEOUS RIGHT 2021 [facility]    KNEE SURGERY Left     LAPAROSCOPY N/A 2021    EXPLORATORY LAPAROTOMY, EXTENSIVE LYSIS OF ADHESIONS, SMALL BOWEL RESECTION, REDUCTION OF STRANGULATED HERNIA performed by Johnna Dunlap DO at [address]  2021    Left neph tube removal, Right neph tube stent placed   [address]  2006    removal    VITRECTOMY Left 16       Social History:    Social History     Tobacco Use    Smoking status: Former Smoker     Packs/day: 0.50     Years: 20.00     Pack years: 10.00     Types: Cigarettes     Quit date: 1979     Years since quittin.9    Smokeless tobacco: Never Used    Tobacco comment: quit in    Substance Use Topics    Alcohol use: No     Alcohol/week: 0.0 standard drinks                                Counseling given: Not Answered  Comment: quit in       Vital Signs (Current):   Vitals:    21 1239   BP: 130/83   Pulse: 62   Resp: 18   Temp: 97.3 °F (36.3 °C)   TempSrc: Temporal   SpO2: 97%   Weight: 207 lb (93.9 kg)   Height: 6' 2\" (1.88 m)                                              BP Readings from Last 3 Encounters:   21 130/83   21 121/72   21 104/84       NPO Status: Time of last liquid consumption:                         Time of last solid consumption:                         Date of last liquid consumption: 21                        Date of last solid food consumption: 21    BMI:   Wt Readings from Last 3 Encounters:   21 207 lb (93.9 kg)   21 207 lb (93.9 kg)   21 207 lb (93.9 kg)     Body mass index is 26.58 kg/m².     CBC:   Lab Results   Component Value Date WBC 7.3 05/17/2021    RBC 4.17 05/17/2021    HGB 11.4 05/17/2021    HCT 38.7 05/17/2021    MCV 92.8 05/17/2021    RDW 16.9 05/17/2021     05/27/2021       CMP:   Lab Results   Component Value Date     05/17/2021    K 4.6 05/17/2021     05/17/2021    CO2 22 05/17/2021    BUN 81 05/17/2021    CREATININE 2.49 06/08/2021    CREATININE 3.53 05/17/2021    GFRAA 20 05/17/2021    LABGLOM 25 06/08/2021    GLUCOSE 130 05/17/2021    PROT 7.3 05/02/2021    CALCIUM 8.8 05/17/2021    BILITOT 0.50 05/02/2021    ALKPHOS 55 05/02/2021    AST 19 05/02/2021    ALT 11 05/02/2021       POC Tests:   Recent Labs     06/08/21  1247   POCGLU 92   POCNA 139   POCK 5.3*   POCCL 107   POCBUN 34*   POCHEMO 11.4*   POCHCT 34*       Coags:   Lab Results   Component Value Date    PROTIME 13.3 05/27/2021    INR 1.0 05/27/2021    APTT 29.6 05/27/2021       HCG (If Applicable): No results found for: PREGTESTUR, PREGSERUM, HCG, HCGQUANT     ABGs: No results found for: PHART, PO2ART, UGF3PAC, RKA9BZK, BEART, A1RWCZKS     Type & Screen (If Applicable):  No results found for: LABABO, LABRH    Drug/Infectious Status (If Applicable):  No results found for: HIV, HEPCAB    COVID-19 Screening (If Applicable):   Lab Results   Component Value Date    COVID19 Not Detected 04/30/2021           Anesthesia Evaluation   no history of anesthetic complications:   Airway: Mallampati: II     Neck ROM: full   Dental:          Pulmonary:       (-) COPD, recent URI and not a current smoker                          ROS comment: 10 pk yrs   Cardiovascular:  Exercise tolerance: good (>4 METS),   (+) hypertension:,                   Neuro/Psych:      (-) seizures and CVA           GI/Hepatic/Renal:   (+) GERD:, renal disease: CRI and kidney stones,          ROS comment: Bladder CA.    Endo/Other:        (-) diabetes mellitus               Abdominal:           Vascular:                                 L shoulder very painful       Anesthesia Plan      MAC ASA 4       Induction: intravenous.                         Kofi Badillo MD   6/8/2021

## 2021-06-09 ENCOUNTER — HOSPITAL ENCOUNTER (INPATIENT)
Age: 81
LOS: 3 days | Discharge: HOME | DRG: 988 | End: 2021-06-12
Attending: EMERGENCY MEDICINE | Admitting: INTERNAL MEDICINE
Payer: MEDICARE

## 2021-06-09 DIAGNOSIS — R55 PRE-SYNCOPE: Primary | ICD-10-CM

## 2021-06-09 DIAGNOSIS — I95.1 ORTHOSTATIC HYPOTENSION: ICD-10-CM

## 2021-06-09 PROBLEM — I95.9 HYPOTENSION: Status: ACTIVE | Noted: 2021-06-09

## 2021-06-09 LAB
-: ABNORMAL
ABSOLUTE EOS #: 0.19 K/UL (ref 0–0.44)
ABSOLUTE IMMATURE GRANULOCYTE: <0.03 K/UL (ref 0–0.3)
ABSOLUTE LYMPH #: 1.18 K/UL (ref 1.1–3.7)
ABSOLUTE MONO #: 0.43 K/UL (ref 0.1–1.2)
AMORPHOUS: ABNORMAL
ANION GAP SERPL CALCULATED.3IONS-SCNC: 12 MMOL/L (ref 9–17)
BACTERIA: ABNORMAL
BASOPHILS # BLD: 1 % (ref 0–2)
BASOPHILS ABSOLUTE: 0.03 K/UL (ref 0–0.2)
BILIRUBIN URINE: NEGATIVE
BUN BLDV-MCNC: 29 MG/DL (ref 8–23)
BUN/CREAT BLD: ABNORMAL (ref 9–20)
CALCIUM SERPL-MCNC: 8.8 MG/DL (ref 8.6–10.4)
CASTS UA: ABNORMAL /LPF (ref 0–8)
CHLORIDE BLD-SCNC: 106 MMOL/L (ref 98–107)
CO2: 20 MMOL/L (ref 20–31)
COLOR: YELLOW
CORTISOL COLLECTION INFO: NORMAL
CORTISOL: 11 UG/DL (ref 2.7–18.4)
CREAT SERPL-MCNC: 2.05 MG/DL (ref 0.7–1.2)
CRYSTALS, UA: ABNORMAL /HPF
DIFFERENTIAL TYPE: ABNORMAL
EOSINOPHILS RELATIVE PERCENT: 3 % (ref 1–4)
EPITHELIAL CELLS UA: ABNORMAL /HPF (ref 0–5)
GFR AFRICAN AMERICAN: 38 ML/MIN
GFR NON-AFRICAN AMERICAN: 31 ML/MIN
GFR SERPL CREATININE-BSD FRML MDRD: ABNORMAL ML/MIN/{1.73_M2}
GFR SERPL CREATININE-BSD FRML MDRD: ABNORMAL ML/MIN/{1.73_M2}
GLUCOSE BLD-MCNC: 94 MG/DL (ref 70–99)
GLUCOSE URINE: NEGATIVE
HCT VFR BLD CALC: 35.1 % (ref 40.7–50.3)
HEMOGLOBIN: 10.7 G/DL (ref 13–17)
IMMATURE GRANULOCYTES: 0 %
KETONES, URINE: NEGATIVE
LACTIC ACID, WHOLE BLOOD: 1.8 MMOL/L (ref 0.7–2.1)
LEUKOCYTE ESTERASE, URINE: ABNORMAL
LYMPHOCYTES # BLD: 19 % (ref 24–43)
MCH RBC QN AUTO: 27.6 PG (ref 25.2–33.5)
MCHC RBC AUTO-ENTMCNC: 30.5 G/DL (ref 28.4–34.8)
MCV RBC AUTO: 90.7 FL (ref 82.6–102.9)
MONOCYTES # BLD: 7 % (ref 3–12)
MUCUS: ABNORMAL
NITRITE, URINE: NEGATIVE
NRBC AUTOMATED: 0 PER 100 WBC
OTHER OBSERVATIONS UA: ABNORMAL
PDW BLD-RTO: 16.9 % (ref 11.8–14.4)
PH UA: 6 (ref 5–8)
PLATELET # BLD: 199 K/UL (ref 138–453)
PLATELET ESTIMATE: ABNORMAL
PMV BLD AUTO: 9.8 FL (ref 8.1–13.5)
POTASSIUM SERPL-SCNC: 4 MMOL/L (ref 3.7–5.3)
PROTEIN UA: ABNORMAL
RBC # BLD: 3.87 M/UL (ref 4.21–5.77)
RBC # BLD: ABNORMAL 10*6/UL
RBC UA: ABNORMAL /HPF (ref 0–4)
RENAL EPITHELIAL, UA: ABNORMAL /HPF
SEG NEUTROPHILS: 70 % (ref 36–65)
SEGMENTED NEUTROPHILS ABSOLUTE COUNT: 4.3 K/UL (ref 1.5–8.1)
SODIUM BLD-SCNC: 138 MMOL/L (ref 135–144)
SPECIFIC GRAVITY UA: 1.01 (ref 1–1.03)
THYROXINE, FREE: 1.31 NG/DL (ref 0.93–1.7)
TRICHOMONAS: ABNORMAL
TSH SERPL DL<=0.05 MIU/L-ACNC: 7.33 MIU/L (ref 0.3–5)
TURBIDITY: ABNORMAL
URINE HGB: ABNORMAL
UROBILINOGEN, URINE: NORMAL
WBC # BLD: 6.2 K/UL (ref 3.5–11.3)
WBC # BLD: ABNORMAL 10*3/UL
WBC UA: ABNORMAL /HPF (ref 0–5)
YEAST: ABNORMAL

## 2021-06-09 PROCEDURE — 80048 BASIC METABOLIC PNL TOTAL CA: CPT

## 2021-06-09 PROCEDURE — 96360 HYDRATION IV INFUSION INIT: CPT

## 2021-06-09 PROCEDURE — 83605 ASSAY OF LACTIC ACID: CPT

## 2021-06-09 PROCEDURE — 6360000002 HC RX W HCPCS: Performed by: CLINICAL NURSE SPECIALIST

## 2021-06-09 PROCEDURE — 2580000003 HC RX 258: Performed by: STUDENT IN AN ORGANIZED HEALTH CARE EDUCATION/TRAINING PROGRAM

## 2021-06-09 PROCEDURE — 93005 ELECTROCARDIOGRAM TRACING: CPT | Performed by: EMERGENCY MEDICINE

## 2021-06-09 PROCEDURE — 84443 ASSAY THYROID STIM HORMONE: CPT

## 2021-06-09 PROCEDURE — 81001 URINALYSIS AUTO W/SCOPE: CPT

## 2021-06-09 PROCEDURE — 84439 ASSAY OF FREE THYROXINE: CPT

## 2021-06-09 PROCEDURE — 93005 ELECTROCARDIOGRAM TRACING: CPT | Performed by: STUDENT IN AN ORGANIZED HEALTH CARE EDUCATION/TRAINING PROGRAM

## 2021-06-09 PROCEDURE — 6370000000 HC RX 637 (ALT 250 FOR IP): Performed by: CLINICAL NURSE SPECIALIST

## 2021-06-09 PROCEDURE — 2060000000 HC ICU INTERMEDIATE R&B

## 2021-06-09 PROCEDURE — 85025 COMPLETE CBC W/AUTO DIFF WBC: CPT

## 2021-06-09 PROCEDURE — 99285 EMERGENCY DEPT VISIT HI MDM: CPT

## 2021-06-09 PROCEDURE — 87086 URINE CULTURE/COLONY COUNT: CPT

## 2021-06-09 PROCEDURE — 6370000000 HC RX 637 (ALT 250 FOR IP): Performed by: STUDENT IN AN ORGANIZED HEALTH CARE EDUCATION/TRAINING PROGRAM

## 2021-06-09 PROCEDURE — 82533 TOTAL CORTISOL: CPT

## 2021-06-09 RX ORDER — BISACODYL 10 MG
10 SUPPOSITORY, RECTAL RECTAL DAILY PRN
Status: DISCONTINUED | OUTPATIENT
Start: 2021-06-09 | End: 2021-06-12 | Stop reason: HOSPADM

## 2021-06-09 RX ORDER — ACETAMINOPHEN 325 MG/1
650 TABLET ORAL EVERY 6 HOURS PRN
Status: DISCONTINUED | OUTPATIENT
Start: 2021-06-09 | End: 2021-06-12 | Stop reason: HOSPADM

## 2021-06-09 RX ORDER — SODIUM CHLORIDE 0.9 % (FLUSH) 0.9 %
5-40 SYRINGE (ML) INJECTION EVERY 12 HOURS SCHEDULED
Status: DISCONTINUED | OUTPATIENT
Start: 2021-06-09 | End: 2021-06-12 | Stop reason: HOSPADM

## 2021-06-09 RX ORDER — SODIUM CHLORIDE 0.9 % (FLUSH) 0.9 %
10 SYRINGE (ML) INJECTION PRN
Status: DISCONTINUED | OUTPATIENT
Start: 2021-06-09 | End: 2021-06-10

## 2021-06-09 RX ORDER — POTASSIUM CHLORIDE 20 MEQ/1
40 TABLET, EXTENDED RELEASE ORAL PRN
Status: DISCONTINUED | OUTPATIENT
Start: 2021-06-09 | End: 2021-06-12 | Stop reason: HOSPADM

## 2021-06-09 RX ORDER — ALBUTEROL SULFATE 2.5 MG/3ML
2.5 SOLUTION RESPIRATORY (INHALATION) EVERY 4 HOURS PRN
Status: DISCONTINUED | OUTPATIENT
Start: 2021-06-09 | End: 2021-06-12 | Stop reason: HOSPADM

## 2021-06-09 RX ORDER — DOCUSATE SODIUM 100 MG/1
100 CAPSULE, LIQUID FILLED ORAL 2 TIMES DAILY
Status: DISCONTINUED | OUTPATIENT
Start: 2021-06-09 | End: 2021-06-12 | Stop reason: HOSPADM

## 2021-06-09 RX ORDER — POTASSIUM CHLORIDE 7.45 MG/ML
10 INJECTION INTRAVENOUS PRN
Status: DISCONTINUED | OUTPATIENT
Start: 2021-06-09 | End: 2021-06-12 | Stop reason: HOSPADM

## 2021-06-09 RX ORDER — HEPARIN SODIUM 5000 [USP'U]/ML
5000 INJECTION, SOLUTION INTRAVENOUS; SUBCUTANEOUS EVERY 8 HOURS SCHEDULED
Status: DISCONTINUED | OUTPATIENT
Start: 2021-06-09 | End: 2021-06-12 | Stop reason: HOSPADM

## 2021-06-09 RX ORDER — ACETAMINOPHEN 650 MG/1
650 SUPPOSITORY RECTAL EVERY 6 HOURS PRN
Status: DISCONTINUED | OUTPATIENT
Start: 2021-06-09 | End: 2021-06-12 | Stop reason: HOSPADM

## 2021-06-09 RX ORDER — LANOLIN ALCOHOL/MO/W.PET/CERES
3 CREAM (GRAM) TOPICAL NIGHTLY
Status: DISCONTINUED | OUTPATIENT
Start: 2021-06-09 | End: 2021-06-12 | Stop reason: HOSPADM

## 2021-06-09 RX ORDER — ONDANSETRON 2 MG/ML
4 INJECTION INTRAMUSCULAR; INTRAVENOUS EVERY 6 HOURS PRN
Status: DISCONTINUED | OUTPATIENT
Start: 2021-06-09 | End: 2021-06-12 | Stop reason: HOSPADM

## 2021-06-09 RX ORDER — POLYETHYLENE GLYCOL 3350 17 G/17G
17 POWDER, FOR SOLUTION ORAL DAILY
Status: DISCONTINUED | OUTPATIENT
Start: 2021-06-10 | End: 2021-06-12 | Stop reason: HOSPADM

## 2021-06-09 RX ORDER — ONDANSETRON 4 MG/1
4 TABLET, ORALLY DISINTEGRATING ORAL EVERY 8 HOURS PRN
Status: DISCONTINUED | OUTPATIENT
Start: 2021-06-09 | End: 2021-06-12 | Stop reason: HOSPADM

## 2021-06-09 RX ORDER — HYDROCODONE BITARTRATE AND ACETAMINOPHEN 5; 325 MG/1; MG/1
1 TABLET ORAL EVERY 8 HOURS PRN
Status: DISCONTINUED | OUTPATIENT
Start: 2021-06-09 | End: 2021-06-10

## 2021-06-09 RX ORDER — ACETAMINOPHEN 500 MG
1000 TABLET ORAL EVERY 4 HOURS PRN
Status: DISCONTINUED | OUTPATIENT
Start: 2021-06-09 | End: 2021-06-09

## 2021-06-09 RX ORDER — SODIUM CHLORIDE 9 MG/ML
25 INJECTION, SOLUTION INTRAVENOUS PRN
Status: DISCONTINUED | OUTPATIENT
Start: 2021-06-09 | End: 2021-06-12 | Stop reason: HOSPADM

## 2021-06-09 RX ORDER — MAGNESIUM SULFATE 1 G/100ML
1000 INJECTION INTRAVENOUS PRN
Status: DISCONTINUED | OUTPATIENT
Start: 2021-06-09 | End: 2021-06-12 | Stop reason: HOSPADM

## 2021-06-09 RX ORDER — MIDODRINE HYDROCHLORIDE 2.5 MG/1
2.5 TABLET ORAL ONCE
Status: COMPLETED | OUTPATIENT
Start: 2021-06-09 | End: 2021-06-09

## 2021-06-09 RX ORDER — SODIUM CHLORIDE 9 MG/ML
INJECTION, SOLUTION INTRAVENOUS CONTINUOUS
Status: DISCONTINUED | OUTPATIENT
Start: 2021-06-09 | End: 2021-06-10

## 2021-06-09 RX ORDER — SODIUM CHLORIDE, SODIUM LACTATE, POTASSIUM CHLORIDE, AND CALCIUM CHLORIDE .6; .31; .03; .02 G/100ML; G/100ML; G/100ML; G/100ML
1000 INJECTION, SOLUTION INTRAVENOUS ONCE
Status: COMPLETED | OUTPATIENT
Start: 2021-06-09 | End: 2021-06-09

## 2021-06-09 RX ORDER — SODIUM CHLORIDE, SODIUM LACTATE, POTASSIUM CHLORIDE, AND CALCIUM CHLORIDE .6; .31; .03; .02 G/100ML; G/100ML; G/100ML; G/100ML
500 INJECTION, SOLUTION INTRAVENOUS ONCE
Status: COMPLETED | OUTPATIENT
Start: 2021-06-09 | End: 2021-06-10

## 2021-06-09 RX ADMIN — Medication 3 MG: at 23:36

## 2021-06-09 RX ADMIN — HYDROCODONE BITARTRATE AND ACETAMINOPHEN 1 TABLET: 5; 325 TABLET ORAL at 23:34

## 2021-06-09 RX ADMIN — SODIUM CHLORIDE, POTASSIUM CHLORIDE, SODIUM LACTATE AND CALCIUM CHLORIDE 1000 ML: 600; 310; 30; 20 INJECTION, SOLUTION INTRAVENOUS at 15:36

## 2021-06-09 RX ADMIN — DOCUSATE SODIUM 100 MG: 100 CAPSULE ORAL at 23:36

## 2021-06-09 RX ADMIN — SODIUM CHLORIDE, POTASSIUM CHLORIDE, SODIUM LACTATE AND CALCIUM CHLORIDE 500 ML: 600; 310; 30; 20 INJECTION, SOLUTION INTRAVENOUS at 21:08

## 2021-06-09 RX ADMIN — MIDODRINE HYDROCHLORIDE 2.5 MG: 2.5 TABLET ORAL at 16:11

## 2021-06-09 RX ADMIN — HEPARIN SODIUM 5000 UNITS: 5000 INJECTION INTRAVENOUS; SUBCUTANEOUS at 23:37

## 2021-06-09 ASSESSMENT — PAIN DESCRIPTION - FREQUENCY: FREQUENCY: CONTINUOUS

## 2021-06-09 ASSESSMENT — PAIN DESCRIPTION - ORIENTATION: ORIENTATION: LEFT

## 2021-06-09 ASSESSMENT — PAIN - FUNCTIONAL ASSESSMENT: PAIN_FUNCTIONAL_ASSESSMENT: PREVENTS OR INTERFERES SOME ACTIVE ACTIVITIES AND ADLS

## 2021-06-09 ASSESSMENT — PAIN DESCRIPTION - PROGRESSION
CLINICAL_PROGRESSION: NOT CHANGED

## 2021-06-09 ASSESSMENT — PAIN DESCRIPTION - ONSET: ONSET: AWAKENED FROM SLEEP

## 2021-06-09 ASSESSMENT — PAIN SCALES - GENERAL
PAINLEVEL_OUTOF10: 8
PAINLEVEL_OUTOF10: 4

## 2021-06-09 ASSESSMENT — PAIN DESCRIPTION - LOCATION: LOCATION: HAND;ARM

## 2021-06-09 ASSESSMENT — PAIN DESCRIPTION - PAIN TYPE: TYPE: CHRONIC PAIN

## 2021-06-09 NOTE — ED NOTES
Report received from [facility]. Pt resting in bed. Pillow provided and scooted pt up in bed. Call light in reach.  Will continue to monitor       Shannan Mejia RN  06/09/21 1949

## 2021-06-09 NOTE — ED NOTES
Patient repositioned in bed for comfort. Patient given meal tray.      Shavonne Connell RN  06/09/21 2060

## 2021-06-09 NOTE — ED PROVIDER NOTES
side hole at the EG junction level but appear to be within the proximal stomach. Aspirated contrast within the lower lungs, prominent cardiac silhouette again noted. Enteric tube with tip position as above. If there is concern about position, small amount of contrast could be injected with a follow-up KUB. IR GUIDED NEPHROSTOMY CATH PLACEMENT LEFT    Result Date: 5/27/2021  PROCEDURE: PERCUTANEOUS left ANTEGRADE PYELOGRAM Attempted replacement of a left nephrostomy tube through existing tract with fluoroscopic guidance; Attempted left PERCUTANEOUS NEPHROSTOMY TUBE PLACEMENT CONVERSION/PLACEMENT OF A 10 Indian BY 24 RIGHT NEPHROURETERAL STENT ULTRASOUND GUIDANCE MODERATE CONSCIOUS SEDATION 5/27/2021 HISTORY: ORDERING SYSTEM PROVIDED HISTORY: OBSTRUCTIVE UROPATHY REPORTED TO BE AT THE ILEAL CONDUIT. DISLODGED LEFT NEPHROSTOMY TUBE WHICH IS THE 2ND TIME THIS MONTH.) SEDATION: 2 MG versed and 100 MICRO G fentanyl were titrated intravenously for moderate sedation monitored under my direction. Total intra service time of sedation was 120 minutes. The patient's vital signs were monitored throughout the procedure and recorded in the patient's medical record by the nurse. CONTRAST: 40 CC FLUOROSCOPY DOSE AND TYPE OR TIME AND EXPOSURES: 200,542 MGY CM SQUARED DAP TECHNIQUE Informed consent was obtained following detailed description of the procedure including risks, benefits, and alternatives. Universal protocol was followed. The patient's left flank was prepped and draped in sterile fashion and local anesthesia was achieved with lidocaine. An attempt was made to recannulate the tract using a 5 Western [place] guiding catheter. However, contrast administration did not opacified left renal collecting system. An Accustick needle was advanced into a posterior lower pole calculus using ultrasound and fluoroscopic guidance and a percutaneous antegrade pyelogram was performed.  This demonstrated large staghorn calculus but without ureteral stenosis. Right nephrostogram demonstrates prompt clearance into the ileal conduit without significant ureteral stenosis. The ileal conduit did not appear dilated. Successful conversion/placement of a 10 Finnish by 24 cm nephroureteral stent. Attempted recannulization and placement of a left nephrostomy tube. Since there is no left ureteral obstruction, the left collecting system will be drained through the contralateral right nephroureteral stent. The above findings were discussed with Dr. Radha Peña who agreed with the plan and will follow-up the patient in clinic the near future. FLUORO FOR SURGICAL PROCEDURES    Result Date: 6/8/2021  Radiology exam is complete. No Radiologist dictation. Please follow up with ordering provider. FL MODIFIED BARIUM SWALLOW W VIDEO    Result Date: 5/11/2021  EXAMINATION: MODIFIED BARIUM SWALLOW WAS PERFORMED IN CONJUNCTION WITH SPEECH PATHOLOGY SERVICES TECHNIQUE: Fluoroscopic evaluation of the swallowing mechanism was performed with multiple consistency of barium product. FLUOROSCOPY DOSE AND TYPE OR TIME AND EXPOSURES: Fluoro time: 3.2 minutes; DAP: 47.9 mGy COMPARISON: None HISTORY: ORDERING SYSTEM PROVIDED HISTORY: SLP recommended TECHNOLOGIST PROVIDED HISTORY: SLP recommended Reason for Exam: Dysphagia. Acuity: Acute Type of Exam: Initial 80year-old male with dysphagia; rule out aspiration FINDINGS: With the honey thick liquid substance, there was trace penetration without aspiration. With the thick liquid substance by straw, there was deep penetration to the cords with trace aspiration and throat clearing. Thin liquid substance was not attempted. With the pureed/pudding thick substance, there was trace penetration without aspiration. With the soft and cookie solid substances, there was no penetration or aspiration. 1. Deep penetration to the cords with trace aspiration in throat clearing with the thick liquid substance by straw.  2. Trace penetration without aspiration with the honey thick liquid substance. 3. Trace penetration without aspiration with the pureed/pudding thick substance. Please see separate speech pathology report for full discussion of findings and recommendations. IR GUIDED NEPHROSTOMY CATH EXCHANGE    Result Date: 5/17/2021  PROCEDURE: PERCUTANEOUS ANTEGRADE PYELOGRAM LEFT PERCUTANEOUS NEPHROSTOMY TUBE PLACEMENT ULTRASOUND GUIDANCE ATTEMPTED REPLACEMENT OF A LEFT NEPHROSTOMY TUBE THROUGH EXISTING TRACT WITH FLUOROSCOPIC GUIDANCE 5/17/2021 HISTORY: ORDERING SYSTEM PROVIDED HISTORY: Left nephrostomy dislodged TECHNOLOGIST PROVIDED HISTORY: Left nephrostomy dislodged Bilateral nephrolithiasis. Cystectomy. Ileal conduit. SEDATION: None CONTRAST: 50 mL FLUOROSCOPY DOSE AND TYPE OR TIME AND EXPOSURES: 9.4 minutes, 95408 mGy cm2 dose area product TECHNIQUE Informed consent was obtained following detailed description of the procedure including risks, benefits, and alternatives. Universal protocol was followed. The patient's flank was prepped and draped in sterile fashion and local anesthesia was achieved with lidocaine. Initially, using a 40 cm angled catheter and Glidewire, I attempted access through the existing tract, though was unable to opacify and access the distal tract at the level of the collecting system, felt related to this representing a recent access site. Therefore, ultrasound was prepped in, and the soft tissues overlying the left kidney were anesthetized with 1% lidocaine. A small dermatotomy was made. An Accustick needle was advanced into a posterior mid pole calyx using ultrasound guidance and a percutaneous antegrade pyelogram was performed. A 0.035 guidewire was used to place a 10 FR percutaneous nephrostomy tube after the fascial tract was dilated. The catheter was sutured to the skin and the patient tolerated the procedure well. FINDINGS: Percutaneous antegrade pyelogram:  Staghorn calculi. Unsuccessful replacement of the left nephrostomy tube through the previous trach site. Successful placement of a new left nephrostomy tube, 10 Western [place], using ultrasound and fluoroscopic guidance. IR GUIDED NEPHROURETERAL CATH REMOVE/REPLACE    Result Date: 5/27/2021  PROCEDURE: PERCUTANEOUS left ANTEGRADE PYELOGRAM Attempted replacement of a left nephrostomy tube through existing tract with fluoroscopic guidance; Attempted left PERCUTANEOUS NEPHROSTOMY TUBE PLACEMENT CONVERSION/PLACEMENT OF A 10 D[place] BY 24 RIGHT NEPHROURETERAL STENT ULTRASOUND GUIDANCE MODERATE CONSCIOUS SEDATION 5/27/2021 HISTORY: ORDERING SYSTEM PROVIDED HISTORY: OBSTRUCTIVE UROPATHY REPORTED TO BE AT THE ILEAL CONDUIT. DISLODGED LEFT NEPHROSTOMY TUBE WHICH IS THE 2ND TIME THIS MONTH.) SEDATION: 2 MG versed and 100 MICRO G fentanyl were titrated intravenously for moderate sedation monitored under my direction. Total intra service time of sedation was 120 minutes. The patient's vital signs were monitored throughout the procedure and recorded in the patient's medical record by the nurse. CONTRAST: 40 CC FLUOROSCOPY DOSE AND TYPE OR TIME AND EXPOSURES: 200,542 MGY CM SQUARED DAP TECHNIQUE Informed consent was obtained following detailed description of the procedure including risks, benefits, and alternatives. Universal protocol was followed. The patient's left flank was prepped and draped in sterile fashion and local anesthesia was achieved with lidocaine. An attempt was made to recannulate the tract using a 5 Western [place] guiding catheter. However, contrast administration did not opacified left renal collecting system. An Accustick needle was advanced into a posterior lower pole calculus using ultrasound and fluoroscopic guidance and a percutaneous antegrade pyelogram was performed.  This demonstrated large staghorn calculus but prompt clearance of the contrast of through the left ureter and ileal conduit without evidence of ureteral without significant ureteral stenosis. The ileal conduit did not appear dilated. Successful conversion/placement of a 10 Georgian by 24 cm nephroureteral stent. Attempted recannulization and placement of a left nephrostomy tube. Since there is no left ureteral obstruction, the left collecting system will be drained through the contralateral right nephroureteral stent. The above findings were discussed with Dr. Viola Sesay who agreed with the plan and will follow-up the patient in clinic the near future. RECENT VITALS:     Temp: 98.2 °F (36.8 °C),  Pulse: 58, Resp: 16, BP: 121/82, SpO2: 97 %    This patient is a 80 y.o. Male with acute episode of hypotension with his home nurse came. Patient has history of other cancer. Presyncopal symptoms at home. EKG did show a prolonged NH interval otherwise work-up has been unremarkable. She has received 2 L of IV fluids and midodrine. 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 OUTSTANDING TASKS / RECOMMENDATIONS:    1. Patient admitted to [facility]   2. Awaiting bed placement     FINAL IMPRESSION:     1. Pre-syncope    2. Orthostatic hypotension        DISPOSITION:         DISPOSITION:  []  Discharge   []  Transfer -    [x]  Admission -     []  Against Medical Advice   []  Eloped   FOLLOW-UP: No follow-up provider specified.    DISCHARGE MEDICATIONS: New Prescriptions    No medications on file           Tonya De Luna DO  Emergency Medicine Resident  [facility]       [facility] BaeNorth[facility] Medical Cent[facility]iselle  Resident  06/09/21 6003

## 2021-06-09 NOTE — ED PROVIDER NOTES
FACULTY SIGN-OUT  ADDENDUM     Care of this patient was assumed from previous attending physician. The patient's initial evaluation and plan have been discussed with the prior provider who initially evaluated the patient. Attestation  I was available and discussed any additional care issues that arose and coordinated the management plans with the resident(s) caring for the patient during my duty period. Any areas of disagreement with resident's documentation of care or procedures are noted on the chart. I was personally present for the key portions of any/all procedures, during my duty period. I have documented in the chart those procedures where I was not present during the key portions. ED COURSE      The patient was given the following medications:  Orders Placed This Encounter   Medications    lactated ringers bolus    midodrine (PROAMATINE) tablet 2.5 mg    lactated ringers bolus       RECENT VITALS:   Temp: 98.2 °F (36.8 °C), Pulse: 58, Resp: 16, BP: 121/82    MEDICAL DECISION MAKING        Federico Kay is a 80 y.o. male who presents to the Emergency Department with complaints of lightheadedness, orthostatic hypotension. Hx bladder ca with nephrostomy. Plan admit.       Kameron Paris MD  Attending Emergency Physician    (Please note that portions of this note were completed with a voice recognition program.  Efforts were made to edit the dictations but occasionally words are mis-transcribed.)          Kameron Paris MD  06/09/21 6020

## 2021-06-09 NOTE — ED NOTES
The following labs were labeled with patient stickers & tubed to lab;    [x]Lavender   []On Ice  [x]Blue  [x]Green/ Yellow  [x]Green/ Black []On Ice  []Pink  []Red  []Yellow    []COVID-19 Swab []Rapid    [x]Urine Sample- from nephrostomy bag  []Pelvic Cultures    []Blood Cultures       Emma Lara RN  06/09/21 0058

## 2021-06-09 NOTE — ED PROVIDER NOTES
[facility]  Emergency Department  Faculty Attestation     I performed a history and physical examination of the patient and discussed management with the resident. I reviewed the residents note and agree with the documented findings and plan of care. Any areas of disagreement are noted on the chart. I was personally present for the key portions of any procedures. I have documented in the chart those procedures where I was not present during the key portions. I have reviewed the emergency nurses triage note. I agree with the chief complaint, past medical history, past surgical history, allergies, medications, social and family history as documented unless otherwise noted below. For Physician Assistant/ Nurse Practitioner cases/documentation I have personally evaluated this patient and have completed at least one if not all key elements of the E/M (history, physical exam, and MDM). Additional findings are as noted.       Primary Care Physician:  Grayson Kim MD    Screenings:  [unfilled]    CHIEF COMPLAINT       Chief Complaint   Patient presents with    Hypotension     renal cancer stage 4       RECENT VITALS:   Temp: 98.2 °F (36.8 °C),  Pulse: 57, Resp: 19, BP: (!) 86/52    LABS:  Labs Reviewed   CBC WITH AUTO DIFFERENTIAL - Abnormal; Notable for the following components:       Result Value    RBC 3.87 (*)     Hemoglobin 10.7 (*)     Hematocrit 35.1 (*)     RDW 16.9 (*)     Seg Neutrophils 70 (*)     Lymphocytes 19 (*)     All other components within normal limits   BASIC METABOLIC PANEL - Abnormal; Notable for the following components:    BUN 29 (*)     CREATININE 2.05 (*)     GFR Non- 31 (*)     GFR  38 (*)     All other components within normal limits   CULTURE, URINE   LACTIC ACID, WHOLE BLOOD   URINALYSIS WITH MICROSCOPIC       Radiology  No orders to display       CRITICAL CARE: There was a high probability of clinically significant/life threatening deterioration in this patient's condition which required my urgent intervention. Total critical care time was none minutes. This excludes any time for separately reportable procedures. EKG:   EKG Interpretation    Interpreted by me    Rhythm: normal sinus   Rate: normal  Axis: normal  Ectopy: none  Conduction: First-degree AVB  ST Segments: no acute change  T Waves: Flattening  Q Waves: Inferior  Clinical Impression: Low voltage, prior inferior MI, first-degree AVB, no acute changes    Attending Physician Additional  Notes    Patient has lightheadedness when standing so severe that he has to crawl on the floor sometimes. He feels presyncopal and tries to rush back to his chair. Visiting nurse check his blood pressure today and it was 80/40 sitting, 60/30 standing. He has been having hypotension and on and off of the past several weeks. He has recent history of bladder cancer, incisional hernia repair, renal obstruction with ileal conduit placed as well as right nephrostomy tube. He denies fevers but has occasional chills no sweats. No cough chest pain shortness of breath. No abdominal pain. No bleeding. There is been reasonable output from both the nephrostomy tube and the ileal loop. He had a staple that was occluding his nephrostomy tube which was removed but the flow was determined to be normal by contrast study. Apparently his nephrostomy tube will be removed soon. He is not on blood pressure medication. No history of thyroid disease. No known history of adrenal crisis. He had previously been euglycemic and hyperglycemic, occasionally now hypoglycemic on insulin. He is not yet on midodrine. On exam he is normotensive bradycardic normothermic no respiratory distress. No pallor noted. Skin is warm and pink with normal capillary refill. No respiratory distress. No CVA tenderness. Abdomen is soft without tenderness distention tympany or rebound.   No apparent bleeding. No purpura or petechiae. No edema cords Homans or calf tenderness. Mouth is moist.  Impression is hypotension, orthostasis, consider sepsis, UTI, hypothyroid, adrenal insufficiency or other cause. Plan is IV fluids, laboratory studies, oral midodrine, reassess. Anticipate discussion with his PCP and specialists. Anticipate admission. Vinnie Trivdei.  Deysi Goodman MD, [facility]  Attending Emergency  Physician               Madonna Cuevas MD  06/09/21 2014       Madonna Cuevas MD  06/09/21 6334

## 2021-06-09 NOTE — ED NOTES
Patient brought in by family members who reports blood pressures of 60s/40s at home. Patient reports left elbow pain from falling during previous hospital stay. Patient with history of stage four renal cancer and right sided nephrostomy tube to be removed by Dr Gareth Villagomez, urology. Patient placed in gown and placed on full cardiac monitor.      Aide Matthew RN  06/09/21 5980

## 2021-06-09 NOTE — ED PROVIDER NOTES
[facility]. 2  Emergency Department Encounter  EmergencyMedicine Resident     Pt Mavis Coto  MRN: [id number]  [place] 1940  Date of evaluation: 6/9/21  PCP:  Autumn Burnett MD    [address] [place] [address]       Chief Complaint   Patient presents with    Hypotension     renal cancer stage 4       HISTORY OF PRESENT ILLNESS  (Location/Symptom, Timing/Onset, Context/Setting, Quality, Duration, Modifying Factors, Severity.)      Leo Steiner is a 80 y.o. male who presents with hypotension reported by his family. Patient is reporting lightheadedness and visiting nurse reported blood pressure of 80/40 sitting and 60/30 standing. Denies any other associated symptoms including fevers, chest pain, palpitations, abdominal pain or bowel or bladder changes. Medical history includes bladder cancer and he has an ileal conduit and nephrostomy tube in place. Patient states that the output from these has been stable. He was seen yesterday in follow-up by his nephrologist and told that he would have his nephrostomy tube pulled in the near future. Abdominal hernia was repaired in May, surgical wound has been healing without complications. PAST MEDICAL / SURGICAL / SOCIAL / FAMILY HISTORY      has a past medical history of Arthritis, Caffeine use, Cancer ([address] Cancer of kidney, unspecified laterality ([address] CKD (chronic kidney disease), Cognitive communication deficit, COPD (chronic obstructive pulmonary disease) ([address] Diabetes mellitus ([address] DNR (do not resuscitate), Dysphagia, GERD (gastroesophageal reflux disease), GI bleeding, Hernia, inguinal, right, Hyperlipidemia, Hypertension, Ileal conduit stomal stenosis, Kidney stone, Mobility impaired, Muscle weakness, Nursing home resident, PAF (paroxysmal atrial fibrillation) ([address] Presence of urostomy ([address] and Retinal detachment. has a past surgical history that includes hernia repair; Bladder removal (2006); Prostate surgery (2006);  Cataract removal (Bilateral); Colonoscopy; Eye surgery (Right, ); vitrectomy (Left, 16); Incisional hernia repair (2015); Incisional hernia repair (2014); laparoscopy (N/A, 2021); Cystoscopy (N/A, 5/3/2021); IR GUIDED NEPHROSTOMY CATH PLACEMENT LEFT (5/3/2021); IR GUIDED NEPHROSTOMY CATH PLACEMENT RIGHT (2021); eye surgery; knee surgery (Left); other surgical history (2021); IR GUIDED NEPHROSTOMY CATH PLACEMENT LEFT (2021); other surgical history (2021); and Cystoscopy (N/A, 2021). Social History     Socioeconomic History    Marital status:      Spouse name: Not on file    Number of children: 2    Years of education: 11.5    Highest education level: Not on file   Occupational History    Occupation: retired   Tobacco Use    Smoking status: Former Smoker     Packs/day: 0.50     Years: 20.00     Pack years: 10.00     Types: Cigarettes     Quit date: 1979     Years since quittin.9    Smokeless tobacco: Never Used    Tobacco comment: quit in    Substance and Sexual Activity    Alcohol use: No     Alcohol/week: 0.0 standard drinks    Drug use: No    Sexual activity: Not on file   Other Topics Concern    Not on file   Social History Narrative    Diet - terrible. [employer] every morning, goes to [employer]    Caffeine intake per day - 2 cups per day q am    Exercise pattern - none, but care for self and house    Living situation - ranch and a cat. Social Determinants of Health     Financial Resource Strain:     Difficulty of Paying Living Expenses:    Food Insecurity:     Worried About Running Out of Food in the Last Year:     [address] in the Last Year:    Transportation Needs:     Lack of Transportation (Medical):      Lack of Transportation (Non-Medical):    Physical Activity:     Days of Exercise per Week:     Minutes of Exercise per Session:    Stress:     Feeling of Stress :    Social Connections:     Frequency of Communication breath sounds. Abdominal:      General: Abdomen is flat. There is no distension. Palpations: Abdomen is soft. Tenderness: There is no abdominal tenderness. There is no guarding. Musculoskeletal:      Cervical back: No muscular tenderness. Right lower leg: No edema. Left lower leg: No edema. Skin:     General: Skin is warm and dry. Capillary Refill: Capillary refill takes less than 2 seconds. Neurological:      General: No focal deficit present. Mental Status: He is alert and oriented to person, place, and time. Mental status is at baseline.    Psychiatric:         Mood and Affect: Mood normal.         DIFFERENTIAL  DIAGNOSIS     PLAN (LABS / IMAGING / EKG):  Orders Placed This Encounter   Procedures    Culture, Urine    COVID-19, Rapid    XR SHOULDER LEFT (MIN 2 VIEWS)    VL DUP UPPER EXTREMITY VENOUS RIGHT    IR REMOVAL NEPHROSTOMY TUBE W FLUORO    Urinalysis with microscopic    CBC WITH AUTO DIFFERENTIAL    BASIC METABOLIC PANEL    Lactic Acid, Whole Blood    Cortisol Total    TSH with Reflex    T4, Free    CBC    Protime-INR    Comprehensive Metabolic Panel w/ Reflex to MG    Hemoglobin A1C    CORTISOL    Orthostatic blood pressure and pulse    Gradual Compression Stockings (BRITTANY)    Inpatient consult to Urology    Inpatient consult to Hospitalist    Inpatient consult to Cardiology    Inpatient consult to Home Care Needs    OT eval and treat    PT evaluation and treat    Holter monitor 48 hour    EKG 12 Lead    EKG 12 Lead    EKG 12 Lead    PATIENT STATUS (FROM ED OR OR/PROCEDURAL) Inpatient    Discharge patient       MEDICATIONS ORDERED:  Orders Placed This Encounter   Medications    lactated ringers bolus    midodrine (PROAMATINE) tablet 2.5 mg    lactated ringers bolus    DISCONTD: acetaminophen (TYLENOL) tablet 1,000 mg    DISCONTD: albuterol (PROVENTIL) nebulizer solution 2.5 mg     Order Specific Question:   Initiate RT Bronchodilator Protocol     Answer:    Yes    DISCONTD: bisacodyl (DULCOLAX) suppository 10 mg    DISCONTD: docusate sodium (COLACE) capsule 100 mg    DISCONTD: HYDROcodone-acetaminophen (NORCO) 5-325 MG per tablet 1 tablet    DISCONTD: polyethylene glycol (GLYCOLAX) packet 17 g    DISCONTD: melatonin tablet 3 mg    DISCONTD: insulin lispro (HUMALOG) injection vial 0-6 Units    DISCONTD: insulin lispro (HUMALOG) injection vial 0-3 Units    DISCONTD: sodium chloride flush 0.9 % injection 5-40 mL    DISCONTD: sodium chloride flush 0.9 % injection 10 mL    DISCONTD: 0.9 % sodium chloride infusion    DISCONTD: potassium chloride (KLOR-CON M) extended release tablet 40 mEq    DISCONTD: potassium bicarb-citric acid (EFFER-K) effervescent tablet 40 mEq    DISCONTD: potassium chloride 10 mEq/100 mL IVPB (Peripheral Line)    DISCONTD: magnesium sulfate 1000 mg in dextrose 5% 100 mL IVPB    DISCONTD: ondansetron (ZOFRAN-ODT) disintegrating tablet 4 mg    DISCONTD: ondansetron (ZOFRAN) injection 4 mg    DISCONTD: acetaminophen (TYLENOL) tablet 650 mg    DISCONTD: acetaminophen (TYLENOL) suppository 650 mg    DISCONTD: 0.9 % sodium chloride infusion    DISCONTD: magnesium hydroxide (MILK OF MAGNESIA) 400 MG/5ML suspension 30 mL    DISCONTD: heparin (porcine) injection 5,000 Units    DISCONTD: midodrine (PROAMATINE) tablet 5 mg    DISCONTD: lactated ringers infusion    DISCONTD: 0.9 % sodium chloride infusion    DISCONTD: midodrine (PROAMATINE) tablet 10 mg    DISCONTD: midodrine (PROAMATINE) tablet 5 mg    iopamidol (ISOVUE-370) 76 % injection 100 mL    midodrine (PROAMATINE) 5 MG tablet     Sig: Take 1 tablet by mouth 3 times daily (with meals)     Dispense:  90 tablet     Refill:  0    Blood Pressure KIT     Si kit by Does not apply route daily     Dispense:  1 kit     Refill:  0       DIAGNOSTIC RESULTS / EMERGENCY DEPARTMENT COURSE / MDM   LAB RESULTS:  Results for orders placed or performed during the hospital encounter of 06/09/21   Culture, Urine    Specimen: Urine   Result Value Ref Range    Specimen Description . URINE     Special Requests NOT REPORTED     Culture NO GROWTH    Urinalysis with microscopic   Result Value Ref Range    Color, UA YELLOW YELLOW    Turbidity UA TURBID (A) CLEAR    Glucose, Ur NEGATIVE NEGATIVE    Bilirubin Urine NEGATIVE NEGATIVE    Ketones, Urine NEGATIVE NEGATIVE    Specific Gravity, UA 1.015 1.005 - 1.030    Urine Hgb LARGE (A) NEGATIVE    pH, UA 6.0 5.0 - 8.0    Protein, UA 3+ (A) NEGATIVE    Urobilinogen, Urine Normal Normal    Nitrite, Urine NEGATIVE NEGATIVE    Leukocyte Esterase, Urine LARGE (A) NEGATIVE    -          WBC, UA TOO NUMEROUS TO COUNT 0 - 5 /HPF    RBC, UA TOO NUMEROUS TO COUNT 0 - 4 /HPF    Casts UA  0 - 8 /LPF     0 TO 2 HYALINE Reference range defined for non-centrifuged specimen. Crystals, UA NOT REPORTED None /HPF    Epithelial Cells UA 2 TO 5 0 - 5 /HPF    Renal Epithelial, UA NOT REPORTED 0 /HPF    Bacteria, UA FEW (A) None    Mucus, UA NOT REPORTED None    Trichomonas, UA NOT REPORTED None    Amorphous, UA NOT REPORTED None    Other Observations UA NOT REPORTED NOT REQ.     Yeast, UA NOT REPORTED None   CBC WITH AUTO DIFFERENTIAL   Result Value Ref Range    WBC 6.2 3.5 - 11.3 k/uL    RBC 3.87 (L) 4.21 - 5.77 m/uL    Hemoglobin 10.7 (L) 13.0 - 17.0 g/dL    Hematocrit 35.1 (L) 40.7 - 50.3 %    MCV 90.7 82.6 - 102.9 fL    MCH 27.6 25.2 - 33.5 pg    MCHC 30.5 28.4 - 34.8 g/dL    RDW 16.9 (H) 11.8 - 14.4 %    Platelets 023 677 - 766 k/uL    MPV 9.8 8.1 - 13.5 fL    NRBC Automated 0.0 0.0 per 100 WBC    Differential Type NOT REPORTED     Seg Neutrophils 70 (H) 36 - 65 %    Lymphocytes 19 (L) 24 - 43 %    Monocytes 7 3 - 12 %    Eosinophils % 3 1 - 4 %    Basophils 1 0 - 2 %    Immature Granulocytes 0 0 %    Segs Absolute 4.30 1.50 - 8.10 k/uL    Absolute Lymph # 1.18 1.10 - 3.70 k/uL    Absolute Mono # 0.43 0.10 - 1.20 k/uL    Absolute Eos # 0.19 0.00 - 0.44 k/uL    Basophils Absolute 0.03 0.00 - 0.20 k/uL    Absolute Immature Granulocyte <0.03 0.00 - 0.30 k/uL    WBC Morphology NOT REPORTED     RBC Morphology ANISOCYTOSIS PRESENT     Platelet Estimate NOT REPORTED    BASIC METABOLIC PANEL   Result Value Ref Range    Glucose 94 70 - 99 mg/dL    BUN 29 (H) 8 - 23 mg/dL    CREATININE 2.05 (H) 0.70 - 1.20 mg/dL    Bun/Cre Ratio NOT REPORTED 9 - 20    Calcium 8.8 8.6 - 10.4 mg/dL    Sodium 138 135 - 144 mmol/L    Potassium 4.0 3.7 - 5.3 mmol/L    Chloride 106 98 - 107 mmol/L    CO2 20 20 - 31 mmol/L    Anion Gap 12 9 - 17 mmol/L    GFR Non-African American 31 (L) >60 mL/min    GFR  38 (L) >60 mL/min    GFR Comment          GFR Staging NOT REPORTED    Lactic Acid, Whole Blood   Result Value Ref Range    Lactic Acid, Whole Blood 1.8 0.7 - 2.1 mmol/L   Cortisol Total   Result Value Ref Range    Cortisol 11.0 2.7 - 18.4 ug/dL    Cortisol Collection Info NOT REPORTED    TSH with Reflex   Result Value Ref Range    TSH 7.33 (H) 0.30 - 5.00 mIU/L   T4, Free   Result Value Ref Range    Thyroxine, Free 1.31 0.93 - 1.70 ng/dL   CBC   Result Value Ref Range    WBC 4.1 3.5 - 11.3 k/uL    RBC 3.50 (L) 4.21 - 5.77 m/uL    Hemoglobin 9.6 (L) 13.0 - 17.0 g/dL    Hematocrit 32.4 (L) 40.7 - 50.3 %    MCV 92.6 82.6 - 102.9 fL    MCH 27.4 25.2 - 33.5 pg    MCHC 29.6 28.4 - 34.8 g/dL    RDW 16.8 (H) 11.8 - 14.4 %    Platelets 346 511 - 494 k/uL    MPV 9.7 8.1 - 13.5 fL    NRBC Automated 0.0 0.0 per 100 WBC   Protime-INR   Result Value Ref Range    Protime 10.4 9.1 - 12.3 sec    INR 1.0    Comprehensive Metabolic Panel w/ Reflex to MG   Result Value Ref Range    Glucose 86 70 - 99 mg/dL    BUN 25 (H) 8 - 23 mg/dL    CREATININE 1.96 (H) 0.70 - 1.20 mg/dL    Bun/Cre Ratio NOT REPORTED 9 - 20    Calcium 8.5 (L) 8.6 - 10.4 mg/dL    Sodium 136 135 - 144 mmol/L    Potassium 3.9 3.7 - 5.3 mmol/L    Chloride 107 98 - 107 mmol/L    CO2 19 (L) 20 - 31 mmol/L    Anion Gap 10 9 - 17 mmol/L mmol/L    CO2 20 20 - 31 mmol/L    Anion Gap 11 9 - 17 mmol/L    GFR Non-African American 30 (L) >60 mL/min    GFR  37 (L) >60 mL/min    GFR Comment          GFR Staging NOT REPORTED    MAGNESIUM   Result Value Ref Range    Magnesium 2.0 1.6 - 2.6 mg/dL   CBC WITH AUTO DIFFERENTIAL   Result Value Ref Range    WBC 3.9 3.5 - 11.3 k/uL    RBC 3.63 (L) 4.21 - 5.77 m/uL    Hemoglobin 10.1 (L) 13.0 - 17.0 g/dL    Hematocrit 35.4 (L) 40.7 - 50.3 %    MCV 97.5 82.6 - 102.9 fL    MCH 27.8 25.2 - 33.5 pg    MCHC 28.5 28.4 - 34.8 g/dL    RDW 16.6 (H) 11.8 - 14.4 %    Platelets 541 184 - 294 k/uL    MPV 9.9 8.1 - 13.5 fL    NRBC Automated 0.0 0.0 per 100 WBC    Differential Type NOT REPORTED     Seg Neutrophils 63 36 - 65 %    Lymphocytes 23 (L) 24 - 43 %    Monocytes 8 3 - 12 %    Eosinophils % 5 (H) 1 - 4 %    Basophils 1 0 - 2 %    Immature Granulocytes 0 0 %    Segs Absolute 2.42 1.50 - 8.10 k/uL    Absolute Lymph # 0.90 (L) 1.10 - 3.70 k/uL    Absolute Mono # 0.31 0.10 - 1.20 k/uL    Absolute Eos # 0.18 0.00 - 0.44 k/uL    Basophils Absolute 0.03 0.00 - 0.20 k/uL    Absolute Immature Granulocyte <0.03 0.00 - 0.30 k/uL    WBC Morphology NOT REPORTED     RBC Morphology ANISOCYTOSIS PRESENT     Platelet Estimate NOT REPORTED    RENAL FUNCTION PANEL   Result Value Ref Range    Glucose 86 70 - 99 mg/dL    BUN 23 8 - 23 mg/dL    CREATININE 1.74 (H) 0.70 - 1.20 mg/dL    Bun/Cre Ratio NOT REPORTED 9 - 20    Calcium 8.6 8.6 - 10.4 mg/dL    Albumin 2.7 (L) 3.5 - 5.2 g/dL    Phosphorus 3.4 2.5 - 4.5 mg/dL    Sodium 141 135 - 144 mmol/L    Potassium 4.3 3.7 - 5.3 mmol/L    Chloride 111 (H) 98 - 107 mmol/L    CO2 21 20 - 31 mmol/L    Anion Gap 9 9 - 17 mmol/L    GFR Non-African American 38 (L) >60 mL/min    GFR  46 (L) >60 mL/min    GFR Comment          GFR Staging NOT REPORTED    MAGNESIUM   Result Value Ref Range    Magnesium 2.5 1.6 - 2.6 mg/dL   EKG 12 Lead   Result Value Ref Range    Ventricular Rate 65 BPM    Atrial Rate 65 BPM    P-R Interval 228 ms    QRS Duration 96 ms    Q-T Interval 384 ms    QTc Calculation (Bazett) 399 ms    P Axis 71 degrees    R Axis -26 degrees    T Axis 39 degrees   EKG 12 Lead   Result Value Ref Range    Ventricular Rate 56 BPM    Atrial Rate 56 BPM    P-R Interval 248 ms    QRS Duration 94 ms    Q-T Interval 426 ms    QTc Calculation (Bazett) 411 ms    P Axis 69 degrees    R Axis -14 degrees    T Axis 29 degrees       IMPRESSION: Donna Rankin is a 80 y.o. man presenting for reported hypotension w/pre-syncopal syndrome. Will order IVF, midodrine, repeat orthostatic vital signs. Cardiac vs neurogenic vs orthostatic pre-syncopal symptoms. Also metabolic etiology could include endocrine, check TSH and cortisol. Also considered septic cause, however not meeting SIRS criteria. F/u EKG, BMP, CBC, lactate. RADIOLOGY:  XR SHOULDER LEFT (MIN 2 VIEWS)    Result Date: 6/11/2021  Degenerative changes of the left shoulder without convincing evidence of acute fracture. IR REMOVAL NEPHROSTOMY TUBE W FLUORO    Result Date: 6/11/2021  Successful fluoroscopic guided removal of right nephroureteral stent      EKG  Normal rate, regular sinus, long NV, otherwise normal intervals, T wave inversions aVR and V1,  No ST elevations or depressions, no significant changes from prior EKG    All EKG's are interpreted by the Emergency Department Physician who either signs or Co-signs this chart in the absence of a cardiologist.    EMERGENCY DEPARTMENT COURSE:  Patient seen and evaluated, VSS and nontoxic in appearance. ED work-up demonstrates no leukocytosis, hemoglobin 10.7, Cr 2.05 decreased from baseline, TSH elevated 7.33, normal cortisol, large LE on UA from nephrostomy tube. EKG unchanged from prior. 2 L IVF and meds given. Orthostatic vital signs still positive. Cardiology consulted for presyncopal symptoms with orthostatic hypotension and sinus bradycardia.   Urology consulted regarding nephrostomy tube and possible removal.  Patient recommended for admission due to ongoing symptoms. PROCEDURES:  none    CONSULTS:  IP CONSULT TO UROLOGY  IP CONSULT TO HOSPITALIST  IP CONSULT TO CARDIOLOGY  IP CONSULT TO HOME CARE NEEDS    CRITICAL CARE:  Please see attending note    FINAL IMPRESSION      1. Pre-syncope    2. Orthostatic hypotension          DISPOSITION / PLAN     DISPOSITION Admitted 06/09/2021 06:26:25 PM      PATIENT REFERRED TO:  Josiah Hunt MD  [address]  [phone]    In 1 week  Hospital follow-up for syncope    [facility]  [address] [place] [id number] [phone]  Call  for 3 week appt.   To be seen once holter monitor completed       DISCHARGE MEDICATIONS:  Discharge Medication List as of 6/12/2021 12:13 PM      START taking these medications    Details   midodrine (PROAMATINE) 5 MG tablet Take 1 tablet by mouth 3 times daily (with meals), Disp-90 tablet, R-0Normal      Blood Pressure KIT DAILY Starting Sat 6/12/2021, Disp-1 kit, R-0, Normal             Rosa Thornton DO  Emergency Medicine Resident    (Please note that portions of thisnote were completed with a voice recognition program.  Efforts were made to edit the dictations but occasionally words are mis-transcribed.)        Bishop Carey DO  Resident  06/16/21 [id number]

## 2021-06-10 ENCOUNTER — APPOINTMENT (OUTPATIENT)
Dept: GENERAL RADIOLOGY | Age: 81
DRG: 988 | End: 2021-06-10
Payer: MEDICARE

## 2021-06-10 PROBLEM — E03.8 SUBCLINICAL HYPOTHYROIDISM: Status: ACTIVE | Noted: 2021-06-10

## 2021-06-10 PROBLEM — M25.512 LEFT SHOULDER PAIN: Status: ACTIVE | Noted: 2021-06-10

## 2021-06-10 LAB
ALBUMIN SERPL-MCNC: 2.7 G/DL (ref 3.5–5.2)
ALBUMIN/GLOBULIN RATIO: 1 (ref 1–2.5)
ALP BLD-CCNC: 55 U/L (ref 40–129)
ALT SERPL-CCNC: 6 U/L (ref 5–41)
ANION GAP SERPL CALCULATED.3IONS-SCNC: 10 MMOL/L (ref 9–17)
AST SERPL-CCNC: 15 U/L
BILIRUB SERPL-MCNC: 0.37 MG/DL (ref 0.3–1.2)
BUN BLDV-MCNC: 25 MG/DL (ref 8–23)
BUN/CREAT BLD: ABNORMAL (ref 9–20)
CALCIUM SERPL-MCNC: 8.5 MG/DL (ref 8.6–10.4)
CHLORIDE BLD-SCNC: 107 MMOL/L (ref 98–107)
CO2: 19 MMOL/L (ref 20–31)
CREAT SERPL-MCNC: 1.96 MG/DL (ref 0.7–1.2)
CULTURE: NO GROWTH
EKG ATRIAL RATE: 56 BPM
EKG ATRIAL RATE: 65 BPM
EKG P AXIS: 69 DEGREES
EKG P AXIS: 71 DEGREES
EKG P-R INTERVAL: 228 MS
EKG P-R INTERVAL: 248 MS
EKG Q-T INTERVAL: 384 MS
EKG Q-T INTERVAL: 426 MS
EKG QRS DURATION: 94 MS
EKG QRS DURATION: 96 MS
EKG QTC CALCULATION (BAZETT): 399 MS
EKG QTC CALCULATION (BAZETT): 411 MS
EKG R AXIS: -14 DEGREES
EKG R AXIS: -26 DEGREES
EKG T AXIS: 29 DEGREES
EKG T AXIS: 39 DEGREES
EKG VENTRICULAR RATE: 56 BPM
EKG VENTRICULAR RATE: 65 BPM
ESTIMATED AVERAGE GLUCOSE: 105 MG/DL
GFR AFRICAN AMERICAN: 40 ML/MIN
GFR NON-AFRICAN AMERICAN: 33 ML/MIN
GFR SERPL CREATININE-BSD FRML MDRD: ABNORMAL ML/MIN/{1.73_M2}
GFR SERPL CREATININE-BSD FRML MDRD: ABNORMAL ML/MIN/{1.73_M2}
GLUCOSE BLD-MCNC: 86 MG/DL (ref 70–99)
HBA1C MFR BLD: 5.3 % (ref 4–6)
HCT VFR BLD CALC: 32.4 % (ref 40.7–50.3)
HEMOGLOBIN: 9.6 G/DL (ref 13–17)
INR BLD: 1
Lab: NORMAL
MCH RBC QN AUTO: 27.4 PG (ref 25.2–33.5)
MCHC RBC AUTO-ENTMCNC: 29.6 G/DL (ref 28.4–34.8)
MCV RBC AUTO: 92.6 FL (ref 82.6–102.9)
NRBC AUTOMATED: 0 PER 100 WBC
PDW BLD-RTO: 16.8 % (ref 11.8–14.4)
PLATELET # BLD: 157 K/UL (ref 138–453)
PMV BLD AUTO: 9.7 FL (ref 8.1–13.5)
POTASSIUM SERPL-SCNC: 3.9 MMOL/L (ref 3.7–5.3)
PROTHROMBIN TIME: 10.4 SEC (ref 9.1–12.3)
RBC # BLD: 3.5 M/UL (ref 4.21–5.77)
SODIUM BLD-SCNC: 136 MMOL/L (ref 135–144)
SPECIMEN DESCRIPTION: NORMAL
TOTAL PROTEIN: 5.5 G/DL (ref 6.4–8.3)
WBC # BLD: 4.1 K/UL (ref 3.5–11.3)

## 2021-06-10 PROCEDURE — 85610 PROTHROMBIN TIME: CPT

## 2021-06-10 PROCEDURE — 80053 COMPREHEN METABOLIC PANEL: CPT

## 2021-06-10 PROCEDURE — 83036 HEMOGLOBIN GLYCOSYLATED A1C: CPT

## 2021-06-10 PROCEDURE — APPSS45 APP SPLIT SHARED TIME 31-45 MINUTES: Performed by: NURSE PRACTITIONER

## 2021-06-10 PROCEDURE — 2060000000 HC ICU INTERMEDIATE R&B

## 2021-06-10 PROCEDURE — 6370000000 HC RX 637 (ALT 250 FOR IP): Performed by: CLINICAL NURSE SPECIALIST

## 2021-06-10 PROCEDURE — 97166 OT EVAL MOD COMPLEX 45 MIN: CPT

## 2021-06-10 PROCEDURE — 6370000000 HC RX 637 (ALT 250 FOR IP): Performed by: INTERNAL MEDICINE

## 2021-06-10 PROCEDURE — 2580000003 HC RX 258: Performed by: INTERNAL MEDICINE

## 2021-06-10 PROCEDURE — 93010 ELECTROCARDIOGRAM REPORT: CPT | Performed by: INTERNAL MEDICINE

## 2021-06-10 PROCEDURE — 73030 X-RAY EXAM OF SHOULDER: CPT

## 2021-06-10 PROCEDURE — 6360000002 HC RX W HCPCS: Performed by: CLINICAL NURSE SPECIALIST

## 2021-06-10 PROCEDURE — 85027 COMPLETE CBC AUTOMATED: CPT

## 2021-06-10 PROCEDURE — 99222 1ST HOSP IP/OBS MODERATE 55: CPT | Performed by: INTERNAL MEDICINE

## 2021-06-10 PROCEDURE — 97535 SELF CARE MNGMENT TRAINING: CPT

## 2021-06-10 PROCEDURE — 2580000003 HC RX 258: Performed by: CLINICAL NURSE SPECIALIST

## 2021-06-10 PROCEDURE — 36415 COLL VENOUS BLD VENIPUNCTURE: CPT

## 2021-06-10 RX ORDER — MIDODRINE HYDROCHLORIDE 5 MG/1
5 TABLET ORAL
Status: DISCONTINUED | OUTPATIENT
Start: 2021-06-10 | End: 2021-06-11

## 2021-06-10 RX ORDER — SODIUM CHLORIDE 9 MG/ML
INJECTION, SOLUTION INTRAVENOUS CONTINUOUS
Status: DISCONTINUED | OUTPATIENT
Start: 2021-06-10 | End: 2021-06-12 | Stop reason: HOSPADM

## 2021-06-10 RX ORDER — SODIUM CHLORIDE, SODIUM LACTATE, POTASSIUM CHLORIDE, CALCIUM CHLORIDE 600; 310; 30; 20 MG/100ML; MG/100ML; MG/100ML; MG/100ML
INJECTION, SOLUTION INTRAVENOUS CONTINUOUS
Status: DISCONTINUED | OUTPATIENT
Start: 2021-06-10 | End: 2021-06-10

## 2021-06-10 RX ADMIN — SODIUM CHLORIDE, PRESERVATIVE FREE 5 ML: 5 INJECTION INTRAVENOUS at 08:10

## 2021-06-10 RX ADMIN — HEPARIN SODIUM 5000 UNITS: 5000 INJECTION INTRAVENOUS; SUBCUTANEOUS at 21:00

## 2021-06-10 RX ADMIN — ACETAMINOPHEN 650 MG: 325 TABLET ORAL at 21:46

## 2021-06-10 RX ADMIN — Medication 3 MG: at 21:48

## 2021-06-10 RX ADMIN — MIDODRINE HYDROCHLORIDE 5 MG: 5 TABLET ORAL at 14:11

## 2021-06-10 RX ADMIN — SODIUM CHLORIDE: 9 INJECTION, SOLUTION INTRAVENOUS at 18:46

## 2021-06-10 RX ADMIN — DOCUSATE SODIUM 100 MG: 100 CAPSULE ORAL at 21:48

## 2021-06-10 RX ADMIN — SODIUM CHLORIDE: 9 INJECTION, SOLUTION INTRAVENOUS at 01:37

## 2021-06-10 ASSESSMENT — PAIN DESCRIPTION - LOCATION
LOCATION: HAND
LOCATION: HAND;ARM
LOCATION: HAND

## 2021-06-10 ASSESSMENT — PAIN SCALES - GENERAL
PAINLEVEL_OUTOF10: 0
PAINLEVEL_OUTOF10: 8
PAINLEVEL_OUTOF10: 6
PAINLEVEL_OUTOF10: 8
PAINLEVEL_OUTOF10: 8

## 2021-06-10 ASSESSMENT — ENCOUNTER SYMPTOMS
SORE THROAT: 0
CHEST TIGHTNESS: 0
PHOTOPHOBIA: 0
DIARRHEA: 0
WHEEZING: 0
NAUSEA: 0
SINUS PRESSURE: 0
TROUBLE SWALLOWING: 0
VOMITING: 0
COUGH: 0
ABDOMINAL PAIN: 0
ABDOMINAL DISTENTION: 0
SHORTNESS OF BREATH: 1
COLOR CHANGE: 0

## 2021-06-10 ASSESSMENT — PAIN DESCRIPTION - PROGRESSION
CLINICAL_PROGRESSION: NOT CHANGED

## 2021-06-10 ASSESSMENT — PAIN DESCRIPTION - ORIENTATION
ORIENTATION: LEFT

## 2021-06-10 ASSESSMENT — PAIN DESCRIPTION - PAIN TYPE
TYPE: CHRONIC PAIN

## 2021-06-10 NOTE — CONSULTS
[facility]    Consult / H&P               Today's Date: 6/10/2021  Patient Name: Levon Carbone  Date of admission: 6/9/2021  2:50 PM  Patient's age: 80 y.o., 1940  Admission Dx: Hypotension [I95.9]    Reason for Consult:  Cardiac evaluation    Requesting Physician: Abbey Back DO    CHIEF COMPLAINT:  Syncope    History Obtained From:  patient, electronic medical record    HISTORY OF PRESENT ILLNESS:    Levon Carbone 80 y.o. male presented with complaints of low blood pressure, weakness. He has h/o RCC, bladder CA s/p cystectomy and ileal conduit. He states he has been having episodes of passing out for few months. Previously he states it was related to sleep medications which were stopped after that he had some improvement. Yesterday visiting nurse noticed that his blood pressure was low 80/40 sitting, 60/30 standing. He had associated episode of presyncope, he was not able to to ambulated due to weakness and lightheadedness. His orthostatic here were positive as well. He was recently admitted for management of ventral hernia with bowel obstruction, he underwent exlap with small bowel resection with reduction of strangulated hernia, lysis of adhesions. During admission he was found to have KARMA, underwent cystoscope followed by nephrostomy. He was discharged to [facility]. Janae Roberts          Past Medical History:   has a past medical history of Arthritis, Caffeine use, Cancer ([address] Cancer of kidney, unspecified laterality ([address] CKD (chronic kidney disease), Cognitive communication deficit, COPD (chronic obstructive pulmonary disease) ([address] Diabetes mellitus ([address] DNR (do not resuscitate), Dysphagia, GERD (gastroesophageal reflux disease), GI bleeding, Hernia, inguinal, right, Hyperlipidemia, Hypertension, Ileal conduit stomal stenosis, Kidney stone, Mobility impaired, Muscle weakness, Nursing home resident, PAF (paroxysmal atrial fibrillation) ([address] Presence of urostomy ([address] and Retinal detachment. Past Surgical History:   has a past surgical history that includes hernia repair; Bladder removal (2006); Prostate surgery (2006); Cataract removal (Bilateral); Colonoscopy; Eye surgery (Right, 2000); vitrectomy (Left, 02/02/16); Incisional hernia repair (12/7/2015); Incisional hernia repair (11/2014); laparoscopy (N/A, 4/30/2021); Cystoscopy (N/A, 5/3/2021); IR GUIDED NEPHROSTOMY CATH PLACEMENT LEFT (5/3/2021); IR GUIDED NEPHROSTOMY CATH PLACEMENT RIGHT (5/5/2021); eye surgery; knee surgery (Left); other surgical history (05/27/2021); IR GUIDED NEPHROSTOMY CATH PLACEMENT LEFT (5/27/2021); other surgical history (06/08/2021); and Cystoscopy (N/A, 6/8/2021). Home Medications:    Prior to Admission medications    Medication Sig Start Date End Date Taking? Authorizing Provider   acetaminophen (TYLENOL) 500 MG tablet Take 1,000 mg by mouth every 4 hours as needed for Pain    Yes Historical Provider, MD   SODIUM PHOSPHATES RE Place 1 Dose rectally as needed (for no BM x 5 days) enema    Historical Provider, MD   insulin lispro (HUMALOG) 100 UNIT/ML injection vial Inject into the skin three times a week Mornings on Monday , Wednesday and Fridays    Sliding scale : 201-250 = 2 units,  251-300 = 4 units, 301-350 = 6 units, 351-400 = 8 units    Historical Provider, MD   bisacodyl (BISAC-EVAC) 10 MG suppository Place 10 mg rectally daily as needed for Other (no BM in 4 days)     Historical Provider, MD   magnesium hydroxide (MILK OF MAGNESIA) 400 MG/5ML suspension Take 30 mLs by mouth daily as needed for Constipation (no BM for 3 days)     Historical Provider, MD   HYDROcodone-acetaminophen (NORCO) 5-325 MG per tablet Take 1 tablet by mouth every 8 hours as needed for Pain.     Historical Provider, MD   docusate sodium (COLACE, DULCOLAX) 100 MG CAPS Take 100 mg by mouth 2 times daily 5/14/21 6/13/21  MARKIE Adams NP   polyethylene glycol (GLYCOLAX) 17 g packet Take 17 g by mouth daily 5/15/21 6/14/21  MARKIE Adams NP   melatonin 3 MG TABS tablet Take 1 tablet by mouth nightly 5/14/21   MARKIE Adams NP   albuterol sulfate (PROAIR RESPICLICK) 272 (90 Base) MCG/ACT aerosol powder inhalation Inhale 2 puffs into the lungs every 4 hours as needed for Wheezing or Shortness of Breath 9/10/20   Lieutenant Jimena MD      Current Facility-Administered Medications: midodrine (PROAMATINE) tablet 5 mg, 5 mg, Oral, TID WC  albuterol (PROVENTIL) nebulizer solution 2.5 mg, 2.5 mg, Nebulization, Q4H PRN  bisacodyl (DULCOLAX) suppository 10 mg, 10 mg, Rectal, Daily PRN  docusate sodium (COLACE) capsule 100 mg, 100 mg, Oral, BID  polyethylene glycol (GLYCOLAX) packet 17 g, 17 g, Oral, Daily  melatonin tablet 3 mg, 3 mg, Oral, Nightly  insulin lispro (HUMALOG) injection vial 0-6 Units, 0-6 Units, Subcutaneous, TID WC  insulin lispro (HUMALOG) injection vial 0-3 Units, 0-3 Units, Subcutaneous, Nightly  sodium chloride flush 0.9 % injection 5-40 mL, 5-40 mL, Intravenous, 2 times per day  0.9 % sodium chloride infusion, 25 mL, Intravenous, PRN  potassium chloride (KLOR-CON M) extended release tablet 40 mEq, 40 mEq, Oral, PRN **OR** potassium bicarb-citric acid (EFFER-K) effervescent tablet 40 mEq, 40 mEq, Oral, PRN **OR** potassium chloride 10 mEq/100 mL IVPB (Peripheral Line), 10 mEq, Intravenous, PRN  magnesium sulfate 1000 mg in dextrose 5% 100 mL IVPB, 1,000 mg, Intravenous, PRN  ondansetron (ZOFRAN-ODT) disintegrating tablet 4 mg, 4 mg, Oral, Q8H PRN **OR** ondansetron (ZOFRAN) injection 4 mg, 4 mg, Intravenous, Q6H PRN  acetaminophen (TYLENOL) tablet 650 mg, 650 mg, Oral, Q6H PRN **OR** acetaminophen (TYLENOL) suppository 650 mg, 650 mg, Rectal, Q6H PRN  magnesium hydroxide (MILK OF MAGNESIA) 400 MG/5ML suspension 30 mL, 30 mL, Oral, Daily PRN  heparin (porcine) injection 5,000 Units, 5,000 Units, Subcutaneous, 3 times per day    Allergies:  Nsaids and Tetracyclines & related    Social Clubbing   Lower extremity edema: No   Skin: Warm and dry  Neurological:  Alert and oriented. Moves all extremities well  No abnormalities of mood, affect, memory, mentation, or behavior are noted    DATA:    Diagnostics:      EKG:   Sinus bradycardia with PACs and compensatory pause    ECHO: 5/10/2021  Left ventricle is normal in size. Mild left ventricular hypertrophy. Global left ventricular systolic function is normal with an estimated  ejection fraction of 60 % . No obvious wall motion abnormality seen. Aortic leaflets show calcification without restriction of motion. Mild aortic insufficiency. Normal tricuspid valve leaflets. Mild tricuspid regurgitation. Aortic root is mildly dilated 4.5cm. Labs:     CBC:   Recent Labs     06/09/21  1523 06/10/21  0558   WBC 6.2 4.1   HGB 10.7* 9.6*   HCT 35.1* 32.4*    157     BMP:   Recent Labs     06/09/21  1523 06/10/21  0558    136   K 4.0 3.9   CO2 20 19*   BUN 29* 25*   CREATININE 2.05* 1.96*   LABGLOM 31* 33*   GLUCOSE 94 86     BNP: No results for input(s): BNP in the last 72 hours. PT/INR:   Recent Labs     06/10/21  0558   PROTIME 10.4   INR 1.0     APTT:No results for input(s): APTT in the last 72 hours. CARDIAC ENZYMES:No results for input(s): CKTOTAL, CKMB, CKMBINDEX, TROPONINI in the last 72 hours.   FASTING LIPID PANEL:  Lab Results   Component Value Date    HDL 26 05/09/2016    LDLCALC 70 05/09/2016    TRIG 188 03/15/2017     LIVER PROFILE:  Recent Labs     06/10/21  0558   AST 15   ALT 6   LABALBU 2.7*       Patient Active Problem List   Diagnosis    Bladder cancer    DJD (degenerative joint disease)    Essential hypertension    Renal mass    Incisional hernia of anterior abdominal wall without obstruction or gangrene    Bilateral kidney stones    Right ureteral calculus    Partial small bowel obstruction (HCC)    GERD (gastroesophageal reflux disease)    Normochromic normocytic anemia    Iron deficiency anemia    Toxic metabolic encephalopathy    Bilateral hydronephrosis    Vitamin B 12 deficiency    Folate deficiency    Acute respiratory failure with hypoxia (HCC)    Moderate malnutrition (HCC)    Neoplasm of uncertain behavior of kidney    S/P ileal conduit (HCC)    CKD (chronic kidney disease), stage III (HCC)    Stenosis of ileal conduit stoma    Renal cancer, right (HCC)    Hypotension    Pre-syncope   IMPRESSION:    Syncope with significant orthostatic hypotension   Sinus bradycardia with PACs  Preserved LV systolic function   Aortic sclerosis   Renal cancer with ileal conduit and nephrostomy tubes  CKD   DM      RECOMMENDATIONS:  Presenting symptoms and low blood pressure likely related to orthostasis and volume depletion  Recommend to start midodrine 5 mg TID  Advised increased fluid intake and life style changes  Follow up on symptoms and blood pressure -will add florinef if continues to have significant symptoms  Rhythm consistent with sinus bradycardia with PACs, recommend Holter for 14 days at discharge    Will discuss with rounding attending Dr. Jose Eduardo Porras for final recommendations. Joy Mcneill MD  Cardiology Fellow    I reviewed the patient history personally, and examined by me during the visit. I have reviewed the H&P/Consult / Progress note as completed, and made appropriate changes to the patient exam and treatment plans.       I have reviewed the case with resident / fellow    Patient treatment plan was explained to patient, correction in notes was made as appropriate, and discussed final arrangement based on  my evaluation and exam.    Additional Recommendations:      Richelle Garcia MD  [facility]

## 2021-06-10 NOTE — PROGRESS NOTES
Physical Therapy        Physical Therapy Cancel Note      DATE: 6/10/2021    NAME: Tyra Moe  MRN: [id number]   : 1940      Patient not seen this date for Physical Therapy due to: Other: Pt had signifcant drop in BP (75/53) in standing during OT eval. Hold PT eval this date and try to mobilize pt a farther distance 21 as medical status allows. Electronically signed by CHAUNCEY Rodriguez on 6/10/2021 at 11:04 AM   This treatment/evaluation completed by signing SPT. Signing PT agrees with treatment and documentation.   Mikala Aparicio, PT

## 2021-06-10 NOTE — CONSULTS
MD [facility] [address]    Urology Consult    Patient:  Jennie Gutiérrez  MRN: [id number]  YOB: 1940    CHIEF COMPLAINT:  Right nephrostomy tube management    HISTORY OF PRESENT ILLNESS:   The patient is a 80 y.o. male who presents with orthostatic hypotension. Urology is consulted for right percutaneous nephrostomy/stent management. He had a looposcopy and loopogram with excision of suture in the ileal conduit with Dr. Nikolas Castañeda on on 6/8/21. Patient notes good urine output from both the right PCNT and the urostomy. Of note, he had a bladder cancer and had radical cystectomy with ileal conduit at [facility] in 2007. He has had multiple parastomal hernia repairs. Patient's old records, notes and chart reviewed and summarized above.     Past Medical History:    Past Medical History:   Diagnosis Date    Arthritis     Caffeine use     2 coffee, 2 cans soda/day    Cancer [facility]) 2006    bladder et prostate, 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, ileo conduit s/p cystectomy    Cancer of kidney, unspecified laterality (HCC)     CKD (chronic kidney disease)     stage 4, Dr. Andrea Livingston, Dr. Che Pel communication deficit     COPD (chronic obstructive pulmonary disease) ([facility]     Diabetes mellitus ([facility]     SQ insulin 3 times per week only for this    DNR (do not resuscitate) 06/07/2021    Paper work from [facility] placed on chart    Dysphagia 05/2021    GERD (gastroesophageal reflux disease)     no meds for this    GI bleeding     with hemorrhage and perforation    Hernia, inguinal, right     Hyperlipidemia     no meds for this    Hypertension     no meds for this    Ileal conduit stomal stenosis 06/2021    Dr. Niranjan Gallardo impaired     using wheelchair, 2 person transfer    Muscle weakness     Nursing home resident 5/21/2021- 6/7/2021    Currently resides at [facility] [place] [phone]    PAF (paroxysmal atrial fibrillation) ([facility]     last EKG Sinus, no known cardiologist Presence of urostomy ([address]     ileoconduit, cystectomy    Retinal detachment        Past Surgical History:    Past Surgical History:   Procedure Laterality Date    BLADDER REMOVAL  2006    cancer, s/p urostomy     CATARACT REMOVAL Bilateral     COLONOSCOPY      CYSTOSCOPY N/A 5/3/2021    CYSTOSCOPY performed by Kalin Del Rio MD at [address] [facility] [address] 6/8/2021    LOOPOSCOPY,  EXCISION OF SUTURE, LOOPOGRAM performed by Kalin Del Rio MD at [address] Right 2000    macular hole    EYE SURGERY      HERNIA REPAIR      x2    INCISIONAL HERNIA REPAIR  12/7/2015    parastomal hernia repair - Dr. Dorene Velez  11/2014    IR NEPHROSTOMY PERCUTANEOUS LEFT  5/3/2021    IR NEPHROSTOMY PERCUTANEOUS LEFT 5/3/2021 [facility]    IR NEPHROSTOMY PERCUTANEOUS LEFT  5/27/2021    IR NEPHROSTOMY PERCUTANEOUS LEFT 5/27/2021 [facility]    IR NEPHROSTOMY PERCUTANEOUS RIGHT  5/5/2021    IR NEPHROSTOMY PERCUTANEOUS RIGHT 5/5/2021 Andra Faria MD [facility]    KNEE SURGERY Left     LAPAROSCOPY N/A 4/30/2021    EXPLORATORY LAPAROTOMY, EXTENSIVE LYSIS OF ADHESIONS, SMALL BOWEL RESECTION, REDUCTION OF STRANGULATED HERNIA performed by Diana Preciado DO at [address]  05/27/2021    Left neph tube removal, Right neph tube stent placed    OTHER SURGICAL HISTORY  06/08/2021    looposcopy, excision of suture, loopogram    PROSTATE SURGERY  2006    removal    VITRECTOMY Left 02/02/16       Medications:    Scheduled Meds:   midodrine  5 mg Oral TID WC    docusate sodium  100 mg Oral BID    polyethylene glycol  17 g Oral Daily    melatonin  3 mg Oral Nightly    insulin lispro  0-6 Units Subcutaneous TID WC    insulin lispro  0-3 Units Subcutaneous Nightly    sodium chloride flush  5-40 mL Intravenous 2 times per day    heparin (porcine)  5,000 Units Subcutaneous 3 times per day     Continuous Infusions:   sodium chloride      sodium chloride 75 mL/hr at 06/10/21 0137     PRN Meds:.albuterol, bisacodyl, HYDROcodone-acetaminophen, sodium chloride flush, sodium chloride, potassium chloride **OR** potassium alternative oral replacement **OR** potassium chloride, magnesium sulfate, ondansetron **OR** ondansetron, acetaminophen **OR** acetaminophen, magnesium hydroxide    Allergies:  Nsaids and Tetracyclines & related    Social History:    Social History     Socioeconomic History    Marital status:      Spouse name: Not on file    Number of children: 2    Years of education: 11.5    Highest education level: Not on file   Occupational History    Occupation: retired   Tobacco Use    Smoking status: Former Smoker     Packs/day: 0.50     Years: 20.00     Pack years: 10.00     Types: Cigarettes     Quit date: 1979     Years since quittin.9    Smokeless tobacco: Never Used    Tobacco comment: quit in    Substance and Sexual Activity    Alcohol use: No     Alcohol/week: 0.0 standard drinks    Drug use: No    Sexual activity: Not on file   Other Topics Concern    Not on file   Social History Narrative    Diet - terrible. [employer] every morning, goes to [employer]    Caffeine intake per day - 2 cups per day q am    Exercise pattern - none, but care for self and house    Living situation - ranch and a cat. Social Determinants of Health     Financial Resource Strain:     Difficulty of Paying Living Expenses:    Food Insecurity:     Worried About Running Out of Food in the Last Year:     Ran Out of Food in the Last Year:    Transportation Needs:     Lack of Transportation (Medical):     Lack of Transportation (Non-Medical):    Physical Activity:     Days of Exercise per Week:     Minutes of Exercise per Session:    Stress:     Feeling of Stress :    Social Connections:     Frequency of Communication with Friends and Family:     Frequency of Social Gatherings with Friends and Family:     Attends Buddhism Services:      Active Member of Clubs or Organizations:     Attends Club or Organization Meetings:     Marital Status:    Intimate Partner Violence:     Fear of Current or Ex-Partner:     Emotionally Abused:     Physically Abused:     Sexually Abused:        Family History:    Family History   Problem Relation Age of Onset    Diabetes Mother     Diabetes Sister          REVIEW OF SYSTEMS:    Constitutional: negative  Eyes: negative  Respiratory: negative  Cardiovascular: negative  Gastrointestinal: negative  Genitourinary: see HPI  Musculoskeletal: negative  Skin: negative   Neurological: negative  Hematological/Lymphatic: negative  Psychological: negative    Physical Exam:    This a 80 y.o. male   Patient Vitals for the past 24 hrs:   BP Temp Temp src Pulse Resp SpO2 Height Weight   06/10/21 1309 -- -- -- 69 18 -- -- --   06/10/21 1053 (!) 111/55 98.4 °F (36.9 °C) Oral (!) 49 14 99 % -- --   06/10/21 0920 107/68 -- -- -- -- -- -- --   06/10/21 0800 (!) 90/50 97.5 °F (36.4 °C) Axillary (!) 49 14 -- -- --   06/10/21 0428 97/61 97.5 °F (36.4 °C) Axillary (!) 44 15 98 % -- --   06/10/21 0045 (!) 100/57 97.2 °F (36.2 °C) Axillary (!) 49 14 97 % -- --   06/09/21 2241 -- -- -- 52 -- -- -- --   06/09/21 2134 117/84 96.8 °F (36 °C) Axillary 56 17 -- 6' 2\" (1.88 m) 207 lb 3.7 oz (94 kg)   06/09/21 2133 117/84 -- -- 132 24 -- -- --   06/09/21 2100 (!) 91/44 -- -- 60 18 97 % -- --   06/09/21 2002 128/60 -- -- 68 14 97 % -- --   06/09/21 1731 121/82 -- -- 58 16 97 % -- --   06/09/21 1716 139/79 -- -- 53 15 97 % -- --   06/09/21 1701 (!) 140/79 -- -- 58 22 98 % -- --   06/09/21 1646 130/84 -- -- 55 19 100 % -- --   06/09/21 1640 (!) 133/104 -- -- 57 22 100 % -- --   06/09/21 1636 (!) 71/57 -- -- 62 16 97 % -- --   06/09/21 1634 98/68 -- -- 60 19 100 % -- --   06/09/21 1632 131/75 -- -- 61 18 99 % -- --   06/09/21 1617 119/66 -- -- 58 18 100 % -- --   06/09/21 1602 108/74 -- -- 56 15 99 % -- --   06/09/21 1547 104/70 -- -- 57 20 100 % -- --   06/09/21 1531 (!) 86/52 -- -- 57 19 99 % -- --   06/09/21 1516 101/65 -- -- 64 17 97 % -- --   06/09/21 1501 105/80 -- -- 66 23 96 % -- --   06/09/21 1457 -- 98.2 °F (36.8 °C) -- -- -- -- -- 200 lb (90.7 kg)       Constitutional: Patient in no acute distress; Neuro: alert and oriented to person place and time. Psych: Mood and affect normal.  Skin: Normal  Lungs: Respiratory effort normal. Equal chest rise bilaterally  Cardiovascular:  Normal peripheral pulses  Abdomen: Soft, non-tender, non-distended  No CVA tenderness bilaterally  Bladder non-tender and not distended. Lower extremities: No edema of calf tenderness bilaterally  Stoma pink and productive of yellow urine  Right PCNT draining yellow urine    :  Penis normal and circumcised  Urethral meatus normal  Scrotal exam normal    LABS:  Recent Labs     06/09/21  1523 06/10/21  0558   WBC 6.2 4.1   HGB 10.7* 9.6*   HCT 35.1* 32.4*   MCV 90.7 92.6    157     Recent Labs     06/08/21  1247 06/09/21  1523 06/10/21  0558   NA  --  138 136   K  --  4.0 3.9   CL  --  106 107   CO2  --  20 19*   BUN  --  29* 25*   CREATININE 2.49* 2.05* 1.96*     Lab Results   Component Value Date    PSA <0.10 01/07/2012       Additional Lab/culture results:    Urinalysis:   Recent Labs     06/09/21  1544   COLORU YELLOW   PHUR 6.0   WBCUA TOO NUMEROUS TO COUNT   RBCUA TOO NUMEROUS TO COUNT   MUCUS NOT REPORTED   TRICHOMONAS NOT REPORTED   YEAST NOT REPORTED   BACTERIA FEW*   SPECGRAV 1.015   LEUKOCYTESUR LARGE*   UROBILINOGEN Normal   BILIRUBINUR NEGATIVE        -----------------------------------------------------------------  Imaging Results:    Imaging was independently reviewed and checked with radiologist report.       Assessment and Plan   Impression:      The patient is a 80 y.o. male who presents with     Problem List  - Right PCNT due to obstructive uropathy from iatrogenic suturing of the ileal conduit during parastomal hernia repair s/p Looposcopy and excision of suture (6/8/21)  - History of bladder cancer s/p radical cystectomy and ileal conduit (2007)  - Right renal mass    Plan:    - We will clamp the R PCNT with plans to remove tomorrow in IR if kidney function remains stable  - Monitor Cr and UOP from PCNT and urostomy  - His urine will be colonized with bacteria given ileal conduit and indwelling Madeline Reynoso MD  2:02 PM 6/10/2021    I have reviewed the history above and agree. I have repeated the key portions of the physical exam and concur with the resident's findings. I have reviewed all laboratory findings and imaging reports/films. I agree with the plan as noted above.     Electronically signed by Ailin Piña MD on 6/10/2021 at 3:01 PM

## 2021-06-10 NOTE — H&P
[facility]  Office: [address], DO, Fidel Olivo, DO, Isaura Hernandez, DO, [place]vindale Whytett Blood, DO, Karl Clifford MD, Carla Damian MD, Ran Morton MD, Rosemarie Reed MD, Bridgett Weiner MD, Maral Downing MD, José Antonio Carlin MD, Dot Enciso MD, Carie Gaucher, DO, Rena Robles MD, Carlos Vasquez DO, Urszula Linda MD,  Marvel Kim DO, Etienne Hathaway MD, Greg Curry MD, Rekha Cordoba MD, Rina Zimmerman MD, [facility], [place], [facility], [facility], [facility], Johana Camacho, CNP, Lawayne Klinefelter, CNS, Aaron Reed, CNP, Nyla Childs, CNP, Conrado Crespo, CNP, John Verma, CNP, Qasim Tesfaye, CNP, Farrah Brown PA-C, Rodríguez Goodson, [facility], Mansi Quintero, CNP, Vicenta Francois, CNP, Velma Rodriguez, CNP, Lubna Shrewsbury, CNP, Vaishali Hennessy, CNP, Robert Morales, [address]    HISTORY AND PHYSICAL EXAMINATION            Date:   6/10/2021  Patient name:  Paola Nix  Date of admission:  6/9/2021  2:50 PM  MRN:   [id number]  Account:  [de-identified]  YOB: 1940  PCP:    Kem Gamez MD  Room:   2022/2022-01  Code Status:    Full Code    Chief Complaint:     Chief Complaint   Patient presents with    Hypotension     renal cancer stage 4       History Obtained From:     patient, electronic medical record    History of Present Illness:     Paola Nix is a 80 y.o. Non-/non  male who presents with Hypotension (renal cancer stage 4)   and is admitted to the hospital for the management of Hypotension. This is an 80-year-old male with underlying history of renal cell carcinoma s/p ablation and bladder cancer s/p cystectomy and ileal conduit creation in 2006 who presents today with hypotension and recent syncopal episodes. Patient symptoms have been ongoing for at least 6 months and reports several syncopal episodes with multiple episodes of LOC for which he has not sought medical attention. Earlier today while ambulating around the kitchen, patient developed significant lightheadedness and had to crawl on the ground to safely make it to the chair. He also endorses associated shortness of breath and diaphoresis. He denies any chest pain, abdominal pain, nausea/vomiting, headache, vision changes/ringing in the ears, focal weakness, or numbness and tingling of affected extremities. On EMS arrival, SBP: 90s with positive orthostatic readings as SBP: 60s on standing. Patient denies any known cardiac history and states \"I only take Tylenol daily\". Pertinent presenting labs include creatinine: 2.05 (around recent baseline), glucose: 94, TSH: 7.33, WBC: 6.2, hemoglobin: 10.7, Lactic Acid: 1.8, UA: 3+ protein, large leuk esterase, few bacteria and large urine Hgb,urine culture: Pending (last urine culture positive for Klebsiella pneumoniae and Citrobacter amalonaticus sensitive to Cipro) and EKG read as NSR with first degree AVB with T wave flattening. Patient was given 2 L IV fluid bolus and given 1 dose of midodrine. Both cardiology and urology were consulted from the ED. Patient is admitted to [facility] for further management of hypotension. Of note, patient recently admitted to our service at the end of April after exploratory lap/bowel resection for incarcerated parastomal hernia requiring reduction of strangulated hernia and lysis of adhesions. Patient developed worsening KARMA with rising creatinine and underwent cystoscopy on 5/3 which revealed ileoconduit obstruction secondary to prior suture placement with PCN tube placed on 5/3 and right nephrostomy tube on 5/5. Patient had a follow-up with Dr. Clau Duncan is on on 6/8 for obstructive uropathy secondary to parastomal hernia repair and underwent looposcopy, loopogram, and excision/extraction of foreign body from ileal conduit on 6/8. Adequate urinary flow demonstrated via contrast studies.      On my evaluation, patient is resting in bed, awake alert and oriented. Pleasant and without any evidence of clinical distress. Pulse nephrostomy tube and ileal conduit are functioning without difficulty with good urine output. He denies any current chest pain, shortness of breath, abdominal pain, nausea/vomiting, headache, dizziness, or fevers.     Past Medical History:     Past Medical History:   Diagnosis Date    Arthritis     Caffeine use     2 coffee, 2 cans soda/day    Cancer [facility]) 2006    bladder et prostate, 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, ileo conduit s/p cystectomy    Cancer of kidney, unspecified laterality ([address]     CKD (chronic kidney disease)     stage 4, Dr. Raegan Elise, Dr. 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 Nura Roman impaired     using wheelchair, 2 person transfer    Muscle weakness    [facility] resident 5/21/2021- 6/7/2021    Currently resides at [facility] [place] [phone]    PA[facility] ([facility]oxysmal atrial fibrillation) [facility]     last EKG Sinus, no known cardiologist     Presence of urostomy ([address]     ileoconduit, cystectomy    Retinal detachment         Past Surgical History:     Past Surgical History:   Procedure Laterality Date    BLADDER REMOVAL  2006    cancer, s/p urostomy     CATARACT REMOVAL Bilateral     COLONOSCOPY      CYSTOSCOPY N/A 5/3/2021    CYSTOSCOPY performed by Neela Singletary MD at [address] [place] [address] Avenue 6/8/2021    LOOPOSCOPY,  EXCISION OF SUTURE, LOOPOGRAM performed by Cade Schroeder MD at [address] Right 2000    macular hole   [address]      x2    INCISIONAL HERNIA REPAIR  12/7/2015    parastomal hernia repair - Dr. Katie Guzmán  11/2014    IR NEPHROSTOMY PERCUTANEOUS LEFT  5/3/2021    IR NEPHROSTOMY PERCUTANEOUS LEFT 5/3/2021 [facility]    IR NEPHROSTOMY PERCUTANEOUS LEFT  5/27/2021    IR NEPHROSTOMY PERCUTANEOUS LEFT 5/27/2021 [facility]    IR NEPHROSTOMY PERCUTANEOUS RIGHT  5/5/2021    IR NEPHROSTOMY PERCUTANEOUS RIGHT 5/5/2021 Sarkis Fontenot MD [facility]    KNEE SURGERY Left     LAPAROSCOPY N/A 4/30/2021    EXPLORATORY LAPAROTOMY, EXTENSIVE LYSIS OF ADHESIONS, SMALL BOWEL RESECTION, REDUCTION OF STRANGULATED HERNIA performed by Silke Frazier DO at [address] [place] [address]  05/27/2021    Left neph tube removal, Right neph tube stent placed    OTHER SURGICAL HISTORY  06/08/2021    looposcopy, excision of suture, loopogram    PROSTATE SURGERY  2006    removal    VITRECTOMY Left 02/02/16        Medications Prior to Admission:     Prior to Admission medications    Medication Sig Start Date End Date Taking?  Authorizing Provider   acetaminophen (TYLENOL) 500 MG tablet Take 1,000 mg by mouth every 4 hours as needed for Pain    Yes Historical Provider, MD   SODIUM PHOSPHATES RE Place 1 Dose rectally as needed (for no BM x 5 days) enema    Historical Provider, MD   insulin lispro (HUMALOG) 100 UNIT/ML injection vial Inject into the skin three times a week Mornings on Monday , Wednesday and Fridays    Sliding scale : 201-250 = 2 units,  251-300 = 4 units, 301-350 = 6 units, 351-400 = 8 units    Historical Provider, MD   bisacodyl (BISAC-EVAC) 10 MG suppository Place 10 mg rectally daily as needed for Other (no BM in 4 days)     Historical Provider, MD   magnesium hydroxide (MILK OF MAGNESIA) 400 MG/5ML suspension Take 30 mLs by mouth daily as needed for Constipation (no BM for 3 days)     Historical Provider, MD   HYDROcodone-acetaminophen (NORCO) 5-325 MG per tablet Take 1 tablet by mouth every 8 hours as needed for Pain. Historical Provider, MD   docusate sodium (COLACE, DULCOLAX) 100 MG CAPS Take 100 mg by mouth 2 times daily 5/14/21 6/13/21  MARKIE Adams - NP   polyethylene glycol (GLYCOLAX) 17 g packet Take 17 g by mouth daily 5/15/21 6/14/21  MARKIE Adams - NP   melatonin 3 MG TABS tablet Take 1 tablet by mouth nightly 5/14/21   MARKIE Adams - NP   albuterol sulfate (PROAIR RESPICLICK) 686 (90 Base) MCG/ACT aerosol powder inhalation Inhale 2 puffs into the lungs every 4 hours as needed for Wheezing or Shortness of Breath 9/10/20   Mariluz Gary MD        Allergies:     Nsaids and Tetracyclines & related    Social History:     Tobacco:    reports that he quit smoking about 41 years ago. His smoking use included cigarettes. He has a 10.00 pack-year smoking history. He has never used smokeless tobacco.  Alcohol:      reports no history of alcohol use. Drug Use:  reports no history of drug use. Family History:     Family History   Problem Relation Age of Onset    Diabetes Mother     Diabetes Sister        Review of Systems:     Positive and Negative as described in HPI. Review of Systems   Constitutional: Positive for activity change and diaphoresis. Negative for appetite change, chills and fever. HENT: Negative for dental problem, sinus pressure, sore throat and trouble swallowing. Eyes: Negative for photophobia and visual disturbance. Respiratory: Positive for shortness of breath. Negative for cough, chest tightness and wheezing. Cardiovascular: Negative for chest pain, palpitations and leg swelling. Gastrointestinal: Negative for abdominal distention, abdominal pain, diarrhea, nausea and vomiting. Endocrine: Negative for polyphagia and polyuria.    Genitourinary: Negative for decreased urine volume, flank pain and hematuria. Has ileal conduit and PCN tube   Musculoskeletal: Positive for arthralgias. Negative for myalgias, neck pain and neck stiffness. Skin: Negative for color change and pallor. Allergic/Immunologic: Negative for immunocompromised state. Neurological: Positive for syncope and light-headedness. Negative for facial asymmetry, speech difficulty and weakness. Psychiatric/Behavioral: Negative for agitation, behavioral problems and confusion. The patient is not nervous/anxious. Physical Exam:   BP (!) 100/57   Pulse (!) 49   Temp 97.2 °F (36.2 °C) (Axillary)   Resp 14   Ht 6' 2\" (1.88 m)   Wt 207 lb 3.7 oz (94 kg)   SpO2 97%   BMI 26.61 kg/m²   Temp (24hrs), Av.4 °F (36.3 °C), Min:96.8 °F (36 °C), Max:98.2 °F (36.8 °C)    Recent Labs     21  1247   POCGLU 92       Intake/Output Summary (Last 24 hours) at 6/10/2021 0052  Last data filed at 2021 2100  Gross per 24 hour   Intake --   Output 400 ml   Net -400 ml       Physical Exam  Vitals and nursing note reviewed. Constitutional:       General: He is not in acute distress. Appearance: Normal appearance. He is not toxic-appearing or diaphoretic. HENT:      Head: Normocephalic and atraumatic. Right Ear: External ear normal.      Left Ear: External ear normal.      Nose: Nose normal. No congestion or rhinorrhea. Mouth/Throat:      Mouth: Mucous membranes are dry. Pharynx: Oropharynx is clear. Eyes:      Extraocular Movements: Extraocular movements intact. Conjunctiva/sclera: Conjunctivae normal.      Pupils: Pupils are equal, round, and reactive to light. Cardiovascular:      Rate and Rhythm: Regular rhythm. Bradycardia present. Pulses: Normal pulses. Heart sounds: Normal heart sounds. No murmur heard. No friction rub. No gallop. Pulmonary:      Effort: Pulmonary effort is normal. No respiratory distress. Breath sounds: Normal breath sounds.  No wheezing, rhonchi or rales. Abdominal:      General: Bowel sounds are normal. There is no distension. Palpations: Abdomen is soft. There is no mass. Tenderness: There is no abdominal tenderness. Musculoskeletal:         General: No tenderness or signs of injury. Cervical back: Normal range of motion and neck supple. No rigidity or tenderness. Right lower leg: No edema. Left lower leg: No edema. Skin:     General: Skin is warm and dry. Capillary Refill: Capillary refill takes less than 2 seconds. Coloration: Skin is not jaundiced. Findings: No bruising or lesion. Neurological:      General: No focal deficit present. Mental Status: He is alert and oriented to person, place, and time. Mental status is at baseline. Cranial Nerves: No cranial nerve deficit. Sensory: No sensory deficit. Motor: No weakness. Psychiatric:         Mood and Affect: Mood normal.         Behavior: Behavior normal.         Thought Content:  Thought content normal.         Judgment: Judgment normal.         Investigations:      Laboratory Testing:  Recent Results (from the past 24 hour(s))   CBC WITH AUTO DIFFERENTIAL    Collection Time: 06/09/21  3:23 PM   Result Value Ref Range    WBC 6.2 3.5 - 11.3 k/uL    RBC 3.87 (L) 4.21 - 5.77 m/uL    Hemoglobin 10.7 (L) 13.0 - 17.0 g/dL    Hematocrit 35.1 (L) 40.7 - 50.3 %    MCV 90.7 82.6 - 102.9 fL    MCH 27.6 25.2 - 33.5 pg    MCHC 30.5 28.4 - 34.8 g/dL    RDW 16.9 (H) 11.8 - 14.4 %    Platelets 776 790 - 558 k/uL    MPV 9.8 8.1 - 13.5 fL    NRBC Automated 0.0 0.0 per 100 WBC    Differential Type NOT REPORTED     Seg Neutrophils 70 (H) 36 - 65 %    Lymphocytes 19 (L) 24 - 43 %    Monocytes 7 3 - 12 %    Eosinophils % 3 1 - 4 %    Basophils 1 0 - 2 %    Immature Granulocytes 0 0 %    Segs Absolute 4.30 1.50 - 8.10 k/uL    Absolute Lymph # 1.18 1.10 - 3.70 k/uL    Absolute Mono # 0.43 0.10 - 1.20 k/uL    Absolute Eos # 0.19 0.00 - 0.44 k/uL Basophils Absolute 0.03 0.00 - 0.20 k/uL    Absolute Immature Granulocyte <0.03 0.00 - 0.30 k/uL    WBC Morphology NOT REPORTED     RBC Morphology ANISOCYTOSIS PRESENT     Platelet Estimate NOT REPORTED    BASIC METABOLIC PANEL    Collection Time: 06/09/21  3:23 PM   Result Value Ref Range    Glucose 94 70 - 99 mg/dL    BUN 29 (H) 8 - 23 mg/dL    CREATININE 2.05 (H) 0.70 - 1.20 mg/dL    Bun/Cre Ratio NOT REPORTED 9 - 20    Calcium 8.8 8.6 - 10.4 mg/dL    Sodium 138 135 - 144 mmol/L    Potassium 4.0 3.7 - 5.3 mmol/L    Chloride 106 98 - 107 mmol/L    CO2 20 20 - 31 mmol/L    Anion Gap 12 9 - 17 mmol/L    GFR Non-African American 31 (L) >60 mL/min    GFR  38 (L) >60 mL/min    GFR Comment          GFR Staging NOT REPORTED    Lactic Acid, Whole Blood    Collection Time: 06/09/21  3:23 PM   Result Value Ref Range    Lactic Acid, Whole Blood 1.8 0.7 - 2.1 mmol/L   Cortisol Total    Collection Time: 06/09/21  3:23 PM   Result Value Ref Range    Cortisol 11.0 2.7 - 18.4 ug/dL    Cortisol Collection Info NOT REPORTED    TSH with Reflex    Collection Time: 06/09/21  3:23 PM   Result Value Ref Range    TSH 7.33 (H) 0.30 - 5.00 mIU/L   T4, Free    Collection Time: 06/09/21  3:23 PM   Result Value Ref Range    Thyroxine, Free 1.31 0.93 - 1.70 ng/dL   Urinalysis with microscopic    Collection Time: 06/09/21  3:44 PM   Result Value Ref Range    Color, UA YELLOW YELLOW    Turbidity UA TURBID (A) CLEAR    Glucose, Ur NEGATIVE NEGATIVE    Bilirubin Urine NEGATIVE NEGATIVE    Ketones, Urine NEGATIVE NEGATIVE    Specific Gravity, UA 1.015 1.005 - 1.030    Urine Hgb LARGE (A) NEGATIVE    pH, UA 6.0 5.0 - 8.0    Protein, UA 3+ (A) NEGATIVE    Urobilinogen, Urine Normal Normal    Nitrite, Urine NEGATIVE NEGATIVE    Leukocyte Esterase, Urine LARGE (A) NEGATIVE    -          WBC, UA TOO NUMEROUS TO COUNT 0 - 5 /HPF    RBC, UA TOO NUMEROUS TO COUNT 0 - 4 /HPF    Casts UA  0 - 8 /LPF     0 TO 2 HYALINE Reference range defined for non-centrifuged specimen. Crystals, UA NOT REPORTED None /HPF    Epithelial Cells UA 2 TO 5 0 - 5 /HPF    Renal Epithelial, UA NOT REPORTED 0 /HPF    Bacteria, UA FEW (A) None    Mucus, UA NOT REPORTED None    Trichomonas, UA NOT REPORTED None    Amorphous, UA NOT REPORTED None    Other Observations UA NOT REPORTED NOT REQ. Yeast, UA NOT REPORTED None       Imaging/Diagnostics:  No results found. 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 Hypotension: Cardiology consulted and appreciate input, continue with IV fluids and midodrine if needed, fall precautions and continuous telemetry  2. Renal cancer with ileal conduit and PCN tube: Urology consulted and appreciate input, follow urine culture  3. CKD: serum creatinine seems to be at recent baseline, continue IV fluids and monitor urinary output  4. Type II DM: patient denies and states he does not take insulin? Check A1C and sliding scale in for now, glucose: 90s  5. Routine labs, home medications reviewed, diet as tolerated  6. DVT  7. Full Code: will need clarification, patient has previous listings of DNRCC-A    Consultations:   IP CONSULT TO UROLOGY  IP CONSULT TO HOSPITALIST  IP CONSULT TO CARDIOLOGY    Patient is admitted as inpatient status because of co-morbidities listed above, severity of signs and symptoms as outlined, requirement for current medical therapies and most importantly because of direct risk to patient if care not provided in a hospital setting. Expected length of stay > 48 hours.     MARKIE Menon NP  6/10/2021  12:52 AM    Copy sent to Dr. Autumn Burnett MD

## 2021-06-10 NOTE — PROGRESS NOTES
bleeding, Hernia, inguinal, right, Hyperlipidemia, Hypertension, Ileal conduit stomal stenosis, Kidney stone, Mobility impaired, Muscle weakness, Nursing home resident, PAF (paroxysmal atrial fibrillation) ([facility] Presence of urostomy ([facility] [facility] and Retinal detachment. has a past surgical history that includes hernia repair; Bladder removal (); Prostate surgery (); Cataract removal (Bilateral); Colonoscopy; Eye surgery (Right, ); vitrectomy (Left, 16); Incisional hernia repair (2015); Incisional hernia repair (2014); laparoscopy (N/A, 2021); Cystoscopy (N/A, 5/3/2021); IR GUIDED NEPHROSTOMY CATH PLACEMENT LEFT (5/3/2021); IR GUIDED NEPHROSTOMY CATH PLACEMENT RIGHT (2021); eye surgery; knee surgery (Left); other surgical history (2021); IR GUIDED NEPHROSTOMY CATH PLACEMENT LEFT (2021); other surgical history (2021); and Cystoscopy (N/A, 2021). Restrictions  Restrictions/Precautions  Required Braces or Orthoses?: No  Position Activity Restriction  Other position/activity restrictions: up with assistance    Subjective   General  Patient assessed for rehabilitation services?: Yes  Family / Caregiver Present: No  Diagnosis: Hypotension  General Comment  Comments: RN ok'd patient for OT evaluation. Pt pleasant and cooperative throughout. Patient Currently in Pain: Yes  Pain Assessment  Pain Assessment: Faces  Pain Level: 8  Pain Type: Chronic pain (Arthritic)  Pain Location: Hand  Pain Orientation: Left  Non-Pharmaceutical Pain Intervention(s): Ambulation/Increased Activity; Emotional support  Response to Pain Intervention: Patient Satisfied  Vital Signs  BP: 107/68  Blood Pressure Lyin/68  Blood Pressure Sittin/64  Blood Pressure Standin/53  Patient Currently in Pain: Yes    Social/Functional History  Social/Functional History  Lives With: Alone (8 yr old Cat)  Type of Home: House  Home Layout: One level  Home Access: Stairs to enter without rails, Stairs to enter with rails  Entrance Stairs - Number of Steps: 1  Entrance Stairs - Rails: Left  Bathroom Shower/Tub: Tub/Shower unit, Doors  Bathroom Toilet: Standard  Bathroom Equipment: Grab bars in shower  Home Equipment: Rolling walker, 4 wheeled walker, Cane (uses RW primarily for ambulation)  ADL Assistance: [address]: Independent  Homemaking Responsibilities: Yes  Meal Prep Responsibility: Primary  Laundry Responsibility: Primary  Cleaning Responsibility: Primary  Shopping Responsibility: Primary  Ambulation Assistance: Independent  Transfer Assistance: Independent  Active : Yes  Mode of Transportation: Truck  Occupation: Retired  Type of occupation: [employer]  IADL Comments: Reports having good family support, son and DIL live a mile and half down the road       Objective   Vision: Impaired  Vision Exceptions: Wears glasses for reading  Hearing: Exceptions to [facility]  Hearing Exceptions: Hard of hearing/hearing concerns (Reports hearing better in L than R)    Orientation  Overall Orientation Status: Within Functional Limits     Balance  Sitting Balance: Supervision (EOB ~ 22min; BP in sitting 90/64)  Standing Balance: Contact guard assistance  Standing Balance  Time: ~2min  Activity: W/ RW EOB  Comment: BP 75/53; demoed B UE release from RW  Functional Mobility  Functional - Mobility Device: Rolling Walker  Activity: Other (to bedisde recliner)  Assist Level: Contact guard assistance  Functional Mobility Comments: No further functional mobility completed d/t decreased BP  ADL  Feeding: Independent;Setup  Grooming: Modified independent ;Setup  UE Bathing: Stand by assistance; Increased time to complete;Setup  LE Bathing: Contact guard assistance; Increased time to complete;Setup  UE Dressing: Stand by assistance; Increased time to complete;Setup  LE Dressing: Contact guard assistance;Setup; Increased time to complete (Pt adjusted socks at Mod I while supine in bed)  Toileting: Contact guard assistance;Setup; Increased time to complete  Tone RUE  RUE Tone: Normotonic  Tone LUE  LUE Tone: Normotonic  Coordination  Movements Are Fluid And Coordinated: No  Coordination and Movement description: Fine motor impairments; Left UE     Bed mobility  Supine to Sit: Stand by assistance  Sit to Supine: Stand by assistance  Comment: VC for use of hand rails and increased time, pt utilized R UE only d/t decreased ROM and strength in L UE.  BP in supine 107/68  Transfers  Sit to stand: Contact guard assistance  Stand to sit: Contact guard assistance  Transfer Comments: w/ RW     Cognition  Overall Cognitive Status: WFL        Sensation  Overall Sensation Status: Impaired (reports burning in the L hand, mostly in the 5th digit)        LUE PROM (degrees)  LUE PROM: WFL  LUE AROM (degrees)  LUE AROM : Exceptions  Left Hand PROM (degrees)  Left Hand PROM: WFL  Left Hand AROM (degrees)  Left Hand AROM: Exceptions  Left Hand General AROM: Unable to make a full fist  RUE AROM (degrees)  RUE AROM : WFL  Right Hand AROM (degrees)  Right Hand AROM: WFL  LUE Strength  Gross LUE Strength: Exceptions to [facility]  L Shoulder Flex: 2-/5  L Elbow Flex: 3-/5  L Elbow Ext: 3/5  L Hand General: 3+/5  RUE Strength  Gross RUE Strength: Exceptions to [facility]  R Shoulder Flex: 4-/5  R Elbow Flex: 4/5  R Elbow Ext: 4+/5  R Hand General: 4+/5     Plan   Plan  Times per week: 3-4 x/wk  Current Treatment Recommendations: Strengthening, Endurance Training, Patient/Caregiver Education & Training, ROM, Equipment Evaluation, Education, & procurement, Self-Care / ADL, Balance Training, Home Management Training, Functional Mobility Training, Safety Education & Training      AM-PAC Score  AM-PAC Inpatient Daily Activity Raw Score: 20 (06/10/21 1044)  AM-PAC Inpatient ADL T-Scale Score : 42.03 (06/10/21 1044)  ADL Inpatient CMS 0-100% Score: 38.32 (06/10/21 1044)  ADL Inpatient CMS G-Code Modifier : CJ (06/10/21 1044)    Goals  Short term goals  Time Frame for Short term goals: Pt will, by discharge  Short term goal 1: Demo UB/LB ADLs at Mod I w/ AE PRN  Short term goal 2: Demo functional mobility/transfers at SBA w/ LRD PRN to engage in ADLs  Short term goal 3: Demo +6min of dynamic standing balance w/ LRD PRN to engage in ADLs  Short term goal 4:  Increase UE/ strength by +1 grade to increase engagement in ADLs  Short term goal 5: Utilize WS techs w/ 1 VC throughout functional tasks and ADLs/IADLs  Short term goal 6: Engage in +10min of pendulum exercises to facilitate functional outcomes of Shoulder         Therapy Time   Individual Concurrent Group Co-treatment   Time In 0915         Time Out 0950         Minutes 35         Timed Code Treatment Minutes: 28 Minutes       Radha Doll S/OT

## 2021-06-10 NOTE — PROGRESS NOTES
Physical Therapy        Physical Therapy Cancel Note      DATE: 6/10/2021    NAME: Lucero Kruse  MRN: [id number]   : 1940      Patient not seen this date for Physical Therapy due to:     Other: pt being evaluated by OT; hypotensive and bradycardic; check back as time/medical status allows       Electronically signed by Juwan Mari PT on 6/10/2021 at 9:28 AM

## 2021-06-10 NOTE — PLAN OF CARE
Problem: Discharge Planning:  Goal: Discharged to appropriate level of care  Description: Discharged to appropriate level of care  6/10/2021 0438 by Debora Cutler RN  Outcome: Ongoing  6/10/2021 0437 by Debora Cutler RN  Outcome: Ongoing     Problem: Cardiac Output - Decreased:  Goal: Avoidance of environmental tobacco smoke  Description: Absence of orthostatic hypotension  6/10/2021 0438 by Debora Cutler RN  Outcome: Ongoing  6/10/2021 0437 by Debora Cutler RN  Outcome: Ongoing  Goal: Cardiac output within specified parameters  Description: Cardiac output within specified parameters  6/10/2021 0438 by Debora Cutler RN  Outcome: Ongoing  6/10/2021 0437 by Debora Cutler RN  Outcome: Ongoing  Goal: Hemodynamic stability will improve  Description: Hemodynamic stability will improve  6/10/2021 0438 by Debora Cutler RN  Outcome: Ongoing  6/10/2021 0437 by Debora Cutler RN  Outcome: Ongoing     Problem: Fluid Volume - Imbalance:  Goal: Absence of imbalanced fluid volume signs and symptoms  Description: Absence of imbalanced fluid volume signs and symptoms  6/10/2021 0438 by Debora Cutler RN  Outcome: Ongoing  6/10/2021 0437 by Debora Cutler RN  Outcome: Ongoing     Problem: Tissue Perfusion, Altered:  Goal: Circulatory function within specified parameters  Description: Circulatory function within specified parameters  6/10/2021 0438 by Debora Cutler RN  Outcome: Ongoing  6/10/2021 0437 by Debora Cutler RN  Outcome: Ongoing     Problem: Tissue Perfusion - Cardiopulmonary, Altered:  Goal: Absence of angina  Description: Absence of angina  6/10/2021 0438 by Debora Cutler RN  Outcome: Ongoing  6/10/2021 0437 by Debora Cutler RN  Outcome: Ongoing  Goal: Hemodynamic stability will improve  Description: Hemodynamic stability will improve  6/10/2021 0438 by Debora Cutler RN  Outcome: Ongoing  6/10/2021 0437 by Debora Cutler RN  Outcome: Ongoing

## 2021-06-10 NOTE — CARE COORDINATION
Case Management Initial Discharge Plan  Natali Mackay,             Met with:patient to discuss discharge plans. Information verified: address, contacts, phone number, , insurance Yes  Insurance Provider: [facility]    Emergency Contact/Next of Kin name & number: Karlene Armenta (son) [phone]  Who are involved in patient's support system? son    PCP: Marielle Rodriguez MD  Date of last visit: unsure    Discharge Planning    Living Arrangements:  Alone     Home has 1 stories  1 stairs to climb to get into front door,   Location of bedroom/bathroom in home main    Patient able to perform ADL's:Independent    Current Services (outpatient & in home) [facility]  DME equipment: walker  DME provider:     Is patient receiving oral anticoagulation therapy? No    If indicated:   Physician managing anticoagulation treatment:   Where does patient obtain lab work for ATC treatment? Potential Assistance Needed:  Home Care    Patient agreeable to home care: Yes, current with [facility]  Freedom of choice provided:  yes    Prior SNF/Rehab Placement and Facility: [facility]  Agreeable to SNF/Rehab: No  [place] of choice provided: no     Evaluation: no    Expected Discharge date:       Patient expects to be discharged to:  home    If home: is the family and/or caregiver wiling & able to provide support at home? Who will be providing this support? *    Follow Up Appointment: Best Day/ Time:      Transportation provider: family  Transportation arrangements needed for discharge: No    Readmission Risk              Risk of Unplanned Readmission:  27             Does patient have a readmission risk score greater than 14?: Yes  If yes, follow-up appointment must be made within 7 days of discharge.      Goals of Care: improved blood pressure      Educated patient on transitional options, provided freedom of choice and are agreeable with plan      Discharge Plan: home with [facility], elizabeth Pop at [facility] of patient's admission          Electronically signed by Sarah Darling RN on 6/10/21 at 12:55 PM EDT

## 2021-06-11 ENCOUNTER — APPOINTMENT (OUTPATIENT)
Dept: INTERVENTIONAL RADIOLOGY/VASCULAR | Age: 81
DRG: 988 | End: 2021-06-11
Payer: MEDICARE

## 2021-06-11 LAB
ABSOLUTE EOS #: 0.17 K/UL (ref 0–0.44)
ABSOLUTE IMMATURE GRANULOCYTE: <0.03 K/UL (ref 0–0.3)
ABSOLUTE LYMPH #: 0.86 K/UL (ref 1.1–3.7)
ABSOLUTE MONO #: 0.34 K/UL (ref 0.1–1.2)
ALBUMIN SERPL-MCNC: 2.8 G/DL (ref 3.5–5.2)
ANION GAP SERPL CALCULATED.3IONS-SCNC: 11 MMOL/L (ref 9–17)
BASOPHILS # BLD: 1 % (ref 0–2)
BASOPHILS ABSOLUTE: <0.03 K/UL (ref 0–0.2)
BUN BLDV-MCNC: 27 MG/DL (ref 8–23)
BUN/CREAT BLD: ABNORMAL (ref 9–20)
CALCIUM SERPL-MCNC: 8.5 MG/DL (ref 8.6–10.4)
CHLORIDE BLD-SCNC: 107 MMOL/L (ref 98–107)
CO2: 20 MMOL/L (ref 20–31)
CORTISOL COLLECTION INFO: NORMAL
CORTISOL: 10.2 UG/DL (ref 2.7–18.4)
CREAT SERPL-MCNC: 2.11 MG/DL (ref 0.7–1.2)
DIFFERENTIAL TYPE: ABNORMAL
EOSINOPHILS RELATIVE PERCENT: 5 % (ref 1–4)
GFR AFRICAN AMERICAN: 37 ML/MIN
GFR NON-AFRICAN AMERICAN: 30 ML/MIN
GFR SERPL CREATININE-BSD FRML MDRD: ABNORMAL ML/MIN/{1.73_M2}
GFR SERPL CREATININE-BSD FRML MDRD: ABNORMAL ML/MIN/{1.73_M2}
GLUCOSE BLD-MCNC: 87 MG/DL (ref 70–99)
HCT VFR BLD CALC: 32.5 % (ref 40.7–50.3)
HEMOGLOBIN: 9.5 G/DL (ref 13–17)
IMMATURE GRANULOCYTES: 0 %
LYMPHOCYTES # BLD: 23 % (ref 24–43)
MAGNESIUM: 2 MG/DL (ref 1.6–2.6)
MCH RBC QN AUTO: 27.3 PG (ref 25.2–33.5)
MCHC RBC AUTO-ENTMCNC: 29.2 G/DL (ref 28.4–34.8)
MCV RBC AUTO: 93.4 FL (ref 82.6–102.9)
MONOCYTES # BLD: 9 % (ref 3–12)
NRBC AUTOMATED: 0 PER 100 WBC
PDW BLD-RTO: 16.8 % (ref 11.8–14.4)
PHOSPHORUS: 3.4 MG/DL (ref 2.5–4.5)
PLATELET # BLD: 161 K/UL (ref 138–453)
PLATELET ESTIMATE: ABNORMAL
PMV BLD AUTO: 9.8 FL (ref 8.1–13.5)
POTASSIUM SERPL-SCNC: 4.1 MMOL/L (ref 3.7–5.3)
RBC # BLD: 3.48 M/UL (ref 4.21–5.77)
RBC # BLD: ABNORMAL 10*6/UL
SEG NEUTROPHILS: 62 % (ref 36–65)
SEGMENTED NEUTROPHILS ABSOLUTE COUNT: 2.41 K/UL (ref 1.5–8.1)
SODIUM BLD-SCNC: 138 MMOL/L (ref 135–144)
WBC # BLD: 3.8 K/UL (ref 3.5–11.3)
WBC # BLD: ABNORMAL 10*3/UL

## 2021-06-11 PROCEDURE — 50431 NJX PX NFROSGRM &/URTRGRM: CPT

## 2021-06-11 PROCEDURE — 6370000000 HC RX 637 (ALT 250 FOR IP): Performed by: CLINICAL NURSE SPECIALIST

## 2021-06-11 PROCEDURE — 83735 ASSAY OF MAGNESIUM: CPT

## 2021-06-11 PROCEDURE — 6370000000 HC RX 637 (ALT 250 FOR IP): Performed by: NURSE PRACTITIONER

## 2021-06-11 PROCEDURE — 80069 RENAL FUNCTION PANEL: CPT

## 2021-06-11 PROCEDURE — 93971 EXTREMITY STUDY: CPT

## 2021-06-11 PROCEDURE — 36415 COLL VENOUS BLD VENIPUNCTURE: CPT

## 2021-06-11 PROCEDURE — 2060000000 HC ICU INTERMEDIATE R&B

## 2021-06-11 PROCEDURE — 6370000000 HC RX 637 (ALT 250 FOR IP): Performed by: INTERNAL MEDICINE

## 2021-06-11 PROCEDURE — 99232 SBSQ HOSP IP/OBS MODERATE 35: CPT | Performed by: INTERNAL MEDICINE

## 2021-06-11 PROCEDURE — 6360000002 HC RX W HCPCS: Performed by: CLINICAL NURSE SPECIALIST

## 2021-06-11 PROCEDURE — 0TP97DZ REMOVAL OF INTRALUMINAL DEVICE FROM URETER, VIA NATURAL OR ARTIFICIAL OPENING: ICD-10-PCS | Performed by: RADIOLOGY

## 2021-06-11 PROCEDURE — 85025 COMPLETE CBC W/AUTO DIFF WBC: CPT

## 2021-06-11 PROCEDURE — 2580000003 HC RX 258: Performed by: CLINICAL NURSE SPECIALIST

## 2021-06-11 PROCEDURE — 2580000003 HC RX 258: Performed by: INTERNAL MEDICINE

## 2021-06-11 PROCEDURE — 6360000004 HC RX CONTRAST MEDICATION: Performed by: INTERNAL MEDICINE

## 2021-06-11 PROCEDURE — 2709999900 HC NON-CHARGEABLE SUPPLY

## 2021-06-11 PROCEDURE — 82533 TOTAL CORTISOL: CPT

## 2021-06-11 RX ORDER — MIDODRINE HYDROCHLORIDE 5 MG/1
5 TABLET ORAL
Status: DISCONTINUED | OUTPATIENT
Start: 2021-06-11 | End: 2021-06-12 | Stop reason: HOSPADM

## 2021-06-11 RX ORDER — MIDODRINE HYDROCHLORIDE 5 MG/1
10 TABLET ORAL
Status: DISCONTINUED | OUTPATIENT
Start: 2021-06-11 | End: 2021-06-11

## 2021-06-11 RX ADMIN — ACETAMINOPHEN 650 MG: 325 TABLET ORAL at 21:05

## 2021-06-11 RX ADMIN — HEPARIN SODIUM 5000 UNITS: 5000 INJECTION INTRAVENOUS; SUBCUTANEOUS at 23:39

## 2021-06-11 RX ADMIN — MIDODRINE HYDROCHLORIDE 5 MG: 5 TABLET ORAL at 18:16

## 2021-06-11 RX ADMIN — HEPARIN SODIUM 5000 UNITS: 5000 INJECTION INTRAVENOUS; SUBCUTANEOUS at 13:51

## 2021-06-11 RX ADMIN — SODIUM CHLORIDE, PRESERVATIVE FREE 5 ML: 5 INJECTION INTRAVENOUS at 07:56

## 2021-06-11 RX ADMIN — MIDODRINE HYDROCHLORIDE 5 MG: 5 TABLET ORAL at 13:50

## 2021-06-11 RX ADMIN — SODIUM CHLORIDE: 9 INJECTION, SOLUTION INTRAVENOUS at 00:12

## 2021-06-11 RX ADMIN — DOCUSATE SODIUM 100 MG: 100 CAPSULE ORAL at 21:10

## 2021-06-11 RX ADMIN — Medication 3 MG: at 21:10

## 2021-06-11 RX ADMIN — MIDODRINE HYDROCHLORIDE 5 MG: 5 TABLET ORAL at 07:55

## 2021-06-11 RX ADMIN — DOCUSATE SODIUM 100 MG: 100 CAPSULE ORAL at 07:55

## 2021-06-11 RX ADMIN — IOPAMIDOL 16 ML: 755 INJECTION, SOLUTION INTRAVENOUS at 13:03

## 2021-06-11 RX ADMIN — SODIUM CHLORIDE, PRESERVATIVE FREE 10 ML: 5 INJECTION INTRAVENOUS at 00:24

## 2021-06-11 ASSESSMENT — PAIN DESCRIPTION - PAIN TYPE: TYPE: ACUTE PAIN

## 2021-06-11 ASSESSMENT — PAIN SCALES - GENERAL
PAINLEVEL_OUTOF10: 4
PAINLEVEL_OUTOF10: 8
PAINLEVEL_OUTOF10: 0
PAINLEVEL_OUTOF10: 5
PAINLEVEL_OUTOF10: 8

## 2021-06-11 ASSESSMENT — PAIN DESCRIPTION - PROGRESSION
CLINICAL_PROGRESSION: NOT CHANGED

## 2021-06-11 NOTE — PROGRESS NOTES
[facility])  Occupational Therapy Not Seen Note    DATE: 2021    NAME: Chris Burkett  MRN: [id number]   : 1940      Patient not seen this date for Occupational Therapy due to:    pt had dopplers RUE this AM, positive for DVT     Next Scheduled Treatment: 2021    Electronically signed by Eamnuel Ramon 2021 at 10:35 AM

## 2021-06-11 NOTE — PROGRESS NOTES
Removal of  RIGHT neph tube    Time out performed    Dr. Latosha Garcia removed without complication in IR under flouroscopy  No complications  4 X 4  Dressing applied with tape  Clear drainage  Report given to RN    Awaiting transport in IR holding area

## 2021-06-11 NOTE — PROGRESS NOTES
[facility]   Progress Note                   Date:   6/11/2021  Patient name: Yg Guzman  Date of admission:  6/9/2021  2:50 PM  MRN:   [id number]  YOB: 1940  PCP: Shane Sadler MD    Reason for Admission: Hypotension [I95.9]    Subjective:       Clinical Changes / Abnormalities: Patient seen and examined at the bed side. Denies chest pain or SOB. No dizziness or lightheadedness. Patient states that he has not been out of bed much. HR mid 40's overnight and in the 50's today. BP remains low. Reviewed vitals, labs, tele, & previous testing. Sinus Bradycardia on tele. Medications:   Scheduled Meds:   midodrine  5 mg Oral TID WC    docusate sodium  100 mg Oral BID    polyethylene glycol  17 g Oral Daily    melatonin  3 mg Oral Nightly    insulin lispro  0-6 Units Subcutaneous TID WC    insulin lispro  0-3 Units Subcutaneous Nightly    sodium chloride flush  5-40 mL Intravenous 2 times per day    heparin (porcine)  5,000 Units Subcutaneous 3 times per day     Continuous Infusions:   sodium chloride 75 mL/hr at 06/11/21 0012    sodium chloride       CBC:   Recent Labs     06/09/21  1523 06/10/21  0558 06/11/21  0606   WBC 6.2 4.1 3.8   HGB 10.7* 9.6* 9.5*    157 161     BMP:    Recent Labs     06/09/21  1523 06/10/21  0558 06/11/21  0606    136 138   K 4.0 3.9 4.1    107 107   CO2 20 19* 20   BUN 29* 25* 27*   CREATININE 2.05* 1.96* 2.11*   GLUCOSE 94 86 87     Hepatic:   Recent Labs     06/10/21  0558   AST 15   ALT 6   BILITOT 0.37   ALKPHOS 55     Troponin: No results for input(s): TROPHS in the last 72 hours. BNP: No results for input(s): BNP in the last 72 hours. Lipids: No results for input(s): CHOL, HDL in the last 72 hours.     Invalid input(s): LDLCALCU  INR:   Recent Labs     06/10/21  0558   INR 1.0       Objective:   Vitals: BP (!) 87/51   Pulse (!) 48   Temp 98.6 °F (37 °C) (Oral)   Resp 20   Ht 6' 2\" (1.88 m)   Wt 207 lb 3.7 oz (94 kg)   SpO2 99%   BMI 26.61 kg/m²   General appearance: alert and cooperative with exam  HEENT: Head: Normocephalic, no lesions, without obvious abnormality. Neck: no JVD, trachea midline, no adenopathy  Lungs: Clear with diminished breath sounds in bases bilat. Heart: Regular rate and rhythm, s1/s2 auscultated, no murmurs  Abdomen: soft, non-tender, bowel sounds active  Extremities: no edema  Neurologic: not done    EKG:   Sinus bradycardia with PACs and compensatory pause     ECHO: 5/10/2021  Left ventricle is normal in size. Mild left ventricular hypertrophy. Global left ventricular systolic function is normal with an estimated  ejection fraction of 60 % . No obvious wall motion abnormality seen. Aortic leaflets show calcification without restriction of motion. Mild aortic insufficiency. Normal tricuspid valve leaflets. Mild tricuspid regurgitation. Aortic root is mildly dilated 4.5cm. Assessment / Acute Cardiac Problems:   1. Syncope with significant orthostatic hypotension   2. Sinus bradycardia with PACs  3. Preserved LV systolic function   4. Aortic sclerosis   5. 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 CKD   7.  DM    Patient Active Problem List:     Bladder cancer     DJD (degenerative joint disease)     Essential hypertension     Renal mass     Incisional hernia of anterior abdominal wall without obstruction or gangrene     Bilateral kidney stones     Right ureteral calculus     Partial small bowel obstruction (HCC)     GERD (gastroesophageal reflux disease)     Normochromic normocytic anemia     Iron deficiency anemia     Toxic metabolic encephalopathy     Bilateral hydronephrosis     Vitamin B 12 deficiency     Folate deficiency     Acute respiratory failure with hypoxia (HCC)     Moderate malnutrition (HCC)     Neoplasm of uncertain behavior of kidney     S/P ileal conduit (HCC)     CKD (chronic kidney disease), stage III (HCC)     Stenosis of ileal conduit stoma     Renal cancer, right ([address]     Hypotension     Syncope and collapse     Subclinical hypothyroidism     Left shoulder pain      Plan of Treatment:   1. Bradycardia. Continue with PACs. HR 50's. Had mid 40's overnight. Continues to have BP on the low side. Continue midodrine. 2. Negative -4.8L since admission. Continue to increase fluid intake. Use of compression socks and gradual position changes. Patient denies dizziness or lightheadedness. 3. Recommend patient up ambulating for assessment of chronotropic response. Orthostatic BP checks BID. 4. Keep K+ > 4.0, and Mg+ > 2.0. Stable. Continue to monitor renal function. Patient could benefit from gentle IV fluid hydration. 5. [place] monitor for 14 days at discharge.          Electronically signed by MARKIE Salcedo CNP on 6/11/2021 at 11:44 [facility]      [phone]

## 2021-06-11 NOTE — PROGRESS NOTES
[facility]  Office: [address], DO, Carol Frias, DO, Gayla Sewell, DO, Ken Salas Blood, DO, Aaron Hull MD, Radha Freire MD, Toney Polk MD, Magnus Jarquin MD, Sabrina Shields MD, Reagan Steiner MD, Linus Wright MD, Brittany Bills MD, Vanessa Tam DO, Tomeka Zamudio MD, Kalyan Sanches DO, Char Bennett MD,  Renuka Patel, DO, Zita Espinosa MD, Chano Solano MD, Bunny Krishna MD, Lukas Elils MD, Gilmar Woodward, Heather Maya, CNP, Craig Duran, CNP, Robin Champion, CNS, Adilson Santillan, CNP, Jose Badillo, CNP, Lawrence Nyes, CNP, Marty Quarles, CNP, Brayden Andres, CNP, Cindy Corbett PA-C, Carol To, [facility], Jeana Mata, CNP, Guille Rollins, CNP, Lara Al, CNP, Tiffany Wray, CNP, Niki Sweeney, [facility], Stefani Francois, [address] [place] [address]    Progress Note    6/12/2021    10:10 AM    Name:   Yg Guzman  MRN:     [id number]     Acct:      [de-identified]   Room:   2022/2022-01   Day:  3  Admit Date:  6/9/2021  2:50 PM    PCP:   Shane Sadler MD  Code Status:  Full Code    Subjective:     C/C:   Chief Complaint   Patient presents with    Hypotension     renal cancer stage 4     Interval History Status:not changed      Patient feels better today. Still di. Plan for IR guided removal of nephrostomy tube. Patient is bradycardic however asymptomatic currently. Doppler positive for superficial thrombosis. Brief History:     77-year-old male who presents after a syncopal event. Patient noted to have positive orthostasis. He was treated with IV fluids, midodrine, compression stockings. He was eval by urology who had PCT and drain removed today.     Review of Systems:     Constitutional:  negative for chills, fevers, sweats  Respiratory:  negative for cough, dyspnea on exertion, shortness of breath, wheezing  Cardiovascular:  negative for chest pain, chest pressure/discomfort, lower extremity edema, palpitations  Gastrointestinal:  negative for abdominal pain, constipation, diarrhea, nausea, vomiting  Neurological:  negative for dizziness, headache    Medications: Allergies: Allergies   Allergen Reactions    Nsaids Anaphylaxis     GI bleed    Tetracyclines & Related        Current Meds:   Scheduled Meds:    midodrine  5 mg Oral TID WC    docusate sodium  100 mg Oral BID    polyethylene glycol  17 g Oral Daily    melatonin  3 mg Oral Nightly    insulin lispro  0-6 Units Subcutaneous TID WC    insulin lispro  0-3 Units Subcutaneous Nightly    sodium chloride flush  5-40 mL Intravenous 2 times per day    heparin (porcine)  5,000 Units Subcutaneous 3 times per day     Continuous Infusions:    sodium chloride 100 mL/hr at 06/11/21 1815    sodium chloride       PRN Meds: albuterol, bisacodyl, sodium chloride, potassium chloride **OR** potassium alternative oral replacement **OR** potassium chloride, magnesium sulfate, ondansetron **OR** ondansetron, acetaminophen **OR** acetaminophen, magnesium hydroxide    Data:     Past Medical History:   has a past medical history of Arthritis, Caffeine use, [facility] Cancer of kidney, unspecified laterality ([facility] CKD (chronic kidney disease), Cognitive communication deficit, COPD (chronic obstructive pulmonary disease) ([facility] Diabetes mellitus ([facility] DNR (do not resuscitate), Dysphagia, GERD (gastroesophageal reflux disease), GI bleeding, Hernia, inguinal, right, Hyperlipidemia, Hypertension, Ileal conduit stomal stenosis, Kidney stone, Mobility impaired, Muscle weakness, Nursing home resident, PAF (paroxysmal atrial fibrillation) ([facility] Presence of urostomy ([facility] and Retinal detachment. Social History:   reports that he quit smoking about 41 years ago. His smoking use included cigarettes. He has a 10.00 pack-year smoking history. He has never used smokeless tobacco. He reports that he does not drink alcohol and does not use drugs. Value Date/Time    SPECIAL NOT REPORTED 06/09/2021 03:44 PM     Lab Results   Component Value Date/Time    CULTURE NO GROWTH 06/09/2021 03:44 PM       Radiology:  XR SHOULDER LEFT (MIN 2 VIEWS)    Result Date: 6/11/2021  Degenerative changes of the left shoulder without convincing evidence of acute fracture. IR REMOVAL NEPHROSTOMY TUBE W FLUORO    Result Date: 6/11/2021  Successful fluoroscopic guided removal of right nephroureteral stent       Physical Examination:        General appearance:  alert, cooperative and no distress  Mental Status:  oriented to person, place and time and normal affect  Lungs:  clear to auscultation bilaterally, normal effort  Heart:  regular rate and rhythm, no murmur  Abdomen:  soft, nontender, nondistended, normal bowel sounds, no masses, hepatomegaly, splenomegaly  Extremities:  no edema, redness, tenderness in the calves  Skin:  no gross lesions, rashes, induration    Assessment:        Hospital Problems         Last Modified POA    * (Principal) Syncope and collapse 6/10/2021 Yes    Acute kidney injury ([address] 6/12/2021 Yes    S/P ileal conduit ([address] 6/10/2021 Yes    CKD (chronic kidney disease), stage III ([address] 6/10/2021 Yes    Stenosis of ileal conduit stoma 6/10/2021 Yes    Renal cancer, right ([address] 6/10/2021 Yes    Hypotension 6/10/2021 Yes    Subclinical hypothyroidism 6/10/2021 Yes    Left shoulder pain 6/10/2021 Yes          Plan:        1. Increase IV fluids 200 cc an hour of normal saline  2. Monitor Scr. Tx KARMA avoid nephrotoxic agents  3. Liberalize diet, allow for increase sodium intake  4. Compression stockings ordered  5. Doppler shows thrombosis of the superficial vein and on, continue warm compresses  6. Patient should continue p.o. intake  7. Plan to remove PCN T/stent today via IR  8. Creatinine appears to be at baseline. 9. DVT prophylaxis  10. Okay for PT OT  11.  Needs to follow-up with his primary care physician for evaluation of subclinical hypothyroidism  12.  Plan for discharge in morning if remains stable overnight    Yamilet Evans DO  6/12/2021  10:10 AM

## 2021-06-11 NOTE — PROGRESS NOTES
Raulito Kelly MD FACS    Urology Progress Note    Subjective: Pamela Cherry is a 80 y.o. male. No acute events overnight.    No chest pain, shortness of breath, nausea, vomiting, fevers, chills  R PCNT capped      Patient Vitals for the past 24 hrs:   BP Temp Temp src Pulse Resp SpO2   06/11/21 0600 -- -- -- (!) 47 -- --   06/11/21 0418 -- -- -- (!) 48 17 96 %   06/11/21 0417 -- -- -- (!) 48 19 95 %   06/11/21 0416 102/71 -- -- (!) 49 18 95 %   06/11/21 0415 -- -- -- (!) 47 17 97 %   06/11/21 0414 -- -- -- (!) 48 16 --   06/11/21 0413 -- -- -- (!) 47 18 --   06/11/21 0412 -- -- -- (!) 44 20 --   06/11/21 0411 -- -- -- (!) 45 22 --   06/11/21 0410 -- -- -- (!) 45 13 --   06/11/21 0409 -- -- -- (!) 44 15 --   06/11/21 0408 -- -- -- (!) 44 17 --   06/11/21 0407 -- -- -- (!) 45 12 --   06/11/21 0406 -- -- -- (!) 43 14 --   06/11/21 0405 -- -- -- (!) 46 14 --   06/11/21 0404 -- -- -- (!) 46 14 --   06/11/21 0403 -- -- -- (!) 47 15 --   06/11/21 0402 -- -- -- (!) 45 14 --   06/11/21 0401 -- -- -- (!) 43 16 --   06/11/21 0400 -- -- -- (!) 45 15 --   06/11/21 0359 -- -- -- (!) 46 14 --   06/11/21 0358 -- -- -- (!) 45 14 --   06/11/21 0357 -- -- -- (!) 44 15 --   06/11/21 0356 -- -- -- (!) 45 15 --   06/11/21 0355 -- -- -- (!) 44 15 --   06/11/21 0354 -- -- -- (!) 46 15 --   06/11/21 0353 -- -- -- (!) 45 14 --   06/11/21 0352 -- -- -- (!) 45 14 --   06/11/21 0351 -- -- -- (!) 46 13 --   06/11/21 0350 -- -- -- (!) 46 14 --   06/11/21 0349 -- -- -- (!) 47 14 --   06/11/21 0348 -- -- -- (!) 46 14 --   06/11/21 0347 -- -- -- (!) 47 15 --   06/11/21 0346 -- -- -- (!) 48 18 --   06/11/21 0345 -- -- -- (!) 47 16 --   06/11/21 0344 -- -- -- (!) 47 16 --   06/11/21 0343 -- -- -- (!) 47 20 --   06/11/21 0342 -- -- -- (!) 48 16 --   06/11/21 0341 -- -- -- (!) 47 14 --   06/11/21 0340 -- -- -- 50 18 --   06/11/21 0339 -- -- -- (!) 49 16 --   06/11/21 0338 -- -- -- 52 18 --   06/11/21 0337 -- -- -- 52 -- --   06/11/21 0336 -- -- -- 56 -- --   06/11/21 0335 -- -- -- 50 -- --   06/11/21 0334 -- -- -- 53 19 --   06/11/21 0333 -- -- -- (!) 48 20 --   06/11/21 0332 -- -- -- (!) 47 14 --   06/11/21 0331 -- -- -- (!) 47 18 --   06/11/21 0330 -- -- -- (!) 46 15 --   06/11/21 0329 -- -- -- (!) 46 17 --   06/11/21 0328 -- -- -- (!) 47 18 --   06/11/21 0327 -- -- -- (!) 47 26 --   06/11/21 0326 -- -- -- (!) 47 20 --   06/11/21 0325 -- -- -- (!) 47 20 --   06/11/21 0324 -- -- -- (!) 46 18 --   06/11/21 0323 -- -- -- (!) 48 16 --   06/11/21 0322 -- -- -- (!) 49 20 --   06/11/21 0321 -- -- -- (!) 47 17 --   06/11/21 0320 -- -- -- (!) 45 16 --   06/11/21 0319 -- -- -- (!) 45 18 --   06/11/21 0318 -- -- -- (!) 47 17 --   06/11/21 0317 -- -- -- (!) 49 17 --   06/11/21 0316 -- -- -- 51 16 --   06/11/21 0315 -- -- -- 54 13 --   06/11/21 0314 -- -- -- 54 27 --   06/11/21 0313 -- -- -- 54 21 --   06/11/21 0312 -- -- -- 50 16 --   06/11/21 0311 -- -- -- 50 22 --   06/11/21 0310 -- -- -- 50 20 --   06/11/21 0309 -- -- -- 50 16 --   06/11/21 0308 -- -- -- (!) 48 12 --   06/11/21 0307 -- -- -- 51 23 --   06/11/21 0306 -- -- -- 51 18 --   06/11/21 0305 -- -- -- 50 17 --   06/11/21 0304 -- -- -- (!) 47 15 --   06/11/21 0303 -- -- -- (!) 48 17 --   06/11/21 0302 -- -- -- 50 16 --   06/11/21 0301 -- -- -- (!) 48 15 --   06/11/21 0300 -- -- -- (!) 47 15 --   06/11/21 0259 -- -- -- (!) 49 20 --   06/11/21 0258 -- -- -- (!) 47 16 --   06/11/21 0257 -- -- -- (!) 49 17 --   06/11/21 0256 -- -- -- (!) 49 16 --   06/11/21 0255 -- -- -- 50 20 --   06/11/21 0254 -- -- -- 50 17 --   06/11/21 0253 -- -- -- 52 17 --   06/11/21 0252 -- -- -- 51 20 --   06/11/21 0251 -- -- -- 50 14 --   06/11/21 0250 -- -- -- (!) 49 16 --   06/11/21 0249 -- -- -- 51 21 --   06/11/21 0248 110/66 -- -- 50 17 --   06/11/21 0247 -- -- -- 52 19 --   06/11/21 0246 -- -- -- 52 23 --   06/11/21 0245 -- -- -- (!) 44 16 --   06/11/21 0244 -- -- -- (!) 45 14 --   06/11/21 0243 -- -- -- (!) 44 15 -- 06/11/21 0242 -- -- -- (!) 43 20 --   06/11/21 0241 -- -- -- (!) 45 18 --   06/11/21 0240 -- -- -- (!) 45 12 --   06/11/21 0239 -- -- -- (!) 46 15 --   06/11/21 0238 -- -- -- (!) 46 16 --   06/11/21 0237 -- -- -- (!) 48 16 --   06/11/21 0236 -- -- -- (!) 47 11 --   06/11/21 0235 -- -- -- (!) 49 14 --   06/11/21 0234 -- -- -- 53 29 --   06/11/21 0233 -- -- -- (!) 48 21 --   06/11/21 0232 -- -- -- (!) 48 16 --   06/11/21 0231 -- -- -- (!) 47 16 --   06/11/21 0230 -- -- -- (!) 45 17 --   06/11/21 0229 -- -- -- (!) 48 17 --   06/11/21 0228 -- -- -- (!) 48 16 --   06/11/21 0227 -- -- -- (!) 46 15 --   06/11/21 0226 -- -- -- (!) 47 15 --   06/11/21 0225 -- -- -- (!) 46 17 --   06/11/21 0224 -- -- -- (!) 44 20 --   06/11/21 0223 -- -- -- (!) 44 17 --   06/11/21 0222 -- -- -- (!) 45 18 --   06/11/21 0221 -- -- -- (!) 49 17 --   06/11/21 0220 -- -- -- (!) 44 14 --   06/11/21 0219 -- -- -- (!) 46 14 --   06/11/21 0218 -- -- -- (!) 47 15 --   06/11/21 0217 -- -- -- (!) 47 19 --   06/11/21 0216 -- -- -- (!) 44 18 --   06/11/21 0215 -- -- -- (!) 43 17 --   06/11/21 0214 -- -- -- (!) 48 15 --   06/11/21 0213 -- -- -- (!) 45 17 --   06/11/21 0212 -- -- -- (!) 44 16 --   06/11/21 0211 -- -- -- (!) 47 18 --   06/11/21 0210 -- -- -- (!) 48 16 --   06/11/21 0209 -- -- -- (!) 47 16 --   06/11/21 0208 -- -- -- (!) 44 16 --   06/11/21 0207 -- -- -- (!) 44 15 --   06/11/21 0206 -- -- -- (!) 46 16 --   06/11/21 0205 -- -- -- (!) 44 18 --   06/11/21 0204 -- -- -- (!) 45 18 --   06/11/21 0203 -- -- -- (!) 42 14 --   06/11/21 0202 -- -- -- (!) 44 15 --   06/11/21 0201 -- -- -- (!) 44 14 --   06/11/21 0200 -- -- -- (!) 43 14 --   06/11/21 0159 -- -- -- (!) 44 15 --   06/11/21 0158 -- -- -- (!) 44 17 --   06/11/21 0157 -- -- -- (!) 46 18 --   06/11/21 0156 -- -- -- (!) 45 17 --   06/11/21 0155 -- -- -- (!) 45 15 --   06/11/21 0154 -- -- -- (!) 45 16 --   06/11/21 0153 -- -- -- (!) 46 18 --   06/11/21 0152 -- -- -- (!) 47 15 --   06/11/21 0151 -- -- -- (!) 47 16 --   06/11/21 0150 -- -- -- (!) 47 16 --   06/11/21 0149 -- -- -- (!) 49 16 --   06/11/21 0148 -- -- -- (!) 47 15 --   06/11/21 0147 -- -- -- (!) 49 13 --   06/11/21 0146 -- -- -- 50 18 --   06/11/21 0145 -- -- -- 52 17 --   06/11/21 0144 -- -- -- 56 19 --   06/11/21 0143 -- -- -- 57 24 --   06/11/21 0142 -- -- -- 57 26 --   06/11/21 0141 -- -- -- 52 19 --   06/11/21 0140 -- -- -- 53 16 --   06/11/21 0139 -- -- -- 56 15 --   06/11/21 0138 -- -- -- 57 20 --   06/11/21 0137 -- -- -- 56 22 --   06/11/21 0136 -- -- -- 53 22 --   06/11/21 0135 -- -- -- 53 21 --   06/11/21 0134 -- -- -- 52 23 --   06/11/21 0133 -- -- -- 53 24 --   06/11/21 0132 -- -- -- 50 24 --   06/11/21 0131 -- -- -- 53 22 --   06/11/21 0130 -- -- -- 50 18 --   06/11/21 0129 -- -- -- (!) 46 14 --   06/11/21 0128 -- -- -- (!) 47 13 --   06/11/21 0127 -- -- -- (!) 47 16 --   06/11/21 0126 -- -- -- (!) 48 15 --   06/11/21 0125 -- -- -- (!) 48 17 --   06/11/21 0124 -- -- -- (!) 47 19 --   06/11/21 0123 -- -- -- (!) 48 13 --   06/11/21 0122 -- -- -- (!) 48 15 --   06/11/21 0121 -- -- -- (!) 49 16 --   06/11/21 0120 -- -- -- 50 16 --   06/11/21 0119 -- -- -- 50 14 --   06/11/21 0118 -- -- -- 51 14 --   06/11/21 0117 -- -- -- 53 15 --   06/11/21 0116 -- -- -- 55 17 --   06/11/21 0115 -- -- -- 59 17 --   06/11/21 0114 -- -- -- 59 (!) 33 --   06/11/21 0113 -- -- -- 53 15 --   06/11/21 0112 -- -- -- (!) 47 (!) 7 --   06/11/21 0111 -- -- -- 50 8 --   06/11/21 0110 -- -- -- 53 9 --   06/11/21 0109 -- -- -- (!) 49 9 --   06/11/21 0108 -- -- -- (!) 48 9 --   06/11/21 0107 -- -- -- (!) 49 9 --   06/11/21 0106 -- -- -- 53 15 --   06/11/21 0105 -- -- -- 52 14 --   06/11/21 0104 -- -- -- (!) 49 14 --   06/11/21 0103 -- -- -- (!) 48 15 --   06/11/21 0102 -- -- -- (!) 48 14 --   06/11/21 0101 -- -- -- 51 17 --   06/11/21 0100 -- -- -- 51 14 --   06/11/21 0059 -- -- -- 51 15 --   06/11/21 0058 -- -- -- (!) 49 15 --   06/11/21 0057 -- -- -- (!) 49 15 -- 06/11/21 0056 -- -- -- 50 13 --   06/11/21 0055 -- -- -- (!) 49 13 --   06/11/21 0054 -- -- -- (!) 49 14 --   06/11/21 0053 -- -- -- 50 13 --   06/11/21 0052 -- -- -- 50 14 --   06/11/21 0051 -- -- -- 51 13 --   06/11/21 0050 -- -- -- 51 13 --   06/11/21 0049 -- -- -- 50 13 --   06/11/21 0048 -- -- -- 51 13 --   06/11/21 0047 -- -- -- 51 15 --   06/11/21 0046 -- -- -- (!) 49 13 --   06/11/21 0045 -- -- -- 51 10 --   06/11/21 0044 -- -- -- 52 16 --   06/11/21 0043 -- -- -- (!) 49 15 --   06/11/21 0042 -- -- -- (!) 49 13 --   06/11/21 0041 -- -- -- 50 13 --   06/11/21 0040 -- -- -- 51 12 --   06/11/21 0039 -- -- -- 51 14 --   06/11/21 0038 -- -- -- 50 14 --   06/11/21 0037 -- -- -- (!) 48 13 --   06/11/21 0036 -- -- -- 50 14 --   06/11/21 0035 -- -- -- 51 14 --   06/11/21 0034 -- -- -- 51 11 --   06/11/21 0033 -- -- -- 51 9 --   06/11/21 0032 -- -- -- 52 8 --   06/11/21 0031 -- -- -- 52 15 --   06/11/21 0030 -- -- -- 54 16 --   06/11/21 0029 -- -- -- 54 13 --   06/11/21 0028 -- -- -- 58 22 --   06/11/21 0027 -- -- -- 52 21 --   06/11/21 0026 -- -- -- 54 19 --   06/11/21 0025 -- -- -- 53 16 --   06/11/21 0024 -- -- -- 54 18 --   06/11/21 0023 -- -- -- 56 21 --   06/11/21 0022 96/65 -- -- 53 17 --   06/11/21 0021 -- -- -- 54 (!) 7 --   06/11/21 0020 -- -- -- 53 10 --   06/11/21 0019 -- -- -- 54 24 --   06/11/21 0018 -- -- -- 55 18 --   06/11/21 0017 -- -- -- 55 17 --   06/11/21 0016 -- -- -- 56 17 --   06/11/21 0015 -- -- -- 56 20 --   06/11/21 0014 -- -- -- 57 29 --   06/11/21 0013 -- -- -- 56 29 --   06/11/21 0012 -- -- -- 55 23 --   06/11/21 0011 -- -- -- 54 26 --   06/11/21 0010 -- -- -- 54 19 --   06/11/21 0009 -- -- -- 52 18 --   06/11/21 0008 -- -- -- 53 21 --   06/11/21 0007 -- -- -- 56 23 --   06/11/21 0006 -- -- -- 52 19 --   06/11/21 0005 -- -- -- 51 16 --   06/11/21 0004 -- -- -- 50 15 --   06/11/21 0003 -- -- -- 51 17 --   06/11/21 0002 -- -- -- 50 21 --   06/11/21 0001 -- -- -- (!) 49 15 --   06/11/21 0000 -- -- Oral (!) 47 16 --   06/10/21 2359 -- -- -- (!) 49 16 --   06/10/21 2358 -- -- -- (!) 49 12 --   06/10/21 2357 -- -- -- (!) 49 14 --   06/10/21 2356 -- -- -- 50 14 --   06/10/21 2355 -- -- -- (!) 48 14 --   06/10/21 2354 -- -- -- (!) 47 14 --   06/10/21 2353 -- -- -- (!) 48 13 --   06/10/21 2352 -- -- -- (!) 48 19 --   06/10/21 2351 -- -- -- (!) 48 15 --   06/10/21 2350 -- -- -- (!) 49 16 --   06/10/21 2349 -- -- -- (!) 47 15 --   06/10/21 2348 -- -- -- 50 16 --   06/10/21 2347 -- -- -- (!) 49 17 --   06/10/21 2346 -- -- -- (!) 48 14 --   06/10/21 2345 -- -- -- (!) 48 16 --   06/10/21 2344 -- -- -- (!) 49 15 --   06/10/21 2343 -- -- -- 50 14 --   06/10/21 2342 -- -- -- (!) 49 15 --   06/10/21 2341 -- -- -- (!) 49 14 --   06/10/21 2340 -- -- -- (!) 49 13 --   06/10/21 2339 -- -- -- (!) 49 14 --   06/10/21 2338 -- -- -- (!) 49 16 --   06/10/21 2337 -- -- -- 51 14 --   06/10/21 2336 -- -- -- 50 14 --   06/10/21 2335 -- -- -- 50 15 --   06/10/21 2334 -- -- -- 50 14 --   06/10/21 2333 -- -- -- 50 14 --   06/10/21 2332 -- -- -- 53 14 --   06/10/21 2331 -- -- -- 51 14 --   06/10/21 2330 -- -- -- 52 15 --   06/10/21 2329 -- -- -- 53 15 --   06/10/21 2328 -- -- -- 54 15 --   06/10/21 2327 -- -- -- 54 16 --   06/10/21 2326 -- -- -- 56 18 --   06/10/21 2325 -- -- -- 59 24 --   06/10/21 2324 -- -- -- 58 25 --   06/10/21 2323 -- -- -- 57 23 --   06/10/21 2322 -- -- -- 57 20 --   06/10/21 2321 -- -- -- 54 16 --   06/10/21 2320 -- -- -- 54 13 --   06/10/21 2319 -- -- -- 55 13 --   06/10/21 2318 -- -- -- 55 12 --   06/10/21 2317 -- -- -- 54 13 --   06/10/21 2316 -- -- -- 54 19 --   06/10/21 2315 -- -- -- 54 14 --   06/10/21 2314 -- -- -- 55 13 --   06/10/21 2313 -- -- -- 55 16 --   06/10/21 2312 -- -- -- 54 16 --   06/10/21 2311 -- -- -- 55 17 --   06/10/21 2310 -- -- -- 53 14 --   06/10/21 2309 -- -- -- 53 15 --   06/10/21 2308 -- -- -- 54 15 --   06/10/21 2307 -- -- -- 54 15 --   06/10/21 2306 -- -- -- 55 15 -- 06/10/21 2305 -- -- -- 55 15 --   06/10/21 2304 -- -- -- 55 15 --   06/10/21 2303 -- -- -- 54 18 --   06/10/21 2302 -- -- -- 55 16 --   06/10/21 2301 -- -- -- 59 26 --   06/10/21 2300 -- -- -- 57 22 --   06/10/21 2259 -- -- -- 56 18 --   06/10/21 2258 -- -- -- 55 16 --   06/10/21 2257 -- -- -- 55 12 --   06/10/21 2256 107/65 -- -- 55 13 --   06/10/21 2255 107/65 98.6 °F (37 °C) Oral 55 12 --   06/10/21 2254 -- -- -- 56 18 --   06/10/21 2253 -- -- -- 56 19 --   06/10/21 2252 -- -- -- 57 18 --   06/10/21 2251 -- -- -- 55 16 --   06/10/21 2250 -- -- -- 55 16 --   06/10/21 2249 -- -- -- 56 16 --   06/10/21 2248 -- -- -- 56 17 --   06/10/21 2247 -- -- -- 55 17 --   06/10/21 2246 -- -- -- 56 23 --   06/10/21 2245 -- -- -- 54 16 --   06/10/21 2244 -- -- -- (!) 49 16 --   06/10/21 2243 -- -- -- 50 16 --   06/10/21 2242 -- -- -- 55 16 --   06/10/21 2241 -- -- -- (!) 46 17 --   06/10/21 2240 -- -- -- 51 16 --   06/10/21 2239 -- -- -- (!) 49 16 --   06/10/21 2238 -- -- -- 57 16 --   06/10/21 2237 -- -- -- 52 16 --   06/10/21 2236 -- -- -- 53 17 --   06/10/21 2235 -- -- -- 51 16 --   06/10/21 2234 -- -- -- 51 13 --   06/10/21 2233 -- -- -- (!) 48 14 --   06/10/21 2232 -- -- -- 52 13 --   06/10/21 2231 -- -- -- 52 15 --   06/10/21 2230 -- -- -- 53 15 --   06/10/21 2229 -- -- -- 52 14 --   06/10/21 2228 -- -- -- 50 13 --   06/10/21 2227 -- -- -- 50 15 --   06/10/21 2226 -- -- -- 53 15 --   06/10/21 2225 -- -- -- 53 15 --   06/10/21 2224 -- -- -- 54 14 --   06/10/21 2223 -- -- -- 52 14 --   06/10/21 2222 -- -- -- 52 15 --   06/10/21 2221 -- -- -- 54 17 --   06/10/21 2220 -- -- -- 54 14 --   06/10/21 2219 -- -- -- 53 14 --   06/10/21 2218 -- -- -- 53 12 --   06/10/21 2217 -- -- -- 53 13 --   06/10/21 2216 -- -- -- 53 11 --   06/10/21 2215 -- -- -- 52 15 --   06/10/21 2214 -- -- -- 52 16 --   06/10/21 2213 -- -- -- 52 16 --   06/10/21 2212 -- -- -- 53 18 --   06/10/21 2211 -- -- -- 53 14 --   06/10/21 2210 -- -- -- 53 15 -- 06/10/21 2209 -- -- -- 54 16 --   06/10/21 2208 -- -- -- 54 17 --   06/10/21 2207 -- -- -- 54 16 --   06/10/21 2206 -- -- -- 54 16 --   06/10/21 2205 -- -- -- 55 17 --   06/10/21 2204 -- -- -- 56 20 --   06/10/21 2203 -- -- -- 55 18 --   06/10/21 2202 -- -- -- 55 23 --   06/10/21 2201 -- -- -- 56 20 --   06/10/21 2200 -- -- -- 53 19 --   06/10/21 2159 -- -- -- 58 20 --   06/10/21 2158 -- -- -- 55 19 --   06/10/21 2157 -- -- -- 53 16 --   06/10/21 2156 -- -- -- 59 22 --   06/10/21 2155 -- -- -- (!) 49 18 --   06/10/21 2154 -- -- -- 50 17 --   06/10/21 2153 -- -- -- (!) 48 15 --   06/10/21 2152 -- -- -- 50 17 --   06/10/21 2151 -- -- -- (!) 48 17 --   06/10/21 2150 -- -- -- 51 15 --   06/10/21 2149 -- -- -- 52 19 --   06/10/21 2148 -- -- -- 50 21 --   06/10/21 2147 -- -- -- (!) 49 17 --   06/10/21 2146 -- -- -- 51 18 --   06/10/21 2145 -- -- -- (!) 48 17 --   06/10/21 2144 -- -- -- 50 16 --   06/10/21 2143 -- -- -- 53 16 --   06/10/21 2142 -- -- -- 52 19 --   06/10/21 2141 -- -- -- 51 16 --   06/10/21 2140 -- -- -- 52 16 --   06/10/21 2139 -- -- -- 50 17 --   06/10/21 2138 -- -- -- 53 19 --   06/10/21 2137 -- -- -- 55 18 --   06/10/21 2136 -- -- -- 55 18 --   06/10/21 2135 -- -- -- 52 16 --   06/10/21 2134 -- -- -- 51 17 --   06/10/21 2133 -- -- -- 53 17 --   06/10/21 2132 -- -- -- 55 16 --   06/10/21 2131 -- -- -- 57 27 --   06/10/21 2130 -- -- -- 54 19 --   06/10/21 2129 -- -- -- 54 16 --   06/10/21 2128 -- -- -- 52 17 --   06/10/21 2127 -- -- -- 54 16 --   06/10/21 2126 -- -- -- 54 17 --   06/10/21 2125 -- -- -- 54 17 --   06/10/21 2124 -- -- -- 53 17 --   06/10/21 2123 -- -- -- 53 18 --   06/10/21 2122 -- -- -- 54 16 --   06/10/21 2121 -- -- -- 53 17 --   06/10/21 2120 -- -- -- 54 17 --   06/10/21 2119 -- -- -- 53 16 --   06/10/21 2118 -- -- -- 55 19 --   06/10/21 2117 -- -- -- 54 17 --   06/10/21 2116 -- -- -- 54 17 --   06/10/21 2115 -- -- -- 56 15 --   06/10/21 2114 -- -- -- 55 18 --   06/10/21 2113 -- -- -- 56 16 --   06/10/21 2112 -- -- -- 57 18 --   06/10/21 2111 -- -- -- 57 20 --   06/10/21 2110 -- -- -- 62 19 --   06/10/21 2109 -- -- -- 55 13 --   06/10/21 2108 -- -- -- 60 15 --   06/10/21 2107 -- -- -- 63 20 --   06/10/21 2106 -- -- -- 61 26 --   06/10/21 2105 -- -- -- 57 16 --   06/10/21 2104 -- -- -- 57 18 --   06/10/21 2103 -- -- -- 59 19 --   06/10/21 2102 -- -- -- 60 24 --   06/10/21 2101 -- -- -- 56 24 --   06/10/21 2100 -- -- -- 55 15 --   06/10/21 2059 -- -- -- 55 19 --   06/10/21 2058 -- -- -- 54 15 --   06/10/21 2057 -- -- -- 54 14 --   06/10/21 2056 -- -- -- 53 15 --   06/10/21 2055 -- -- -- 55 15 --   06/10/21 2054 -- -- -- 55 15 --   06/10/21 2053 -- -- -- 55 17 --   06/10/21 2052 -- -- -- 55 15 --   06/10/21 2051 -- -- -- 55 13 --   06/10/21 2050 -- -- -- 55 15 --   06/10/21 2049 -- -- -- 54 17 --   06/10/21 2048 -- -- -- 54 15 --   06/10/21 2047 -- -- -- 54 16 --   06/10/21 2046 -- -- -- 55 16 --   06/10/21 2045 -- -- -- 55 17 --   06/10/21 2044 -- -- -- 55 15 --   06/10/21 2043 -- -- -- 55 13 --   06/10/21 2042 -- -- -- 57 14 --   06/10/21 2041 -- -- -- 57 22 --   06/10/21 2040 -- -- -- 56 20 --   06/10/21 2039 -- -- -- 55 16 --   06/10/21 2038 -- -- -- 55 16 --   06/10/21 2037 -- -- -- 55 17 --   06/10/21 2036 -- -- -- 53 18 --   06/10/21 2035 -- -- -- 56 17 --   06/10/21 2034 -- -- -- 57 17 --   06/10/21 2033 -- -- -- 56 17 --   06/10/21 2032 -- -- -- 55 17 --   06/10/21 2031 -- -- -- 57 18 --   06/10/21 2030 -- -- -- 56 19 --   06/10/21 2029 -- -- -- 58 18 --   06/10/21 2028 -- -- -- 59 15 --   06/10/21 2027 -- -- -- 56 12 --   06/10/21 2026 -- -- -- 56 12 --   06/10/21 2025 -- -- -- 56 16 --   06/10/21 2024 -- -- -- 56 13 --   06/10/21 2023 -- -- -- 58 15 --   06/10/21 2022 -- -- -- 56 14 --   06/10/21 2021 -- -- -- 57 18 --   06/10/21 2020 -- -- -- 58 24 --   06/10/21 2019 -- -- -- 56 16 --   06/10/21 2018 -- -- -- 56 17 --   06/10/21 2017 -- -- -- 56 16 --   06/10/21 2016 -- -- -- 56 17 -- 06/10/21 2015 -- -- -- 58 18 --   06/10/21 2014 -- -- -- 56 16 --   06/10/21 2013 -- -- -- 58 17 --   06/10/21 2012 -- -- -- 56 19 --   06/10/21 2011 -- -- -- 56 18 --   06/10/21 2010 -- -- -- 56 18 --   06/10/21 2009 -- -- -- 57 20 --   06/10/21 2008 -- -- -- 58 17 --   06/10/21 2007 -- -- -- 57 18 --   06/10/21 2006 -- -- -- 60 18 --   06/10/21 2005 -- -- -- 59 19 --   06/10/21 2004 -- -- -- 57 15 --   06/10/21 2003 -- -- -- 58 18 --   06/10/21 2002 -- -- -- 58 26 --   06/10/21 2001 -- -- -- 59 16 --   06/10/21 2000 -- -- -- 62 20 --   06/10/21 1644 (!) 150/78 97.7 °F (36.5 °C) Oral 56 -- 98 %   06/10/21 1309 -- -- -- 69 18 --   06/10/21 1115 100/72 -- -- (!) 49 19 --   06/10/21 1053 (!) 111/55 98.4 °F (36.9 °C) Oral (!) 49 14 99 %   06/10/21 0920 107/68 -- -- -- -- --       Intake/Output Summary (Last 24 hours) at 6/11/2021 0850  Last data filed at 6/11/2021 0600  Gross per 24 hour   Intake --   Output 2825 ml   Net -2825 ml       Recent Labs     06/09/21  1523 06/10/21  0558 06/11/21  0606   WBC 6.2 4.1 3.8   HGB 10.7* 9.6* 9.5*   HCT 35.1* 32.4* 32.5*   MCV 90.7 92.6 93.4    157 161     Recent Labs     06/09/21  1523 06/10/21  0558 06/11/21  0606    136 138   K 4.0 3.9 4.1    107 107   CO2 20 19* 20   PHOS  --   --  3.4   BUN 29* 25* 27*   CREATININE 2.05* 1.96* 2.11*       Recent Labs     06/09/21  1544   COLORU YELLOW   PHUR 6.0   WBCUA TOO NUMEROUS TO COUNT   RBCUA TOO NUMEROUS TO COUNT   MUCUS NOT REPORTED   TRICHOMONAS NOT REPORTED   YEAST NOT REPORTED   BACTERIA FEW*   SPECGRAV 1.015   LEUKOCYTESUR LARGE*   UROBILINOGEN Normal   BILIRUBINUR NEGATIVE       Physical Exam:     NAD, AOx3  RRR.  Peripheral pulses palpable  Respirations nonlabored, symmetric chest rise bilaterally  Abdomen: soft, appropriately tender, nondistended  Lower extremities: No edema of calf tenderness bilaterally  R PCNT capped  Urostomy pink and productive of yellow urine    Interval Imaging Findings:    Imaging was independently reviewed and checked with radiologist report      Impression:      French Felix is a 80 y.o. 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    Gaby Calvillo M.D. 6/11/2021 3:26 PM

## 2021-06-11 NOTE — PROGRESS NOTES
IV access lost, no longer able to run 75 ml/hr NS, requested for ultrasound to put in IV.     Patient experiencing bradycardia HR range while asleep 40-46, on a few occassions HR dipped

## 2021-06-11 NOTE — PLAN OF CARE
Problem: Discharge Planning:  Goal: Discharged to appropriate level of care  Description: Discharged to appropriate level of care  6/11/2021 0836 by Slim Wang RN  Outcome: Ongoing  6/11/2021 0835 by Slim Wang RN  Outcome: Ongoing     Problem: Cardiac Output - Decreased:  Goal: Avoidance of environmental tobacco smoke  Description: Absence of orthostatic hypotension  6/11/2021 0836 by Slim Wang RN  Outcome: Ongoing  6/11/2021 0835 by Slim Wang RN  Outcome: Ongoing  Goal: Cardiac output within specified parameters  Description: Cardiac output within specified parameters  6/11/2021 0836 by Slim Wang RN  Outcome: Ongoing  6/11/2021 0835 by Slim Wang RN  Outcome: Ongoing  Goal: Hemodynamic stability will improve  Description: Hemodynamic stability will improve  6/11/2021 0836 by Slim Wang RN  Outcome: Ongoing  6/11/2021 0835 by Slim Wang RN  Outcome: Ongoing     Problem: Fluid Volume - Imbalance:  Goal: Absence of imbalanced fluid volume signs and symptoms  Description: Absence of imbalanced fluid volume signs and symptoms  6/11/2021 0836 by Slim Wang RN  Outcome: Ongoing  6/11/2021 0835 by Slim Wang RN  Outcome: Ongoing     Problem: Tissue Perfusion, Altered:  Goal: Circulatory function within specified parameters  Description: Circulatory function within specified parameters  6/11/2021 0836 by Slim Wang RN  Outcome: Ongoing  6/11/2021 0835 by Slim Wang RN  Outcome: Ongoing     Problem: Tissue Perfusion - Cardiopulmonary, Altered:  Goal: Absence of angina  Description: Absence of angina  6/11/2021 0836 by Slim Wang RN  Outcome: Ongoing  6/11/2021 0835 by Slim Wang RN  Outcome: Ongoing  Goal: Hemodynamic stability will improve  Description: Hemodynamic stability will improve  6/11/2021 0836 by Slim Wang RN  Outcome: Ongoing  6/11/2021 0835 by Slim Wang RN  Outcome: Ongoing     Problem: Pain:  Goal: Pain level will decrease  Description:

## 2021-06-12 VITALS
BODY MASS INDEX: 27.81 KG/M2 | TEMPERATURE: 97.7 F | OXYGEN SATURATION: 96 % | RESPIRATION RATE: 15 BRPM | HEART RATE: 49 BPM | HEIGHT: 74 IN | DIASTOLIC BLOOD PRESSURE: 52 MMHG | WEIGHT: 216.71 LBS | SYSTOLIC BLOOD PRESSURE: 124 MMHG

## 2021-06-12 LAB
ABSOLUTE EOS #: 0.18 K/UL (ref 0–0.44)
ABSOLUTE IMMATURE GRANULOCYTE: <0.03 K/UL (ref 0–0.3)
ABSOLUTE LYMPH #: 0.9 K/UL (ref 1.1–3.7)
ABSOLUTE MONO #: 0.31 K/UL (ref 0.1–1.2)
ALBUMIN SERPL-MCNC: 2.7 G/DL (ref 3.5–5.2)
ANION GAP SERPL CALCULATED.3IONS-SCNC: 9 MMOL/L (ref 9–17)
BASOPHILS # BLD: 1 % (ref 0–2)
BASOPHILS ABSOLUTE: 0.03 K/UL (ref 0–0.2)
BUN BLDV-MCNC: 23 MG/DL (ref 8–23)
BUN/CREAT BLD: ABNORMAL (ref 9–20)
CALCIUM SERPL-MCNC: 8.6 MG/DL (ref 8.6–10.4)
CHLORIDE BLD-SCNC: 111 MMOL/L (ref 98–107)
CO2: 21 MMOL/L (ref 20–31)
CREAT SERPL-MCNC: 1.74 MG/DL (ref 0.7–1.2)
DIFFERENTIAL TYPE: ABNORMAL
EOSINOPHILS RELATIVE PERCENT: 5 % (ref 1–4)
GFR AFRICAN AMERICAN: 46 ML/MIN
GFR NON-AFRICAN AMERICAN: 38 ML/MIN
GFR SERPL CREATININE-BSD FRML MDRD: ABNORMAL ML/MIN/{1.73_M2}
GFR SERPL CREATININE-BSD FRML MDRD: ABNORMAL ML/MIN/{1.73_M2}
GLUCOSE BLD-MCNC: 86 MG/DL (ref 70–99)
HCT VFR BLD CALC: 35.4 % (ref 40.7–50.3)
HEMOGLOBIN: 10.1 G/DL (ref 13–17)
IMMATURE GRANULOCYTES: 0 %
LYMPHOCYTES # BLD: 23 % (ref 24–43)
MAGNESIUM: 2.5 MG/DL (ref 1.6–2.6)
MCH RBC QN AUTO: 27.8 PG (ref 25.2–33.5)
MCHC RBC AUTO-ENTMCNC: 28.5 G/DL (ref 28.4–34.8)
MCV RBC AUTO: 97.5 FL (ref 82.6–102.9)
MONOCYTES # BLD: 8 % (ref 3–12)
NRBC AUTOMATED: 0 PER 100 WBC
PDW BLD-RTO: 16.6 % (ref 11.8–14.4)
PHOSPHORUS: 3.4 MG/DL (ref 2.5–4.5)
PLATELET # BLD: 177 K/UL (ref 138–453)
PLATELET ESTIMATE: ABNORMAL
PMV BLD AUTO: 9.9 FL (ref 8.1–13.5)
POTASSIUM SERPL-SCNC: 4.3 MMOL/L (ref 3.7–5.3)
RBC # BLD: 3.63 M/UL (ref 4.21–5.77)
RBC # BLD: ABNORMAL 10*6/UL
SEG NEUTROPHILS: 63 % (ref 36–65)
SEGMENTED NEUTROPHILS ABSOLUTE COUNT: 2.42 K/UL (ref 1.5–8.1)
SODIUM BLD-SCNC: 141 MMOL/L (ref 135–144)
WBC # BLD: 3.9 K/UL (ref 3.5–11.3)
WBC # BLD: ABNORMAL 10*3/UL

## 2021-06-12 PROCEDURE — 2580000003 HC RX 258: Performed by: CLINICAL NURSE SPECIALIST

## 2021-06-12 PROCEDURE — 85025 COMPLETE CBC W/AUTO DIFF WBC: CPT

## 2021-06-12 PROCEDURE — 6370000000 HC RX 637 (ALT 250 FOR IP): Performed by: NURSE PRACTITIONER

## 2021-06-12 PROCEDURE — 83735 ASSAY OF MAGNESIUM: CPT

## 2021-06-12 PROCEDURE — 99239 HOSP IP/OBS DSCHRG MGMT >30: CPT | Performed by: INTERNAL MEDICINE

## 2021-06-12 PROCEDURE — 97530 THERAPEUTIC ACTIVITIES: CPT

## 2021-06-12 PROCEDURE — 36415 COLL VENOUS BLD VENIPUNCTURE: CPT

## 2021-06-12 PROCEDURE — 94761 N-INVAS EAR/PLS OXIMETRY MLT: CPT

## 2021-06-12 PROCEDURE — 97162 PT EVAL MOD COMPLEX 30 MIN: CPT

## 2021-06-12 PROCEDURE — 80069 RENAL FUNCTION PANEL: CPT

## 2021-06-12 RX ORDER — BLOOD PRESSURE TEST KIT
1 KIT MISCELLANEOUS DAILY
Qty: 1 KIT | Refills: 0 | Status: SHIPPED | OUTPATIENT
Start: 2021-06-12

## 2021-06-12 RX ORDER — MIDODRINE HYDROCHLORIDE 5 MG/1
5 TABLET ORAL
Qty: 90 TABLET | Refills: 0 | Status: SHIPPED | OUTPATIENT
Start: 2021-06-12 | End: 2022-03-10

## 2021-06-12 RX ADMIN — SODIUM CHLORIDE, PRESERVATIVE FREE 10 ML: 5 INJECTION INTRAVENOUS at 09:30

## 2021-06-12 RX ADMIN — DOCUSATE SODIUM 100 MG: 100 CAPSULE ORAL at 09:29

## 2021-06-12 RX ADMIN — MIDODRINE HYDROCHLORIDE 5 MG: 5 TABLET ORAL at 09:29

## 2021-06-12 RX ADMIN — MIDODRINE HYDROCHLORIDE 5 MG: 5 TABLET ORAL at 12:46

## 2021-06-12 NOTE — PLAN OF CARE
I & O's assessed. Patient on telemetry. Call light within reach. Bed in lowest position. Patient on telemetry.

## 2021-06-12 NOTE — PROGRESS NOTES
Physical Therapy    Facility/Department: [facility] CAR 2  Initial Assessment    NAME: Jonathan Roberto  : 1940  MRN: [id number]    Date of Service: 2021    Discharge Recommendations:  Patient would benefit from continued therapy after discharge      Assessment   Assessment: Pt is 81 y/o male with general weakness due to recent medical issues; will benefit from PT. Treatment Diagnosis: difficulty walking  Prognosis: Good  Decision Making: Medium Complexity  Patient Education: PT eval and POC  REQUIRES PT FOLLOW UP: Yes  Activity Tolerance  Activity Tolerance: Patient Tolerated treatment well       Patient Diagnosis(es): The primary encounter diagnosis was Pre-syncope. A diagnosis of Orthostatic hypotension was also pertinent to this visit. has a past medical history of Arthritis, Caffeine use, Cancer ([address] Cancer of kidney, unspecified laterality ([address] CKD (chronic kidney disease), Cognitive communication deficit, COPD (chronic obstructive pulmonary disease) ([address] Diabetes mellitus ([address] DNR (do not resuscitate), Dysphagia, GERD (gastroesophageal reflux disease), GI bleeding, Hernia, inguinal, right, Hyperlipidemia, Hypertension, Ileal conduit stomal stenosis, Kidney stone, Mobility impaired, Muscle weakness, Nursing home resident, PAF (paroxysmal atrial fibrillation) ([address] Presence of urostomy ([address] and Retinal detachment. has a past surgical history that includes hernia repair; Bladder removal (); Prostate surgery (); Cataract removal (Bilateral); Colonoscopy; Eye surgery (Right, ); vitrectomy (Left, 16); Incisional hernia repair (2015); Incisional hernia repair (2014); laparoscopy (N/A, 2021); Cystoscopy (N/A, 5/3/2021); IR GUIDED NEPHROSTOMY CATH PLACEMENT LEFT (5/3/2021); IR GUIDED NEPHROSTOMY CATH PLACEMENT RIGHT (2021); eye surgery; knee surgery (Left); other surgical history (2021);  IR GUIDED NEPHROSTOMY CATH PLACEMENT LEFT (2021); other surgical history (06/08/2021); and Cystoscopy (N/A, 6/8/2021).     Restrictions  Restrictions/Precautions  Restrictions/Precautions: General Precautions, Fall Risk  Required Braces or Orthoses?: No  Position Activity Restriction  Other position/activity restrictions: up with assistance     Vision/Hearing  Vision Exceptions: Wears glasses for reading  Hearing: Within functional limits       Subjective  General  Chart Reviewed: Yes  Response To Previous Treatment: Not applicable  Family / Caregiver Present: No  Follows Commands: Within Functional Limits  Subjective  Subjective: hopes to go today; agrees to PT eval.     Orientation  Orientation  Overall Orientation Status: Within Normal Limits     Social/Functional History  Social/Functional History  Lives With: Alone  Type of Home: House  Home Layout: One level  Home Access: Stairs to enter with rails  Entrance Stairs - Number of Steps: 1  Entrance Stairs - Rails: Right  Bathroom Shower/Tub: Tub/Shower unit, Doors  Bathroom Toilet: Standard  Bathroom Equipment: Grab bars in shower  Home Equipment: Rolling walker  ADL Assistance: Independent  Homemaking Assistance: Needs assistance  Ambulation Assistance: Independent  Transfer Assistance: Independent  Active : Yes  Occupation: Retired  IADL Comments: Reports having good family support, son and DIL live a mile and half down the road    Cognition   Cognition  Overall Cognitive Status: WFL    Objective  AROM RLE (degrees)  RLE AROM: WFL  RLE General AROM: drop foot noted  AROM LLE (degrees)  LLE AROM : WFL     Strength RLE  Comment: hip and knee 4/5; ankle DF 1/5  Strength LLE  Comment: grossly 4/5     Bed mobility  Comment: sitting in chair at time of eval     Transfers  Sit to Stand: Contact guard assistance  Stand to sit: Stand by assistance     Ambulation  Ambulation?: Yes  Ambulation 1  Surface: level tile  Device: Rolling Walker  Assistance: Contact guard assistance  Distance: 25ft x 1; 10ft x 2  Comments: steppage gait right LE     Balance  Sitting - Dynamic: Good  Standing - Static: Good;Fair  Standing - Dynamic: [address] [facility]  Times per week: 5x week  Times per day: Daily  Current Treatment Recommendations: Strengthening, Neuromuscular Re-education, Home Exercise Program, Safety Education & Training, Balance Training, Endurance Training, Patient/Caregiver Education & Training, Functional Mobility Training, Gait Training, Transfer Training, Stair training  Safety Devices  Type of devices: All fall risk precautions in place    AM-PAC Score  AM-PAC Inpatient Mobility without Stair Climbing Raw Score : 15 (06/12/21 1200)  AM-PAC Inpatient without Stair Climbing T-Scale Score : 43.03 (06/12/21 1200)  Mobility Inpatient CMS 0-100% Score: 47.43 (06/12/21 1200)  Mobility Inpatient without Stair CMS G-Code Modifier : CK (06/12/21 1200)       Goals  Short term goals  Time Frame for Short term goals: 14 days  Short term goal 1: Pt to ambulate 75ft with RW and no LOB. Short term goal 2: Pt to demonstrate good standing balance for decrease fall risk. Short term goal 3: Pt to tolerate 20-30 mins ther ex/act for improved strength and endurance. Short term goal 4: Pt to perform all bed mob, transfers, and gait independently.   Patient Goals   Patient goals : home following discharge       Therapy Time   Individual Concurrent Group Co-treatment   Time In 1009         Time Out 1040         Minutes 31                 Holly Schrader, PT, DPT, CMPT

## 2021-06-12 NOTE — DISCHARGE SUMMARY
[facility]  Office: [address], , Lazara eBnnett, DO, Razia Urbina, DO, Mak Juan Aralph Bajwa, DO, Divya Forrester MD, Janna Frank MD, Tiffany Phoenix MD, Carlos Ramon MD, Josh Ortiz MD, Anupama Aguirre MD, Marine Quigley MD, Trudi Villalobos MD, Joey Cespedes DO, Armando Heath MD, Lizandro Jimenez DO, Lisa Yoo MD,  Apurva Chiu DO, Timothy Robles MD, Rosmery Chaparro MD, Yun Olivares MD, Amirah Montalvo MD, Maite Gomez, [facility], [facility], [facility], Viji Giles, CNP, Shane Waldrop, [facility], Osmel Henry, CNP, Sophia Duarte, CNP, Marialuisa Jimenez, CNP, Eugenie Cerrato, CNP, Nely Roman, CNP, Loly Fernandez PA-C, Guido Yan, [facility], Kathy Morgan, CNP, Janeth Tavera, CNP, Deni Smallwood, CNP, Ana María Garcia, CNP, Olga Interiano, CNP, Sarah Mcdaniels, [address] [facility]. [address] [facility]    Discharge Summary     Patient ID: Natali Mackay  :  1940   MRN: [id number]     ACCOUNT:  [de-identified]   Patient's PCP: Marielle Rodriguez MD  Admit Date: 2021   Discharge Date: 2021     Length of Stay: 3  Code Status:  Full Code  Admitting Physician: Jackelin Alvarado DO  Discharge Physician: Jackelin Alvarado DO     Active Discharge Diagnoses:     Hospital Problem Lists:  Principal Problem:    Syncope and collapse  Active Problems:    Acute kidney injury ([facility]    S/P ileal conduit (HCC)    CKD (chronic kidney disease), stage III ([facility]    Stenosis of ileal conduit stoma    Renal cancer, right (HCC)    Hypotension    Subclinical hypothyroidism    Left shoulder pain  Resolved Problems:    * No resolved hospital problems. *      Admission Condition:  stable     Discharged Condition: stable    Hospital Stay:     Hospital Course:  Natali Mackay is a 80 y.o. male who was admitted for the management of   Syncope and collapse , presented to ER with severe hypotension. On admission blood pressure was in the 28N systolic.   Patient also had profound orthostatic hypotension along with bradycardia with a heart rate in the 50s. Patient was admitted and treated with IV fluids, compression stockings, and started on midodrine. Cortisol was WNL. He was evaluated by cardiology, who believed his symptoms were more related to orthostasis and bradycardia. Patient was noted to have significant output from his ileal conduit which was placed after iatrogenic suturing during parastomal hernia repair. Patient was instructed to increase his oral intake. He was evaluated by interventional radiology who removed his indwelling PCN T.  Prior to discharge, patient was doing well and his dizziness has resolved. He did not have any further syncopal episodes. Orthostatic vital signs returned to normal.  Patient was able to tolerate his diet. Currently, he is medically stable for discharge with outpatient follow-up with his primary care physician in 1 week's time. Significant therapeutic interventions: See above    Significant Diagnostic Studies:   Labs / Micro:  CBC:   Lab Results   Component Value Date    WBC 3.9 06/12/2021    RBC 3.63 06/12/2021    HGB 10.1 06/12/2021    HCT 35.4 06/12/2021    MCV 97.5 06/12/2021    MCH 27.8 06/12/2021    MCHC 28.5 06/12/2021    RDW 16.6 06/12/2021     06/12/2021        Radiology:  XR SHOULDER LEFT (MIN 2 VIEWS)    Result Date: 6/11/2021  Degenerative changes of the left shoulder without convincing evidence of acute fracture. IR REMOVAL NEPHROSTOMY TUBE W FLUORO    Result Date: 6/11/2021  Successful fluoroscopic guided removal of right nephroureteral stent       Consultations:    Consults:     Final Specialist Recommendations/Findings:   IP CONSULT TO UROLOGY  IP CONSULT TO HOSPITALIST  IP CONSULT TO CARDIOLOGY  IP CONSULT TO HOME CARE NEEDS      The patient was seen and examined on day of discharge and this discharge summary is in conjunction with any daily progress note from day of discharge.     Discharge plan:     Disposition: Home    Physician Follow Up:     Erwin Law MD  [address] [place] [address]  [phone]    In 1 week  Hospital follow-up for syncope       Requiring Further Evaluation/Follow Up POST HOSPITALIZATION/Incidental Findings: see below    Diet: regular diet    Activity: As tolerated    Instructions to Patient:   -Speak to your doctor prior to starting any new medications especially AV blocking agents including calcium/beta-blockers  -Follow-up with your primary care physician in 1 week's time.  -Continue wearing compression stockings and taking medications as prescribed  -Return to the hospital he develop worsening dizziness, lightheadedness or if you pass out  -Check your blood pressure daily    Discharge Medications:      Medication List      START taking these medications    Blood Pressure Kit  1 kit by Does not apply route daily     midodrine 5 MG tablet  Commonly known as: PROAMATINE  Take 1 tablet by mouth 3 times daily (with meals)        CONTINUE taking these medications    acetaminophen 500 MG tablet  Commonly known as: TYLENOL     Bisac-Evac 10 MG suppository  Generic drug: bisacodyl     docusate 100 MG Caps  Commonly known as: COLACE, DULCOLAX  Take 100 mg by mouth 2 times daily     insulin lispro 100 UNIT/ML injection vial  Commonly known as: HUMALOG     magnesium hydroxide 400 MG/5ML suspension  Commonly known as: MILK OF MAGNESIA     melatonin 3 MG Tabs tablet  Take 1 tablet by mouth nightly     polyethylene glycol 17 g packet  Commonly known as: GLYCOLAX  Take 17 g by mouth daily     ProAir RespiClick 221 (90 Base) MCG/ACT aerosol powder inhalation  Generic drug: albuterol sulfate  Inhale 2 puffs into the lungs every 4 hours as needed for Wheezing or Shortness of Breath     SODIUM PHOSPHATES RE        STOP taking these medications    HYDROcodone-acetaminophen 5-325 MG per tablet  Commonly known as: Bertrum Och           Where to Get Your Medications      These medications were sent to [facility], [address].  [address] [facility], [address] [place] [address]    Phone: [phone]   · Blood Pressure Kit  · midodrine 5 MG tablet         Discharge Procedure Orders   Gradual Compression Stockings (BRITTANY)       Time Spent on discharge is  38 mins in patient examination, evaluation, counseling as well as medication reconciliation, prescriptions for required medications, discharge plan and follow up. Electronically signed by   Queenie Colin DO  6/12/2021  10:09 AM      Thank you Dr. Shorty Birmingham MD for the opportunity to be involved in this patient's care.

## 2021-06-12 NOTE — FLOWSHEET NOTE
SPIRITUAL CARE PROGRESS NOTE     Spiritual Assessment: Pt was calm and approachable upon  arrival. Pt reports having family and friends visit daily and provide prayer.  Intervention:  facilitated exploration of pt feelings, concerns.  discussed with pt his support system and brandy/Worship practices.  Outcome: Pt was receptive to visit and expressed gratitude for visit. Declined prayer at this time. 06/12/21 0848   Encounter Summary   Services provided to: Patient   Referral/Consult From: [address] [place] [address] Family members   Continue Visiting   (6/12/21)   Complexity of Encounter Low   Length of Encounter 15 minutes   Spiritual Assessment Completed Yes   Routine   Type Initial   Assessment Calm; Approachable   Intervention Explored feelings, thoughts, concerns;Sustaining presence/ Ministry of presence; Discussed belief system/Worship practices/brandy   Outcome Expressed gratitude;Receptive

## 2021-06-12 NOTE — PROGRESS NOTES
cough, dyspnea on exertion, shortness of breath, wheezing  Cardiovascular:  negative for chest pain, chest pressure/discomfort, lower extremity edema, palpitations  Gastrointestinal:  negative for abdominal pain, constipation, diarrhea, nausea, vomiting  Neurological:  negative for dizziness, headache    Medications: Allergies: Allergies   Allergen Reactions    Nsaids Anaphylaxis     GI bleed    Tetracyclines & Related        Current Meds:   Scheduled Meds:    midodrine  5 mg Oral TID WC    docusate sodium  100 mg Oral BID    polyethylene glycol  17 g Oral Daily    melatonin  3 mg Oral Nightly    insulin lispro  0-6 Units Subcutaneous TID WC    insulin lispro  0-3 Units Subcutaneous Nightly    sodium chloride flush  5-40 mL Intravenous 2 times per day    heparin (porcine)  5,000 Units Subcutaneous 3 times per day     Continuous Infusions:    sodium chloride 100 mL/hr at 06/11/21 1815    sodium chloride       PRN Meds: albuterol, bisacodyl, sodium chloride, potassium chloride **OR** potassium alternative oral replacement **OR** potassium chloride, magnesium sulfate, ondansetron **OR** ondansetron, acetaminophen **OR** acetaminophen, magnesium hydroxide    Data:     Past Medical History:   has a past medical history of Arthritis, Caffeine use, Cancer ([facility] Cancer of kidney, unspecified laterality ([facility] CKD (chronic kidney disease), Cognitive communication deficit, COPD (chronic obstructive pulmonary disease) ([facility] Diabetes mellitus ([facility] DNR (do not resuscitate), Dysphagia, GERD (gastroesophageal reflux disease), GI bleeding, Hernia, inguinal, right, Hyperlipidemia, Hypertension, Ileal conduit stomal stenosis, Kidney stone, Mobility impaired, Muscle weakness, Nursing home resident, PAF (paroxysmal atrial fibrillation) ([facility] Presence of urostomy ([facility] and Retinal detachment. Social History:   reports that he quit smoking about 41 years ago. His smoking use included cigarettes.  He has a 10.00 pack-year smoking history. He has never used smokeless tobacco. He reports that he does not drink alcohol and does not use drugs. Family History:   Family History   Problem Relation Age of Onset    Diabetes Mother     Diabetes Sister        Vitals:  BP (!) 124/52   Pulse (!) 49   Temp 97.7 °F (36.5 °C) (Oral)   Resp 15   Ht 6' 2\" (1.88 m)   Wt 216 lb 11.4 oz (98.3 kg)   SpO2 96%   BMI 27.82 kg/m²   Temp (24hrs), Av °F (36.7 °C), Min:97.2 °F (36.2 °C), Max:98.6 °F (37 °C)    No results for input(s): POCGLU in the last 72 hours. I/O (24Hr):     Intake/Output Summary (Last 24 hours) at 2021 0953  Last data filed at 2021 0648  Gross per 24 hour   Intake 1650 ml   Output 1500 ml   Net 150 ml       Labs:  Hematology:  Recent Labs     06/10/21  0558 21  0606 21  0727   WBC 4.1 3.8 3.9   RBC 3.50* 3.48* 3.63*   HGB 9.6* 9.5* 10.1*   HCT 32.4* 32.5* 35.4*   MCV 92.6 93.4 97.5   MCH 27.4 27.3 27.8   MCHC 29.6 29.2 28.5   RDW 16.8* 16.8* 16.6*    161 177   MPV 9.7 9.8 9.9   INR 1.0  --   --      Chemistry:  Recent Labs     21  1523 06/10/21  0558 21  0606 21  0727    136 138 141   K 4.0 3.9 4.1 4.3    107 107 111*   CO2 20 19* 20 21   GLUCOSE 94 86 87 86   BUN 29* 25* 27* 23   CREATININE 2.05* 1.96* 2.11* 1.74*   MG  --   --  2.0 2.5   ANIONGAP 12 10 11 9   LABGLOM 31* 33* 30* 38*   GFRAA 38* 40* 37* 46*   CALCIUM 8.8 8.5* 8.5* 8.6   PHOS  --   --  3.4 3.4   LACTACIDWB 1.8  --   --   --      Recent Labs     21  1523 06/10/21  0558 21  0606 21  0727   PROT  --  5.5*  --   --    LABALBU  --  2.7* 2.8* 2.7*   LABA1C  --  5.3  --   --    TSH 7.33*  --   --   --    AST  --  15  --   --    ALT  --  6  --   --    ALKPHOS  --  55  --   --    BILITOT  --  0.37  --   --      ABG:  Lab Results   Component Value Date    POCPH 7.19 2021    POCPCO2 44 2021    POCPO2 74 2021    POCHCO3 16.9 2021    NBEA 11 2021 PBEA NOT REPORTED 05/02/2021    DHB0BBZ 18 05/02/2021    DWRD6KYJ 90 05/02/2021    FIO2 32.0 05/02/2021     Lab Results   Component Value Date/Time    SPECIAL NOT REPORTED 06/09/2021 03:44 PM     Lab Results   Component Value Date/Time    CULTURE NO GROWTH 06/09/2021 03:44 PM       Radiology:  XR SHOULDER LEFT (MIN 2 VIEWS)    Result Date: 6/11/2021  Degenerative changes of the left shoulder without convincing evidence of acute fracture. IR REMOVAL NEPHROSTOMY TUBE W FLUORO    Result Date: 6/11/2021  Successful fluoroscopic guided removal of right nephroureteral stent       Physical Examination:        General appearance:  alert, cooperative and no distress  Mental Status:  oriented to person, place and time and normal affect  Lungs:  clear to auscultation bilaterally, normal effort  Heart:  regular rate and rhythm, no murmur  Abdomen:  soft, nontender, nondistended, normal bowel sounds, no masses, hepatomegaly, splenomegaly  Extremities:  no edema, redness, tenderness in the calves  Skin:  no gross lesions, rashes, induration    Assessment:        Hospital Problems         Last Modified POA    * (Principal) Syncope and collapse 6/10/2021 Yes    S/P ileal conduit ([address] 6/10/2021 Yes    CKD (chronic kidney disease), stage III ([address] 6/10/2021 Yes    Stenosis of ileal conduit stoma 6/10/2021 Yes    Renal cancer, right ([address] 6/10/2021 Yes    Hypotension 6/10/2021 Yes    Subclinical hypothyroidism 6/10/2021 Yes    Left shoulder pain 6/10/2021 Yes          Plan:        1. Plan for discharge today  2. Liberalize diet, allow for increase sodium intake  3. Compression stockings ordered  4. Doppler shows thrombosis of the superficial vein and on, continue warm compresses  5. Patient should continue p.o. intake  6. Creatinine appears to be at baseline. 7. DVT prophylaxis  8. Okay for PT OT  9.  Needs to follow-up with his primary care physician for evaluation of subclinical hypothyroidism    Brando Vang DO  6/12/2021  9:53 AM

## 2021-06-12 NOTE — DISCHARGE INSTR - COC
Continuity of Care Form    Patient Name: Levon Carbone   :  1940  MRN:  [id number]    [facility] date:  2021  Discharge date:  21***    Code Status Order: Full Code   Advance Directives:   Advance Care Flowsheet Documentation       Date/Time Healthcare Directive Type of Healthcare Directive Copy in [address] Agent's Name Healthcare Agent's Phone Number    [phone]  Yes, patient has an advance directive for healthcare treatment  Durable power of  for health care; Health care treatment directive per pt with son and daughter in law  --  --  --  --            Admitting Physician:  Abbey Back DO  PCP: Nick Esteban MD    Discharging Nurse:  Jean Claude Doyle 429 Unit/Room#:   Discharging Unit Phone Number:     Emergency Contact:   Extended Emergency Contact Information  Primary Emergency Contact: [address] [place] [address] Phone: [phone]  Relation: Child    Past Surgical History:  Past Surgical History:   Procedure Laterality Date    BLADDER REMOVAL  2006    cancer, s/p urostomy     CATARACT REMOVAL Bilateral     COLONOSCOPY      CYSTOSCOPY N/A 5/3/2021    CYSTOSCOPY performed by Sylvester Bennett MD at [address] [place] [address] 2021    LOOPOSCOPY,  EXCISION OF SUTURE, LOOPOGRAM performed by Sylvester Bennett MD at [address] Right 2000    macular hole   [address] [facility]      x2    INCISIONAL HERNIA REPAIR  2015    parastomal hernia repair - Dr. Osmani Zaragoza  2014    IR NEPHROSTOMY PERCUTANEOUS LEFT  5/3/2021    IR NEPHROSTOMY PERCUTANEOUS LEFT 5/3/2021 [facility]    IR NEPHROSTOMY PERCUTANEOUS LEFT  2021    IR NEPHROSTOMY PERCUTANEOUS LEFT 2021 [facility]    IR NEPHROSTOMY PERCUTANEOUS RIGHT  2021    IR NEPHROSTOMY PERCUTANEOUS RIGHT 2021 Letha Rubinstein, MD [facility] SPECIAL PROCEDURES    KNEE SURGERY Left     LAPAROSCOPY N/A 4/30/2021    EXPLORATORY LAPAROTOMY, EXTENSIVE LYSIS OF ADHESIONS, SMALL BOWEL RESECTION, REDUCTION OF STRANGULATED HERNIA performed by Tu Martinez DO at [address]  05/27/2021    Left neph tube removal, Right neph tube stent placed    OTHER SURGICAL HISTORY  06/08/2021    looposcopy, excision of suture, loopogram    PROSTATE SURGERY  2006    removal    VITRECTOMY Left 02/02/16       Immunization History:   Immunization History   Administered Date(s) Administered    COVID-19, Bundy Peter, PF, 30mcg/0.3mL 02/17/2021, 03/10/2021    Influenza Virus Vaccine 10/09/2012, 01/17/2014, 11/17/2015, 10/01/2018, 09/24/2020    Influenza Whole 01/17/2014, 11/17/2015    Influenza, Quadv, IM, (6 mo and older Fluzone, Flulaval, Fluarix and 3 yrs and older Afluria) 11/22/2016, 01/06/2020    Influenza, Jessica Busing, Recombinant, IM PF (Flublok 18 yrs and older) 09/24/2020    Pneumococcal Conjugate 13-valent ([id number]) 05/03/2016    Pneumococcal Polysaccharide ([id number]) 12/05/2017    Tdap (Boostrix, Adacel) 07/15/2020       Active Problems:  Patient Active Problem List   Diagnosis Code    Bladder cancer C67.9    DJD (degenerative joint disease) M19.90    Essential hypertension I10    Renal mass N28.89    Incisional hernia of anterior abdominal wall without obstruction or gangrene K43.2    Bilateral kidney stones N20.0    Right ureteral calculus N20.1    Partial small bowel obstruction ([address] K56.600    GERD (gastroesophageal reflux disease) K21.9    Normochromic normocytic anemia D64.9    Iron deficiency anemia D50.9    Toxic metabolic encephalopathy R73    Bilateral hydronephrosis N13.30    Vitamin B 12 deficiency E53.8    Folate deficiency E53.8    Acute respiratory failure with hypoxia (HCC) J96.01    Moderate malnutrition (HCC) E44.0    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    S/P ileal conduit (HCC) Z93.6    CKD (chronic kidney disease), stage III ([facility] N18.30    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    Renal cancer, right (HCC) C64.1    Hypotension I95.9    Syncope and collapse R55    Subclinical hypothyroidism E03.9    Left shoulder pain M25.512       Isolation/Infection:   Isolation            No Isolation          Patient Infection Status       Infection Onset Added Last Indicated Last Indicated By Review Planned Expiration Resolved Resolved By    VRE  04/20/17 04/20/17 Lexi Kay RN        Urine - 3/2017              Nurse Assessment:  Last Vital Signs: BP (!) 124/52   Pulse (!) 49   Temp 97.7 °F (36.5 °C) (Oral)   Resp 15   Ht 6' 2\" (1.88 m)   Wt 216 lb 11.4 oz (98.3 kg)   SpO2 96%   BMI 27.82 kg/m²     Last documented pain score (0-10 scale): Pain Level: 4 (tolerable. chronic finger pain)  Last Weight:   Wt Readings from Last 1 Encounters:   06/12/21 216 lb 11.4 oz (98.3 kg)     Mental Status:  alert    IV Access:  - None    Nursing Mobility/ADLs:  Walking   Assisted  Transfer  Assisted  Bathing  Assisted  Dressing  Assisted  Toileting  Assisted  Feeding  Independent  Med Admin  Assisted  Med Delivery   whole    Wound Care Documentation and Therapy:        Elimination:  Continence:   · Bowel: Yes  · Bladder: Yes  Urinary Catheter: None   Colostomy/Ileostomy/Ileal Conduit: Yes  Urostomy-Stoma  Assessment: Pink  Urostomy-Mucocutaneous Junction: Intact  Urostomy-Peristomal Assessment: Intact  Urostomy-Treatment: Bag change    Date of Last BM:6-12-21 ***    Intake/Output Summary (Last 24 hours) at 6/12/2021 1005  Last data filed at 6/12/2021 0648  Gross per 24 hour   Intake 1650 ml   Output 1500 ml   Net 150 ml     I/O last 3 completed shifts: In: 1195 [P.O.:450; I.V.:1200]  Out: 1500 [Urine:1500]    Safety Concerns:      At Risk for Falls    Impairments/Disabilities:      None    Nutrition Therapy:  Current Nutrition Therapy:   - Oral Diet:  General    Routes of Feeding: Oral  Liquids: No Restrictions  Daily Fluid Restriction: no  Last Modified Barium Swallow with Video (Video Swallowing Test): not done    Treatments at the Time of Hospital Discharge:   Respiratory Treatments: none  Oxygen Therapy:  is not on home oxygen therapy. Ventilator:    - No ventilator support    Rehab Therapies: Physical Therapy  Weight Bearing Status/Restrictions: No weight bearing restirctions  Other Medical Equipment (for information only, NOT a DME order):  wheelchair  Other Treatments: none    Patient's personal belongings (please select all that are sent with patient):  Glasses    RN SIGNATURE:  Electronically signed by Elvie Pinedo RN on 6/12/21 at 11:19 AM EDT    CASE MANAGEMENT/SOCIAL WORK SECTION    Inpatient Status Date: ***    Readmission Risk Assessment Score:  Readmission Risk              Risk of Unplanned Readmission:  26           Discharging to Facility/ Agency   · Name:   · Address:  · Phone:  · Fax:    Dialysis Facility (if applicable)   · Name:  · Address:  · Dialysis Schedule:  · Phone:  · Fax:    / signature: {Esignature:[id number]:::0}    PHYSICIAN SECTION    Prognosis: Good    Condition at Discharge: Stable    Rehab Potential (if transferring to Rehab): Good    Recommended Labs or Other Treatments After Discharge:   -Speak to your doctor prior to starting any new medications especially AV blocking agents including calcium/beta-blockers  -Follow-up with your primary care physician in 1 week's time.  -Continue wearing compression stockings and taking medications as prescribed  -Return to the hospital he develop worsening dizziness, lightheadedness or if you pass out  -Check your blood pressure daily  -PTOT    Physician Certification: I certify the above information and transfer of Theo Klinefelter  is necessary for the continuing treatment of the diagnosis listed and that he requires [address] for less 30 days.      Update Admission H&P: No change in H&P    PHYSICIAN SIGNATURE:  Electronically signed by Fabiana Navarro LARON Mantilla DO on 6/12/21 at 10:06 AM EDT

## 2021-06-12 NOTE — PROGRESS NOTES
[facility]   Progress Note                   Date:   6/11/2021  Patient name: Tomas Fuller  Date of admission:  6/9/2021  2:50 PM  MRN:   [id number]  YOB: 1940  PCP: Grey Ga MD    Reason for Admission: Hypotension [I95.9]    Subjective:       Clinical Changes / Abnormalities: Patient seen and examined at the bed side. Denies chest pain or SOB. No dizziness or lightheadedness Pt up chair. HR stable. BP stable. Medications:   Scheduled Meds:   midodrine  5 mg Oral TID WC    docusate sodium  100 mg Oral BID    polyethylene glycol  17 g Oral Daily    melatonin  3 mg Oral Nightly    insulin lispro  0-6 Units Subcutaneous TID WC    insulin lispro  0-3 Units Subcutaneous Nightly    sodium chloride flush  5-40 mL Intravenous 2 times per day    heparin (porcine)  5,000 Units Subcutaneous 3 times per day     Continuous Infusions:   sodium chloride 100 mL/hr at 06/11/21 1815    sodium chloride       CBC:   Recent Labs     06/09/21  1523 06/10/21  0558 06/11/21  0606   WBC 6.2 4.1 3.8   HGB 10.7* 9.6* 9.5*    157 161     BMP:    Recent Labs     06/09/21  1523 06/10/21  0558 06/11/21  0606    136 138   K 4.0 3.9 4.1    107 107   CO2 20 19* 20   BUN 29* 25* 27*   CREATININE 2.05* 1.96* 2.11*   GLUCOSE 94 86 87     Hepatic:   Recent Labs     06/10/21  0558   AST 15   ALT 6   BILITOT 0.37   ALKPHOS 55     Troponin: No results for input(s): TROPHS in the last 72 hours. BNP: No results for input(s): BNP in the last 72 hours. Lipids: No results for input(s): CHOL, HDL in the last 72 hours.     Invalid input(s): LDLCALCU  INR:   Recent Labs     06/10/21  0558   INR 1.0       Objective:   Vitals: /87   Pulse 59   Temp 98.6 °F (37 °C) (Oral)   Resp 18   Ht 6' 2\" (1.88 m)   Wt 207 lb 3.7 oz (94 kg)   SpO2 97%   BMI 26.61 kg/m²   General appearance: alert and cooperative with exam  HEENT: Head: Normocephalic, no lesions, without obvious abnormality. Neck: no JVD, trachea midline, no adenopathy  Lungs: Clear with diminished breath sounds in bases bilat. Heart: Regular rate and rhythm, s1/s2 auscultated, no murmurs  Abdomen: soft, non-tender, bowel sounds active  Extremities: no edema  Neurologic: not done    EKG:   Sinus bradycardia with PACs and compensatory pause     ECHO: 5/10/2021  Left ventricle is normal in size. Mild left ventricular hypertrophy. Global left ventricular systolic function is normal with an estimated  ejection fraction of 60 % . No obvious wall motion abnormality seen. Aortic leaflets show calcification without restriction of motion. Mild aortic insufficiency. Normal tricuspid valve leaflets. Mild tricuspid regurgitation. Aortic root is mildly dilated 4.5cm. Assessment / Acute Cardiac Problems:   1. Syncope with significant orthostatic hypotension   2. Sinus bradycardia with PACs  3. Preserved LV systolic function   4. Aortic sclerosis   5. 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 CKD   7.  DM    Patient Active Problem List:     Bladder cancer     DJD (degenerative joint disease)     Essential hypertension     Renal mass     Incisional hernia of anterior abdominal wall without obstruction or gangrene     Bilateral kidney stones     Right ureteral calculus     Partial small bowel obstruction (HCC)     GERD (gastroesophageal reflux disease)     Normochromic normocytic anemia     Iron deficiency anemia     Toxic metabolic encephalopathy     Bilateral hydronephrosis     Vitamin B 12 deficiency     Folate deficiency     Acute respiratory failure with hypoxia (HCC)     Moderate malnutrition (HCC)     Neoplasm of uncertain behavior of kidney     S/P ileal conduit (HCC)     CKD (chronic kidney disease), stage III (HCC)     Stenosis of ileal conduit stoma     Renal cancer, right (HCC)     Hypotension     Syncope and collapse     Subclinical hypothyroidism     Left shoulder pain      Plan of Treatment: 1. Bradycardia while sleeping in the high 40's. Asymptomatic. 2. Hypotension. Improving. Continue Midodrine 5 mg TID. Compression stockings   3. Keep K+ > 4.0, and Mg+ > 2.0.   4. Quiroga monitor for 14 days at discharge. 5. No further cardiac workup at this time. Ok to [facility] home from cards standpoint.   Will follow in 3 weeks after holter monitor         Electronically signed by MARKIE Rivera CNP on 6/11/2021 at 10:22 [facility]      [phone]

## 2021-06-14 ENCOUNTER — CARE COORDINATION (OUTPATIENT)
Dept: CASE MANAGEMENT | Age: 81
End: 2021-06-14

## 2021-06-14 ENCOUNTER — TELEPHONE (OUTPATIENT)
Dept: FAMILY MEDICINE CLINIC | Age: 81
End: 2021-06-14

## 2021-06-14 DIAGNOSIS — I95.89 HYPOTENSION DUE TO HYPOVOLEMIA: Primary | ICD-10-CM

## 2021-06-14 DIAGNOSIS — E86.1 HYPOTENSION DUE TO HYPOVOLEMIA: Primary | ICD-10-CM

## 2021-06-14 PROCEDURE — 1111F DSCHRG MED/CURRENT MED MERGE: CPT | Performed by: FAMILY MEDICINE

## 2021-06-14 NOTE — TELEPHONE ENCOUNTER
Patient's nurse called trying to get a hospital follow up for patient. Patient already has appt with you 6/30/21. No other providers have anything sooner nor do you. Patient was seen for syncope and collapse.      Please advise if you need to see the patient sooner than 6/30/21

## 2021-06-14 NOTE — CARE COORDINATION
[facility] Transitions Initial Follow Up Call    Call within 2 business days of discharge: Yes    Patient: Norman Toney Patient : 1940   MRN: [id number]  Reason for Admission: Pre-syncope  Discharge Date: 21 RARS: Readmission Risk Score: 26      Last Discharge [facility]       Complaint Diagnosis Description Type Department Provider    21 Hypotension Pre-syncope . .. ED to Hosp-Admission (Discharged) (ADMITTED) JUANJO Wetzel . .. Spoke with: Patient    Facility: [facility]    Non-face-to-face services provided:  Scheduled appointment with PCP-Routed to  for scheduling   Scheduled appointment with Specialist-Routed to  for appointment with cardiologist  Obtained and reviewed discharge summary and/or continuity of care documents     Spoke with patient who states he is feeling ok today. Blood pressure today is 110/61. He denies any dizziness, lightheadedness, shortness of breath or other issues. He said he has increased his water intake but said he is unable to get on his compression stockings. He denies any swelling to legs, states he feels the stockings are too small. He has home care that follows at home. He has had his COVID vaccine. He denies any needs or concerns at this time. Cardiology appointment scheduled for  with CNP at [facility] office. Transitions of Care Initial Call    Was this an external facility discharge? No Discharge Facility: [facility]    Challenges to be reviewed by the provider   Additional needs identified to be addressed with provider: No  none             Method of communication with provider : none      Advance Care Planning:   Does patient have an Advance Directive:  reviewed and current. Was this a readmission?  No  Patient stated reason for admission: low blood pressures    Patients top risk factors for readmission: medical condition-hypotension    Care Transition Nurse (CTN) contacted the

## 2021-06-15 ENCOUNTER — TELEPHONE (OUTPATIENT)
Dept: FAMILY MEDICINE CLINIC | Age: 81
End: 2021-06-15

## 2021-06-16 ASSESSMENT — ENCOUNTER SYMPTOMS
EYE ITCHING: 0
ABDOMINAL DISTENTION: 0
DIARRHEA: 0
NAUSEA: 0
ABDOMINAL PAIN: 0
SINUS PAIN: 0
SHORTNESS OF BREATH: 0
COUGH: 0
EYE PAIN: 0
SORE THROAT: 0
CONSTIPATION: 0
SINUS PRESSURE: 0

## 2021-06-18 ENCOUNTER — CARE COORDINATION (OUTPATIENT)
Dept: CASE MANAGEMENT | Age: 81
End: 2021-06-18

## 2021-06-18 NOTE — CARE COORDINATION
[facility] Transitions Follow Up Call    2021    Patient: Donna Rankin  Patient : 1940   MRN: [id number]  Reason for Admission: Pre-syncope, orthostatic hypotension  Discharge Date: 21 RARS: Readmission Risk Score: 32         Spoke with: Patient    Spoke with patient who said he is feeling ok today. He denies any dizziness or lightheadedness, denies shortness of breath or chest pain. PCP office unable to get patient in for sooner appointment, he will see PCP on . He is scheduled with cardiology for . He states home care nurse was out earlier today, denies any needs or concerns. Care Transitions Follow Up Call    Needs to be reviewed by the provider   Additional needs identified to be addressed with provider: No  none             Method of communication with provider : none      Care Transition Nurse (CTN) contacted the patient by telephone to follow up after admission on . Verified name and  with patient as identifiers. Addressed changes since last contact: none  Discussed follow-up appointments. If no appointment was previously scheduled, appointment scheduling offered: Yes. Is follow up appointment scheduled within 7 days of discharge? No.    Advance Care Planning:   Does patient have an Advance Directive:  reviewed and current. CTN reviewed discharge instructions, medical action plan and red flags with patient and discussed any barriers to care and/or understanding of plan of care after discharge. Discussed appropriate site of care based on symptoms and resources available to patient including: PCP and Specialist. The patient agrees to contact the PCP office for questions related to their healthcare.      Patients top risk factors for readmission: medical condition-orthostatic hypotension  Interventions to address risk factors: Scheduled appointment with PCP- and Scheduled appointment with Specialist- with cardiology    Non-[facility] follow up appointment(s): 6/28 with cardiology      CTN provided contact information for future needs. Plan for follow-up call in 10-14 days based on severity of symptoms and risk factors. Plan for next call: Routine follow up            Care Transitions Subsequent and Final Call    Schedule Follow Up Appointment with PCP: Completed  Subsequent and Final Calls  Do you have any ongoing symptoms?: No  Have your medications changed?: No  Do you have any questions related to your medications?: No  Do you currently have any active services?: Yes  Are you currently active with any services?: Home Health  Do you have any needs or concerns that I can assist you with?: No  Identified Barriers: Lack of Education  Care Transitions Interventions  Other Interventions:            Follow Up  Future Appointments   Date Time Provider Adrian Goodman   6/30/2021 11:30 AM Eileen Roy MD [facility] Alida Lal RN

## 2021-06-21 ENCOUNTER — TELEPHONE (OUTPATIENT)
Dept: FAMILY MEDICINE CLINIC | Age: 81
End: 2021-06-21

## 2021-06-21 NOTE — TELEPHONE ENCOUNTER
pts blood pressure is 98/62 sitting and standing it was 78/56. He is on midodrine 5 mg tid. Pt is asymptomatic.  Please advise

## 2021-06-25 ENCOUNTER — CARE COORDINATION (OUTPATIENT)
Dept: CASE MANAGEMENT | Age: 81
End: 2021-06-25

## 2021-06-25 NOTE — CARE COORDINATION
[facility] Transitions Follow Up Call    2021    Patient: Elmira Anaya  Patient : 1940   MRN: [id number]  Reason for Admission: Pre-syncope, orthostatic hypotension  Discharge Date: 21 RARS: Readmission Risk Score: 26    .  Spoke with patient who reports he is feeling good today. Per chart notes, [facility] home care nurse had called PCP office and notified office of a drop in blood pressure upon standing. Patient was advised to go to the ED however he was feeling well and said his blood pressures that day were in the upper 25'M systolic and he was having no symptoms. He currently denies any chest pains, shortness of breath, lightheadedness or dizziness and reports he has been feeling \"very well\". Blood pressures have ranges this week between  systolic with HR 43-11 range. He will see his PCP on Wednesday. He currently denies any needs or concerns. Care Transitions Follow Up Call    Needs to be reviewed by the provider   Additional needs identified to be addressed with provider: No  none             Method of communication with provider : none      Care Transition Nurse (CTN) contacted the patient by telephone to follow up after admission on . Verified name and  with patient as identifiers. Addressed changes since last contact: none  Discussed follow-up appointments. Advance Care Planning:   Does patient have an Advance Directive:  reviewed and current. CTN reviewed discharge instructions, medical action plan and red flags with patient and discussed any barriers to care and/or understanding of plan of care after discharge. Discussed appropriate site of care based on symptoms and resources available to patient including: PCP and Specialist. The patient agrees to contact the PCP office for questions related to their healthcare.      Patients top risk factors for readmission: medical condition-orthostatic hypotension  Interventions to address risk factors: Scheduled appointment with PCP-June 30th with PCP and Obtained and reviewed discharge summary and/or continuity of care documents    Non-[facility] follow up appointment(s): n/a    CTN provided contact information for future needs. Plan for follow-up call in 7-10 days based on severity of symptoms and risk factors. Plan for next call: Routine follow up          Care Transitions Subsequent and Final Call    Schedule Follow Up Appointment with PCP: Completed  Subsequent and Final Calls  Do you have any ongoing symptoms?: No  Have your medications changed?: No  Do you have any questions related to your medications?: No  Do you currently have any active services?: Yes  Are you currently active with any services?: Home Health  Do you have any needs or concerns that I can assist you with?: No  Identified Barriers: Lack of Education  Care Transitions Interventions  Other Interventions:            Follow Up  Future Appointments   Date Time Provider Adrian Goodman   6/30/2021 11:30 AM Nick Esteban MD [facility] Ambika Milton RN

## 2021-06-30 ENCOUNTER — OFFICE VISIT (OUTPATIENT)
Dept: FAMILY MEDICINE CLINIC | Age: 81
End: 2021-06-30
Payer: MEDICARE

## 2021-06-30 VITALS
DIASTOLIC BLOOD PRESSURE: 70 MMHG | SYSTOLIC BLOOD PRESSURE: 110 MMHG | HEART RATE: 68 BPM | WEIGHT: 206 LBS | BODY MASS INDEX: 26.45 KG/M2

## 2021-06-30 DIAGNOSIS — R55 SYNCOPE AND COLLAPSE: Primary | ICD-10-CM

## 2021-06-30 DIAGNOSIS — S43.422A SPRAIN OF LEFT ROTATOR CUFF CAPSULE, INITIAL ENCOUNTER: ICD-10-CM

## 2021-06-30 PROCEDURE — 99214 OFFICE O/P EST MOD 30 MIN: CPT | Performed by: FAMILY MEDICINE

## 2021-06-30 SDOH — ECONOMIC STABILITY: TRANSPORTATION INSECURITY
IN THE PAST 12 MONTHS, HAS LACK OF TRANSPORTATION KEPT YOU FROM MEETINGS, WORK, OR FROM GETTING THINGS NEEDED FOR DAILY LIVING?: NO

## 2021-06-30 SDOH — ECONOMIC STABILITY: FOOD INSECURITY: WITHIN THE PAST 12 MONTHS, THE FOOD YOU BOUGHT JUST DIDN'T LAST AND YOU DIDN'T HAVE MONEY TO GET MORE.: NEVER TRUE

## 2021-06-30 SDOH — ECONOMIC STABILITY: TRANSPORTATION INSECURITY
IN THE PAST 12 MONTHS, HAS THE LACK OF TRANSPORTATION KEPT YOU FROM MEDICAL APPOINTMENTS OR FROM GETTING MEDICATIONS?: NO

## 2021-06-30 SDOH — ECONOMIC STABILITY: FOOD INSECURITY: WITHIN THE PAST 12 MONTHS, YOU WORRIED THAT YOUR FOOD WOULD RUN OUT BEFORE YOU GOT MONEY TO BUY MORE.: NEVER TRUE

## 2021-06-30 ASSESSMENT — PATIENT HEALTH QUESTIONNAIRE - PHQ9
SUM OF ALL RESPONSES TO PHQ QUESTIONS 1-9: 1
2. FEELING DOWN, DEPRESSED OR HOPELESS: 1
SUM OF ALL RESPONSES TO PHQ QUESTIONS 1-9: 1
SUM OF ALL RESPONSES TO PHQ9 QUESTIONS 1 & 2: 1
SUM OF ALL RESPONSES TO PHQ QUESTIONS 1-9: 1
1. LITTLE INTEREST OR PLEASURE IN DOING THINGS: 0

## 2021-06-30 ASSESSMENT — SOCIAL DETERMINANTS OF HEALTH (SDOH): HOW HARD IS IT FOR YOU TO PAY FOR THE VERY BASICS LIKE FOOD, HOUSING, MEDICAL CARE, AND HEATING?: NOT HARD AT ALL

## 2021-06-30 NOTE — PROGRESS NOTES
Subjective:  Samantha Clemente presents for   Chief Complaint   Patient presents with   [address] from [address] for low BP     Is doing basic chores now. Hasn't had a ny labs since dishcarge. Is checking bp multiples per day - averages [id number] systolic    occ he is getting systolic in the 91'I but no complaints    Added midordrine      appettie is good    He fell while in rehab and since has not been able to bring his left sholder up. xr - was bruce. Patient Active Problem List   Diagnosis    Bladder cancer    DJD (degenerative joint disease)    Essential hypertension    Renal mass    Incisional hernia of anterior abdominal wall without obstruction or gangrene    Bilateral kidney stones    Right ureteral calculus    Partial small bowel obstruction (HCC)    GERD (gastroesophageal reflux disease)    Acute kidney injury (HCC)    Normochromic normocytic anemia    Iron deficiency anemia    Toxic metabolic encephalopathy    Bilateral hydronephrosis    Vitamin B 12 deficiency    Folate deficiency    Acute respiratory failure with hypoxia (HCC)    Moderate malnutrition (HCC)    Neoplasm of uncertain behavior of kidney    S/P ileal conduit (HCC)    CKD (chronic kidney disease), stage III (HCC)    Stenosis of ileal conduit stoma    Renal cancer, right (HCC)    Hypotension    Syncope and collapse    Subclinical hypothyroidism    Left shoulder pain         Review of Systems:  · General: no significant weight changes. · Respiratory: no cough, pleuritic chest pain, dyspnea, or wheezing  · Cardiovascular: no pain, CROWELL, orthopnea, palpitations or claudication  · Gastrointestinal: no chronic nausea, vomiting, heartburn, diarrhea, constipation, bloating, or abdominal pain. No bloody or black stools.     Objective:  Physical Exam   Vitals:   Vitals:    06/30/21 1139   BP: 110/70   Pulse: 68   Weight: 206 lb (93.4 kg)     Wt Readings from Last 3 Encounters:   06/30/21 206 lb (93.4 kg) 06/12/21 216 lb 11.4 oz (98.3 kg)   06/08/21 207 lb (93.9 kg)     Ht Readings from Last 3 Encounters:   06/09/21 6' 2\" (1.88 m)   06/08/21 6' 2\" (1.88 m)   06/02/21 6' 2\" (1.88 m)     Body mass index is 26.45 kg/m². Constitutional: He is oriented to person, place, and time. He appears well-developed and well-nourished and in no acute distress. Answers all my questions appropriately. Head: Normocephalic and atraumatic. Eyes:conjunctiva appear normal.  Nose: pink, non-edematous mucosa. No polyps. No septal deviation  Throat: no erythema, tonsillar hypertrophy or exudate. No ulcerations noted. Lips/Teeth/Gums all appear normal.  Neck: Normal range of motion. Neck supple. No tracheal deviation present. No abnormal lymphadenopathy. No JVD noted. Carotids are clear bilaterally. No thyroid masses noted. Heart: RRR without murmur. No S3, S4, or gallop noted. Chest: Clear to auscultation bilaterally. Good breath sounds noted. No rales, wheezes, or rhonchi noted. No respiratory retractions noted. Wall has symmetrical movement with respirations. Unable to raise his arm. Good hand and elbo strnghetn. No obvious defomrity of the sholder or plapable tenderenss    Assessment:   Encounter Diagnoses   Name Primary?  Syncope and collapse Yes    Sprain of left rotator cuff capsule, initial encounter          Plan:   Medications Discontinued During This Encounter   Medication Reason    melatonin 3 MG TABS tablet     SODIUM PHOSPHATES RE      THE ABOVE NOTED DISCONTINUED MEDS MAY ONLY BE FROM 'CLEANING UP' THE MED LIST AND WERE NOT ACTUALLY CANCELED;  SEE CHART FOR DETAILS! No orders of the defined types were placed in this encounter.     Orders Placed This Encounter   Procedures    CBC Auto Differential     Standing Status:   Future     Standing Expiration Date:   6/30/2022    Comprehensive Metabolic Panel     Standing Status:   Future     Standing Expiration Date:   6/30/2022   Jemima Bashir,

## 2021-07-02 ENCOUNTER — CARE COORDINATION (OUTPATIENT)
Dept: CASE MANAGEMENT | Age: 81
End: 2021-07-02

## 2021-07-02 NOTE — CARE COORDINATION
[facility] Transitions Follow Up Call    2021    Patient: Radha Tierney  Patient : 1940   MRN: [id number]  Reason for Admission: Syncope and collapse  Discharge Date: 21 RARS: Readmission Risk Score: 26         Spoke with: Patient    Spoke with patient who said that he is feeling good and doing well. He denies any dizziness or lightheadedness and says his blood pressures have been doing good \"so far\". He has limited ROM to left shoulder but denies pain to area. He did something to his shoulder when he had a fall at cardiac rehab. He has an appointment to see orthopedic surgeon but requested that I cancel this appointment for now. He will see about following up in the fall if needed. He denies any needs or concerns. Episode resolved    Care Transitions Follow Up Call    Needs to be reviewed by the provider   Additional needs identified to be addressed with provider: No  none             Method of communication with provider : none      Care Transition Nurse (CTN) contacted the patient by telephone to follow up after admission on . Verified name and  with patient as identifiers. Addressed changes since last contact: none  Discussed follow-up appointments. Advance Care Planning:   Does patient have an Advance Directive:  reviewed and current. CTN reviewed discharge instructions, medical action plan and red flags with patient and discussed any barriers to care and/or understanding of plan of care after discharge. Discussed appropriate site of care based on symptoms and resources available to patient including: PCP and Specialist. The patient agrees to contact the PCP office for questions related to their healthcare.      Patients top risk factors for readmission: medical condition-hypotension  Interventions to address risk factors: Obtained and reviewed discharge summary and/or continuity of care documents    Non-[facility] follow up appointment(s): n/a    CTN provided contact information for future needs. No further follow-up call identified based on severity of symptoms and risk factors. Plan for next call: final call            Care Transitions Subsequent and Final Call    Schedule Follow Up Appointment with PCP: Completed  Subsequent and Final Calls  Do you have any ongoing symptoms?: No  Have your medications changed?: No  Do you have any questions related to your medications?: No  Do you currently have any active services?: No  Are you currently active with any services?: Home Health  Do you have any needs or concerns that I can assist you with?: No  Identified Barriers: Lack of Education  Care Transitions Interventions  Other Interventions: Follow Up  No future appointments.     Rachel Meadows RN

## 2022-01-25 ENCOUNTER — NURSE ONLY (OUTPATIENT)
Dept: PRIMARY CARE CLINIC | Age: 82
End: 2022-01-25

## 2022-01-25 VITALS
SYSTOLIC BLOOD PRESSURE: 152 MMHG | OXYGEN SATURATION: 98 % | DIASTOLIC BLOOD PRESSURE: 92 MMHG | RESPIRATION RATE: 20 BRPM | HEART RATE: 57 BPM

## 2022-01-25 DIAGNOSIS — I15.9 SECONDARY HYPERTENSION: Primary | ICD-10-CM

## 2022-01-25 NOTE — PROGRESS NOTES
pt walked in the office states he feels like his bp is high. states he was going to go to his pcp but we were closer. I took pt vitals in waiting room as he was very concerned. bp was mildly elevated pt advised. pt is under alot of stress with furnise going out and cat missing. Pt was offered walk in. Pt declined states he feels better and is going to go home. pt advised to contact pcp to schedule apt.

## 2022-03-10 ENCOUNTER — OFFICE VISIT (OUTPATIENT)
Dept: FAMILY MEDICINE CLINIC | Age: 82
End: 2022-03-10
Payer: MEDICARE

## 2022-03-10 VITALS
OXYGEN SATURATION: 97 % | SYSTOLIC BLOOD PRESSURE: 117 MMHG | WEIGHT: 230 LBS | HEART RATE: 70 BPM | BODY MASS INDEX: 29.53 KG/M2 | DIASTOLIC BLOOD PRESSURE: 78 MMHG

## 2022-03-10 DIAGNOSIS — D50.9 IRON DEFICIENCY ANEMIA, UNSPECIFIED IRON DEFICIENCY ANEMIA TYPE: ICD-10-CM

## 2022-03-10 DIAGNOSIS — Z93.6 S/P ILEAL CONDUIT (HCC): ICD-10-CM

## 2022-03-10 DIAGNOSIS — I10 ESSENTIAL HYPERTENSION: ICD-10-CM

## 2022-03-10 DIAGNOSIS — K43.2 INCISIONAL HERNIA OF ANTERIOR ABDOMINAL WALL WITHOUT OBSTRUCTION OR GANGRENE: ICD-10-CM

## 2022-03-10 DIAGNOSIS — M65.342 TRIGGER RING FINGER OF LEFT HAND: Primary | ICD-10-CM

## 2022-03-10 PROCEDURE — 99214 OFFICE O/P EST MOD 30 MIN: CPT | Performed by: STUDENT IN AN ORGANIZED HEALTH CARE EDUCATION/TRAINING PROGRAM

## 2022-03-10 ASSESSMENT — PATIENT HEALTH QUESTIONNAIRE - PHQ9
7. TROUBLE CONCENTRATING ON THINGS, SUCH AS READING THE NEWSPAPER OR WATCHING TELEVISION: 0
SUM OF ALL RESPONSES TO PHQ QUESTIONS 1-9: 1
5. POOR APPETITE OR OVEREATING: 0
1. LITTLE INTEREST OR PLEASURE IN DOING THINGS: 0
3. TROUBLE FALLING OR STAYING ASLEEP: 0
SUM OF ALL RESPONSES TO PHQ QUESTIONS 1-9: 1
SUM OF ALL RESPONSES TO PHQ9 QUESTIONS 1 & 2: 1
2. FEELING DOWN, DEPRESSED OR HOPELESS: 1
4. FEELING TIRED OR HAVING LITTLE ENERGY: 0
10. IF YOU CHECKED OFF ANY PROBLEMS, HOW DIFFICULT HAVE THESE PROBLEMS MADE IT FOR YOU TO DO YOUR WORK, TAKE CARE OF THINGS AT HOME, OR GET ALONG WITH OTHER PEOPLE: 0
9. THOUGHTS THAT YOU WOULD BE BETTER OFF DEAD, OR OF HURTING YOURSELF: 0
SUM OF ALL RESPONSES TO PHQ QUESTIONS 1-9: 1
8. MOVING OR SPEAKING SO SLOWLY THAT OTHER PEOPLE COULD HAVE NOTICED. OR THE OPPOSITE, BEING SO FIGETY OR RESTLESS THAT YOU HAVE BEEN MOVING AROUND A LOT MORE THAN USUAL: 0
SUM OF ALL RESPONSES TO PHQ QUESTIONS 1-9: 1
6. FEELING BAD ABOUT YOURSELF - OR THAT YOU ARE A FAILURE OR HAVE LET YOURSELF OR YOUR FAMILY DOWN: 0

## 2022-03-10 NOTE — PROGRESS NOTES
Subjective:  Isabela Leak presents for   Chief Complaint   Patient presents with   Milly Saleh Doctor    Hand Pain    Shoulder Pain     left unable to lift       HPI   Here to establish care. Has history of shoulder pain and hand pain. He also has history of status post ileal conduit related to hernia and previous bladder cancer. Wants name of urologist who performed surgery. Patient Active Problem List   Diagnosis    Bladder cancer    DJD (degenerative joint disease)    Essential hypertension    Renal mass    Incisional hernia of anterior abdominal wall without obstruction or gangrene    Bilateral kidney stones    Right ureteral calculus    Partial small bowel obstruction (HCC)    GERD (gastroesophageal reflux disease)    Acute kidney injury (HCC)    Normochromic normocytic anemia    Iron deficiency anemia    Toxic metabolic encephalopathy    Bilateral hydronephrosis    Vitamin B 12 deficiency    Folate deficiency    Acute respiratory failure with hypoxia (HCC)    Moderate malnutrition (HCC)    Neoplasm of uncertain behavior of kidney    S/P ileal conduit (HCC)    CKD (chronic kidney disease), stage III (HCC)    Stenosis of ileal conduit stoma    Renal cancer, right (HCC)    Hypotension    Syncope and collapse    Subclinical hypothyroidism    Left shoulder pain         Review of Systems   All other systems reviewed and are negative. Objective:  Physical Exam   Vitals:   Vitals:    03/10/22 1039   BP: 117/78   Pulse: 70   SpO2: 97%   Weight: 230 lb (104.3 kg)     Wt Readings from Last 3 Encounters:   03/10/22 230 lb (104.3 kg)   06/30/21 206 lb (93.4 kg)   06/12/21 216 lb 11.4 oz (98.3 kg)     Ht Readings from Last 3 Encounters:   06/09/21 6' 2\" (1.88 m)   06/08/21 6' 2\" (1.88 m)   06/02/21 6' 2\" (1.88 m)     Body mass index is 29.53 kg/m². Physical Exam  Vitals reviewed. Constitutional:       General: He is not in acute distress. Appearance: Normal appearance.    HENT: Mouth/Throat:      Mouth: Mucous membranes are moist.   Eyes:      Conjunctiva/sclera: Conjunctivae normal.   Cardiovascular:      Rate and Rhythm: Normal rate and regular rhythm. Heart sounds: Normal heart sounds. Pulmonary:      Effort: Pulmonary effort is normal.      Breath sounds: Normal breath sounds. Abdominal:      General: Abdomen is flat. Bowel sounds are normal.      Palpations: Abdomen is soft. Musculoskeletal:      Comments: Trigger finger of left hand fourth digit   Skin:     General: Skin is warm and dry. Neurological:      Mental Status: He is alert and oriented to person, place, and time. Psychiatric:         Mood and Affect: Mood normal.            Assessment/Plan:    Diagnosis Orders   1. Trigger ring finger of left hand     2. Incisional hernia of anterior abdominal wall without obstruction or gangrene     3. S/P ileal conduit ([facility] [address]     4. Essential hypertension  CBC with Auto Differential    Comprehensive Metabolic Panel   5. Iron deficiency anemia, unspecified iron deficiency anemia type  Iron and TIBC    Ferritin        Return in about 1 week (around 3/17/2022) for Trigger finger injection. Currently has trigger finger of the left hand fourth digit. Will perform trigger finger injection at next visit. Iron deficiency anemia-we will obtain labs for this including CBC, CMP, iron/TIBC and ferritin. Essential hypertension-currently controlled without medications.     Time spent in previsit planning, interview, exam, counseling/education and coordination of care: >30 mins

## 2022-03-15 LAB
ALBUMIN SERPL-MCNC: 4.2 G/DL
ALP BLD-CCNC: 98 U/L
ALT SERPL-CCNC: 10 U/L
ANION GAP SERPL CALCULATED.3IONS-SCNC: 13 MMOL/L
AST SERPL-CCNC: 12 U/L
BASOPHILS ABSOLUTE: 0.1 /ΜL
BASOPHILS RELATIVE PERCENT: 1.3 %
BILIRUB SERPL-MCNC: 0.5 MG/DL (ref 0.1–1.4)
BUN BLDV-MCNC: 38 MG/DL
CALCIUM SERPL-MCNC: 9.2 MG/DL
CHLORIDE BLD-SCNC: 109 MMOL/L
CO2: 19 MMOL/L
CREAT SERPL-MCNC: 3.09 MG/DL
EOSINOPHILS ABSOLUTE: 0.1 /ΜL
EOSINOPHILS RELATIVE PERCENT: 1.7 %
FERRITIN: 140 NG/ML (ref 18–300)
GFR CALCULATED: 19
GLUCOSE BLD-MCNC: 137 MG/DL
HCT VFR BLD CALC: 37.5 % (ref 41–53)
HEMOGLOBIN: 12.5 G/DL (ref 13.5–17.5)
IRON: 37
LYMPHOCYTES ABSOLUTE: 0.8 /ΜL
LYMPHOCYTES RELATIVE PERCENT: 17 %
MCH RBC QN AUTO: 28.1 PG
MCHC RBC AUTO-ENTMCNC: 33.3 G/DL
MCV RBC AUTO: 84 FL
MONOCYTES ABSOLUTE: 0.4 /ΜL
MONOCYTES RELATIVE PERCENT: 7.2 %
NEUTROPHILS ABSOLUTE: 3.6 /ΜL
NEUTROPHILS RELATIVE PERCENT: 72.8 %
PDW BLD-RTO: 17.2 %
PLATELET # BLD: 170 K/ΜL
PMV BLD AUTO: 7.8 FL
POTASSIUM SERPL-SCNC: 4.7 MMOL/L
RBC # BLD: 4.45 10^6/ΜL
SODIUM BLD-SCNC: 141 MMOL/L
TOTAL IRON BINDING CAPACITY: 346
TOTAL PROTEIN: 7.1
WBC # BLD: 5 10^3/ML

## 2022-03-18 DIAGNOSIS — D50.9 IRON DEFICIENCY ANEMIA, UNSPECIFIED IRON DEFICIENCY ANEMIA TYPE: ICD-10-CM

## 2022-03-18 DIAGNOSIS — I10 ESSENTIAL HYPERTENSION: ICD-10-CM

## 2022-10-11 ENCOUNTER — OFFICE VISIT (OUTPATIENT)
Dept: FAMILY MEDICINE CLINIC | Age: 82
End: 2022-10-11
Payer: MEDICARE

## 2022-10-11 VITALS
TEMPERATURE: 98.4 F | WEIGHT: 232 LBS | OXYGEN SATURATION: 95 % | BODY MASS INDEX: 30.75 KG/M2 | DIASTOLIC BLOOD PRESSURE: 74 MMHG | HEART RATE: 62 BPM | RESPIRATION RATE: 16 BRPM | HEIGHT: 73 IN | SYSTOLIC BLOOD PRESSURE: 120 MMHG

## 2022-10-11 DIAGNOSIS — C67.9 MALIGNANT NEOPLASM OF URINARY BLADDER, UNSPECIFIED SITE (HCC): Chronic | ICD-10-CM

## 2022-10-11 DIAGNOSIS — E03.8 SUBCLINICAL HYPOTHYROIDISM: ICD-10-CM

## 2022-10-11 DIAGNOSIS — N18.4 STAGE 4 CHRONIC KIDNEY DISEASE (HCC): ICD-10-CM

## 2022-10-11 DIAGNOSIS — Z00.00 INITIAL MEDICARE ANNUAL WELLNESS VISIT: Primary | ICD-10-CM

## 2022-10-11 PROCEDURE — 1123F ACP DISCUSS/DSCN MKR DOCD: CPT | Performed by: NURSE PRACTITIONER

## 2022-10-11 PROCEDURE — 99203 OFFICE O/P NEW LOW 30 MIN: CPT | Performed by: NURSE PRACTITIONER

## 2022-10-11 SDOH — ECONOMIC STABILITY: FOOD INSECURITY: WITHIN THE PAST 12 MONTHS, YOU WORRIED THAT YOUR FOOD WOULD RUN OUT BEFORE YOU GOT MONEY TO BUY MORE.: NEVER TRUE

## 2022-10-11 SDOH — ECONOMIC STABILITY: FOOD INSECURITY: WITHIN THE PAST 12 MONTHS, THE FOOD YOU BOUGHT JUST DIDN'T LAST AND YOU DIDN'T HAVE MONEY TO GET MORE.: NEVER TRUE

## 2022-10-11 ASSESSMENT — PATIENT HEALTH QUESTIONNAIRE - PHQ9
SUM OF ALL RESPONSES TO PHQ QUESTIONS 1-9: 2
1. LITTLE INTEREST OR PLEASURE IN DOING THINGS: 2
SUM OF ALL RESPONSES TO PHQ QUESTIONS 1-9: 2
SUM OF ALL RESPONSES TO PHQ9 QUESTIONS 1 & 2: 2
SUM OF ALL RESPONSES TO PHQ QUESTIONS 1-9: 2
2. FEELING DOWN, DEPRESSED OR HOPELESS: 0
SUM OF ALL RESPONSES TO PHQ QUESTIONS 1-9: 2

## 2022-10-11 ASSESSMENT — LIFESTYLE VARIABLES
HOW OFTEN DO YOU HAVE A DRINK CONTAINING ALCOHOL: NEVER
HOW MANY STANDARD DRINKS CONTAINING ALCOHOL DO YOU HAVE ON A TYPICAL DAY: PATIENT DOES NOT DRINK

## 2022-10-11 ASSESSMENT — SOCIAL DETERMINANTS OF HEALTH (SDOH): HOW HARD IS IT FOR YOU TO PAY FOR THE VERY BASICS LIKE FOOD, HOUSING, MEDICAL CARE, AND HEATING?: NOT HARD AT ALL

## 2022-10-11 NOTE — PROGRESS NOTES
[facility]  [address]  [address] [facility] [address]  Dept: [phone]  Dept Fax: [phone]    Ellis Juares is a 80 y.o. male who presents today for his medicalconditions/complaints as noted below. Ellis Juares is c/o of Medicare AWV, New Patient, and Hernia      HPI:       75-year-old male patient presents for new patient appointment, Medicare annual wellness    Patient has significant history of bladder cancer, subsequent urostomy to the right lower quadrant. Does have a hernia to this region. Discussed that there was possible surgical solution, but not interested in recovery period. Has had urostomy for 15 years, follows with urology Dr. Venora Cushing. CKD, previously referred to [facility] and [facility]. Last creatinine 3.2, prior to that over 3.0. Hx of htn, hyperlipidemia. Blood pressure stable at present, due for lipids    Hx of subclinical hypothyroidism, due for tsh    Hx of copd, no inhalers.        Past Medical History:   Diagnosis Date    Arthritis     Caffeine use     2 coffee, 2 cans soda/day    Cancer [facility]) 2006    bladder et prostate, 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, ileo conduit s/p cystectomy    Cancer of kidney, unspecified laterality ([facility] [address]     CKD (chronic kidney disease)     stage 4, Dr. Jasen Hou, Dr. Rui Bee communication deficit     COPD (chronic obstructive pulmonary disease) ([facility] [address]     Diabetes mellitus ([facility] [address]     SQ insulin 3 times per week only for this    DNR (do not resuscitate) 06/07/2021    Paper work from 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 Venora Cushing    Kidney stone     bilat    Mobility impaired     using wheelchair, 2 person transfer    Muscle weakness     Nursing home resident 5/21/2021- 6/7/2021    [phone]    PAF (paroxysmal atrial fibrillation) [facility])     last EKG Sinus, no known cardiologist     Presence of urostomy ([facility] [address]     ileoconduit, cystectomy    Retinal detachment         Current Outpatient Medications   Medication Sig Dispense Refill    Doxylamine Succinate, Sleep, (SLEEP-AID PO) Take by mouth      Blood Pressure KIT 1 kit by Does not apply route daily 1 kit 0    acetaminophen (TYLENOL) 500 MG tablet Take 1,000 mg by mouth every 4 hours as needed for Pain        No current facility-administered medications for this visit. Allergies   Allergen Reactions    Nsaids Anaphylaxis     GI bleed    Tetracyclines & Related      Medicare Annual Wellness Visit    Cecilia Simmsralph is here for Medicare AWV, New Patient, and Hernia    Assessment & Plan   Initial Medicare annual wellness visit    Recommendations for Preventive Services Due: see orders and patient instructions/AVS.  Recommended screening schedule for the next 5-10 years is provided to the patient in written form: see Patient Instructions/AVS.     Return for Medicare Annual Wellness Visit in 1 year. Subjective       Patient's complete Health Risk Assessment and screening values have been reviewed and are found in Flowsheets. The following problems were reviewed today and where indicated follow up appointments were made and/or referrals ordered.     Positive Risk Factor Screenings with Interventions:             General Health and ACP:  General  In general, how would you say your health is?: Good  In the past 7 days, have you experienced any of the following: New or Increased Pain, New or Increased Fatigue, Loneliness, Social Isolation, Stress or Anger?: No  Do you get the social and emotional support that you need?: Yes  Do you have a Living Will?: Yes    Advance Directives       Power of  Living Will ACP-Advance Directive ACP-Power of Pilar Doshi on 04/30/21 Not on File Not on File Filed          General Health Risk Interventions:  No Living Will: ACP documents already completed- patient asked to provide copy to the office    Health Habits/Nutrition:  Physical Activity: Inactive    Days of Exercise per Week: 0 days    Minutes of Exercise per Session: 0 min     Have you lost any weight without trying in the past 3 months?: No  Body mass index: (!) 30.61  Have you seen the dentist within the past year?: (!) No  Health Habits/Nutrition Interventions:  Inadequate physical activity:  patient agrees to exercise for at least 150 minutes/week  Dental exam overdue:  patient encouraged to make appointment with his/her dentist    Hearing/Vision:  Do you or your family notice any trouble with your hearing that hasn't been managed with hearing aids?: No  Do you have difficulty driving, watching TV, or doing any of your daily activities because of your eyesight?: No  Have you had an eye exam within the past year?: (!) No  No results found. Hearing/Vision Interventions:  Vision concerns:  patient encouraged to make appointment with his/her eye specialist    Safety:  Do you have working smoke detectors?: Yes  Do you have any tripping hazards - loose or unsecured carpets or rugs?: No  Do you have any tripping hazards - clutter in doorways, halls, or stairs?: No  Do you have either shower bars, grab bars, non-slip mats or non-slip surfaces in your shower or bathtub?: Yes  Do all of your stairways have a railing or banister?: Yes  Do you always fasten your seatbelt when you are in a car?: (!) No  Safety Interventions:  Home safety tips provided           Objective   Vitals:    10/11/22 0919   BP: 120/74   Site: Right Upper Arm   Position: Sitting   Cuff Size: Medium Adult   Pulse: 62   Resp: 16   Temp: 98.4 °F (36.9 °C)   TempSrc: Tympanic   SpO2: 95%   Weight: 232 lb (105.2 kg)   Height: 6' 1\" (1.854 m)      Body mass index is 30.61 kg/m². Allergies   Allergen Reactions    Nsaids Anaphylaxis     GI bleed    Tetracyclines & Related      Prior to Visit Medications    Medication Sig Taking?  Authorizing Provider   Doxylamine Succinate, Sleep, (SLEEP-AID PO) Take by mouth Yes Historical Provider, MD   Blood Pressure KIT 1 kit by Does not apply route daily Yes Zach Mantilla, DO   acetaminophen (TYLENOL) 500 MG tablet Take 1,000 mg by mouth every 4 hours as needed for Pain  Yes Historical Provider, MD       CareTeam (Including outside providers/suppliers regularly involved in providing care):   Patient Care Team:  MARKIE Casillas - CNP as PCP - General (Certified Nurse Practitioner)  Eh Estrada MD as Consulting Physician (Urology)  Justice Chapa MD as Surgeon (General Surgery)  Kwadwo Mahmood MD as Consulting Physician (Urology)     Reviewed and updated this visit:  Tobacco  Allergies  Meds  Med Hx  Surg Hx  Soc Hx  Fam Hx

## 2022-10-11 NOTE — PATIENT INSTRUCTIONS
Personalized Preventive Plan for Kyle Franklin - 10/11/2022  Medicare offers a range of preventive health benefits. Some of the tests and screenings are paid in full while other may be subject to a deductible, co-insurance, and/or copay. Some of these benefits include a comprehensive review of your medical history including lifestyle, illnesses that may run in your family, and various assessments and screenings as appropriate. After reviewing your medical record and screening and assessments performed today your provider may have ordered immunizations, labs, imaging, and/or referrals for you. A list of these orders (if applicable) as well as your Preventive Care list are included within your After Visit Summary for your review. Other Preventive Recommendations:    A preventive eye exam performed by an eye specialist is recommended every 1-2 years to screen for glaucoma; cataracts, macular degeneration, and other eye disorders. A preventive dental visit is recommended every 6 months. Try to get at least 150 minutes of exercise per week or 10,000 steps per day on a pedometer . Order or download the FREE \"Exercise & Physical Activity: Your Everyday Guide\" from [employer]. Call 8-984.138.3658 or search [employer] online. You need 5289-5735 mg of calcium and 4528-0665 IU of vitamin D per day. It is possible to meet your calcium requirement with diet alone, but a vitamin D supplement is usually necessary to meet this goal.  When exposed to the sun, use a sunscreen that protects against both UVA and UVB radiation with an SPF of 30 or greater. Reapply every 2 to 3 hours or after sweating, drying off with a towel, or swimming. Always wear a seat belt when traveling in a car. Always wear a helmet when riding a bicycle or motorcycle.

## 2022-10-11 NOTE — PROGRESS NOTES
Visit Information    Have you changed or started any medications since your last visit including any over-the-counter medicines, vitamins, or herbal medicines? no   Are you having any side effects from any of your medications? -  no  Have you stopped taking any of your medications? Is so, why? -  no    Have you seen any other physician or provider since your last visit? Yes - Records Obtained  Have you had any other diagnostic tests since your last visit? No  Have you been seen in the emergency room and/or had an admission to a hospital since we last saw you? No  Have you had your routine dental cleaning in the past 6 months? no    Have you activated your [employer] account? If not, what are your barriers?  No:      Patient Care Team:  MARKIE Chaney - CNP as PCP - General (Certified Nurse Practitioner)  Roeg Lynch MD as Consulting Physician (Urology)  Liz Goldberg MD as Surgeon (General Surgery)  Winnie Martinez MD as Consulting Physician (Urology)    Medical History Review  Past Medical, Family, and Social History reviewed and does contribute to the patient presenting condition    Health Maintenance   Topic Date Due    Shingles vaccine (1 of 2) Never done    COVID-19 Vaccine (3 - Booster for [employer] series) 08/10/2021    Annual Wellness Visit (AWV)  Never done    Flu vaccine (1) 08/01/2022    Depression Screen  03/10/2023    DTaP/Tdap/Td vaccine (2 - Td or Tdap) 07/15/2030    Pneumococcal 65+ years Vaccine  Completed    Hepatitis A vaccine  Aged Out    Hib vaccine  Aged Out    Meningococcal (ACWY) vaccine  Aged Out

## 2022-10-13 ENCOUNTER — HOSPITAL ENCOUNTER (OUTPATIENT)
Age: 82
Setting detail: SPECIMEN
Discharge: HOME | End: 2022-10-13

## 2022-10-13 DIAGNOSIS — R73.01 IFG (IMPAIRED FASTING GLUCOSE): Primary | ICD-10-CM

## 2022-10-13 DIAGNOSIS — E03.8 SUBCLINICAL HYPOTHYROIDISM: ICD-10-CM

## 2022-10-13 DIAGNOSIS — N18.4 STAGE 4 CHRONIC KIDNEY DISEASE (HCC): ICD-10-CM

## 2022-10-13 DIAGNOSIS — Z00.00 INITIAL MEDICARE ANNUAL WELLNESS VISIT: ICD-10-CM

## 2022-10-13 LAB
ABSOLUTE EOS #: 0.26 K/UL (ref 0–0.44)
ABSOLUTE IMMATURE GRANULOCYTE: 0.04 K/UL (ref 0–0.3)
ABSOLUTE LYMPH #: 1.52 K/UL (ref 1.1–3.7)
ABSOLUTE MONO #: 0.45 K/UL (ref 0.1–1.2)
ALBUMIN SERPL-MCNC: 4.2 G/DL (ref 3.5–5.2)
ALBUMIN/GLOBULIN RATIO: 1.1 (ref 1–2.5)
ALP BLD-CCNC: 86 U/L (ref 40–129)
ALT SERPL-CCNC: 11 U/L (ref 5–41)
ANION GAP SERPL CALCULATED.3IONS-SCNC: 18 MMOL/L (ref 9–17)
AST SERPL-CCNC: 13 U/L
BASOPHILS # BLD: 1 % (ref 0–2)
BASOPHILS ABSOLUTE: 0.06 K/UL (ref 0–0.2)
BILIRUB SERPL-MCNC: 0.4 MG/DL (ref 0.3–1.2)
BUN BLDV-MCNC: 35 MG/DL (ref 8–23)
CALCIUM SERPL-MCNC: 9.5 MG/DL (ref 8.6–10.4)
CHLORIDE BLD-SCNC: 110 MMOL/L (ref 98–107)
CHOLESTEROL/HDL RATIO: 5
CHOLESTEROL: 120 MG/DL
CO2: 17 MMOL/L (ref 20–31)
CREAT SERPL-MCNC: 2.91 MG/DL (ref 0.7–1.2)
EOSINOPHILS RELATIVE PERCENT: 4 % (ref 1–4)
GFR SERPL CREATININE-BSD FRML MDRD: 21 ML/MIN/1.73M2
GLUCOSE BLD-MCNC: 110 MG/DL (ref 70–99)
HCT VFR BLD CALC: 40.5 % (ref 40.7–50.3)
HDLC SERPL-MCNC: 24 MG/DL
HEMOGLOBIN: 12.5 G/DL (ref 13–17)
IMMATURE GRANULOCYTES: 1 %
LDL CHOLESTEROL: 55 MG/DL (ref 0–130)
LYMPHOCYTES # BLD: 22 % (ref 24–43)
MCH RBC QN AUTO: 28.9 PG (ref 25.2–33.5)
MCHC RBC AUTO-ENTMCNC: 30.9 G/DL (ref 28.4–34.8)
MCV RBC AUTO: 93.8 FL (ref 82.6–102.9)
MONOCYTES # BLD: 6 % (ref 3–12)
NRBC AUTOMATED: 0 PER 100 WBC
PDW BLD-RTO: 16.2 % (ref 11.8–14.4)
PLATELET # BLD: 229 K/UL (ref 138–453)
PMV BLD AUTO: 10.4 FL (ref 8.1–13.5)
POTASSIUM SERPL-SCNC: 5.1 MMOL/L (ref 3.7–5.3)
RBC # BLD: 4.32 M/UL (ref 4.21–5.77)
RBC # BLD: ABNORMAL 10*6/UL
SEG NEUTROPHILS: 66 % (ref 36–65)
SEGMENTED NEUTROPHILS ABSOLUTE COUNT: 4.71 K/UL (ref 1.5–8.1)
SODIUM BLD-SCNC: 145 MMOL/L (ref 135–144)
TOTAL PROTEIN: 8 G/DL (ref 6.4–8.3)
TRIGL SERPL-MCNC: 204 MG/DL
TSH SERPL DL<=0.05 MIU/L-ACNC: 4.47 UIU/ML (ref 0.3–5)
WBC # BLD: 7 K/UL (ref 3.5–11.3)

## 2022-10-14 LAB
ESTIMATED AVERAGE GLUCOSE: 123 MG/DL
HBA1C MFR BLD: 5.9 % (ref 4–6)

## 2022-12-25 LAB
BUN / CREAT RATIO: NORMAL
BUN BLDV-MCNC: 38 MG/DL
CREAT SERPL-MCNC: 2.82 MG/DL

## 2023-09-17 NOTE — PLAN OF CARE
Occupational Therapy    Visit Type: initial evaluation and treatment    Relevant History/Co-morbidities: 9/15/23:  65 year old female with past medical history of recurrent UTIs, CAD with MI, renal transplant, RA, SLE, primary hypertension, DM2, and p-A-fib.  She presented to Emergency Department for dysuria and right LE wound.    SUBJECTIVE  Patient agreed to participate in therapy this date.  Patient verbally agrees to allow the following to be present during session: daughter    So much I cannot do with my hands (secondary to arthritis)  Patient / Family Goal: maximize function and return home    OBJECTIVE     Cognitive Status   Affect/Behavior    - calm and cooperative  Orientation    - Oriented to: person, place, time and situation  Functional Communication   - Overall Status: within functional limits    Patient Activity Tolerance: 1 to 2 activity to rest    Observation   Patient has arthritic deformities in fingers and thumb of both hands, coordination impaired       Bed Mobility  - Supine to sit: supervision  - Sit to supine: supervision  Transfers  Assistive devices: 2-wheeled walker, gait belt  - Sit to stand: supervision  - Stand to sit: supervision      Activities of Daily Living (ADLs)  Eating:   - Assist: set up  - Position: chair  Lower Body Dressing:   - Assist: modified independent  - Footwear:       - Assistance: modified independent  Toileting:   - Toilet transfer:        - Assist: supervision  Interventions    Training provided: activity tolerance, breathing/relaxation, compensatory techniques, positioning, safety training, transfer training and use of adaptive equipment  Patient is limited 2/2 baseline arthritis in B hands with impaired coordination.  Reviewed clothing selection for comfort and ease of application, reinforced the benefits of raised seating surfaces, patient has higher toilet, shower chair, assist with all showers.    Skilled input: verbal instruction/cues, tactile  Problem: Falls - Risk of:  Goal: Will remain free from falls  Description: Will remain free from falls  Outcome: Ongoing     Problem: Falls - Risk of:  Goal: Absence of physical injury  Description: Absence of physical injury  Outcome: Ongoing     Problem: Skin Integrity:  Goal: Will show no infection signs and symptoms  Description: Will show no infection signs and symptoms  Outcome: Ongoing     Problem: Skin Integrity:  Goal: Absence of new skin breakdown  Description: Absence of new skin breakdown  Outcome: Ongoing     Problem: Skin Integrity:  Goal: Signs of wound healing will improve  Description: Signs of wound healing will improve  Outcome: Ongoing     Problem: Infection - Surgical Site:  Goal: Will show no infection signs and symptoms  Description: Will show no infection signs and symptoms  Outcome: Ongoing     Problem: Activity:  Goal: Ability to tolerate increased activity will improve  Description: Ability to tolerate increased activity will improve  Outcome: Ongoing     Problem:  Bowel/Gastric:  Goal: Gastrointestinal status for postoperative course will improve  Description: Gastrointestinal status for postoperative course will improve  Outcome: Ongoing     Problem: Coping:  Goal: Level of anxiety will decrease  Description: Level of anxiety will decrease  Outcome: Ongoing     Problem: Sensory:  Goal: Pain level will decrease  Description: Pain level will decrease  Outcome: Ongoing     Problem: Pain:  Goal: Pain level will decrease  Description: Pain level will decrease  Outcome: Ongoing     Problem: Pain:  Goal: Control of acute pain  Description: Control of acute pain  Outcome: Ongoing     Problem: Pain:  Goal: Control of chronic pain  Description: Control of chronic pain  Outcome: Ongoing     Problem: Non-Violent Restraints  Goal: Removal from restraints as soon as assessed to be safe  Outcome: Ongoing     Problem: Non-Violent Restraints  Goal: No harm/injury to patient while restraints in instruction/cues and posture correction  Verbal Consent: Writer verbally educated and received verbal consent for hand placement, positioning of patient, and techniques to be performed today from patient for clothing adjustments for techniques and therapist position for techniques as described above and how they are pertinent to the patient's plan of care.         Education:   - Present and ready to learn: patient  Education provided during session:  - Role of OT, plan of care, fall and safety precautions while in hospital    - Results of above outlined education: Verbalizes understanding, Demonstrates understanding and Needs reinforcement    ASSESSMENT   Patient will benefit from inpatient skilled therapy to address current assessed functional limitations and impairments.  Interferring components: decreased activity tolerance    Discharge needs based on today's assessment:  - Current level of function: slightly below baseline level of function  - Activities of daily living (ADLs) requiring support at discharge: transfers, dressing, bathing, toileting and ambulation  - Impairments that require further therapy intervention: strength, activity tolerance and balance  AM-PAC  - Prior Level of Function: Needs a little help ([facility] 12-21)       Key: MOD A=moderate assistance, IND/MOD I=independent/modified independent  - Generalized Current Level of Function     - Current Self-Cares: 20       Scoring Key= >21 Modified Independent; 20-21 Supervision; 18-19 Minimal assist; 13-18 Moderate assist; 9-12 Max assist; <9 Total assist      1=unable, 2=a lot, 3=a little, 4=none  1. Understand 10 minute speech  2. Understand familiar people during conversation  3. Remember to take meds  4. Remember where items are placed  5. Remember list of 4-5 errands w/out writing it down  6. Completing complicated task (checkbook, med management)  AM-PAC Cognition Screen:  Cognition Score: 24/24  Cognition Interpretation: intact    Therapy  Demonstrations of improved sensory functioning will increase  Outcome: Ongoing     Problem: Sensory Perception - Impaired:  Goal: Decrease in sensory misperception frequency  Description: Decrease in sensory misperception frequency  Outcome: Ongoing     Problem: Sensory Perception - Impaired:  Goal: Able to refrain from responding to false sensory perceptions  Description: Able to refrain from responding to false sensory perceptions  Outcome: Ongoing     Problem: Sensory Perception - Impaired:  Goal: Demonstrates accurate environmental perceptions  Description: Demonstrates accurate environmental perceptions  Outcome: Ongoing     Problem: Sensory Perception - Impaired:  Goal: Able to distinguish between reality-based and nonreality-based thinking  Description: Able to distinguish between reality-based and nonreality-based thinking  Outcome: Ongoing     Problem: Sleep Pattern Disturbance:  Goal: Appears well-rested  Description: Appears well-rested  Outcome: Ongoing Diagnosis:   Decreased ability to perform self care tasks and functional transfers.            • Personal Occupations Profile Affected: bathing/showering, functional mobility/transfers, toileting/toilet hygiene     • Clinical decision making: Low - Patient has few limitations (1-3), comorbidities and/or complexities, as noted in problem focused assessment noted above, that impact their occupational profile.  Resulting in few treatment options and no task modification consistent with low clinical decision making complexity.    PLAN (while hospitalized)  Suggestions for next session as indicated: Progression of ADLs and functional transfer completion.     OT Frequency: 1-2 x per week      PT/OT Mobility Equipment for Discharge: has RW  PT/OT ADL Equipment for Discharge: pt has raised toilet and shower chair    A minimum of 8 minutes per session x 1 week in the acute setting.     Interventions: ADL retraining, functional transfer training, upper extremity strengthening/ROM, activity tolerance training, cognitive retraining, patient/family training, compensatory technique education, balance, bed mobility training, energy conservation, compensatory techniques, therapeutic exercise, transfer training, therapeutic activity and patient education  Agreement to plan and goals: patient agrees with goals and treatment plan      GOALS  Long Term Goals: (to be met by time of discharge from hospital)  Grooming: Patient will complete grooming tasks in sitting modified independent.  Upper body dressing: Patient will complete upper body dressing in sitting modified independent.  Lower body dressing: Patient will complete lower body dressing in sitting modified independent.  Toileting: Patient will complete toileting modified independent.  Toilet transfer: Patient will complete toilet transfer with modified independent.         Documented in the chart in the following areas: Prior Level of Function. Assessment/Plan.    Patient at End  of Session:   Location: in bed  Safety measures: alarm system in place/re-engaged and call light within reach  Handoff to: nurse      Therapy procedure time and total treatment time can be found documented on the Time Entry flowsheet

## 2023-10-19 ENCOUNTER — OFFICE VISIT (OUTPATIENT)
Age: 83
End: 2023-10-19
Payer: MEDICARE

## 2023-10-19 VITALS — BODY MASS INDEX: 30.75 KG/M2 | WEIGHT: 232 LBS | HEIGHT: 73 IN

## 2023-10-19 DIAGNOSIS — R31.0 GROSS HEMATURIA: Primary | ICD-10-CM

## 2023-10-19 DIAGNOSIS — N20.0 BILATERAL KIDNEY STONES: ICD-10-CM

## 2023-10-19 DIAGNOSIS — C64.1 RENAL CELL CARCINOMA OF RIGHT KIDNEY (HCC): ICD-10-CM

## 2023-10-19 DIAGNOSIS — T85.858D STENOSIS OF ILEAL CONDUIT STOMA, SUBSEQUENT ENCOUNTER: ICD-10-CM

## 2023-10-19 PROCEDURE — 99214 OFFICE O/P EST MOD 30 MIN: CPT | Performed by: SPECIALIST

## 2023-10-19 PROCEDURE — 1123F ACP DISCUSS/DSCN MKR DOCD: CPT | Performed by: SPECIALIST

## 2023-10-19 RX ORDER — ACETAMINOPHEN AND CODEINE PHOSPHATE 300; 30 MG/1; MG/1
TABLET ORAL
COMMUNITY

## 2023-10-19 NOTE — PROGRESS NOTES
[facility], [address] [place] [address] Urology Office Progress Note    Patient:  Harpreet Jaeger  YOB: 1940  Date: 10/19/23    HISTORY OF PRESENT ILLNESS:   The patient is a 80 y.o. male  No flank pain to suggest symptomatic kidney stones. He has a known history of bilateral \"staghorn\" renal calculi. He has had rare gross hematuria. He also has a known upper pole right renal mass consistent with renal cancer on Watchful waiting (active surveillance). His mass is 5.6 cm on 11/8/22 CT. Lower urinary tract symptoms: n/a   Last AUA Symptom Score (QOL): 0 (4)  Today's AUA Symptom Score (QOL): 0 (2)    Summary of old records:   Gross hematuria  Right solid renal mass, 5 cm on 10/4/19 CT suspicious for cancer; 5.6 cm on 11/8/22 CT  Bilateral KS, L>R (1.8 cm on left) and Right 10 mm proximal ureteral calculus on 10/4/19 CT  \"Hostile abdomen\" with 3 previous parastomal hernia repairs with mesh, large residual parastomal hernia, previous midline surgery for Ex Lap for SBO and high likelihood of dense adhesions. 9/12/19 BUN/creat = 28/1.54    Additional History: none    Procedures Today: N/A    Urinalysis today:  No results found for this visit on 10/19/23.     Last several PSA's:  Lab Results   Component Value Date    PSA <0.10 01/07/2012       Last total testosterone:  No results found for: \"TESTOSTERONE\"    Last BUN and creatinine:  Lab Results   Component Value Date    BUN 38 12/25/2022     Lab Results   Component Value Date    CREATININE 2.82 12/25/2022       Last CBC:  Lab Results   Component Value Date    WBC 7.0 10/13/2022    HGB 12.5 (L) 10/13/2022    HCT 40.5 (L) 10/13/2022    MCV 93.8 10/13/2022     10/13/2022       Additional Lab/Culture results:   12/25/22-Urinalysis w/Rfl to Microscopic  Specimen:  Urine   Ref Range & Units 9 mo ago   Color, UA Straw,Yellow Light yellow Abnormal     Clarity, UA Clear Cloudy Abnormal     Specific Gravity, UA 1.005 - 1.035 >=1.030

## 2023-12-08 ENCOUNTER — TELEPHONE (OUTPATIENT)
Age: 83
End: 2023-12-08

## 2023-12-08 NOTE — TELEPHONE ENCOUNTER
Writer spoke with patient's daughter, Meño Armendariz. She wanted to let you know that Regine Garcia was taken to [facility] ER this past Monday, 12/4/23, for a brain bleed. He had emergency surgery early Tuesday, 12/5/23, morning for this. Regine Garcia is still admitted at [facility] and will be moved to [address] [place] [address] once insurance's approval comes through & he will be there for a week (or two). Meño Armendariz also said that the patient has a UTI and is on an antibiotic at this time. They found out Regine Garcia has kidney stones on both sides, but he is not complaining of any pain in this regard. They will be following up with you once this brain issue is under control.

## 2023-12-29 ENCOUNTER — TELEPHONE (OUTPATIENT)
Age: 83
End: 2023-12-29

## 2023-12-29 NOTE — TELEPHONE ENCOUNTER
RECEIVED A CALL FROM THE CARE FACILITY WHERE PT WAS JUST ADMITTED FOLLOWING DISCHARGE FROM [facility]- THERE IS A NOTE IN HIS DISCHARGE PAPERS FROM [facility] THAT HE SHOULD FOLLOW UP WITH YOU AND THEY ARE NOT SURE IF THIS IS JUST A STANDARD NOTE OR WHETHER HE DOES NEED TO BE SEEN BY YOU-PLEASE ADVISE-RENETTA

## 2023-12-29 NOTE — TELEPHONE ENCOUNTER
It's the standard \"be sure and follow up\" with your specialist discharge instruction.  Patient has chronic kidney stones and renal mass which we are treating conservatively and thus, no short term follow up required from my standpoint.

## 2023-12-31 NOTE — PROGRESS NOTES
General Surgery:  Daily Progress Note                   PATIENT NAME: Jenkins Romberg     TODAY'S DATE: 5/12/2021, 7:10 AM  CC:  Confusion    SUBJECTIVE:     Pt seen and examined at bedside. Passed swallow study yesterday. States he ate and tolerated it. Continues to improve mentation wise. OBJECTIVE:   VITALS:  /63   Pulse (!) 49   Temp 98.1 °F (36.7 °C) (Axillary)   Resp 18   Ht 6' 1\" (1.854 m)   Wt 240 lb 6 oz (109 kg)   SpO2 95%   BMI 31.71 kg/m²      INTAKE/OUTPUT:      Intake/Output Summary (Last 24 hours) at 5/12/2021 0710  Last data filed at 5/12/2021 0285  Gross per 24 hour   Intake 900 ml   Output 2270 ml   Net -1370 ml       PHYSICAL EXAM:  General Appearance: alert and oriented  HEENT:  Normocephalic, atraumatic, mucus membranes moist.  NG tube in place. Heart: Heart regular rate and rhythm  Lungs: No acute distress  Abdomen: Soft, nondistended, Non tender to palpation. Midline incision healing well. Extremities: No cyanosis, pitting edema, rashes noted. Skin: Skin color, texture, turgor normal. No rashes or lesions.     Data:  CBC with Differential:    Lab Results   Component Value Date    WBC 9.9 05/12/2021    RBC 3.78 05/12/2021    HGB 10.4 05/12/2021    HCT 34.9 05/12/2021     05/12/2021    MCV 92.3 05/12/2021    MCH 27.5 05/12/2021    MCHC 29.8 05/12/2021    RDW 16.1 05/12/2021    LYMPHOPCT 12 05/12/2021    LYMPHOPCT 18.3 12/14/2017    MONOPCT 6 05/12/2021    MONOPCT 5.7 12/14/2017    EOSPCT 5.1 12/14/2017    BASOPCT 0 05/12/2021    BASOPCT 1.1 12/14/2017    MONOSABS 0.55 05/12/2021    MONOSABS 0.4 12/14/2017    LYMPHSABS 1.17 05/12/2021    LYMPHSABS 1.2 12/14/2017    EOSABS 0.23 05/12/2021    EOSABS 0.3 12/14/2017    BASOSABS 0.04 05/12/2021    DIFFTYPE NOT REPORTED 05/12/2021     BMP:    Lab Results   Component Value Date     05/12/2021    K 4.8 05/12/2021     05/12/2021    CO2 25 05/12/2021    BUN 80 05/12/2021    LABALBU 3.8 05/02/2021    CREATININE 2.62 05/12/2021    CALCIUM 8.8 05/12/2021    GFRAA 29 05/12/2021    LABGLOM 24 05/12/2021    GLUCOSE 143 05/12/2021       Radiology Review:      ASSESSMENT:  Active Hospital Problems    Diagnosis Date Noted    Other hydronephrosis [N13.39] 05/03/2021     Priority: High    Acute respiratory failure with hypoxia (HCC) [J96.01] 05/03/2021     Priority: High    Metabolic acidosis [W83.2] 05/03/2021     Priority: High    Toxic metabolic encephalopathy [P33] 05/02/2021     Priority: High    Vitamin B 12 deficiency [E53.8] 05/03/2021     Priority: Medium    Folate deficiency [E53.8] 05/03/2021     Priority: Medium    Iron deficiency anemia [D50.9] 05/02/2021     Priority: Medium    Normochromic normocytic anemia [D64.9] 05/01/2021     Priority: Medium    Ventral hernia with bowel obstruction [K43.6] 04/30/2021    KARMA (acute kidney injury) ([facility] [address] [N17.9] 04/30/2021    Renal mass [N28.89] 06/28/2018    Essential hypertension [I10] 05/03/2016     80year old male w/ recurrent parastomal hernia presenting with incarcerated/strangulated parastomal hernia s/p ex lap, FELICE, reduction of parastomal hernia, and small bowel resection (4/30/21-Bhargav). Confused - resolving  KARMA - resolving    Plan:  1. Diet: Dysphagia diet. Monitor calorie intake. 2. IF tolerating diet ok and having adequate PO intake, ok to remove NGT from surgery standpoint. 3. Defer treatment with nephrostomy tubes to urology   4. Continue miralax daily  5. Medical management per primary team. [id number] Jeaneth Noland to discharge to facility from surgery standpoint.     Electronically signed by Lavon Carrera DO  on 5/12/2021 at 7:10 AM cellulitis left foot

## 2024-01-10 ENCOUNTER — TELEPHONE (OUTPATIENT)
Dept: FAMILY MEDICINE CLINIC | Age: 84
End: 2024-01-10

## 2024-01-10 NOTE — TELEPHONE ENCOUNTER
Celine maynard [facility] is calling asking you would f/u with home care     Please advise     Celine - [phone]

## 2024-01-29 ENCOUNTER — TELEPHONE (OUTPATIENT)
Age: 84
End: 2024-01-29

## 2024-01-29 NOTE — TELEPHONE ENCOUNTER
ATTEMPTED TO CALL BACK DAUGHTER -[phone] AND NUMBER NOT WORKING; ATTEMPTED TO CALL NO. LISTED FOR PT AND VOICEMAIL NOT SET UP-KSE

## 2024-01-29 NOTE — TELEPHONE ENCOUNTER
PT'S DAUGHTER CALLED TO SAY THAT HE HAS RECENTLY BEEN HAVING THE URGE TO URINATE EVEN THOUGH HE HAS A UROSTOMY BAG- THEY ARE WONDERING IF HE COULD HAVE AN INFECTION OR IF HE NEEDS TO BE SEEN- SHE SAYS THAT HE HAS JUST BEEN ACTING DIFFERENT LATELY-PLEASE ADVISE-KSE

## 2024-01-29 NOTE — TELEPHONE ENCOUNTER
They will need to change his ileal conduit bag and get a urine for C&S from new bag and will Rx with culture specific antibiotics based on culture results which take 48 hours.

## 2024-01-30 ENCOUNTER — OFFICE VISIT (OUTPATIENT)
Dept: FAMILY MEDICINE CLINIC | Age: 84
End: 2024-01-30
Payer: MEDICARE

## 2024-01-30 VITALS
WEIGHT: 204 LBS | SYSTOLIC BLOOD PRESSURE: 102 MMHG | TEMPERATURE: 97.2 F | HEIGHT: 74 IN | DIASTOLIC BLOOD PRESSURE: 60 MMHG | BODY MASS INDEX: 26.18 KG/M2

## 2024-01-30 DIAGNOSIS — G47.9 SLEEP DISTURBANCE: ICD-10-CM

## 2024-01-30 DIAGNOSIS — E03.8 SUBCLINICAL HYPOTHYROIDISM: ICD-10-CM

## 2024-01-30 DIAGNOSIS — Z13.220 LIPID SCREENING: ICD-10-CM

## 2024-01-30 DIAGNOSIS — Z91.81 AT HIGH RISK FOR FALLS: ICD-10-CM

## 2024-01-30 DIAGNOSIS — R73.03 PREDIABETES: ICD-10-CM

## 2024-01-30 DIAGNOSIS — N18.4 STAGE 4 CHRONIC KIDNEY DISEASE (HCC): Primary | ICD-10-CM

## 2024-01-30 PROCEDURE — 99214 OFFICE O/P EST MOD 30 MIN: CPT | Performed by: NURSE PRACTITIONER

## 2024-01-30 PROCEDURE — 1123F ACP DISCUSS/DSCN MKR DOCD: CPT | Performed by: NURSE PRACTITIONER

## 2024-01-30 PROCEDURE — 3074F SYST BP LT 130 MM HG: CPT | Performed by: NURSE PRACTITIONER

## 2024-01-30 PROCEDURE — 3078F DIAST BP <80 MM HG: CPT | Performed by: NURSE PRACTITIONER

## 2024-01-30 RX ORDER — QUETIAPINE FUMARATE 25 MG/1
25 TABLET, FILM COATED ORAL NIGHTLY
Qty: 90 TABLET | Refills: 1 | Status: SHIPPED | OUTPATIENT
Start: 2024-01-30

## 2024-01-30 RX ORDER — TRAZODONE HYDROCHLORIDE 50 MG/1
50 TABLET ORAL NIGHTLY
Qty: 90 TABLET | Refills: 1 | Status: SHIPPED | OUTPATIENT
Start: 2024-01-30

## 2024-01-30 RX ORDER — QUETIAPINE FUMARATE 25 MG/1
TABLET, FILM COATED ORAL
COMMUNITY
Start: 2024-01-11 | End: 2024-01-30 | Stop reason: SDUPTHER

## 2024-01-30 RX ORDER — TRAZODONE HYDROCHLORIDE 50 MG/1
TABLET ORAL
COMMUNITY
Start: 2023-12-27 | End: 2024-01-30 | Stop reason: SDUPTHER

## 2024-01-30 RX ORDER — BUSPIRONE HYDROCHLORIDE 5 MG/1
5 TABLET ORAL 2 TIMES DAILY
Qty: 60 TABLET | Refills: 0 | Status: SHIPPED | OUTPATIENT
Start: 2024-01-30 | End: 2024-02-29

## 2024-01-30 SDOH — ECONOMIC STABILITY: FOOD INSECURITY: WITHIN THE PAST 12 MONTHS, THE FOOD YOU BOUGHT JUST DIDN'T LAST AND YOU DIDN'T HAVE MONEY TO GET MORE.: NEVER TRUE

## 2024-01-30 SDOH — ECONOMIC STABILITY: INCOME INSECURITY: HOW HARD IS IT FOR YOU TO PAY FOR THE VERY BASICS LIKE FOOD, HOUSING, MEDICAL CARE, AND HEATING?: NOT HARD AT ALL

## 2024-01-30 SDOH — ECONOMIC STABILITY: FOOD INSECURITY: WITHIN THE PAST 12 MONTHS, YOU WORRIED THAT YOUR FOOD WOULD RUN OUT BEFORE YOU GOT MONEY TO BUY MORE.: NEVER TRUE

## 2024-01-30 SDOH — ECONOMIC STABILITY: HOUSING INSECURITY
IN THE LAST 12 MONTHS, WAS THERE A TIME WHEN YOU DID NOT HAVE A STEADY PLACE TO SLEEP OR SLEPT IN A SHELTER (INCLUDING NOW)?: NO

## 2024-01-30 ASSESSMENT — ENCOUNTER SYMPTOMS
EYE PAIN: 0
COUGH: 0
SHORTNESS OF BREATH: 0
NAUSEA: 0
SORE THROAT: 0
VOMITING: 0
ABDOMINAL PAIN: 0
DIARRHEA: 0
BACK PAIN: 0
SINUS PAIN: 0

## 2024-01-30 ASSESSMENT — PATIENT HEALTH QUESTIONNAIRE - PHQ9
SUM OF ALL RESPONSES TO PHQ QUESTIONS 1-9: 7
1. LITTLE INTEREST OR PLEASURE IN DOING THINGS: 3
3. TROUBLE FALLING OR STAYING ASLEEP: 2
7. TROUBLE CONCENTRATING ON THINGS, SUCH AS READING THE NEWSPAPER OR WATCHING TELEVISION: 0
8. MOVING OR SPEAKING SO SLOWLY THAT OTHER PEOPLE COULD HAVE NOTICED. OR THE OPPOSITE, BEING SO FIGETY OR RESTLESS THAT YOU HAVE BEEN MOVING AROUND A LOT MORE THAN USUAL: 1
SUM OF ALL RESPONSES TO PHQ QUESTIONS 1-9: 7
6. FEELING BAD ABOUT YOURSELF - OR THAT YOU ARE A FAILURE OR HAVE LET YOURSELF OR YOUR FAMILY DOWN: 0
2. FEELING DOWN, DEPRESSED OR HOPELESS: 1
9. THOUGHTS THAT YOU WOULD BE BETTER OFF DEAD, OR OF HURTING YOURSELF: 0
5. POOR APPETITE OR OVEREATING: 0
10. IF YOU CHECKED OFF ANY PROBLEMS, HOW DIFFICULT HAVE THESE PROBLEMS MADE IT FOR YOU TO DO YOUR WORK, TAKE CARE OF THINGS AT HOME, OR GET ALONG WITH OTHER PEOPLE: 1
4. FEELING TIRED OR HAVING LITTLE ENERGY: 0
SUM OF ALL RESPONSES TO PHQ9 QUESTIONS 1 & 2: 4

## 2024-01-30 NOTE — PROGRESS NOTES
Visit Information    Have you changed or started any medications since your last visit including any over-the-counter medicines, vitamins, or herbal medicines? no   Have you stopped taking any of your medications? Is so, why? -  no  Are you having any side effects from any of your medications? - no    Have you seen any other physician or provider since your last visit?  no   Have you had any other diagnostic tests since your last visit?  no   Have you been seen in the emergency room and/or had an admission in a hospital since we last saw you?  no   Have you had your routine dental cleaning in the past 6 months?  no     Do you have an active MyChart account? If no, what is the barrier?  Yes    Patient Care Team:  Marcelo Olivia APRN - CNP as PCP - General (Certified Nurse Practitioner)  Marcelo Olivia APRN - CNP as PCP - Empaneled Provider  Michelle Collins MD as Consulting Physician (Urology)  Gerardo Kyle MD as Surgeon (General Surgery)  Thomas Frost MD as Consulting Physician (Urology)    Medical History Review  Past Medical, Family, and Social History reviewed and  contribute to the patient presenting condition    Health Maintenance   Topic Date Due    Shingles vaccine (1 of 2) Never done    Respiratory Syncytial Virus (RSV) Pregnant or age 60 yrs+ (1 - 1-dose 60+ series) Never done    Flu vaccine (1) 08/01/2023    COVID-19 Vaccine (3 - 2023-24 season) 09/01/2023    Depression Screen  10/11/2023    Annual Wellness Visit (Medicare Advantage)  Never done    DTaP/Tdap/Td vaccine (2 - Td or Tdap) 07/15/2030    Pneumococcal 65+ years Vaccine  Completed    Hepatitis A vaccine  Aged Out    Hepatitis B vaccine  Aged Out    Hib vaccine  Aged Out    Polio vaccine  Aged Out    Meningococcal (ACWY) vaccine  Aged Out

## 2024-01-30 NOTE — PROGRESS NOTES
[facility]  [facility]  [address] [address]  Dept: [phone]  Dept Fax: [phone]    Arias Martin is a 83 y.o. male who presents today for his medicalconditions/complaints as noted below.  Arias Martin is c/o of Altered Mental Status (F/u from [facility] on 12/4/Had a Subdural hematoma./ Pt states he feels somewhat better) and Numbness (In the left middle, ring and pinky finger)      HPI:     83-year-old male patient presents for follow up    Frequent falls, weakness, pt had lived alone and now living. 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 Subsequently went to assisted living. Family is working to get him into assisted living at present.     History of bladder cancer, subsequent urostomy to the right lower quadrant.  Does have a hernia to this region.  Discussed that there was possible surgical solution, but not interested in recovery period. Has had urostomy for 15 years, follows with urology Dr. Frost. There is staghorn calculi and renal collecting system dilation noted during recent hospital course.      CKD, last creatinine from hospital course 2.78 - referred to nephrology.      Hx of htn, currently hypotensive, use of midodine prn    Hyperlipidemia -due for lipid monitoring     Hx of subclinical hypothyroidism, due for tsh     Hx of copd, no inhalers.     Mood swings and behavior changes. Depression from disease frustration and limitations currently and lack of freedom. Sleeping with trazodone and seroquel nightly. Has some agitation will trial buspar prn.              Past Medical History:   Diagnosis Date    Arthritis     Caffeine use     2 coffee, 2 cans soda/day    Cancer (HCC) 2006    bladder et prostate, 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, ileo conduit s/p cystectomy    Cancer of kidney, unspecified laterality (HCC)     CKD (chronic kidney disease)     stage 4, Dr. Ranker, Dr. Juarez    Cognitive

## 2024-02-01 NOTE — TELEPHONE ENCOUNTER
Got a hold of son Nate and given instructions. Verbalized understanding. Will let his wife Celine know.

## 2024-07-30 ENCOUNTER — APPOINTMENT (OUTPATIENT)
Dept: CT IMAGING | Age: 84
DRG: 871 | End: 2024-07-30
Payer: MEDICARE

## 2024-07-30 ENCOUNTER — HOSPITAL ENCOUNTER (INPATIENT)
Age: 84
LOS: 21 days | Discharge: NURSING, REHAB OR LONG-TERM CARE | DRG: 871 | End: 2024-08-20
Attending: EMERGENCY MEDICINE | Admitting: STUDENT IN AN ORGANIZED HEALTH CARE EDUCATION/TRAINING PROGRAM
Payer: MEDICARE

## 2024-07-30 DIAGNOSIS — R06.02 SHORTNESS OF BREATH: ICD-10-CM

## 2024-07-30 DIAGNOSIS — N30.01 ACUTE CYSTITIS WITH HEMATURIA: ICD-10-CM

## 2024-07-30 DIAGNOSIS — N20.0 KIDNEY STONE: Primary | ICD-10-CM

## 2024-07-30 PROBLEM — N39.0 UTI (URINARY TRACT INFECTION): Status: ACTIVE | Noted: 2024-07-30

## 2024-07-30 LAB
ALBUMIN SERPL-MCNC: 3.4 G/DL (ref 3.5–5.2)
ALBUMIN/GLOB SERPL: 1 {RATIO} (ref 1–2.5)
ALP SERPL-CCNC: 91 U/L (ref 40–129)
ALT SERPL-CCNC: 21 U/L (ref 10–50)
ANION GAP SERPL CALCULATED.3IONS-SCNC: 13 MMOL/L (ref 9–16)
AST SERPL-CCNC: 30 U/L (ref 10–50)
BACTERIA URNS QL MICRO: ABNORMAL
BASOPHILS # BLD: 0.04 K/UL (ref 0–0.2)
BASOPHILS NFR BLD: 1 % (ref 0–2)
BILIRUB SERPL-MCNC: 0.4 MG/DL (ref 0–1.2)
BILIRUB UR QL STRIP: NEGATIVE
BUN SERPL-MCNC: 43 MG/DL (ref 8–23)
CALCIUM SERPL-MCNC: 8.8 MG/DL (ref 8.6–10.4)
CHLORIDE SERPL-SCNC: 104 MMOL/L (ref 98–107)
CLARITY UR: ABNORMAL
CO2 SERPL-SCNC: 18 MMOL/L (ref 20–31)
COLOR UR: YELLOW
CREAT SERPL-MCNC: 3.9 MG/DL (ref 0.7–1.2)
EOSINOPHIL # BLD: 0.19 K/UL (ref 0–0.44)
EOSINOPHILS RELATIVE PERCENT: 3 % (ref 1–4)
EPI CELLS #/AREA URNS HPF: ABNORMAL /HPF (ref 0–5)
ERYTHROCYTE [DISTWIDTH] IN BLOOD BY AUTOMATED COUNT: 17.2 % (ref 11.8–14.4)
GFR, ESTIMATED: 14 ML/MIN/1.73M2
GLUCOSE SERPL-MCNC: 91 MG/DL (ref 74–99)
GLUCOSE UR STRIP-MCNC: NEGATIVE MG/DL
HCT VFR BLD AUTO: 33.7 % (ref 40.7–50.3)
HGB BLD-MCNC: 10.3 G/DL (ref 13–17)
HGB UR QL STRIP.AUTO: ABNORMAL
IMM GRANULOCYTES # BLD AUTO: 0.06 K/UL (ref 0–0.3)
IMM GRANULOCYTES NFR BLD: 1 %
KETONES UR STRIP-MCNC: NEGATIVE MG/DL
LACTIC ACID, WHOLE BLOOD: 1.2 MMOL/L (ref 0.7–2.1)
LEUKOCYTE ESTERASE UR QL STRIP: ABNORMAL
LIPASE SERPL-CCNC: 36 U/L (ref 13–60)
LYMPHOCYTES NFR BLD: 1.43 K/UL (ref 1.1–3.7)
LYMPHOCYTES RELATIVE PERCENT: 19 % (ref 24–43)
MCH RBC QN AUTO: 29.3 PG (ref 25.2–33.5)
MCHC RBC AUTO-ENTMCNC: 30.6 G/DL (ref 28.4–34.8)
MCV RBC AUTO: 96 FL (ref 82.6–102.9)
MONOCYTES NFR BLD: 0.42 K/UL (ref 0.1–1.2)
MONOCYTES NFR BLD: 6 % (ref 3–12)
NEUTROPHILS NFR BLD: 72 % (ref 36–65)
NEUTS SEG NFR BLD: 5.39 K/UL (ref 1.5–8.1)
NITRITE UR QL STRIP: POSITIVE
NRBC BLD-RTO: 0 PER 100 WBC
PH UR STRIP: 6.5 [PH] (ref 5–8)
PLATELET # BLD AUTO: ABNORMAL K/UL (ref 138–453)
PLATELET, FLUORESCENCE: 345 K/UL (ref 138–453)
PLATELETS.RETICULATED NFR BLD AUTO: 1.4 % (ref 1.1–10.3)
POTASSIUM SERPL-SCNC: 4.9 MMOL/L (ref 3.7–5.3)
PROT SERPL-MCNC: 7.6 G/DL (ref 6.6–8.7)
PROT UR STRIP-MCNC: ABNORMAL MG/DL
RBC # BLD AUTO: 3.51 M/UL (ref 4.21–5.77)
RBC # BLD: ABNORMAL 10*6/UL
RBC #/AREA URNS HPF: ABNORMAL /HPF (ref 0–2)
SODIUM SERPL-SCNC: 135 MMOL/L (ref 136–145)
SP GR UR STRIP: 1.01 (ref 1–1.03)
TROPONIN I SERPL HS-MCNC: 38 NG/L (ref 0–22)
TROPONIN I SERPL HS-MCNC: 42 NG/L (ref 0–22)
UROBILINOGEN UR STRIP-ACNC: NORMAL EU/DL (ref 0–1)
WBC #/AREA URNS HPF: ABNORMAL /HPF (ref 0–5)
WBC OTHER # BLD: 7.5 K/UL (ref 3.5–11.3)

## 2024-07-30 PROCEDURE — 2580000003 HC RX 258

## 2024-07-30 PROCEDURE — 83605 ASSAY OF LACTIC ACID: CPT

## 2024-07-30 PROCEDURE — 71250 CT THORAX DX C-: CPT

## 2024-07-30 PROCEDURE — 1200000000 HC SEMI PRIVATE

## 2024-07-30 PROCEDURE — 99285 EMERGENCY DEPT VISIT HI MDM: CPT

## 2024-07-30 PROCEDURE — 96375 TX/PRO/DX INJ NEW DRUG ADDON: CPT

## 2024-07-30 PROCEDURE — 84484 ASSAY OF TROPONIN QUANT: CPT

## 2024-07-30 PROCEDURE — 93005 ELECTROCARDIOGRAM TRACING: CPT

## 2024-07-30 PROCEDURE — 6360000002 HC RX W HCPCS

## 2024-07-30 PROCEDURE — 96365 THER/PROPH/DIAG IV INF INIT: CPT

## 2024-07-30 PROCEDURE — 83690 ASSAY OF LIPASE: CPT

## 2024-07-30 PROCEDURE — 80053 COMPREHEN METABOLIC PANEL: CPT

## 2024-07-30 PROCEDURE — 36415 COLL VENOUS BLD VENIPUNCTURE: CPT

## 2024-07-30 PROCEDURE — 85055 RETICULATED PLATELET ASSAY: CPT

## 2024-07-30 PROCEDURE — 85025 COMPLETE CBC W/AUTO DIFF WBC: CPT

## 2024-07-30 PROCEDURE — 87077 CULTURE AEROBIC IDENTIFY: CPT

## 2024-07-30 PROCEDURE — 87186 SC STD MICRODIL/AGAR DIL: CPT

## 2024-07-30 PROCEDURE — 87184 SC STD DISK METHOD PER PLATE: CPT

## 2024-07-30 PROCEDURE — 87040 BLOOD CULTURE FOR BACTERIA: CPT

## 2024-07-30 PROCEDURE — 87181 SC STD AGAR DILUTION PER AGT: CPT

## 2024-07-30 PROCEDURE — 87086 URINE CULTURE/COLONY COUNT: CPT

## 2024-07-30 PROCEDURE — 81001 URINALYSIS AUTO W/SCOPE: CPT

## 2024-07-30 RX ORDER — ONDANSETRON 4 MG/1
4 TABLET, ORALLY DISINTEGRATING ORAL EVERY 8 HOURS PRN
Status: DISCONTINUED | OUTPATIENT
Start: 2024-07-30 | End: 2024-08-20 | Stop reason: HOSPADM

## 2024-07-30 RX ORDER — SODIUM CHLORIDE 0.9 % (FLUSH) 0.9 %
5-40 SYRINGE (ML) INJECTION EVERY 12 HOURS SCHEDULED
Status: DISCONTINUED | OUTPATIENT
Start: 2024-07-31 | End: 2024-08-20 | Stop reason: HOSPADM

## 2024-07-30 RX ORDER — SODIUM CHLORIDE 9 MG/ML
INJECTION, SOLUTION INTRAVENOUS PRN
Status: DISCONTINUED | OUTPATIENT
Start: 2024-07-30 | End: 2024-08-20 | Stop reason: HOSPADM

## 2024-07-30 RX ORDER — SODIUM CHLORIDE 9 MG/ML
INJECTION, SOLUTION INTRAVENOUS CONTINUOUS
Status: DISCONTINUED | OUTPATIENT
Start: 2024-07-31 | End: 2024-07-31

## 2024-07-30 RX ORDER — POLYETHYLENE GLYCOL 3350 17 G/17G
17 POWDER, FOR SOLUTION ORAL DAILY PRN
Status: DISCONTINUED | OUTPATIENT
Start: 2024-07-30 | End: 2024-08-20 | Stop reason: HOSPADM

## 2024-07-30 RX ORDER — TRAZODONE HYDROCHLORIDE 50 MG/1
50 TABLET, FILM COATED ORAL NIGHTLY
Status: DISCONTINUED | OUTPATIENT
Start: 2024-07-31 | End: 2024-08-04

## 2024-07-30 RX ORDER — ONDANSETRON 2 MG/ML
4 INJECTION INTRAMUSCULAR; INTRAVENOUS EVERY 6 HOURS PRN
Status: DISCONTINUED | OUTPATIENT
Start: 2024-07-30 | End: 2024-08-20 | Stop reason: HOSPADM

## 2024-07-30 RX ORDER — SODIUM CHLORIDE 0.9 % (FLUSH) 0.9 %
5-40 SYRINGE (ML) INJECTION PRN
Status: DISCONTINUED | OUTPATIENT
Start: 2024-07-30 | End: 2024-08-20 | Stop reason: HOSPADM

## 2024-07-30 RX ORDER — TAMSULOSIN HYDROCHLORIDE 0.4 MG/1
0.4 CAPSULE ORAL DAILY
Status: DISCONTINUED | OUTPATIENT
Start: 2024-07-31 | End: 2024-08-20 | Stop reason: HOSPADM

## 2024-07-30 RX ORDER — QUETIAPINE FUMARATE 25 MG/1
25 TABLET, FILM COATED ORAL NIGHTLY
Status: DISCONTINUED | OUTPATIENT
Start: 2024-07-31 | End: 2024-08-09

## 2024-07-30 RX ORDER — MORPHINE SULFATE 4 MG/ML
4 INJECTION, SOLUTION INTRAMUSCULAR; INTRAVENOUS
Status: DISCONTINUED | OUTPATIENT
Start: 2024-07-30 | End: 2024-08-02

## 2024-07-30 RX ORDER — MORPHINE SULFATE 4 MG/ML
4 INJECTION, SOLUTION INTRAMUSCULAR; INTRAVENOUS ONCE
Status: COMPLETED | OUTPATIENT
Start: 2024-07-30 | End: 2024-07-30

## 2024-07-30 RX ORDER — MORPHINE SULFATE 4 MG/ML
2 INJECTION, SOLUTION INTRAMUSCULAR; INTRAVENOUS
Status: DISCONTINUED | OUTPATIENT
Start: 2024-07-30 | End: 2024-08-02

## 2024-07-30 RX ORDER — ENOXAPARIN SODIUM 100 MG/ML
30 INJECTION SUBCUTANEOUS DAILY
Status: DISCONTINUED | OUTPATIENT
Start: 2024-07-31 | End: 2024-07-31

## 2024-07-30 RX ADMIN — MORPHINE SULFATE 4 MG: 4 INJECTION INTRAVENOUS at 18:18

## 2024-07-30 RX ADMIN — CEFTRIAXONE SODIUM 1000 MG: 1 INJECTION, POWDER, FOR SOLUTION INTRAMUSCULAR; INTRAVENOUS at 17:24

## 2024-07-30 ASSESSMENT — PAIN SCALES - GENERAL: PAINLEVEL_OUTOF10: 6

## 2024-07-30 ASSESSMENT — PAIN - FUNCTIONAL ASSESSMENT: PAIN_FUNCTIONAL_ASSESSMENT: NONE - DENIES PAIN

## 2024-07-30 NOTE — ED NOTES
Pt to ED with family due to altered mental status. Family states pt was at an assisted living facility for about 3 months, but left 7/12. Family states at that time pt had a UTI and was septic. Pt was treated at that time at [facility] and was sent to [facility] after discharge. Many kidney stones were noted while at [facility]. Family states pt has been declining since being at [facility]. Family states pts urine was clear when he left [facility], but now urine is cloudy and milky again. Pt is normally a/o x4 and confused per family. Pt is alert and oriented answering all orientation questions, but appears confused at times. Pts legs are restless and randomly yells out like he is in pain, although pt denies any pain. Pt has kidney failure stage 4 and kidney cancer per family and needs dialysis. Pt has not been started on dialysis yet. Pt has hx of bladder cancer, dementia, and brain bleeds. Pt placed on continuous cardiac monitor, bp and pulse ox. EKG completed, IV established, blood work drawn.

## 2024-07-30 NOTE — ED PROVIDER NOTES
[facility] ED     Emergency Department     Faculty Attestation        I performed a history and physical examination of the patient and discussed management with the resident. I reviewed the resident’s note and agree with the documented findings and plan of care. Any areas of disagreement are noted on the chart. I was personally present for the key portions of any procedures. I have documented in the chart those procedures where I was not present during the key portions. I have reviewed the emergency nurses triage note. I agree with the chief complaint, past medical history, past surgical history, allergies, medications, social and family history as documented unless otherwise noted below.    For mid-level providers such as nurse practitioners as well as physicians assistants:    I have personally seen and evaluated the patient.    I find the patient's history and physical exam are consistent with NP/PA documentation.  I agree with the care provided, treatment rendered, disposition, & follow-up plan.     Additional findings are as noted.    Vital Signs: /62   Pulse 51   Temp 97.6 °F (36.4 °C) (Oral)   Resp 19   Wt 92.5 kg (204 lb)   SpO2 99%   BMI 26.19 kg/m²   PCP:  Marcelo Olivia, APRN - CNP    Pertinent Comments:           Critical Care  None          Abram Rivera MD    Attending Emergency Medicine Physician            Eugenio Rivera MD  07/30/24 2753

## 2024-07-30 NOTE — ED PROVIDER NOTES
[facility] ED  Emergency Department Encounter  Emergency Medicine Resident     Pt Name:Arias [facility]  MRN: [id number]  Birthdate 1940  Date of evaluation: 7/30/24  PCP:  Marcelo Olivia APRN - CNP  Note Started: 4:30 PM EDT      CHIEF COMPLAINT       Chief Complaint   Patient presents with    Altered Mental Status       HISTORY OF PRESENT ILLNESS  (Location/Symptom, Timing/Onset, Context/Setting, Quality, Duration, Modifying Factors, Severity.)      Arias [facility] is a 84 y.o. male via EMS from a nursing facility who with a history of UTIs suprapubic catheter bladder cancer presents with increased confusion. According to his daughter-in-law, who works at the facility where he resides, the patient has been confused for about a week and a half. He was previously diagnosed with a severe UTI that led to sepsis and required ICU admission at [facility]. He was treated with IV antibiotics until the 18th and then discharged to [facility]. His urine was clear upon discharge but has since become milky and cloudy again. The patient has a history of bladder cancer (22 years ago) and kidney cancer (12 years ago), with recent creatinine levels rising to 6.0 two weeks ago during his initial admission at [facility]. His baseline mentation is usually alert and oriented, capable of normal conversations, and he has a sense of humor. However, his confusion has worsened since yesterday, with a notable decline since his stay at [facility]. He has a history of mild dementia and two brain bleeds but no recent falls or head injuries. His heart rate is currently 50 bpm, and his blood pressure is 100/62 mmHg. He has not complained of chest pain or shortness of breath and has no history of hypertension, hyperlipidemia, or type 2 diabetes. He is currently on Seroquel and Trazodone, which may contribute to his low blood pressure. He also has a history of gout and arthritis.        PAST MEDICAL /  Alcohol/week: 0.0 standard drinks of alcohol    Drug use: No    Sexual activity: Not on file   Other Topics Concern    Not on file   Social History Narrative    Diet - terrible.  [employer] every morning, goes to [employer]    Caffeine intake per day - 2 cups per day q am    Exercise pattern - none, but care for self and house    Living situation - ranch and a cat.     Social Determinants of Health     Financial Resource Strain: Low Risk  (1/30/2024)    Overall Financial Resource Strain (CARDIA)     Difficulty of Paying Living Expenses: Not hard at all   Food Insecurity: No Food Insecurity (1/30/2024)    Hunger Vital Sign     Worried About Running Out of Food in the Last Year: Never true     Ran Out of Food in the Last Year: Never true   Transportation Needs: Unknown (1/30/2024)    PRAPARE - Transportation     Lack of Transportation (Medical): Not on file     Lack of Transportation (Non-Medical): No   Physical Activity: Inactive (10/11/2022)    Exercise Vital Sign     Days of Exercise per Week: 0 days     Minutes of Exercise per Session: 0 min   Stress: Not on file   Social Connections: Not on file   Intimate Partner Violence: Not on file   Housing Stability: Unknown (1/30/2024)    Housing Stability Vital Sign     Unable to Pay for Housing in the Last Year: Not on file     Number of Places Lived in the Last Year: Not on file     Unstable Housing in the Last Year: No       Family History   Problem Relation Age of Onset    Diabetes Mother     Diabetes Sister        Allergies:  Nsaids and Tetracyclines & related    Home Medications:  Prior to Admission medications    Medication Sig Start Date End Date Taking? Authorizing Provider   QUEtiapine (SEROQUEL) 25 MG tablet Take 1 tablet by mouth at bedtime 1/30/24   Marcelo Olivia, APRN - CNP   traZODone (DESYREL) 50 MG tablet Take 1 tablet by mouth nightly 1/30/24   Marcelo Olivia, APRN - CNP   mupirocin (BACTROBAN) 2 % ointment  9/12/23   Provider, MD Andre    acetaminophen-codeine (TYLENOL #3) 300-30 MG per tablet     Andre Dumont MD   Doxylamine Succinate, Sleep, (SLEEP-AID PO) Take by mouth  Patient not taking: Reported on 1/30/2024    Andre Dumont MD   Blood Pressure KIT 1 kit by Does not apply route daily 6/12/21   Zach Mantilla DO   acetaminophen (TYLENOL) 500 MG tablet Take 625 mg by mouth every 4 hours as needed for Pain    Andre Dumont MD         REVIEW OF SYSTEMS       Review of Systems   Respiratory:  Negative for shortness of breath.    Cardiovascular:  Negative for chest pain.       PHYSICAL EXAM      INITIAL VITALS:   /72   Pulse (!) 47   Temp 97.6 °F (36.4 °C) (Oral)   Resp 14   Wt 92.5 kg (204 lb)   SpO2 100%   BMI 26.19 kg/m²     Physical Exam  Vitals and nursing note reviewed.   Constitutional:       Appearance: Normal appearance. He is not ill-appearing or diaphoretic.   HENT:      Head: Normocephalic and atraumatic.   Eyes:      Extraocular Movements: Extraocular movements intact.      Conjunctiva/sclera: Conjunctivae normal.      Pupils: Pupils are equal, round, and reactive to light.   Cardiovascular:      Rate and Rhythm: Normal rate and regular rhythm.      Pulses: Normal pulses.      Heart sounds: Normal heart sounds. No murmur heard.     No friction rub. No gallop.   Pulmonary:      Effort: Pulmonary effort is normal.      Breath sounds: Normal breath sounds. No stridor. No rhonchi or rales.   Abdominal:      General: There is no distension.      Palpations: Abdomen is soft.      Tenderness: There is no abdominal tenderness.      Comments: Urostomy bag in right lower quadrant with a hernia patient's family states this is baseline.  Does have milk colored urine in the bag.    Musculoskeletal:         General: No deformity. Normal range of motion.      Cervical back: Normal range of motion and neck supple. No tenderness.   Skin:     General: Skin is warm and dry.   Neurological:      General: No

## 2024-07-31 ENCOUNTER — APPOINTMENT (OUTPATIENT)
Dept: INTERVENTIONAL RADIOLOGY/VASCULAR | Age: 84
DRG: 871 | End: 2024-07-31
Payer: MEDICARE

## 2024-07-31 LAB
ANION GAP SERPL CALCULATED.3IONS-SCNC: 13 MMOL/L (ref 9–16)
BUN SERPL-MCNC: 39 MG/DL (ref 8–23)
CALCIUM SERPL-MCNC: 8.4 MG/DL (ref 8.6–10.4)
CHLORIDE SERPL-SCNC: 111 MMOL/L (ref 98–107)
CO2 SERPL-SCNC: 14 MMOL/L (ref 20–31)
CREAT SERPL-MCNC: 3.7 MG/DL (ref 0.7–1.2)
EKG ATRIAL RATE: 57 BPM
EKG ATRIAL RATE: 59 BPM
EKG P AXIS: 1 DEGREES
EKG P AXIS: 85 DEGREES
EKG P-R INTERVAL: 248 MS
EKG P-R INTERVAL: 272 MS
EKG Q-T INTERVAL: 464 MS
EKG Q-T INTERVAL: 474 MS
EKG QRS DURATION: 114 MS
EKG QRS DURATION: 148 MS
EKG QTC CALCULATION (BAZETT): 451 MS
EKG QTC CALCULATION (BAZETT): 469 MS
EKG R AXIS: -2 DEGREES
EKG R AXIS: 67 DEGREES
EKG T AXIS: 37 DEGREES
EKG T AXIS: 55 DEGREES
EKG VENTRICULAR RATE: 57 BPM
EKG VENTRICULAR RATE: 59 BPM
GFR, ESTIMATED: 16 ML/MIN/1.73M2
GLUCOSE SERPL-MCNC: 78 MG/DL (ref 74–99)
INR PPP: 1.2
MYOGLOBIN SERPL-MCNC: 198 NG/ML (ref 28–72)
MYOGLOBIN SERPL-MCNC: 86 NG/ML (ref 28–72)
MYOGLOBIN SERPL-MCNC: 87 NG/ML (ref 28–72)
PARTIAL THROMBOPLASTIN TIME: 33.4 SEC (ref 23–36.5)
POTASSIUM SERPL-SCNC: 5 MMOL/L (ref 3.7–5.3)
PROTHROMBIN TIME: 14.7 SEC (ref 11.7–14.9)
SODIUM SERPL-SCNC: 138 MMOL/L (ref 136–145)
TROPONIN I SERPL HS-MCNC: 38 NG/L (ref 0–22)
TROPONIN I SERPL HS-MCNC: 39 NG/L (ref 0–22)
TROPONIN I SERPL HS-MCNC: 40 NG/L (ref 0–22)

## 2024-07-31 PROCEDURE — 6360000002 HC RX W HCPCS: Performed by: STUDENT IN AN ORGANIZED HEALTH CARE EDUCATION/TRAINING PROGRAM

## 2024-07-31 PROCEDURE — 6360000002 HC RX W HCPCS: Performed by: NURSE PRACTITIONER

## 2024-07-31 PROCEDURE — 99152 MOD SED SAME PHYS/QHP 5/>YRS: CPT

## 2024-07-31 PROCEDURE — 87086 URINE CULTURE/COLONY COUNT: CPT

## 2024-07-31 PROCEDURE — 6360000004 HC RX CONTRAST MEDICATION: Performed by: STUDENT IN AN ORGANIZED HEALTH CARE EDUCATION/TRAINING PROGRAM

## 2024-07-31 PROCEDURE — 2580000003 HC RX 258: Performed by: STUDENT IN AN ORGANIZED HEALTH CARE EDUCATION/TRAINING PROGRAM

## 2024-07-31 PROCEDURE — 85610 PROTHROMBIN TIME: CPT

## 2024-07-31 PROCEDURE — 99153 MOD SED SAME PHYS/QHP EA: CPT

## 2024-07-31 PROCEDURE — 80048 BASIC METABOLIC PNL TOTAL CA: CPT

## 2024-07-31 PROCEDURE — 84484 ASSAY OF TROPONIN QUANT: CPT

## 2024-07-31 PROCEDURE — 6360000002 HC RX W HCPCS: Performed by: RADIOLOGY

## 2024-07-31 PROCEDURE — 2T015 HOSPITALIST 2ND TOUCH: CPT | Performed by: STUDENT IN AN ORGANIZED HEALTH CARE EDUCATION/TRAINING PROGRAM

## 2024-07-31 PROCEDURE — 93005 ELECTROCARDIOGRAM TRACING: CPT | Performed by: STUDENT IN AN ORGANIZED HEALTH CARE EDUCATION/TRAINING PROGRAM

## 2024-07-31 PROCEDURE — C1769 GUIDE WIRE: HCPCS

## 2024-07-31 PROCEDURE — 6370000000 HC RX 637 (ALT 250 FOR IP): Performed by: NURSE PRACTITIONER

## 2024-07-31 PROCEDURE — 2500000003 HC RX 250 WO HCPCS: Performed by: INTERNAL MEDICINE

## 2024-07-31 PROCEDURE — 2709999900 IR GUIDED NEPHROSTOMY CATH PLACEMENT LEFT

## 2024-07-31 PROCEDURE — 6370000000 HC RX 637 (ALT 250 FOR IP): Performed by: STUDENT IN AN ORGANIZED HEALTH CARE EDUCATION/TRAINING PROGRAM

## 2024-07-31 PROCEDURE — 83874 ASSAY OF MYOGLOBIN: CPT

## 2024-07-31 PROCEDURE — 36415 COLL VENOUS BLD VENIPUNCTURE: CPT

## 2024-07-31 PROCEDURE — 0T9430Z DRAINAGE OF LEFT KIDNEY PELVIS WITH DRAINAGE DEVICE, PERCUTANEOUS APPROACH: ICD-10-PCS | Performed by: RADIOLOGY

## 2024-07-31 PROCEDURE — 2580000003 HC RX 258: Performed by: INTERNAL MEDICINE

## 2024-07-31 PROCEDURE — 2580000003 HC RX 258: Performed by: NURSE PRACTITIONER

## 2024-07-31 PROCEDURE — 50432 PLMT NEPHROSTOMY CATHETER: CPT

## 2024-07-31 PROCEDURE — 85730 THROMBOPLASTIN TIME PARTIAL: CPT

## 2024-07-31 PROCEDURE — 99223 1ST HOSP IP/OBS HIGH 75: CPT | Performed by: SPECIALIST

## 2024-07-31 PROCEDURE — 99223 1ST HOSP IP/OBS HIGH 75: CPT | Performed by: STUDENT IN AN ORGANIZED HEALTH CARE EDUCATION/TRAINING PROGRAM

## 2024-07-31 PROCEDURE — 2060000000 HC ICU INTERMEDIATE R&B

## 2024-07-31 RX ORDER — DICLOFENAC SODIUM 1 MG/ML
1 SOLUTION/ DROPS OPHTHALMIC 4 TIMES DAILY
COMMUNITY

## 2024-07-31 RX ORDER — CLINDAMYCIN IN PERCENT DEXTROSE 6 MG/ML
INJECTION, SOLUTION INTRAVENOUS CONTINUOUS PRN
Status: COMPLETED | OUTPATIENT
Start: 2024-07-31 | End: 2024-07-31

## 2024-07-31 RX ORDER — OXYCODONE HYDROCHLORIDE 5 MG/1
5 TABLET ORAL EVERY 4 HOURS PRN
Status: DISCONTINUED | OUTPATIENT
Start: 2024-07-31 | End: 2024-08-02

## 2024-07-31 RX ORDER — MIDAZOLAM HYDROCHLORIDE 2 MG/2ML
INJECTION, SOLUTION INTRAMUSCULAR; INTRAVENOUS PRN
Status: COMPLETED | OUTPATIENT
Start: 2024-07-31 | End: 2024-07-31

## 2024-07-31 RX ORDER — MIDODRINE HYDROCHLORIDE 5 MG/1
10 TABLET ORAL 2 TIMES DAILY
Status: ON HOLD | COMMUNITY
End: 2024-08-13 | Stop reason: HOSPADM

## 2024-07-31 RX ORDER — POLYETHYLENE GLYCOL 3350 17 G/17G
17 POWDER, FOR SOLUTION ORAL NIGHTLY
COMMUNITY

## 2024-07-31 RX ORDER — HEPARIN SODIUM 5000 [USP'U]/ML
5000 INJECTION, SOLUTION INTRAVENOUS; SUBCUTANEOUS EVERY 8 HOURS SCHEDULED
Status: DISCONTINUED | OUTPATIENT
Start: 2024-07-31 | End: 2024-08-20 | Stop reason: HOSPADM

## 2024-07-31 RX ORDER — LANOLIN ALCOHOL/MO/W.PET/CERES
3 CREAM (GRAM) TOPICAL NIGHTLY PRN
COMMUNITY

## 2024-07-31 RX ADMIN — TAMSULOSIN HYDROCHLORIDE 0.4 MG: 0.4 CAPSULE ORAL at 08:24

## 2024-07-31 RX ADMIN — SODIUM CHLORIDE: 9 INJECTION, SOLUTION INTRAVENOUS at 13:59

## 2024-07-31 RX ADMIN — CLINDAMYCIN IN 5 PERCENT DEXTROSE 600 MG: 6 INJECTION, SOLUTION INTRAVENOUS at 15:05

## 2024-07-31 RX ADMIN — Medication 1000 MG: at 16:10

## 2024-07-31 RX ADMIN — SODIUM BICARBONATE: 84 INJECTION, SOLUTION INTRAVENOUS at 19:32

## 2024-07-31 RX ADMIN — MIDAZOLAM HYDROCHLORIDE 0.5 MG: 1 INJECTION, SOLUTION INTRAMUSCULAR; INTRAVENOUS at 14:59

## 2024-07-31 RX ADMIN — IOHEXOL 30 ML: 240 INJECTION, SOLUTION INTRATHECAL; INTRAVASCULAR; INTRAVENOUS; ORAL at 15:45

## 2024-07-31 RX ADMIN — OXYCODONE HYDROCHLORIDE 5 MG: 5 TABLET ORAL at 18:20

## 2024-07-31 RX ADMIN — SODIUM CHLORIDE: 9 INJECTION, SOLUTION INTRAVENOUS at 07:46

## 2024-07-31 RX ADMIN — TRAZODONE HYDROCHLORIDE 50 MG: 50 TABLET ORAL at 01:22

## 2024-07-31 RX ADMIN — QUETIAPINE FUMARATE 25 MG: 25 TABLET ORAL at 21:55

## 2024-07-31 RX ADMIN — HEPARIN SODIUM 5000 UNITS: 5000 INJECTION INTRAVENOUS; SUBCUTANEOUS at 01:22

## 2024-07-31 RX ADMIN — CEFEPIME 1000 MG: 1 INJECTION, POWDER, FOR SOLUTION INTRAMUSCULAR; INTRAVENOUS at 17:40

## 2024-07-31 RX ADMIN — HEPARIN SODIUM 5000 UNITS: 5000 INJECTION INTRAVENOUS; SUBCUTANEOUS at 06:29

## 2024-07-31 RX ADMIN — MORPHINE SULFATE 2 MG: 4 INJECTION, SOLUTION INTRAMUSCULAR; INTRAVENOUS at 20:08

## 2024-07-31 RX ADMIN — MORPHINE SULFATE 2 MG: 4 INJECTION, SOLUTION INTRAMUSCULAR; INTRAVENOUS at 01:21

## 2024-07-31 RX ADMIN — HEPARIN SODIUM 5000 UNITS: 5000 INJECTION INTRAVENOUS; SUBCUTANEOUS at 21:55

## 2024-07-31 RX ADMIN — MORPHINE SULFATE 4 MG: 4 INJECTION INTRAVENOUS at 08:29

## 2024-07-31 RX ADMIN — OXYCODONE HYDROCHLORIDE 5 MG: 5 TABLET ORAL at 12:41

## 2024-07-31 RX ADMIN — SODIUM CHLORIDE, PRESERVATIVE FREE 10 ML: 5 INJECTION INTRAVENOUS at 08:23

## 2024-07-31 RX ADMIN — MORPHINE SULFATE 4 MG: 4 INJECTION INTRAVENOUS at 11:24

## 2024-07-31 RX ADMIN — QUETIAPINE FUMARATE 25 MG: 25 TABLET ORAL at 01:22

## 2024-07-31 RX ADMIN — SODIUM CHLORIDE: 9 INJECTION, SOLUTION INTRAVENOUS at 01:21

## 2024-07-31 ASSESSMENT — PAIN DESCRIPTION - LOCATION
LOCATION: BACK;LEG
LOCATION: BACK
LOCATION: GROIN;OTHER (COMMENT)
LOCATION: PERINEUM;GROIN
LOCATION: BACK
LOCATION: GROIN

## 2024-07-31 ASSESSMENT — PAIN SCALES - WONG BAKER: WONGBAKER_NUMERICALRESPONSE: NO HURT

## 2024-07-31 ASSESSMENT — PAIN SCALES - GENERAL
PAINLEVEL_OUTOF10: 8
PAINLEVEL_OUTOF10: 10
PAINLEVEL_OUTOF10: 10
PAINLEVEL_OUTOF10: 8
PAINLEVEL_OUTOF10: 5
PAINLEVEL_OUTOF10: 9
PAINLEVEL_OUTOF10: 5
PAINLEVEL_OUTOF10: 10
PAINLEVEL_OUTOF10: 0
PAINLEVEL_OUTOF10: 5

## 2024-07-31 ASSESSMENT — PAIN - FUNCTIONAL ASSESSMENT
PAIN_FUNCTIONAL_ASSESSMENT: PREVENTS OR INTERFERES SOME ACTIVE ACTIVITIES AND ADLS

## 2024-07-31 ASSESSMENT — PAIN DESCRIPTION - DESCRIPTORS
DESCRIPTORS: SHARP;SHOOTING
DESCRIPTORS: SHARP;THROBBING
DESCRIPTORS: SHARP
DESCRIPTORS: SHARP;SHOOTING;THROBBING
DESCRIPTORS: ACHING;SHARP;SHOOTING

## 2024-07-31 ASSESSMENT — PAIN DESCRIPTION - ORIENTATION
ORIENTATION: RIGHT;LEFT;LOWER;POSTERIOR
ORIENTATION: LOWER
ORIENTATION: LOWER
ORIENTATION: ANTERIOR

## 2024-07-31 NOTE — PLAN OF CARE
Problem: Chronic Conditions and Co-morbidities  Goal: Patient's chronic conditions and co-morbidity symptoms are monitored and maintained or improved  Outcome: Progressing  Flowsheets (Taken 7/31/2024 0840)  Care Plan - Patient's Chronic Conditions and Co-Morbidity Symptoms are Monitored and Maintained or Improved: Monitor and assess patient's chronic conditions and comorbid symptoms for stability, deterioration, or improvement     Problem: Discharge Planning  Goal: Discharge to home or other facility with appropriate resources  Outcome: Progressing  Flowsheets (Taken 7/31/2024 0840)  Discharge to home or other facility with appropriate resources: Identify barriers to discharge with patient and caregiver     Problem: Safety - Adult  Goal: Free from fall injury  Outcome: Progressing

## 2024-07-31 NOTE — PROGRESS NOTES
Writer spoke with shadi Sullivan and informed of pt being moved to room 2002 for better observation on step-down unit. No further needs. Electronically signed by Mrailia Lane RN on 7/31/2024 at 7:20 PM

## 2024-07-31 NOTE — H&P
[facility]  Office: [phone]  Mendez Cesar DO, Patrice Zurita DO, Marco A Rosales DO, Everette Bajwa DO, Bettina Jasso MD, Kelsie De La Cruz MD, Otilio Lake MD, Fang Cuevas MD,  Akash Warren MD, Pawan Barcenas MD, Nikita Evans DO, Jose Isabel MD,  Zach Manitlla DO, Nathalia Clark MD, Neville Timmons MD, Neri Cesar DO, Ebony Cesar MD, Candido Flores MD, Sonny Edwards DO, Lucita Perez MD, Elvia Arredondo MD, Rsoalinda Ritter MD, Korey Gamez MD,  Rahat Cooley DO, Elise Hayden MD,  Shirley Waterhouse, CNP,  Dasha Haskins, CNP, Mary Peacock, CNP, Altaf Galvan, CNP,  Nanette Waite, [facility], Cande Aguiar, CNP, Malu Alvarez, CNP, Anel Carlson, CNP, Billie Burnett, CNP, Sadia Casarez, CNP, Nelson Worrell PA-C, Anuradha Flood, CNS, Ingris Hudson, CNP, Honey Perrin, CNP         [facility]   IN-PATIENT SERVICE   [facility]    HISTORY AND PHYSICAL EXAMINATION            Date:   7/31/2024  Patient name:  Arias [facility]  Date of admission:  7/30/2024  4:30 PM  MRN:   [id number]  Account:  [id number]  YOB: 1940  PCP:    Marcelo Olivia APRN - CNP  Room:   OBS 04/04-1  Code Status:    Full Code    Chief Complaint:     Chief Complaint   Patient presents with    Altered Mental Status       History Obtained From:     patient    History of Present Illness:     Arias [facility] is a 84 y.o. Non- / non  male who presents with Altered Mental Status   and is admitted to the hospital for the management of UTI (urinary tract infection).    84-year-old male with past medical history of suprapubic catheter with frequent UTIs, recent admission at [facility] for altered mentation due to UTI treated with cefepime/ceftriaxone, right subdural hematoma with midline shift s/p right middle meningeal artery ablation, history of bladder cancer status post cystectomy with ileal conduit, CKD stage IV, hypertension, history of dementia  16        Medications Prior to Admission:     Prior to Admission medications    Medication Sig Start Date End Date Taking? Authorizing Provider   QUEtiapine (SEROQUEL) 25 MG tablet Take 1 tablet by mouth at bedtime 24   Marcelo Olivia APRN - CNP   traZODone (DESYREL) 50 MG tablet Take 1 tablet by mouth nightly 24   Marcelo Olivia APRN - CNP   mupirocin (BACTROBAN) 2 % ointment  23   Andre Dumont MD   acetaminophen-codeine (TYLENOL #3) 300-30 MG per tablet     Andre Dumont MD   Doxylamine Succinate, Sleep, (SLEEP-AID PO) Take by mouth  Patient not taking: Reported on 2024    Andre Dumont MD   Blood Pressure KIT 1 kit by Does not apply route daily 21   Zach Mantilla,    acetaminophen (TYLENOL) 500 MG tablet Take 625 mg by mouth every 4 hours as needed for Pain    Andre Dumont MD        Allergies:     Nsaids and Tetracyclines & related    Social History:     Tobacco:    reports that he quit smoking about 45 years ago. His smoking use included cigarettes. He started smoking about 65 years ago. He has a 10.0 pack-year smoking history. He has never used smokeless tobacco.  Alcohol:      reports no history of alcohol use.  Drug Use:  reports no history of drug use.    Family History:     Family History   Problem Relation Age of Onset    Diabetes Mother     Diabetes Sister        Review of Systems:     Positive and Negative as described in HPI.      Physical Exam:   BP 98/73   Pulse 51   Temp 97.5 °F (36.4 °C) (Oral)   Resp 19   Wt 92.5 kg (204 lb)   SpO2 98%   BMI 26.19 kg/m²   Temp (24hrs), Av.6 °F (36.4 °C), Min:97.5 °F (36.4 °C), Max:97.6 °F (36.4 °C)    No results for input(s): \"POCGLU\" in the last 72 hours.    Intake/Output Summary (Last 24 hours) at 2024 0536  Last data filed at 2024 0131  Gross per 24 hour   Intake --   Output 450 ml   Net -450 ml       Unable to do exam patient noncooperative and wanted to  (triple phosphate) renal calculi bilateral kidneys, greater left than right. 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 8. Splenomegaly. 9. Prominent portion of ascending colon and hepatic flexure as well as a smaller portion of the small bowel are involved in the right abdominopelvic ventral wall peristomal hernia. No obstruction or inflammatory change. 10.  Diverticulosis coli without CT evidence of acute diverticulitis. 11.  Cholelithiasis without findings of acute cholecystitis.       Assessment :      Hospital Problems             Last Modified POA    * (Principal) UTI (urinary tract infection) 7/30/2024 Yes    Toxic metabolic encephalopathy 7/30/2024 Yes    Bilateral hydronephrosis 7/30/2024 Yes    Normochromic normocytic anemia 7/30/2024 Yes    Iron deficiency anemia 7/30/2024 Yes    Folate deficiency 7/30/2024 Yes    Bladder cancer (Chronic) 7/30/2024 Yes    Essential hypertension (Chronic) 7/30/2024 Yes    Bilateral kidney stones 7/30/2024 Yes    GERD (gastroesophageal reflux disease) 7/30/2024 Yes    Acute kidney injury (HCC) 7/30/2024 Yes    Moderate malnutrition (HCC) 7/30/2024 Yes    S/P ileal conduit (HCC) 7/30/2024 Yes    CKD (chronic kidney disease), stage III (HCC) 7/30/2024 Yes    Hypotension 7/30/2024 Yes       Plan:     Patient status inpatient in the Progressive Unit/Step down    Altered mentation could be secondary to UTI:  Acute metabolic encephalopathy:    Urology consulted, recommendation for nephrostomy tube placement  N.p.o. after midnight  Follow-up with urine culture and blood cultures  Pain management with morphine  IV ceftriaxone  Consulting ID  Gentle IV hydration    Bilateral hydronephrosis with renal calculi and pyelonephritis: ID consultation placing on IV ceftriaxone follow-up with urine cultures.  IR consultation for nephrostomy placement.  Urology consulted  Bladder cancer s/p cystectomy and ileal conduit formation:  Hypertension:

## 2024-07-31 NOTE — ED PROVIDER NOTES
[facility]  FACULTY HANDOFF       Handoff taken on the following patient from prior Attending Physician: Miguel  Pt Name: Arias CESAR [facility]  PCP:  Marcelo Olivia, MARKIE - CNP  8:24 PM EDT    Attestation  I was available and discussed any additional care issues that arose and coordinated the management plans with the resident(s) caring for the patient during my duty period. Any areas of disagreement with resident's documentation of care or procedures are noted on the chart. I was personally present for the key portions of any/all procedures during my duty period. I have documented in the chart those procedures where I was not present during the key portions.         CHIEF COMPLAINT       Chief Complaint   Patient presents with    Altered Mental Status         CURRENT MEDICATIONS     Previous Medications  Previous Medications    ACETAMINOPHEN (TYLENOL) 500 MG TABLET    Take 625 mg by mouth every 4 hours as needed for Pain    ACETAMINOPHEN-CODEINE (TYLENOL #3) 300-30 MG PER TABLET        BLOOD PRESSURE KIT    1 kit by Does not apply route daily    DOXYLAMINE SUCCINATE, SLEEP, (SLEEP-AID PO)    Take by mouth    MUPIROCIN (BACTROBAN) 2 % OINTMENT        QUETIAPINE (SEROQUEL) 25 MG TABLET    Take 1 tablet by mouth at bedtime    TRAZODONE (DESYREL) 50 MG TABLET    Take 1 tablet by mouth nightly       Encounter Medications  Orders Placed This Encounter   Medications    cefTRIAXone (ROCEPHIN) 1,000 mg in sodium chloride 0.9 % 50 mL IVPB (mini-bag)     Order Specific Question:   Antimicrobial Indications     Answer:   Urinary Tract Infection    morphine injection 4 mg       ALLERGIES     is allergic to nsaids and tetracyclines & related.      RECENT VITALS:   Temp: 97.6 °F (36.4 °C),  Pulse: 57, Respirations: 21, BP: (!) 87/55    RADIOLOGY:   CT CHEST ABDOMEN PELVIS WO CONTRAST Additional Contrast? None    (Results Pending)       LABS:  Labs Reviewed   CBC WITH AUTO DIFFERENTIAL - Abnormal; Notable for  the following components:       Result Value    RBC 3.51 (*)     Hemoglobin 10.3 (*)     Hematocrit 33.7 (*)     RDW 17.2 (*)     Neutrophils % 72 (*)     Lymphocytes % 19 (*)     Immature Granulocytes % 1 (*)     All other components within normal limits   COMPREHENSIVE METABOLIC PANEL - Abnormal; Notable for the following components:    Sodium 135 (*)     CO2 18 (*)     BUN 43 (*)     Creatinine 3.9 (*)     Est, Glom Filt Rate 14 (*)     Albumin 3.4 (*)     All other components within normal limits   TROPONIN - Abnormal; Notable for the following components:    Troponin, High Sensitivity 42 (*)     All other components within normal limits   URINALYSIS WITH MICROSCOPIC - Abnormal; Notable for the following components:    Turbidity UA Turbid (*)     Urine Hgb LARGE (*)     Protein, UA 2+ (*)     Nitrite, Urine POSITIVE (*)     Leukocyte Esterase, Urine LARGE (*)     Bacteria, UA MANY (*)     All other components within normal limits   TROPONIN - Abnormal; Notable for the following components:    Troponin, High Sensitivity 38 (*)     All other components within normal limits   CULTURE, BLOOD 1   CULTURE, BLOOD 1   CULTURE, URINE   LIPASE   LACTIC ACID   APTT   PROTIME-INR           PLAN/ TASKS OUTSTANDING       Pt presents with altered mental status with urinary tract infection. Plan for antibiotics and admission.     (Please note that portions of this note were completed with a voice recognition program.  Efforts were made to edit the dictations but occasionally words are mis-transcribed.)    Malik Stubbs MD, MD,   Attending Emergency Physician       Malik Stubbs MD  07/30/24 2025

## 2024-07-31 NOTE — ED NOTES
ED to inpatient nurses report      Chief Complaint:  Chief Complaint   Patient presents with    Altered Mental Status     Present to ED from: home    MOA:     LOC: alert to only name  Mobility: Requires assistance * 1  Oxygen Baseline: RA    Current needs required: RA   Pending ED orders: none  Present condition: stable    Why did the patient come to the ED? AMS  What is the plan? OBs  Any procedures or intervention occur? EKG, labs, meds, consults VS, x-ray, CT  Any safety concerns??    Mental Status:  Level of Consciousness: Alert (0)    Psych Assessment:   Psychosocial  Psychosocial (WDL): Within Defined Limits  Vital signs   Vitals:    07/30/24 2348 07/30/24 2349 07/30/24 2350 07/30/24 2351   BP:       Pulse: (!) 45 (!) 47 (!) 49 (!) 47   Resp:  13 15 19   Temp:       TempSrc:       SpO2: 97% 100% 100% 96%   Weight:            Vitals:  Patient Vitals for the past 24 hrs:   BP Temp Temp src Pulse Resp SpO2 Weight   07/30/24 2351 -- -- -- (!) 47 19 96 % --   07/30/24 2350 -- -- -- (!) 49 15 100 % --   07/30/24 2349 -- -- -- (!) 47 13 100 % --   07/30/24 2348 -- -- -- (!) 45 -- 97 % --   07/30/24 2120 -- -- -- (!) 47 14 100 % --   07/30/24 2119 -- -- -- (!) 47 13 100 % --   07/30/24 2117 -- -- -- (!) 47 13 99 % --   07/30/24 2116 -- -- -- (!) 48 14 100 % --   07/30/24 2115 102/72 -- -- (!) 48 15 100 % --   07/30/24 2026 -- -- -- (!) 45 13 98 % --   07/30/24 2015 (!) 89/68 -- -- -- -- -- --   07/30/24 1952 (!) 87/55 -- -- 57 21 -- --   07/30/24 1949 -- -- -- 50 13 -- --   07/30/24 1945 (!) 86/60 -- -- (!) 49 14 -- --   07/30/24 1900 90/60 -- -- (!) 46 12 -- --   07/30/24 1859 -- -- -- 50 16 -- --   07/30/24 1852 90/64 -- -- (!) 49 14 -- --   07/30/24 1830 119/76 -- -- (!) 49 12 -- --   07/30/24 1645 100/62 -- -- 51 19 99 % --   07/30/24 1640 97/68 97.6 °F (36.4 °C) Oral 54 16 100 % 92.5 kg (204 lb)      Visit Vitals  /72   Pulse (!) 47   Temp 97.6 °F (36.4 °C) (Oral)   Resp 19   Wt 92.5 kg (204 lb)   SpO2 96%  (10/11/2022)    Exercise Vital Sign     Days of Exercise per Week: 0 days     Minutes of Exercise per Session: 0 min   Housing Stability: Unknown (1/30/2024)    Housing Stability Vital Sign     Unstable Housing in the Last Year: No       FAMILY HISTORY       Family History   Problem Relation Age of Onset    Diabetes Mother     Diabetes Sister        ALLERGIES     Nsaids and Tetracyclines & related    CURRENT MEDICATIONS       Previous Medications    ACETAMINOPHEN (TYLENOL) 500 MG TABLET    Take 625 mg by mouth every 4 hours as needed for Pain    ACETAMINOPHEN-CODEINE (TYLENOL #3) 300-30 MG PER TABLET        BLOOD PRESSURE KIT    1 kit by Does not apply route daily    DOXYLAMINE SUCCINATE, SLEEP, (SLEEP-AID PO)    Take by mouth    MUPIROCIN (BACTROBAN) 2 % OINTMENT        QUETIAPINE (SEROQUEL) 25 MG TABLET    Take 1 tablet by mouth at bedtime    TRAZODONE (DESYREL) 50 MG TABLET    Take 1 tablet by mouth nightly     Orders Placed This Encounter   Medications    cefTRIAXone (ROCEPHIN) 1,000 mg in sodium chloride 0.9 % 50 mL IVPB (mini-bag)     Order Specific Question:   Antimicrobial Indications     Answer:   Urinary Tract Infection    morphine injection 4 mg    traZODone (DESYREL) tablet 50 mg    QUEtiapine (SEROQUEL) tablet 25 mg    0.9 % sodium chloride infusion    sodium chloride flush 0.9 % injection 5-40 mL    sodium chloride flush 0.9 % injection 5-40 mL    0.9 % sodium chloride infusion    enoxaparin Sodium (LOVENOX) injection 30 mg     Order Specific Question:   Indication of Use     Answer:   Prophylaxis-DVT/PE    tamsulosin (FLOMAX) capsule 0.4 mg    OR Linked Order Group     morphine injection 2 mg     morphine injection 4 mg    OR Linked Order Group     ondansetron (ZOFRAN-ODT) disintegrating tablet 4 mg     ondansetron (ZOFRAN) injection 4 mg    polyethylene glycol (GLYCOLAX) packet 17 g       SURGICAL HISTORY       Past Surgical History:   Procedure Laterality Date    BLADDER REMOVAL  2006    cancer, s/p

## 2024-07-31 NOTE — PLAN OF CARE
Problem: Chronic Conditions and Co-morbidities  Goal: Patient's chronic conditions and co-morbidity symptoms are monitored and maintained or improved  Outcome: Progressing  Flowsheets (Taken 7/31/2024 0100)  Care Plan - Patient's Chronic Conditions and Co-Morbidity Symptoms are Monitored and Maintained or Improved:   Monitor and assess patient's chronic conditions and comorbid symptoms for stability, deterioration, or improvement   Collaborate with multidisciplinary team to address chronic and comorbid conditions and prevent exacerbation or deterioration   Update acute care plan with appropriate goals if chronic or comorbid symptoms are exacerbated and prevent overall improvement and discharge     Problem: Discharge Planning  Goal: Discharge to home or other facility with appropriate resources  Outcome: Progressing  Flowsheets (Taken 7/31/2024 0100)  Discharge to home or other facility with appropriate resources:   Identify barriers to discharge with patient and caregiver   Arrange for needed discharge resources and transportation as appropriate   Identify discharge learning needs (meds, wound care, etc)   Arrange for interpreters to assist at discharge as needed   Refer to discharge planning if patient needs post-hospital services based on physician order or complex needs related to functional status, cognitive ability or social support system     Problem: Safety - Adult  Goal: Free from fall injury  Outcome: Progressing  Flowsheets (Taken 7/31/2024 0443)  Free From Fall Injury: Instruct family/caregiver on patient safety     Problem: Skin/Tissue Integrity  Goal: Absence of new skin breakdown  Description: 1.  Monitor for areas of redness and/or skin breakdown  2.  Assess vascular access sites hourly  3.  Every 4-6 hours minimum:  Change oxygen saturation probe site  4.  Every 4-6 hours:  If on nasal continuous positive airway pressure, respiratory therapy assess nares and determine need for appliance change or

## 2024-07-31 NOTE — PROGRESS NOTES
[facility] Urology  Thomas Frost MD [facility]      Urology Progress Note     Chief Complaint: Sepsis secondary to staghorn calculi    Subjective:  No acute events overnight, afebrile, bradycardic, heart rate upper 40s  Pain level: Minimal  Denies fever, chills, nausea, vomiting, chest pain, shortness of breath  N.p.o. for nephrostomy tube placement today    Labs pending, coags    Patient Vitals for the past 24 hrs:   BP Temp Temp src Pulse Resp SpO2 Weight   07/31/24 0600 -- -- -- -- -- -- 94.6 kg (208 lb 8.9 oz)   07/31/24 0151 -- -- -- -- 19 -- --   07/31/24 0121 -- -- -- -- 20 -- --   07/31/24 0100 98/73 97.5 °F (36.4 °C) Oral 51 19 98 % --   07/30/24 2351 -- -- -- (!) 47 19 96 % --   07/30/24 2350 -- -- -- (!) 49 15 100 % --   07/30/24 2349 -- -- -- (!) 47 13 100 % --   07/30/24 2348 -- -- -- (!) 45 -- 97 % --   07/30/24 2120 -- -- -- (!) 47 14 100 % --   07/30/24 2119 -- -- -- (!) 47 13 100 % --   07/30/24 2117 -- -- -- (!) 47 13 99 % --   07/30/24 2116 -- -- -- (!) 48 14 100 % --   07/30/24 2115 102/72 -- -- (!) 48 15 100 % --   07/30/24 2026 -- -- -- (!) 45 13 98 % --   07/30/24 2015 (!) 89/68 -- -- -- -- -- --   07/30/24 1952 (!) 87/55 -- -- 57 21 -- --   07/30/24 1949 -- -- -- 50 13 -- --   07/30/24 1945 (!) 86/60 -- -- (!) 49 14 -- --   07/30/24 1900 90/60 -- -- (!) 46 12 -- --   07/30/24 1859 -- -- -- 50 16 -- --   07/30/24 1852 90/64 -- -- (!) 49 14 -- --   07/30/24 1830 119/76 -- -- (!) 49 12 -- --   07/30/24 1645 100/62 -- -- 51 19 99 % --   07/30/24 1640 97/68 97.6 °F (36.4 °C) Oral 54 16 100 % 92.5 kg (204 lb)       Intake/Output Summary (Last 24 hours) at 7/31/2024 0744  Last data filed at 7/31/2024 0632  Gross per 24 hour   Intake --   Output 800 ml   Net -800 ml       Recent Labs     07/30/24  1700   WBC 7.5   HGB 10.3*   HCT 33.7*   MCV 96.0   PLT See Reflexed IPF Result     Recent Labs     07/30/24  1700   *   K 4.9      CO2 18*   BUN 43*   CREATININE 3.9*  infectious/inflammatory process.  No suspicious soft  tissue pulmonary nodule.  No inspissated secretions or endobronchial lesion  evident.  No pleural effusion or pneumothorax.     Soft Tissues/Bones: No acute superficial soft tissue or osseous structure  abnormality evident.        Abdomen/Pelvis:     Kidneys: Bilateral hydronephrosis, more severe left than right.  Two adjacent  proximal to mid LEFT ureter calculi measuring 9 x 26 mm and 4 x 6 mm.  3 mm  distal right ureter calculus at ileal diversion pouch.  Several other  calcifications are demonstrated at the loop ileal diversion.  Extensive  staghorn calculi bilateral kidneys, greater left than right.     Other organs: Layering calcifications within the gallbladder without  gallbladder wall thickening or pericholecystic fluid.  Splenomegaly; no  discrete splenic lesion.  Unremarkable appearance of the liver and pancreas.  Calcifications bilateral adrenal glands commonly seen as sequela from old  granulomatous disease or prior adrenal hemorrhage (benign finding requiring  no additional evaluation, no follow-up imaging recommended).     GI/Bowel: Postsurgical changes from right lower quadrant ileal loop  diversion.  The stomach, small bowel and colon demonstrate no acute  inflammatory process or obstruction.  Prominent portion of ascending colon  and hepatic flexure as well as a smaller portion of the small bowel are  involved in the right abdominopelvic ventral wall peristomal hernia.  No  obstruction or inflammatory change.  Numerous colonic diverticula throughout  the colon without acute diverticulitis evident.     Pelvis: Status post radical prostatectomy and cystectomy.  Right-sided ileal  loop diversion pouch.  No ascites or pneumoperitoneum.  No adenopathy.     Peritoneum/Retroperitoneum: Calcific atherosclerotic disease aorta.  No  aneurysm.  Unremarkable appearance of the IVC. No adenopathy or fluid.     Bones/Soft Tissues: No suspicious lytic or

## 2024-07-31 NOTE — PROGRESS NOTES
Patient was not in his room.  He was down for percutaneous nephrostomy.  Orders reviewed.  Patient is receiving normal saline at 150 mL/h.  Recommendations  Repeat BMP today  Will change IV fluid to half-normal saline with 75 mEq of bicarb at 100 mL/h  Will follow with you and see him tomorrow morning in the meantime if you have any question please do not hesitate to call.    Brian Astorga MD

## 2024-07-31 NOTE — PROGRESS NOTES
[facility]  Occupational Therapy Not Seen Note    DATE: 2024    NAME: Arias Martin  MRN: [id number]   : 1940      Patient not seen this date for Occupational Therapy due to:    Patient Declined: Pt intermittently yelling out in pain, not agreeable to assessment this date. Per RN plan for stenting this afternoon. OT will check back tomorrow as able.    Next Scheduled Treatment:       Electronically signed by JAVAD Allan on 2024 at 1:22 PM

## 2024-07-31 NOTE — PROGRESS NOTES
Report called to Car [facility], report given to Michelle DOE. Electronically signed by Marilia Lane RN on 7/31/2024 at 6:18 PM

## 2024-07-31 NOTE — PROGRESS NOTES
[facility]  Office: [phone]  Mendez Cesar DO, Patrice Zurita, DO, Marco A Rosales DO, Everette Bajwa, DO, Bettina Jasso MD, Kelsie De La Cruz MD, Otilio Lake MD, Fang Cuevas MD,  Akash Warren MD, Pawan Barcenas MD, Jose Isabel MD,  Zach Mantilla DO, Nathalia Clark MD, Neville Timmons MD, Neri Cesar DO, Ebony Cesar MD,  Sonny Edwards DO, Lucita Perez MD, Elvia Arredondo MD, Rosalinda Ritter MD, Korey Gamez MD,  Leonel Breaux MD, Jean-Claude Haas MD, Hien Bradford MD, Maria Eugenia Nobles MD, Royal Marie MD, Sandra Viveros MD, Rahat Cooley DO, Nikita Evans DO, Elise Hayden MD,  Candido Flores MD, Shirley Waterhouse, CNP,  Dasha Haskins CNP, Altaf Galvan, CNP,  Nanette Waite, DNP, Cande Aguiar, CNP, Malu Alvarez, CNP, Billie Burnett, CNP, Sadia Casarez, CNP, Rosa Taylor, PA-C, Gloria Mendoza PA-C, Mona Agosto, CNP, Lorene Simpson, CNP, Ned Greene, CNP, Mary Peacock, CNP, Anel Carlson, CNP, Anuradha Flood, CNS, nIgris Hudson, CNP, Honey Perrin CNP, Tracy Schwab, CNP         [facility]   IN-PATIENT SERVICE   [facility]    Second Visit Note  For more detailed information please refer to the progress note of the day      7/31/2024    2:42 PM    Name:   Arias Martin  MRN:     [id number]     Acct:      [id number]   Room:   OBS 04/04-1  IP Day:  1  Admit Date:  7/30/2024  4:30 PM    PCP:   Marcelo Olivia, MARKIE - CNP  Code Status:  Full Code      Pt vitals were reviewed   New labs were reviewed   Patient was seen and examined.  He appears confused.  Only oriented to self.  He cannot give me a good history.  Reports of pain in the lower extremity.  Labs and vitals reviewed  Patient is 84-year-old male with known medical history of suprapubic catheter with frequent UTIs, recent admission in [facility] with altered mental status, right subdural hematoma with midline shift, had right middle meningeal artery ablation,  history of bladder cancer s/p cystectomy with ileal conduit, CKD stage IV, hypertension, history of dementia presents to the hospital from rehab due to altered mental status. Patient has extensive history of kidney stone burden, patient supposed t to see Dr. Gilliam later this week for evaluation of his kidney stones.  Patient daughter-in-law who works at a facility mentioned that patient has some altered mentation at baseline but it has gotten worse and also the urine color has been significantly worsen.    Updated plan :     Pyelonephritis with bilateral hydronephrosis-IR consulted for nephroureteral catheter placement, urology following, continue IV Rocephin.  ID consulted due to multiple UTI episodes. Will need definite stone treatment when infection clears. Follow cultures  Bladder cancer with cystectomy, ileal conduit patient  Hypertension-blood pressure stable  KARMA on CKD stage III-creatinine elevated to 3.9, nephrology consulted, IR procedure today, continue IV fluids  Toxic metabolic encephalopathy likely secondary to pyelonephritis and KARMA  Cholelithiasis without evidence of cholecystitis        Nathalia Clark MD  7/31/2024  2:42 PM

## 2024-08-01 LAB
ALBUMIN SERPL-MCNC: 2.8 G/DL (ref 3.5–5.2)
ALBUMIN/GLOB SERPL: 1 {RATIO} (ref 1–2.5)
ALP SERPL-CCNC: 79 U/L (ref 40–129)
ALT SERPL-CCNC: 12 U/L (ref 10–50)
ANION GAP SERPL CALCULATED.3IONS-SCNC: 19 MMOL/L (ref 9–16)
AST SERPL-CCNC: 21 U/L (ref 10–50)
BASOPHILS # BLD: 0 K/UL (ref 0–0.2)
BASOPHILS NFR BLD: 0 % (ref 0–2)
BILIRUB DIRECT SERPL-MCNC: <0.2 MG/DL (ref 0–0.2)
BILIRUB INDIRECT SERPL-MCNC: ABNORMAL MG/DL (ref 0–1)
BILIRUB SERPL-MCNC: 0.4 MG/DL (ref 0–1.2)
BUN SERPL-MCNC: 36 MG/DL (ref 8–23)
CALCIUM SERPL-MCNC: 8.4 MG/DL (ref 8.6–10.4)
CHLORIDE SERPL-SCNC: 105 MMOL/L (ref 98–107)
CO2 SERPL-SCNC: 15 MMOL/L (ref 20–31)
CREAT SERPL-MCNC: 3.4 MG/DL (ref 0.7–1.2)
EOSINOPHIL # BLD: 0 K/UL (ref 0–0.4)
EOSINOPHILS RELATIVE PERCENT: 0 % (ref 1–4)
ERYTHROCYTE [DISTWIDTH] IN BLOOD BY AUTOMATED COUNT: 17.1 % (ref 11.8–14.4)
GFR, ESTIMATED: 17 ML/MIN/1.73M2
GLOBULIN SER CALC-MCNC: 3.9 G/DL
GLUCOSE BLD-MCNC: 79 MG/DL (ref 75–110)
GLUCOSE SERPL-MCNC: 66 MG/DL (ref 74–99)
HCT VFR BLD AUTO: 33.6 % (ref 40.7–50.3)
HGB BLD-MCNC: 9.6 G/DL (ref 13–17)
IMM GRANULOCYTES # BLD AUTO: 0.36 K/UL (ref 0–0.3)
IMM GRANULOCYTES NFR BLD: 5 %
LYMPHOCYTES NFR BLD: 0.58 K/UL (ref 1–4.8)
LYMPHOCYTES RELATIVE PERCENT: 8 % (ref 24–44)
MCH RBC QN AUTO: 29 PG (ref 25.2–33.5)
MCHC RBC AUTO-ENTMCNC: 28.6 G/DL (ref 28.4–34.8)
MCV RBC AUTO: 101.5 FL (ref 82.6–102.9)
MICROORGANISM SPEC CULT: NORMAL
MONOCYTES NFR BLD: 0.22 K/UL (ref 0.1–0.8)
MONOCYTES NFR BLD: 3 % (ref 1–7)
MORPHOLOGY: ABNORMAL
MORPHOLOGY: ABNORMAL
MYOGLOBIN SERPL-MCNC: 492 NG/ML (ref 28–72)
MYOGLOBIN SERPL-MCNC: 503 NG/ML (ref 28–72)
NEUTROPHILS NFR BLD: 84 % (ref 36–66)
NEUTS SEG NFR BLD: 6.04 K/UL (ref 1.8–7.7)
NRBC BLD-RTO: 0 PER 100 WBC
PLATELET # BLD AUTO: 231 K/UL (ref 138–453)
PMV BLD AUTO: 9.1 FL (ref 8.1–13.5)
POTASSIUM SERPL-SCNC: 4.9 MMOL/L (ref 3.7–5.3)
PROT SERPL-MCNC: 6.7 G/DL (ref 6.6–8.7)
RBC # BLD AUTO: 3.31 M/UL (ref 4.21–5.77)
SODIUM SERPL-SCNC: 139 MMOL/L (ref 136–145)
SPECIMEN DESCRIPTION: NORMAL
TROPONIN I SERPL HS-MCNC: 37 NG/L (ref 0–22)
TROPONIN I SERPL HS-MCNC: 43 NG/L (ref 0–22)
WBC OTHER # BLD: 7.2 K/UL (ref 3.5–11.3)

## 2024-08-01 PROCEDURE — 2580000003 HC RX 258: Performed by: STUDENT IN AN ORGANIZED HEALTH CARE EDUCATION/TRAINING PROGRAM

## 2024-08-01 PROCEDURE — 6370000000 HC RX 637 (ALT 250 FOR IP): Performed by: STUDENT IN AN ORGANIZED HEALTH CARE EDUCATION/TRAINING PROGRAM

## 2024-08-01 PROCEDURE — 80048 BASIC METABOLIC PNL TOTAL CA: CPT

## 2024-08-01 PROCEDURE — 2580000003 HC RX 258: Performed by: NURSE PRACTITIONER

## 2024-08-01 PROCEDURE — 6360000002 HC RX W HCPCS

## 2024-08-01 PROCEDURE — 84484 ASSAY OF TROPONIN QUANT: CPT

## 2024-08-01 PROCEDURE — 2500000003 HC RX 250 WO HCPCS: Performed by: INTERNAL MEDICINE

## 2024-08-01 PROCEDURE — 99222 1ST HOSP IP/OBS MODERATE 55: CPT | Performed by: INTERNAL MEDICINE

## 2024-08-01 PROCEDURE — 2580000003 HC RX 258: Performed by: INTERNAL MEDICINE

## 2024-08-01 PROCEDURE — 97162 PT EVAL MOD COMPLEX 30 MIN: CPT

## 2024-08-01 PROCEDURE — 97535 SELF CARE MNGMENT TRAINING: CPT

## 2024-08-01 PROCEDURE — 6360000002 HC RX W HCPCS: Performed by: STUDENT IN AN ORGANIZED HEALTH CARE EDUCATION/TRAINING PROGRAM

## 2024-08-01 PROCEDURE — 2580000003 HC RX 258

## 2024-08-01 PROCEDURE — 6360000002 HC RX W HCPCS: Performed by: NURSE PRACTITIONER

## 2024-08-01 PROCEDURE — 82947 ASSAY GLUCOSE BLOOD QUANT: CPT

## 2024-08-01 PROCEDURE — 99232 SBSQ HOSP IP/OBS MODERATE 35: CPT

## 2024-08-01 PROCEDURE — 85025 COMPLETE CBC W/AUTO DIFF WBC: CPT

## 2024-08-01 PROCEDURE — 97166 OT EVAL MOD COMPLEX 45 MIN: CPT

## 2024-08-01 PROCEDURE — 83874 ASSAY OF MYOGLOBIN: CPT

## 2024-08-01 PROCEDURE — 6370000000 HC RX 637 (ALT 250 FOR IP): Performed by: NURSE PRACTITIONER

## 2024-08-01 PROCEDURE — 80076 HEPATIC FUNCTION PANEL: CPT

## 2024-08-01 PROCEDURE — 97530 THERAPEUTIC ACTIVITIES: CPT

## 2024-08-01 PROCEDURE — 36415 COLL VENOUS BLD VENIPUNCTURE: CPT

## 2024-08-01 PROCEDURE — 2060000000 HC ICU INTERMEDIATE R&B

## 2024-08-01 RX ORDER — ACETAMINOPHEN 325 MG/1
650 TABLET ORAL EVERY 4 HOURS PRN
Status: DISCONTINUED | OUTPATIENT
Start: 2024-08-01 | End: 2024-08-20 | Stop reason: HOSPADM

## 2024-08-01 RX ORDER — HALOPERIDOL 5 MG/ML
1 INJECTION INTRAMUSCULAR EVERY 6 HOURS PRN
Status: DISCONTINUED | OUTPATIENT
Start: 2024-08-01 | End: 2024-08-08

## 2024-08-01 RX ADMIN — AMPICILLIN 2000 MG: 2 INJECTION, POWDER, FOR SOLUTION INTRAMUSCULAR; INTRAVENOUS at 18:06

## 2024-08-01 RX ADMIN — CEFEPIME 1000 MG: 1 INJECTION, POWDER, FOR SOLUTION INTRAMUSCULAR; INTRAVENOUS at 18:58

## 2024-08-01 RX ADMIN — MORPHINE SULFATE 2 MG: 4 INJECTION, SOLUTION INTRAMUSCULAR; INTRAVENOUS at 01:09

## 2024-08-01 RX ADMIN — HEPARIN SODIUM 5000 UNITS: 5000 INJECTION INTRAVENOUS; SUBCUTANEOUS at 06:13

## 2024-08-01 RX ADMIN — MORPHINE SULFATE 2 MG: 4 INJECTION, SOLUTION INTRAMUSCULAR; INTRAVENOUS at 04:02

## 2024-08-01 RX ADMIN — HEPARIN SODIUM 5000 UNITS: 5000 INJECTION INTRAVENOUS; SUBCUTANEOUS at 12:32

## 2024-08-01 RX ADMIN — MORPHINE SULFATE 4 MG: 4 INJECTION INTRAVENOUS at 15:12

## 2024-08-01 RX ADMIN — MORPHINE SULFATE 2 MG: 4 INJECTION, SOLUTION INTRAMUSCULAR; INTRAVENOUS at 08:18

## 2024-08-01 RX ADMIN — AMPICILLIN 2000 MG: 2 INJECTION, POWDER, FOR SOLUTION INTRAMUSCULAR; INTRAVENOUS at 22:39

## 2024-08-01 RX ADMIN — SODIUM BICARBONATE: 84 INJECTION, SOLUTION INTRAVENOUS at 09:40

## 2024-08-01 RX ADMIN — MORPHINE SULFATE 4 MG: 4 INJECTION INTRAVENOUS at 20:09

## 2024-08-01 RX ADMIN — SODIUM CHLORIDE, PRESERVATIVE FREE 10 ML: 5 INJECTION INTRAVENOUS at 20:09

## 2024-08-01 RX ADMIN — SODIUM BICARBONATE: 84 INJECTION, SOLUTION INTRAVENOUS at 14:29

## 2024-08-01 RX ADMIN — HEPARIN SODIUM 5000 UNITS: 5000 INJECTION INTRAVENOUS; SUBCUTANEOUS at 22:40

## 2024-08-01 RX ADMIN — QUETIAPINE FUMARATE 25 MG: 25 TABLET ORAL at 19:40

## 2024-08-01 ASSESSMENT — PAIN SCALES - GENERAL
PAINLEVEL_OUTOF10: 3
PAINLEVEL_OUTOF10: 7
PAINLEVEL_OUTOF10: 6
PAINLEVEL_OUTOF10: 10
PAINLEVEL_OUTOF10: 6
PAINLEVEL_OUTOF10: 8
PAINLEVEL_OUTOF10: 5
PAINLEVEL_OUTOF10: 10
PAINLEVEL_OUTOF10: 6

## 2024-08-01 ASSESSMENT — PAIN DESCRIPTION - DESCRIPTORS
DESCRIPTORS: DISCOMFORT;SORE;TENDER
DESCRIPTORS: DISCOMFORT;TENDER

## 2024-08-01 ASSESSMENT — PAIN DESCRIPTION - LOCATION
LOCATION: BACK

## 2024-08-01 ASSESSMENT — PAIN DESCRIPTION - ORIENTATION: ORIENTATION: LEFT

## 2024-08-01 NOTE — PROGRESS NOTES
[facility] Urology  Thomas Frost MD [facility]      Urology Progress Note     Chief Complaint: Sepsis secondary to staghorn calculi    Subjective:  Underwent left nephrostomy tube placement yesterday, vitals have been stable  Pain level: None  Denies fever, chills, nausea, vomiting, chest pain, shortness of breath  Left nephrostomy tube in place, urostomy draining urine, still appears cloudy    A.m. labs pending    Patient Vitals for the past 24 hrs:   BP Temp Temp src Pulse Resp SpO2   08/01/24 0432 -- -- -- -- 16 --   08/01/24 0402 -- -- -- -- 14 --   08/01/24 0400 100/70 98.9 °F (37.2 °C) Axillary 77 15 99 %   08/01/24 0139 -- -- -- -- 16 --   08/01/24 0058 102/62 99.5 °F (37.5 °C) Axillary 81 15 98 %   07/31/24 2038 -- -- -- -- 16 --   07/31/24 2008 -- -- -- -- 19 --   07/31/24 1930 107/65 98.3 °F (36.8 °C) Oral 83 13 --   07/31/24 1850 -- -- -- -- 20 --   07/31/24 1820 -- -- -- -- 17 --   07/31/24 1800 110/66 98.6 °F (37 °C) Axillary 87 14 97 %   07/31/24 1730 120/68 98.4 °F (36.9 °C) Axillary 90 16 93 %   07/31/24 1700 110/72 98.2 °F (36.8 °C) Axillary 85 12 96 %   07/31/24 1630 100/79 98.6 °F (37 °C) Axillary 87 14 100 %   07/31/24 1600 116/76 98.2 °F (36.8 °C) Axillary 87 12 98 %   07/31/24 1535 95/69 -- -- 95 20 100 %   07/31/24 1530 102/69 -- -- 93 17 100 %   07/31/24 1525 95/64 -- -- 93 15 100 %   07/31/24 1520 111/63 -- -- 91 12 --   07/31/24 1515 95/64 -- -- 91 13 --   07/31/24 1513 95/64 -- -- 90 13 --   07/31/24 1510 94/64 -- -- 91 12 95 %   07/31/24 1506 107/68 -- -- 92 13 --   07/31/24 1505 107/68 -- -- 93 15 97 %   07/31/24 1500 118/81 -- -- 100 16 95 %   07/31/24 1458 118/81 -- -- 94 22 --   07/31/24 1311 -- -- -- -- 18 --   07/31/24 1241 -- -- -- -- 18 --   07/31/24 1154 -- -- -- -- 18 --   07/31/24 1124 -- -- -- -- 20 --   07/31/24 0859 -- -- -- -- 18 --   07/31/24 0829 -- -- -- -- 18 --   07/31/24 0750 111/61 98 °F (36.7 °C) Oral 77 -- 98 %       Intake/Output Summary (Last 24  hours) at 8/1/2024 0704  Last data filed at 8/1/2024 0405  Gross per 24 hour   Intake 2249.08 ml   Output 1590 ml   Net 659.08 ml       Recent Labs     07/30/24  1700   WBC 7.5   HGB 10.3*   HCT 33.7*   MCV 96.0   PLT See Reflexed IPF Result     Recent Labs     07/30/24  1700 07/31/24  1044   * 138   K 4.9 5.0    111*   CO2 18* 14*   BUN 43* 39*   CREATININE 3.9* 3.7*       Recent Labs     07/30/24  1742   COLORU Yellow   PHUR 6.5   WBCUA TOO NUMEROUS TO COUNT   RBCUA 20 TO 50   BACTERIA MANY*   LEUKOCYTESUR LARGE*   UROBILINOGEN Normal   BILIRUBINUR NEGATIVE       Additional Lab/culture results:  Urine culture from nephrostomy tube in process, prior urine culture growing Pseudomonas    Physical Exam:  Constitutional: Patient in no acute distress.  Neuro: alert and oriented to person place and time.  HEENT: No acute abnormalities  Lungs: Respiratory effort normal on room air  Cardiovascular:  Heart rate regular rate and rhythm  Abdomen: Soft, non-tender, non-distended. Non peritonitic  Extremities: No leg swelling, no edema  : Left nephrostomy tube draining cloudy yellow urine, urostomy draining cloudy yellow urine    Interval Imaging Findings:  None    Impression:  Arias Martin is a 84 y.o. 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

## 2024-08-01 NOTE — PLAN OF CARE
Problem: Chronic Conditions and Co-morbidities  Goal: Patient's chronic conditions and co-morbidity symptoms are monitored and maintained or improved  8/1/2024 0323 by Shefali Montgomery RN  Outcome: Progressing  Flowsheets (Taken 7/31/2024 0840 by Marilia Lane RN)  Care Plan - Patient's Chronic Conditions and Co-Morbidity Symptoms are Monitored and Maintained or Improved: Monitor and assess patient's chronic conditions and comorbid symptoms for stability, deterioration, or improvement  7/31/2024 1906 by Marilia Lane RN  Outcome: Progressing  Flowsheets (Taken 7/31/2024 0840)  Care Plan - Patient's Chronic Conditions and Co-Morbidity Symptoms are Monitored and Maintained or Improved: Monitor and assess patient's chronic conditions and comorbid symptoms for stability, deterioration, or improvement     Problem: Discharge Planning  Goal: Discharge to home or other facility with appropriate resources  8/1/2024 0323 by Shefali Montgomery RN  Outcome: Progressing  Flowsheets (Taken 7/31/2024 0840 by Marilia Lane RN)  Discharge to home or other facility with appropriate resources: Identify barriers to discharge with patient and caregiver  7/31/2024 1906 by Marilia Lane RN  Outcome: Progressing  Flowsheets (Taken 7/31/2024 0840)  Discharge to home or other facility with appropriate resources: Identify barriers to discharge with patient and caregiver     Problem: Safety - Adult  Goal: Free from fall injury  8/1/2024 0323 by Shefali Montgomery RN  Outcome: Progressing  Flowsheets (Taken 7/31/2024 0443 by Sary Torres, RN)  Free From Fall Injury: Instruct family/caregiver on patient safety  7/31/2024 1906 by Marilia Lane RN  Outcome: Progressing     Problem: Skin/Tissue Integrity  Goal: Absence of new skin breakdown  Description: 1.  Monitor for areas of redness and/or skin breakdown  2.  Assess vascular access sites hourly  3.  Every 4-6 hours minimum:  Change oxygen saturation probe site  4.  Every 4-6 hours:

## 2024-08-01 NOTE — PROGRESS NOTES
Physical Therapy  Facility/Department: [facility]  Physical Therapy Initial Assessment    Name: Arias Martin  : 1940  MRN: [id number]  Date of Service: 2024  Chief Complaint   Patient presents with    Altered Mental Status       Discharge Recommendations:   Further therapy recommended at discharge.     PT Equipment Recommendations  Equipment Needed: No      Patient Diagnosis(es): The primary encounter diagnosis was Kidney stone. A diagnosis of Acute cystitis with hematuria was also pertinent to this visit.  Past Medical History:  has a past medical history of Arthritis, Caffeine use, Cancer (HCC), Cancer of kidney, unspecified laterality (HCC), CKD (chronic kidney disease), Cognitive communication deficit, COPD (chronic obstructive pulmonary disease) ([facility]), Diabetes mellitus ([facility]), DNR (do not resuscitate), Dysphagia, GERD (gastroesophageal reflux disease), GI bleeding, Hernia, inguinal, right, Hyperlipidemia, Hypertension, Ileal conduit stomal stenosis, Kidney stone, Mobility impaired, Muscle weakness, Nursing home resident, PAF (paroxysmal atrial fibrillation) ([facility]), Presence of urostomy ([facility]), and Retinal detachment.  Past Surgical History:  has a past surgical history that includes hernia repair; Bladder removal (); Prostate surgery (); Cataract removal (Bilateral); Colonoscopy; Eye surgery (Right, ); vitrectomy (Left, 16); Incisional hernia repair (2015); Incisional hernia repair (2014); laparoscopy (N/A, 2021); Cystoscopy (N/A, 5/3/2021); IR GUIDED NEPHROSTOMY CATH PLACEMENT LEFT (5/3/2021); IR GUIDED NEPHROSTOMY CATH PLACEMENT RIGHT (2021); eye surgery; knee surgery (Left); other surgical history (2021); IR GUIDED NEPHROSTOMY CATH PLACEMENT LEFT (2021); other surgical history (2021); Cystoscopy (N/A, 2021); CT CRYOABLATION OF RENAL TUMOR (2019); and IR GUIDED NEPHROSTOMY CATH PLACEMENT LEFT (2024).    Assessment   Body  History  Social/Functional History  Lives With: Other (comment) (facility)  Type of Home: Facility  Home Equipment: Wheelchair - Manual  ADL Assistance: Needs assistance  Toileting: Needs assistance  Homemaking Assistance: Needs assistance  Homemaking Responsibilities: No  Ambulation Assistance: Needs assistance  Transfer Assistance: Needs assistance  Active : No  Mode of Transportation: Other  Occupation: Retired  Additional Comments: unable to obtain home information d/t cognitive deficits; pt AxOx1  Vision/Hearing  Vision  Vision:  (no complaints during session)  Hearing  Hearing:  (appeared WFL)    Cognition   Orientation  Orientation Level: Oriented to person;Disoriented to place;Disoriented to time;Disoriented to situation  Cognition  Overall Cognitive Status: Exceptions  Arousal/Alertness: Appears intact  Following Commands: Follows one step commands with increased time;Follows one step commands with repetition  Attention Span: Attends with cues to redirect  Memory: Decreased recall of recent events;Decreased short term memory  Safety Judgement: Decreased awareness of need for assistance;Decreased awareness of need for safety  Problem Solving: Decreased awareness of errors;Assistance required to implement solutions;Assistance required to correct errors made;Assistance required to identify errors made;Assistance required to generate solutions  Insights: Not aware of deficits  Initiation: Requires cues for all  Sequencing: Requires cues for all     Objective   AROM RLE (degrees)  RLE General AROM: Unable to accuractly assess secondary to confusion, agitation  AROM LLE (degrees)  LLE General AROM: Unable to accuractly assess secondary to confusion, agitation  AROM RUE (degrees)  RUE General AROM: See OT  AROM LUE (degrees)  LUE General AROM: See OT  Strength RLE  Comment: Unable to accuractly assess secondary to confusion, agitation  Strength LLE  Comment: Unable to accuractly assess secondary to

## 2024-08-01 NOTE — CONSULTS
Renal Consult Note    Patient :  Arias Martin; 84 y.o. MRN# [id number]  Location:  2002/2002-01  Attending:  Hien Bradford MD  Admit Date:  7/30/2024   Hospital Day: 2    Reason for Consult:     Asked by Hien Noland Sra, MD to see for KARMA/Elevated Creatinine.    History Obtained From:     patient, electronic medical record    History of Present Illness:     Arias Martin; 84 y.o. male with past medical history of cholecystectomy for bladder cancer status post ileal conduit, recurrent UTIs, recurrent nephrolithiasis, rt SDH w/ midline shift s/p Rt middle meningeal artery ablation, h/o bladder cancer s/p cystectomy w/ ileal conduit, CKD4, baseline creatinine 2.8-3.0 follows up with Dr. Booker, HTN, DM2, extensive kidney stone burden, follows with Dr. Funk.  Has chronic hydronephrosis bilaterally.  He was recently at an outside facility for complicated UTI.  Was transferred to an ECF facility subsequently..    Now presents to the hospital with the chief complaint of altered mentation and worsening abdominal pain. Nephrology consulted for KARMA on CKD.  Ultrasound showed bilateral hydronephrosis.  CT abdomen showed severe hydronephrosis on the left side moderate hydronephrosis in the right side, left ureteral stone, bilateral struvite stones, significant left-sided perinephric stranding and inflammatory changes proximal to the stone.  Urine culture positive for Pseudomonas and Enterococcus.  He was hypotensive on initial admission.  Urology was consulted.  IR was consulted as well and left percutaneous nephrostomy tube was placed.  Urine output has been good.  Creatinine was 3.9 on presentation which is now down to 3.4 closer to his baseline.  Labs today showed a sodium of 139 potassium 4.9 chloride 95 bicarb 15 BUN 36 creatinine 3.4 anion gap 19 blood sugar 66 albumin 2.8 hemoglobin 9.6 white count 7.2 platelets 231  His mental status continues to be poor he is awake but not following commands  oral intake is extremely poor.  .    Labs and Imaging Reviewed:  Na 139, K 4.9, Cl 105, bicarb 15, BUN 36, Cr 3.4, Anion Gap 19, gluc 66, Ca 8.4, Myoglobin 492, Troponin 38->43, WBC 7.2, Hgb 9.6, PLTs 231  Ur Cx grew pseudomonas  UA: 1.010 gravity, 2+ protein, positive nitrites, large leuk est, too many WBC, 20-50 RBC, many bacteria, large Ur Hgb  CT Chest/abd/pel: no acute abnormality in chest, BL hydronephrosis L>R, 2 proximal to mid left ureter calculi 0.9x2.6cm and 0.4x0.6cm, prominent inflammatory changes of left kidney and ureter, prominent struvite renal calculi of BL kidneys L>R     Past History/Allergies?Social History:     Past Medical History:   Diagnosis Date    Arthritis     Caffeine use     2 coffee, 2 cans soda/day    Cancer ([facility]) 2006    bladder et prostate, 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, ileo conduit s/p cystectomy    Cancer of kidney, unspecified laterality ([facility])     CKD (chronic kidney disease)     stage 4, Dr. Ranker, Dr. Juarez    Cognitive communication deficit     COPD (chronic obstructive pulmonary disease) ([facility])     Diabetes mellitus ([facility])     SQ insulin 3 times per week only for this    DNR (do not resuscitate) 06/07/2021    Paper work from [facility] placed on chart    Dysphagia 05/2021    GERD (gastroesophageal reflux disease)     no meds for this    GI bleeding     with hemorrhage and perforation    Hernia, inguinal, right     Hyperlipidemia     no meds for this    Hypertension     no meds for this    Ileal conduit stomal stenosis 06/2021    Dr. Frost    Kidney stone     bilat    Mobility impaired     using wheelchair, 2 person transfer    Muscle weakness     Nursing home resident 5/21/2021- 6/7/2021    Currently resides at [facility] [phone]    PAF (paroxysmal atrial fibrillation) ([facility])     last EKG Sinus, no known cardiologist     Presence of urostomy ([facility])     ileoconduit, cystectomy    Retinal detachment        Past Surgical History:   Procedure Laterality Date    BLADDER  Eosinophils:  No components found for: \"UEOS\"    Radiology:     Reviewed.      Assessment:     Acute Kidney Injury on CKD4, Cr 3.9 on admission, likely from ischemic ATN from hypotension, low flow related to systemic inflammatory response syndrome and volume depletion.  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  Does have bilateral hydronephrosis left side appears to be more severe  Acute pyelonephritis, complicated pseudomonas UTI on cefepime  BL renal struvite stone  Left ureteral stones  Non-anion gap and anion gap metabolic acidosis 2/2 ARF and ileal conduit with bicarbonate losses  H/o bladder cancer s/p ileal conduit         Plan:     Change fluids to D5W with 150 of bicarb at 100 mill an hour  Monitor BMP daily  High likelihood of needing renal placement therapy soon  Pyelonephritis per primary  Obstructive uropathy and nephrolithiasis/ureterolithiasis per urology  Nephrostomy tube per urology  We will follow    Nutrition   Please ensure that patient is on a renal diet/TF. Avoid nephrotoxic drugs/contrast exposure.    Thank you for the consultation. Please do not hesitate to contact us for any further questions/concerns.     Terry Harley MD  Internal Medicine Resident, PGY-3  [facility]  08/01/24  10:17 AM      This note is created with the assistance of a speech-recognition program. While intending to generate a document that actually reflects the content of the visit, no guarantees can be provided that  resident/fellow.  I agree with the assessment and plan and status of the problem list as documented.      .  Electronically signed by Hollie Franco MD on 8/1/2024 at 1:23 PM

## 2024-08-01 NOTE — PROGRESS NOTES
..  [facility]  Office: [phone]  Mendez Cesar DO, Patrice Zurita DO, Marco A Rosales DO, Everette Bajwa DO, Bettina Jasso MD, Kelsie De La Cruz MD, Otilio Lake MD, Fang Cuevas MD,  Akash Warren MD, Pawan Barcenas MD, Jose Isabel MD,  Zach Mantilla DO, Nathalia Clark MD, Neville Timmons MD, Neri Cesar DO, Ebony Cesar MD,  Sonny Edwards DO, Lucita Perez MD, Elvia Arredondo MD, Rosalinda Ritter MD, Korey Gamez MD,  Leonel Breaux MD, Jean-Claude Haas MD, Hien Bradford MD, Maria Eugenia Nobles MD, Royal Marie MD, Sandra Viveros MD, Rahat Cooley DO, Nikita Evans DO, Elise Hayden MD,  Candido Flores MD, Shirley Waterhouse, CNP,  Dasha Haskins, CNP, Altaf Galvan, CNP,  Nanette Waite, DNP, Cande Aguiar, CNP, Malu Alvarez, CNP, Billie Burnett, CNP, Sadia Casarez, CNP, Rosa Taylor, PA-C, Gloria Mendoza, PA-C, Mona Agosto, CNP, Lorene Simpson, CNP, Ned Greene, CNP, Mary Peacock, CNP, Anel Carlson, CNP, Anuradha Flood, CNS, Ingris Hudson, CNP, Honey Perrin CNP, Tracy Schwab, CNP         [facility]   IN-PATIENT SERVICE   [facility]    Progress Note    8/1/2024    10:59 AM    Name:   Arias Martin  MRN:     [id number]     Acct:      [id number]   Room:   2002/2002-01  IP Day:  2  Admit Date:  7/30/2024  4:30 PM    PCP:   Marcelo Olivia APRN - CNP  Code Status:  Full Code    Subjective:     C/C:   Chief Complaint   Patient presents with    Altered Mental Status     Interval History Status: improved.     Patient subjectively reports improvement in status, however \"still not back to normal, still just generally feel bad\".  Patient denies all pain.  Right lower quadrant has pink blood, appropriate output, negative increased redness or drainage, baseline distention and right lower quadrant soft to touch and nontender.  Awaiting finalized urine culture and sensitivity.    Na 139, K 4.9, Cl 105, bicarb 15, BUN 36, Cr 3.4, Anion

## 2024-08-01 NOTE — CARE COORDINATION
Received a voice message from Celine Mario, the patients daughter. Provided 2 choices for SNF referrals: [facility] and [facility]. Referrals sent.

## 2024-08-01 NOTE — PROGRESS NOTES
Occupational Therapy  Facility/Department: [facility] CAR 2- STEPDOWN  Occupational Therapy Initial Assessment    Name: Arias Martin  : 1940  MRN: [id number]  Date of Service: 2024  Chief Complaint   Patient presents with    Altered Mental Status     Discharge Recommendations:  Patient would benefit from continued therapy after discharge     Patient Diagnosis(es): The primary encounter diagnosis was Kidney stone. A diagnosis of Acute cystitis with hematuria was also pertinent to this visit.  Past Medical History:  has a past medical history of Arthritis, Caffeine use, Cancer (HCC), Cancer of kidney, unspecified laterality (HCC), CKD (chronic kidney disease), Cognitive communication deficit, COPD (chronic obstructive pulmonary disease) ([facility]), Diabetes mellitus ([facility]), DNR (do not resuscitate), Dysphagia, GERD (gastroesophageal reflux disease), GI bleeding, Hernia, inguinal, right, Hyperlipidemia, Hypertension, Ileal conduit stomal stenosis, Kidney stone, Mobility impaired, Muscle weakness, Nursing home resident, PAF (paroxysmal atrial fibrillation) ([facility]), Presence of urostomy ([facility]), and Retinal detachment.  Past Surgical History:  has a past surgical history that includes hernia repair; Bladder removal (); Prostate surgery (); Cataract removal (Bilateral); Colonoscopy; Eye surgery (Right, ); vitrectomy (Left, 16); Incisional hernia repair (2015); Incisional hernia repair (2014); laparoscopy (N/A, 2021); Cystoscopy (N/A, 5/3/2021); IR GUIDED NEPHROSTOMY CATH PLACEMENT LEFT (5/3/2021); IR GUIDED NEPHROSTOMY CATH PLACEMENT RIGHT (2021); eye surgery; knee surgery (Left); other surgical history (2021); IR GUIDED NEPHROSTOMY CATH PLACEMENT LEFT (2021); other surgical history (2021); Cystoscopy (N/A, 2021); CT CRYOABLATION OF RENAL TUMOR (2019); and IR GUIDED NEPHROSTOMY CATH PLACEMENT LEFT (2024).     Assessment   Performance deficits / Impairments:

## 2024-08-01 NOTE — PROGRESS NOTES
Pt requested pain meds, RN brought pt medications placed in his mouth with applesauce pt spit medication out on tray lid & stated \"that's what he thinks of that medicine\" Documented in pt's MAR

## 2024-08-01 NOTE — PLAN OF CARE

## 2024-08-02 PROBLEM — R65.10 SIRS (SYSTEMIC INFLAMMATORY RESPONSE SYNDROME) (HCC): Status: ACTIVE | Noted: 2024-08-02

## 2024-08-02 PROBLEM — G93.40 ACUTE ENCEPHALOPATHY: Status: ACTIVE | Noted: 2021-05-02

## 2024-08-02 PROBLEM — B96.5 PSEUDOMONAS URINARY TRACT INFECTION: Status: ACTIVE | Noted: 2024-07-30

## 2024-08-02 LAB
ANION GAP SERPL CALCULATED.3IONS-SCNC: 12 MMOL/L (ref 9–16)
BASOPHILS # BLD: 0 K/UL (ref 0–0.2)
BASOPHILS NFR BLD: 0 % (ref 0–2)
BUN SERPL-MCNC: 33 MG/DL (ref 8–23)
CALCIUM SERPL-MCNC: 8.2 MG/DL (ref 8.6–10.4)
CHLORIDE SERPL-SCNC: 106 MMOL/L (ref 98–107)
CK SERPL-CCNC: 198 U/L (ref 39–308)
CO2 SERPL-SCNC: 21 MMOL/L (ref 20–31)
CREAT SERPL-MCNC: 3.1 MG/DL (ref 0.7–1.2)
EOSINOPHIL # BLD: 0.18 K/UL (ref 0–0.44)
EOSINOPHILS RELATIVE PERCENT: 3 % (ref 1–4)
ERYTHROCYTE [DISTWIDTH] IN BLOOD BY AUTOMATED COUNT: 16.9 % (ref 11.8–14.4)
FOLATE SERPL-MCNC: 4.1 NG/ML (ref 4.8–24.2)
GFR, ESTIMATED: 19 ML/MIN/1.73M2
GLUCOSE SERPL-MCNC: 112 MG/DL (ref 74–99)
HCT VFR BLD AUTO: 30.2 % (ref 40.7–50.3)
HGB BLD-MCNC: 9.1 G/DL (ref 13–17)
IMM GRANULOCYTES # BLD AUTO: 0.06 K/UL (ref 0–0.3)
IMM GRANULOCYTES NFR BLD: 1 %
IRON SATN MFR SERPL: 10 % (ref 20–55)
IRON SERPL-MCNC: 17 UG/DL (ref 61–157)
LYMPHOCYTES NFR BLD: 0.66 K/UL (ref 1.1–3.7)
LYMPHOCYTES RELATIVE PERCENT: 11 % (ref 24–43)
MCH RBC QN AUTO: 29.2 PG (ref 25.2–33.5)
MCHC RBC AUTO-ENTMCNC: 30.1 G/DL (ref 28.4–34.8)
MCV RBC AUTO: 96.8 FL (ref 82.6–102.9)
MONOCYTES NFR BLD: 0.42 K/UL (ref 0.1–1.2)
MONOCYTES NFR BLD: 7 % (ref 3–12)
MORPHOLOGY: ABNORMAL
MYOGLOBIN SERPL-MCNC: 312 NG/ML (ref 28–72)
NEUTROPHILS NFR BLD: 78 % (ref 36–65)
NEUTS SEG NFR BLD: 4.68 K/UL (ref 1.5–8.1)
NRBC BLD-RTO: 0 PER 100 WBC
PLATELET # BLD AUTO: 220 K/UL (ref 138–453)
PMV BLD AUTO: 8.8 FL (ref 8.1–13.5)
POTASSIUM SERPL-SCNC: 4.1 MMOL/L (ref 3.7–5.3)
RBC # BLD AUTO: 3.12 M/UL (ref 4.21–5.77)
SODIUM SERPL-SCNC: 139 MMOL/L (ref 136–145)
TIBC SERPL-MCNC: 168 UG/DL (ref 250–450)
TROPONIN I SERPL HS-MCNC: 46 NG/L (ref 0–22)
UNSATURATED IRON BINDING CAPACITY: 151 UG/DL (ref 112–347)
VIT B12 SERPL-MCNC: 403 PG/ML (ref 232–1245)
WBC OTHER # BLD: 6 K/UL (ref 3.5–11.3)

## 2024-08-02 PROCEDURE — 6370000000 HC RX 637 (ALT 250 FOR IP): Performed by: STUDENT IN AN ORGANIZED HEALTH CARE EDUCATION/TRAINING PROGRAM

## 2024-08-02 PROCEDURE — 82550 ASSAY OF CK (CPK): CPT

## 2024-08-02 PROCEDURE — 6370000000 HC RX 637 (ALT 250 FOR IP): Performed by: INTERNAL MEDICINE

## 2024-08-02 PROCEDURE — 36415 COLL VENOUS BLD VENIPUNCTURE: CPT

## 2024-08-02 PROCEDURE — 6360000002 HC RX W HCPCS: Performed by: NURSE PRACTITIONER

## 2024-08-02 PROCEDURE — 6370000000 HC RX 637 (ALT 250 FOR IP): Performed by: NURSE PRACTITIONER

## 2024-08-02 PROCEDURE — 85025 COMPLETE CBC W/AUTO DIFF WBC: CPT

## 2024-08-02 PROCEDURE — 6360000002 HC RX W HCPCS: Performed by: STUDENT IN AN ORGANIZED HEALTH CARE EDUCATION/TRAINING PROGRAM

## 2024-08-02 PROCEDURE — 82607 VITAMIN B-12: CPT

## 2024-08-02 PROCEDURE — 2060000000 HC ICU INTERMEDIATE R&B

## 2024-08-02 PROCEDURE — 82746 ASSAY OF FOLIC ACID SERUM: CPT

## 2024-08-02 PROCEDURE — 2580000003 HC RX 258: Performed by: NURSE PRACTITIONER

## 2024-08-02 PROCEDURE — 83540 ASSAY OF IRON: CPT

## 2024-08-02 PROCEDURE — 80048 BASIC METABOLIC PNL TOTAL CA: CPT

## 2024-08-02 PROCEDURE — 99223 1ST HOSP IP/OBS HIGH 75: CPT | Performed by: INTERNAL MEDICINE

## 2024-08-02 PROCEDURE — 2580000003 HC RX 258

## 2024-08-02 PROCEDURE — 83550 IRON BINDING TEST: CPT

## 2024-08-02 PROCEDURE — 84484 ASSAY OF TROPONIN QUANT: CPT

## 2024-08-02 PROCEDURE — 6360000002 HC RX W HCPCS

## 2024-08-02 PROCEDURE — 2580000003 HC RX 258: Performed by: STUDENT IN AN ORGANIZED HEALTH CARE EDUCATION/TRAINING PROGRAM

## 2024-08-02 PROCEDURE — 99232 SBSQ HOSP IP/OBS MODERATE 35: CPT | Performed by: INTERNAL MEDICINE

## 2024-08-02 PROCEDURE — 83874 ASSAY OF MYOGLOBIN: CPT

## 2024-08-02 PROCEDURE — 99232 SBSQ HOSP IP/OBS MODERATE 35: CPT | Performed by: STUDENT IN AN ORGANIZED HEALTH CARE EDUCATION/TRAINING PROGRAM

## 2024-08-02 RX ORDER — AMOXICILLIN 500 MG/1
500 CAPSULE ORAL EVERY 12 HOURS SCHEDULED
Status: DISPENSED | OUTPATIENT
Start: 2024-08-02 | End: 2024-08-08

## 2024-08-02 RX ORDER — ASCORBIC ACID 500 MG
500 TABLET ORAL DAILY
Status: DISCONTINUED | OUTPATIENT
Start: 2024-08-02 | End: 2024-08-20 | Stop reason: HOSPADM

## 2024-08-02 RX ORDER — SODIUM BICARBONATE 650 MG/1
1300 TABLET ORAL 2 TIMES DAILY
Status: DISCONTINUED | OUTPATIENT
Start: 2024-08-02 | End: 2024-08-20 | Stop reason: HOSPADM

## 2024-08-02 RX ORDER — FERROUS SULFATE 325(65) MG
325 TABLET, DELAYED RELEASE (ENTERIC COATED) ORAL
Status: DISCONTINUED | OUTPATIENT
Start: 2024-08-03 | End: 2024-08-20 | Stop reason: HOSPADM

## 2024-08-02 RX ORDER — FOLIC ACID 1 MG/1
1 TABLET ORAL DAILY
Status: DISCONTINUED | OUTPATIENT
Start: 2024-08-02 | End: 2024-08-20 | Stop reason: HOSPADM

## 2024-08-02 RX ORDER — OXYCODONE HYDROCHLORIDE 5 MG/1
5 TABLET ORAL EVERY 6 HOURS PRN
Status: DISCONTINUED | OUTPATIENT
Start: 2024-08-02 | End: 2024-08-20 | Stop reason: HOSPADM

## 2024-08-02 RX ORDER — UBIDECARENONE 75 MG
50 CAPSULE ORAL DAILY
Status: DISCONTINUED | OUTPATIENT
Start: 2024-08-02 | End: 2024-08-20 | Stop reason: HOSPADM

## 2024-08-02 RX ORDER — DEXTROSE MONOHYDRATE AND SODIUM CHLORIDE 5; .45 G/100ML; G/100ML
INJECTION, SOLUTION INTRAVENOUS CONTINUOUS
Status: DISCONTINUED | OUTPATIENT
Start: 2024-08-02 | End: 2024-08-05

## 2024-08-02 RX ADMIN — MORPHINE SULFATE 2 MG: 4 INJECTION, SOLUTION INTRAMUSCULAR; INTRAVENOUS at 00:16

## 2024-08-02 RX ADMIN — AMPICILLIN 2000 MG: 2 INJECTION, POWDER, FOR SOLUTION INTRAMUSCULAR; INTRAVENOUS at 09:47

## 2024-08-02 RX ADMIN — QUETIAPINE FUMARATE 25 MG: 25 TABLET ORAL at 23:32

## 2024-08-02 RX ADMIN — AMPICILLIN 2000 MG: 2 INJECTION, POWDER, FOR SOLUTION INTRAMUSCULAR; INTRAVENOUS at 06:04

## 2024-08-02 RX ADMIN — HEPARIN SODIUM 5000 UNITS: 5000 INJECTION INTRAVENOUS; SUBCUTANEOUS at 14:32

## 2024-08-02 RX ADMIN — OXYCODONE HYDROCHLORIDE 5 MG: 5 TABLET ORAL at 14:32

## 2024-08-02 RX ADMIN — CEFEPIME 1000 MG: 1 INJECTION, POWDER, FOR SOLUTION INTRAMUSCULAR; INTRAVENOUS at 18:41

## 2024-08-02 RX ADMIN — SODIUM BICARBONATE 1300 MG: 648 TABLET ORAL at 14:32

## 2024-08-02 RX ADMIN — MORPHINE SULFATE 4 MG: 4 INJECTION INTRAVENOUS at 07:58

## 2024-08-02 RX ADMIN — HEPARIN SODIUM 5000 UNITS: 5000 INJECTION INTRAVENOUS; SUBCUTANEOUS at 06:38

## 2024-08-02 RX ADMIN — AMOXICILLIN 500 MG: 500 CAPSULE ORAL at 23:33

## 2024-08-02 RX ADMIN — AMPICILLIN 2000 MG: 2 INJECTION, POWDER, FOR SOLUTION INTRAMUSCULAR; INTRAVENOUS at 02:02

## 2024-08-02 RX ADMIN — SODIUM BICARBONATE 1300 MG: 648 TABLET ORAL at 23:32

## 2024-08-02 RX ADMIN — HEPARIN SODIUM 5000 UNITS: 5000 INJECTION INTRAVENOUS; SUBCUTANEOUS at 23:32

## 2024-08-02 RX ADMIN — DEXTROSE AND SODIUM CHLORIDE: 5; 450 INJECTION, SOLUTION INTRAVENOUS at 09:49

## 2024-08-02 RX ADMIN — SODIUM CHLORIDE, PRESERVATIVE FREE 10 ML: 5 INJECTION INTRAVENOUS at 23:46

## 2024-08-02 RX ADMIN — OXYCODONE HYDROCHLORIDE 5 MG: 5 TABLET ORAL at 23:34

## 2024-08-02 ASSESSMENT — PAIN SCALES - GENERAL
PAINLEVEL_OUTOF10: 7
PAINLEVEL_OUTOF10: 0
PAINLEVEL_OUTOF10: 6
PAINLEVEL_OUTOF10: 10
PAINLEVEL_OUTOF10: 10
PAINLEVEL_OUTOF10: 6

## 2024-08-02 ASSESSMENT — PAIN DESCRIPTION - LOCATION: LOCATION: BACK

## 2024-08-02 NOTE — PROGRESS NOTES
[facility]  Thomas Frost MD FACS      Urology Progress Note     Chief Complaint: Sepsis secondary to staghorn calculi    Subjective:  Underwent left nephrostomy tube placement, vitals have been stable  Pain level: None  Denies fever, chills, nausea, vomiting, chest pain, shortness of breath  Left nephrostomy tube in place, urostomy draining urine, still appears cloudy    A.m. labs pending    Patient Vitals for the past 24 hrs:   BP Temp Temp src Pulse Resp SpO2   08/02/24 0717 101/78 -- -- 67 24 96 %   08/02/24 0330 101/66 98 °F (36.7 °C) Oral 79 11 94 %   08/02/24 0046 -- -- -- -- 18 --   08/02/24 0016 -- -- -- -- 17 --   08/02/24 0015 121/67 97.9 °F (36.6 °C) Oral 64 23 98 %   08/01/24 2039 -- -- -- -- 15 --   08/01/24 1930 (!) 128/50 97.9 °F (36.6 °C) Oral 77 17 97 %   08/01/24 1655 132/70 98.9 °F (37.2 °C) Oral 76 16 98 %   08/01/24 1520 121/68 (!) 100.9 °F (38.3 °C) Axillary 78 16 95 %   08/01/24 1235 120/76 -- -- 93 22 --       Intake/Output Summary (Last 24 hours) at 8/2/2024 0819  Last data filed at 8/2/2024 0805  Gross per 24 hour   Intake 297.03 ml   Output 2325 ml   Net -2027.97 ml       Recent Labs     07/30/24  1700 08/01/24  0621 08/02/24  0445   WBC 7.5 7.2 6.0   HGB 10.3* 9.6* 9.1*   HCT 33.7* 33.6* 30.2*   MCV 96.0 101.5 96.8   PLT See Reflexed IPF Result 231 220     Recent Labs     07/31/24  1044 08/01/24  0621 08/02/24  0445    139 139   K 5.0 4.9 4.1   * 105 106   CO2 14* 15* 21   BUN 39* 36* 33*   CREATININE 3.7* 3.4* 3.1*       Recent Labs     07/30/24  1742   COLORU Yellow   PHUR 6.5   WBCUA TOO NUMEROUS TO COUNT   RBCUA 20 TO 50   BACTERIA MANY*   LEUKOCYTESUR LARGE*   UROBILINOGEN Normal   BILIRUBINUR NEGATIVE       Additional Lab/culture results:  Urine culture pseudomonas and enterococcus    Physical Exam:  Constitutional: Patient in no acute distress.  Neuro: alert and oriented to person place and time.  HEENT: No acute abnormalities  Lungs:  Respiratory effort normal on room air  Cardiovascular:  Heart rate regular rate and rhythm  Abdomen: Soft, non-tender, non-distended. Non peritonitic  Extremities: No leg swelling, no edema  : Left nephrostomy tube draining cloudy yellow urine, urostomy draining cloudy yellow urine    Interval Imaging Findings:  None    Impression:  Arias Martin is a 84 y.o. 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

## 2024-08-02 NOTE — PROGRESS NOTES
Pharmacy Note     Renal Dose Adjustment    Arias Martin is a 84 y.o. male. Pharmacist assessment of renally cleared medications.    Recent Labs     08/01/24  0621 08/02/24  0445   BUN 36* 33*       Recent Labs     08/01/24  0621 08/02/24  0445   CREATININE 3.4* 3.1*       Estimated Creatinine Clearance: 21 mL/min (A) (based on SCr of 3.1 mg/dL (H)).      Height:   Ht Readings from Last 1 Encounters:   01/30/24 1.88 m (6' 2\")     Weight:  Wt Readings from Last 1 Encounters:   07/31/24 94.6 kg (208 lb 8.9 oz)       The following medication dose has been adjusted based upon renal function per P&T Guidelines:             Ampicillin adjusted to 2 gm q8h for CrCl 15 to < 30 ml/hr.    Elmo Fernandes, PharmD, BCPS  8/2/2024  9:59 AM

## 2024-08-02 NOTE — PROGRESS NOTES
Renal Progress Note    Patient :  Arias Martin; 84 y.o. MRN# [id number]  Location:  2002/2002-01  Attending:  Hien Bradford MD  Admit Date:  7/30/2024   Hospital Day: 3    Subjective:     Patient seen and examined at bedside, chart and results reviewed. Spiked low grade fever overnight, 38.3. Currently afebrile, hemodynamically stable and saturating well on room air. He has had good UOP from conduit, 1.6L and has had 100cc out of nephrostomy tube in last 24hrs. He remains confused, dose have baseline dementia, denied any nausea, pain, fevers, or chills. He has a partial eaten tray of food beside him, patient stating he is not very hungry. Discussed with RN and does not sound like patient has been eating much of his food.  On ampicillin and cefepime  On 150mEq bicarb drip @ 125cc/hr   Labs and Imaging Reviewed:  Na 139, K 4.1, Cl 106, bicarb 21, BUN 33, Cr 3.1, Anion Gap 12, gluc 112, Ca 8.2, WBC 6.0, Hgb 9.1, PLTs 220  Urine Cx showed pseudomonas only resistant to levaquin and E. Faecalis for which sensitivities pending  Nephrostomy tube culture was negative  Blood cultures remain NGTD       History reviewed:   84 y.o. male with past medical history of cholecystectomy for bladder cancer status post ileal conduit, recurrent UTIs, recurrent nephrolithiasis, rt SDH w/ midline shift s/p Rt middle meningeal artery ablation, h/o bladder cancer s/p cystectomy w/ ileal conduit, CKD4, baseline creatinine 2.8-3.0 follows up with Dr. Booker, HTN, DM2, extensive kidney stone burden, follows with Dr. Funk.  Has chronic hydronephrosis bilaterally.  He was recently at an outside facility for complicated UTI.  Was transferred to an ECF facility subsequently. Presented to the ED with CC of AMS and worsening abdominal pain. Nephrology consulted for KARMA on CKD. Ultrasound showed bilateral hydronephrosis.  CT abdomen showed severe hydronephrosis on the left side moderate hydronephrosis in the right side, left ureteral  pertinent history and exam findings with the resident/fellow. I have reviewed the key elements of all parts of the encounter with the resident/fellow. I have seen and examined the patient with the resident/fellow.  I agree with the assessment and plan and status of the problem list as documented.      .  Electronically signed by Hollie Franco MD on 8/2/2024 at 1:00 PM        This note is created with the assistance of a speech-recognition program. While intending to generate a document that actually reflects the content of the visit, no guarantees can be provided that every mistake has been identified and corrected by editing.

## 2024-08-02 NOTE — PLAN OF CARE
Problem: Chronic Conditions and Co-morbidities  Goal: Patient's chronic conditions and co-morbidity symptoms are monitored and maintained or improved  8/2/2024 1859 by Mimi Matamoros RN  Outcome: Progressing  8/2/2024 1133 by Kathie Barajas RN  Outcome: Progressing  Flowsheets (Taken 8/2/2024 0800)  Care Plan - Patient's Chronic Conditions and Co-Morbidity Symptoms are Monitored and Maintained or Improved: Monitor and assess patient's chronic conditions and comorbid symptoms for stability, deterioration, or improvement     Problem: Discharge Planning  Goal: Discharge to home or other facility with appropriate resources  8/2/2024 1859 by Mimi Matamoros RN  Outcome: Progressing  8/2/2024 1133 by Kathie Barajas RN  Outcome: Progressing  Flowsheets (Taken 8/2/2024 0800)  Discharge to home or other facility with appropriate resources: Identify barriers to discharge with patient and caregiver     Problem: Safety - Adult  Goal: Free from fall injury  8/2/2024 1859 by Mimi Matamoros RN  Outcome: Progressing  8/2/2024 1133 by Kathie Barajas RN  Outcome: Progressing     Problem: Skin/Tissue Integrity  Goal: Absence of new skin breakdown  Description: 1.  Monitor for areas of redness and/or skin breakdown  2.  Assess vascular access sites hourly  3.  Every 4-6 hours minimum:  Change oxygen saturation probe site  4.  Every 4-6 hours:  If on nasal continuous positive airway pressure, respiratory therapy assess nares and determine need for appliance change or resting period.  8/2/2024 1859 by Mimi Matamoros RN  Outcome: Progressing  8/2/2024 1133 by Kathie Barajas RN  Outcome: Progressing     Problem: Pain  Goal: Verbalizes/displays adequate comfort level or baseline comfort level  8/2/2024 1859 by Mimi Matamoros RN  Outcome: Progressing  8/2/2024 1133 by Kathie Barajas RN  Outcome: Progressing     Problem: ABCDS Injury Assessment  Goal: Absence of physical injury  8/2/2024

## 2024-08-02 NOTE — PLAN OF CARE
Problem: Chronic Conditions and Co-morbidities  Goal: Patient's chronic conditions and co-morbidity symptoms are monitored and maintained or improved  8/1/2024 2345 by Shefali Montgomery RN  Outcome: Progressing  Flowsheets (Taken 7/31/2024 0840 by Marilia Lane, RN)  Care Plan - Patient's Chronic Conditions and Co-Morbidity Symptoms are Monitored and Maintained or Improved: Monitor and assess patient's chronic conditions and comorbid symptoms for stability, deterioration, or improvement  8/1/2024 1812 by Mimi Matamoros RN  Outcome: Progressing     Problem: Discharge Planning  Goal: Discharge to home or other facility with appropriate resources  8/1/2024 2345 by Shefali Montgomery RN  Outcome: Progressing  Flowsheets (Taken 7/31/2024 0840 by Marilia Lane, RN)  Discharge to home or other facility with appropriate resources: Identify barriers to discharge with patient and caregiver  8/1/2024 1812 by Mimi Matamoros RN  Outcome: Progressing     Problem: Safety - Adult  Goal: Free from fall injury  8/1/2024 2345 by Shefali Montgomery RN  Outcome: Progressing  Flowsheets (Taken 7/31/2024 0443 by Sary Torres, RN)  Free From Fall Injury: Instruct family/caregiver on patient safety  8/1/2024 1812 by Mimi Matamoros RN  Outcome: Progressing     Problem: Skin/Tissue Integrity  Goal: Absence of new skin breakdown  Description: 1.  Monitor for areas of redness and/or skin breakdown  2.  Assess vascular access sites hourly  3.  Every 4-6 hours minimum:  Change oxygen saturation probe site  4.  Every 4-6 hours:  If on nasal continuous positive airway pressure, respiratory therapy assess nares and determine need for appliance change or resting period.  8/1/2024 2345 by Shefali Montgomery RN  Outcome: Progressing  8/1/2024 1812 by Mimi Matamoros RN  Outcome: Progressing     Problem: Pain  Goal: Verbalizes/displays adequate comfort level or baseline comfort level  8/1/2024 2345 by Shefali Montgomery

## 2024-08-02 NOTE — PROGRESS NOTES
@[facility]    [facility]   IN-PATIENT SERVICE   [facility]    Progress Note    8/2/2024    4:26 PM    Name:   Arias Martin  MRN:     [id number]     Acct:      [id number]   Room:   2002/2002-01   Day:  3  Admit Date:  7/30/2024  4:30 PM    PCP:   Marcelo Olivia APRN - CNP  Code Status:  Full Code    Subjective:     C/C:   Chief Complaint   Patient presents with    Altered Mental Status     Interval History Status: improved.     Seen at bedside, hemodynamically stable  No acute events overnight, no new complaints  Continues to be on IV antibiotics, creatinine improving, fluids changed to D5 half-normal at 75 mill per hour, patient has not been eating well,    Brief History:     Per my colleague     \"Arias Martin is a 84 y.o. Non- / non  male who presents with Altered Mental Status   and is admitted to the hospital for the management of UTI (urinary tract infection).     84-year-old male with past medical history of suprapubic catheter with frequent UTIs, recent admission at [facility] for altered mentation due to UTI treated with cefepime/ceftriaxone, right subdural hematoma with midline shift s/p right middle meningeal artery ablation, history of bladder cancer status post cystectomy with ileal conduit, CKD stage IV, hypertension, history of dementia presented to ED from rehab due to altered mentation and worsening abdominal pain.     Patient has extensive history of kidney stone burden, patient supposed t to see Dr. Gilliam later this week for evaluation of his kidney stones.  Patient daughter-in-law who works at a facility mentioned that patient has some altered mentation at baseline but it has gotten worse and also the urine color has been significantly worsen.     At the time of my evaluation patient was feeling tired and sleepy and he said that he is not in the mood to answer question and he would like to rest and sleep for now.  Unable to get  Splenomegaly. 9. Prominent portion of ascending colon and hepatic flexure as well as a smaller portion of the small bowel are involved in the right abdominopelvic ventral wall peristomal hernia. No obstruction or inflammatory change. 10.  Diverticulosis coli without CT evidence of acute diverticulitis. 11.  Cholelithiasis without findings of acute cholecystitis.       Physical Examination:        General appearance:  alert, awake  Mental Status: Confused, with baseline dementia  lungs:  clear to auscultation bilaterally, normal effort  Heart:  regular rate and rhythm, no murmur  Abdomen:  soft, nontender, nondistended, normal bowel sounds, .  Extremities:  no edema, redness, tenderness in the calves  Skin:  no gross lesions, rashes, induration    Assessment:        Hospital Problems             Last Modified POA    * (Principal) UTI (urinary tract infection) 7/30/2024 Yes    Toxic metabolic encephalopathy 7/30/2024 Yes    Bilateral hydronephrosis 7/30/2024 Yes    Normochromic normocytic anemia 7/30/2024 Yes    Iron deficiency anemia 7/30/2024 Yes    Vitamin B 12 deficiency 8/2/2024 Yes    Folate deficiency 7/30/2024 Yes    Bladder cancer (Chronic) 7/30/2024 Yes    Kidney stone 8/1/2024 Yes    Left ureteral calculus 7/31/2024 Yes    GERD (gastroesophageal reflux disease) 7/30/2024 Yes    Acute kidney injury (HCC) 7/30/2024 Yes    Moderate malnutrition (HCC) 7/30/2024 Yes    S/P ileal conduit (HCC) 7/30/2024 Yes    CKD (chronic kidney disease), stage III (HCC) 7/30/2024 Yes    Hypotension 7/30/2024 Yes       Plan:        -Continue ampicillin, cefepime, pending ID evaluation  -Continue current IV fluids, encourage oral intake, dietary consult, started on oral bicarbonate as well, nephrology service signed off further follow-up with his primary nephrologist Dr. Mix, BMP in 1 week to be faxed to the nephrologist  -Underwent left nephrostomy tube placement, continue Flomax, follow urology service recommendations, will  need further treatment of stone as outpatient  -Delirium precautions, melatonin nightly  -Elevated troponins likely due to urosepsis, continue to monitor for any chest pain  -Continue to monitor H&H, continue folic acid, iron, B12 supplements has chronic anemia and splenomegaly on imaging, further workup as needed as outpatient    Hien Mcfadden Sra, MD  8/2/2024  4:26 PM

## 2024-08-02 NOTE — PLAN OF CARE
Problem: Chronic Conditions and Co-morbidities  Goal: Patient's chronic conditions and co-morbidity symptoms are monitored and maintained or improved  8/2/2024 1133 by Kathie Barajas RN  Outcome: Progressing  Flowsheets (Taken 8/2/2024 0800)  Care Plan - Patient's Chronic Conditions and Co-Morbidity Symptoms are Monitored and Maintained or Improved: Monitor and assess patient's chronic conditions and comorbid symptoms for stability, deterioration, or improvement  8/1/2024 2345 by Shefali Montgomery RN  Outcome: Progressing  Flowsheets (Taken 7/31/2024 0840 by Marilia Lane, RN)  Care Plan - Patient's Chronic Conditions and Co-Morbidity Symptoms are Monitored and Maintained or Improved: Monitor and assess patient's chronic conditions and comorbid symptoms for stability, deterioration, or improvement     Problem: Discharge Planning  Goal: Discharge to home or other facility with appropriate resources  8/2/2024 1133 by Kathie Barajas RN  Outcome: Progressing  Flowsheets (Taken 8/2/2024 0800)  Discharge to home or other facility with appropriate resources: Identify barriers to discharge with patient and caregiver  8/1/2024 2345 by Shefali Montgomery RN  Outcome: Progressing  Flowsheets (Taken 7/31/2024 0840 by Marilia Lane RN)  Discharge to home or other facility with appropriate resources: Identify barriers to discharge with patient and caregiver     Problem: Safety - Adult  Goal: Free from fall injury  8/2/2024 1133 by Kathie Barajas RN  Outcome: Progressing  8/1/2024 2345 by Shefali Montgomery RN  Outcome: Progressing  Flowsheets (Taken 7/31/2024 0443 by Sary Torres, RN)  Free From Fall Injury: Instruct family/caregiver on patient safety     Problem: Skin/Tissue Integrity  Goal: Absence of new skin breakdown  8/2/2024 1133 by Kathie Barajas RN  Outcome: Progressing  8/1/2024 2345 by Shefali Montgomery RN  Outcome: Progressing     Problem: Pain  Goal: Verbalizes/displays adequate comfort level or baseline

## 2024-08-02 NOTE — PROGRESS NOTES
Pt continuously repeating stating he \"doesn't want to eat, he doesn't want anything\"  Unable to give medications at this time.

## 2024-08-03 LAB
ANION GAP SERPL CALCULATED.3IONS-SCNC: 12 MMOL/L (ref 9–16)
BASOPHILS # BLD: <0.03 K/UL (ref 0–0.2)
BASOPHILS NFR BLD: 0 % (ref 0–2)
BUN SERPL-MCNC: 30 MG/DL (ref 8–23)
CALCIUM SERPL-MCNC: 8.2 MG/DL (ref 8.6–10.4)
CHLORIDE SERPL-SCNC: 104 MMOL/L (ref 98–107)
CO2 SERPL-SCNC: 23 MMOL/L (ref 20–31)
CREAT SERPL-MCNC: 3.2 MG/DL (ref 0.7–1.2)
EOSINOPHIL # BLD: 0.17 K/UL (ref 0–0.44)
EOSINOPHILS RELATIVE PERCENT: 3 % (ref 1–4)
ERYTHROCYTE [DISTWIDTH] IN BLOOD BY AUTOMATED COUNT: 16.7 % (ref 11.8–14.4)
GFR, ESTIMATED: 18 ML/MIN/1.73M2
GLUCOSE SERPL-MCNC: 157 MG/DL (ref 74–99)
HCT VFR BLD AUTO: 32.3 % (ref 40.7–50.3)
HGB BLD-MCNC: 9.3 G/DL (ref 13–17)
IMM GRANULOCYTES # BLD AUTO: 0.04 K/UL (ref 0–0.3)
IMM GRANULOCYTES NFR BLD: 1 %
LYMPHOCYTES NFR BLD: 0.89 K/UL (ref 1.1–3.7)
LYMPHOCYTES RELATIVE PERCENT: 13 % (ref 24–43)
MCH RBC QN AUTO: 28.9 PG (ref 25.2–33.5)
MCHC RBC AUTO-ENTMCNC: 28.8 G/DL (ref 28.4–34.8)
MCV RBC AUTO: 100.3 FL (ref 82.6–102.9)
MICROORGANISM SPEC CULT: ABNORMAL
MICROORGANISM SPEC CULT: ABNORMAL
MONOCYTES NFR BLD: 0.43 K/UL (ref 0.1–1.2)
MONOCYTES NFR BLD: 6 % (ref 3–12)
NEUTROPHILS NFR BLD: 77 % (ref 36–65)
NEUTS SEG NFR BLD: 5.26 K/UL (ref 1.5–8.1)
NRBC BLD-RTO: 0 PER 100 WBC
PLATELET # BLD AUTO: 215 K/UL (ref 138–453)
PMV BLD AUTO: 9.4 FL (ref 8.1–13.5)
POTASSIUM SERPL-SCNC: 3.9 MMOL/L (ref 3.7–5.3)
RBC # BLD AUTO: 3.22 M/UL (ref 4.21–5.77)
RBC # BLD: ABNORMAL 10*6/UL
SERVICE CMNT-IMP: ABNORMAL
SODIUM SERPL-SCNC: 139 MMOL/L (ref 136–145)
SPECIMEN DESCRIPTION: ABNORMAL
WBC OTHER # BLD: 6.8 K/UL (ref 3.5–11.3)

## 2024-08-03 PROCEDURE — 80048 BASIC METABOLIC PNL TOTAL CA: CPT

## 2024-08-03 PROCEDURE — 97535 SELF CARE MNGMENT TRAINING: CPT

## 2024-08-03 PROCEDURE — 2580000003 HC RX 258: Performed by: NURSE PRACTITIONER

## 2024-08-03 PROCEDURE — 6360000002 HC RX W HCPCS: Performed by: STUDENT IN AN ORGANIZED HEALTH CARE EDUCATION/TRAINING PROGRAM

## 2024-08-03 PROCEDURE — 2580000003 HC RX 258: Performed by: STUDENT IN AN ORGANIZED HEALTH CARE EDUCATION/TRAINING PROGRAM

## 2024-08-03 PROCEDURE — 36415 COLL VENOUS BLD VENIPUNCTURE: CPT

## 2024-08-03 PROCEDURE — 85025 COMPLETE CBC W/AUTO DIFF WBC: CPT

## 2024-08-03 PROCEDURE — 6370000000 HC RX 637 (ALT 250 FOR IP): Performed by: INTERNAL MEDICINE

## 2024-08-03 PROCEDURE — 2580000003 HC RX 258

## 2024-08-03 PROCEDURE — 6360000002 HC RX W HCPCS: Performed by: NURSE PRACTITIONER

## 2024-08-03 PROCEDURE — 2060000000 HC ICU INTERMEDIATE R&B

## 2024-08-03 PROCEDURE — 99232 SBSQ HOSP IP/OBS MODERATE 35: CPT | Performed by: STUDENT IN AN ORGANIZED HEALTH CARE EDUCATION/TRAINING PROGRAM

## 2024-08-03 PROCEDURE — 6370000000 HC RX 637 (ALT 250 FOR IP): Performed by: NURSE PRACTITIONER

## 2024-08-03 PROCEDURE — 6370000000 HC RX 637 (ALT 250 FOR IP): Performed by: STUDENT IN AN ORGANIZED HEALTH CARE EDUCATION/TRAINING PROGRAM

## 2024-08-03 RX ADMIN — OXYCODONE HYDROCHLORIDE 5 MG: 5 TABLET ORAL at 20:45

## 2024-08-03 RX ADMIN — CEFEPIME 1000 MG: 1 INJECTION, POWDER, FOR SOLUTION INTRAMUSCULAR; INTRAVENOUS at 16:39

## 2024-08-03 RX ADMIN — SODIUM BICARBONATE 1300 MG: 648 TABLET ORAL at 20:34

## 2024-08-03 RX ADMIN — CYANOCOBALAMIN TAB 500 MCG 50 MCG: 500 TAB at 09:05

## 2024-08-03 RX ADMIN — OXYCODONE HYDROCHLORIDE 5 MG: 5 TABLET ORAL at 12:15

## 2024-08-03 RX ADMIN — SODIUM CHLORIDE, PRESERVATIVE FREE 10 ML: 5 INJECTION INTRAVENOUS at 09:06

## 2024-08-03 RX ADMIN — HEPARIN SODIUM 5000 UNITS: 5000 INJECTION INTRAVENOUS; SUBCUTANEOUS at 05:55

## 2024-08-03 RX ADMIN — QUETIAPINE FUMARATE 25 MG: 25 TABLET ORAL at 20:33

## 2024-08-03 RX ADMIN — DEXTROSE AND SODIUM CHLORIDE: 5; 450 INJECTION, SOLUTION INTRAVENOUS at 16:35

## 2024-08-03 RX ADMIN — OXYCODONE HYDROCHLORIDE 5 MG: 5 TABLET ORAL at 05:55

## 2024-08-03 RX ADMIN — AMOXICILLIN 500 MG: 500 CAPSULE ORAL at 20:33

## 2024-08-03 RX ADMIN — SODIUM CHLORIDE, PRESERVATIVE FREE 10 ML: 5 INJECTION INTRAVENOUS at 20:42

## 2024-08-03 RX ADMIN — HEPARIN SODIUM 5000 UNITS: 5000 INJECTION INTRAVENOUS; SUBCUTANEOUS at 20:34

## 2024-08-03 ASSESSMENT — PAIN SCALES - GENERAL
PAINLEVEL_OUTOF10: 0
PAINLEVEL_OUTOF10: 7
PAINLEVEL_OUTOF10: 9
PAINLEVEL_OUTOF10: 0
PAINLEVEL_OUTOF10: 9

## 2024-08-03 ASSESSMENT — PAIN DESCRIPTION - DESCRIPTORS: DESCRIPTORS: ACHING

## 2024-08-03 ASSESSMENT — PAIN DESCRIPTION - LOCATION
LOCATION: BACK
LOCATION: BACK

## 2024-08-03 ASSESSMENT — PAIN DESCRIPTION - ORIENTATION: ORIENTATION: POSTERIOR

## 2024-08-03 NOTE — PLAN OF CARE
Problem: Chronic Conditions and Co-morbidities  Goal: Patient's chronic conditions and co-morbidity symptoms are monitored and maintained or improved  8/3/2024 1845 by Genesis Apple RN  Outcome: Progressing  8/3/2024 1845 by Genesis Apple RN  Outcome: Progressing     Problem: Discharge Planning  Goal: Discharge to home or other facility with appropriate resources  8/3/2024 1845 by eGnesis Apple RN  Outcome: Progressing  8/3/2024 1845 by Genesis Apple RN  Outcome: Progressing     Problem: Safety - Adult  Goal: Free from fall injury  8/3/2024 1845 by Genesis Apple RN  Outcome: Progressing  8/3/2024 1845 by Genesis Apple RN  Outcome: Progressing     Problem: Skin/Tissue Integrity  Goal: Absence of new skin breakdown  Description: 1.  Monitor for areas of redness and/or skin breakdown  2.  Assess vascular access sites hourly  3.  Every 4-6 hours minimum:  Change oxygen saturation probe site  4.  Every 4-6 hours:  If on nasal continuous positive airway pressure, respiratory therapy assess nares and determine need for appliance change or resting period.  8/3/2024 1845 by Genesis Apple RN  Outcome: Progressing  8/3/2024 1845 by Genesis Apple RN  Outcome: Progressing     Problem: Pain  Goal: Verbalizes/displays adequate comfort level or baseline comfort level  8/3/2024 1845 by Genesis Apple RN  Outcome: Progressing  8/3/2024 1845 by Genesis Apple RN  Outcome: Progressing     Problem: ABCDS Injury Assessment  Goal: Absence of physical injury  8/3/2024 1845 by Genesis Apple RN  Outcome: Progressing  8/3/2024 1845 by Genesis Apple RN  Outcome: Progressing     Problem: Nutrition Deficit:  Goal: Optimize nutritional status  8/3/2024 1845 by Genesis Apple RN  Outcome: Progressing  8/3/2024 1845 by Genesis Apple RN  Outcome: Progressing  8/3/2024 1212 by Edwige Julio RD  Outcome: Progressing  Flowsheets (Taken 8/3/2024 1212)  Nutrient intake appropriate for

## 2024-08-03 NOTE — PROGRESS NOTES
[facility]    [facility]   IN-PATIENT SERVICE   [facility]    Progress Note    8/3/2024    3:02 PM    Name:   Arias Martin  MRN:     [id number]     Acct:      [id number]   Room:   2002/2002-01   Day:  4  Admit Date:  7/30/2024  4:30 PM    PCP:   Marcelo Olivia APRN - CNP  Code Status:  Full Code    Subjective:     C/C:   Chief Complaint   Patient presents with    Altered Mental Status     Interval History Status: improved.     Seen at bedside, hemodynamically stable  No acute events overnight, no new complaints  Continues to be on amoxicillin, cefepime, creatinine improving, continues to be on IV fluids    Labs reviewed  Working on placement    Brief History:     Per my colleague     \"Arias Martin is a 84 y.o. Non- / non  male who presents with Altered Mental Status   and is admitted to the hospital for the management of UTI (urinary tract infection).     84-year-old male with past medical history of suprapubic catheter with frequent UTIs, recent admission at [facility] for altered mentation due to UTI treated with cefepime/ceftriaxone, right subdural hematoma with midline shift s/p right middle meningeal artery ablation, history of bladder cancer status post cystectomy with ileal conduit, CKD stage IV, hypertension, history of dementia presented to ED from rehab due to altered mentation and worsening abdominal pain.     Patient has extensive history of kidney stone burden, patient supposed t to see Dr. Gilliam later this week for evaluation of his kidney stones.  Patient daughter-in-law who works at a facility mentioned that patient has some altered mentation at baseline but it has gotten worse and also the urine color has been significantly worsen.     At the time of my evaluation patient was feeling tired and sleepy and he said that he is not in the mood to answer question and he would like to rest and sleep for now.  Unable to get any further

## 2024-08-03 NOTE — PLAN OF CARE
Problem: Chronic Conditions and Co-morbidities  Goal: Patient's chronic conditions and co-morbidity symptoms are monitored and maintained or improved  8/3/2024 0409 by Efra Spear RN  Outcome: Progressing  8/2/2024 1859 by Mimi Matamoros RN  Outcome: Progressing     Problem: Discharge Planning  Goal: Discharge to home or other facility with appropriate resources  8/3/2024 0409 by Efra Spear RN  Outcome: Progressing  8/2/2024 1859 by Mimi Matamoros RN  Outcome: Progressing

## 2024-08-03 NOTE — PROGRESS NOTES
Comprehensive Nutrition Assessment    Type and Reason for Visit:  Initial, Consult (poor PO)    Nutrition Recommendations/Plan:   Continue diet as tolerated  Start low kcal, high protein ONS once a day  Monitor intakes, ONS, weight, labs, GI status, fluid status, N/V.     Malnutrition Assessment:  Malnutrition Status:  Mild malnutrition (at least) (08/03/24 1202)    Context:  Acute Illness     Findings of the 6 clinical characteristics of malnutrition:  Energy Intake:  75% or less of estimated energy requirements for 7 or more days (predicted)  Weight Loss:  No significant weight loss     Body Fat Loss:  Unable to assess     Muscle Mass Loss:  Mild muscle mass loss Thigh (quadriceps), Calf (gastrocnemius) (visual)  Fluid Accumulation:  Unable to assess     Strength:  Not Performed    Nutrition Assessment:    Consult for poor PO and ONS. 85 yo M adm AMS, UTI. PMHx: MAHESH, Vit B12 deficiency, hx bladder CA, GERD, CKD, moderate malnutrition. Weights reviewed, appear stable without weight loss. Per RN, picking at breakfast but unsure of intake. No intakes recorded per nursing documentation. Attempted to interview pt r/t weight, intakes, appetite however pt reporting he is not sure to all questions. Previously liked Magic cup ONS per RD notes. Glu  mg/dL. Pt reports \"my back hurts and i might throw up\" at visit, notified RN. Poor PO likely r/t acute illness. Plan to order low kcal, high protein ONS. Noted Iron, Vit B12, folate supplementation.    Nutrition Related Findings:    Meds and labs reviewed. Wound Type: None, Surgical Incision       Current Nutrition Intake & Therapies:    Average Meal Intake: Unable to assess  Average Supplements Intake: None Ordered  ADULT DIET; Dysphagia - Soft and Bite Sized    Anthropometric Measures:  Height: 188 cm (6' 2.02\")  Ideal Body Weight (IBW): 190 lbs (86 kg)    Current Body Weight: 94.6 kg (208 lb 8.9 oz), 109.8 % IBW. Weight Source: Bed Scale  Current BMI (kg/m2):  (kg/m2): 26.8  BMI Categories: Overweight (BMI 25.0-29.9)    Estimated Daily Nutrient Needs:  Energy Requirements Based On: Kcal/kg  Weight Used for Energy Requirements: Admission  Energy (kcal/day): 8079-5614 kcal/d  Weight Used for Protein Requirements: Ideal  Protein (g/day): 110-120 g/kg  Method Used for Fluid Requirements: 1 ml/kcal  Fluid (ml/day): 5221-0835 mL or per MD    Nutrition Diagnosis:   Inadequate oral intake related to inadequate protein-energy intake as evidenced by mild muscle loss, nausea    Nutrition Interventions:   Food and/or Nutrient Delivery: Continue Current Diet, Start Oral Nutrition Supplement  Nutrition Education/Counseling: No recommendation at this time  Coordination of Nutrition Care: Continue to monitor while inpatient  Plan of Care discussed with: RN    Goals:  Previous Goal Met:  (goal set)  Goals: Meet at least 75% of estimated needs, prior to discharge       Nutrition Monitoring and Evaluation:   Behavioral-Environmental Outcomes: None Identified  Food/Nutrient Intake Outcomes: Food and Nutrient Intake, Supplement Intake  Physical Signs/Symptoms Outcomes: Biochemical Data, Nutrition Focused Physical Findings, Weight, GI Status    Discharge Planning:    Continue current diet, Continue Oral Nutrition Supplement     Edwige Julio RD  Contact: [phone]

## 2024-08-03 NOTE — CONSULTS
[facility] -   Infectious diseases evaluation  admission date 7/30/2024    reason for consultation:   UTI and antibiotic management    Impression :   Current:  Acute encephalopathy - improved  Positive SIRS criteria from infection  Bilateral staghorn kidney stones, with bilateral hydronephrosis  7/31 right percutaneous nephrostomy tube placed  Urine culture from the PCN T neg  Patient has an ileal conduit post cystectomy for cancer  7/30 Pseudomonas ( R levaquin ) and Enterococcus faecalis UTI, taken from the ileal conduit, infection versus colonization  CKD 4  Dementia    Other:  Allergy tetracycline  Discussion / summary of stay / plan of care/ Recommendations:     HENCE:   8/1 cefepime and amoxicillin ( 8/2 stop iv ampicillin )  Zosyn sensi to pseudomonas is pend  Zosyn is a good single choice if deemed sensitive  Disc w micro lab  Would plan total 7 days antibiotics        Claims his ear is hurting but wont let me touch it or examine it \" dont touch me \"  Infection Control Recommendations   Tracy Precautions  Contact Isolation     Antimicrobial Stewardship Recommendations   Simplification of therapy  Targeted therapy      History of Present Illness:   Initial history:  Arias Martin is a 84 y.o.-year-old male with a history of suprapubic catheter frequent UTIs and sometimes encephalopathy related to UTIs, history of right subdural hematoma with midline shift and right meningeal artery ablation, history of bladder cancer post cystectomy and ileal conduit, CKD stage IV, history of dementia,    Presents to the emergency room from the rehab again with altered mentation, and abdominal pain.  Since his urine color has been worsening, and his mentation has been getting worse as well over the last few days.  He was tired and sleepy not responding    Is had some kidney stones and follows with urology    Upon admission he was tachycardic tachypneic, hypotensive, fits  Last Year: Never true   Transportation Needs: Unknown (1/30/2024)    PRAPARE - Transportation     Lack of Transportation (Medical): Not on file     Lack of Transportation (Non-Medical): No   Physical Activity: Inactive (10/11/2022)    Exercise Vital Sign     Days of Exercise per Week: 0 days     Minutes of Exercise per Session: 0 min   Stress: Not on file   Social Connections: Not on file   Intimate Partner Violence: Not on file   Housing Stability: Unknown (1/30/2024)    Housing Stability Vital Sign     Unable to Pay for Housing in the Last Year: Not on file     Number of Places Lived in the Last Year: Not on file     Unstable Housing in the Last Year: No       Family History:     Family History   Problem Relation Age of Onset    Diabetes Mother     Diabetes Sister       Medical Decision Making:   I have independently reviewed/ordered the following labs:    CBC with Differential:   Recent Labs     08/01/24 0621 08/02/24 0445   WBC 7.2 6.0   HGB 9.6* 9.1*   HCT 33.6* 30.2*    220   LYMPHOPCT 8* 11*   MONOPCT 3 7   EOSPCT 0* 3     BMP:  Recent Labs     08/01/24 0621 08/02/24 0445    139   K 4.9 4.1    106   CO2 15* 21   BUN 36* 33*   CREATININE 3.4* 3.1*     Hepatic Function Panel:   Recent Labs     08/01/24 0621   BILIDIR <0.2   IBILI Can not be calculated   BILITOT 0.4   ALKPHOS 79   ALT 12   AST 21     No results for input(s): \"RPR\" in the last 72 hours.  No results for input(s): \"HIV\" in the last 72 hours.  No results for input(s): \"BC\" in the last 72 hours.  Lab Results   Component Value Date/Time    CREATININE 3.1 08/02/2024 04:45 AM    GLUCOSE 112 08/02/2024 04:45 AM    GLUCOSE 93 07/12/2024 05:45 AM       Detailed results:        Thank you for allowing us to participate in the care of this patient.Please call with questions.    This note is created with the assistance of a speech recognition program.  While intending to generate adocument that actually reflects the content of the  visit, the document can still have some errors including those of syntax and sound a like substitutions which may escape proof reading.  It such instances, actual meaningcan be extrapolated by contextual diversion.    Pina Smith MD  Office: [phone]  Perfect serve / office [phone]

## 2024-08-03 NOTE — PROGRESS NOTES
Occupational Therapy  Facility/Department: [facility] CAR 2- STEPDOWN   Daily Treatment Note  Patient Name: Arias Martin        MRN: [id number]    : 1940    Date of Service: 8/3/2024    Discharge Recommendations  Discharge Recommendations: Patient would benefit from continued therapy after discharge  Assessment  Performance deficits / Impairments: Decreased functional mobility ;Decreased ADL status;Decreased cognition;Decreased endurance;Decreased balance;Decreased strength;Decreased safe awareness;Decreased fine motor control;Decreased coordination  Prognosis: Fair  Activity Tolerance  Activity Tolerance: Treatment limited secondary to decreased cognition;Patient limited by pain  Safety Devices  Type of Devices: Call light within reach;Nurse notified;Left in bed;Bed alarm in place    Restrictions/Precautions  Restrictions/Precautions  Restrictions/Precautions: Contact Precautions;Up as Tolerated;Fall Risk  Required Braces or Orthoses?: No  Position Activity Restriction  Other position/activity restrictions: s/p Left nephrostomy tube placement 24, urostomy; up with assistance.  Pain Rating: \"hurts a lot\"  Pain Location: lower back  Non-Pharmaceutical Pain Intervention: Repositioned  Subjective  General  Family / Caregiver Present: No    Objective  Orientation  Overall Orientation Status: Impaired  Orientation Level: Oriented to person;Disoriented to place;Disoriented to time;Disoriented to situation  Cognition  Overall Cognitive Status: Exceptions  Arousal/Alertness: Inconsistent responses to stimuli  Following Commands: Follows one step commands with repetition;Follows one step commands with increased time  Attention Span: Attends with cues to redirect;Difficulty attending to directions  Memory: Decreased recall of recent events  Safety Judgement: Decreased awareness of need for assistance;Decreased awareness of need for safety  Problem Solving: Assistance required to identify errors made;Assistance required  at Min A to promote OOB activity and perform pressure reliefs  Short Term Goal 2: demo UB ADLs at Supervision with x1 VC  Short Term Goal 3: demo LB ADLs at Min A using AE PRN  Short Term Goal 4: demo 20+ min of dynamic sitting tolerance at SBA to participate in ADLs  Short Term Goal 5: notify OTR to update POC as patient progresses  Long Term Goals  Additional Goals?: Yes    Plan  Occupational Therapy Plan  Times Per Week: 4-5x/wk  Current Treatment Recommendations: Strengthening, Balance training, Endurance training, Safety education & training, Patient/Caregiver education & training, Self-Care / ADL, Equipment evaluation, education, & procurement, Cognitive reorientation    AM-PAC Daily Activities Inpatient  AM-PAC Daily Activity - Inpatient   How much help is needed for putting on and taking off regular lower body clothing?: A Lot  How much help is needed for bathing (which includes washing, rinsing, drying)?: A Lot  How much help is needed for toileting (which includes using toilet, bedpan, or urinal)?: A Lot  How much help is needed for putting on and taking off regular upper body clothing?: A Little  How much help is needed for taking care of personal grooming?: A Little  How much help for eating meals?: None  AM-PAC Inpatient Daily Activity Raw Score: 16  AM-PAC Inpatient ADL T-Scale Score : 35.96  ADL Inpatient CMS 0-100% Score: 53.32  ADL Inpatient CMS G-Code Modifier : CK    Minutes  OT Individual Minutes  Time In: 1207  Time Out: 1250  Minutes: 43  Time Code Minutes   Timed Code Treatment Minutes: 43 Minutes      Electronically signed by CHINMAY Heller on 8/3/24 at 1:08 PM EDT

## 2024-08-04 PROBLEM — G92.8 TOXIC METABOLIC ENCEPHALOPATHY: Status: ACTIVE | Noted: 2024-08-04

## 2024-08-04 PROBLEM — N10 ACUTE PYELONEPHRITIS: Status: ACTIVE | Noted: 2024-08-04

## 2024-08-04 LAB
ANION GAP SERPL CALCULATED.3IONS-SCNC: 9 MMOL/L (ref 9–16)
BUN SERPL-MCNC: 28 MG/DL (ref 8–23)
CALCIUM SERPL-MCNC: 8.3 MG/DL (ref 8.6–10.4)
CHLORIDE SERPL-SCNC: 106 MMOL/L (ref 98–107)
CO2 SERPL-SCNC: 25 MMOL/L (ref 20–31)
CREAT SERPL-MCNC: 3 MG/DL (ref 0.7–1.2)
GFR, ESTIMATED: 20 ML/MIN/1.73M2
GLUCOSE BLD-MCNC: 87 MG/DL (ref 75–110)
GLUCOSE SERPL-MCNC: 97 MG/DL (ref 74–99)
MICROORGANISM SPEC CULT: NORMAL
MICROORGANISM SPEC CULT: NORMAL
POTASSIUM SERPL-SCNC: 3.8 MMOL/L (ref 3.7–5.3)
SERVICE CMNT-IMP: NORMAL
SERVICE CMNT-IMP: NORMAL
SODIUM SERPL-SCNC: 140 MMOL/L (ref 136–145)
SPECIMEN DESCRIPTION: NORMAL
SPECIMEN DESCRIPTION: NORMAL

## 2024-08-04 PROCEDURE — 82947 ASSAY GLUCOSE BLOOD QUANT: CPT

## 2024-08-04 PROCEDURE — 6360000002 HC RX W HCPCS: Performed by: NURSE PRACTITIONER

## 2024-08-04 PROCEDURE — 6370000000 HC RX 637 (ALT 250 FOR IP): Performed by: STUDENT IN AN ORGANIZED HEALTH CARE EDUCATION/TRAINING PROGRAM

## 2024-08-04 PROCEDURE — 2580000003 HC RX 258: Performed by: STUDENT IN AN ORGANIZED HEALTH CARE EDUCATION/TRAINING PROGRAM

## 2024-08-04 PROCEDURE — 6370000000 HC RX 637 (ALT 250 FOR IP): Performed by: NURSE PRACTITIONER

## 2024-08-04 PROCEDURE — 99232 SBSQ HOSP IP/OBS MODERATE 35: CPT | Performed by: STUDENT IN AN ORGANIZED HEALTH CARE EDUCATION/TRAINING PROGRAM

## 2024-08-04 PROCEDURE — 2580000003 HC RX 258

## 2024-08-04 PROCEDURE — 6360000002 HC RX W HCPCS

## 2024-08-04 PROCEDURE — 6360000002 HC RX W HCPCS: Performed by: STUDENT IN AN ORGANIZED HEALTH CARE EDUCATION/TRAINING PROGRAM

## 2024-08-04 PROCEDURE — 99232 SBSQ HOSP IP/OBS MODERATE 35: CPT | Performed by: INTERNAL MEDICINE

## 2024-08-04 PROCEDURE — 2060000000 HC ICU INTERMEDIATE R&B

## 2024-08-04 PROCEDURE — 80048 BASIC METABOLIC PNL TOTAL CA: CPT

## 2024-08-04 PROCEDURE — 6370000000 HC RX 637 (ALT 250 FOR IP): Performed by: INTERNAL MEDICINE

## 2024-08-04 PROCEDURE — 36415 COLL VENOUS BLD VENIPUNCTURE: CPT

## 2024-08-04 PROCEDURE — 2580000003 HC RX 258: Performed by: NURSE PRACTITIONER

## 2024-08-04 RX ORDER — MORPHINE SULFATE 4 MG/ML
4 INJECTION, SOLUTION INTRAMUSCULAR; INTRAVENOUS
Status: DISCONTINUED | OUTPATIENT
Start: 2024-08-04 | End: 2024-08-19

## 2024-08-04 RX ORDER — CYCLOBENZAPRINE HCL 10 MG
10 TABLET ORAL 3 TIMES DAILY PRN
Status: DISCONTINUED | OUTPATIENT
Start: 2024-08-04 | End: 2024-08-20 | Stop reason: HOSPADM

## 2024-08-04 RX ORDER — MORPHINE SULFATE 2 MG/ML
2 INJECTION, SOLUTION INTRAMUSCULAR; INTRAVENOUS
Status: DISCONTINUED | OUTPATIENT
Start: 2024-08-04 | End: 2024-08-19

## 2024-08-04 RX ADMIN — CEFEPIME 1000 MG: 1 INJECTION, POWDER, FOR SOLUTION INTRAMUSCULAR; INTRAVENOUS at 16:55

## 2024-08-04 RX ADMIN — OXYCODONE HYDROCHLORIDE 5 MG: 5 TABLET ORAL at 08:07

## 2024-08-04 RX ADMIN — AMOXICILLIN 500 MG: 500 CAPSULE ORAL at 21:32

## 2024-08-04 RX ADMIN — OXYCODONE HYDROCHLORIDE 5 MG: 5 TABLET ORAL at 21:02

## 2024-08-04 RX ADMIN — TAMSULOSIN HYDROCHLORIDE 0.4 MG: 0.4 CAPSULE ORAL at 14:33

## 2024-08-04 RX ADMIN — SODIUM CHLORIDE, PRESERVATIVE FREE 10 ML: 5 INJECTION INTRAVENOUS at 21:03

## 2024-08-04 RX ADMIN — QUETIAPINE FUMARATE 25 MG: 25 TABLET ORAL at 21:02

## 2024-08-04 RX ADMIN — DEXTROSE AND SODIUM CHLORIDE: 5; 450 INJECTION, SOLUTION INTRAVENOUS at 06:28

## 2024-08-04 RX ADMIN — AMOXICILLIN 500 MG: 500 CAPSULE ORAL at 14:33

## 2024-08-04 RX ADMIN — HEPARIN SODIUM 5000 UNITS: 5000 INJECTION INTRAVENOUS; SUBCUTANEOUS at 13:49

## 2024-08-04 RX ADMIN — HYOSCYAMINE SULFATE 125 MCG: 0.12 TABLET SUBLINGUAL at 22:52

## 2024-08-04 RX ADMIN — CYCLOBENZAPRINE 10 MG: 10 TABLET, FILM COATED ORAL at 14:31

## 2024-08-04 RX ADMIN — SODIUM BICARBONATE 1300 MG: 648 TABLET ORAL at 21:01

## 2024-08-04 RX ADMIN — MORPHINE SULFATE 4 MG: 4 INJECTION INTRAVENOUS at 17:41

## 2024-08-04 RX ADMIN — MORPHINE SULFATE 4 MG: 4 INJECTION INTRAVENOUS at 13:43

## 2024-08-04 RX ADMIN — HALOPERIDOL LACTATE 1 MG: 5 INJECTION, SOLUTION INTRAMUSCULAR at 17:37

## 2024-08-04 RX ADMIN — HEPARIN SODIUM 5000 UNITS: 5000 INJECTION INTRAVENOUS; SUBCUTANEOUS at 21:02

## 2024-08-04 RX ADMIN — HEPARIN SODIUM 5000 UNITS: 5000 INJECTION INTRAVENOUS; SUBCUTANEOUS at 06:22

## 2024-08-04 ASSESSMENT — PAIN SCALES - GENERAL
PAINLEVEL_OUTOF10: 9
PAINLEVEL_OUTOF10: 10
PAINLEVEL_OUTOF10: 8
PAINLEVEL_OUTOF10: 7
PAINLEVEL_OUTOF10: 10
PAINLEVEL_OUTOF10: 0
PAINLEVEL_OUTOF10: 9
PAINLEVEL_OUTOF10: 10
PAINLEVEL_OUTOF10: 10
PAINLEVEL_OUTOF10: 8

## 2024-08-04 ASSESSMENT — PAIN DESCRIPTION - ORIENTATION
ORIENTATION: LOWER
ORIENTATION: POSTERIOR;LOWER

## 2024-08-04 ASSESSMENT — PAIN DESCRIPTION - LOCATION
LOCATION: BACK
LOCATION: BACK

## 2024-08-04 ASSESSMENT — PAIN DESCRIPTION - DESCRIPTORS
DESCRIPTORS: ACHING;DISCOMFORT
DESCRIPTORS: DISCOMFORT;ACHING

## 2024-08-04 NOTE — PLAN OF CARE

## 2024-08-04 NOTE — PROGRESS NOTES
[email or web address]@    [facility]   IN-PATIENT SERVICE   [facility]    Progress Note    8/4/2024    4:06 PM    Name:   Arias Martin  MRN:     [id number]     Acct:      [id number]   Room:   2002/2002-01   Day:  5  Admit Date:  7/30/2024  4:30 PM    PCP:   Marcelo Olivia APRN - CNP  Code Status:  Full Code    Subjective:     C/C:   Chief Complaint   Patient presents with    Altered Mental Status     Interval History Status: improved.     Seen at bedside, hemodynamically stable  No acute events overnight,   Continues to complain of flank/back pain  Kidney function improving  Continues to be on amoxicillin, cefepime, .  Working on placement    Brief History:     Per my colleague     \"Arias Martin is a 84 y.o. Non- / non  male who presents with Altered Mental Status   and is admitted to the hospital for the management of UTI (urinary tract infection).     84-year-old male with past medical history of suprapubic catheter with frequent UTIs, recent admission at [facility] for altered mentation due to UTI treated with cefepime/ceftriaxone, right subdural hematoma with midline shift s/p right middle meningeal artery ablation, history of bladder cancer status post cystectomy with ileal conduit, CKD stage IV, hypertension, history of dementia presented to ED from rehab due to altered mentation and worsening abdominal pain.     Patient has extensive history of kidney stone burden, patient supposed t to see Dr. Gilliam later this week for evaluation of his kidney stones.  Patient daughter-in-law who works at a facility mentioned that patient has some altered mentation at baseline but it has gotten worse and also the urine color has been significantly worsen.     At the time of my evaluation patient was feeling tired and sleepy and he said that he is not in the mood to answer question and he would like to rest and sleep for now.  Unable to get any further history.    \"URICACID\", \"POCGLU\" in the last 72 hours.    Invalid input(s): \"PROT\", \"N7TGRUX\", \"LABGGT\", \"LDLCHOLESTEROL\"    ABG:  Lab Results   Component Value Date/Time    POCPH 7.19 05/02/2021 01:36 PM    POCPCO2 44 05/02/2021 01:36 PM    POCPO2 74 05/02/2021 01:36 PM    POCHCO3 16.9 05/02/2021 01:36 PM    NBEA 11 05/02/2021 01:36 PM    PBEA NOT REPORTED 05/02/2021 01:36 PM    FCQ9HTS 18 05/02/2021 01:36 PM    KHIK5MXJ 90 05/02/2021 01:36 PM    FIO2 32.0 05/02/2021 01:36 PM     Lab Results   Component Value Date/Time    SPECIAL Site: Urine 07/30/2024 05:42 PM     Lab Results   Component Value Date/Time    CULTURE NO SIGNIFICANT GROWTH 07/31/2024 05:15 PM       Radiology:  IR GUIDED NEPHROSTOMY CATH PLACEMENT LEFT    Result Date: 7/31/2024  Successful percutaneous 10 [place] left nephrostomy tube placement The catheter should be flushed every 6 hours with 10 cc normal saline until the urine is clear.  Patient should be monitored for any signs or symptoms of urosepsis due to the grossly infected urine.     CT CHEST ABDOMEN PELVIS WO CONTRAST Additional Contrast? None    Result Date: 7/30/2024  1.  CT CHEST: No acute abnormality. 2. Moderately severe calcific atherosclerosis coronary arteries. 3. Mild cardiomegaly. 4. CT ABDOMEN/PELVIS: Bilateral hydronephrosis, more severe left than right. 5. The two adjacent proximal to mid LEFT ureter calculi measuring 9 x 26 mm and 4 x 6 mm. 6. Prominent inflammatory changes left kidney and ureter proximal to the obstruction concerning for superimposed infection. 7. Prominent struvite (triple phosphate) renal calculi bilateral kidneys, greater left than right. 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 8. Splenomegaly. 9. Prominent portion of ascending colon and hepatic flexure as well as a smaller portion of the small bowel are involved in the right abdominopelvic ventral wall peristomal hernia. No obstruction or inflammatory change. 10.   due to urosepsis, continue to monitor for any chest pain  -Continue to monitor H&H, continue folic acid, iron, supplements has chronic anemia and splenomegaly on imaging, further workup as needed as outpatient    Hien Mcfadden Sra, MD  8/4/2024  4:06 PM

## 2024-08-04 NOTE — PROGRESS NOTES
[facility] -   Infectious diseases evaluation  admission date 7/30/2024    reason for consultation:   UTI and antibiotic management    Impression :   Current:  Acute encephalopathy - improved  Positive SIRS criteria from infection  Bilateral staghorn kidney stones, with bilateral hydronephrosis  7/31 right percutaneous nephrostomy tube placed  Urine culture from the PCN T neg  Patient has an ileal conduit post cystectomy for cancer  7/30 Pseudomonas ( R levaquin ) and Enterococcus faecalis UTI, taken from the ileal conduit, infection versus colonization  CKD 4  Dementia    Other:  Allergy tetracycline  Discussion / summary of stay / plan of care/ Recommendations:   8/1 cefepime and amoxicillin ( 8/2 stop iv ampicillin )  Would plan total 7 days antibiotics      Claims his ear is hurting but wont let me touch it or examine it \" dont touch me \"  Recommendations:   Cefepime 1 gm q 24 hr. Stop date 7-6-24  Amoxicillin 500 mg po q 12 hr  Infection Control Recommendations   [place] Precautions  Contact Isolation     Antimicrobial Stewardship Recommendations   Simplification of therapy  Targeted therapy      History of Present Illness:   Initial history:  Arisa Martin is a 84 y.o.-year-old male with a history of suprapubic catheter frequent UTIs and sometimes encephalopathy related to UTIs, history of right subdural hematoma with midline shift and right meningeal artery ablation, history of bladder cancer post cystectomy and ileal conduit, CKD stage IV, history of dementia,    Presents to the emergency room from the rehab again with altered mentation, and abdominal pain.  Since his urine color has been worsening, and his mentation has been getting worse as well over the last few days.  He was tired and sleepy not responding    He has kidney stones and follows with urology    Upon admission he was tachycardic tachypneic, hypotensive, fits SIRS criteria.  Urine analysis    Endocrine: Negative for cold intolerance.   Genitourinary:  Negative for dysuria.   Musculoskeletal:  Negative for arthralgias.   Skin:  Negative for color change.   Allergic/Immunologic: Negative for immunocompromised state.   Neurological:  Negative for dizziness.   Hematological:  Negative for adenopathy.   Psychiatric/Behavioral:  Negative for agitation.        Physical Examination :       Physical Exam  Constitutional:       General: He is not in acute distress.     Appearance: Normal appearance. He is not ill-appearing.   HENT:      Head: Normocephalic and atraumatic.      Nose: Nose normal.      Mouth/Throat:      Mouth: Mucous membranes are moist.   Eyes:      General: No scleral icterus.     Conjunctiva/sclera: Conjunctivae normal.   Cardiovascular:      Rate and Rhythm: Normal rate and regular rhythm.      Heart sounds: No murmur heard.     No gallop.   Pulmonary:      Breath sounds: No stridor. No rhonchi.   Abdominal:      Palpations: There is no mass.      Hernia: No hernia is present.   Musculoskeletal:         General: No swelling or deformity.      Cervical back: Neck supple. No rigidity.   Skin:     General: Skin is dry.      Coloration: Skin is not jaundiced.   Neurological:      Mental Status: He is alert. He is disoriented.      Cranial Nerves: No cranial nerve deficit.   Psychiatric:         Mood and Affect: Mood normal.         Thought Content: Thought content normal.         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 Ranker, Dr. Juarez    Cognitive communication deficit     COPD (chronic obstructive pulmonary disease) ([facility])     Diabetes mellitus ([facility])     SQ insulin 3 times per week only for this    DNR (do not  SURGERY Left     LAPAROSCOPY N/A 2021    EXPLORATORY LAPAROTOMY, EXTENSIVE LYSIS OF ADHESIONS, SMALL BOWEL RESECTION, REDUCTION OF STRANGULATED HERNIA performed by Andres Durant, DO at [facility] OR    OTHER SURGICAL HISTORY  2021    Left neph tube removal, Right neph tube stent placed    OTHER SURGICAL HISTORY  2021    looposcopy, excision of suture, loopogram    PROSTATE SURGERY  2006    removal    VITRECTOMY Left 16       Medications:      sodium bicarbonate  1,300 mg Oral BID    vitamin B-12  50 mcg Oral Daily    ferrous sulfate  325 mg Oral Daily with breakfast    ascorbic acid  500 mg Oral Daily    folic acid  1 mg Oral Daily    amoxicillin  500 mg Oral 2 times per day    heparin (porcine)  5,000 Units SubCUTAneous 3 times per day    cefepime  1,000 mg IntraVENous Q24H    [Held by provider] traZODone  50 mg Oral Nightly    QUEtiapine  25 mg Oral Nightly    sodium chloride flush  5-40 mL IntraVENous 2 times per day    tamsulosin  0.4 mg Oral Daily       Social History:     Social History     Socioeconomic History    Marital status:      Spouse name: Not on file    Number of children: 2    Years of education: 11.5    Highest education level: Not on file   Occupational History    Occupation: retired   Tobacco Use    Smoking status: Former     Current packs/day: 0.00     Average packs/day: 0.5 packs/day for 20.0 years (10.0 ttl pk-yrs)     Types: Cigarettes     Start date: 1959     Quit date: 1979     Years since quittin.1    Smokeless tobacco: Never    Tobacco comments:     quit in    Substance and Sexual Activity    Alcohol use: No     Alcohol/week: 0.0 standard drinks of alcohol    Drug use: No    Sexual activity: Not on file   Other Topics Concern    Not on file   Social History Narrative    Diet - terrible.  [employer] every morning, goes to [employer]    Caffeine intake per day - 2 cups per day q am    Exercise pattern - none, but care for self and house

## 2024-08-04 NOTE — PLAN OF CARE
Problem: Chronic Conditions and Co-morbidities  Goal: Patient's chronic conditions and co-morbidity symptoms are monitored and maintained or improved  8/4/2024 0155 by Isabel Flores RN  Outcome: Progressing  8/3/2024 1845 by Genesis Apple RN  Outcome: Progressing     Problem: Discharge Planning  Goal: Discharge to home or other facility with appropriate resources  8/4/2024 0155 by Isabel Flores RN  Outcome: Progressing  8/3/2024 1845 by Genesis Apple RN  Outcome: Progressing     Problem: Safety - Adult  Goal: Free from fall injury  8/4/2024 0155 by Isabel Flores RN  Outcome: Progressing  8/3/2024 1845 by Genesis Apple RN  Outcome: Progressing     Problem: Skin/Tissue Integrity  Goal: Absence of new skin breakdown  Description: 1.  Monitor for areas of redness and/or skin breakdown  2.  Assess vascular access sites hourly  3.  Every 4-6 hours minimum:  Change oxygen saturation probe site  4.  Every 4-6 hours:  If on nasal continuous positive airway pressure, respiratory therapy assess nares and determine need for appliance change or resting period.  8/4/2024 0155 by Isabel Flores RN  Outcome: Progressing  8/3/2024 1845 by Genesis Apple RN  Outcome: Progressing     Problem: Pain  Goal: Verbalizes/displays adequate comfort level or baseline comfort level  8/4/2024 0155 by Isabel Flores RN  Outcome: Progressing  8/3/2024 1845 by Genesis Apple RN  Outcome: Progressing     Problem: ABCDS Injury Assessment  Goal: Absence of physical injury  8/4/2024 0155 by Isabel Flores RN  Outcome: Progressing  8/3/2024 1845 by Genesis Apple RN  Outcome: Progressing     Problem: Nutrition Deficit:  Goal: Optimize nutritional status  8/4/2024 0155 by Isabel Flores RN  Outcome: Progressing  8/3/2024 1845 by Genesis Apple RN  Outcome: Progressing  8/3/2024 1212 by Edwige Julio, ANIA  Outcome: Progressing  Flowsheets (Taken 8/3/2024 1212)  Nutrient intake appropriate for improving, restoring,  or maintaining nutritional needs:   Assess nutritional status and recommend course of action   Monitor oral intake, labs, and treatment plans   Recommend appropriate diets, oral nutritional supplements, and vitamin/mineral supplements

## 2024-08-05 PROBLEM — N30.00 ACUTE CYSTITIS WITHOUT HEMATURIA: Status: ACTIVE | Noted: 2024-08-05

## 2024-08-05 LAB
ANION GAP SERPL CALCULATED.3IONS-SCNC: 10 MMOL/L (ref 9–16)
BASOPHILS # BLD: <0.03 K/UL (ref 0–0.2)
BASOPHILS NFR BLD: 0 % (ref 0–2)
BUN SERPL-MCNC: 26 MG/DL (ref 8–23)
CALCIUM SERPL-MCNC: 8.4 MG/DL (ref 8.6–10.4)
CHLORIDE SERPL-SCNC: 105 MMOL/L (ref 98–107)
CO2 SERPL-SCNC: 23 MMOL/L (ref 20–31)
CREAT SERPL-MCNC: 2.8 MG/DL (ref 0.7–1.2)
EOSINOPHIL # BLD: 0.22 K/UL (ref 0–0.44)
EOSINOPHILS RELATIVE PERCENT: 4 % (ref 1–4)
ERYTHROCYTE [DISTWIDTH] IN BLOOD BY AUTOMATED COUNT: 16.4 % (ref 11.8–14.4)
GFR, ESTIMATED: 21 ML/MIN/1.73M2
GLUCOSE SERPL-MCNC: 100 MG/DL (ref 74–99)
HCT VFR BLD AUTO: 30.7 % (ref 40.7–50.3)
HGB BLD-MCNC: 9.1 G/DL (ref 13–17)
IMM GRANULOCYTES # BLD AUTO: 0.04 K/UL (ref 0–0.3)
IMM GRANULOCYTES NFR BLD: 1 %
LYMPHOCYTES NFR BLD: 0.96 K/UL (ref 1.1–3.7)
LYMPHOCYTES RELATIVE PERCENT: 19 % (ref 24–43)
MCH RBC QN AUTO: 29.3 PG (ref 25.2–33.5)
MCHC RBC AUTO-ENTMCNC: 29.6 G/DL (ref 28.4–34.8)
MCV RBC AUTO: 98.7 FL (ref 82.6–102.9)
MONOCYTES NFR BLD: 0.36 K/UL (ref 0.1–1.2)
MONOCYTES NFR BLD: 7 % (ref 3–12)
NEUTROPHILS NFR BLD: 69 % (ref 36–65)
NEUTS SEG NFR BLD: 3.46 K/UL (ref 1.5–8.1)
NRBC BLD-RTO: 0 PER 100 WBC
PLATELET # BLD AUTO: 223 K/UL (ref 138–453)
PMV BLD AUTO: 9.4 FL (ref 8.1–13.5)
POTASSIUM SERPL-SCNC: 3.8 MMOL/L (ref 3.7–5.3)
RBC # BLD AUTO: 3.11 M/UL (ref 4.21–5.77)
RBC # BLD: ABNORMAL 10*6/UL
SODIUM SERPL-SCNC: 138 MMOL/L (ref 136–145)
WBC OTHER # BLD: 5.1 K/UL (ref 3.5–11.3)

## 2024-08-05 PROCEDURE — 6370000000 HC RX 637 (ALT 250 FOR IP): Performed by: INTERNAL MEDICINE

## 2024-08-05 PROCEDURE — 85025 COMPLETE CBC W/AUTO DIFF WBC: CPT

## 2024-08-05 PROCEDURE — 6370000000 HC RX 637 (ALT 250 FOR IP): Performed by: NURSE PRACTITIONER

## 2024-08-05 PROCEDURE — 6370000000 HC RX 637 (ALT 250 FOR IP): Performed by: STUDENT IN AN ORGANIZED HEALTH CARE EDUCATION/TRAINING PROGRAM

## 2024-08-05 PROCEDURE — 6370000000 HC RX 637 (ALT 250 FOR IP)

## 2024-08-05 PROCEDURE — 2580000003 HC RX 258: Performed by: STUDENT IN AN ORGANIZED HEALTH CARE EDUCATION/TRAINING PROGRAM

## 2024-08-05 PROCEDURE — 97535 SELF CARE MNGMENT TRAINING: CPT

## 2024-08-05 PROCEDURE — 97530 THERAPEUTIC ACTIVITIES: CPT

## 2024-08-05 PROCEDURE — 80048 BASIC METABOLIC PNL TOTAL CA: CPT

## 2024-08-05 PROCEDURE — 94761 N-INVAS EAR/PLS OXIMETRY MLT: CPT

## 2024-08-05 PROCEDURE — 6360000002 HC RX W HCPCS: Performed by: STUDENT IN AN ORGANIZED HEALTH CARE EDUCATION/TRAINING PROGRAM

## 2024-08-05 PROCEDURE — 36415 COLL VENOUS BLD VENIPUNCTURE: CPT

## 2024-08-05 PROCEDURE — 2580000003 HC RX 258: Performed by: NURSE PRACTITIONER

## 2024-08-05 PROCEDURE — 99232 SBSQ HOSP IP/OBS MODERATE 35: CPT | Performed by: INTERNAL MEDICINE

## 2024-08-05 PROCEDURE — 2060000000 HC ICU INTERMEDIATE R&B

## 2024-08-05 PROCEDURE — 6360000002 HC RX W HCPCS

## 2024-08-05 PROCEDURE — 97110 THERAPEUTIC EXERCISES: CPT

## 2024-08-05 PROCEDURE — 99232 SBSQ HOSP IP/OBS MODERATE 35: CPT | Performed by: STUDENT IN AN ORGANIZED HEALTH CARE EDUCATION/TRAINING PROGRAM

## 2024-08-05 PROCEDURE — 6360000002 HC RX W HCPCS: Performed by: NURSE PRACTITIONER

## 2024-08-05 PROCEDURE — 2580000003 HC RX 258

## 2024-08-05 RX ADMIN — CYCLOBENZAPRINE 10 MG: 10 TABLET, FILM COATED ORAL at 04:41

## 2024-08-05 RX ADMIN — HEPARIN SODIUM 5000 UNITS: 5000 INJECTION INTRAVENOUS; SUBCUTANEOUS at 16:19

## 2024-08-05 RX ADMIN — OXYCODONE HYDROCHLORIDE 5 MG: 5 TABLET ORAL at 16:16

## 2024-08-05 RX ADMIN — HALOPERIDOL LACTATE 1 MG: 5 INJECTION, SOLUTION INTRAMUSCULAR at 22:52

## 2024-08-05 RX ADMIN — HEPARIN SODIUM 5000 UNITS: 5000 INJECTION INTRAVENOUS; SUBCUTANEOUS at 20:53

## 2024-08-05 RX ADMIN — FERROUS SULFATE TAB EC 325 MG (65 MG FE EQUIVALENT) 325 MG: 325 (65 FE) TABLET DELAYED RESPONSE at 07:41

## 2024-08-05 RX ADMIN — CEFEPIME 1000 MG: 1 INJECTION, POWDER, FOR SOLUTION INTRAMUSCULAR; INTRAVENOUS at 18:32

## 2024-08-05 RX ADMIN — HALOPERIDOL LACTATE 1 MG: 5 INJECTION, SOLUTION INTRAMUSCULAR at 02:08

## 2024-08-05 RX ADMIN — MORPHINE SULFATE 4 MG: 4 INJECTION INTRAVENOUS at 07:41

## 2024-08-05 RX ADMIN — SODIUM CHLORIDE, PRESERVATIVE FREE 10 ML: 5 INJECTION INTRAVENOUS at 08:17

## 2024-08-05 RX ADMIN — HEPARIN SODIUM 5000 UNITS: 5000 INJECTION INTRAVENOUS; SUBCUTANEOUS at 06:08

## 2024-08-05 RX ADMIN — Medication 5 MG: at 20:52

## 2024-08-05 RX ADMIN — MORPHINE SULFATE 4 MG: 4 INJECTION INTRAVENOUS at 00:40

## 2024-08-05 RX ADMIN — DEXTROSE AND SODIUM CHLORIDE: 5; 450 INJECTION, SOLUTION INTRAVENOUS at 07:50

## 2024-08-05 RX ADMIN — AMOXICILLIN 500 MG: 500 CAPSULE ORAL at 07:53

## 2024-08-05 RX ADMIN — HYOSCYAMINE SULFATE 125 MCG: 0.12 TABLET SUBLINGUAL at 17:47

## 2024-08-05 RX ADMIN — QUETIAPINE FUMARATE 25 MG: 25 TABLET ORAL at 20:54

## 2024-08-05 RX ADMIN — AMOXICILLIN 500 MG: 500 CAPSULE ORAL at 20:47

## 2024-08-05 RX ADMIN — HYOSCYAMINE SULFATE 125 MCG: 0.12 TABLET SUBLINGUAL at 07:41

## 2024-08-05 RX ADMIN — OXYCODONE HYDROCHLORIDE 5 MG: 5 TABLET ORAL at 20:52

## 2024-08-05 RX ADMIN — SODIUM BICARBONATE 1300 MG: 648 TABLET ORAL at 20:47

## 2024-08-05 RX ADMIN — SODIUM BICARBONATE 1300 MG: 648 TABLET ORAL at 07:53

## 2024-08-05 RX ADMIN — FOLIC ACID 1 MG: 1 TABLET ORAL at 07:41

## 2024-08-05 RX ADMIN — OXYCODONE HYDROCHLORIDE AND ACETAMINOPHEN 500 MG: 500 TABLET ORAL at 07:41

## 2024-08-05 RX ADMIN — CYANOCOBALAMIN TAB 500 MCG 50 MCG: 500 TAB at 07:41

## 2024-08-05 RX ADMIN — SODIUM CHLORIDE, PRESERVATIVE FREE 10 ML: 5 INJECTION INTRAVENOUS at 20:55

## 2024-08-05 RX ADMIN — TAMSULOSIN HYDROCHLORIDE 0.4 MG: 0.4 CAPSULE ORAL at 07:53

## 2024-08-05 RX ADMIN — ACETAMINOPHEN 650 MG: 325 TABLET ORAL at 07:41

## 2024-08-05 ASSESSMENT — PAIN - FUNCTIONAL ASSESSMENT: PAIN_FUNCTIONAL_ASSESSMENT: ACTIVITIES ARE NOT PREVENTED

## 2024-08-05 ASSESSMENT — PAIN SCALES - GENERAL
PAINLEVEL_OUTOF10: 10
PAINLEVEL_OUTOF10: 8
PAINLEVEL_OUTOF10: 8
PAINLEVEL_OUTOF10: 10
PAINLEVEL_OUTOF10: 0
PAINLEVEL_OUTOF10: 0
PAINLEVEL_OUTOF10: 9
PAINLEVEL_OUTOF10: 7

## 2024-08-05 ASSESSMENT — PAIN DESCRIPTION - LOCATION
LOCATION: BACK
LOCATION: BACK;GENERALIZED

## 2024-08-05 ASSESSMENT — PAIN DESCRIPTION - DESCRIPTORS
DESCRIPTORS: ACHING;DISCOMFORT
DESCRIPTORS: ACHING
DESCRIPTORS: DISCOMFORT
DESCRIPTORS: ACHING;DISCOMFORT

## 2024-08-05 ASSESSMENT — PAIN DESCRIPTION - ORIENTATION
ORIENTATION: LOWER
ORIENTATION: LEFT;LOWER
ORIENTATION: RIGHT;LEFT;LOWER
ORIENTATION: LOWER

## 2024-08-05 ASSESSMENT — PAIN SCALES - WONG BAKER: WONGBAKER_NUMERICALRESPONSE: NO HURT

## 2024-08-05 NOTE — PROGRESS NOTES
Occupational Therapy    [facility]  Occupational Therapy Not Seen Note    DATE: 2024    NAME: Arias Martin  MRN: [id number]   : 1940      Patient not seen this date for Occupational Therapy due to: Leona DOE, did not want pt woke up d/t not sleeping last night or day before.    Next Scheduled Treatment: Later this date or 24    Electronically signed by CHINMAY TRAYLOR on 2024 at 8:53 AM

## 2024-08-05 NOTE — PROGRESS NOTES
than right. 5. The two adjacent proximal to mid LEFT ureter calculi measuring 9 x 26 mm and 4 x 6 mm. 6. Prominent inflammatory changes left kidney and ureter proximal to the obstruction concerning for superimposed infection. 7. Prominent struvite (triple phosphate) renal calculi bilateral kidneys, greater left than right. 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 8. Splenomegaly. 9. Prominent portion of ascending colon and hepatic flexure as well as a smaller portion of the small bowel are involved in the right abdominopelvic ventral wall peristomal hernia. No obstruction or inflammatory change. 10.  Diverticulosis coli without CT evidence of acute diverticulitis. 11.  Cholelithiasis without findings of acute cholecystitis.       Physical Examination:        General appearance:  alert, awake, looks in mild to moderate distress  Mental Status: Confused, with baseline dementia  lungs:  clear to auscultation bilaterally, normal effort  Heart:  regular rate and rhythm, no murmur  Abdomen:  soft, nontender, nondistended, normal bowel sounds, .  Extremities:  no edema, redness, tenderness in the calves  Skin:  no gross lesions, rashes, induration    Assessment:        Hospital Problems             Last Modified POA    * (Principal) Pseudomonas urinary tract infection 8/2/2024 Yes    Acute encephalopathy 8/2/2024 Yes    Bilateral hydronephrosis 7/30/2024 Yes    Normochromic normocytic anemia 7/30/2024 Yes    Iron deficiency anemia 7/30/2024 Yes    Vitamin B 12 deficiency 8/2/2024 Yes    Folate deficiency 7/30/2024 Yes    Bladder cancer (Chronic) 7/30/2024 Yes    Kidney stone 8/1/2024 Yes    Left ureteral calculus 7/31/2024 Yes    GERD (gastroesophageal reflux disease) 7/30/2024 Yes    Acute kidney injury (HCC) 7/30/2024 Yes    Moderate malnutrition (HCC) 7/30/2024 Yes    S/P ileal conduit (HCC) 7/30/2024 Yes    CKD (chronic kidney disease), stage III (HCC) 7/30/2024 Yes

## 2024-08-05 NOTE — PROGRESS NOTES
Physical Therapy  Facility/Department: [facility] CAR 2- STEPDOWN  Physical Therapy Daily Treatment Note    Name: Arias Martin  : 1940  MRN: [id number]  Date of Service: 2024    Discharge Recommendations:  Patient would benefit from continued therapy after discharge   PT Equipment Recommendations  Equipment Needed: No      Patient Diagnosis(es): The primary encounter diagnosis was Kidney stone. A diagnosis of Acute cystitis with hematuria was also pertinent to this visit.  Past Medical History:  has a past medical history of Arthritis, Caffeine use, Cancer (HCC), Cancer of kidney, unspecified laterality (HCC), CKD (chronic kidney disease), Cognitive communication deficit, COPD (chronic obstructive pulmonary disease) ([facility]), Diabetes mellitus (HCC), DNR (do not resuscitate), Dysphagia, GERD (gastroesophageal reflux disease), GI bleeding, Hernia, inguinal, right, Hyperlipidemia, Hypertension, Ileal conduit stomal stenosis, Kidney stone, Mobility impaired, Muscle weakness, Nursing home resident, PAF (paroxysmal atrial fibrillation) ([facility]), Presence of urostomy ([facility]), and Retinal detachment.  Past Surgical History:  has a past surgical history that includes hernia repair; Bladder removal (); Prostate surgery (); Cataract removal (Bilateral); Colonoscopy; Eye surgery (Right, ); vitrectomy (Left, 16); Incisional hernia repair (2015); Incisional hernia repair (2014); laparoscopy (N/A, 2021); Cystoscopy (N/A, 5/3/2021); IR GUIDED NEPHROSTOMY CATH PLACEMENT LEFT (5/3/2021); IR GUIDED NEPHROSTOMY CATH PLACEMENT RIGHT (2021); eye surgery; knee surgery (Left); other surgical history (2021); IR GUIDED NEPHROSTOMY CATH PLACEMENT LEFT (2021); other surgical history (2021); Cystoscopy (N/A, 2021); CT CRYOABLATION OF RENAL TUMOR (2019); and IR GUIDED NEPHROSTOMY CATH PLACEMENT LEFT (2024).    Assessment   Body Structures, Functions, Activity Limitations Requiring  agreeable to PT this afternoon. Pt supine in bed upon arrival, c/o \"back pain\" unable to rate. Pt cooperative throughout session although very confused.       Cognition   Orientation  Overall Orientation Status: Impaired  Orientation Level: Oriented to person;Disoriented to place;Disoriented to time;Disoriented to situation  Cognition  Overall Cognitive Status: Exceptions  Arousal/Alertness: Inconsistent responses to stimuli;Delayed responses to stimuli  Following Commands: Follows one step commands with repetition;Follows one step commands with increased time  Attention Span: Attends with cues to redirect;Difficulty attending to directions  Memory: Decreased recall of recent events  Safety Judgement: Decreased awareness of need for assistance;Decreased awareness of need for safety  Problem Solving: Assistance required to identify errors made;Assistance required to correct errors made;Decreased awareness of errors  Insights: Not aware of deficits  Initiation: Requires cues for all  Sequencing: Requires cues for some  Cognition Comment: Pt very confused, also becoming tearful at times during session stating he missed his cat.     Objective   Bed mobility  Supine to Sit: Maximum assistance;2 Person assistance  Sit to Supine: Maximum assistance;2 Person assistance  Scooting: Maximal assistance  Bed Mobility Comments: Pt required maxA x2 to perform supine <-> sit transfer, required assistance with trunk and LE progression throughout. Increased time/effort required to perform d/t cognition.  Transfers  Squat Pivot Transfers: Unable to assess  Lateral Transfers: Unable to assess  Car Transfer: Unable to assess  Comment: Pt declining to attempt further mobility.  Ambulation  More Ambulation?: No  Stairs/Curb  Stairs?: No     Balance  Posture: Fair  Sitting - Static: Fair;+  Sitting - Dynamic: Fair;+  Comments: Pt tolerated sitting EOB ~20 minutes with CGA/[place] required for sitting balance.    Exercise Treatment:   Supine

## 2024-08-05 NOTE — PLAN OF CARE
Problem: Chronic Conditions and Co-morbidities  Goal: Patient's chronic conditions and co-morbidity symptoms are monitored and maintained or improved  8/5/2024 0406 by Isabel Flores RN  Outcome: Progressing  8/4/2024 1802 by Genesis Apple RN  Outcome: Progressing     Problem: Discharge Planning  Goal: Discharge to home or other facility with appropriate resources  8/5/2024 0406 by Isabel Flores RN  Outcome: Progressing  8/4/2024 1802 by Genesis Apple RN  Outcome: Progressing     Problem: Safety - Adult  Goal: Free from fall injury  8/5/2024 0406 by Isabel Flores RN  Outcome: Progressing  8/4/2024 1802 by Genesis Apple RN  Outcome: Progressing     Problem: Skin/Tissue Integrity  Goal: Absence of new skin breakdown  Description: 1.  Monitor for areas of redness and/or skin breakdown  2.  Assess vascular access sites hourly  3.  Every 4-6 hours minimum:  Change oxygen saturation probe site  4.  Every 4-6 hours:  If on nasal continuous positive airway pressure, respiratory therapy assess nares and determine need for appliance change or resting period.  8/5/2024 0406 by Isabel Flores RN  Outcome: Progressing  8/4/2024 1802 by Genesis Apple RN  Outcome: Progressing     Problem: Pain  Goal: Verbalizes/displays adequate comfort level or baseline comfort level  8/5/2024 0406 by Isabel Flores RN  Outcome: Progressing  8/4/2024 1802 by Genesis Apple RN  Outcome: Progressing     Problem: ABCDS Injury Assessment  Goal: Absence of physical injury  8/5/2024 0406 by Isabel Flores RN  Outcome: Progressing  8/4/2024 1802 by Genesis Apple RN  Outcome: Progressing     Problem: Nutrition Deficit:  Goal: Optimize nutritional status  8/5/2024 0406 by Isabel Flores RN  Outcome: Progressing  8/4/2024 1802 by Genesis Apple RN  Outcome: Progressing

## 2024-08-05 NOTE — PROGRESS NOTES
Occupational Therapy  Facility/Department: [facility] CAR 2- [facility]PDOWN   Daily Treatment Note  Patient Name: Arias Martin        MRN: [id number]    : 1940    Date of Service: 2024    Discharge Recommendations  Discharge Recommendations: Patient would benefit from continued therapy after discharge    Assessment  Performance deficits / Impairments: Decreased functional mobility ;Decreased ADL status;Decreased cognition;Decreased endurance;Decreased balance;Decreased strength;Decreased safe awareness;Decreased fine motor control;Decreased coordination  Assessment: Pt would benefit from continued acute care and post acute care OT to address above listed deficits.  Prognosis: Fair  Activity Tolerance  Activity Tolerance: Treatment limited secondary to decreased cognition;Patient limited by pain;Patient limited by fatigue  Safety Devices  Type of Devices: Call light within reach;Nurse notified;Left in bed;Bed alarm in place;Patient at risk for falls;All fall risk precautions in place  Restraints Initially in Place: No    Restrictions/Precautions  Restrictions/Precautions  Restrictions/Precautions: Contact Precautions;Up as Tolerated;Fall Risk  Required Braces or Orthoses?: No  Position Activity Restriction  Other position/activity restrictions: s/p Left nephrostomy tube placement 24, urostomy; up with assistance.    Subjective  General  Patient assessed for rehabilitation services?: Yes  Pt C/O pain in back but was unable to rate pain level. Pt repositioned back in bed at end of tx for comfort.    Objective  Orientation  Overall Orientation Status: Impaired  Orientation Level: Oriented to person;Disoriented to place;Disoriented to time;Disoriented to situation  Cognition  Overall Cognitive Status: Exceptions  Arousal/Alertness: Inconsistent responses to stimuli;Delayed responses to stimuli  Following Commands: Follows one step commands with repetition;Follows one step commands with increased time  Attention

## 2024-08-06 LAB
ANION GAP SERPL CALCULATED.3IONS-SCNC: 11 MMOL/L (ref 9–16)
BASOPHILS # BLD: 0.03 K/UL (ref 0–0.2)
BASOPHILS NFR BLD: 1 % (ref 0–2)
BUN SERPL-MCNC: 25 MG/DL (ref 8–23)
CALCIUM SERPL-MCNC: 8.6 MG/DL (ref 8.6–10.4)
CHLORIDE SERPL-SCNC: 107 MMOL/L (ref 98–107)
CO2 SERPL-SCNC: 23 MMOL/L (ref 20–31)
CREAT SERPL-MCNC: 2.7 MG/DL (ref 0.7–1.2)
EOSINOPHIL # BLD: 0.2 K/UL (ref 0–0.44)
EOSINOPHILS RELATIVE PERCENT: 4 % (ref 1–4)
ERYTHROCYTE [DISTWIDTH] IN BLOOD BY AUTOMATED COUNT: 16.1 % (ref 11.8–14.4)
GFR, ESTIMATED: 23 ML/MIN/1.73M2
GLUCOSE SERPL-MCNC: 96 MG/DL (ref 74–99)
HCT VFR BLD AUTO: 30.1 % (ref 40.7–50.3)
HGB BLD-MCNC: 8.7 G/DL (ref 13–17)
IMM GRANULOCYTES # BLD AUTO: 0.03 K/UL (ref 0–0.3)
IMM GRANULOCYTES NFR BLD: 1 %
LYMPHOCYTES NFR BLD: 0.9 K/UL (ref 1.1–3.7)
LYMPHOCYTES RELATIVE PERCENT: 17 % (ref 24–43)
MCH RBC QN AUTO: 29 PG (ref 25.2–33.5)
MCHC RBC AUTO-ENTMCNC: 28.9 G/DL (ref 28.4–34.8)
MCV RBC AUTO: 100.3 FL (ref 82.6–102.9)
MONOCYTES NFR BLD: 0.32 K/UL (ref 0.1–1.2)
MONOCYTES NFR BLD: 6 % (ref 3–12)
NEUTROPHILS NFR BLD: 71 % (ref 36–65)
NEUTS SEG NFR BLD: 3.95 K/UL (ref 1.5–8.1)
NRBC BLD-RTO: 0 PER 100 WBC
PLATELET # BLD AUTO: 207 K/UL (ref 138–453)
PMV BLD AUTO: 9.4 FL (ref 8.1–13.5)
POTASSIUM SERPL-SCNC: 4.3 MMOL/L (ref 3.7–5.3)
RBC # BLD AUTO: 3 M/UL (ref 4.21–5.77)
RBC # BLD: ABNORMAL 10*6/UL
SODIUM SERPL-SCNC: 141 MMOL/L (ref 136–145)
WBC OTHER # BLD: 5.4 K/UL (ref 3.5–11.3)

## 2024-08-06 PROCEDURE — 6360000002 HC RX W HCPCS: Performed by: NURSE PRACTITIONER

## 2024-08-06 PROCEDURE — 6370000000 HC RX 637 (ALT 250 FOR IP): Performed by: INTERNAL MEDICINE

## 2024-08-06 PROCEDURE — 6370000000 HC RX 637 (ALT 250 FOR IP): Performed by: STUDENT IN AN ORGANIZED HEALTH CARE EDUCATION/TRAINING PROGRAM

## 2024-08-06 PROCEDURE — 6360000002 HC RX W HCPCS: Performed by: STUDENT IN AN ORGANIZED HEALTH CARE EDUCATION/TRAINING PROGRAM

## 2024-08-06 PROCEDURE — 85025 COMPLETE CBC W/AUTO DIFF WBC: CPT

## 2024-08-06 PROCEDURE — 6370000000 HC RX 637 (ALT 250 FOR IP): Performed by: NURSE PRACTITIONER

## 2024-08-06 PROCEDURE — 2580000003 HC RX 258: Performed by: NURSE PRACTITIONER

## 2024-08-06 PROCEDURE — 80048 BASIC METABOLIC PNL TOTAL CA: CPT

## 2024-08-06 PROCEDURE — 2060000000 HC ICU INTERMEDIATE R&B

## 2024-08-06 PROCEDURE — 99232 SBSQ HOSP IP/OBS MODERATE 35: CPT | Performed by: STUDENT IN AN ORGANIZED HEALTH CARE EDUCATION/TRAINING PROGRAM

## 2024-08-06 PROCEDURE — 36415 COLL VENOUS BLD VENIPUNCTURE: CPT

## 2024-08-06 PROCEDURE — 2580000003 HC RX 258: Performed by: STUDENT IN AN ORGANIZED HEALTH CARE EDUCATION/TRAINING PROGRAM

## 2024-08-06 PROCEDURE — 99232 SBSQ HOSP IP/OBS MODERATE 35: CPT | Performed by: INTERNAL MEDICINE

## 2024-08-06 RX ADMIN — CEFEPIME 1000 MG: 1 INJECTION, POWDER, FOR SOLUTION INTRAMUSCULAR; INTRAVENOUS at 18:03

## 2024-08-06 RX ADMIN — TAMSULOSIN HYDROCHLORIDE 0.4 MG: 0.4 CAPSULE ORAL at 10:55

## 2024-08-06 RX ADMIN — MORPHINE SULFATE 4 MG: 4 INJECTION INTRAVENOUS at 11:56

## 2024-08-06 RX ADMIN — SODIUM BICARBONATE 1300 MG: 648 TABLET ORAL at 10:55

## 2024-08-06 RX ADMIN — OXYCODONE HYDROCHLORIDE 5 MG: 5 TABLET ORAL at 21:20

## 2024-08-06 RX ADMIN — QUETIAPINE FUMARATE 25 MG: 25 TABLET ORAL at 21:20

## 2024-08-06 RX ADMIN — OXYCODONE HYDROCHLORIDE 5 MG: 5 TABLET ORAL at 03:01

## 2024-08-06 RX ADMIN — HEPARIN SODIUM 5000 UNITS: 5000 INJECTION INTRAVENOUS; SUBCUTANEOUS at 13:04

## 2024-08-06 RX ADMIN — HEPARIN SODIUM 5000 UNITS: 5000 INJECTION INTRAVENOUS; SUBCUTANEOUS at 21:20

## 2024-08-06 RX ADMIN — FERROUS SULFATE TAB EC 325 MG (65 MG FE EQUIVALENT) 325 MG: 325 (65 FE) TABLET DELAYED RESPONSE at 10:56

## 2024-08-06 RX ADMIN — SODIUM CHLORIDE, PRESERVATIVE FREE 10 ML: 5 INJECTION INTRAVENOUS at 10:56

## 2024-08-06 RX ADMIN — OXYCODONE HYDROCHLORIDE AND ACETAMINOPHEN 500 MG: 500 TABLET ORAL at 10:55

## 2024-08-06 RX ADMIN — HEPARIN SODIUM 5000 UNITS: 5000 INJECTION INTRAVENOUS; SUBCUTANEOUS at 06:39

## 2024-08-06 RX ADMIN — Medication 5 MG: at 21:20

## 2024-08-06 RX ADMIN — CYANOCOBALAMIN TAB 500 MCG 50 MCG: 500 TAB at 10:55

## 2024-08-06 RX ADMIN — OXYCODONE HYDROCHLORIDE 5 MG: 5 TABLET ORAL at 10:54

## 2024-08-06 RX ADMIN — SODIUM BICARBONATE 1300 MG: 648 TABLET ORAL at 21:20

## 2024-08-06 RX ADMIN — AMOXICILLIN 500 MG: 500 CAPSULE ORAL at 10:55

## 2024-08-06 RX ADMIN — FOLIC ACID 1 MG: 1 TABLET ORAL at 10:55

## 2024-08-06 RX ADMIN — AMOXICILLIN 500 MG: 500 CAPSULE ORAL at 21:20

## 2024-08-06 ASSESSMENT — PAIN DESCRIPTION - LOCATION
LOCATION: BACK
LOCATION: BACK

## 2024-08-06 ASSESSMENT — PAIN SCALES - GENERAL
PAINLEVEL_OUTOF10: 8
PAINLEVEL_OUTOF10: 4
PAINLEVEL_OUTOF10: 8
PAINLEVEL_OUTOF10: 6
PAINLEVEL_OUTOF10: 9
PAINLEVEL_OUTOF10: 10

## 2024-08-06 ASSESSMENT — PAIN DESCRIPTION - ORIENTATION
ORIENTATION: RIGHT
ORIENTATION: RIGHT;MID;LEFT

## 2024-08-06 ASSESSMENT — PAIN DESCRIPTION - DESCRIPTORS: DESCRIPTORS: ACHING;BURNING

## 2024-08-06 NOTE — PROGRESS NOTES
[facility] -   Infectious diseases evaluation  admission date 7/30/2024    reason for consultation:   UTI and antibiotic management    Impression :   Current:  Acute encephalopathy - improved  Positive SIRS criteria from infection  Bilateral staghorn kidney stones, with bilateral hydronephrosis  7/31 right percutaneous nephrostomy tube placed  Urine culture from the PCN T neg  Patient has an ileal conduit post cystectomy for cancer  7/30 Pseudomonas ( R levaquin ) and Enterococcus faecalis UTI, taken from the ileal conduit, infection versus colonization  CKD 4  Dementia    Other:  Allergy tetracycline  Discussion / summary of stay / plan of care/ Recommendations:     HENCE:   8/1 cefepime and amoxicillin till 8/8      Infection Control Recommendations   [place] Precautions  Contact Isolation     Antimicrobial Stewardship Recommendations   Simplification of therapy  Targeted therapy      History of Present Illness:   Initial history:  Arias Martin is a 84 y.o.-year-old male with a history of suprapubic catheter frequent UTIs and sometimes encephalopathy related to UTIs, history of right subdural hematoma with midline shift and right meningeal artery ablation, history of bladder cancer post cystectomy and ileal conduit, CKD stage IV, history of dementia,    Presents to the emergency room from the rehab again with altered mentation, and abdominal pain.  Since his urine color has been worsening, and his mentation has been getting worse as well over the last few days.  He was tired and sleepy not responding    Is had some kidney stones and follows with urology    Upon admission he was tachycardic tachypneic, hypotensive, fits SIRS criteria.  Urine analysis was infectious  Creatinine was 3.9  CT scan of the abdomen shows bilateral hydronephrosis with obstructive kidney stones, felt to be struvite, the suspicion of a left xanthogranulomatous pyelonephritis    Patient

## 2024-08-06 NOTE — PROGRESS NOTES
2305:Writer notified on call NP, Billie Burnett, in regards to patient continuously ripping of urostomy bag. Patient refusing writer to replace bag. Patient becoming increasingly agitated.      2330: Writer/Josias DOE able to replace urostomy bag.    2350: Patient refusing to wear telemetry, stating \"Don't touch me\" plan of care ongoing

## 2024-08-06 NOTE — PROGRESS NOTES
[facility] -   Infectious diseases evaluation  admission date 7/30/2024    reason for consultation:   UTI and antibiotic management    Impression :   Current:  Acute encephalopathy - improved  Positive SIRS criteria from infection  Bilateral staghorn kidney stones, with bilateral hydronephrosis  7/31 right percutaneous nephrostomy tube placed  Urine culture from the PCN T neg  Patient has an ileal conduit post cystectomy for cancer  7/30 Pseudomonas ( R levaquin ) and Enterococcus faecalis UTI, taken from the ileal conduit, infection versus colonization  CKD 4  Dementia    Other:  Allergy tetracycline  Discussion / summary of stay / plan of care/ Recommendations:     HENCE:   8/1 cefepime and amoxicillin till 8/8      Infection Control Recommendations   [place] Precautions  Contact Isolation     Antimicrobial Stewardship Recommendations   Simplification of therapy  Targeted therapy      History of Present Illness:   Initial history:  Arias Martin is a 84 y.o.-year-old male with a history of suprapubic catheter frequent UTIs and sometimes encephalopathy related to UTIs, history of right subdural hematoma with midline shift and right meningeal artery ablation, history of bladder cancer post cystectomy and ileal conduit, CKD stage IV, history of dementia,    Presents to the emergency room from the rehab again with altered mentation, and abdominal pain.  Since his urine color has been worsening, and his mentation has been getting worse as well over the last few days.  He was tired and sleepy not responding    Is had some kidney stones and follows with urology    Upon admission he was tachycardic tachypneic, hypotensive, fits SIRS criteria.  Urine analysis was infectious  Creatinine was 3.9  CT scan of the abdomen shows bilateral hydronephrosis with obstructive kidney stones, felt to be struvite, the suspicion of a left xanthogranulomatous pyelonephritis    Patient    Transportation Needs: Unknown (1/30/2024)    PRAPARE - Transportation     Lack of Transportation (Medical): Not on file     Lack of Transportation (Non-Medical): No   Physical Activity: Inactive (10/11/2022)    Exercise Vital Sign     Days of Exercise per Week: 0 days     Minutes of Exercise per Session: 0 min   Stress: Not on file   Social Connections: Not on file   Intimate Partner Violence: Not on file   Housing Stability: Unknown (1/30/2024)    Housing Stability Vital Sign     Unable to Pay for Housing in the Last Year: Not on file     Number of Places Lived in the Last Year: Not on file     Unstable Housing in the Last Year: No       Family History:     Family History   Problem Relation Age of Onset    Diabetes Mother     Diabetes Sister       Medical Decision Making:   I have independently reviewed/ordered the following labs:    CBC with Differential:   Recent Labs     08/05/24 0545 08/06/24  0548   WBC 5.1 5.4   HGB 9.1* 8.7*   HCT 30.7* 30.1*    207   LYMPHOPCT 19* 17*   MONOPCT 7 6   EOSPCT 4 4       BMP:  Recent Labs     08/05/24 0545 08/06/24  0548    141   K 3.8 4.3    107   CO2 23 23   BUN 26* 25*   CREATININE 2.8* 2.7*       Hepatic Function Panel:   No results for input(s): \"LABALBU\", \"BILIDIR\", \"IBILI\", \"BILITOT\", \"ALKPHOS\", \"ALT\", \"AST\" in the last 72 hours.    Invalid input(s): \"PROT\"    No results for input(s): \"RPR\" in the last 72 hours.  No results for input(s): \"HIV\" in the last 72 hours.  No results for input(s): \"BC\" in the last 72 hours.  Lab Results   Component Value Date/Time    CREATININE 2.7 08/06/2024 05:48 AM    GLUCOSE 96 08/06/2024 05:48 AM    GLUCOSE 93 07/12/2024 05:45 AM       Detailed results:        Thank you for allowing us to participate in the care of this patient.Please call with questions.    This note is created with the assistance of a speech recognition program.  While intending to generate adocument that actually reflects the content of the visit,  the document can still have some errors including those of syntax and sound a like substitutions which may escape proof reading.  It such instances, actual meaningcan be extrapolated by contextual diversion.    Pina Smith MD  Office: [phone]  Perfect serve / office [phone]

## 2024-08-06 NOTE — PROGRESS NOTES
@[email or web address] [email or web address]@    [facility]   IN-PATIENT SERVICE   [facility]    Progress Note    8/6/2024    1:04 PM    Name:   Arias Martin  MRN:     [id number]     Acct:      [id number]   Room:   2002/2002-01   Day:  7  Admit Date:  7/30/2024  4:30 PM    PCP:   Marcelo Olivia, APRN - CNP  Code Status:  Full Code    Subjective:     C/C:   Chief Complaint   Patient presents with    Altered Mental Status     Interval History Status: improved.     Seen at bedside, hemodynamically stable  No acute events overnight, no new complaints  Labs reviewed, kidney function improving, discussed with RN regarding encouraging oral intake  Continues to be on antibiotics, ID service following  Working on placement    Brief History:     84-year-old male with history of suprapubic catheter with frequent UTIs, recent UTI treated with cefepime/ceftriaxone, bladder cancer s/p cystectomy with ileal conduit, CKD, SDH, dementia presented with altered mental status and abdominal pain, patient was supposed to see urologist due to history of kidney stones, however because of altered mentation and worsening urine color was brought to the ER, found to be  hypotensive on arrival, with elevated creatinine levels, elevated troponin levels, urine analysis positive for infection, CT scan concerning for bilateral hydronephrosis with obstructive kidney stones and pyelonephritis, had percutaneous nephrostomy tube placed on left side on 7/31, urology service has been following, ID service following for antibiotics recommendations, nephrology service was on board for KARMA, started on bicarb containing fluids, improved, signed off    Medications:     Allergies:    Allergies   Allergen Reactions    Nsaids Anaphylaxis     GI bleed    Tetracyclines & Related        Current Meds:   Scheduled Meds:    sodium bicarbonate  1,300 mg Oral BID    vitamin B-12  50 mcg Oral Daily    ferrous sulfate  325 mg Oral Daily with breakfast     (24Hr):    Intake/Output Summary (Last 24 hours) at 8/6/2024 1304  Last data filed at 8/6/2024 0700  Gross per 24 hour   Intake --   Output 1610 ml   Net -1610 ml         Labs:  Hematology:  Recent Labs     08/05/24  0545 08/06/24  0548   WBC 5.1 5.4   RBC 3.11* 3.00*   HGB 9.1* 8.7*   HCT 30.7* 30.1*   MCV 98.7 100.3   MCH 29.3 29.0   MCHC 29.6 28.9   RDW 16.4* 16.1*    207   MPV 9.4 9.4       Chemistry:  Recent Labs     08/04/24  0552 08/05/24  0545 08/06/24  0548    138 141   K 3.8 3.8 4.3    105 107   CO2 25 23 23   GLUCOSE 97 100* 96   BUN 28* 26* 25*   CREATININE 3.0* 2.8* 2.7*   ANIONGAP 9 10 11   LABGLOM 20* 21* 23*   CALCIUM 8.3* 8.4* 8.6       Recent Labs     08/04/24  1943   POCGLU 87       ABG:  Lab Results   Component Value Date/Time    POCPH 7.19 05/02/2021 01:36 PM    POCPCO2 44 05/02/2021 01:36 PM    POCPO2 74 05/02/2021 01:36 PM    POCHCO3 16.9 05/02/2021 01:36 PM    NBEA 11 05/02/2021 01:36 PM    PBEA NOT REPORTED 05/02/2021 01:36 PM    QNK5AMN 18 05/02/2021 01:36 PM    XXBN6ZML 90 05/02/2021 01:36 PM    FIO2 32.0 05/02/2021 01:36 PM     Lab Results   Component Value Date/Time    SPECIAL Site: Urine 07/30/2024 05:42 PM     Lab Results   Component Value Date/Time    CULTURE NO SIGNIFICANT GROWTH 07/31/2024 05:15 PM       Radiology:  IR GUIDED NEPHROSTOMY CATH PLACEMENT LEFT    Result Date: 7/31/2024  Successful percutaneous 10 Maltese left nephrostomy tube placement The catheter should be flushed every 6 hours with 10 cc normal saline until the urine is clear.  Patient should be monitored for any signs or symptoms of urosepsis due to the grossly infected urine.     CT CHEST ABDOMEN PELVIS WO CONTRAST Additional Contrast? None    Result Date: 7/30/2024  1.  CT CHEST: No acute abnormality. 2. Moderately severe calcific atherosclerosis coronary arteries. 3. Mild cardiomegaly. 4. CT ABDOMEN/PELVIS: Bilateral hydronephrosis, more severe left than right. 5. The two adjacent proximal to

## 2024-08-06 NOTE — PROGRESS NOTES
[facility]  Thomas Frost MD FACS      Urology Progress Note     Chief Complaint: Sepsis secondary to staghorn calculi    Subjective:  Urology notified due to increased pain over nephrostomy tube site, site appears healthy, nephrostomy tube is in place and draining clear yellow urine.  Patient has been afebrile, vital signs stable  Pain level: Significant tenderness over nephrostomy tube site  Denies fever, chills, nausea, vomiting, chest pain, shortness of breath  Left nephrostomy tube in place, urostomy draining urine, appears clear      Patient Vitals for the past 24 hrs:   BP Temp Temp src Pulse Resp SpO2   08/05/24 1646 -- -- -- -- 16 --   08/05/24 1600 96/74 97.3 °F (36.3 °C) Oral 61 17 98 %   08/05/24 0811 -- -- -- -- 17 --   08/05/24 0800 101/75 98 °F (36.7 °C) Oral 59 16 96 %   08/05/24 0441 100/69 97.8 °F (36.6 °C) Oral 63 18 97 %   08/05/24 0110 -- -- -- -- 16 --   08/05/24 0040 -- -- -- -- 18 --   08/04/24 2330 99/75 97.6 °F (36.4 °C) Oral 76 14 96 %   08/04/24 2132 -- -- -- -- 16 --       Intake/Output Summary (Last 24 hours) at 8/5/2024 2109  Last data filed at 8/5/2024 1641  Gross per 24 hour   Intake --   Output 1030 ml   Net -1030 ml       Recent Labs     08/03/24  1004 08/05/24  0545   WBC 6.8 5.1   HGB 9.3* 9.1*   HCT 32.3* 30.7*   .3 98.7    223     Recent Labs     08/03/24  1004 08/04/24  0552 08/05/24  0545    140 138   K 3.9 3.8 3.8    106 105   CO2 23 25 23   BUN 30* 28* 26*   CREATININE 3.2* 3.0* 2.8*       No results for input(s): \"COLORU\", \"PHUR\", \"LABCAST\", \"WBCUA\", \"RBCUA\", \"MUCUS\", \"TRICHOMONAS\", \"YEAST\", \"BACTERIA\", \"CLARITYU\", \"SPECGRAV\", \"LEUKOCYTESUR\", \"UROBILINOGEN\", \"BILIRUBINUR\", \"BLOODU\" in the last 72 hours.    Invalid input(s): \"NITRATE\", \"GLUCOSEUKETONESUAMORPHOUS\"      Additional Lab/culture results:  Urine culture pseudomonas and enterococcus    Physical Exam:  Constitutional: Patient in no acute distress.  Neuro: alert  and oriented to person place and time.  HEENT: No acute abnormalities  Lungs: Respiratory effort normal on room air  Cardiovascular:  Heart rate regular rate and rhythm  Abdomen: Soft, non-tender, non-distended. Non peritonitic  Extremities: No leg swelling, no edema  : Left nephrostomy tube draining clear yellow urine, urostomy draining clear yellow urine    Interval Imaging Findings:  None    Impression:  Arias Martin is a 84 y.o. male who presents with worsening altered mental status.  Underwent nephrostomy tube placement, initially was doing well but now having increased pain in nephrostomy tube site.  Urology at the bedside, tube looks to be in good position with clear drainage of urine, no evidence of skin infection.  Labs also appear within normal limits and patient is afebrile.  At this time, would recommend padding to be placed below the nephrostomy tube which should hopefully make the patient more comfortable.    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

## 2024-08-06 NOTE — CARE COORDINATION
Spoke to Polly from [facility]. They are not able to accept d/t out of network. Voicemail left for admissions from [facility] requesting return call to follow up on referral    1241 spoke to patient's daugher in law, Celine, to request more SNF choices. She would like [facility] or [facility]. Referrals sent    1305 received voicemail from Miesha from [facility]. They are not able to accept d/t insurance    1456 spoke to Sumi from [facility]. Referral is in review. Spoke to Van from [facility]. They have no beds available. Voicemail left for Celine to update and request more choices    1720 spoke to Celine to obtain email address. SNF list sent to [email or web address]

## 2024-08-07 LAB
ANION GAP SERPL CALCULATED.3IONS-SCNC: 10 MMOL/L (ref 9–16)
BASOPHILS # BLD: 0.04 K/UL (ref 0–0.2)
BASOPHILS NFR BLD: 1 % (ref 0–2)
BUN SERPL-MCNC: 25 MG/DL (ref 8–23)
CALCIUM SERPL-MCNC: 8.8 MG/DL (ref 8.6–10.4)
CHLORIDE SERPL-SCNC: 105 MMOL/L (ref 98–107)
CO2 SERPL-SCNC: 26 MMOL/L (ref 20–31)
CREAT SERPL-MCNC: 2.5 MG/DL (ref 0.7–1.2)
EOSINOPHIL # BLD: 0.19 K/UL (ref 0–0.44)
EOSINOPHILS RELATIVE PERCENT: 4 % (ref 1–4)
ERYTHROCYTE [DISTWIDTH] IN BLOOD BY AUTOMATED COUNT: 16.2 % (ref 11.8–14.4)
GFR, ESTIMATED: 25 ML/MIN/1.73M2
GLUCOSE BLD-MCNC: 86 MG/DL (ref 75–110)
GLUCOSE SERPL-MCNC: 81 MG/DL (ref 74–99)
HCT VFR BLD AUTO: 31.3 % (ref 40.7–50.3)
HGB BLD-MCNC: 9.1 G/DL (ref 13–17)
IMM GRANULOCYTES # BLD AUTO: 0.03 K/UL (ref 0–0.3)
IMM GRANULOCYTES NFR BLD: 1 %
LYMPHOCYTES NFR BLD: 1 K/UL (ref 1.1–3.7)
LYMPHOCYTES RELATIVE PERCENT: 20 % (ref 24–43)
MCH RBC QN AUTO: 28.7 PG (ref 25.2–33.5)
MCHC RBC AUTO-ENTMCNC: 29.1 G/DL (ref 28.4–34.8)
MCV RBC AUTO: 98.7 FL (ref 82.6–102.9)
MONOCYTES NFR BLD: 0.26 K/UL (ref 0.1–1.2)
MONOCYTES NFR BLD: 5 % (ref 3–12)
NEUTROPHILS NFR BLD: 69 % (ref 36–65)
NEUTS SEG NFR BLD: 3.42 K/UL (ref 1.5–8.1)
NRBC BLD-RTO: 0 PER 100 WBC
PLATELET # BLD AUTO: 234 K/UL (ref 138–453)
PMV BLD AUTO: 9.7 FL (ref 8.1–13.5)
POTASSIUM SERPL-SCNC: 3.9 MMOL/L (ref 3.7–5.3)
RBC # BLD AUTO: 3.17 M/UL (ref 4.21–5.77)
RBC # BLD: ABNORMAL 10*6/UL
SODIUM SERPL-SCNC: 141 MMOL/L (ref 136–145)
WBC OTHER # BLD: 4.9 K/UL (ref 3.5–11.3)

## 2024-08-07 PROCEDURE — 6370000000 HC RX 637 (ALT 250 FOR IP): Performed by: INTERNAL MEDICINE

## 2024-08-07 PROCEDURE — 6370000000 HC RX 637 (ALT 250 FOR IP): Performed by: STUDENT IN AN ORGANIZED HEALTH CARE EDUCATION/TRAINING PROGRAM

## 2024-08-07 PROCEDURE — 36415 COLL VENOUS BLD VENIPUNCTURE: CPT

## 2024-08-07 PROCEDURE — 80048 BASIC METABOLIC PNL TOTAL CA: CPT

## 2024-08-07 PROCEDURE — 2580000003 HC RX 258: Performed by: STUDENT IN AN ORGANIZED HEALTH CARE EDUCATION/TRAINING PROGRAM

## 2024-08-07 PROCEDURE — 99232 SBSQ HOSP IP/OBS MODERATE 35: CPT | Performed by: STUDENT IN AN ORGANIZED HEALTH CARE EDUCATION/TRAINING PROGRAM

## 2024-08-07 PROCEDURE — 6360000002 HC RX W HCPCS: Performed by: NURSE PRACTITIONER

## 2024-08-07 PROCEDURE — 85025 COMPLETE CBC W/AUTO DIFF WBC: CPT

## 2024-08-07 PROCEDURE — 2060000000 HC ICU INTERMEDIATE R&B

## 2024-08-07 PROCEDURE — 6360000002 HC RX W HCPCS: Performed by: STUDENT IN AN ORGANIZED HEALTH CARE EDUCATION/TRAINING PROGRAM

## 2024-08-07 PROCEDURE — 82947 ASSAY GLUCOSE BLOOD QUANT: CPT

## 2024-08-07 PROCEDURE — 99232 SBSQ HOSP IP/OBS MODERATE 35: CPT | Performed by: INTERNAL MEDICINE

## 2024-08-07 PROCEDURE — 6370000000 HC RX 637 (ALT 250 FOR IP): Performed by: NURSE PRACTITIONER

## 2024-08-07 PROCEDURE — 97530 THERAPEUTIC ACTIVITIES: CPT

## 2024-08-07 PROCEDURE — 2580000003 HC RX 258: Performed by: NURSE PRACTITIONER

## 2024-08-07 RX ADMIN — AMOXICILLIN 500 MG: 500 CAPSULE ORAL at 19:52

## 2024-08-07 RX ADMIN — HEPARIN SODIUM 5000 UNITS: 5000 INJECTION INTRAVENOUS; SUBCUTANEOUS at 21:34

## 2024-08-07 RX ADMIN — SODIUM BICARBONATE 1300 MG: 648 TABLET ORAL at 09:09

## 2024-08-07 RX ADMIN — HEPARIN SODIUM 5000 UNITS: 5000 INJECTION INTRAVENOUS; SUBCUTANEOUS at 06:03

## 2024-08-07 RX ADMIN — TAMSULOSIN HYDROCHLORIDE 0.4 MG: 0.4 CAPSULE ORAL at 09:09

## 2024-08-07 RX ADMIN — HEPARIN SODIUM 5000 UNITS: 5000 INJECTION INTRAVENOUS; SUBCUTANEOUS at 15:55

## 2024-08-07 RX ADMIN — FERROUS SULFATE TAB EC 325 MG (65 MG FE EQUIVALENT) 325 MG: 325 (65 FE) TABLET DELAYED RESPONSE at 09:09

## 2024-08-07 RX ADMIN — SODIUM BICARBONATE 1300 MG: 648 TABLET ORAL at 19:52

## 2024-08-07 RX ADMIN — SODIUM CHLORIDE, PRESERVATIVE FREE 10 ML: 5 INJECTION INTRAVENOUS at 09:09

## 2024-08-07 RX ADMIN — CEFEPIME 1000 MG: 1 INJECTION, POWDER, FOR SOLUTION INTRAMUSCULAR; INTRAVENOUS at 18:20

## 2024-08-07 RX ADMIN — OXYCODONE HYDROCHLORIDE AND ACETAMINOPHEN 500 MG: 500 TABLET ORAL at 09:09

## 2024-08-07 RX ADMIN — CYANOCOBALAMIN TAB 500 MCG 50 MCG: 500 TAB at 09:09

## 2024-08-07 RX ADMIN — QUETIAPINE FUMARATE 25 MG: 25 TABLET ORAL at 19:52

## 2024-08-07 RX ADMIN — AMOXICILLIN 500 MG: 500 CAPSULE ORAL at 09:09

## 2024-08-07 RX ADMIN — FOLIC ACID 1 MG: 1 TABLET ORAL at 09:09

## 2024-08-07 RX ADMIN — SODIUM CHLORIDE, PRESERVATIVE FREE 10 ML: 5 INJECTION INTRAVENOUS at 19:54

## 2024-08-07 ASSESSMENT — PAIN SCALES - GENERAL
PAINLEVEL_OUTOF10: 0

## 2024-08-07 NOTE — PROGRESS NOTES
Occupational Therapy    [facility]  Occupational Therapy Not Seen Note    DATE: 2024    NAME: Arias Martin  MRN: [id number]   : 1940      Patient not seen this date for Occupational Therapy due to: Pt politely declined. Max encouragement given and pt continued to decline.    Next Scheduled Treatment: 24    Electronically signed by CHINMAY TRAYLOR on 2024 at 3:10 PM

## 2024-08-07 NOTE — PROGRESS NOTES
Physical Therapy  Facility/Department: [facility] [[email or web address] CAR 2- STEPDOWN  Physical Therapy Treatment Note    Name: Arias Martin  : 1940  MRN: [id number]  Date of Service: 2024    Discharge Recommendations:  Patient would benefit from continued therapy after discharge   PT Equipment Recommendations  Equipment Needed: No      Patient Diagnosis(es): The primary encounter diagnosis was Kidney stone. A diagnosis of Acute cystitis with hematuria was also pertinent to this visit.  Past Medical History:  has a past medical history of Arthritis, Caffeine use, Cancer (HCC), Cancer of kidney, unspecified laterality (HCC), CKD (chronic kidney disease), Cognitive communication deficit, COPD (chronic obstructive pulmonary disease) ([facility]), Diabetes mellitus ([facility]), DNR (do not resuscitate), Dysphagia, GERD (gastroesophageal reflux disease), GI bleeding, Hernia, inguinal, right, Hyperlipidemia, Hypertension, Ileal conduit stomal stenosis, Kidney stone, Mobility impaired, Muscle weakness, Nursing home resident, PAF (paroxysmal atrial fibrillation) ([facility]), Presence of urostomy ([facility]), and Retinal detachment.  Past Surgical History:  has a past surgical history that includes hernia repair; Bladder removal (); Prostate surgery (); Cataract removal (Bilateral); Colonoscopy; Eye surgery (Right, ); vitrectomy (Left, 16); Incisional hernia repair (2015); Incisional hernia repair (2014); laparoscopy (N/A, 2021); Cystoscopy (N/A, 5/3/2021); IR GUIDED NEPHROSTOMY CATH PLACEMENT LEFT (5/3/2021); IR GUIDED NEPHROSTOMY CATH PLACEMENT RIGHT (2021); eye surgery; knee surgery (Left); other surgical history (2021); IR GUIDED NEPHROSTOMY CATH PLACEMENT LEFT (2021); other surgical history (2021); Cystoscopy (N/A, 2021); CT CRYOABLATION OF RENAL TUMOR (2019); and IR GUIDED NEPHROSTOMY CATH PLACEMENT LEFT (2024).    Assessment   Assessment: Pt required modA x2/[place] x2 for bed mobility. Pt sat  perform d/t decreased cognition.  Transfers  Sit to Stand: Moderate Assistance;2 Person Assistance  Stand to Sit: Minimal Assistance  Lateral Transfers: 2 Person Assistance;Minimal Assistance  Comment: Pt completed x3 sit to stand; completed 1x STS modA x2 and x2 STS [place] x2. Pt constantly stated \"My right leg doesn't work\" but able to stand and side step to EOB  Ambulation  Surface: Level tile  Device: Rolling Walker  Assistance: Minimal assistance;2 Person assistance  Quality of Gait: small steps with shuffling gait  Gait Deviations: Slow Nadya;Decreased step height;Shuffles;Staggers  Distance: 2' side stepping  Comments: max redirection to prevent pt from amb anteriorly; pt repeatedly reporting inability to stand; sits without warning  More Ambulation?: No  Stairs/Curb  Stairs?: No     Balance  Posture: Fair  Sitting - Static: Fair;+  Sitting - Dynamic: Fair  Standing - Static: Fair;-  Standing - Dynamic: Poor  Comments: standing balance assessed with RW, seated balance assessed EOB. Pt tolerated sitting EOB ~20 minutes with CGA/Milton required; pt required max verbal and tactile cues to stand; pt able to stand with Milton but would sit without warning stating \"I can't stand\"  Exercise Treatment: Supine B LE exercises: ankle pumps x10 reps. Seated EOB L LE only: LAQ 10x  Dynamic Sitting Balance Exercises: pt sat EOB ~20' for dynamic seated activities, requiring CGA/Milton for upright posture      AM-PAC - Mobility  AM-PAC Basic Mobility - Inpatient   How much help is needed turning from your back to your side while in a flat bed without using bedrails?: A Lot  How much help is needed moving from lying on your back to sitting on the side of a flat bed without using bedrails?: A Lot  How much help is needed moving to and from a bed to a chair?: Total  How much help is needed standing up from a chair using your arms?: Total  How much help is needed walking in hospital room?: Total  How much help is needed climbing 3-5

## 2024-08-07 NOTE — PLAN OF CARE
Problem: Chronic Conditions and Co-morbidities  Goal: Patient's chronic conditions and co-morbidity symptoms are monitored and maintained or improved  Outcome: Progressing     Problem: Discharge Planning  Goal: Discharge to home or other facility with appropriate resources  Outcome: Progressing     Problem: Safety - Adult  Goal: Free from fall injury  Outcome: Progressing     Problem: Skin/Tissue Integrity  Goal: Absence of new skin breakdown  Outcome: Progressing     Problem: Pain  Goal: Verbalizes/displays adequate comfort level or baseline comfort level  Outcome: Progressing     Problem: ABCDS Injury Assessment  Goal: Absence of physical injury  Outcome: Progressing     Problem: Nutrition Deficit:  Goal: Optimize nutritional status  Outcome: Progressing

## 2024-08-07 NOTE — PROGRESS NOTES
@[facility]@    [facility]   IN-PATIENT SERVICE   [facility]    Progress Note    8/7/2024    4:23 PM    Name:   Arias Martin  MRN:     [id number]     Acct:      [id number]   Room:   2002/2002-01   Day:  8  Admit Date:  7/30/2024  4:30 PM    PCP:   Marcelo Olivia APRN - CNP  Code Status:  Full Code    Subjective:     C/C:   Chief Complaint   Patient presents with    Altered Mental Status     Interval History Status: improved.     Seen at bedside, hemodynamically stable, mentation much better this morning  Patient states that his pain is much better  Labs reviewed, creatinine improving  Continues to be on antibiotics, ID service following  Working on placement    Brief History:     84-year-old male with history of suprapubic catheter with frequent UTIs, recent UTI treated with cefepime/ceftriaxone, bladder cancer s/p cystectomy with ileal conduit, CKD, SDH, dementia presented with altered mental status and abdominal pain, patient was supposed to see urologist due to history of kidney stones, however because of altered mentation and worsening urine color was brought to the ER, found to be  hypotensive on arrival, with elevated creatinine levels, elevated troponin levels, urine analysis positive for infection, CT scan concerning for bilateral hydronephrosis with obstructive kidney stones and pyelonephritis, had percutaneous nephrostomy tube placed on left side on 7/31, urology service has been following, ID service following for antibiotics recommendations, nephrology service was on board for KARMA, started on bicarb containing fluids, improved, signed off    Medications:     Allergies:    Allergies   Allergen Reactions    Nsaids Anaphylaxis     GI bleed    Tetracyclines & Related        Current Meds:   Scheduled Meds:    sodium bicarbonate  1,300 mg Oral BID    vitamin B-12  50 mcg Oral Daily    ferrous sulfate  325 mg Oral Daily with breakfast    ascorbic acid  500 mg Oral  Bilateral hydronephrosis, more severe left than right. 5. The two adjacent proximal to mid LEFT ureter calculi measuring 9 x 26 mm and 4 x 6 mm. 6. Prominent inflammatory changes left kidney and ureter proximal to the obstruction concerning for superimposed infection. 7. Prominent struvite (triple phosphate) renal calculi bilateral kidneys, greater left than right. 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 8. Splenomegaly. 9. Prominent portion of ascending colon and hepatic flexure as well as a smaller portion of the small bowel are involved in the right abdominopelvic ventral wall peristomal hernia. No obstruction or inflammatory change. 10.  Diverticulosis coli without CT evidence of acute diverticulitis. 11.  Cholelithiasis without findings of acute cholecystitis.       Physical Examination:        General appearance:  alert, awake,  Mental Status: Improved today  lungs:  clear to auscultation bilaterally, normal effort  Heart:  regular rate and rhythm, no murmur  Abdomen:  soft, nontender, nondistended, normal bowel sounds, .  Extremities:  no edema, redness, tenderness in the calves  Skin:  no gross lesions, rashes, induration    Assessment:        Hospital Problems             Last Modified POA    * (Principal) Pseudomonas urinary tract infection 8/2/2024 Yes    Acute encephalopathy 8/2/2024 Yes    Bilateral hydronephrosis 7/30/2024 Yes    Normochromic normocytic anemia 7/30/2024 Yes    Iron deficiency anemia 7/30/2024 Yes    Vitamin B 12 deficiency 8/2/2024 Yes    Folate deficiency 7/30/2024 Yes    Bladder cancer (Chronic) 7/30/2024 Yes    Kidney stone 8/1/2024 Yes    Left ureteral calculus 7/31/2024 Yes    GERD (gastroesophageal reflux disease) 7/30/2024 Yes    Acute kidney injury (HCC) 7/30/2024 Yes    Moderate malnutrition (HCC) 7/30/2024 Yes    S/P ileal conduit (HCC) 7/30/2024 Yes    CKD (chronic kidney disease), stage III (HCC) 7/30/2024 Yes    Hypotension  7/30/2024 Yes    SIRS (systemic inflammatory response syndrome) (HCC) 8/2/2024 Yes    Acute pyelonephritis 8/4/2024 Yes    Toxic metabolic encephalopathy 8/4/2024 Yes    Acute cystitis without hematuria 8/5/2024 Yes   Plan:        -Continue amoxicillin,, cefepime, follow ID service recommendations   -Encourage oral intake, dietary service on board,, started on oral bicarbonate , off IV fluids now, nephrology service signed off further follow-up with his primary nephrologist Dr. Mix, SARAH BETH in 1 week to be faxed to the nephrologist  -Underwent left nephrostomy tube placement, continue Flomax, follow urology service recommendations, will need further treatment of stone as outpatient  -Delirium precautions, melatonin nightly  -Elevated troponins likely due to urosepsis, continue to monitor for any chest pain  -Continue to monitor H&H, continue folic acid, iron, supplements has chronic anemia and splenomegaly on imaging, further workup as needed as outpatient    Hien Mcfadden Sra, MD  8/7/2024  4:23 PM

## 2024-08-07 NOTE — CARE COORDINATION
Received voicemail from Prerna from [place]. They are not able to accept. Voicemail left for patient's daughter in law, Celine, to update and request more choices    1534 attempted to call Celine. No answer    1716 spoke to patient's son, Nate. He spoke to Celine. She received SNF list but has not reviewed. Requested choices asap    1743 spoke to Celine. She will review list in the morning and call with choices

## 2024-08-08 LAB
ANION GAP SERPL CALCULATED.3IONS-SCNC: 11 MMOL/L (ref 9–16)
BUN SERPL-MCNC: 28 MG/DL (ref 8–23)
CALCIUM SERPL-MCNC: 8.6 MG/DL (ref 8.6–10.4)
CHLORIDE SERPL-SCNC: 106 MMOL/L (ref 98–107)
CO2 SERPL-SCNC: 23 MMOL/L (ref 20–31)
CREAT SERPL-MCNC: 2.5 MG/DL (ref 0.7–1.2)
GFR, ESTIMATED: 24 ML/MIN/1.73M2
GLUCOSE SERPL-MCNC: 93 MG/DL (ref 74–99)
POTASSIUM SERPL-SCNC: 4 MMOL/L (ref 3.7–5.3)
SODIUM SERPL-SCNC: 140 MMOL/L (ref 136–145)

## 2024-08-08 PROCEDURE — 2060000000 HC ICU INTERMEDIATE R&B

## 2024-08-08 PROCEDURE — 2580000003 HC RX 258: Performed by: STUDENT IN AN ORGANIZED HEALTH CARE EDUCATION/TRAINING PROGRAM

## 2024-08-08 PROCEDURE — 94761 N-INVAS EAR/PLS OXIMETRY MLT: CPT

## 2024-08-08 PROCEDURE — 6360000002 HC RX W HCPCS: Performed by: STUDENT IN AN ORGANIZED HEALTH CARE EDUCATION/TRAINING PROGRAM

## 2024-08-08 PROCEDURE — 36415 COLL VENOUS BLD VENIPUNCTURE: CPT

## 2024-08-08 PROCEDURE — 6360000002 HC RX W HCPCS: Performed by: NURSE PRACTITIONER

## 2024-08-08 PROCEDURE — 80048 BASIC METABOLIC PNL TOTAL CA: CPT

## 2024-08-08 PROCEDURE — 2580000003 HC RX 258: Performed by: NURSE PRACTITIONER

## 2024-08-08 PROCEDURE — 99232 SBSQ HOSP IP/OBS MODERATE 35: CPT | Performed by: STUDENT IN AN ORGANIZED HEALTH CARE EDUCATION/TRAINING PROGRAM

## 2024-08-08 RX ADMIN — SODIUM CHLORIDE, PRESERVATIVE FREE 10 ML: 5 INJECTION INTRAVENOUS at 21:31

## 2024-08-08 RX ADMIN — HEPARIN SODIUM 5000 UNITS: 5000 INJECTION INTRAVENOUS; SUBCUTANEOUS at 20:34

## 2024-08-08 RX ADMIN — ONDANSETRON 4 MG: 2 INJECTION INTRAMUSCULAR; INTRAVENOUS at 23:56

## 2024-08-08 RX ADMIN — HEPARIN SODIUM 5000 UNITS: 5000 INJECTION INTRAVENOUS; SUBCUTANEOUS at 07:35

## 2024-08-08 RX ADMIN — CEFEPIME 1000 MG: 1 INJECTION, POWDER, FOR SOLUTION INTRAMUSCULAR; INTRAVENOUS at 21:31

## 2024-08-08 RX ADMIN — MORPHINE SULFATE 2 MG: 2 INJECTION, SOLUTION INTRAMUSCULAR; INTRAVENOUS at 23:56

## 2024-08-08 ASSESSMENT — PAIN SCALES - GENERAL
PAINLEVEL_OUTOF10: 0
PAINLEVEL_OUTOF10: 5

## 2024-08-08 ASSESSMENT — PAIN DESCRIPTION - DESCRIPTORS: DESCRIPTORS: ACHING

## 2024-08-08 ASSESSMENT — PAIN DESCRIPTION - ORIENTATION: ORIENTATION: MID

## 2024-08-08 ASSESSMENT — PAIN DESCRIPTION - LOCATION: LOCATION: BACK

## 2024-08-08 NOTE — PROGRESS NOTES
Comprehensive Nutrition Assessment    Type and Reason for Visit:  Reassess    Nutrition Recommendations/Plan:   Continue diet and low kcal, high protein ONS  Start frozen ONS once a day  Monitor intakes, ONS, weight, labs, GI status.     Malnutrition Assessment:  Malnutrition Status:  Mild malnutrition (at least) (08/03/24 1202)    Context:  Acute Illness     Findings of the 6 clinical characteristics of malnutrition:  Energy Intake:  75% or less of estimated energy requirements for 7 or more days (predicted)  Weight Loss:  No significant weight loss     Body Fat Loss:  Unable to assess     Muscle Mass Loss:  Mild muscle mass loss Thigh (quadriceps), Calf (gastrocnemius) (visual)  Fluid Accumulation:  Unable to assess     Strength:  Not Performed    Nutrition Assessment:    Continues on soft and bite diet with low kcal, high protein ONS at dinner. Pt disoriented per notes, refusing meds and breakfast r/t raspy voice. Noted % intake at dinner per nursing documentation. Previous RD note indicates pt likes Magic cup ONS. LBM 8/5 per chart. Working on placement. Noted weight gain ? 4 lbs over 5 days, continue to monitor.     Nutrition Related Findings:    Meds and labs reviewed Wound Type: None, Surgical Incision       Current Nutrition Intake & Therapies:    Average Meal Intake: %, Refusing to eat  Average Supplements Intake: %  ADULT DIET; Dysphagia - Soft and Bite Sized  ADULT ORAL NUTRITION SUPPLEMENT; Dinner; Low Calorie/High Protein Oral Supplement    Anthropometric Measures:  Height: 188 cm (6' 2.02\")  Ideal Body Weight (IBW): 190 lbs (86 kg)    Current Body Weight: 96.3 kg (212 lb 4.9 oz), 109.8 % IBW. Weight Source: Bed Scale  Current BMI (kg/m2): 27.2  BMI Categories: Overweight (BMI 25.0-29.9)    Estimated Daily Nutrient Needs:  Energy Requirements Based On: Kcal/kg  Weight Used for Energy Requirements: Admission  Energy (kcal/day): 8039-0114 kcal/d  Weight Used for Protein Requirements:  Ideal  Protein (g/day): 110-120 g/kg  Method Used for Fluid Requirements: 1 ml/kcal  Fluid (ml/day): 3853-1257 mL or per MD    Nutrition Diagnosis:   Inadequate oral intake related to inadequate protein-energy intake as evidenced by mild muscle loss, nausea    Nutrition Interventions:   Food and/or Nutrient Delivery: Continue Current Diet, Modify Oral Nutrition Supplement  Nutrition Education/Counseling: No recommendation at this time  Coordination of Nutrition Care: Continue to monitor while inpatient  Plan of Care discussed with: Nursing    Goals:  Previous Goal Met: Progressing toward Goal(s)  Goals: Meet at least 75% of estimated needs, prior to discharge       Nutrition Monitoring and Evaluation:   Behavioral-Environmental Outcomes: None Identified  Food/Nutrient Intake Outcomes: Food and Nutrient Intake, Supplement Intake  Physical Signs/Symptoms Outcomes: Biochemical Data, Nutrition Focused Physical Findings, Weight, GI Status    Discharge Planning:    Continue current diet, Continue Oral Nutrition Supplement     Edwige Julio RD  Contact: [phone]

## 2024-08-08 NOTE — PROGRESS NOTES
[facility] -   Infectious diseases evaluation  admission date 7/30/2024    reason for consultation:   UTI and antibiotic management    Impression :   Current:  Acute encephalopathy - improved  Positive SIRS criteria from infection  Bilateral staghorn kidney stones, with bilateral hydronephrosis  7/31 right percutaneous nephrostomy tube placed  Urine culture from the PCN T neg  Patient has an ileal conduit post cystectomy for cancer  7/30 Pseudomonas ( R levaquin ) and Enterococcus faecalis UTI, taken from the ileal conduit, infection versus colonization  CKD 4  Dementia    Other:  Allergy tetracycline  Discussion / summary of stay / plan of care/ Recommendations:     HENCE:   8/1 cefepime and amoxicillin till 8/8  ID signing off    Infection Control Recommendations   [place] Precautions  Contact Isolation     Antimicrobial Stewardship Recommendations   Simplification of therapy  Targeted therapy      History of Present Illness:   Initial history:  Arias Martin is a 84 y.o.-year-old male with a history of suprapubic catheter frequent UTIs and sometimes encephalopathy related to UTIs, history of right subdural hematoma with midline shift and right meningeal artery ablation, history of bladder cancer post cystectomy and ileal conduit, CKD stage IV, history of dementia,    Presents to the emergency room from the rehab again with altered mentation, and abdominal pain.  Since his urine color has been worsening, and his mentation has been getting worse as well over the last few days.  He was tired and sleepy not responding    Is had some kidney stones and follows with urology    Upon admission he was tachycardic tachypneic, hypotensive, fits SIRS criteria.  Urine analysis was infectious  Creatinine was 3.9  CT scan of the abdomen shows bilateral hydronephrosis with obstructive kidney stones, felt to be struvite, the suspicion of a left xanthogranulomatous

## 2024-08-08 NOTE — CARE COORDINATION
TRansitional  planning    No family at bedside. Called Celine [phone] and left VM requesting call back.     1300 Celine called back. CM reviewed SNFs that have declined. New choices are [facility] and [facility]-referrals sent    1429 CM returned phone call to [facility] [phone]. Left VM that message received that onsite needs to be done this afternoon or tomorrow morning    1626 Called [facility] [phone] and left VM asking if referral received.

## 2024-08-08 NOTE — PROGRESS NOTES
[email or web address]    [facility]   IN-PATIENT SERVICE   [facility]    Progress Note    8/8/2024    1:08 PM    Name:   Arias Martin  MRN:     [id number]     Acct:      [id number]   Room:   2002/2002-01  IP Day:  9  Admit Date:  7/30/2024  4:30 PM    PCP:   Marcelo Olivia, APRN - CNP  Code Status:  Full Code    Subjective:     C/C:   Chief Complaint   Patient presents with    Altered Mental Status     Interval History Status: improved.     Seen at bedside, hemodynamically stable,   Patient states that his pain is much better, refused to take medications because of raspy voice  Labs reviewed,   Working on placement  Brief History:     84-year-old male with history of suprapubic catheter with frequent UTIs, recent UTI treated with cefepime/ceftriaxone, bladder cancer s/p cystectomy with ileal conduit, CKD, SDH, dementia presented with altered mental status and abdominal pain, patient was supposed to see urologist due to history of kidney stones, however because of altered mentation and worsening urine color was brought to the ER, found to be  hypotensive on arrival, with elevated creatinine levels, elevated troponin levels, urine analysis positive for infection, CT scan concerning for bilateral hydronephrosis with obstructive kidney stones and pyelonephritis, had percutaneous nephrostomy tube placed on left side on 7/31, urology service has been following, ID service following for antibiotics recommendations, nephrology service was on board for KARMA, started on bicarb containing fluids, improved, signed off    Medications:     Allergies:    Allergies   Allergen Reactions    Nsaids Anaphylaxis     GI bleed    Tetracyclines & Related        Current Meds:   Scheduled Meds:    sodium bicarbonate  1,300 mg Oral BID    vitamin B-12  50 mcg Oral Daily    ferrous sulfate  325 mg Oral Daily with breakfast    ascorbic acid  500 mg Oral Daily    folic acid  1 mg Oral Daily    amoxicillin  500  at 8/8/2024 0902  Gross per 24 hour   Intake --   Output 1900 ml   Net -1900 ml         Labs:  Hematology:  Recent Labs     08/06/24  0548 08/07/24  0655   WBC 5.4 4.9   RBC 3.00* 3.17*   HGB 8.7* 9.1*   HCT 30.1* 31.3*   .3 98.7   MCH 29.0 28.7   MCHC 28.9 29.1   RDW 16.1* 16.2*    234   MPV 9.4 9.7       Chemistry:  Recent Labs     08/06/24  0548 08/07/24  0655 08/08/24  0642    141 140   K 4.3 3.9 4.0    105 106   CO2 23 26 23   GLUCOSE 96 81 93   BUN 25* 25* 28*   CREATININE 2.7* 2.5* 2.5*   ANIONGAP 11 10 11   LABGLOM 23* 25* 24*   CALCIUM 8.6 8.8 8.6       Recent Labs     08/07/24  0751   POCGLU 86       ABG:  Lab Results   Component Value Date/Time    POCPH 7.19 05/02/2021 01:36 PM    POCPCO2 44 05/02/2021 01:36 PM    POCPO2 74 05/02/2021 01:36 PM    POCHCO3 16.9 05/02/2021 01:36 PM    NBEA 11 05/02/2021 01:36 PM    PBEA NOT REPORTED 05/02/2021 01:36 PM    PEO9HUW 18 05/02/2021 01:36 PM    DBOP9SRQ 90 05/02/2021 01:36 PM    FIO2 32.0 05/02/2021 01:36 PM     Lab Results   Component Value Date/Time    SPECIAL Site: Urine 07/30/2024 05:42 PM     Lab Results   Component Value Date/Time    CULTURE NO SIGNIFICANT GROWTH 07/31/2024 05:15 PM       Radiology:  IR GUIDED NEPHROSTOMY CATH PLACEMENT LEFT    Result Date: 7/31/2024  Successful percutaneous 10 Montserratian left nephrostomy tube placement The catheter should be flushed every 6 hours with 10 cc normal saline until the urine is clear.  Patient should be monitored for any signs or symptoms of urosepsis due to the grossly infected urine.     CT CHEST ABDOMEN PELVIS WO CONTRAST Additional Contrast? None    Result Date: 7/30/2024  1.  CT CHEST: No acute abnormality. 2. Moderately severe calcific atherosclerosis coronary arteries. 3. Mild cardiomegaly. 4. CT ABDOMEN/PELVIS: Bilateral hydronephrosis, more severe left than right. 5. The two adjacent proximal to mid LEFT ureter calculi measuring 9 x 26 mm and 4 x 6 mm. 6. Prominent inflammatory

## 2024-08-08 NOTE — PLAN OF CARE
Problem: Chronic Conditions and Co-morbidities  Goal: Patient's chronic conditions and co-morbidity symptoms are monitored and maintained or improved  8/8/2024 0551 by Jerri Kent RN  Outcome: Progressing  8/7/2024 1834 by Corrie Guthrie RN  Outcome: Progressing     Problem: Discharge Planning  Goal: Discharge to home or other facility with appropriate resources  8/8/2024 0551 by Jerri Kent RN  Outcome: Progressing  8/7/2024 1834 by Corrie Guthrie RN  Outcome: Progressing     Problem: Safety - Adult  Goal: Free from fall injury  8/8/2024 0551 by Jerri Kent RN  Outcome: Progressing  8/7/2024 1834 by Corrie Guthrie RN  Outcome: Progressing     Problem: Skin/Tissue Integrity  Goal: Absence of new skin breakdown  Description: 1.  Monitor for areas of redness and/or skin breakdown  2.  Assess vascular access sites hourly  3.  Every 4-6 hours minimum:  Change oxygen saturation probe site  4.  Every 4-6 hours:  If on nasal continuous positive airway pressure, respiratory therapy assess nares and determine need for appliance change or resting period.  8/8/2024 0551 by Jerri Kent RN  Outcome: Progressing  8/7/2024 1834 by Corrie Guhtrie RN  Outcome: Progressing     Problem: Pain  Goal: Verbalizes/displays adequate comfort level or baseline comfort level  8/8/2024 0551 by Jerri Kent RN  Outcome: Progressing  8/7/2024 1834 by Corrie Guthrie RN  Outcome: Progressing     Problem: ABCDS Injury Assessment  Goal: Absence of physical injury  8/8/2024 0551 by Jerri Kent RN  Outcome: Progressing  8/7/2024 1834 by Corrie Guthrie RN  Outcome: Progressing     Problem: Nutrition Deficit:  Goal: Optimize nutritional status  8/8/2024 0551 by Jerri Kent RN  Outcome: Progressing  8/7/2024 1834 by Corrie Guthrie RN  Outcome: Progressing

## 2024-08-08 NOTE — PLAN OF CARE
Problem: Safety - Adult  Goal: Free from fall injury  8/8/2024 1521 by Gilma Grissom, RN  Outcome: Progressing     Problem: Skin/Tissue Integrity  Goal: Absence of new skin breakdown  Description: 1.  Monitor for areas of redness and/or skin breakdown.  8/8/2024 1521 by Gilma Grissom, RN  Outcome: Progressing     Problem: Pain  Goal: Verbalizes/displays adequate comfort level or baseline comfort level  8/8/2024 1521 by Gilma Grissom, RN  Outcome: Progressing

## 2024-08-08 NOTE — PROGRESS NOTES
Patient remains disoriented this morning. Refusing medications and breakfast at this time because his \"voice is raspy.\" Writer offered patient a drink to help clear throat and educated on importance of medications. Patient states \"they're not important to me when my voice sounds like this, I will let you know when I want to take them.\" Writer offered to assist patient with breakfast tray, continues to refuse.

## 2024-08-09 PROBLEM — F03.90 DEMENTIA (HCC): Status: ACTIVE | Noted: 2024-08-09

## 2024-08-09 PROBLEM — R41.0 DELIRIUM: Status: ACTIVE | Noted: 2024-08-09

## 2024-08-09 LAB
ANION GAP SERPL CALCULATED.3IONS-SCNC: 11 MMOL/L (ref 9–16)
BASOPHILS # BLD: 0.03 K/UL (ref 0–0.2)
BASOPHILS NFR BLD: 0 % (ref 0–2)
BUN SERPL-MCNC: 27 MG/DL (ref 8–23)
CALCIUM SERPL-MCNC: 8.5 MG/DL (ref 8.6–10.4)
CHLORIDE SERPL-SCNC: 105 MMOL/L (ref 98–107)
CO2 SERPL-SCNC: 23 MMOL/L (ref 20–31)
CREAT SERPL-MCNC: 2.4 MG/DL (ref 0.7–1.2)
EKG ATRIAL RATE: 70 BPM
EKG P AXIS: 58 DEGREES
EKG P-R INTERVAL: 288 MS
EKG Q-T INTERVAL: 416 MS
EKG QRS DURATION: 92 MS
EKG QTC CALCULATION (BAZETT): 449 MS
EKG R AXIS: -9 DEGREES
EKG T AXIS: 5 DEGREES
EKG VENTRICULAR RATE: 70 BPM
EOSINOPHIL # BLD: 0.15 K/UL (ref 0–0.44)
EOSINOPHILS RELATIVE PERCENT: 1 % (ref 1–4)
ERYTHROCYTE [DISTWIDTH] IN BLOOD BY AUTOMATED COUNT: 16.4 % (ref 11.8–14.4)
GFR, ESTIMATED: 26 ML/MIN/1.73M2
GLUCOSE BLD-MCNC: 83 MG/DL (ref 75–110)
GLUCOSE SERPL-MCNC: 94 MG/DL (ref 74–99)
HCT VFR BLD AUTO: 32.1 % (ref 40.7–50.3)
HGB BLD-MCNC: 9.5 G/DL (ref 13–17)
IMM GRANULOCYTES # BLD AUTO: 0.1 K/UL (ref 0–0.3)
IMM GRANULOCYTES NFR BLD: 1 %
LYMPHOCYTES NFR BLD: 1.49 K/UL (ref 1.1–3.7)
LYMPHOCYTES RELATIVE PERCENT: 11 % (ref 24–43)
MCH RBC QN AUTO: 29.1 PG (ref 25.2–33.5)
MCHC RBC AUTO-ENTMCNC: 29.6 G/DL (ref 28.4–34.8)
MCV RBC AUTO: 98.5 FL (ref 82.6–102.9)
MONOCYTES NFR BLD: 0.65 K/UL (ref 0.1–1.2)
MONOCYTES NFR BLD: 5 % (ref 3–12)
NEUTROPHILS NFR BLD: 82 % (ref 36–65)
NEUTS SEG NFR BLD: 10.95 K/UL (ref 1.5–8.1)
NRBC BLD-RTO: 0 PER 100 WBC
PLATELET # BLD AUTO: 192 K/UL (ref 138–453)
PMV BLD AUTO: 9.4 FL (ref 8.1–13.5)
POTASSIUM SERPL-SCNC: 3.7 MMOL/L (ref 3.7–5.3)
RBC # BLD AUTO: 3.26 M/UL (ref 4.21–5.77)
RBC # BLD: ABNORMAL 10*6/UL
SODIUM SERPL-SCNC: 139 MMOL/L (ref 136–145)
TROPONIN I SERPL HS-MCNC: 37 NG/L (ref 0–22)
WBC OTHER # BLD: 13.4 K/UL (ref 3.5–11.3)

## 2024-08-09 PROCEDURE — 36415 COLL VENOUS BLD VENIPUNCTURE: CPT

## 2024-08-09 PROCEDURE — 97530 THERAPEUTIC ACTIVITIES: CPT

## 2024-08-09 PROCEDURE — 6360000002 HC RX W HCPCS: Performed by: STUDENT IN AN ORGANIZED HEALTH CARE EDUCATION/TRAINING PROGRAM

## 2024-08-09 PROCEDURE — 84484 ASSAY OF TROPONIN QUANT: CPT

## 2024-08-09 PROCEDURE — 6360000002 HC RX W HCPCS: Performed by: NURSE PRACTITIONER

## 2024-08-09 PROCEDURE — 97535 SELF CARE MNGMENT TRAINING: CPT

## 2024-08-09 PROCEDURE — 97110 THERAPEUTIC EXERCISES: CPT

## 2024-08-09 PROCEDURE — 80048 BASIC METABOLIC PNL TOTAL CA: CPT

## 2024-08-09 PROCEDURE — 2060000000 HC ICU INTERMEDIATE R&B

## 2024-08-09 PROCEDURE — 82947 ASSAY GLUCOSE BLOOD QUANT: CPT

## 2024-08-09 PROCEDURE — 6370000000 HC RX 637 (ALT 250 FOR IP): Performed by: STUDENT IN AN ORGANIZED HEALTH CARE EDUCATION/TRAINING PROGRAM

## 2024-08-09 PROCEDURE — 99232 SBSQ HOSP IP/OBS MODERATE 35: CPT | Performed by: STUDENT IN AN ORGANIZED HEALTH CARE EDUCATION/TRAINING PROGRAM

## 2024-08-09 PROCEDURE — 99222 1ST HOSP IP/OBS MODERATE 55: CPT | Performed by: NURSE PRACTITIONER

## 2024-08-09 PROCEDURE — 92610 EVALUATE SWALLOWING FUNCTION: CPT

## 2024-08-09 PROCEDURE — 6370000000 HC RX 637 (ALT 250 FOR IP): Performed by: INTERNAL MEDICINE

## 2024-08-09 PROCEDURE — 93005 ELECTROCARDIOGRAM TRACING: CPT | Performed by: STUDENT IN AN ORGANIZED HEALTH CARE EDUCATION/TRAINING PROGRAM

## 2024-08-09 PROCEDURE — 99222 1ST HOSP IP/OBS MODERATE 55: CPT | Performed by: PSYCHIATRY & NEUROLOGY

## 2024-08-09 PROCEDURE — APPSS60 APP SPLIT SHARED TIME 46-60 MINUTES

## 2024-08-09 PROCEDURE — 85025 COMPLETE CBC W/AUTO DIFF WBC: CPT

## 2024-08-09 PROCEDURE — 2580000003 HC RX 258: Performed by: STUDENT IN AN ORGANIZED HEALTH CARE EDUCATION/TRAINING PROGRAM

## 2024-08-09 RX ORDER — LOPERAMIDE HCL 2 MG
2 CAPSULE ORAL 4 TIMES DAILY PRN
Status: DISCONTINUED | OUTPATIENT
Start: 2024-08-09 | End: 2024-08-20 | Stop reason: HOSPADM

## 2024-08-09 RX ORDER — LORAZEPAM 2 MG/ML
0.25 INJECTION INTRAMUSCULAR ONCE
Status: DISCONTINUED | OUTPATIENT
Start: 2024-08-09 | End: 2024-08-09

## 2024-08-09 RX ORDER — QUETIAPINE FUMARATE 25 MG/1
50 TABLET, FILM COATED ORAL NIGHTLY
Status: DISCONTINUED | OUTPATIENT
Start: 2024-08-09 | End: 2024-08-10

## 2024-08-09 RX ORDER — OLANZAPINE 5 MG/1
5 TABLET, ORALLY DISINTEGRATING ORAL 2 TIMES DAILY
Status: DISCONTINUED | OUTPATIENT
Start: 2024-08-09 | End: 2024-08-11

## 2024-08-09 RX ORDER — LORAZEPAM 2 MG/ML
0.5 INJECTION INTRAMUSCULAR ONCE
Status: COMPLETED | OUTPATIENT
Start: 2024-08-09 | End: 2024-08-09

## 2024-08-09 RX ORDER — HYDROXYZINE HYDROCHLORIDE 10 MG/1
10 TABLET, FILM COATED ORAL ONCE
Status: DISCONTINUED | OUTPATIENT
Start: 2024-08-09 | End: 2024-08-09

## 2024-08-09 RX ORDER — TRAZODONE HYDROCHLORIDE 50 MG/1
50 TABLET, FILM COATED ORAL NIGHTLY
Status: DISCONTINUED | OUTPATIENT
Start: 2024-08-09 | End: 2024-08-10

## 2024-08-09 RX ADMIN — FERROUS SULFATE TAB EC 325 MG (65 MG FE EQUIVALENT) 325 MG: 325 (65 FE) TABLET DELAYED RESPONSE at 09:02

## 2024-08-09 RX ADMIN — FOLIC ACID 1 MG: 1 TABLET ORAL at 09:02

## 2024-08-09 RX ADMIN — LORAZEPAM 0.5 MG: 2 INJECTION INTRAMUSCULAR; INTRAVENOUS at 06:15

## 2024-08-09 RX ADMIN — HEPARIN SODIUM 5000 UNITS: 5000 INJECTION INTRAVENOUS; SUBCUTANEOUS at 14:18

## 2024-08-09 RX ADMIN — WATER 5 MG: 1 INJECTION INTRAMUSCULAR; INTRAVENOUS; SUBCUTANEOUS at 07:14

## 2024-08-09 RX ADMIN — SODIUM BICARBONATE 1300 MG: 648 TABLET ORAL at 09:02

## 2024-08-09 RX ADMIN — OXYCODONE HYDROCHLORIDE AND ACETAMINOPHEN 500 MG: 500 TABLET ORAL at 09:03

## 2024-08-09 RX ADMIN — CYANOCOBALAMIN TAB 500 MCG 50 MCG: 500 TAB at 09:03

## 2024-08-09 ASSESSMENT — PAIN SCALES - WONG BAKER
WONGBAKER_NUMERICALRESPONSE: NO HURT

## 2024-08-09 ASSESSMENT — PAIN SCALES - GENERAL
PAINLEVEL_OUTOF10: 0
PAINLEVEL_OUTOF10: 0

## 2024-08-09 NOTE — PROGRESS NOTES
Patient repeatedly occluding IV by bending arm, educated multiple times about keeping arm straight to facilitate flow of medication.  Patient remembers education but refuses to keep arm straight.  Patient will not allow RN to place different IV. After last time pump alarmed, patient stated to RN \"you know what I don't want no medicine.\"  IV pump stopped after 25% of cefepime administered.  Provider notified. Care ongoing.    No new orders received.    After further education patient agreeable to trying armboard to keep IV arm straight during sleep. Cefepime restarted 2321.    Patient removed armboard 2335    Cefepime once again stopped 2341

## 2024-08-09 NOTE — PROGRESS NOTES
Clarified with Dr. Bradford if pt requires IV at this time. Nursing ok to hold on IV reinsertion to decrease agitation

## 2024-08-09 NOTE — PROGRESS NOTES
Patient becoming agitated states \"I feel like I'm going crazy\" while pulling at devices and his nephrostomy tube. On call MIGUEL Burnett notified via [employer], order for atarax received.    Order changed to ativan because patient refusing PO meds.

## 2024-08-09 NOTE — CARE COORDINATION
Received call from Shanel from [facility]. They are not able to accept    0902 received voicemail from Billie from [facility]. They are not able to accept d/t no bed availability. Celine notified. Requested more choices

## 2024-08-09 NOTE — PROGRESS NOTES
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 obstruction or gangrene; Kidney stone; Left ureteral calculus; Partial small bowel obstruction (HCC); GERD (gastroesophageal reflux disease); Acute kidney injury (HCC); Normochromic normocytic anemia; Iron deficiency anemia; Acute encephalopathy; Bilateral hydronephrosis; Vitamin B 12 deficiency; Folate deficiency; Acute respiratory failure with hypoxia (HCC); Moderate malnutrition (HCC); Neoplasm of uncertain behavior of kidney; S/P ileal conduit (HCC); CKD (chronic kidney disease), stage III (HCC); Stenosis of ileal conduit stoma; Renal cell carcinoma of right kidney (HCC); Hypotension; Syncope and collapse; Subclinical hypothyroidism; Left shoulder pain; Pseudomonas urinary tract infection; SIRS (systemic inflammatory response syndrome) (HCC); Acute pyelonephritis; Toxic metabolic encephalopathy; and Acute cystitis without hematuria on their problem list.      Recent Chest Xray/CT of Chest: 24  FINDINGS:     Chest:     Mediastinum: Mild cardiomegaly.  The great vessels appear unremarkable with  exception of calcific atherosclerotic disease.  No pericardial effusion.  Posterior mediastinal structures appear unremarkable.  No mediastinal or  hilar adenopathy.     Lungs/pleura: No acute pulmonary disease.  Incidentally noted, medial upper  right lung, is an accessory azygos fissure and lobe.  Numerous calcifications  posterior mid and lower lungs typical of sequela from old granulomatous  disease or other prior infectious/inflammatory process.  No suspicious soft  tissue pulmonary nodule.  No inspissated secretions or endobronchial lesion  evident.  No pleural effusion or pneumothorax.       Date  ceftriaxone    Pain:  Pain Assessment  Pain Assessment: None - Denies Pain  Pain Level: 0  Warren-Baker Pain Rating: No hurt  Patient's Stated Pain Goal: 0 - No pain  Pain Location: Back  Pain Orientation: Mid  Pain Descriptors: Aching  Functional Pain Assessment: Activities are not prevented  Non-Pharmaceutical Pain Intervention(s): Emotional support  Response to Pain Intervention: Patient satisfied  Side Effects: No reported side effects    Reason for Referral  Arias Martin was referred for a bedside swallow evaluation to assess the efficiency of his swallow function, identify signs and symptoms of aspiration and make recommendations regarding safe dietary consistencies, effective compensatory strategies, and safe eating environment.    Impression  Dysphagia Diagnosis: Mild oral stage dysphagia  Dysphagia Outcome Severity Scale: Level 6: Within functional limits/Modified independence     Treatment Plan  Requires SLP Intervention: Yes     D/C Recommendations: To be determined       Recommended Diet and Intervention  Diet Solids Recommendation: Soft & Bite Sized  Liquid Consistency Recommendation: Thin  Recommended Form of Meds: Meds in puree     Therapeutic Interventions: Patient/Family education  Patient presents with probable safe swallow for Soft/bite sized diet with thin liquids as evidenced by no overt s/s of aspiration noted with consistencies tested. Recommend small sips and bites, only feed when alert and awake and upright at 90 degrees for all PO intake. Recommend close monitoring for overt/clinical s/s of aspiration and D/C PO intake and complete Modified Barium Swallow Study should they occur. Results and recommendations reported to RN.     Compensatory Swallowing Strategies  Compensatory Swallowing Strategies : Upright as possible for all oral intake;Small bites/sips;Remain upright for 30-45 minutes after meals;Eat/Feed slowly    Treatment/Goals  Dysphagia Goals: The patient will tolerate recommended  diet without observed clinical signs of aspiration    General  Chart Reviewed: Yes  Comments: Per RN, pt was complaining of change in voice affecting his ability to swallow. RN reports that pt took pills in pudding this AM with no difficulty. OT reports pt was eating breakfast tray without difficulty.  Behavior/Cognition: Confused;Lethargic;Requires cueing;Uncooperative  Communication Observation: Functional  Follows Directions: Simple  Dentition: Adequate  Patient Positioning: Upright in bed  Prior Dysphagia History: none per chart  Consistencies Administered: Soft and Bite-Sized;Thin - cup;Thin - straw;Pureed    Vision/Hearing     Functional for purpose of this evaluation.   Oral Motor Deficits  Labial: No impairment  Lingual: No impairment  Consistencies Administered: Soft and Bite-Sized;Thin - cup;Thin - straw;Pureed    Oral Phase Dysfunction  Oral Phase  Oral Phase: WNL  Mildly prolonged mastication due to lethargy throughout evaluation. Recommend pt remain on soft/bite sized diet and reassess when mental status improves for possible upgrade.      Indicators of Pharyngeal Phase Dysfunction   Pharyngeal Phase  Pharyngeal Phase: WNL  Pharyngeal Phase   Pharyngeal Phase: WNL  No overt s/s of aspiration with all consistencies presented  Prognosis  Prognosis: Guarded  Prognosis Considerations: Age;Participation Level  Consulted and agree with results and recommendations: Patient;RN    Education  Patient Education: yes  Patient Education Response: Needs reinforcement             Therapy Time      [id number]         CHARITO Skinner M.A., CCC-SLP   8/9/2024 10:19 AM

## 2024-08-09 NOTE — CONSULTS
experiencing any internal stimuli at this time but he appears to be resting comfortably.    Most likely patient is experiencing a delirium due to his known medical condition.  Dementia could be also exacerbated due to the unknown environment and current medical concerns.  BUN at 27, creatinine 2.4, GFR 38 does show significant acute kidney injury.  Also has elevated myoglobin and troponin at 46 as of 8/2/2024.  EKG is a QTc of 451.  Consider reassessing EKG if patient continues to receive Qtc prolonging antipsychotics.     At this time it is recommended to continue one-to-one safety sitter.  Due to patient's poor cognition and being alert and oriented only to self he is a danger to himself and others.  Recommend continuing Zyprexa 5 mg 2 times daily as needed for agitation.  Can also supplement with Risperdal 0.5 mg disintegrating tablets.  Safe to continue Seroquel and increase to 50 mg nightly.  Possibly consider Exelon patch 0.4 mg.  Please stop any benzodiazepines or anticholinergics which can exacerbate further aggression and confusion in the elderly.        The patient is not currently receiving care for the above psychiatric illness.    Past Psychiatric History:  Prior Diagnosis: Mild dementia  Outpatient psychiatric provider: None noted  Psychiatric Hospitalization: no  Hx of Suicidal Attempts: no  Hx of violence:  no    Past psychiatric medications includes:   Unknown    Adverse reactions from psychotropic medications:  None documented    Substance Abuse History:  ETOH: Unknown  Marijuana: Unknown  Opiates: Unknown  Other Drugs: Unknown    Social History:     RESIDENCE: [place]  :     CHILDREN: Son involved in care  OCCUPATION: Retired  EDUCATION: Unknown    Past Medical History:        Diagnosis Date    Arthritis     Caffeine use     2 coffee, 2 cans soda/day    Cancer (HCC) 2006    bladder et prostate, 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, ileo conduit s/p cystectomy    Cancer of kidney, unspecified    Blood Pressure KIT 1 kit by Does not apply route daily 6/12/21   Zach Mantilla I, DO   acetaminophen (TYLENOL) 500 MG tablet Take 625 mg by mouth every 4 hours as needed for Pain    Provider, Historical, MD        Medications:    Current Facility-Administered Medications: loperamide (IMODIUM) capsule 2 mg, 2 mg, Oral, 4x Daily PRN  benzocaine-menthol (CEPACOL SORE THROAT) lozenge 1 lozenge, 1 lozenge, Oral, Q2H PRN  morphine (PF) injection 2 mg, 2 mg, IntraVENous, Q2H PRN **OR** morphine injection 4 mg, 4 mg, IntraVENous, Q2H PRN  cyclobenzaprine (FLEXERIL) tablet 10 mg, 10 mg, Oral, TID PRN  hyoscyamine (LEVSIN/SL) sublingual tablet 125 mcg, 125 mcg, SubLINGual, Q4H PRN  melatonin tablet 5 mg, 5 mg, Oral, Nightly PRN  sodium bicarbonate tablet 1,300 mg, 1,300 mg, Oral, BID  oxyCODONE (ROXICODONE) immediate release tablet 5 mg, 5 mg, Oral, Q6H PRN  vitamin B-12 (CYANOCOBALAMIN) tablet 50 mcg, 50 mcg, Oral, Daily  ferrous sulfate (FE TABS 325) EC tablet 325 mg, 325 mg, Oral, Daily with breakfast  ascorbic acid (VITAMIN C) tablet 500 mg, 500 mg, Oral, Daily  folic acid (FOLVITE) tablet 1 mg, 1 mg, Oral, Daily  acetaminophen (TYLENOL) tablet 650 mg, 650 mg, Oral, Q4H PRN  heparin (porcine) injection 5,000 Units, 5,000 Units, SubCUTAneous, 3 times per day  QUEtiapine (SEROQUEL) tablet 25 mg, 25 mg, Oral, Nightly  sodium chloride flush 0.9 % injection 5-40 mL, 5-40 mL, IntraVENous, 2 times per day  sodium chloride flush 0.9 % injection 5-40 mL, 5-40 mL, IntraVENous, PRN  0.9 % sodium chloride infusion, , IntraVENous, PRN  tamsulosin (FLOMAX) capsule 0.4 mg, 0.4 mg, Oral, Daily  ondansetron (ZOFRAN-ODT) disintegrating tablet 4 mg, 4 mg, Oral, Q8H PRN **OR** ondansetron (ZOFRAN) injection 4 mg, 4 mg, IntraVENous, Q6H PRN  polyethylene glycol (GLYCOLAX) packet 17 g, 17 g, Oral, Daily PRN     Physical:    Vitals:  /76   Pulse 65   Temp 97.3 °F (36.3 °C) (Oral)   Resp 13   Ht 1.88 m (6' 2.02\")   Wt 94.4 kg  (208 lb 1.8 oz)   SpO2 97%   BMI 26.71 kg/m²      Qtc: 451 as of 7/31/2024    Neuro Exam:   Muscle Strength & Tone: full ROM, normal    Involuntary Movements: No    Mental Status Examination:  Level of consciousness: Sleeping  Appearance: hospital attire, lying in bed, fair grooming  Behavior/Motor:  no abnormalities noted  Attitude toward examiner: Patient currently sleeping  Speech: Unable to assess  Mood: Unable to assess patient sleeping  Affect: mood congruent  Thought processes: Unable to assess  Thought content: Unable to assess suicidal ideations   Unable to assess homicidal ideations    Unable to assess hallucinations   Unable to assess delusions  Cognition:  Oriented to self  Concentration clinically adequate  Memory age for recent and immediate recall  Insight & Judgment: Limited/poor    DSM-5 DIAGNOSIS: Acute delirium      Stressors     Severity of stressors is mild  Source of stressors include:  Other psychosocial and environmental stressors    Plan:      Continue one-to-one sitter  Recommend continuing Zyprexa 5 mg 2 times daily as needed for agitation.  Can also supplement with Risperdal 0.5 mg disintegrating tablets.  Safe to continue Seroquel and increase to 50 mg nightly.  Possibly consider Exelon patch 0.4 mg.  Please stop any benzodiazepines or anticholinergics which can exacerbate further aggression and confusion in the elderly.     Consider reassessing EKG if patient continues to receive Qtc prolonging antipsychotics.     No Medication Changes Today  Additional recommendations will follow the clinical course.       Thank you very much for allowing us to participate in the care of this patient.      Electronically signed by MARKIE Bran CNP on 8/9/24 at 12:08 PM EDT    Please note that this chart was generated using voice recognition Dragon dictation software.  Although every effort was made to ensure the accuracy of this automated transcription, some errors in transcription  may have occurred.                                          Psychiatry Attending Attestation     I independently saw and evaluated the patient.  I reviewed the Advance Practice Provider's documentation above.  Any additional comments or changes to the Advance Practice Provider's documentation are stated below otherwise agree with assessment.    Patient is a 84-year-old male with history of dementia admitted for altered mentation secondary to UTI.  Has been dealing with fluctuating cognition and some agitation.  Agree with the plan to continue sitter for now and can try Zyprexa as needed for agitation.  Can consider starting Exelon 4.6 mg 24-hour patch.  Continue to titrate night dose of Seroquel as needed for agitation.  Will continue to follow as needed.    Electronically signed by Andrews Madera MD on 8/09/24 at 9:06 PM EDT

## 2024-08-09 NOTE — PLAN OF CARE
Problem: Chronic Conditions and Co-morbidities  Goal: Patient's chronic conditions and co-morbidity symptoms are monitored and maintained or improved  Outcome: Progressing  Flowsheets (Taken 8/8/2024 2000)  Care Plan - Patient's Chronic Conditions and Co-Morbidity Symptoms are Monitored and Maintained or Improved:   Monitor and assess patient's chronic conditions and comorbid symptoms for stability, deterioration, or improvement   Collaborate with multidisciplinary team to address chronic and comorbid conditions and prevent exacerbation or deterioration   Update acute care plan with appropriate goals if chronic or comorbid symptoms are exacerbated and prevent overall improvement and discharge     Problem: Discharge Planning  Goal: Discharge to home or other facility with appropriate resources  Outcome: Progressing  Flowsheets (Taken 8/8/2024 2000)  Discharge to home or other facility with appropriate resources:   Identify barriers to discharge with patient and caregiver   Arrange for needed discharge resources and transportation as appropriate   Identify discharge learning needs (meds, wound care, etc)     Problem: Safety - Adult  Goal: Free from fall injury  8/8/2024 2224 by Sonny Mejía RN  Outcome: Progressing  8/8/2024 1521 by Gilma Grissom RN  Outcome: Progressing     Problem: Skin/Tissue Integrity  Goal: Absence of new skin breakdown  Description: 1.  Monitor for areas of redness and/or skin breakdown  2.  Assess vascular access sites hourly  3.  Every 4-6 hours minimum:  Change oxygen saturation probe site  4.  Every 4-6 hours:  If on nasal continuous positive airway pressure, respiratory therapy assess nares and determine need for appliance change or resting period.  8/8/2024 2224 by Sonny Mejía RN  Outcome: Progressing  8/8/2024 1521 by Gilma Grissom RN  Outcome: Progressing     Problem: Pain  Goal: Verbalizes/displays adequate comfort level or baseline comfort level  8/8/2024 2224 by

## 2024-08-09 NOTE — PLAN OF CARE
Problem: Chronic Conditions and Co-morbidities  Goal: Patient's chronic conditions and co-morbidity symptoms are monitored and maintained or improved  Outcome: Progressing  Flowsheets (Taken 8/9/2024 0800)  Care Plan - Patient's Chronic Conditions and Co-Morbidity Symptoms are Monitored and Maintained or Improved: Monitor and assess patient's chronic conditions and comorbid symptoms for stability, deterioration, or improvement     Problem: Discharge Planning  Goal: Discharge to home or other facility with appropriate resources  Outcome: Progressing  Flowsheets (Taken 8/9/2024 0800)  Discharge to home or other facility with appropriate resources: Identify barriers to discharge with patient and caregiver     Problem: Safety - Adult  Goal: Free from fall injury  Outcome: Progressing     Problem: Skin/Tissue Integrity  Goal: Absence of new skin breakdown  Description: 1.  Monitor for areas of redness and/or skin breakdown  2.  Assess vascular access sites hourly  3.  Every 4-6 hours minimum:  Change oxygen saturation probe site  4.  Every 4-6 hours:  If on nasal continuous positive airway pressure, respiratory therapy assess nares and determine need for appliance change or resting period.  Outcome: Progressing     Problem: Pain  Goal: Verbalizes/displays adequate comfort level or baseline comfort level  Outcome: Progressing     Problem: ABCDS Injury Assessment  Goal: Absence of physical injury  Outcome: Progressing  Flowsheets (Taken 8/9/2024 1126)  Absence of Physical Injury: Implement safety measures based on patient assessment     Problem: Nutrition Deficit:  Goal: Optimize nutritional status  Outcome: Progressing

## 2024-08-09 NOTE — PROGRESS NOTES
Patient agitation again increasing, swung on RN when redirection attempted from pulling nephrostomy tube. Dr. Stanford notified via [employer], IM zyprexa ordered.  Dr. Stanford also ordered immodium for patient's watery stools. Care transferred to day shift nurse.

## 2024-08-09 NOTE — PROGRESS NOTES
@[facility] HospitalEDLOGO@    [facility]   IN-PATIENT SERVICE   [facility]    Progress Note    8/9/2024    12:54 PM    Name:   Arias Martin  MRN:     [id number]     Acct:      [id number]   Room:   2002/2002-01   Day:  10  Admit Date:  7/30/2024  4:30 PM    PCP:   Marcelo Olivia, APRN - CNP  Code Status:  Full Code    Subjective:     C/C:   Chief Complaint   Patient presents with    Altered Mental Status     Interval History Status: improved.     Seen at bedside, hemodynamically stable,   Has been agitated/combative/aggressive, overnight was given Ativan, status post IM Zyprexa this morning, psych and neurology service consulted as well  Labs reviewed,   Working on placement  Brief History:     84-year-old male with history of suprapubic catheter with frequent UTIs, recent UTI treated with cefepime/ceftriaxone, bladder cancer s/p cystectomy with ileal conduit, CKD, SDH, dementia presented with altered mental status and abdominal pain, patient was supposed to see urologist due to history of kidney stones, however because of altered mentation and worsening urine color was brought to the ER, found to be  hypotensive on arrival, with elevated creatinine levels, elevated troponin levels, urine analysis positive for infection, CT scan concerning for bilateral hydronephrosis with obstructive kidney stones and pyelonephritis, had percutaneous nephrostomy tube placed on left side on 7/31, urology service has been following, ID service following for antibiotics recommendations, nephrology service was on board for KARMA, started on bicarb containing fluids, improved, signed off    Medications:     Allergies:    Allergies   Allergen Reactions    Nsaids Anaphylaxis     GI bleed    Tetracyclines & Related        Current Meds:   Scheduled Meds:    OLANZapine zydis  5 mg Oral BID    QUEtiapine  50 mg Oral Nightly    sodium bicarbonate  1,300 mg Oral BID    vitamin B-12  50 mcg Oral Daily    ferrous  mm. 6. Prominent inflammatory changes left kidney and ureter proximal to the obstruction concerning for superimposed infection. 7. Prominent struvite (triple phosphate) renal calculi bilateral kidneys, greater left than right. 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 8. Splenomegaly. 9. Prominent portion of ascending colon and hepatic flexure as well as a smaller portion of the small bowel are involved in the right abdominopelvic ventral wall peristomal hernia. No obstruction or inflammatory change. 10.  Diverticulosis coli without CT evidence of acute diverticulitis. 11.  Cholelithiasis without findings of acute cholecystitis.       Physical Examination:        General appearance:  alert, awake,  Mental Status: Intermittently confused  lungs:  clear to auscultation bilaterally, normal effort  Heart:  regular rate and rhythm, no murmur  Abdomen:  soft, nontender, nondistended, normal bowel sounds, .  Extremities:  no edema, redness, tenderness in the calves  Skin:  no gross lesions, rashes, induration    Assessment:        Hospital Problems             Last Modified POA    * (Principal) Pseudomonas urinary tract infection 8/2/2024 Yes    Acute encephalopathy 8/2/2024 Yes    Bilateral hydronephrosis 7/30/2024 Yes    Normochromic normocytic anemia 7/30/2024 Yes    Iron deficiency anemia 7/30/2024 Yes    Vitamin B 12 deficiency 8/2/2024 Yes    Folate deficiency 7/30/2024 Yes    Bladder cancer (Chronic) 7/30/2024 Yes    Kidney stone 8/1/2024 Yes    Left ureteral calculus 7/31/2024 Yes    GERD (gastroesophageal reflux disease) 7/30/2024 Yes    Acute kidney injury (HCC) 7/30/2024 Yes    Moderate malnutrition (HCC) 7/30/2024 Yes    S/P ileal conduit ([facility]) 7/30/2024 Yes    CKD (chronic kidney disease), stage III ([facility]) 7/30/2024 Yes    Hypotension 7/30/2024 Yes    SIRS (systemic inflammatory response syndrome) ([facility]) 8/2/2024 Yes    Acute pyelonephritis 8/4/2024 Yes    Toxic

## 2024-08-09 NOTE — PROGRESS NOTES
Occupational Therapy  Facility/Department: [facility] CAR 2- STEPDOWN   Daily Treatment Note  Patient Name: Arias Martin        MRN: [id number]    : 1940    Date of Service: 2024    Discharge Recommendations  Discharge Recommendations: Patient would benefit from continued therapy after discharge    OT Equipment Recommendations  Other: CTA    Assessment  Performance deficits / Impairments: Decreased functional mobility ;Decreased ADL status;Decreased cognition;Decreased endurance;Decreased balance;Decreased strength;Decreased safe awareness;Decreased fine motor control;Decreased coordination  Assessment: Pt would benefit from continued skilled OT to address above deficits to increase independence with ADL/IADLs and to promote overall occupational performance.   Prognosis: Fair  REQUIRES OT FOLLOW-UP: Yes  Activity Tolerance  Activity Tolerance: Treatment limited secondary to decreased cognition;Treatment limited secondary to agitation  Safety Devices  Type of Devices: Call light within reach;Nurse notified;Left in bed;Bed alarm in place;Patient at risk for falls;All fall risk precautions in place;Heels elevated for pressure relief  Restraints  Restraints Initially in Place: No    Restrictions/Precautions  Restrictions/Precautions  Restrictions/Precautions: Contact Precautions;Up as Tolerated;Fall Risk  Required Braces or Orthoses?: No  Position Activity Restriction  Other position/activity restrictions: s/p Left nephrostomy tube placement 24, urostomy; up with assistance.    Subjective  General  Patient assessed for rehabilitation services?: Yes  Response to previous treatment: Patient with no complaints from previous session  Family / Caregiver Present: No  General Comment  Comments: Nursing OK'd for OT tx this date. Pt agreeable to therapy and pleasant/cooperative throughout. Pt reported back pain, no numeric value given. Pt repositioned for comfort at conclusion of tx to address  using AE PRN  Short Term Goal 4: demo 20+ min of dynamic sitting tolerance at SBA to participate in ADLs  Short Term Goal 5: notify OTR to update POC as patient progresses  Long Term Goals  Additional Goals?: Yes    Plan  Occupational Therapy Plan  Times Per Week: 4-5x/wk  Current Treatment Recommendations: Strengthening, Balance training, Endurance training, Safety education & training, Patient/Caregiver education & training, Self-Care / ADL, Equipment evaluation, education, & procurement, Cognitive reorientation    AM-PAC Daily Activities Inpatient  AM-PAC Daily Activity - Inpatient   How much help is needed for putting on and taking off regular lower body clothing?: A Lot  How much help is needed for bathing (which includes washing, rinsing, drying)?: A Lot  How much help is needed for toileting (which includes using toilet, bedpan, or urinal)?: A Lot  How much help is needed for putting on and taking off regular upper body clothing?: A Little  How much help is needed for taking care of personal grooming?: A Little  How much help for eating meals?: A Little  AM-[facility] Inpatient Daily Activity Raw Score: 15  AM-PAC Inpatient ADL T-Scale Score : 34.69  ADL Inpatient CMS 0-100% Score: 56.46  ADL Inpatient CMS G-Code Modifier : CK    Minutes  OT Individual Minutes  Time In: 0929 (1024)  Time Out: 1003 (1038)  Minutes: 34 + (14) = 48  Time Code Minutes   Timed Code Treatment Minutes: 38 Minutes    Co- treatment with PT warranted secondary to decreased patient safety and independence with functional mobility requiring skilled physical assistance of two professionals to simultaneously address individualized discipline goals. OT is addressing bed mobility, while PT is addressing their individualized functional mobility task.     Following initial session pt was heard calling out for nurse, writer entered room and pt attempting to exit bed and reported toileting need. Writer assisted pts BLEs back into bed and assisted to

## 2024-08-09 NOTE — PROGRESS NOTES
Physical Therapy  Facility/Department: [facility] CAR 2- STEPDOWN  Physical Therapy Daily Treatment Note    Name: Arias Martin  : 1940  MRN: [id number]  Date of Service: 2024    Discharge Recommendations:  Patient would benefit from continued therapy after discharge   PT Equipment Recommendations  Equipment Needed: No      Patient Diagnosis(es): The primary encounter diagnosis was Kidney stone. A diagnosis of Acute cystitis with hematuria was also pertinent to this visit.  Past Medical History:  has a past medical history of Arthritis, Caffeine use, Cancer (HCC), Cancer of kidney, unspecified laterality (HCC), CKD (chronic kidney disease), Cognitive communication deficit, COPD (chronic obstructive pulmonary disease) ([facility]), Diabetes mellitus (HCC), DNR (do not resuscitate), Dysphagia, GERD (gastroesophageal reflux disease), GI bleeding, Hernia, inguinal, right, Hyperlipidemia, Hypertension, Ileal conduit stomal stenosis, Kidney stone, Mobility impaired, Muscle weakness, Nursing home resident, PAF (paroxysmal atrial fibrillation) ([facility]), Presence of urostomy ([facility]), and Retinal detachment.  Past Surgical History:  has a past surgical history that includes hernia repair; Bladder removal (); Prostate surgery (); Cataract removal (Bilateral); Colonoscopy; Eye surgery (Right, ); vitrectomy (Left, 16); Incisional hernia repair (2015); Incisional hernia repair (2014); laparoscopy (N/A, 2021); Cystoscopy (N/A, 5/3/2021); IR GUIDED NEPHROSTOMY CATH PLACEMENT LEFT (5/3/2021); IR GUIDED NEPHROSTOMY CATH PLACEMENT RIGHT (2021); eye surgery; knee surgery (Left); other surgical history (2021); IR GUIDED NEPHROSTOMY CATH PLACEMENT LEFT (2021); other surgical history (2021); Cystoscopy (N/A, 2021); CT CRYOABLATION OF RENAL TUMOR (2019); and IR GUIDED NEPHROSTOMY CATH PLACEMENT LEFT (2024).    Assessment   Body Structures, Functions, Activity Limitations Requiring  Skilled Therapeutic Intervention: Decreased functional mobility ;Decreased strength;Decreased endurance;Decreased balance  Assessment: Pt required mod/maxA x2 to perform bed mobility, and tolerated sitting EOB ~25 minutes with CGA/Milton for balance. Pt performed rolling R/L at end of session with Milton. Pt declining to attempt transfers this date. He is most limited by decreased cognition also balance and endurance. Recommending continued PT to address deficits as he is currently a high fall risk and would be unsafe to return to prior living arrangements.  Therapy Prognosis: Fair  Requires PT Follow-Up: Yes  Activity Tolerance  Activity Tolerance: Treatment limited secondary to decreased cognition     Plan   Physical Therapy Plan  General Plan:  (5x/week)  Current Treatment Recommendations: Strengthening, Balance training, Functional mobility training, Transfer training, Endurance training, Gait training, Stair training, Neuromuscular re-education, Home exercise program, Safety education & training, Patient/Caregiver education & training, Equipment evaluation, education, & procurement, Therapeutic activities  Safety Devices  Type of Devices: Call light within reach, Nurse notified, Left in bed, Bed alarm in place, Patient at risk for falls, All fall risk precautions in place, Heels elevated for pressure relief  Restraints  Restraints Initially in Place: No     Restrictions  Restrictions/Precautions  Restrictions/Precautions: Contact Precautions, Up as Tolerated, Fall Risk  Required Braces or Orthoses?: No  Position Activity Restriction  Other position/activity restrictions: s/p Left nephrostomy tube placement 7/31/24, urostomy; up with assistance.     Subjective   General  Patient assessed for rehabilitation services?: Yes  Response To Previous Treatment: Patient with no complaints from previous session.  Family / Caregiver Present: No  General Comment  Comments: Pt returned to supine in bed at end of session with  call light in reach, positioned for comfort. RN notified  Subjective  Subjective: Pt and RN agreeable to PT this morning. Pt supine in bed upon arrival, c/o back pain, did not rate. Pt somewhat cooperative throughout session although very confused.       Cognition   Orientation  Overall Orientation Status: Impaired  Orientation Level: Oriented to person;Disoriented to time;Disoriented to situation;Oriented to place  Cognition  Overall Cognitive Status: Exceptions  Arousal/Alertness: Inconsistent responses to stimuli;Delayed responses to stimuli  Following Commands: Follows one step commands with repetition;Follows one step commands with increased time  Attention Span: Attends with cues to redirect;Difficulty attending to directions  Memory: Decreased recall of recent events;Decreased short term memory  Safety Judgement: Decreased awareness of need for assistance;Decreased awareness of need for safety  Problem Solving: Assistance required to identify errors made;Assistance required to correct errors made;Decreased awareness of errors  Insights: Not aware of deficits  Initiation: Requires cues for all  Sequencing: Requires cues for some  Cognition Comment: Pt confused throughout session, easily agitated.     Objective   Bed mobility  Rolling to Left: Minimal assistance  Rolling to Right: Minimal assistance  Supine to Sit: Maximum assistance;2 Person assistance  Sit to Supine: Moderate assistance;2 Person assistance  Scooting: Contact guard assistance  Bed Mobility Comments: Pt required assistance with trunk and LE progression throughout but able to provide some assistance at times. Increased time/effort required to perform d/t decreased cognition. Pt performed rolling at end of session R/L for bottom care and to change linens. Pt required Milton to perform rolling. While pt was laying on L side he attempted to pull at nephrostomy tube, required max redirection, pt becoming slightly agitated. Writer returned to pt's room

## 2024-08-09 NOTE — CONSULTS
NEUROLOGY INPATIENT CONSULT NOTE      8/9/2024     Reason for Consult: Altered/aggressive/combative     Requesting Provider: Dr. Bradford        I had the pleasure of seeing your patient as a neurology consultation. As you would recall Arias Martin is a  84 y.o. male with H/O suprapubic catheter with frequent UTIs, right SDH status post right MMA ablation, bladder cancer status post cystectomy with ileal conduit, CKD, HTN, dementia, who was admitted on 7/30/2024 with Kidney stone [N20.0]  UTI (urinary tract infection) [N39.0]  Acute cystitis with hematuria [N30.01].      Patient presented to the ED from rehab due to altered mentation and worsening abdominal pain.  He was found to have elevated creatinine levels with UA positive for infection.  CT scan was concerning for bilateral hydronephrosis with obstructive kidney stones and pyelonephritis and he had percutaneous nephrostomy tube placement to the left side on 7/31.  ID was on the case and patient completed antibiotics.  Overnight patient became increasingly agitated and aggressive for which neurology was consulted.  Psych was also consulted who have made medication adjustment recommendations. He has reportedly been more agitated and irritable in the evenings the last few days. Of note, this is day 10 in the hospital. He is disoriented, only following some commands.      No current facility-administered medications on file prior to encounter.     Current Outpatient Medications on File Prior to Encounter   Medication Sig Dispense Refill    melatonin 3 MG TABS tablet Take 1 tablet by mouth nightly as needed      midodrine (PROAMATINE) 5 MG tablet Take 2 tablets by mouth in the morning and 2 tablets in the evening. Hold for SBP >100.      magnesium hydroxide (MILK OF MAGNESIA) 400 MG/5ML suspension Take 30 mLs by mouth daily as needed for Constipation      MULTIPLE VITAMINS-MINERALS PO Take 1 tablet by mouth daily      polyethylene glycol (GLYCOLAX) 17 GM/SCOOP  Currently resides at [facility] [phone]    PAF (paroxysmal atrial fibrillation) (HCC)     last EKG Sinus, no known cardiologist     Presence of urostomy (HCC)     ileoconduit, cystectomy    Retinal detachment        Past Surgical History:   Procedure Laterality Date    BLADDER REMOVAL  2006    cancer, s/p urostomy     CATARACT REMOVAL Bilateral     COLONOSCOPY      CT CRYO ABLATION RENAL TUMOR  12/4/2019    CT CRYO ABLATION RENAL TUMOR 12/4/2019    CYSTOSCOPY N/A 5/3/2021    CYSTOSCOPY performed by Thomas Frost MD at [facility] OR    CYSTOSCOPY N/A 6/8/2021    LOOPOSCOPY,  EXCISION OF SUTURE, LOOPOGRAM performed by Thomas Frost MD at [facility] OR    EYE SURGERY Right 2000    macular hole    EYE SURGERY      HERNIA REPAIR      x2    INCISIONAL HERNIA REPAIR  12/7/2015    parastomal hernia repair - Dr. Kyle    INCISIONAL HERNIA REPAIR  11/2014    IR NEPHROSTOMY PERCUTANEOUS LEFT  5/3/2021    IR NEPHROSTOMY PERCUTANEOUS LEFT 5/3/2021 [facility] SPECIAL PROCEDURES    IR NEPHROSTOMY PERCUTANEOUS LEFT  5/27/2021    IR NEPHROSTOMY PERCUTANEOUS LEFT 5/27/2021 [facility] SPECIAL PROCEDURES    IR NEPHROSTOMY PERCUTANEOUS LEFT  7/31/2024    IR NEPHROSTOMY PERCUTANEOUS LEFT 7/31/2024 Gordon Dee MD [facility] SPECIAL PROCEDURES    IR NEPHROSTOMY PERCUTANEOUS RIGHT  5/5/2021    IR NEPHROSTOMY PERCUTANEOUS RIGHT 5/5/2021 Nelson Levy MD [facility] SPECIAL PROCEDURES    KNEE SURGERY Left     LAPAROSCOPY N/A 4/30/2021    EXPLORATORY LAPAROTOMY, EXTENSIVE LYSIS OF ADHESIONS, SMALL BOWEL RESECTION, REDUCTION OF STRANGULATED HERNIA performed by Andres Durant DO at [facility] OR    OTHER SURGICAL HISTORY  05/27/2021    Left neph tube removal, Right neph tube stent placed    OTHER SURGICAL HISTORY  06/08/2021    looposcopy, excision of suture, loopogram    PROSTATE SURGERY  2006    removal    VITRECTOMY Left 02/02/16       Social History: Arias Martin  reports that he quit smoking about 45 years ago. His smoking use

## 2024-08-10 ENCOUNTER — APPOINTMENT (OUTPATIENT)
Dept: CT IMAGING | Age: 84
DRG: 871 | End: 2024-08-10
Payer: MEDICARE

## 2024-08-10 LAB
ANION GAP SERPL CALCULATED.3IONS-SCNC: 13 MMOL/L (ref 9–16)
BASOPHILS # BLD: 0.09 K/UL (ref 0–0.2)
BASOPHILS NFR BLD: 1 % (ref 0–2)
BUN SERPL-MCNC: 30 MG/DL (ref 8–23)
CALCIUM SERPL-MCNC: 8.2 MG/DL (ref 8.6–10.4)
CHLORIDE SERPL-SCNC: 107 MMOL/L (ref 98–107)
CO2 SERPL-SCNC: 19 MMOL/L (ref 20–31)
CREAT SERPL-MCNC: 2.5 MG/DL (ref 0.7–1.2)
EKG ATRIAL RATE: 61 BPM
EKG P AXIS: 84 DEGREES
EKG P-R INTERVAL: 294 MS
EKG Q-T INTERVAL: 472 MS
EKG QRS DURATION: 128 MS
EKG QTC CALCULATION (BAZETT): 475 MS
EKG R AXIS: -14 DEGREES
EKG T AXIS: 42 DEGREES
EKG VENTRICULAR RATE: 61 BPM
EOSINOPHIL # BLD: 0.26 K/UL (ref 0–0.44)
EOSINOPHILS RELATIVE PERCENT: 3 % (ref 1–4)
ERYTHROCYTE [DISTWIDTH] IN BLOOD BY AUTOMATED COUNT: 16.6 % (ref 11.8–14.4)
GFR, ESTIMATED: 25 ML/MIN/1.73M2
GLUCOSE BLD-MCNC: 103 MG/DL (ref 75–110)
GLUCOSE SERPL-MCNC: 82 MG/DL (ref 74–99)
HCT VFR BLD AUTO: 33.5 % (ref 40.7–50.3)
HGB BLD-MCNC: 9.4 G/DL (ref 13–17)
IMM GRANULOCYTES # BLD AUTO: 0 K/UL (ref 0–0.3)
IMM GRANULOCYTES NFR BLD: 0 %
LYMPHOCYTES NFR BLD: 1.36 K/UL (ref 1.1–3.7)
LYMPHOCYTES RELATIVE PERCENT: 16 % (ref 24–43)
MCH RBC QN AUTO: 29.7 PG (ref 25.2–33.5)
MCHC RBC AUTO-ENTMCNC: 28.1 G/DL (ref 28.4–34.8)
MCV RBC AUTO: 105.7 FL (ref 82.6–102.9)
MONOCYTES NFR BLD: 0.43 K/UL (ref 0.1–1.2)
MONOCYTES NFR BLD: 5 % (ref 3–12)
MORPHOLOGY: ABNORMAL
MORPHOLOGY: ABNORMAL
NEUTROPHILS NFR BLD: 75 % (ref 36–65)
NEUTS SEG NFR BLD: 6.36 K/UL (ref 1.5–8.1)
NRBC BLD-RTO: 0 PER 100 WBC
PLATELET # BLD AUTO: 208 K/UL (ref 138–453)
PMV BLD AUTO: 10 FL (ref 8.1–13.5)
POTASSIUM SERPL-SCNC: 3.9 MMOL/L (ref 3.7–5.3)
RBC # BLD AUTO: 3.17 M/UL (ref 4.21–5.77)
SODIUM SERPL-SCNC: 139 MMOL/L (ref 136–145)
WBC OTHER # BLD: 8.5 K/UL (ref 3.5–11.3)

## 2024-08-10 PROCEDURE — 82947 ASSAY GLUCOSE BLOOD QUANT: CPT

## 2024-08-10 PROCEDURE — 80048 BASIC METABOLIC PNL TOTAL CA: CPT

## 2024-08-10 PROCEDURE — 99232 SBSQ HOSP IP/OBS MODERATE 35: CPT | Performed by: STUDENT IN AN ORGANIZED HEALTH CARE EDUCATION/TRAINING PROGRAM

## 2024-08-10 PROCEDURE — 85025 COMPLETE CBC W/AUTO DIFF WBC: CPT

## 2024-08-10 PROCEDURE — 6370000000 HC RX 637 (ALT 250 FOR IP): Performed by: INTERNAL MEDICINE

## 2024-08-10 PROCEDURE — 99232 SBSQ HOSP IP/OBS MODERATE 35: CPT | Performed by: PSYCHIATRY & NEUROLOGY

## 2024-08-10 PROCEDURE — 36415 COLL VENOUS BLD VENIPUNCTURE: CPT

## 2024-08-10 PROCEDURE — 2060000000 HC ICU INTERMEDIATE R&B

## 2024-08-10 PROCEDURE — 6370000000 HC RX 637 (ALT 250 FOR IP): Performed by: STUDENT IN AN ORGANIZED HEALTH CARE EDUCATION/TRAINING PROGRAM

## 2024-08-10 PROCEDURE — 6360000002 HC RX W HCPCS: Performed by: NURSE PRACTITIONER

## 2024-08-10 PROCEDURE — 6370000000 HC RX 637 (ALT 250 FOR IP): Performed by: NURSE PRACTITIONER

## 2024-08-10 PROCEDURE — 6370000000 HC RX 637 (ALT 250 FOR IP)

## 2024-08-10 PROCEDURE — 70450 CT HEAD/BRAIN W/O DYE: CPT

## 2024-08-10 RX ORDER — QUETIAPINE FUMARATE 25 MG/1
50 TABLET, FILM COATED ORAL NIGHTLY PRN
Status: DISCONTINUED | OUTPATIENT
Start: 2024-08-10 | End: 2024-08-11

## 2024-08-10 RX ORDER — TRAZODONE HYDROCHLORIDE 50 MG/1
50 TABLET, FILM COATED ORAL NIGHTLY PRN
Status: DISCONTINUED | OUTPATIENT
Start: 2024-08-10 | End: 2024-08-12

## 2024-08-10 RX ADMIN — FERROUS SULFATE TAB EC 325 MG (65 MG FE EQUIVALENT) 325 MG: 325 (65 FE) TABLET DELAYED RESPONSE at 08:08

## 2024-08-10 RX ADMIN — SODIUM BICARBONATE 1300 MG: 648 TABLET ORAL at 08:07

## 2024-08-10 RX ADMIN — HEPARIN SODIUM 5000 UNITS: 5000 INJECTION INTRAVENOUS; SUBCUTANEOUS at 13:53

## 2024-08-10 RX ADMIN — OXYCODONE HYDROCHLORIDE AND ACETAMINOPHEN 500 MG: 500 TABLET ORAL at 08:08

## 2024-08-10 RX ADMIN — HEPARIN SODIUM 5000 UNITS: 5000 INJECTION INTRAVENOUS; SUBCUTANEOUS at 20:52

## 2024-08-10 RX ADMIN — CYANOCOBALAMIN TAB 500 MCG 50 MCG: 500 TAB at 08:08

## 2024-08-10 RX ADMIN — TAMSULOSIN HYDROCHLORIDE 0.4 MG: 0.4 CAPSULE ORAL at 08:07

## 2024-08-10 RX ADMIN — SODIUM BICARBONATE 1300 MG: 648 TABLET ORAL at 20:49

## 2024-08-10 RX ADMIN — ACETAMINOPHEN 650 MG: 325 TABLET ORAL at 20:50

## 2024-08-10 RX ADMIN — FOLIC ACID 1 MG: 1 TABLET ORAL at 08:07

## 2024-08-10 ASSESSMENT — PAIN DESCRIPTION - LOCATION: LOCATION: BACK

## 2024-08-10 ASSESSMENT — PAIN DESCRIPTION - DESCRIPTORS: DESCRIPTORS: ACHING

## 2024-08-10 ASSESSMENT — PAIN DESCRIPTION - ORIENTATION: ORIENTATION: LEFT;LOWER

## 2024-08-10 ASSESSMENT — PAIN SCALES - GENERAL
PAINLEVEL_OUTOF10: 0
PAINLEVEL_OUTOF10: 3

## 2024-08-10 NOTE — PROGRESS NOTES
[facility] Neurology   IN-PATIENT SERVICE      NEUROLOGY PROGRESS  NOTE            Date:   8/10/2024  Patient name:  Arias Martin  Date of admission:  7/30/2024  YOB: 1940      Interval History:     Doing much better this morning.  Currently awake alert and oriented x 3.  Overall comfortable not very restless or agitated at this time.  Reportedly refused his nighttime medications last night, did not receive trazodone or Seroquel.  Underwent CT head this morning which does not reveal any acute abnormalities.    History of Present Illness:     The patient is a 84 y.o. male who presents with Altered Mental Status  . The patient was seen and examined and the chart was reviewed.  Patient with prolonged hospitalization initially presenting on 7/30 with abdominal pain found to have KARMA, urinary tract infection, hydronephrosis as well as pyelonephritis.  He had nephrostomy tube placement on the left and was treated with antibiotics.  Over the last several days he has had fluctuating mentation with increasing agitation and change in behavior requiring antipsychotic usage.  Neurology and psychiatry have been consulted for this.    Past Medical History:     Past Medical History:   Diagnosis Date    Arthritis     Caffeine use     2 coffee, 2 cans soda/day    Cancer ([facility]) 2006    bladder et prostate, 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, ileo conduit s/p cystectomy    Cancer of kidney, unspecified laterality ([facility])     CKD (chronic kidney disease)     stage 4, Dr. Ranker, Dr. Juarez    Cognitive communication deficit     COPD (chronic obstructive pulmonary disease) ([facility])     Diabetes mellitus ([facility])     SQ insulin 3 times per week only for this    DNR (do not resuscitate) 06/07/2021    Paper work from [facility] placed on chart    Dysphagia 05/2021    GERD (gastroesophageal reflux disease)     no meds for this    GI bleeding     with hemorrhage and perforation    Hernia, inguinal, right     Hyperlipidemia     no meds for this     throughout     Cerebellar Intact finger-nose-finger testing.      Reflex function 2/4 symmetric throughout . Downgoing plantar response bilaterally. (-)Barbour's sign bilaterally    Gait                  Not assessed             Diagnostics:      Laboratory Testing:  CBC:   Recent Labs     08/09/24  0554 08/10/24  0527   WBC 13.4* 8.5   HGB 9.5* 9.4*    208     BMP:    Recent Labs     08/08/24  0642 08/09/24  0554 08/10/24  0527    139 139   K 4.0 3.7 3.9    105 107   CO2 23 23 19*   BUN 28* 27* 30*   CREATININE 2.5* 2.4* 2.5*   GLUCOSE 93 94 82         Lab Results   Component Value Date    CHOL 120 10/13/2022    HDL 24 (L) 10/13/2022    TRIG 204 (H) 10/13/2022    ALT 12 08/01/2024    AST 21 08/01/2024    TSH 4.47 10/13/2022    INR 1.2 07/31/2024    LABA1C 5.9 10/13/2022    [id number] 403 08/02/2024    MG 2.5 06/12/2021    PHOS 3.4 06/12/2021       Imaging/Diagnostics:      CT HEAD WO CONTRAST    Narrative  EXAMINATION:  CT OF THE HEAD WITHOUT CONTRAST  8/10/2024 10:00 am    TECHNIQUE:  CT of the head was performed without the administration of intravenous  contrast. Automated exposure control, iterative reconstruction, and/or weight  based adjustment of the mA/kV was utilized to reduce the radiation dose to as  low as reasonably achievable.    COMPARISON:  None.    HISTORY:  ORDERING SYSTEM PROVIDED HISTORY: AMS, agitated, history of dementia  TECHNOLOGIST PROVIDED HISTORY:    AMS, agitated, history of dementia    FINDINGS:  BRAIN/VENTRICLES: There is no acute intracranial hemorrhage, mass effect, or  midline shift.  There is satisfactory overall gray-white matter  differentiation.  There is cerebral atrophy.  The ventricular structures are  symmetric and unremarkable.  The infratentorial structures are unremarkable.    ORBITS: The visualized portion of the orbits demonstrate no acute abnormality.    SINUSES: The visualized paranasal sinuses and mastoid air cells demonstrate  no acute

## 2024-08-10 NOTE — FLOWSHEET NOTE
Urostomy collection device changed,   Bath and oral care given  Assisted pt to chair with walker and max 2 assist  Plan for CT Head this am at 9:30

## 2024-08-10 NOTE — PROGRESS NOTES
@[facility]@    [facility]   IN-PATIENT SERVICE   [facility]    Progress Note    8/10/2024    4:45 PM    Name:   Arias Martin  MRN:     [id number]     Acct:      [id number]   Room:   2002/2002-01   Day:  11  Admit Date:  7/30/2024  4:30 PM    PCP:   Marcelo Olivia APRN - CNP  Code Status:  Full Code    Subjective:     C/C:   Chief Complaint   Patient presents with    Altered Mental Status     Interval History Status: improved.     Seen at bedside, hemodynamically stable,   Did not sleep well overnight  Mentation improved from before  Labs reviewed  CT head pending this morning, neurology service following  Working on placement  Brief History:     84-year-old male with history of suprapubic catheter with frequent UTIs, recent UTI treated with cefepime/ceftriaxone, bladder cancer s/p cystectomy with ileal conduit, CKD, SDH, dementia presented with altered mental status and abdominal pain, patient was supposed to see urologist due to history of kidney stones, however because of altered mentation and worsening urine color was brought to the ER, found to be  hypotensive on arrival, with elevated creatinine levels, elevated troponin levels, urine analysis positive for infection, CT scan concerning for bilateral hydronephrosis with obstructive kidney stones and pyelonephritis, had percutaneous nephrostomy tube placed on left side on 7/31, urology service has been following, ID service following for antibiotics recommendations, nephrology service was on board for KARMA, started on bicarb containing fluids, improved, signed off    Medications:     Allergies:    Allergies   Allergen Reactions    Nsaids Anaphylaxis     GI bleed    Tetracyclines & Related        Current Meds:   Scheduled Meds:    [Held by provider] OLANZapine zydis  5 mg Oral BID    sodium bicarbonate  1,300 mg Oral BID    vitamin B-12  50 mcg Oral Daily    ferrous sulfate  325 mg Oral Daily with breakfast     Prominent inflammatory changes left kidney and ureter proximal to the obstruction concerning for superimposed infection. 7. Prominent struvite (triple phosphate) renal calculi bilateral kidneys, greater left than right. 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 8. Splenomegaly. 9. Prominent portion of ascending colon and hepatic flexure as well as a smaller portion of the small bowel are involved in the right abdominopelvic ventral wall peristomal hernia. No obstruction or inflammatory change. 10.  Diverticulosis coli without CT evidence of acute diverticulitis. 11.  Cholelithiasis without findings of acute cholecystitis.       Physical Examination:        General appearance:  alert, awake,  Mental Status: Intermittently confused  lungs:  clear to auscultation bilaterally, normal effort  Heart:  regular rate and rhythm, no murmur  Abdomen:  soft, nontender, nondistended, normal bowel sounds, .  Extremities:  no edema, redness, tenderness in the calves  Skin:  no gross lesions, rashes, induration    Assessment:        Hospital Problems             Last Modified POA    * (Principal) Pseudomonas urinary tract infection 8/2/2024 Yes    Acute encephalopathy 8/2/2024 Yes    Bilateral hydronephrosis 7/30/2024 Yes    Normochromic normocytic anemia 7/30/2024 Yes    Iron deficiency anemia 7/30/2024 Yes    Vitamin B 12 deficiency 8/2/2024 Yes    Folate deficiency 7/30/2024 Yes    Bladder cancer (Chronic) 7/30/2024 Yes    Kidney stone 8/1/2024 Yes    Left ureteral calculus 7/31/2024 Yes    GERD (gastroesophageal reflux disease) 7/30/2024 Yes    Acute kidney injury (HCC) 7/30/2024 Yes    Moderate malnutrition (HCC) 7/30/2024 Yes    S/P ileal conduit ([facility]) 7/30/2024 Yes    CKD (chronic kidney disease), stage III ([facility]) 7/30/2024 Yes    Hypotension 7/30/2024 Yes    SIRS (systemic inflammatory response syndrome) ([facility]) 8/2/2024 Yes    Acute pyelonephritis 8/4/2024 Yes    Toxic metabolic

## 2024-08-11 LAB
ANION GAP SERPL CALCULATED.3IONS-SCNC: 11 MMOL/L (ref 9–16)
BASOPHILS # BLD: 0.04 K/UL (ref 0–0.2)
BASOPHILS NFR BLD: 1 % (ref 0–2)
BUN SERPL-MCNC: 31 MG/DL (ref 8–23)
CALCIUM SERPL-MCNC: 8.5 MG/DL (ref 8.6–10.4)
CHLORIDE SERPL-SCNC: 105 MMOL/L (ref 98–107)
CO2 SERPL-SCNC: 22 MMOL/L (ref 20–31)
CREAT SERPL-MCNC: 2.5 MG/DL (ref 0.7–1.2)
EOSINOPHIL # BLD: 0.22 K/UL (ref 0–0.44)
EOSINOPHILS RELATIVE PERCENT: 3 % (ref 1–4)
ERYTHROCYTE [DISTWIDTH] IN BLOOD BY AUTOMATED COUNT: 16.3 % (ref 11.8–14.4)
GFR, ESTIMATED: 25 ML/MIN/1.73M2
GLUCOSE SERPL-MCNC: 86 MG/DL (ref 74–99)
HCT VFR BLD AUTO: 31.5 % (ref 40.7–50.3)
HGB BLD-MCNC: 9.4 G/DL (ref 13–17)
IMM GRANULOCYTES # BLD AUTO: 0.03 K/UL (ref 0–0.3)
IMM GRANULOCYTES NFR BLD: 0 %
LYMPHOCYTES NFR BLD: 1.22 K/UL (ref 1.1–3.7)
LYMPHOCYTES RELATIVE PERCENT: 16 % (ref 24–43)
MCH RBC QN AUTO: 29.6 PG (ref 25.2–33.5)
MCHC RBC AUTO-ENTMCNC: 29.8 G/DL (ref 28.4–34.8)
MCV RBC AUTO: 99.1 FL (ref 82.6–102.9)
MONOCYTES NFR BLD: 0.44 K/UL (ref 0.1–1.2)
MONOCYTES NFR BLD: 6 % (ref 3–12)
NEUTROPHILS NFR BLD: 74 % (ref 36–65)
NEUTS SEG NFR BLD: 5.68 K/UL (ref 1.5–8.1)
NRBC BLD-RTO: 0 PER 100 WBC
PLATELET # BLD AUTO: 187 K/UL (ref 138–453)
PMV BLD AUTO: 9.7 FL (ref 8.1–13.5)
POTASSIUM SERPL-SCNC: 3.8 MMOL/L (ref 3.7–5.3)
RBC # BLD AUTO: 3.18 M/UL (ref 4.21–5.77)
RBC # BLD: ABNORMAL 10*6/UL
SODIUM SERPL-SCNC: 138 MMOL/L (ref 136–145)
WBC OTHER # BLD: 7.6 K/UL (ref 3.5–11.3)

## 2024-08-11 PROCEDURE — 6370000000 HC RX 637 (ALT 250 FOR IP): Performed by: INTERNAL MEDICINE

## 2024-08-11 PROCEDURE — 80048 BASIC METABOLIC PNL TOTAL CA: CPT

## 2024-08-11 PROCEDURE — 85025 COMPLETE CBC W/AUTO DIFF WBC: CPT

## 2024-08-11 PROCEDURE — 2060000000 HC ICU INTERMEDIATE R&B

## 2024-08-11 PROCEDURE — 93005 ELECTROCARDIOGRAM TRACING: CPT | Performed by: STUDENT IN AN ORGANIZED HEALTH CARE EDUCATION/TRAINING PROGRAM

## 2024-08-11 PROCEDURE — 6370000000 HC RX 637 (ALT 250 FOR IP): Performed by: PSYCHIATRY & NEUROLOGY

## 2024-08-11 PROCEDURE — 99232 SBSQ HOSP IP/OBS MODERATE 35: CPT | Performed by: STUDENT IN AN ORGANIZED HEALTH CARE EDUCATION/TRAINING PROGRAM

## 2024-08-11 PROCEDURE — 6370000000 HC RX 637 (ALT 250 FOR IP): Performed by: STUDENT IN AN ORGANIZED HEALTH CARE EDUCATION/TRAINING PROGRAM

## 2024-08-11 PROCEDURE — 36415 COLL VENOUS BLD VENIPUNCTURE: CPT

## 2024-08-11 PROCEDURE — 6360000002 HC RX W HCPCS: Performed by: NURSE PRACTITIONER

## 2024-08-11 PROCEDURE — 6370000000 HC RX 637 (ALT 250 FOR IP): Performed by: NURSE PRACTITIONER

## 2024-08-11 PROCEDURE — 6370000000 HC RX 637 (ALT 250 FOR IP)

## 2024-08-11 RX ORDER — QUETIAPINE FUMARATE 25 MG/1
25 TABLET, FILM COATED ORAL NIGHTLY PRN
Status: DISCONTINUED | OUTPATIENT
Start: 2024-08-11 | End: 2024-08-12

## 2024-08-11 RX ORDER — QUETIAPINE FUMARATE 25 MG/1
25 TABLET, FILM COATED ORAL
Status: DISCONTINUED | OUTPATIENT
Start: 2024-08-11 | End: 2024-08-20 | Stop reason: HOSPADM

## 2024-08-11 RX ADMIN — ACETAMINOPHEN 650 MG: 325 TABLET ORAL at 17:42

## 2024-08-11 RX ADMIN — CYANOCOBALAMIN TAB 500 MCG 50 MCG: 500 TAB at 08:39

## 2024-08-11 RX ADMIN — TAMSULOSIN HYDROCHLORIDE 0.4 MG: 0.4 CAPSULE ORAL at 08:36

## 2024-08-11 RX ADMIN — SODIUM BICARBONATE 1300 MG: 648 TABLET ORAL at 23:13

## 2024-08-11 RX ADMIN — FOLIC ACID 1 MG: 1 TABLET ORAL at 08:36

## 2024-08-11 RX ADMIN — HEPARIN SODIUM 5000 UNITS: 5000 INJECTION INTRAVENOUS; SUBCUTANEOUS at 23:14

## 2024-08-11 RX ADMIN — TRAZODONE HYDROCHLORIDE 50 MG: 50 TABLET ORAL at 23:13

## 2024-08-11 RX ADMIN — FERROUS SULFATE TAB EC 325 MG (65 MG FE EQUIVALENT) 325 MG: 325 (65 FE) TABLET DELAYED RESPONSE at 08:36

## 2024-08-11 RX ADMIN — QUETIAPINE 25 MG: 25 TABLET ORAL at 13:18

## 2024-08-11 RX ADMIN — HEPARIN SODIUM 5000 UNITS: 5000 INJECTION INTRAVENOUS; SUBCUTANEOUS at 13:18

## 2024-08-11 RX ADMIN — SODIUM BICARBONATE 1300 MG: 648 TABLET ORAL at 08:36

## 2024-08-11 ASSESSMENT — PAIN DESCRIPTION - DESCRIPTORS: DESCRIPTORS: ACHING

## 2024-08-11 ASSESSMENT — PAIN DESCRIPTION - ORIENTATION: ORIENTATION: LOWER

## 2024-08-11 ASSESSMENT — PAIN DESCRIPTION - LOCATION: LOCATION: BACK

## 2024-08-11 NOTE — PROGRESS NOTES
Per provider instructions patient not disturbed for overnight vitals if he was sleeping. Unit routine hourly rounding completed to ensure patient safety. Care ongoing.

## 2024-08-11 NOTE — PLAN OF CARE
Problem: Chronic Conditions and Co-morbidities  Goal: Patient's chronic conditions and co-morbidity symptoms are monitored and maintained or improved  Outcome: Progressing  Flowsheets (Taken 8/10/2024 2000)  Care Plan - Patient's Chronic Conditions and Co-Morbidity Symptoms are Monitored and Maintained or Improved:   Monitor and assess patient's chronic conditions and comorbid symptoms for stability, deterioration, or improvement   Collaborate with multidisciplinary team to address chronic and comorbid conditions and prevent exacerbation or deterioration   Update acute care plan with appropriate goals if chronic or comorbid symptoms are exacerbated and prevent overall improvement and discharge     Problem: Discharge Planning  Goal: Discharge to home or other facility with appropriate resources  Outcome: Progressing  Flowsheets (Taken 8/10/2024 2000)  Discharge to home or other facility with appropriate resources:   Identify barriers to discharge with patient and caregiver   Arrange for needed discharge resources and transportation as appropriate   Identify discharge learning needs (meds, wound care, etc)     Problem: Safety - Adult  Goal: Free from fall injury  Outcome: Progressing     Problem: Skin/Tissue Integrity  Goal: Absence of new skin breakdown  Description: 1.  Monitor for areas of redness and/or skin breakdown  2.  Assess vascular access sites hourly  3.  Every 4-6 hours minimum:  Change oxygen saturation probe site  4.  Every 4-6 hours:  If on nasal continuous positive airway pressure, respiratory therapy assess nares and determine need for appliance change or resting period.  Outcome: Progressing     Problem: Pain  Goal: Verbalizes/displays adequate comfort level or baseline comfort level  Outcome: Progressing     Problem: ABCDS Injury Assessment  Goal: Absence of physical injury  Outcome: Progressing  Flowsheets (Taken 8/10/2024 2000)  Absence of Physical Injury: Implement safety measures based on  patient assessment     Problem: Nutrition Deficit:  Goal: Optimize nutritional status  Outcome: Progressing

## 2024-08-11 NOTE — PROGRESS NOTES
[facility] [place]    [facility]   IN-PATIENT SERVICE   [facility]    Progress Note    8/11/2024    12:42 PM    Name:   Arias Martin  MRN:     [id number]     Acct:      [id number]   Room:   2002/2002-01   Day:  12  Admit Date:  7/30/2024  4:30 PM    PCP:   Marcelo Olivia, APRN - CNP  Code Status:  Full Code    Subjective:     C/C:   Chief Complaint   Patient presents with    Altered Mental Status     Interval History Status: improved.     Seen at bedside, hemodynamically stable,   Denies any new complaints,  Intermittently confused, has sitter at bedside  Discussed with family patient takes Seroquel in the afternoon, will try adjusting the timing to see if it helps  Labs reviewed  Working on placement  Brief History:     84-year-old male with history of suprapubic catheter with frequent UTIs, recent UTI treated with cefepime/ceftriaxone, bladder cancer s/p cystectomy with ileal conduit, CKD, SDH, dementia presented with altered mental status and abdominal pain, patient was supposed to see urologist due to history of kidney stones, however because of altered mentation and worsening urine color was brought to the ER, found to be  hypotensive on arrival, with elevated creatinine levels, elevated troponin levels, urine analysis positive for infection, CT scan concerning for bilateral hydronephrosis with obstructive kidney stones and pyelonephritis, had percutaneous nephrostomy tube placed on left side on 7/31, urology service has been following, ID service following for antibiotics recommendations, nephrology service was on board for KARMA, started on bicarb containing fluids, improved, signed off    Medications:     Allergies:    Allergies   Allergen Reactions    Nsaids Anaphylaxis     GI bleed    Tetracyclines & Related        Current Meds:   Scheduled Meds:    QUEtiapine  25 mg Oral Lunch    sodium bicarbonate  1,300 mg Oral BID    vitamin B-12  50 mcg Oral Daily    ferrous  4. CT ABDOMEN/PELVIS: Bilateral hydronephrosis, more severe left than right. 5. The two adjacent proximal to mid LEFT ureter calculi measuring 9 x 26 mm and 4 x 6 mm. 6. Prominent inflammatory changes left kidney and ureter proximal to the obstruction concerning for superimposed infection. 7. Prominent struvite (triple phosphate) renal calculi bilateral kidneys, greater left than right. 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 8. Splenomegaly. 9. Prominent portion of ascending colon and hepatic flexure as well as a smaller portion of the small bowel are involved in the right abdominopelvic ventral wall peristomal hernia. No obstruction or inflammatory change. 10.  Diverticulosis coli without CT evidence of acute diverticulitis. 11.  Cholelithiasis without findings of acute cholecystitis.       Physical Examination:        General appearance:  alert, awake,  Mental Status: Intermittently confused  lungs:  clear to auscultation bilaterally, normal effort  Heart:  regular rate and rhythm, no murmur  Abdomen:  soft, nontender, nondistended, normal bowel sounds, .  Extremities:  no edema, redness, tenderness in the calves  Skin:  no gross lesions, rashes, induration    Assessment:        Hospital Problems             Last Modified POA    * (Principal) Pseudomonas urinary tract infection 8/2/2024 Yes    Acute encephalopathy 8/2/2024 Yes    Bilateral hydronephrosis 7/30/2024 Yes    Normochromic normocytic anemia 7/30/2024 Yes    Iron deficiency anemia 7/30/2024 Yes    Vitamin B 12 deficiency 8/2/2024 Yes    Folate deficiency 7/30/2024 Yes    Bladder cancer (Chronic) 7/30/2024 Yes    Kidney stone 8/1/2024 Yes    Left ureteral calculus 7/31/2024 Yes    GERD (gastroesophageal reflux disease) 7/30/2024 Yes    Acute kidney injury (HCC) 7/30/2024 Yes    Moderate malnutrition (HCC) 7/30/2024 Yes    S/P ileal conduit (HCC) 7/30/2024 Yes    CKD (chronic kidney disease), stage III (HCC)

## 2024-08-12 LAB
ANION GAP SERPL CALCULATED.3IONS-SCNC: 13 MMOL/L (ref 9–16)
BASOPHILS # BLD: <0.03 K/UL (ref 0–0.2)
BASOPHILS NFR BLD: 0 % (ref 0–2)
BUN SERPL-MCNC: 31 MG/DL (ref 8–23)
CALCIUM SERPL-MCNC: 8.3 MG/DL (ref 8.6–10.4)
CHLORIDE SERPL-SCNC: 104 MMOL/L (ref 98–107)
CO2 SERPL-SCNC: 22 MMOL/L (ref 20–31)
CREAT SERPL-MCNC: 2.5 MG/DL (ref 0.7–1.2)
EOSINOPHIL # BLD: 0.24 K/UL (ref 0–0.44)
EOSINOPHILS RELATIVE PERCENT: 3 % (ref 1–4)
ERYTHROCYTE [DISTWIDTH] IN BLOOD BY AUTOMATED COUNT: 16.3 % (ref 11.8–14.4)
GFR, ESTIMATED: 25 ML/MIN/1.73M2
GLUCOSE SERPL-MCNC: 80 MG/DL (ref 74–99)
HCT VFR BLD AUTO: 29.4 % (ref 40.7–50.3)
HGB BLD-MCNC: 9.3 G/DL (ref 13–17)
IMM GRANULOCYTES # BLD AUTO: 0.04 K/UL (ref 0–0.3)
IMM GRANULOCYTES NFR BLD: 1 %
LYMPHOCYTES NFR BLD: 1.18 K/UL (ref 1.1–3.7)
LYMPHOCYTES RELATIVE PERCENT: 16 % (ref 24–43)
MCH RBC QN AUTO: 29.8 PG (ref 25.2–33.5)
MCHC RBC AUTO-ENTMCNC: 31.6 G/DL (ref 28.4–34.8)
MCV RBC AUTO: 94.2 FL (ref 82.6–102.9)
MONOCYTES NFR BLD: 0.49 K/UL (ref 0.1–1.2)
MONOCYTES NFR BLD: 7 % (ref 3–12)
NEUTROPHILS NFR BLD: 73 % (ref 36–65)
NEUTS SEG NFR BLD: 5.39 K/UL (ref 1.5–8.1)
NRBC BLD-RTO: 0 PER 100 WBC
PLATELET # BLD AUTO: 183 K/UL (ref 138–453)
PMV BLD AUTO: 10.2 FL (ref 8.1–13.5)
POTASSIUM SERPL-SCNC: 3.8 MMOL/L (ref 3.7–5.3)
RBC # BLD AUTO: 3.12 M/UL (ref 4.21–5.77)
RBC # BLD: ABNORMAL 10*6/UL
SODIUM SERPL-SCNC: 139 MMOL/L (ref 136–145)
TROPONIN I SERPL HS-MCNC: 31 NG/L (ref 0–22)
WBC OTHER # BLD: 7.4 K/UL (ref 3.5–11.3)

## 2024-08-12 PROCEDURE — 92526 ORAL FUNCTION THERAPY: CPT

## 2024-08-12 PROCEDURE — 93005 ELECTROCARDIOGRAM TRACING: CPT | Performed by: STUDENT IN AN ORGANIZED HEALTH CARE EDUCATION/TRAINING PROGRAM

## 2024-08-12 PROCEDURE — 84484 ASSAY OF TROPONIN QUANT: CPT

## 2024-08-12 PROCEDURE — 6370000000 HC RX 637 (ALT 250 FOR IP): Performed by: NURSE PRACTITIONER

## 2024-08-12 PROCEDURE — 99232 SBSQ HOSP IP/OBS MODERATE 35: CPT | Performed by: STUDENT IN AN ORGANIZED HEALTH CARE EDUCATION/TRAINING PROGRAM

## 2024-08-12 PROCEDURE — 94761 N-INVAS EAR/PLS OXIMETRY MLT: CPT

## 2024-08-12 PROCEDURE — 6360000002 HC RX W HCPCS: Performed by: STUDENT IN AN ORGANIZED HEALTH CARE EDUCATION/TRAINING PROGRAM

## 2024-08-12 PROCEDURE — 80048 BASIC METABOLIC PNL TOTAL CA: CPT

## 2024-08-12 PROCEDURE — 6360000002 HC RX W HCPCS: Performed by: NURSE PRACTITIONER

## 2024-08-12 PROCEDURE — 36415 COLL VENOUS BLD VENIPUNCTURE: CPT

## 2024-08-12 PROCEDURE — 2060000000 HC ICU INTERMEDIATE R&B

## 2024-08-12 PROCEDURE — 85025 COMPLETE CBC W/AUTO DIFF WBC: CPT

## 2024-08-12 PROCEDURE — 6370000000 HC RX 637 (ALT 250 FOR IP): Performed by: STUDENT IN AN ORGANIZED HEALTH CARE EDUCATION/TRAINING PROGRAM

## 2024-08-12 PROCEDURE — 6370000000 HC RX 637 (ALT 250 FOR IP): Performed by: INTERNAL MEDICINE

## 2024-08-12 PROCEDURE — 6370000000 HC RX 637 (ALT 250 FOR IP)

## 2024-08-12 RX ORDER — OLANZAPINE 5 MG/1
5 TABLET, ORALLY DISINTEGRATING ORAL 2 TIMES DAILY PRN
Status: DISCONTINUED | OUTPATIENT
Start: 2024-08-12 | End: 2024-08-12

## 2024-08-12 RX ORDER — DIPHENHYDRAMINE HYDROCHLORIDE 50 MG/ML
25 INJECTION INTRAMUSCULAR; INTRAVENOUS ONCE
Status: DISCONTINUED | OUTPATIENT
Start: 2024-08-12 | End: 2024-08-12

## 2024-08-12 RX ORDER — DIPHENHYDRAMINE HCL 25 MG
25 TABLET ORAL ONCE
Status: COMPLETED | OUTPATIENT
Start: 2024-08-12 | End: 2024-08-12

## 2024-08-12 RX ORDER — MAGNESIUM SULFATE IN WATER 40 MG/ML
2000 INJECTION, SOLUTION INTRAVENOUS ONCE
Status: COMPLETED | OUTPATIENT
Start: 2024-08-12 | End: 2024-08-13

## 2024-08-12 RX ORDER — TRAZODONE HYDROCHLORIDE 50 MG/1
50 TABLET, FILM COATED ORAL NIGHTLY
Status: DISCONTINUED | OUTPATIENT
Start: 2024-08-12 | End: 2024-08-20 | Stop reason: HOSPADM

## 2024-08-12 RX ADMIN — DIPHENHYDRAMINE HYDROCHLORIDE 25 MG: 25 TABLET ORAL at 02:29

## 2024-08-12 RX ADMIN — ACETAMINOPHEN 650 MG: 325 TABLET ORAL at 17:26

## 2024-08-12 RX ADMIN — ACETAMINOPHEN 650 MG: 325 TABLET ORAL at 02:29

## 2024-08-12 RX ADMIN — QUETIAPINE 25 MG: 25 TABLET ORAL at 14:50

## 2024-08-12 RX ADMIN — SODIUM BICARBONATE 1300 MG: 648 TABLET ORAL at 20:14

## 2024-08-12 RX ADMIN — HEPARIN SODIUM 5000 UNITS: 5000 INJECTION INTRAVENOUS; SUBCUTANEOUS at 14:51

## 2024-08-12 RX ADMIN — FOLIC ACID 1 MG: 1 TABLET ORAL at 10:06

## 2024-08-12 RX ADMIN — TAMSULOSIN HYDROCHLORIDE 0.4 MG: 0.4 CAPSULE ORAL at 10:06

## 2024-08-12 RX ADMIN — FERROUS SULFATE TAB EC 325 MG (65 MG FE EQUIVALENT) 325 MG: 325 (65 FE) TABLET DELAYED RESPONSE at 10:06

## 2024-08-12 RX ADMIN — CYANOCOBALAMIN TAB 500 MCG 50 MCG: 500 TAB at 10:05

## 2024-08-12 RX ADMIN — SODIUM BICARBONATE 1300 MG: 648 TABLET ORAL at 10:06

## 2024-08-12 RX ADMIN — MAGNESIUM SULFATE HEPTAHYDRATE 2000 MG: 40 INJECTION, SOLUTION INTRAVENOUS at 23:09

## 2024-08-12 RX ADMIN — TRAZODONE HYDROCHLORIDE 50 MG: 50 TABLET ORAL at 20:14

## 2024-08-12 RX ADMIN — OXYCODONE HYDROCHLORIDE AND ACETAMINOPHEN 500 MG: 500 TABLET ORAL at 10:06

## 2024-08-12 ASSESSMENT — PAIN DESCRIPTION - DESCRIPTORS
DESCRIPTORS: PINS AND NEEDLES;BURNING
DESCRIPTORS: ACHING

## 2024-08-12 ASSESSMENT — PAIN DESCRIPTION - ORIENTATION
ORIENTATION: LEFT
ORIENTATION: LEFT

## 2024-08-12 ASSESSMENT — PAIN SCALES - GENERAL
PAINLEVEL_OUTOF10: 3
PAINLEVEL_OUTOF10: 7

## 2024-08-12 ASSESSMENT — PAIN DESCRIPTION - LOCATION
LOCATION: HAND;FINGER (COMMENT WHICH ONE)
LOCATION: HAND

## 2024-08-12 NOTE — PLAN OF CARE
Problem: Chronic Conditions and Co-morbidities  Goal: Patient's chronic conditions and co-morbidity symptoms are monitored and maintained or improved  Outcome: Progressing  Flowsheets (Taken 8/11/2024 2000)  Care Plan - Patient's Chronic Conditions and Co-Morbidity Symptoms are Monitored and Maintained or Improved:   Monitor and assess patient's chronic conditions and comorbid symptoms for stability, deterioration, or improvement   Collaborate with multidisciplinary team to address chronic and comorbid conditions and prevent exacerbation or deterioration   Update acute care plan with appropriate goals if chronic or comorbid symptoms are exacerbated and prevent overall improvement and discharge     Problem: Discharge Planning  Goal: Discharge to home or other facility with appropriate resources  Outcome: Progressing  Flowsheets (Taken 8/11/2024 2000)  Discharge to home or other facility with appropriate resources:   Identify barriers to discharge with patient and caregiver   Arrange for needed discharge resources and transportation as appropriate   Identify discharge learning needs (meds, wound care, etc)     Problem: Safety - Adult  Goal: Free from fall injury  Outcome: Progressing     Problem: Skin/Tissue Integrity  Goal: Absence of new skin breakdown  Description: 1.  Monitor for areas of redness and/or skin breakdown  2.  Assess vascular access sites hourly  3.  Every 4-6 hours minimum:  Change oxygen saturation probe site  4.  Every 4-6 hours:  If on nasal continuous positive airway pressure, respiratory therapy assess nares and determine need for appliance change or resting period.  Outcome: Progressing     Problem: Pain  Goal: Verbalizes/displays adequate comfort level or baseline comfort level  Outcome: Progressing     Problem: ABCDS Injury Assessment  Goal: Absence of physical injury  Outcome: Progressing  Flowsheets (Taken 8/11/2024 2000)  Absence of Physical Injury: Implement safety measures based on  patient assessment     Problem: Nutrition Deficit:  Goal: Optimize nutritional status  Outcome: Progressing

## 2024-08-12 NOTE — PROGRESS NOTES
@[facility] ServicesED[facility]@    [facility]   IN-PATIENT SERVICE   [facility]    Progress Note    8/12/2024    1:15 PM    Name:   Arias Martin  MRN:     [id number]     Acct:      [id number]   Room:   2002/2002-01   Day:  13  Admit Date:  7/30/2024  4:30 PM    PCP:   Marcelo Olivia, APRN - CNP  Code Status:  Full Code    Subjective:     C/C:   Chief Complaint   Patient presents with    Altered Mental Status     Interval History Status: improved.     Seen at bedside, hemodynamically stable, mentation improved from before, continues to have sitter at bedside, family at bedside as well  Denies any new complaints,  Labs reviewed  Working on placement  Brief History:     84-year-old male with history of suprapubic catheter with frequent UTIs, recent UTI treated with cefepime/ceftriaxone, bladder cancer s/p cystectomy with ileal conduit, CKD, SDH, dementia presented with altered mental status and abdominal pain, patient was supposed to see urologist due to history of kidney stones, however because of altered mentation and worsening urine color was brought to the ER, found to be  hypotensive on arrival, with elevated creatinine levels, elevated troponin levels, urine analysis positive for infection, CT scan concerning for bilateral hydronephrosis with obstructive kidney stones and pyelonephritis, had percutaneous nephrostomy tube placed on left side on 7/31, urology service has been following, ID service following for antibiotics recommendations, nephrology service was on board for KARMA, started on bicarb containing fluids, improved, signed off.    Intermittently confused, concern for delirium, neurology and psych service were consulted as well, CT head negative, Seroquel home dose started, along with trazodone nightly, Zyprexa as needed    Has finished antibiotics, currently waiting placement    Medications:     Allergies:    Allergies   Allergen Reactions    Nsaids Anaphylaxis     GI bleed     (208 lb 1.8 oz)   SpO2 99%   BMI 26.71 kg/m²   Temp (24hrs), Av.3 °F (36.8 °C), Min:97.9 °F (36.6 °C), Max:98.7 °F (37.1 °C)    Recent Labs     08/09/24  2029 08/10/24  1952   POCGLU 83 103       I/O (24Hr):    Intake/Output Summary (Last 24 hours) at 2024 1315  Last data filed at 2024 1239  Gross per 24 hour   Intake 850 ml   Output 1375 ml   Net -525 ml       Labs:  Hematology:  Recent Labs     08/10/24  0527 08/11/24  0815 08/12/24  06   WBC 8.5 7.6 7.4   RBC 3.17* 3.18* 3.12*   HGB 9.4* 9.4* 9.3*   HCT 33.5* 31.5* 29.4*   .7* 99.1 94.2   MCH 29.7 29.6 29.8   MCHC 28.1* 29.8 31.6   RDW 16.6* 16.3* 16.3*    187 183   MPV 10.0 9.7 10.2     Chemistry:  Recent Labs     08/10/24  0527 08/11/24  0815 08/12/24  0642    138 139   K 3.9 3.8 3.8    105 104   CO2 19* 22 22   GLUCOSE 82 86 80   BUN 30* 31* 31*   CREATININE 2.5* 2.5* 2.5*   ANIONGAP 13 11 13   LABGLOM 25* 25* 25*   CALCIUM 8.2* 8.5* 8.3*     Recent Labs     08/09/24  2029 08/10/24  1952   POCGLU 83 103     ABG:  Lab Results   Component Value Date/Time    POCPH 7.19 2021 01:36 PM    POCPCO2 44 2021 01:36 PM    POCPO2 74 2021 01:36 PM    POCHCO3 16.9 2021 01:36 PM    NBEA 11 2021 01:36 PM    PBEA NOT REPORTED 2021 01:36 PM    DUZ8ZEQ 18 2021 01:36 PM    ZAVE8ZTE 90 2021 01:36 PM    FIO2 32.0 2021 01:36 PM     Lab Results   Component Value Date/Time    SPECIAL Site: Urine 2024 05:42 PM     Lab Results   Component Value Date/Time    CULTURE NO SIGNIFICANT GROWTH 2024 05:15 PM       Radiology:  IR GUIDED NEPHROSTOMY CATH PLACEMENT LEFT    Result Date: 2024  Successful percutaneous 10 Croatian left nephrostomy tube placement The catheter should be flushed every 6 hours with 10 cc normal saline until the urine is clear.  Patient should be monitored for any signs or symptoms of urosepsis due to the grossly infected urine.     CT CHEST ABDOMEN PELVIS WO  Yes    Acute kidney injury (HCC) 7/30/2024 Yes    Moderate malnutrition (HCC) 7/30/2024 Yes    S/P ileal conduit (HCC) 7/30/2024 Yes    CKD (chronic kidney disease), stage III (HCC) 7/30/2024 Yes    Hypotension 7/30/2024 Yes    SIRS (systemic inflammatory response syndrome) (HCC) 8/2/2024 Yes    Acute pyelonephritis 8/4/2024 Yes    Toxic metabolic encephalopathy 8/4/2024 Yes    Acute cystitis without hematuria 8/5/2024 Yes    Delirium 8/9/2024 Yes    Dementia (HCC) 8/9/2024 Yes   Plan:        -Completed antibiotics cefepime/amoxicillin, ID signed off  -Encourage oral intake, dietary service on board,, has been on bicarbonate tablets,, off IV fluids now, nephrology service signed off further follow-up with his primary nephrologist Dr. Mix, SARAH BETH in 1 week to be faxed to the nephrologist  -Underwent left nephrostomy tube placement, continue Flomax, hyoscyamine follow urology service recommendations, will need further treatment of stone as outpatient  -Delirium precautions, melatonin nightly, changed timing for Seroquel to afternoon, added Seroquel 25 nightly as needed, along with trazodone,  appreciate psych/neurology service recommendations,.  -Elevated troponins likely due to urosepsis, continue to monitor for any chest pain  -Continue to monitor H&H, continue folic acid, iron, B12, supplements has chronic anemia and splenomegaly on imaging, further workup as needed as outpatient    Hien Mcfadden Sra, MD  8/12/2024  1:15 PM

## 2024-08-12 NOTE — CARE COORDINATION
Patient's son, Nate, at bedside. Provided him a list of SNFs that accept patient's insurance and have memory care. He will send list to Celine    [phone] spoke to Celine via telephone. Update provided. SNFs with memory care emailed to her    [phone] Celine would like [facility]. Referral sent

## 2024-08-12 NOTE — PROGRESS NOTES
Speech Language Pathology  [facility]    Dysphagia Treatment Note    Date: 8/12/2024  Patient’s Name: Arias Martin  MRN: [id number]  Diagnosis: Dysphagia  Patient Active Problem List   Diagnosis Code    Bladder cancer C67.9    DJD (degenerative joint disease) M19.90    Renal mass N28.89    Incisional hernia of anterior abdominal wall without obstruction or gangrene K43.2    Kidney stone N20.0    Left ureteral calculus N20.1    Partial small bowel obstruction (HCC) K56.600    GERD (gastroesophageal reflux disease) K21.9    Acute kidney injury ([facility]) N17.9    Normochromic normocytic anemia D64.9    Iron deficiency anemia D50.9    Acute encephalopathy G93.40    Bilateral hydronephrosis N13.30    Vitamin B 12 deficiency E53.8    Folate deficiency E53.8    Acute respiratory failure with hypoxia ([facility]) J96.01    Moderate malnutrition ([facility]) E44.0    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    S/P ileal conduit ([facility]) Z93.6    CKD (chronic kidney disease), stage III ([facility]) N18.30    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    Renal cell carcinoma of right kidney ([facility]) C64.1    Hypotension I95.9    Syncope and collapse R55    Subclinical hypothyroidism E03.8    Left shoulder pain M25.512    Pseudomonas urinary tract infection N39.0, B96.5    SIRS (systemic inflammatory response syndrome) ([facility]) R65.10    Acute pyelonephritis N10    Toxic metabolic encephalopathy G92.8    Acute cystitis without hematuria N30.00    Delirium R41.0    Dementia ([facility]) F03.90       Pain: 0/10    Dysphagia Treatment  Treatment time:0470-0939    Subjective: [x] Alert [x] Cooperative     [] Confused     [] Agitated    [] Lethargic    Objective/Assessment:    Pt. Seen for possible diet upgrade.  Pt. Completed bedside swallow study on 8/9.  Dysphagia soft and bite/sized (Dysphagia III) with thin liquid was recommended.    Pt. Given trials of thin liquid, puree, soft and regular solid.  Oral phase is WFL. No difficulty with

## 2024-08-12 NOTE — PROGRESS NOTES
reports patient is experiencing intermittent agitation, sleeping for a few minutes at a time before waking up and making irate comments about his nephrostomy and urostomy tubes.  Every time nursing rounds on the patient he is asleep or at least has his eyes closed and is calm. Patient also complaining of pain in his fingers.  PRN trazodone was given around 2320 to attempt to help the patient sleep.  It has apparently not had an effect. Day shift RN reported scheduled seroquel had a paradoxical effect on the patient and made him more aggressive. Because of this, RN has not given PRN seroquel.  On-call NP Hermelindo reached via [employer] for guidance and informed of above.  Provider ordered benadryl and confirmed order when RN asked for verification due to patient age.  Care ongoing.    0300  indicates patient behavior has not really changed, RN observes patient restless in bed.    0400 RN observed patient sleeping,  reports patient behavior may be slightly more sedate after benadryl but has definitely not been worse.    0500  reports to RN patient has not slept much through the night, patient getting a few minutes of sleep then waking up for 10 or 15 minutes confused and restless, in a repeating cycle.  Patient is redirectable with verbal cues and hostile agitation has decreased from earlier in the night.  RN observes patient restless in bed.

## 2024-08-13 ENCOUNTER — APPOINTMENT (OUTPATIENT)
Age: 84
DRG: 871 | End: 2024-08-13
Attending: INTERNAL MEDICINE
Payer: MEDICARE

## 2024-08-13 LAB
ANION GAP SERPL CALCULATED.3IONS-SCNC: 11 MMOL/L (ref 9–16)
BASOPHILS # BLD: 0.03 K/UL (ref 0–0.2)
BASOPHILS NFR BLD: 0 % (ref 0–2)
BUN SERPL-MCNC: 29 MG/DL (ref 8–23)
CALCIUM SERPL-MCNC: 8.4 MG/DL (ref 8.6–10.4)
CHLORIDE SERPL-SCNC: 104 MMOL/L (ref 98–107)
CO2 SERPL-SCNC: 23 MMOL/L (ref 20–31)
CREAT SERPL-MCNC: 2.4 MG/DL (ref 0.7–1.2)
ECHO AO ROOT DIAM: 3.5 CM
ECHO AO ROOT INDEX: 1.72 CM/M2
ECHO AV AREA PEAK VELOCITY: 3.3 CM2
ECHO AV AREA VTI: 2.5 CM2
ECHO AV AREA/BSA PEAK VELOCITY: 1.6 CM2/M2
ECHO AV AREA/BSA VTI: 1.2 CM2/M2
ECHO AV MEAN GRADIENT: 5 MMHG
ECHO AV MEAN VELOCITY: 1.1 M/S
ECHO AV PEAK GRADIENT: 9 MMHG
ECHO AV PEAK VELOCITY: 1.5 M/S
ECHO AV VELOCITY RATIO: 0.8
ECHO AV VTI: 29.6 CM
ECHO LA DIAMETER INDEX: 1.67 CM/M2
ECHO LA DIAMETER: 3.4 CM
ECHO LA TO AORTIC ROOT RATIO: 0.97
ECHO LV E' LATERAL VELOCITY: 10 CM/S
ECHO LV E' SEPTAL VELOCITY: 7 CM/S
ECHO LV FRACTIONAL SHORTENING: 34 % (ref 28–44)
ECHO LV INTERNAL DIMENSION DIASTOLE INDEX: 2.89 CM/M2
ECHO LV INTERNAL DIMENSION DIASTOLIC: 5.9 CM (ref 4.2–5.9)
ECHO LV INTERNAL DIMENSION SYSTOLIC INDEX: 1.91 CM/M2
ECHO LV INTERNAL DIMENSION SYSTOLIC: 3.9 CM
ECHO LV IVSD: 1 CM (ref 0.6–1)
ECHO LV MASS 2D: 239.9 G (ref 88–224)
ECHO LV MASS INDEX 2D: 117.6 G/M2 (ref 49–115)
ECHO LV POSTERIOR WALL DIASTOLIC: 1 CM (ref 0.6–1)
ECHO LV RELATIVE WALL THICKNESS RATIO: 0.34
ECHO LVOT AREA: 4.2 CM2
ECHO LVOT AV VTI INDEX: 0.61
ECHO LVOT DIAM: 2.3 CM
ECHO LVOT MEAN GRADIENT: 3 MMHG
ECHO LVOT PEAK GRADIENT: 6 MMHG
ECHO LVOT PEAK VELOCITY: 1.2 M/S
ECHO LVOT STROKE VOLUME INDEX: 36.6 ML/M2
ECHO LVOT SV: 74.7 ML
ECHO LVOT VTI: 18 CM
ECHO MV A VELOCITY: 0.81 M/S
ECHO MV AREA VTI: 3.6 CM2
ECHO MV E DECELERATION TIME (DT): 178 MS
ECHO MV E VELOCITY: 0.56 M/S
ECHO MV E/A RATIO: 0.69
ECHO MV E/E' LATERAL: 5.6
ECHO MV E/E' RATIO (AVERAGED): 6.8
ECHO MV E/E' SEPTAL: 8
ECHO MV LVOT VTI INDEX: 1.16
ECHO MV MAX VELOCITY: 0.8 M/S
ECHO MV MEAN GRADIENT: 1 MMHG
ECHO MV MEAN VELOCITY: 0.5 M/S
ECHO MV PEAK GRADIENT: 3 MMHG
ECHO MV VTI: 20.9 CM
ECHO RV FREE WALL PEAK S': 17 CM/S
ECHO RV TAPSE: 1.7 CM (ref 1.7–?)
ECHO TV REGURGITANT MAX VELOCITY: 2.12 M/S
ECHO TV REGURGITANT PEAK GRADIENT: 18 MMHG
EKG ATRIAL RATE: 58 BPM
EKG ATRIAL RATE: 76 BPM
EKG P AXIS: 86 DEGREES
EKG P AXIS: 90 DEGREES
EKG P-R INTERVAL: 246 MS
EKG P-R INTERVAL: 270 MS
EKG Q-T INTERVAL: 428 MS
EKG Q-T INTERVAL: 460 MS
EKG QRS DURATION: 130 MS
EKG QRS DURATION: 136 MS
EKG QTC CALCULATION (BAZETT): 451 MS
EKG QTC CALCULATION (BAZETT): 481 MS
EKG R AXIS: 0 DEGREES
EKG R AXIS: 145 DEGREES
EKG T AXIS: 27 DEGREES
EKG T AXIS: 30 DEGREES
EKG VENTRICULAR RATE: 58 BPM
EKG VENTRICULAR RATE: 76 BPM
EOSINOPHIL # BLD: 0.22 K/UL (ref 0–0.44)
EOSINOPHILS RELATIVE PERCENT: 3 % (ref 1–4)
ERYTHROCYTE [DISTWIDTH] IN BLOOD BY AUTOMATED COUNT: 16.1 % (ref 11.8–14.4)
GFR, ESTIMATED: 26 ML/MIN/1.73M2
GLUCOSE SERPL-MCNC: 90 MG/DL (ref 74–99)
HCT VFR BLD AUTO: 31.4 % (ref 40.7–50.3)
HGB BLD-MCNC: 9.6 G/DL (ref 13–17)
IMM GRANULOCYTES # BLD AUTO: 0.05 K/UL (ref 0–0.3)
IMM GRANULOCYTES NFR BLD: 1 %
LYMPHOCYTES NFR BLD: 1.19 K/UL (ref 1.1–3.7)
LYMPHOCYTES RELATIVE PERCENT: 17 % (ref 24–43)
MAGNESIUM SERPL-MCNC: 2.4 MG/DL (ref 1.6–2.4)
MCH RBC QN AUTO: 29.9 PG (ref 25.2–33.5)
MCHC RBC AUTO-ENTMCNC: 30.6 G/DL (ref 28.4–34.8)
MCV RBC AUTO: 97.8 FL (ref 82.6–102.9)
MONOCYTES NFR BLD: 0.38 K/UL (ref 0.1–1.2)
MONOCYTES NFR BLD: 5 % (ref 3–12)
NEUTROPHILS NFR BLD: 74 % (ref 36–65)
NEUTS SEG NFR BLD: 5.34 K/UL (ref 1.5–8.1)
NRBC BLD-RTO: 0 PER 100 WBC
PLATELET # BLD AUTO: 208 K/UL (ref 138–453)
PMV BLD AUTO: 9.9 FL (ref 8.1–13.5)
POTASSIUM SERPL-SCNC: 3.6 MMOL/L (ref 3.7–5.3)
RBC # BLD AUTO: 3.21 M/UL (ref 4.21–5.77)
RBC # BLD: ABNORMAL 10*6/UL
SODIUM SERPL-SCNC: 138 MMOL/L (ref 136–145)
WBC OTHER # BLD: 7.2 K/UL (ref 3.5–11.3)

## 2024-08-13 PROCEDURE — 97530 THERAPEUTIC ACTIVITIES: CPT

## 2024-08-13 PROCEDURE — 6360000002 HC RX W HCPCS: Performed by: NURSE PRACTITIONER

## 2024-08-13 PROCEDURE — 93306 TTE W/DOPPLER COMPLETE: CPT | Performed by: INTERNAL MEDICINE

## 2024-08-13 PROCEDURE — 99223 1ST HOSP IP/OBS HIGH 75: CPT | Performed by: INTERNAL MEDICINE

## 2024-08-13 PROCEDURE — 99232 SBSQ HOSP IP/OBS MODERATE 35: CPT | Performed by: INTERNAL MEDICINE

## 2024-08-13 PROCEDURE — 93306 TTE W/DOPPLER COMPLETE: CPT

## 2024-08-13 PROCEDURE — 2580000003 HC RX 258: Performed by: NURSE PRACTITIONER

## 2024-08-13 PROCEDURE — 83735 ASSAY OF MAGNESIUM: CPT

## 2024-08-13 PROCEDURE — 6370000000 HC RX 637 (ALT 250 FOR IP): Performed by: STUDENT IN AN ORGANIZED HEALTH CARE EDUCATION/TRAINING PROGRAM

## 2024-08-13 PROCEDURE — 80048 BASIC METABOLIC PNL TOTAL CA: CPT

## 2024-08-13 PROCEDURE — 2060000000 HC ICU INTERMEDIATE R&B

## 2024-08-13 PROCEDURE — 6370000000 HC RX 637 (ALT 250 FOR IP): Performed by: INTERNAL MEDICINE

## 2024-08-13 PROCEDURE — 36415 COLL VENOUS BLD VENIPUNCTURE: CPT

## 2024-08-13 PROCEDURE — 6370000000 HC RX 637 (ALT 250 FOR IP): Performed by: NURSE PRACTITIONER

## 2024-08-13 PROCEDURE — 85025 COMPLETE CBC W/AUTO DIFF WBC: CPT

## 2024-08-13 PROCEDURE — 6370000000 HC RX 637 (ALT 250 FOR IP)

## 2024-08-13 PROCEDURE — 97110 THERAPEUTIC EXERCISES: CPT

## 2024-08-13 RX ORDER — FOLIC ACID 1 MG/1
1 TABLET ORAL DAILY
DISCHARGE
Start: 2024-08-14

## 2024-08-13 RX ORDER — CYCLOBENZAPRINE HCL 10 MG
10 TABLET ORAL 3 TIMES DAILY PRN
DISCHARGE
Start: 2024-08-13 | End: 2024-08-23

## 2024-08-13 RX ORDER — FERROUS SULFATE 325(65) MG
325 TABLET, DELAYED RELEASE (ENTERIC COATED) ORAL
DISCHARGE
Start: 2024-08-14

## 2024-08-13 RX ORDER — ASCORBIC ACID 500 MG
500 TABLET ORAL DAILY
DISCHARGE
Start: 2024-08-14

## 2024-08-13 RX ORDER — TAMSULOSIN HYDROCHLORIDE 0.4 MG/1
0.4 CAPSULE ORAL DAILY
DISCHARGE
Start: 2024-08-14

## 2024-08-13 RX ORDER — SODIUM BICARBONATE 650 MG/1
1300 TABLET ORAL 2 TIMES DAILY
DISCHARGE
Start: 2024-08-13

## 2024-08-13 RX ORDER — ACETAMINOPHEN 500 MG
500 TABLET ORAL EVERY 4 HOURS PRN
DISCHARGE
Start: 2024-08-13

## 2024-08-13 RX ORDER — POTASSIUM CHLORIDE 1500 MG/1
20 TABLET, EXTENDED RELEASE ORAL ONCE
Status: COMPLETED | OUTPATIENT
Start: 2024-08-13 | End: 2024-08-13

## 2024-08-13 RX ADMIN — SODIUM BICARBONATE 1300 MG: 648 TABLET ORAL at 09:56

## 2024-08-13 RX ADMIN — SODIUM BICARBONATE 1300 MG: 648 TABLET ORAL at 21:43

## 2024-08-13 RX ADMIN — ACETAMINOPHEN 650 MG: 325 TABLET ORAL at 12:48

## 2024-08-13 RX ADMIN — TRAZODONE HYDROCHLORIDE 50 MG: 50 TABLET ORAL at 21:43

## 2024-08-13 RX ADMIN — HEPARIN SODIUM 5000 UNITS: 5000 INJECTION INTRAVENOUS; SUBCUTANEOUS at 21:43

## 2024-08-13 RX ADMIN — SODIUM CHLORIDE, PRESERVATIVE FREE 10 ML: 5 INJECTION INTRAVENOUS at 21:43

## 2024-08-13 RX ADMIN — HEPARIN SODIUM 5000 UNITS: 5000 INJECTION INTRAVENOUS; SUBCUTANEOUS at 16:16

## 2024-08-13 RX ADMIN — SODIUM CHLORIDE, PRESERVATIVE FREE 10 ML: 5 INJECTION INTRAVENOUS at 09:56

## 2024-08-13 RX ADMIN — FOLIC ACID 1 MG: 1 TABLET ORAL at 09:56

## 2024-08-13 RX ADMIN — TAMSULOSIN HYDROCHLORIDE 0.4 MG: 0.4 CAPSULE ORAL at 09:56

## 2024-08-13 RX ADMIN — POTASSIUM CHLORIDE 20 MEQ: 1500 TABLET, EXTENDED RELEASE ORAL at 12:48

## 2024-08-13 RX ADMIN — FERROUS SULFATE TAB EC 325 MG (65 MG FE EQUIVALENT) 325 MG: 325 (65 FE) TABLET DELAYED RESPONSE at 09:56

## 2024-08-13 RX ADMIN — QUETIAPINE 25 MG: 25 TABLET ORAL at 16:16

## 2024-08-13 RX ADMIN — OXYCODONE HYDROCHLORIDE AND ACETAMINOPHEN 500 MG: 500 TABLET ORAL at 09:56

## 2024-08-13 RX ADMIN — HEPARIN SODIUM 5000 UNITS: 5000 INJECTION INTRAVENOUS; SUBCUTANEOUS at 06:56

## 2024-08-13 RX ADMIN — CYANOCOBALAMIN TAB 500 MCG 50 MCG: 500 TAB at 09:56

## 2024-08-13 ASSESSMENT — PAIN SCALES - GENERAL
PAINLEVEL_OUTOF10: 6
PAINLEVEL_OUTOF10: 4

## 2024-08-13 ASSESSMENT — PAIN SCALES - WONG BAKER: WONGBAKER_NUMERICALRESPONSE: NO HURT

## 2024-08-13 NOTE — DISCHARGE INSTR - COC
Continuity of Care Form    Patient Name: Arias Martin   :  1940  MRN:  [id number]    Admit date:  2024  Discharge date:  8/15/24    Code Status Order: Full Code   Advance Directives:   Advance Care Flowsheet Documentation             Admitting Physician:  No admitting provider for patient encounter.  PCP: Marcelo Olivia, APRN - CNP    Discharging Nurse: NADER Agudelo  Discharging Hospital Unit/Room#:   Discharging Unit Phone Number: [phone]    Emergency Contact:   Extended Emergency Contact Information  Primary Emergency Contact: Nate Martin  Home Phone: [phone]  Relation: Child  Secondary Emergency Contact: Celine Martin  Mobile Phone: [phone]  Relation: Daughter-in-Law    Past Surgical History:  Past Surgical History:   Procedure Laterality Date    BLADDER REMOVAL  2006    cancer, s/p urostomy     CATARACT REMOVAL Bilateral     COLONOSCOPY      CT CRYO ABLATION RENAL TUMOR  2019    CT CRYO ABLATION RENAL TUMOR 2019    CYSTOSCOPY N/A 5/3/2021    CYSTOSCOPY performed by Thomas Frost MD at [facility] OR    CYSTOSCOPY N/A 2021    LOOPOSCOPY,  EXCISION OF SUTURE, LOOPOGRAM performed by Thomas Frost MD at [facility] OR    EYE SURGERY Right 2000    macular hole    EYE SURGERY      HERNIA REPAIR      x2    INCISIONAL HERNIA REPAIR  2015    parastomal hernia repair - Dr. Kyle    INCISIONAL HERNIA REPAIR  2014    IR NEPHROSTOMY PERCUTANEOUS LEFT  5/3/2021    IR NEPHROSTOMY PERCUTANEOUS LEFT 5/3/2021 [facility] SPECIAL PROCEDURES    IR NEPHROSTOMY PERCUTANEOUS LEFT  2021    IR NEPHROSTOMY PERCUTANEOUS LEFT 2021 [facility] SPECIAL PROCEDURES    IR NEPHROSTOMY PERCUTANEOUS LEFT  2024    IR NEPHROSTOMY PERCUTANEOUS LEFT 2024 Gordon Dee MD [facility] SPECIAL PROCEDURES    IR NEPHROSTOMY PERCUTANEOUS RIGHT  2021    IR NEPHROSTOMY PERCUTANEOUS RIGHT 2021 Nelson Levy MD [facility] SPECIAL PROCEDURES    KNEE SURGERY Left     LAPAROSCOPY N/A  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    Renal cell carcinoma of right kidney ([facility]) C64.1    Hypotension I95.9    Syncope and collapse R55    Subclinical hypothyroidism E03.8    Left shoulder pain M25.512    Pseudomonas urinary tract infection N39.0, B96.5    SIRS (systemic inflammatory response syndrome) ([facility]) R65.10    Acute pyelonephritis N10    Toxic metabolic encephalopathy G92.8    Acute cystitis without hematuria N30.00    Delirium R41.0    Dementia ([facility]) F03.90       Isolation/Infection:   Isolation            Contact          Patient Infection Status       Infection Onset Added Last Indicated Last Indicated By Review Planned Expiration Resolved Resolved By    VRE  04/20/17 04/20/17 Shakira Lowe RN        Urine - 3/2017              Nurse Assessment:  Last Vital Signs: /73   Pulse 63   Temp 98.2 °F (36.8 °C) (Oral)   Resp 18   Ht 1.88 m (6' 2.02\")   Wt 78.3 kg (172 lb 9.9 oz)   SpO2 98%   BMI 22.15 kg/m²     Last documented pain score (0-10 scale): Pain Level: 7  Last Weight:   Wt Readings from Last 1 Encounters:   08/13/24 78.3 kg (172 lb 9.9 oz)     Mental Status:  disoriented and alert    IV Access:  - None    Nursing Mobility/ADLs:  Walking   Assisted  Transfer  Assisted  Bathing  Assisted  Dressing  Assisted  Toileting  Assisted  Feeding  Independent  Med Admin  Independent  Med Delivery   whole    Wound Care Documentation and Therapy:  Incision 04/30/21 Abdomen (Active)   Number of days: 1200       Incision Abdomen Right;Upper (Active)   Number of days:         Elimination:  Continence:   Bowel: Yes  Bladder: Urostomy RLQ and nephrostomy tube L kidney  Urinary Catheter:  Urostomy and nephrostomy tube    Colostomy/Ileostomy/Ileal Conduit: Yes  Urostomy RLQ-Stomal Appliance: 1 piece, Clean, dry & intact  Urostomy RLQ-Stoma  Assessment: Pink  Urostomy RLQ-Mucocutaneous Junction: Intact  Urostomy RLQ-Peristomal Assessment: Clean, dry & intact  Urostomy RLQ-Treatment: Pouch change, Site

## 2024-08-13 NOTE — PROGRESS NOTES
[facility]  Office: [phone]  Mendez Cesar DO, Patrice Zurita, DO, Marco A Rosales DO, Everette Bajwa, DO, Bettina Jasso MD, Kelsie De La Cruz MD, Otilio Lake MD, Fang Cuevas MD,  Akash Warren MD, Pawan Barcenas MD, Jose Isabel MD,  Zach Mantilla DO, Nathalia Clark MD, Neville Timmons MD, Neri Cesar DO, Ebony Cesar MD,  Sonny Edwards DO, Lucita Perez MD, Elvia Arredondo MD, Rosalinda Ritter MD, Korey Gamez MD,  Leonel rBeaux MD, Jean-Claude Haas MD, Hien Bradford MD, Maria Eugenia Nobles MD, Royal Marie MD, Sandra Viveros MD, Rahat Cooley DO, Nikita Evans DO, Elise Hayden MD,  Candido Flores MD, Shirley Waterhouse, CNP,  Dasha Haskins CNP, Altaf Galvan, CNP,  Nanette Waite, MELLISA, Cande Aguiar, CNP, Malu Alvarez, CNP, Billie Burnett CNP, Sadia Casarez CNP, Rosa Taylor, PA-C, Glorai Mendoza PA-C, Mona Agosto, CNP, Ned Greene, CNP, Mary Peacock, CNP, Anel Carlson CNP, Anuradha Flood, CNS, Ingris Hudson, CNP, Honey Perrin CNP, Tracy Schwab, CNP         [facility]   IN-PATIENT SERVICE   [facility]    Progress Note    8/13/2024    8:43 AM    Name:   Arias Martin  MRN:     [id number]     Acct:      [id number]   Room:   2002/2002-01  IP Day:  14  Admit Date:  7/30/2024  4:30 PM    PCP:   Marcelo Olivia APRN - CNP  Code Status:  Full Code    Subjective:     C/C:   Chief Complaint   Patient presents with    Altered Mental Status     Interval History Status: improved.     Pt is resting in bed.  He ate breakfast well per the sitter.  He doesn't remember that he ate breakfast.  He is oriented to self, place and date.  No complaints.    Brief History:     Per my partner:  \"84-year-old male with history of suprapubic catheter with frequent UTIs, recent UTI treated with cefepime/ceftriaxone, bladder cancer s/p cystectomy with ileal conduit, CKD, SDH, dementia presented with altered mental status and abdominal pain,  Yes    Folate deficiency 7/30/2024 Yes    Bladder cancer (Chronic) 7/30/2024 Yes    Kidney stone 8/1/2024 Yes    Left ureteral calculus 7/31/2024 Yes    GERD (gastroesophageal reflux disease) 7/30/2024 Yes    Acute kidney injury (HCC) 7/30/2024 Yes    Moderate malnutrition (HCC) 7/30/2024 Yes    S/P ileal conduit (HCC) 7/30/2024 Yes    CKD (chronic kidney disease), stage III (HCC) 7/30/2024 Yes    Hypotension 7/30/2024 Yes    SIRS (systemic inflammatory response syndrome) (HCC) 8/2/2024 Yes    Acute pyelonephritis 8/4/2024 Yes    Toxic metabolic encephalopathy 8/4/2024 Yes    Acute cystitis without hematuria 8/5/2024 Yes    Delirium 8/9/2024 Yes    Dementia (HCC) 8/9/2024 Yes       Plan:        Pseudomonas urinary tract infection-status post cefepime and amoxicillin, ID signed off  KARMA-resolved, repeat BMP in 1 week, follow-up with primary nephrologist Dr. Norwood  Anemia- monitor H/H, has splenomegaly, recommend outpatient work up  Hydronephrosis, left ureteral calculus status post left nephrostomy tube placement, continue Flomax, LEvsin, follow-up with urology outpatient for further treatment of stone  Dementia with episodes of acute delirium-continue Seroquel at night, trazodone, Zyprexa as needed.  Can likely discontinue sitter  Elevated troponins-flat trend, no chest pain likely type II MI, echocardiogram completed, appreciate cardiology evaluation, follow-up echo read  DVT prophylaxis  PT/OT    Disposition-discharge planning to SNF, Vanessa signed, Med rec started    Kelsie De La Cruz MD  8/13/2024  8:43 AM

## 2024-08-13 NOTE — CONSULTS
Cardiology Consultation         Date:   8/13/2024  Patient name: Arias Martin  Date of admission:  7/30/2024  4:30 PM  MRN:   [id number]  YOB: 1940    Reason for Admission: encephalopathy, septic shock, hydronephrosis     Chief Complaint: altered mental status     History of present illness:     84-year-old male with pertinent cardiac history of paroxysmal atrial fibrillation, underlying dementia, chronic kidney disease, diabetes and hypertension, admitted with altered sensorium and abdominal pain, found to be hypotensive on arrival with elevated creatinine, urinalysis positive for UTI, CT abdomen showed bilateral hydronephrosis with obstructive kidney stones and possible pyelonephritis, underwent percutaneous nephrostomy tube on 7/31 with urology following.  Last night patient was found to be bradycardic, ECG showed right bundle branch block with inferior infarct.  Troponins were repeated which are at baseline ranging 30-40 with a flat trend.  Patient is historian however he is awake and partially oriented this morning.  He denies any active chest pain or dyspnea.  He has been on room air.    Past Medical History:   has a past medical history of Arthritis, Caffeine use, Cancer ([facility]), Cancer of kidney, unspecified laterality ([facility]), CKD (chronic kidney disease), Cognitive communication deficit, COPD (chronic obstructive pulmonary disease) ([facility]), Diabetes mellitus ([facility]), DNR (do not resuscitate), Dysphagia, GERD (gastroesophageal reflux disease), GI bleeding, Hernia, inguinal, right, Hyperlipidemia, Hypertension, Ileal conduit stomal stenosis, Kidney stone, Mobility impaired, Muscle weakness, Nursing home resident, PAF (paroxysmal atrial fibrillation) ([facility]), Presence of urostomy ([facility]), and Retinal detachment.    Past Surgical History:   has a past surgical history that includes hernia repair; Bladder removal (2006); Prostate surgery (2006); Cataract removal (Bilateral); Colonoscopy; Eye  oral mucosa  Respiratory:  Normal excursion and expansion without use of accessory muscles  Resp Auscultation: Good respiratory effort. No for increased work of breathing. On auscultation: clear to auscultation bilaterally, no wheezing, no rales.   Cardiovascular:  The apical impulse is not displaced  Heart tones are crisp and normal. regular S1 and S2.   Murmurs: None   Jugular venous pulsation Normal  Thre is no carotid bruit   Peripheral pulses are symmetrical and full   Abdomen:  No masses or tenderness  Bowel sounds present  Extremities:   No Cyanosis or Clubbing   Lower extremity edema: No edema    Skin: Warm and dry  Neurological:  Not done     DATA:    Diagnostics:      EKG: Sinus bradycardia, first-degree AV block, RBBB, inferior infarct          Labs:     CBC:   Recent Labs     08/12/24  0642 08/13/24 0624   WBC 7.4 7.2   HGB 9.3* 9.6*   HCT 29.4* 31.4*    208     BMP:   Recent Labs     08/12/24  0642 08/13/24 0624    138   K 3.8 3.6*   CO2 22 23   BUN 31* 29*   CREATININE 2.5* 2.4*   LABGLOM 25* 26*   GLUCOSE 80 90     BNP: No results for input(s): \"BNP\" in the last 72 hours.  PT/INR: No results for input(s): \"PROTIME\", \"INR\" in the last 72 hours.  APTT:No results for input(s): \"APTT\" in the last 72 hours.  CARDIAC ENZYMES:No results for input(s): \"CKTOTAL\", \"CKMB\", \"CKMBINDEX\", \"TROPONINI\" in the last 72 hours.  FASTING LIPID PANEL:  Lab Results   Component Value Date/Time    HDL 24 10/13/2022 07:45 AM    TRIG 204 10/13/2022 07:45 AM     LIVER PROFILE:No results for input(s): \"AST\", \"ALT\", \"LABALBU\" in the last 72 hours.      IMPRESSION:    Sepsis due to UTI  Bilateral hydronephrosis from obstructive kidney stone status post left nephrostomy  Recent history of pyelonephritis  Acute toxic metabolic encephalopathy  Acute on chronic renal insufficiency  Elevated troponins, flat trend, no active chest pain, likely suggestive of type II MI  Abnormal ECG with inferior infarct  Right bundle  branch block, chronic  Reported history of paroxysmal atrial fibrillation      PLAN:  Recommend obtaining echocardiogram to assess LVEF and any regional wall motion abnormalities.  Patient not on ASA due to hx of anaphylaxis to NSAIDS.   No further cardiac testing indicated if echocardiogram low risk.    No objection to discharge from cardiac standpoint      Discussed with patient and nursing.      Jose Alejandro Ellis MD [facility] [place]

## 2024-08-13 NOTE — CARE COORDINATION
Spoke to Jaylin from [facility]. She received referral and will review    1530 spoke to Mae from [facility]. They do not have a bed available

## 2024-08-13 NOTE — PLAN OF CARE
Problem: Chronic Conditions and Co-morbidities  Goal: Patient's chronic conditions and co-morbidity symptoms are monitored and maintained or improved  Outcome: Progressing  Flowsheets  Taken 8/12/2024 2000 by Ana Laura Schmid RN  Care Plan - Patient's Chronic Conditions and Co-Morbidity Symptoms are Monitored and Maintained or Improved: Monitor and assess patient's chronic conditions and comorbid symptoms for stability, deterioration, or improvement  Taken 8/12/2024 0954 by Jina Hackett RN  Care Plan - Patient's Chronic Conditions and Co-Morbidity Symptoms are Monitored and Maintained or Improved:   Monitor and assess patient's chronic conditions and comorbid symptoms for stability, deterioration, or improvement   Collaborate with multidisciplinary team to address chronic and comorbid conditions and prevent exacerbation or deterioration   Update acute care plan with appropriate goals if chronic or comorbid symptoms are exacerbated and prevent overall improvement and discharge     Problem: Discharge Planning  Goal: Discharge to home or other facility with appropriate resources  Outcome: Progressing  Flowsheets (Taken 8/12/2024 0954 by Jina Hackett RN)  Discharge to home or other facility with appropriate resources:   Identify barriers to discharge with patient and caregiver   Arrange for needed discharge resources and transportation as appropriate   Identify discharge learning needs (meds, wound care, etc)   Refer to discharge planning if patient needs post-hospital services based on physician order or complex needs related to functional status, cognitive ability or social support system     Problem: Safety - Adult  Goal: Free from fall injury  Outcome: Progressing     Problem: Skin/Tissue Integrity  Goal: Absence of new skin breakdown  Description: 1.  Monitor for areas of redness and/or skin breakdown  2.  Assess vascular access sites hourly  3.  Every 4-6 hours minimum:  Change oxygen saturation probe

## 2024-08-13 NOTE — PROGRESS NOTES
Physical Therapy  Facility/Department: [facility] CAR 2- STEPDOWN  Physical Therapy Daily Treatment Note    Name: Arias Martin  : 1940  MRN: [id number]  Date of Service: 2024    Discharge Recommendations:  Patient would benefit from continued therapy after discharge   PT Equipment Recommendations  Equipment Needed: No      Patient Diagnosis(es): The primary encounter diagnosis was Kidney stone. Diagnoses of Acute cystitis with hematuria and Shortness of breath were also pertinent to this visit.  Past Medical History:  has a past medical history of Arthritis, Caffeine use, Cancer (HCC), Cancer of kidney, unspecified laterality (HCC), CKD (chronic kidney disease), Cognitive communication deficit, COPD (chronic obstructive pulmonary disease) ([facility]), Diabetes mellitus ([facility]), DNR (do not resuscitate), Dysphagia, GERD (gastroesophageal reflux disease), GI bleeding, Hernia, inguinal, right, Hyperlipidemia, Hypertension, Ileal conduit stomal stenosis, Kidney stone, Mobility impaired, Muscle weakness, Nursing home resident, PAF (paroxysmal atrial fibrillation) ([facility]), Presence of urostomy ([facility]), and Retinal detachment.  Past Surgical History:  has a past surgical history that includes hernia repair; Bladder removal (); Prostate surgery (); Cataract removal (Bilateral); Colonoscopy; Eye surgery (Right, ); vitrectomy (Left, 16); Incisional hernia repair (2015); Incisional hernia repair (2014); laparoscopy (N/A, 2021); Cystoscopy (N/A, 5/3/2021); IR GUIDED NEPHROSTOMY CATH PLACEMENT LEFT (5/3/2021); IR GUIDED NEPHROSTOMY CATH PLACEMENT RIGHT (2021); eye surgery; knee surgery (Left); other surgical history (2021); IR GUIDED NEPHROSTOMY CATH PLACEMENT LEFT (2021); other surgical history (2021); Cystoscopy (N/A, 2021); CT CRYOABLATION OF RENAL TUMOR (2019); and IR GUIDED NEPHROSTOMY CATH PLACEMENT LEFT (2024).    Assessment   Body Structures, Functions,  No  Follows Commands: Within Functional Limits (follows most simple commands.)  Subjective  Subjective: Pt supine in bed and with encouragement is agreeable to therapy, RN agreeable to therapy.  Pt cooperative throughout, requires redirection at times.  Pt reports \"a lot\" of generalized pain at rest, repositioned for comfort following mobility.              Cognition   Orientation  Overall Orientation Status: Impaired  Orientation Level: Oriented to person;Disoriented to time;Disoriented to situation;Oriented to place  Cognition  Overall Cognitive Status: Exceptions  Arousal/Alertness: Appropriate responses to stimuli  Following Commands: Follows one step commands with repetition;Follows one step commands with increased time  Attention Span: Attends with cues to redirect  Safety Judgement: Decreased awareness of need for assistance;Decreased awareness of need for safety  Problem Solving: Assistance required to identify errors made;Assistance required to correct errors made  Insights: Decreased awareness of deficits  Initiation: Requires cues for some  Sequencing: Requires cues for some     Objective                                Bed mobility  Supine to Sit: Moderate assistance;2 Person assistance  Sit to Supine: Moderate assistance;2 Person assistance  Scooting: Contact guard assistance  Bed Mobility Comments: Verbal cues for initiation and sequencing.  Increased time/effort to perform.  HOB slightly elevated with use of bedrail.  Transfers  Sit to Stand: Minimal Assistance  Stand to Sit: Minimal Assistance  Comment: Transfers performed x4, pt suddenly sits with poor eccentric control.  Verbal cues for UE placement.           Exercise Treatment: standing hip flexion x6 BLE, seated LAQ x8 BLE, seated hip flexion x10 AAROM, SLR x10 BLE, SAQ x8 BLE, ankle dorsi/plantarflexion x10 BLE                     AM-PAC - Mobility    AM-PAC Basic Mobility - Inpatient   How much help is needed turning from your back to your side

## 2024-08-14 PROCEDURE — 2580000003 HC RX 258: Performed by: NURSE PRACTITIONER

## 2024-08-14 PROCEDURE — 97535 SELF CARE MNGMENT TRAINING: CPT

## 2024-08-14 PROCEDURE — 99232 SBSQ HOSP IP/OBS MODERATE 35: CPT | Performed by: INTERNAL MEDICINE

## 2024-08-14 PROCEDURE — 6370000000 HC RX 637 (ALT 250 FOR IP): Performed by: NURSE PRACTITIONER

## 2024-08-14 PROCEDURE — 6370000000 HC RX 637 (ALT 250 FOR IP): Performed by: INTERNAL MEDICINE

## 2024-08-14 PROCEDURE — 97530 THERAPEUTIC ACTIVITIES: CPT

## 2024-08-14 PROCEDURE — 6360000002 HC RX W HCPCS: Performed by: NURSE PRACTITIONER

## 2024-08-14 PROCEDURE — 2060000000 HC ICU INTERMEDIATE R&B

## 2024-08-14 PROCEDURE — 6370000000 HC RX 637 (ALT 250 FOR IP): Performed by: STUDENT IN AN ORGANIZED HEALTH CARE EDUCATION/TRAINING PROGRAM

## 2024-08-14 RX ADMIN — SODIUM CHLORIDE, PRESERVATIVE FREE 10 ML: 5 INJECTION INTRAVENOUS at 09:00

## 2024-08-14 RX ADMIN — FOLIC ACID 1 MG: 1 TABLET ORAL at 08:59

## 2024-08-14 RX ADMIN — TRAZODONE HYDROCHLORIDE 50 MG: 50 TABLET ORAL at 20:16

## 2024-08-14 RX ADMIN — HEPARIN SODIUM 5000 UNITS: 5000 INJECTION INTRAVENOUS; SUBCUTANEOUS at 22:09

## 2024-08-14 RX ADMIN — HEPARIN SODIUM 5000 UNITS: 5000 INJECTION INTRAVENOUS; SUBCUTANEOUS at 13:34

## 2024-08-14 RX ADMIN — HEPARIN SODIUM 5000 UNITS: 5000 INJECTION INTRAVENOUS; SUBCUTANEOUS at 06:38

## 2024-08-14 RX ADMIN — QUETIAPINE 25 MG: 25 TABLET ORAL at 13:34

## 2024-08-14 RX ADMIN — SODIUM BICARBONATE 1300 MG: 648 TABLET ORAL at 08:58

## 2024-08-14 RX ADMIN — TAMSULOSIN HYDROCHLORIDE 0.4 MG: 0.4 CAPSULE ORAL at 08:59

## 2024-08-14 RX ADMIN — FERROUS SULFATE TAB EC 325 MG (65 MG FE EQUIVALENT) 325 MG: 325 (65 FE) TABLET DELAYED RESPONSE at 08:59

## 2024-08-14 RX ADMIN — SODIUM BICARBONATE 1300 MG: 648 TABLET ORAL at 20:20

## 2024-08-14 RX ADMIN — CYANOCOBALAMIN TAB 500 MCG 50 MCG: 500 TAB at 08:58

## 2024-08-14 RX ADMIN — OXYCODONE HYDROCHLORIDE AND ACETAMINOPHEN 500 MG: 500 TABLET ORAL at 09:02

## 2024-08-14 RX ADMIN — SODIUM CHLORIDE, PRESERVATIVE FREE 10 ML: 5 INJECTION INTRAVENOUS at 21:42

## 2024-08-14 NOTE — PROGRESS NOTES
Physical Therapy  Facility/Department: [facility] CAR 2- STEPDOWN  Physical Therapy    Name: Arias Martin  : 1940  MRN: [id number]  Date of Service: 2024    Discharge Recommendations:  Patient would benefit from continued therapy after discharge       Patient Diagnosis(es): The primary encounter diagnosis was Kidney stone. Diagnoses of Acute cystitis with hematuria and Shortness of breath were also pertinent to this visit.  Past Medical History:  has a past medical history of Arthritis, Caffeine use, Cancer (HCC), Cancer of kidney, unspecified laterality (HCC), CKD (chronic kidney disease), Cognitive communication deficit, COPD (chronic obstructive pulmonary disease) ([facility]), Diabetes mellitus ([facility]), DNR (do not resuscitate), Dysphagia, GERD (gastroesophageal reflux disease), GI bleeding, Hernia, inguinal, right, Hyperlipidemia, Hypertension, Ileal conduit stomal stenosis, Kidney stone, Mobility impaired, Muscle weakness, Nursing home resident, PAF (paroxysmal atrial fibrillation) ([facility]), Presence of urostomy ([facility]), and Retinal detachment.  Past Surgical History:  has a past surgical history that includes hernia repair; Bladder removal (); Prostate surgery (); Cataract removal (Bilateral); Colonoscopy; Eye surgery (Right, ); vitrectomy (Left, 16); Incisional hernia repair (2015); Incisional hernia repair (2014); laparoscopy (N/A, 2021); Cystoscopy (N/A, 5/3/2021); IR GUIDED NEPHROSTOMY CATH PLACEMENT LEFT (5/3/2021); IR GUIDED NEPHROSTOMY CATH PLACEMENT RIGHT (2021); eye surgery; knee surgery (Left); other surgical history (2021); IR GUIDED NEPHROSTOMY CATH PLACEMENT LEFT (2021); other surgical history (2021); Cystoscopy (N/A, 2021); CT CRYOABLATION OF RENAL TUMOR (2019); and IR GUIDED NEPHROSTOMY CATH PLACEMENT LEFT (2024).    Assessment   Assessment: pt dayday supine<->sit with min assist of 1, sit<->stand with mod assist of 1 & ther ex in    Cognition   Orientation  Orientation Level: Oriented to person;Disoriented to time;Disoriented to situation;Oriented to place  Cognition  Arousal/Alertness: Appropriate responses to stimuli  Following Commands: Follows one step commands with repetition;Follows one step commands with increased time  Attention Span: Attends with cues to redirect     Objective     Bed mobility  Supine to Sit: Minimal assistance (HOB elevated ~45 degrees, assist with trunk & bilat LE management, mod v.c's for technique)  Sit to Supine: Minimal assistance (assist with trunk & bilat LE)  Scooting: Contact guard assistance (to scoot to place bilat feet onto floor while seated EOB & v.c's for technique)  Transfers  Stand to Sit: Moderate Assistance  Comment: from EOB to EOB with use of RW, min v.c's for technique & sequencing of body  Ambulation  WB Status:  (pt defers amb d/t reports of bilat LE weakness)     Balance  Sitting - Static: Fair;+  Sitting - Dynamic: Fair  Standing - Static: Fair;-  Comments: pt dayday seated EOB activity followed by static standing with use of RW @ side of bed. pt stood ~30 seconds  Exercise Treatment: 5 reps AROM with occasional tactile cues given to pt for technique: hip abd/add, SLR (AAROM), heel slide, PF/DF, LAQ      AM-PAC - Mobility    AM-PAC Basic Mobility - Inpatient   How much help is needed turning from your back to your side while in a flat bed without using bedrails?: A Little  How much help is needed moving from lying on your back to sitting on the side of a flat bed without using bedrails?: A Little  How much help is needed moving to and from a bed to a chair?: A Lot  How much help is needed standing up from a chair using your arms?: A Lot  How much help is needed walking in hospital room?: Total  How much help is needed climbing 3-5 steps with a railing?: Total  AM-PAC Inpatient Mobility Raw Score : 12  AM-PAC Inpatient T-Scale Score : 35.33  Mobility Inpatient CMS 0-100% Score: 68.66  Mobility  Inpatient CMS G-Code Modifier : CL    Goals  Short Term Goals  Time Frame for Short Term Goals: 14 visits  Short Term Goal 1: Pt will be John in all bed mobility tasks  Short Term Goal 2: Pt will be John in transfers  Short Term Goal 3: Pt will amb 100' John w/ RW  Additional Goals?: No     Education  Patient Education  Education Given To: Patient  Education Provided: Role of Therapy  Education Method: Verbal  Barriers to Learning: Cognition  Education Outcome: Verbalized understanding;Continued education needed      Therapy Time   Individual Concurrent Group Co-treatment   Time In 1530         Time Out 1553         Minutes 23                 KEVIN GUERRERO, PTA

## 2024-08-14 NOTE — CARE COORDINATION
Patient's agitation and mentation improved. Referrals resent to [facility], [facility], [facility], [facility]    1054 received call from Shanel from [facility] [place]. Brittany will complete onsite eval today    1230 Brittany from [facility] [place] at bedside. She will discuss with centralized intake

## 2024-08-14 NOTE — PROGRESS NOTES
Occupational Therapy  Facility/Department: [facility] CAR 2- STEPDOWN   Daily Treatment Note  Patient Name: Arias Martin        MRN:   [id number]    : 1940    Date of Service: 2024     Discharge Recommendations  Discharge Recommendations: Patient would benefit from continued therapy after discharge     Assessment  Performance deficits / Impairments: Decreased functional mobility ;Decreased ADL status;Decreased cognition;Decreased endurance;Decreased balance;Decreased strength;Decreased safe awareness;Decreased fine motor control;Decreased coordination  Assessment: Pt would benefit from continued acute care and post acute care OT to address above listed deficits.  Prognosis: Fair  Activity Tolerance  Activity Tolerance: Treatment limited secondary to decreased cognition;Patient limited by pain;Patient limited by fatigue  Safety Devices  Type of Devices: Call light within reach;Nurse notified;Left in bed;Bed alarm in place;Patient at risk for falls;All fall risk precautions in place  Restraints Initially in Place: No     Restrictions/Precautions  Restrictions/Precautions  Restrictions/Precautions: Contact Precautions;Up as Tolerated;Fall Risk  Required Braces or Orthoses?: No  Position Activity Restriction  Other position/activity restrictions: s/p Left nephrostomy tube placement 24, urostomy; up with assistance.     Subjective  General  Patient assessed for rehabilitation services?: Yes  Pt C/O pain in back but was unable to rate pain level. Pt repositioned back in bed at end of tx for comfort.     Objective  Orientation  Overall Orientation Status: Impaired  Orientation Level: Oriented to person;Disoriented to place;Disoriented to time;Disoriented to situation  Cognition  Overall Cognitive Status: Exceptions  Arousal/Alertness: Inconsistent responses to stimuli;Delayed responses to stimuli  Following Commands: Follows one step commands with repetition;Follows one step commands with increased  toilet, bedpan, or urinal)?: A Lot  How much help is needed for putting on and taking off regular upper body clothing?: A Little  How much help is needed for taking care of personal grooming?: A Little  How much help for eating meals?: A Little  AM-PAC Inpatient Daily Activity Raw Score: 15  AM-PAC Inpatient ADL T-Scale Score : 34.69  ADL Inpatient CMS 0-100% Score: 56.46  ADL Inpatient CMS G-Code Modifier : CK     Minutes  OT Individual Minutes  Time In: 1410  Time Out: 1449  Minutes: 39  Time Code Minutes   Timed Code Treatment Minutes: 23 Minutes     Co- treatment with PT warranted secondary to decreased patient safety and independence with functional mobility requiring skilled physical assistance of two professionals to simultaneously address individualized discipline goals. OT is addressing ADL's, while PT is addressing their individualized functional mobility task.        Electronically signed by CHINMAY TRAYLOR on 8/14/24 at 4:16 PM EDT

## 2024-08-14 NOTE — PROGRESS NOTES
Comprehensive Nutrition Assessment    Type and Reason for Visit:  Reassess    Nutrition Recommendations/Plan:   Continue diet and ONS as ordered  Monitor meal time behavior; consider finger foods as appropriate  Monitor intakes, ONS, weight, labs, GI status, fluid status.     Malnutrition Assessment:  Malnutrition Status:  Mild malnutrition (at least) (08/03/24 1202)    Context:  Acute Illness     Findings of the 6 clinical characteristics of malnutrition:  Energy Intake:  75% or less of estimated energy requirements for 7 or more days (predicted)  Weight Loss:  No significant weight loss     Body Fat Loss:  Unable to assess     Muscle Mass Loss:  Mild muscle mass loss Thigh (quadriceps), Calf (gastrocnemius) (visual)  Fluid Accumulation:  Unable to assess     Strength:  Not Performed    Nutrition Assessment:    Per notes, SLP recommends regular and thin liquids. Nursing documentation indicates 26-50% meal intake. Per RN, pt somewhat confused. Sleeping at time of visit, family not present. RN reports pt consumed 50% of breakast, nearly 100% of lunch brought by family (KF). RN reports pt did not consume ONS today, previous RD notes show % intake of ONS. Per chart, pt has dementia with episodes of acute delirium. Transitional plan: working on placement per notes.  d/c yesterday.    Nutrition Related Findings:    Labs reviewed: K 3.6 mmol/L. Meds reviewed. Wound Type: None, Surgical Incision       Current Nutrition Intake & Therapies:    Average Meal Intake: 26-50%, %  Average Supplements Intake: 0%  ADULT ORAL NUTRITION SUPPLEMENT; Dinner; Low Calorie/High Protein Oral Supplement  ADULT ORAL NUTRITION SUPPLEMENT; Lunch; Frozen Oral Supplement  ADULT DIET; Regular    Anthropometric Measures:  Height: 188 cm (6' 2.02\")  Ideal Body Weight (IBW): 190 lbs (86 kg)    Admission Body Weight: 92.5 kg (204 lb)  Current Body Weight: 78.3 kg (172 lb 9.9 oz) (suspect inaccurate; 40 lb weight loss

## 2024-08-14 NOTE — PROGRESS NOTES
Cardiology Progress Note                     Date:   8/14/2024  Patient name: Arias Martin  Date of admission:  7/30/2024  4:30 PM  MRN:   [id number]  YOB: 1940  PCP: Marcelo Olivia, APRN - CNP    Reason for Admission:   encephalopathy, septic shock, hydronephrosis     Subjective:       There were no acute events overnight, remained hemodynamically stable, sleepy this am but partially oriented and answering appropriately, denies chest pain, dyspnea, orthopnea or palpitations.      Scheduled Meds:   traZODone  50 mg Oral Nightly    QUEtiapine  25 mg Oral Lunch    sodium bicarbonate  1,300 mg Oral BID    vitamin B-12  50 mcg Oral Daily    ferrous sulfate  325 mg Oral Daily with breakfast    ascorbic acid  500 mg Oral Daily    folic acid  1 mg Oral Daily    heparin (porcine)  5,000 Units SubCUTAneous 3 times per day    sodium chloride flush  5-40 mL IntraVENous 2 times per day    tamsulosin  0.4 mg Oral Daily       Continuous Infusions:   sodium chloride         Labs:     CBC:   Recent Labs     08/12/24  0642 08/13/24  0624   WBC 7.4 7.2   HGB 9.3* 9.6*    208     BMP:    Recent Labs     08/12/24  0642 08/13/24  0624    138   K 3.8 3.6*    104   CO2 22 23   BUN 31* 29*   CREATININE 2.5* 2.4*   GLUCOSE 80 90     Hepatic: No results for input(s): \"AST\", \"ALT\", \"BILITOT\", \"ALKPHOS\" in the last 72 hours.    Invalid input(s): \"ALB\"  Troponin: No results for input(s): \"TROPONINI\" in the last 72 hours.  BNP: No results for input(s): \"BNP\" in the last 72 hours.  Lipids: No results for input(s): \"CHOL\", \"HDL\" in the last 72 hours.    Invalid input(s): \"LDLCALCU\"  INR: No results for input(s): \"INR\" in the last 72 hours.      Objective:     Vitals: BP (!) 92/55   Pulse 66   Temp 97.8 °F (36.6 °C) (Oral)   Resp 20   Ht 1.88 m (6' 2.02\")   Wt 78.3 kg (172 lb 9.9 oz)   SpO2 100%   BMI 22.15 kg/m²     General appearance: awake, lethargic, partially oriented,in no apparent

## 2024-08-14 NOTE — PLAN OF CARE
Problem: Chronic Conditions and Co-morbidities  Goal: Patient's chronic conditions and co-morbidity symptoms are monitored and maintained or improved  8/14/2024 0154 by Shefali Montgomery RN  Outcome: Progressing  Flowsheets (Taken 8/12/2024 2000 by Ana Laura Schmid, RN)  Care Plan - Patient's Chronic Conditions and Co-Morbidity Symptoms are Monitored and Maintained or Improved: Monitor and assess patient's chronic conditions and comorbid symptoms for stability, deterioration, or improvement  8/13/2024 1638 by Missy Brown RN  Outcome: Progressing     Problem: Discharge Planning  Goal: Discharge to home or other facility with appropriate resources  8/14/2024 0154 by Shefali Montgomery RN  Outcome: Progressing  Flowsheets (Taken 8/12/2024 0954 by Jina Hackett, RN)  Discharge to home or other facility with appropriate resources:   Identify barriers to discharge with patient and caregiver   Arrange for needed discharge resources and transportation as appropriate   Identify discharge learning needs (meds, wound care, etc)   Refer to discharge planning if patient needs post-hospital services based on physician order or complex needs related to functional status, cognitive ability or social support system  8/13/2024 1638 by Missy Brown RN  Outcome: Progressing     Problem: Safety - Adult  Goal: Free from fall injury  8/14/2024 0154 by Shefali Montgomery RN  Outcome: Progressing  Flowsheets (Taken 8/14/2024 0154)  Free From Fall Injury: Instruct family/caregiver on patient safety  8/13/2024 1638 by Missy Brown RN  Outcome: Progressing     Problem: Skin/Tissue Integrity  Goal: Absence of new skin breakdown  Description: 1.  Monitor for areas of redness and/or skin breakdown  2.  Assess vascular access sites hourly  3.  Every 4-6 hours minimum:  Change oxygen saturation probe site  4.  Every 4-6 hours:  If on nasal continuous positive airway pressure, respiratory therapy assess nares and determine need for appliance change

## 2024-08-14 NOTE — PROGRESS NOTES
[facility]  Office: [phone]  Mendez Cesar DO, Patrice Zurita, DO, Marco A Rosales DO, Everette Bajwa, DO, Bettina Jasso MD, Kelsie De La Cruz MD, Otilio Lake MD, Fang Cuevas MD,  Akash Warren MD, Pawan Barcenas MD, Jose Isabel MD,  Zach Mantilla DO, Nathalia Clark MD, Neville Timmons MD, Neri Cesar DO, Ebony Cesar MD,  Sonny Edwards DO, Lucita Perez MD, Elvia Arredondo MD, Rosalinda Ritter MD, Korey Gamez MD,  Leonel Breaux MD, Jean-Claude Haas MD, Hien Bradford MD, Maria Eugenia Nobles MD, Royal Marie MD, Sandra Viveros MD, Rahat Cooley DO, Nikita Evans DO, Elise Hayden MD,  Candido Flores MD, Shirley Waterhouse, CNP,  Dasha Haskins CNP, Altaf Galvan, CNP,  Nanette Waite, DNP, Cande Aguiar, CNP, Malu Alvarez, CNP, Billie Burnett CNP, Sadia Casarez CNP, Rosa Taylor, PA-C, Gloria Mendoza PA-C, Mona Agosto, CNP, Ned Greene, CNP, Mary Peacock, CNP, Anel Carlson CNP, Anuradha Flood, CNS, Ingris Hudson, CNP, Honey Perrin CNP, Tracy Schwab, CNP         [facility]   IN-PATIENT SERVICE   [facility] [place]    Progress Note    8/14/2024    8:06 AM    Name:   Arias Martin  MRN:     [id number]     Acct:      [id number]   Room:   2002/2002-01  IP Day:  15  Admit Date:  7/30/2024  4:30 PM    PCP:   Marcelo Olivia APRN - CNP  Code Status:  Full Code    Subjective:     C/C:   Chief Complaint   Patient presents with    Altered Mental Status     Interval History Status: improved.     Pt is resting in bed.   He doesn't remember that he ate breakfast.  He is oriented to self, place and date.  No complaints.  His sitter was discontinued yesterday.  He's very cooperative.    Brief History:     Per my partner:  \"84-year-old male with history of suprapubic catheter with frequent UTIs, recent UTI treated with cefepime/ceftriaxone, bladder cancer s/p cystectomy with ileal conduit, CKD, SDH, dementia presented with altered mental  status and abdominal pain, patient was supposed to see urologist due to history of kidney stones, however because of altered mentation and worsening urine color was brought to the ER, found to be  hypotensive on arrival, with elevated creatinine levels, elevated troponin levels, urine analysis positive for infection, CT scan concerning for bilateral hydronephrosis with obstructive kidney stones and pyelonephritis, had percutaneous nephrostomy tube placed on left side on 7/31, urology service has been following, ID service following for antibiotics recommendations, nephrology service was on board for KARMA, started on bicarb containing fluids, improved, signed off.     Intermittently confused, concern for delirium, neurology and psych service were consulted as well, CT head negative, Seroquel home dose started, along with trazodone nightly, Zyprexa as needed     Has finished antibiotics, currently waiting placement\"    Review of Systems:     Constitutional:  negative for chills, fevers, sweats  Respiratory:  negative for cough, dyspnea on exertion, shortness of breath, wheezing  Cardiovascular:  negative for chest pain, chest pressure/discomfort, lower extremity edema, palpitations  Gastrointestinal:  negative for abdominal pain, constipation, diarrhea, nausea, vomiting  Neurological:  negative for dizziness, headache    Medications:     Allergies:    Allergies   Allergen Reactions    Nsaids Anaphylaxis     GI bleed    Tetracyclines & Related        Current Meds:   Scheduled Meds:    traZODone  50 mg Oral Nightly    QUEtiapine  25 mg Oral Lunch    sodium bicarbonate  1,300 mg Oral BID    vitamin B-12  50 mcg Oral Daily    ferrous sulfate  325 mg Oral Daily with breakfast    ascorbic acid  500 mg Oral Daily    folic acid  1 mg Oral Daily    heparin (porcine)  5,000 Units SubCUTAneous 3 times per day    sodium chloride flush  5-40 mL IntraVENous 2 times per day    tamsulosin  0.4 mg Oral Daily     Continuous Infusions:    MCV 99.1 94.2 97.8   MCH 29.6 29.8 29.9   MCHC 29.8 31.6 30.6   RDW 16.3* 16.3* 16.1*    183 208   MPV 9.7 10.2 9.9     Chemistry:  Recent Labs     08/11/24  0815 08/12/24  0642 08/12/24  1836 08/13/24  0624    139  --  138   K 3.8 3.8  --  3.6*    104  --  104   CO2 22 22  --  23   GLUCOSE 86 80  --  90   BUN 31* 31*  --  29*   CREATININE 2.5* 2.5*  --  2.4*   MG  --   --   --  2.4   ANIONGAP 11 13  --  11   LABGLOM 25* 25*  --  26*   CALCIUM 8.5* 8.3*  --  8.4*   TROPHS  --   --  31*  --      No results for input(s): \"LABALBU\", \"LABA1C\", \"B4WRISG\", \"FT4\", \"TSH\", \"AST\", \"ALT\", \"LDH\", \"GGT\", \"ALKPHOS\", \"BILITOT\", \"BILIDIR\", \"AMMONIA\", \"AMYLASE\", \"LIPASE\", \"LACTATE\", \"CHOL\", \"HDL\", \"CHOLHDLRATIO\", \"TRIG\", \"VLDL\", \"ZPV28QJ\", \"PHENYTOIN\", \"PHENYF\", \"URICACID\", \"POCGLU\" in the last 72 hours.    Invalid input(s): \"PROT\", \"R8GQJEU\", \"LABGGT\", \"LDLCHOLESTEROL\"    ABG:  Lab Results   Component Value Date/Time    POCPH 7.19 05/02/2021 01:36 PM    POCPCO2 44 05/02/2021 01:36 PM    POCPO2 74 05/02/2021 01:36 PM    POCHCO3 16.9 05/02/2021 01:36 PM    NBEA 11 05/02/2021 01:36 PM    PBEA NOT REPORTED 05/02/2021 01:36 PM    ERW1BYO 18 05/02/2021 01:36 PM    LEJR1PAG 90 05/02/2021 01:36 PM    FIO2 32.0 05/02/2021 01:36 PM     Lab Results   Component Value Date/Time    SPECIAL Site: Urine 07/30/2024 05:42 PM     Lab Results   Component Value Date/Time    CULTURE NO SIGNIFICANT GROWTH 07/31/2024 05:15 PM       Radiology:  CT HEAD WO CONTRAST    Result Date: 8/10/2024  No acute intracranial abnormality.     Echo:  Narrative:       Left Ventricle: Diffiuclt evaluation due to arrhythmia. Visually  estimated EF of 50 - 55%. Left ventricle size is normal. Normal wall  thickness. Normal wall motion.    Aortic Valve: Trileaflet valve. Moderately calcified right and  noncoronary cusps.    Tricuspid Valve: Trace to mild regurgitation.    Image quality is technically difficult. Technically difficult study due  to patient's

## 2024-08-14 NOTE — PLAN OF CARE
Problem: Chronic Conditions and Co-morbidities  Goal: Patient's chronic conditions and co-morbidity symptoms are monitored and maintained or improved  8/14/2024 0938 by Jammie Cheng RN  Outcome: Progressing  8/14/2024 0154 by Shefali Montgomery RN  Outcome: Progressing  Flowsheets (Taken 8/12/2024 2000 by Ana Laura Schmid, RN)  Care Plan - Patient's Chronic Conditions and Co-Morbidity Symptoms are Monitored and Maintained or Improved: Monitor and assess patient's chronic conditions and comorbid symptoms for stability, deterioration, or improvement     Problem: Discharge Planning  Goal: Discharge to home or other facility with appropriate resources  8/14/2024 0938 by Jammie Cheng RN  Outcome: Progressing  8/14/2024 0154 by Shefali Montgomery RN  Outcome: Progressing  Flowsheets (Taken 8/12/2024 0954 by Jina Hackett, RN)  Discharge to home or other facility with appropriate resources:   Identify barriers to discharge with patient and caregiver   Arrange for needed discharge resources and transportation as appropriate   Identify discharge learning needs (meds, wound care, etc)   Refer to discharge planning if patient needs post-hospital services based on physician order or complex needs related to functional status, cognitive ability or social support system     Problem: Safety - Adult  Goal: Free from fall injury  8/14/2024 0938 by Jammie Cheng RN  Outcome: Progressing  8/14/2024 0154 by Shefali Montgomery RN  Outcome: Progressing  Flowsheets (Taken 8/14/2024 0154)  Free From Fall Injury: Instruct family/caregiver on patient safety     Problem: Skin/Tissue Integrity  Goal: Absence of new skin breakdown  Description: 1.  Monitor for areas of redness and/or skin breakdown  2.  Assess vascular access sites hourly  3.  Every 4-6 hours minimum:  Change oxygen saturation probe site  4.  Every 4-6 hours:  If on nasal continuous positive airway pressure, respiratory therapy assess nares and determine need for appliance  change or resting period.  8/14/2024 0938 by Jammie Cheng RN  Outcome: Progressing  8/14/2024 0154 by Shefali Montgomery RN  Outcome: Progressing     Problem: Pain  Goal: Verbalizes/displays adequate comfort level or baseline comfort level  8/14/2024 0938 by Jammie Cheng RN  Outcome: Progressing  8/14/2024 0154 by Shefali Montgomery RN  Outcome: Progressing  Flowsheets (Taken 8/12/2024 1730 by Jina Hackett, RN)  Verbalizes/displays adequate comfort level or baseline comfort level:   Encourage patient to monitor pain and request assistance   Assess pain using appropriate pain scale   Administer analgesics based on type and severity of pain and evaluate response   Implement non-pharmacological measures as appropriate and evaluate response   Consider cultural and social influences on pain and pain management   Notify Licensed Independent Practitioner if interventions unsuccessful or patient reports new pain     Problem: ABCDS Injury Assessment  Goal: Absence of physical injury  8/14/2024 0938 by Jammie Cheng RN  Outcome: Progressing  8/14/2024 0154 by Shefali Montgomery RN  Outcome: Progressing  Flowsheets (Taken 8/12/2024 2325 by Ana Laura Schmid RN)  Absence of Physical Injury: Implement safety measures based on patient assessment     Problem: Nutrition Deficit:  Goal: Optimize nutritional status  8/14/2024 0938 by Jammie Cheng RN  Outcome: Progressing  8/14/2024 0154 by Shefali Montgomery RN  Outcome: Progressing  Flowsheets (Taken 8/8/2024 1540 by Edwige Julio RD)  Nutrient intake appropriate for improving, restoring, or maintaining nutritional needs:   Monitor oral intake, labs, and treatment plans   Assess nutritional status and recommend course of action   Recommend appropriate diets, oral nutritional supplements, and vitamin/mineral supplements

## 2024-08-15 LAB — GLUCOSE BLD-MCNC: 108 MG/DL (ref 75–110)

## 2024-08-15 PROCEDURE — 82947 ASSAY GLUCOSE BLOOD QUANT: CPT

## 2024-08-15 PROCEDURE — 99239 HOSP IP/OBS DSCHRG MGMT >30: CPT | Performed by: INTERNAL MEDICINE

## 2024-08-15 PROCEDURE — 2580000003 HC RX 258: Performed by: NURSE PRACTITIONER

## 2024-08-15 PROCEDURE — 6370000000 HC RX 637 (ALT 250 FOR IP): Performed by: NURSE PRACTITIONER

## 2024-08-15 PROCEDURE — 2060000000 HC ICU INTERMEDIATE R&B

## 2024-08-15 PROCEDURE — 6370000000 HC RX 637 (ALT 250 FOR IP): Performed by: INTERNAL MEDICINE

## 2024-08-15 PROCEDURE — 6360000002 HC RX W HCPCS: Performed by: NURSE PRACTITIONER

## 2024-08-15 PROCEDURE — 6370000000 HC RX 637 (ALT 250 FOR IP): Performed by: STUDENT IN AN ORGANIZED HEALTH CARE EDUCATION/TRAINING PROGRAM

## 2024-08-15 RX ORDER — OXYCODONE HYDROCHLORIDE 5 MG/1
5 TABLET ORAL EVERY 6 HOURS PRN
Qty: 12 TABLET | Refills: 0 | Status: SHIPPED | OUTPATIENT
Start: 2024-08-15 | End: 2024-08-18

## 2024-08-15 RX ADMIN — SODIUM CHLORIDE, PRESERVATIVE FREE 10 ML: 5 INJECTION INTRAVENOUS at 08:47

## 2024-08-15 RX ADMIN — QUETIAPINE 25 MG: 25 TABLET ORAL at 12:43

## 2024-08-15 RX ADMIN — FERROUS SULFATE TAB EC 325 MG (65 MG FE EQUIVALENT) 325 MG: 325 (65 FE) TABLET DELAYED RESPONSE at 08:47

## 2024-08-15 RX ADMIN — TRAZODONE HYDROCHLORIDE 50 MG: 50 TABLET ORAL at 20:45

## 2024-08-15 RX ADMIN — OXYCODONE HYDROCHLORIDE AND ACETAMINOPHEN 500 MG: 500 TABLET ORAL at 08:49

## 2024-08-15 RX ADMIN — SODIUM BICARBONATE 1300 MG: 648 TABLET ORAL at 08:47

## 2024-08-15 RX ADMIN — HEPARIN SODIUM 5000 UNITS: 5000 INJECTION INTRAVENOUS; SUBCUTANEOUS at 21:49

## 2024-08-15 RX ADMIN — SODIUM CHLORIDE, PRESERVATIVE FREE 6 ML: 5 INJECTION INTRAVENOUS at 20:00

## 2024-08-15 RX ADMIN — TAMSULOSIN HYDROCHLORIDE 0.4 MG: 0.4 CAPSULE ORAL at 08:47

## 2024-08-15 RX ADMIN — SODIUM BICARBONATE 1300 MG: 648 TABLET ORAL at 20:45

## 2024-08-15 RX ADMIN — LOPERAMIDE HYDROCHLORIDE 2 MG: 2 CAPSULE ORAL at 21:49

## 2024-08-15 RX ADMIN — CYANOCOBALAMIN TAB 500 MCG 50 MCG: 500 TAB at 08:49

## 2024-08-15 RX ADMIN — FOLIC ACID 1 MG: 1 TABLET ORAL at 08:47

## 2024-08-15 RX ADMIN — HEPARIN SODIUM 5000 UNITS: 5000 INJECTION INTRAVENOUS; SUBCUTANEOUS at 06:44

## 2024-08-15 RX ADMIN — HEPARIN SODIUM 5000 UNITS: 5000 INJECTION INTRAVENOUS; SUBCUTANEOUS at 12:42

## 2024-08-15 ASSESSMENT — PAIN SCALES - GENERAL: PAINLEVEL_OUTOF10: 0

## 2024-08-15 NOTE — PLAN OF CARE
Problem: Chronic Conditions and Co-morbidities  Goal: Patient's chronic conditions and co-morbidity symptoms are monitored and maintained or improved  Outcome: Progressing  Flowsheets (Taken 8/12/2024 2000 by Ana Laura Schmid, RN)  Care Plan - Patient's Chronic Conditions and Co-Morbidity Symptoms are Monitored and Maintained or Improved: Monitor and assess patient's chronic conditions and comorbid symptoms for stability, deterioration, or improvement     Problem: Discharge Planning  Goal: Discharge to home or other facility with appropriate resources  Outcome: Progressing  Flowsheets (Taken 8/12/2024 0954 by Jina Hackett, RN)  Discharge to home or other facility with appropriate resources:   Identify barriers to discharge with patient and caregiver   Arrange for needed discharge resources and transportation as appropriate   Identify discharge learning needs (meds, wound care, etc)   Refer to discharge planning if patient needs post-hospital services based on physician order or complex needs related to functional status, cognitive ability or social support system     Problem: Safety - Adult  Goal: Free from fall injury  Outcome: Progressing  Flowsheets (Taken 8/14/2024 0154)  Free From Fall Injury: Instruct family/caregiver on patient safety     Problem: Skin/Tissue Integrity  Goal: Absence of new skin breakdown  Description: 1.  Monitor for areas of redness and/or skin breakdown  2.  Assess vascular access sites hourly  3.  Every 4-6 hours minimum:  Change oxygen saturation probe site  4.  Every 4-6 hours:  If on nasal continuous positive airway pressure, respiratory therapy assess nares and determine need for appliance change or resting period.  Outcome: Progressing     Problem: Pain  Goal: Verbalizes/displays adequate comfort level or baseline comfort level  Outcome: Progressing  Flowsheets (Taken 8/12/2024 1730 by Jina Hackett, RN)  Verbalizes/displays adequate comfort level or baseline comfort  level:   Encourage patient to monitor pain and request assistance   Assess pain using appropriate pain scale   Administer analgesics based on type and severity of pain and evaluate response   Implement non-pharmacological measures as appropriate and evaluate response   Consider cultural and social influences on pain and pain management   Notify Licensed Independent Practitioner if interventions unsuccessful or patient reports new pain     Problem: ABCDS Injury Assessment  Goal: Absence of physical injury  Outcome: Progressing  Flowsheets (Taken 8/12/2024 2325 by Ana Laura Schmid, RN)  Absence of Physical Injury: Implement safety measures based on patient assessment     Problem: Nutrition Deficit:  Goal: Optimize nutritional status  8/15/2024 0059 by Shefali Montgomery, RN  Outcome: Progressing  Flowsheets (Taken 8/14/2024 1431 by Edwige Julio RD)  Nutrient intake appropriate for improving, restoring, or maintaining nutritional needs:   Assess nutritional status and recommend course of action   Monitor oral intake, labs, and treatment plans   Recommend appropriate diets, oral nutritional supplements, and vitamin/mineral supplements  8/14/2024 1508 by Edwige Julio RD  Outcome: Progressing  Flowsheets (Taken 8/14/2024 1431)  Nutrient intake appropriate for improving, restoring, or maintaining nutritional needs:   Assess nutritional status and recommend course of action   Monitor oral intake, labs, and treatment plans   Recommend appropriate diets, oral nutritional supplements, and vitamin/mineral supplements

## 2024-08-15 NOTE — PROGRESS NOTES
[facility]  Office: [phone]  Mendez Cesar DO, Patrice Zurita DO, Marco A Rosales DO, Everette Bajwa DO, Bettina Jasso MD, Kelsie De La Cruz MD, Otilio Lake MD, Fang Cuevas MD,  Akash Warren MD, Pawan Barcenas MD, Jose Isabel MD,  Zach Mantilla DO, Nathalia Clark MD, Neville Timmons MD, Neri Cesar DO, Ebony Cesar MD,  Sonny Edwards DO, Lucita Perez MD, Elvia Arredondo MD, Rosalinda Ritter MD, Korey Gamez MD,  Leonel Breaux MD, Jean-Claude Haas MD, Hien Bradford MD, Maria Eugenia Nobles MD, Royal Marie MD, Sandra Viveros MD, Rahat Cooley DO, Nikita Evans DO, Elise Hayden MD,  Candido Flores MD, Shirley Waterhouse, CNP,  Dasha Haskins CNP, Altaf Galvan, CNP,  Nanette Waite, DNP, Cande Aguiar, CNP, Malu Alvarez, CNP, Billie Burnett CNP, Sadia Casarez CNP, Rosa Taylor, PA-C, Gloria Mendoza PA-C, Mona Agosto, CNP, Ned Greene, CNP, Mary Peacock, CNP, Anel Carlson CNP, Anuradha Flood, CNS, Ingris Hudson, CNP, Honey Perrin CNP, Tracy Schwab, CNP         [facility]   IN-PATIENT SERVICE   [facility]    Progress Note    8/15/2024    12:19 PM    Name:   Arias Martin  MRN:     [id number]     Acct:      [id number]   Room:   2002/2002-01  IP Day:  16  Admit Date:  7/30/2024  4:30 PM    PCP:   Marcelo Olivia APRN - CNP  Code Status:  Full Code    Subjective:     C/C:   Chief Complaint   Patient presents with    Altered Mental Status     Interval History Status: improved.     Pt is resting in bed.   He is getting his ostomy changed.  He is oriented to self, place and date.  No complaints.   He's very cooperative.  No pain, waiting for a SNF placement.    Brief History:     Per my partner:  \"84-year-old male with history of suprapubic catheter with frequent UTIs, recent UTI treated with cefepime/ceftriaxone, bladder cancer s/p cystectomy with ileal conduit, CKD, SDH, dementia presented with altered mental status and  abdominal pain, patient was supposed to see urologist due to history of kidney stones, however because of altered mentation and worsening urine color was brought to the ER, found to be  hypotensive on arrival, with elevated creatinine levels, elevated troponin levels, urine analysis positive for infection, CT scan concerning for bilateral hydronephrosis with obstructive kidney stones and pyelonephritis, had percutaneous nephrostomy tube placed on left side on 7/31, urology service has been following, ID service following for antibiotics recommendations, nephrology service was on board for KARMA, started on bicarb containing fluids, improved, signed off.     Intermittently confused, concern for delirium, neurology and psych service were consulted as well, CT head negative, Seroquel home dose started, along with trazodone nightly, Zyprexa as needed     Has finished antibiotics, currently waiting placement\"    Review of Systems:     Constitutional:  negative for chills, fevers, sweats  Respiratory:  negative for cough, dyspnea on exertion, shortness of breath, wheezing  Cardiovascular:  negative for chest pain, chest pressure/discomfort, lower extremity edema, palpitations  Gastrointestinal:  negative for abdominal pain, constipation, diarrhea, nausea, vomiting  Neurological:  negative for dizziness, headache    Medications:     Allergies:    Allergies   Allergen Reactions    Nsaids Anaphylaxis     GI bleed    Tetracyclines & Related        Current Meds:   Scheduled Meds:    traZODone  50 mg Oral Nightly    QUEtiapine  25 mg Oral Lunch    sodium bicarbonate  1,300 mg Oral BID    vitamin B-12  50 mcg Oral Daily    ferrous sulfate  325 mg Oral Daily with breakfast    ascorbic acid  500 mg Oral Daily    folic acid  1 mg Oral Daily    heparin (porcine)  5,000 Units SubCUTAneous 3 times per day    sodium chloride flush  5-40 mL IntraVENous 2 times per day    tamsulosin  0.4 mg Oral Daily     Continuous Infusions:    sodium  chloride       PRN Meds: loperamide, benzocaine-menthol, morphine **OR** morphine, cyclobenzaprine, hyoscyamine, melatonin, oxyCODONE, acetaminophen, sodium chloride flush, sodium chloride, ondansetron **OR** ondansetron, polyethylene glycol    Data:     Past Medical History:   has a past medical history of Arthritis, Caffeine use, Cancer ([facility]), Cancer of kidney, unspecified laterality ([facility]), CKD (chronic kidney disease), Cognitive communication deficit, COPD (chronic obstructive pulmonary disease) ([facility]), Diabetes mellitus ([facility]), DNR (do not resuscitate), Dysphagia, GERD (gastroesophageal reflux disease), GI bleeding, Hernia, inguinal, right, Hyperlipidemia, Hypertension, Ileal conduit stomal stenosis, Kidney stone, Mobility impaired, Muscle weakness, Nursing home resident, PAF (paroxysmal atrial fibrillation) ([facility]), Presence of urostomy ([facility]), and Retinal detachment.    Social History:   reports that he quit smoking about 45 years ago. His smoking use included cigarettes. He started smoking about 65 years ago. He has a 10 pack-year smoking history. He has never used smokeless tobacco. He reports that he does not drink alcohol and does not use drugs.     Family History:   Family History   Problem Relation Age of Onset    Diabetes Mother     Diabetes Sister        Vitals:  /76   Pulse 59   Temp 97.6 °F (36.4 °C) (Oral)   Resp 18   Ht 1.88 m (6' 2.02\")   Wt 78.3 kg (172 lb 9.9 oz)   SpO2 98%   BMI 22.15 kg/m²   Temp (24hrs), Av °F (36.7 °C), Min:97.6 °F (36.4 °C), Max:98.5 °F (36.9 °C)    No results for input(s): \"POCGLU\" in the last 72 hours.      I/O (24Hr):    Intake/Output Summary (Last 24 hours) at 8/15/2024 1219  Last data filed at 8/15/2024 0859  Gross per 24 hour   Intake --   Output 540 ml   Net -540 ml       Labs:  Hematology:  Recent Labs     24  0624   WBC 7.2   RBC 3.21*   HGB 9.6*   HCT 31.4*   MCV 97.8   MCH 29.9   MCHC 30.6   RDW 16.1*      MPV 9.9     Chemistry:  Recent  appreciate cardiology evaluation, follow-up echo read  DVT prophylaxis  PT/OT    Disposition-discharge planning to SNF, Vanessa signed, Med rec done    Kelsie De La Cruz MD  8/15/2024  12:19 PM

## 2024-08-15 NOTE — PLAN OF CARE
Problem: Chronic Conditions and Co-morbidities  Goal: Patient's chronic conditions and co-morbidity symptoms are monitored and maintained or improved  8/15/2024 1253 by Jammie Cheng RN  Outcome: Completed  8/15/2024 0900 by Jammie Cheng RN  Outcome: Progressing  8/15/2024 0059 by Shefali Montgomery RN  Outcome: Progressing  Flowsheets (Taken 8/12/2024 2000 by Ana Laura Schmid, RN)  Care Plan - Patient's Chronic Conditions and Co-Morbidity Symptoms are Monitored and Maintained or Improved: Monitor and assess patient's chronic conditions and comorbid symptoms for stability, deterioration, or improvement     Problem: Discharge Planning  Goal: Discharge to home or other facility with appropriate resources  8/15/2024 1253 by Jammie Cheng RN  Outcome: Completed  8/15/2024 0900 by Jammie Cheng RN  Outcome: Progressing  8/15/2024 0059 by Shefali Montgomery RN  Outcome: Progressing  Flowsheets (Taken 8/12/2024 0954 by Jina Hackett, RN)  Discharge to home or other facility with appropriate resources:   Identify barriers to discharge with patient and caregiver   Arrange for needed discharge resources and transportation as appropriate   Identify discharge learning needs (meds, wound care, etc)   Refer to discharge planning if patient needs post-hospital services based on physician order or complex needs related to functional status, cognitive ability or social support system     Problem: Safety - Adult  Goal: Free from fall injury  8/15/2024 1253 by Jammie Cheng RN  Outcome: Completed  8/15/2024 0900 by Jammie Cheng RN  Outcome: Progressing  8/15/2024 0059 by Shefali Montgomery RN  Outcome: Progressing  Flowsheets (Taken 8/14/2024 0154)  Free From Fall Injury: Instruct family/caregiver on patient safety     Problem: Skin/Tissue Integrity  Goal: Absence of new skin breakdown  Description: 1.  Monitor for areas of redness and/or skin breakdown  2.  Assess vascular access sites hourly  3.  Every 4-6 hours minimum:  Change  maintaining nutritional needs:   Assess nutritional status and recommend course of action   Monitor oral intake, labs, and treatment plans   Recommend appropriate diets, oral nutritional supplements, and vitamin/mineral supplements

## 2024-08-15 NOTE — PLAN OF CARE
Problem: Chronic Conditions and Co-morbidities  Goal: Patient's chronic conditions and co-morbidity symptoms are monitored and maintained or improved  8/15/2024 0900 by Jammie Cheng RN  Outcome: Progressing  8/15/2024 0059 by Shefali Montgomery RN  Outcome: Progressing  Flowsheets (Taken 8/12/2024 2000 by Ana Laura Schmid, RN)  Care Plan - Patient's Chronic Conditions and Co-Morbidity Symptoms are Monitored and Maintained or Improved: Monitor and assess patient's chronic conditions and comorbid symptoms for stability, deterioration, or improvement     Problem: Discharge Planning  Goal: Discharge to home or other facility with appropriate resources  8/15/2024 0900 by Jammie Cheng RN  Outcome: Progressing  8/15/2024 0059 by Shefali Montgomery RN  Outcome: Progressing  Flowsheets (Taken 8/12/2024 0954 by Jina Hackett, RN)  Discharge to home or other facility with appropriate resources:   Identify barriers to discharge with patient and caregiver   Arrange for needed discharge resources and transportation as appropriate   Identify discharge learning needs (meds, wound care, etc)   Refer to discharge planning if patient needs post-hospital services based on physician order or complex needs related to functional status, cognitive ability or social support system     Problem: Safety - Adult  Goal: Free from fall injury  8/15/2024 0900 by Jammie Cheng RN  Outcome: Progressing  8/15/2024 0059 by Shefali Montgomery RN  Outcome: Progressing  Flowsheets (Taken 8/14/2024 0154)  Free From Fall Injury: Instruct family/caregiver on patient safety     Problem: Skin/Tissue Integrity  Goal: Absence of new skin breakdown  Description: 1.  Monitor for areas of redness and/or skin breakdown  2.  Assess vascular access sites hourly  3.  Every 4-6 hours minimum:  Change oxygen saturation probe site  4.  Every 4-6 hours:  If on nasal continuous positive airway pressure, respiratory therapy assess nares and determine need for appliance

## 2024-08-15 NOTE — CARE COORDINATION
Received call from [facility], able to accept patient.     TRANSITIONAL CARE/DISCHARGE PLANNING ONGOING EVALUATION      Reason for Admission: Kidney stone [N20.0]  UTI (urinary tract infection) [N39.0]  Acute cystitis with hematuria [N30.01]     Hospital day:16    Patient goals/Transitional Plan:    Spoke with patient about transition and discharge planning, DC to [facility]-   Dr De La Cruz notified of acceptance, ok to DC today  Calvin Sullivan notified that pt was accepted,   Discharge ordered- request faxed to [facility] for 1400    1300 message left for admissions to notify of transport time of 5 pm   1400  precert started awaiting approval    Readmission Risk              Risk of Unplanned Readmission:  32           Discharge Report    [facility]  Clinical Case Management Department  Written by: Mae Figueroa RN    Patient Name: Arias Martin  Attending Provider: Kelsie De La Cruz MD  Admit Date: 2024  4:30 PM  MRN: [id number]  Account: [id number]                     : 1940  Discharge Date: 8/15      Disposition: [facility]    Mae Figueroa RN

## 2024-08-16 LAB
GLUCOSE BLD-MCNC: 115 MG/DL (ref 75–110)
GLUCOSE BLD-MCNC: 116 MG/DL (ref 75–110)
GLUCOSE BLD-MCNC: 97 MG/DL (ref 75–110)

## 2024-08-16 PROCEDURE — 2580000003 HC RX 258: Performed by: NURSE PRACTITIONER

## 2024-08-16 PROCEDURE — 6370000000 HC RX 637 (ALT 250 FOR IP): Performed by: STUDENT IN AN ORGANIZED HEALTH CARE EDUCATION/TRAINING PROGRAM

## 2024-08-16 PROCEDURE — 82947 ASSAY GLUCOSE BLOOD QUANT: CPT

## 2024-08-16 PROCEDURE — 99231 SBSQ HOSP IP/OBS SF/LOW 25: CPT | Performed by: INTERNAL MEDICINE

## 2024-08-16 PROCEDURE — 2060000000 HC ICU INTERMEDIATE R&B

## 2024-08-16 PROCEDURE — 6370000000 HC RX 637 (ALT 250 FOR IP): Performed by: NURSE PRACTITIONER

## 2024-08-16 PROCEDURE — 6370000000 HC RX 637 (ALT 250 FOR IP): Performed by: INTERNAL MEDICINE

## 2024-08-16 PROCEDURE — 6360000002 HC RX W HCPCS: Performed by: NURSE PRACTITIONER

## 2024-08-16 RX ADMIN — TAMSULOSIN HYDROCHLORIDE 0.4 MG: 0.4 CAPSULE ORAL at 09:44

## 2024-08-16 RX ADMIN — Medication 5 MG: at 20:41

## 2024-08-16 RX ADMIN — QUETIAPINE 25 MG: 25 TABLET ORAL at 14:41

## 2024-08-16 RX ADMIN — HEPARIN SODIUM 5000 UNITS: 5000 INJECTION INTRAVENOUS; SUBCUTANEOUS at 06:18

## 2024-08-16 RX ADMIN — FOLIC ACID 1 MG: 1 TABLET ORAL at 09:44

## 2024-08-16 RX ADMIN — TRAZODONE HYDROCHLORIDE 50 MG: 50 TABLET ORAL at 20:40

## 2024-08-16 RX ADMIN — HEPARIN SODIUM 5000 UNITS: 5000 INJECTION INTRAVENOUS; SUBCUTANEOUS at 14:41

## 2024-08-16 RX ADMIN — HEPARIN SODIUM 5000 UNITS: 5000 INJECTION INTRAVENOUS; SUBCUTANEOUS at 20:41

## 2024-08-16 RX ADMIN — CYCLOBENZAPRINE 10 MG: 10 TABLET, FILM COATED ORAL at 20:40

## 2024-08-16 RX ADMIN — SODIUM BICARBONATE 1300 MG: 648 TABLET ORAL at 20:41

## 2024-08-16 RX ADMIN — CYANOCOBALAMIN TAB 500 MCG 50 MCG: 500 TAB at 09:44

## 2024-08-16 RX ADMIN — OXYCODONE HYDROCHLORIDE AND ACETAMINOPHEN 500 MG: 500 TABLET ORAL at 09:44

## 2024-08-16 RX ADMIN — SODIUM BICARBONATE 1300 MG: 648 TABLET ORAL at 09:44

## 2024-08-16 RX ADMIN — OXYCODONE HYDROCHLORIDE 5 MG: 5 TABLET ORAL at 23:40

## 2024-08-16 RX ADMIN — FERROUS SULFATE TAB EC 325 MG (65 MG FE EQUIVALENT) 325 MG: 325 (65 FE) TABLET DELAYED RESPONSE at 09:43

## 2024-08-16 RX ADMIN — SODIUM CHLORIDE, PRESERVATIVE FREE 5 ML: 5 INJECTION INTRAVENOUS at 20:42

## 2024-08-16 ASSESSMENT — PAIN SCALES - GENERAL
PAINLEVEL_OUTOF10: 10
PAINLEVEL_OUTOF10: 7

## 2024-08-16 ASSESSMENT — PAIN DESCRIPTION - LOCATION: LOCATION: BACK

## 2024-08-16 NOTE — PROGRESS NOTES
[facility]  Office: [phone]  Mendez Cesar DO, Patrice Zurita, DO, Marco A Rosales DO, Everette Bajwa, DO, Bettina Jasso MD, Kelsie De La Cruz MD, Otilio Lake MD, Fang Cuevas MD,  Akash Warren MD, Pawan Barcenas MD, Jose Isabel MD,  Zach Mantilla DO, Nathalia Clark MD, Neville Timmons MD, Neri Cesar DO, Ebony Cesar MD,  Sonny Edwards DO, Lucita Perez MD, Elvia Arredondo MD, Rosalinda Ritter MD, Korey Gamez MD,  Leonel Breaux MD, Jean-Claude Haas MD, Hien Bradford MD, Maria Eugenia Nobles MD, Royal Marie MD, Sandra Viveros MD, Rahat Cooley DO, Nikita Evans DO, Elise Hayden MD,  Candido Flores MD, Shirley Waterhouse, CNP,  Dasha Haskins CNP, Altaf Galvan, CNP,  Nanette Waite, DNP, Cande Aguiar, CNP, Malu Alvarez, CNP, Billie Burnett CNP, Sadia Casarez CNP, Rosa Taylor, PA-C, Gloria Mendoza PA-C, Mona Agosto, CNP, Ned Greene, CNP, Mary Peacock, CNP, Anel Carlson, CNP, Anuradha Flood, CNS, Ingris Hudson, CNP, Honey Perrin CNP, Tracy Schwab, CNP         [facility]   IN-PATIENT SERVICE   [facility]    Progress Note    8/16/2024    8:45 AM    Name:   Arias Martin  MRN:     [id number]     Acct:      [id number]   Room:   2002/2002-01  IP Day:  17  Admit Date:  7/30/2024  4:30 PM    PCP:   Marcelo Olivia APRN - CNP  Code Status:  Full Code    Subjective:     C/C:   Chief Complaint   Patient presents with    Altered Mental Status     Interval History Status: improved.     Pt is resting in bed.    He is oriented to self, place and date.  No complaints.   He's very cooperative.  No pain, waiting for a SNF placement.  Now he doesn't want to go to [place]    Brief History:     Per my partner:  \"84-year-old male with history of suprapubic catheter with frequent UTIs, recent UTI treated with cefepime/ceftriaxone, bladder cancer s/p cystectomy with ileal conduit, CKD, SDH, dementia presented with altered mental status  \"[id number]\", \"LABABSO\" in the last 72 hours.    Invalid input(s): \"PT\"    Chemistry:  No results for input(s): \"NA\", \"K\", \"CL\", \"CO2\", \"GLUCOSE\", \"BUN\", \"CREATININE\", \"MG\", \"ANIONGAP\", \"LABGLOM\", \"GFRAA\", \"CALCIUM\", \"CAION\", \"PHOS\", \"PSA\", \"PROBNP\", \"TROPHS\", \"CKTOTAL\", \"CKMB\", \"CKMBINDEX\", \"MYOGLOBIN\", \"DIGOXIN\", \"LACTACIDWB\" in the last 72 hours.    Recent Labs     08/15/24  1936 08/16/24  0718   POCGLU 108 115*       ABG:  Lab Results   Component Value Date/Time    POCPH 7.19 05/02/2021 01:36 PM    POCPCO2 44 05/02/2021 01:36 PM    POCPO2 74 05/02/2021 01:36 PM    POCHCO3 16.9 05/02/2021 01:36 PM    NBEA 11 05/02/2021 01:36 PM    PBEA NOT REPORTED 05/02/2021 01:36 PM    ODZ6AXT 18 05/02/2021 01:36 PM    VPFF5SAL 90 05/02/2021 01:36 PM    FIO2 32.0 05/02/2021 01:36 PM     Lab Results   Component Value Date/Time    SPECIAL Site: Urine 07/30/2024 05:42 PM     Lab Results   Component Value Date/Time    CULTURE NO SIGNIFICANT GROWTH 07/31/2024 05:15 PM       Radiology:  CT HEAD WO CONTRAST    Result Date: 8/10/2024  No acute intracranial abnormality.     Echo:  Narrative:       Left Ventricle: Diffiuclt evaluation due to arrhythmia. Visually  estimated EF of 50 - 55%. Left ventricle size is normal. Normal wall  thickness. Normal wall motion.    Aortic Valve: Trileaflet valve. Moderately calcified right and  noncoronary cusps.    Tricuspid Valve: Trace to mild regurgitation.    Image quality is technically difficult. Technically difficult study due  to patient's heart rhythm and procedure performed with the patient in a  supine position.     Physical Examination:        General appearance:  alert, cooperative and no distress  Mental Status:  oriented to person, place and time and flat affect  Lungs:  clear to auscultation bilaterally, normal effort  Heart:  regular rate and rhythm, no murmur  Abdomen:  soft, nontender, nondistended, normal bowel sounds,  + abdominal hernia, ostomy c/d/i  Extremities:  no edema, redness,  tenderness in the calves  Skin:  no gross lesions, rashes, induration    Assessment:        Hospital Problems             Last Modified POA    * (Principal) Pseudomonas urinary tract infection 8/2/2024 Yes    Acute encephalopathy 8/2/2024 Yes    Bilateral hydronephrosis 7/30/2024 Yes    Normochromic normocytic anemia 7/30/2024 Yes    Iron deficiency anemia 7/30/2024 Yes    Vitamin B 12 deficiency 8/2/2024 Yes    Folate deficiency 7/30/2024 Yes    Bladder cancer (Chronic) 7/30/2024 Yes    Kidney stone 8/1/2024 Yes    Left ureteral calculus 7/31/2024 Yes    GERD (gastroesophageal reflux disease) 7/30/2024 Yes    Acute kidney injury (HCC) 7/30/2024 Yes    Moderate malnutrition (HCC) 7/30/2024 Yes    S/P ileal conduit ([facility]) 7/30/2024 Yes    CKD (chronic kidney disease), stage III ([facility]) 7/30/2024 Yes    Hypotension 7/30/2024 Yes    SIRS (systemic inflammatory response syndrome) ([facility]) 8/2/2024 Yes    Acute pyelonephritis 8/4/2024 Yes    Toxic metabolic encephalopathy 8/4/2024 Yes    Acute cystitis without hematuria 8/5/2024 Yes    Delirium 8/9/2024 Yes    Dementia ([facility]) 8/9/2024 Yes       Plan:        Pseudomonas urinary tract infection-status post cefepime and amoxicillin, ID signed off  KARMA-resolved, repeat BMP in 1 week, follow-up with primary nephrologist Dr. Norwood  Anemia- monitor H/H, has splenomegaly, recommend outpatient work up  Hydronephrosis, left ureteral calculus status post left nephrostomy tube placement, continue Flomax, LEvsin, follow-up with urology outpatient for further treatment of stone  Dementia with episodes of acute delirium-continue Seroquel at night, trazodone, Zyprexa as needed. Much improved on meds  Elevated troponins-flat trend, no chest pain likely type II MI, echocardiogram completed, appreciate cardiology evaluation, follow-up echo read  DVT prophylaxis  PT/OT    Disposition-discharge planning to SNF, Vanessa signed, Med rec done    Will have  discuss options with him    Kelsie

## 2024-08-16 NOTE — CARE COORDINATION
Called [facility] central intake. Left voice message requesting prior auth be initiated. Requested a return call to confirm PA has been submitted.    1055 Met with the patient, his ex-wife and spoke with his son and daughter in law. Explained that the writer completed a neuro assessment and the patient was alert and oriented x4 during the conversation. He refused to go to [facility] [place].    1100 Spoke with Cherry PORTER with [facility] - the assisted living facility where the patient lives. States the patient cannot return to [facility] without going to therapy first.    1105 Met with the patient and his ex-wife. Informed the patient what Cherry with the assisted living facility stated - unable to return until he receives therapy. Patient upset with the information, but in agreement to go to [facility] before returning to [facility].    1107 Called Autumn with [facility]. Left voice message requesting the prior auth be initiated.    2803 Received message from Autumn - prior auth has been initiated.

## 2024-08-17 PROCEDURE — 6370000000 HC RX 637 (ALT 250 FOR IP): Performed by: INTERNAL MEDICINE

## 2024-08-17 PROCEDURE — 6370000000 HC RX 637 (ALT 250 FOR IP): Performed by: STUDENT IN AN ORGANIZED HEALTH CARE EDUCATION/TRAINING PROGRAM

## 2024-08-17 PROCEDURE — 2060000000 HC ICU INTERMEDIATE R&B

## 2024-08-17 PROCEDURE — 99232 SBSQ HOSP IP/OBS MODERATE 35: CPT | Performed by: INTERNAL MEDICINE

## 2024-08-17 PROCEDURE — 6360000002 HC RX W HCPCS: Performed by: NURSE PRACTITIONER

## 2024-08-17 PROCEDURE — 2580000003 HC RX 258: Performed by: NURSE PRACTITIONER

## 2024-08-17 PROCEDURE — 6370000000 HC RX 637 (ALT 250 FOR IP): Performed by: NURSE PRACTITIONER

## 2024-08-17 RX ORDER — LORAZEPAM 2 MG/ML
1 INJECTION INTRAMUSCULAR ONCE
Status: COMPLETED | OUTPATIENT
Start: 2024-08-17 | End: 2024-08-17

## 2024-08-17 RX ADMIN — TRAZODONE HYDROCHLORIDE 50 MG: 50 TABLET ORAL at 20:19

## 2024-08-17 RX ADMIN — SODIUM BICARBONATE 1300 MG: 648 TABLET ORAL at 20:19

## 2024-08-17 RX ADMIN — TAMSULOSIN HYDROCHLORIDE 0.4 MG: 0.4 CAPSULE ORAL at 08:29

## 2024-08-17 RX ADMIN — QUETIAPINE 25 MG: 25 TABLET ORAL at 12:27

## 2024-08-17 RX ADMIN — OXYCODONE HYDROCHLORIDE AND ACETAMINOPHEN 500 MG: 500 TABLET ORAL at 08:29

## 2024-08-17 RX ADMIN — HEPARIN SODIUM 5000 UNITS: 5000 INJECTION INTRAVENOUS; SUBCUTANEOUS at 15:01

## 2024-08-17 RX ADMIN — HEPARIN SODIUM 5000 UNITS: 5000 INJECTION INTRAVENOUS; SUBCUTANEOUS at 20:19

## 2024-08-17 RX ADMIN — CYCLOBENZAPRINE 10 MG: 10 TABLET, FILM COATED ORAL at 20:19

## 2024-08-17 RX ADMIN — LORAZEPAM 1 MG: 2 INJECTION INTRAMUSCULAR; INTRAVENOUS at 22:38

## 2024-08-17 RX ADMIN — FOLIC ACID 1 MG: 1 TABLET ORAL at 08:29

## 2024-08-17 RX ADMIN — SODIUM CHLORIDE, PRESERVATIVE FREE 10 ML: 5 INJECTION INTRAVENOUS at 08:32

## 2024-08-17 RX ADMIN — CYANOCOBALAMIN TAB 500 MCG 50 MCG: 500 TAB at 08:29

## 2024-08-17 RX ADMIN — FERROUS SULFATE TAB EC 325 MG (65 MG FE EQUIVALENT) 325 MG: 325 (65 FE) TABLET DELAYED RESPONSE at 08:30

## 2024-08-17 RX ADMIN — HEPARIN SODIUM 5000 UNITS: 5000 INJECTION INTRAVENOUS; SUBCUTANEOUS at 05:28

## 2024-08-17 RX ADMIN — SODIUM BICARBONATE 1300 MG: 648 TABLET ORAL at 08:30

## 2024-08-17 RX ADMIN — SODIUM CHLORIDE, PRESERVATIVE FREE 10 ML: 5 INJECTION INTRAVENOUS at 20:25

## 2024-08-17 ASSESSMENT — PAIN SCALES - GENERAL: PAINLEVEL_OUTOF10: 0

## 2024-08-17 ASSESSMENT — PAIN SCALES - WONG BAKER: WONGBAKER_NUMERICALRESPONSE: NO HURT

## 2024-08-17 NOTE — PROGRESS NOTES
[facility]  Office: [phone]  Mendez Cesar DO, Patrice Zurita, DO, Marco A Rosales DO, Everette Bajwa, DO, Bettina Jasso MD, Kelsie De La Cruz MD, Otilio Lake MD, Fang Cuevas MD,  Akash Warren MD, Pawan Barcenas MD, Jose Isabel MD,  Zach Mantilla DO, Nathalia Clark MD, Neville Timmons MD, Neri Cesar DO, Ebony Cesar MD,  Sonny Edwards DO, Lucita Perez MD, Elvia Arredondo MD, Rosalinda Ritter MD, Korey Gamez MD,  Leonel Breaux MD, Jean-Claude Haas MD, Hien Bradford MD, Maria Eugenia Nobles MD, Royal Marie MD, Sandra Viveros MD, Rahat Cooley DO, Nikita Evans DO, Elise Hayden MD,  Candido Flores MD, Shirley Waterhouse, CNP,  Dasha Haskins CNP, Altaf Galvan, CNP,  Nanette Waite, DNP, Cande Aguiar, CNP, Malu Alvarez, CNP, Billie Burnett CNP, Sadia Casarez CNP, Rosa Taylor, PA-C, Gloria Mendoza PA-C, Mona Agosto, CNP, Ned Greene, CNP, Mary Peacock, CNP, Anel Carlson, CNP, Anuradha Flood, CNS, Ingris Hudson, CNP, Honey Perrin CNP, Tracy Schwab, CNP         [facility]   IN-PATIENT SERVICE   [facility]    Progress Note    8/17/2024    8:17 AM    Name:   Arias Martin  MRN:     [id number]     Acct:      [id number]   Room:   2002/2002-01  IP Day:  18  Admit Date:  7/30/2024  4:30 PM    PCP:   Marcelo Olivia APRN - CNP  Code Status:  Full Code    Subjective:     C/C:   Chief Complaint   Patient presents with    Altered Mental Status     Interval History Status: improved.     Pt is resting in bed.    He is oriented to self, place and date.  No complaints.   He's very cooperative.  No pain, waiting for a SNF placement.   His ostomy appears to be leaking.    Brief History:     Per my partner:  \"84-year-old male with history of suprapubic catheter with frequent UTIs, recent UTI treated with cefepime/ceftriaxone, bladder cancer s/p cystectomy with ileal conduit, CKD, SDH, dementia presented with altered mental status and  \"CRP\", \"INR\", \"DDIMER\", \"TS5GIWMN\", \"LABABSO\" in the last 72 hours.    Invalid input(s): \"PT\"    Chemistry:  No results for input(s): \"NA\", \"K\", \"CL\", \"CO2\", \"GLUCOSE\", \"BUN\", \"CREATININE\", \"MG\", \"ANIONGAP\", \"LABGLOM\", \"GFRAA\", \"CALCIUM\", \"CAION\", \"PHOS\", \"PSA\", \"PROBNP\", \"TROPHS\", \"CKTOTAL\", \"CKMB\", \"CKMBINDEX\", \"MYOGLOBIN\", \"DIGOXIN\", \"LACTACIDWB\" in the last 72 hours.    Recent Labs     08/15/24  1936 08/16/24  0718 08/16/24  1213 08/16/24  1647   POCGLU 108 115* 116* 97       ABG:  Lab Results   Component Value Date/Time    POCPH 7.19 05/02/2021 01:36 PM    POCPCO2 44 05/02/2021 01:36 PM    POCPO2 74 05/02/2021 01:36 PM    POCHCO3 16.9 05/02/2021 01:36 PM    NBEA 11 05/02/2021 01:36 PM    PBEA NOT REPORTED 05/02/2021 01:36 PM    RLO7ANE 18 05/02/2021 01:36 PM    JOAK0ZCP 90 05/02/2021 01:36 PM    FIO2 32.0 05/02/2021 01:36 PM     Lab Results   Component Value Date/Time    SPECIAL Site: Urine 07/30/2024 05:42 PM     Lab Results   Component Value Date/Time    CULTURE NO SIGNIFICANT GROWTH 07/31/2024 05:15 PM       Radiology:  CT HEAD WO CONTRAST    Result Date: 8/10/2024  No acute intracranial abnormality.     Echo:  Narrative:       Left Ventricle: Diffiuclt evaluation due to arrhythmia. Visually  estimated EF of 50 - 55%. Left ventricle size is normal. Normal wall  thickness. Normal wall motion.    Aortic Valve: Trileaflet valve. Moderately calcified right and  noncoronary cusps.    Tricuspid Valve: Trace to mild regurgitation.    Image quality is technically difficult. Technically difficult study due  to patient's heart rhythm and procedure performed with the patient in a  supine position.     Physical Examination:        General appearance:  drowsy, cooperative and no distress  Mental Status:  oriented to person, place and time and flat affect  Lungs:  clear to auscultation bilaterally, normal effort  Heart:  regular rate and rhythm, no murmur  Abdomen:  soft, nontender, nondistended, normal bowel sounds,  +  done      Kelsie De La Cruz MD  8/17/2024  8:17 AM

## 2024-08-17 NOTE — CARE COORDINATION
TRANSITIONAL CARE/DISCHARGE PLANNING ONGOING EVALUATION      Reason for Admission: Kidney stone [N20.0]  UTI (urinary tract infection) [N39.0]  Acute cystitis with hematuria [N30.01]     Hospital day:18    Patient goals/Transitional Plan:    Spoke with patient about transition and discharge planning, plan remains [facility]- waiting on Precert- spoke with joshua Loera still pending        Readmission Risk              Risk of Unplanned Readmission:  23

## 2024-08-18 LAB
ANION GAP SERPL CALCULATED.3IONS-SCNC: 10 MMOL/L (ref 9–16)
BUN SERPL-MCNC: 30 MG/DL (ref 8–23)
CALCIUM SERPL-MCNC: 8.4 MG/DL (ref 8.6–10.4)
CHLORIDE SERPL-SCNC: 102 MMOL/L (ref 98–107)
CO2 SERPL-SCNC: 22 MMOL/L (ref 20–31)
CREAT SERPL-MCNC: 2.6 MG/DL (ref 0.7–1.2)
GFR, ESTIMATED: 23 ML/MIN/1.73M2
GLUCOSE SERPL-MCNC: 96 MG/DL (ref 74–99)
POTASSIUM SERPL-SCNC: 3.8 MMOL/L (ref 3.7–5.3)
SODIUM SERPL-SCNC: 134 MMOL/L (ref 136–145)

## 2024-08-18 PROCEDURE — 6370000000 HC RX 637 (ALT 250 FOR IP): Performed by: INTERNAL MEDICINE

## 2024-08-18 PROCEDURE — 6360000002 HC RX W HCPCS: Performed by: NURSE PRACTITIONER

## 2024-08-18 PROCEDURE — 6370000000 HC RX 637 (ALT 250 FOR IP): Performed by: STUDENT IN AN ORGANIZED HEALTH CARE EDUCATION/TRAINING PROGRAM

## 2024-08-18 PROCEDURE — 2060000000 HC ICU INTERMEDIATE R&B

## 2024-08-18 PROCEDURE — 36415 COLL VENOUS BLD VENIPUNCTURE: CPT

## 2024-08-18 PROCEDURE — 99232 SBSQ HOSP IP/OBS MODERATE 35: CPT | Performed by: INTERNAL MEDICINE

## 2024-08-18 PROCEDURE — 80048 BASIC METABOLIC PNL TOTAL CA: CPT

## 2024-08-18 PROCEDURE — 6370000000 HC RX 637 (ALT 250 FOR IP): Performed by: NURSE PRACTITIONER

## 2024-08-18 PROCEDURE — 2580000003 HC RX 258: Performed by: NURSE PRACTITIONER

## 2024-08-18 RX ADMIN — SODIUM CHLORIDE, PRESERVATIVE FREE 10 ML: 5 INJECTION INTRAVENOUS at 08:14

## 2024-08-18 RX ADMIN — QUETIAPINE 25 MG: 25 TABLET ORAL at 12:28

## 2024-08-18 RX ADMIN — SODIUM CHLORIDE, PRESERVATIVE FREE 10 ML: 5 INJECTION INTRAVENOUS at 19:43

## 2024-08-18 RX ADMIN — SODIUM BICARBONATE 1300 MG: 648 TABLET ORAL at 19:42

## 2024-08-18 RX ADMIN — OXYCODONE HYDROCHLORIDE 5 MG: 5 TABLET ORAL at 19:42

## 2024-08-18 RX ADMIN — TRAZODONE HYDROCHLORIDE 50 MG: 50 TABLET ORAL at 19:42

## 2024-08-18 RX ADMIN — FOLIC ACID 1 MG: 1 TABLET ORAL at 08:09

## 2024-08-18 RX ADMIN — SODIUM BICARBONATE 1300 MG: 648 TABLET ORAL at 08:09

## 2024-08-18 RX ADMIN — OXYCODONE HYDROCHLORIDE AND ACETAMINOPHEN 500 MG: 500 TABLET ORAL at 08:09

## 2024-08-18 RX ADMIN — HEPARIN SODIUM 5000 UNITS: 5000 INJECTION INTRAVENOUS; SUBCUTANEOUS at 14:03

## 2024-08-18 RX ADMIN — HEPARIN SODIUM 5000 UNITS: 5000 INJECTION INTRAVENOUS; SUBCUTANEOUS at 22:14

## 2024-08-18 RX ADMIN — HEPARIN SODIUM 5000 UNITS: 5000 INJECTION INTRAVENOUS; SUBCUTANEOUS at 06:12

## 2024-08-18 RX ADMIN — CYANOCOBALAMIN TAB 500 MCG 50 MCG: 500 TAB at 08:09

## 2024-08-18 RX ADMIN — FERROUS SULFATE TAB EC 325 MG (65 MG FE EQUIVALENT) 325 MG: 325 (65 FE) TABLET DELAYED RESPONSE at 08:09

## 2024-08-18 RX ADMIN — TAMSULOSIN HYDROCHLORIDE 0.4 MG: 0.4 CAPSULE ORAL at 08:09

## 2024-08-18 ASSESSMENT — PAIN SCALES - GENERAL
PAINLEVEL_OUTOF10: 0
PAINLEVEL_OUTOF10: 8

## 2024-08-18 NOTE — PROGRESS NOTES
[facility]  Office: [phone]  Mendez Cesar DO, Patrice Zurita, DO, Marco A Rosales DO, Everette Bajwa, DO, Bettina Jasso MD, Kelsie De La Cruz MD, Otilio Lake MD, Fang Cuevas MD,  Akash Warren MD, Pawan Barcenas MD, Jose Isabel MD,  Zach Mantilla DO, Nathalia Clark MD, Neville Timmons MD, Neri Cesar DO, Ebony Cesar MD,  Sonny Edwards DO, Lucita Perez MD, Elvia Arredondo MD, Rosalinda Ritter MD, Korey Gamez MD,  Leonel Breaux MD, Jean-Claude Haas MD, Hien Bradford MD, Maria Eugenia Nobles MD, Royal Marie MD, Sandra Viveros MD, Rahat Cooley DO, Nikita Evans DO, Elise Hayden MD,  Candido Flores MD, Shirley Waterhouse, CNP,  Dasha Haskins CNP, Altaf Galvan, CNP,  Nanette Waite, DNP, Cande Aguiar, CNP, Malu Alvarez, CNP, Billie Burnett CNP, Sadia Casarez CNP, Rosa Taylor, PA-C, Gloria Mendoza PA-C, Mona Agosto, CNP, Ned Greene, CNP, Mary Peacock, CNP, Anel Carlson, CNP, Anuradha Flood, CNS, Ingris Hudson, CNP, Honey Perrin CNP, Tracy Schwab, CNP         [facility]   IN-PATIENT SERVICE   [facility]    Progress Note    8/18/2024    8:23 AM    Name:   Arias Martin  MRN:     [id number]     Acct:      [id number]   Room:   2002/2002-01  IP Day:  19  Admit Date:  7/30/2024  4:30 PM    PCP:   Marcelo Olivia APRN - CNP  Code Status:  Full Code    Subjective:     C/C:   Chief Complaint   Patient presents with    Altered Mental Status     Interval History Status: improved.     Pt is resting in bed.    He is oriented to self, place only, seems a little more confused today.  He didn't eat breakfast.  He states that he needs help moving to the bathroom to have a BM.      Brief History:     Per my partner:  \"84-year-old male with history of suprapubic catheter with frequent UTIs, recent UTI treated with cefepime/ceftriaxone, bladder cancer s/p cystectomy with ileal conduit, CKD, SDH, dementia presented with altered mental  sounds,  + abdominal hernia, ostomy c/d/i  Extremities:  no edema, redness, tenderness in the calves  Skin:  no gross lesions, rashes, induration    Assessment:        Hospital Problems             Last Modified POA    * (Principal) Pseudomonas urinary tract infection 8/2/2024 Yes    Acute encephalopathy 8/2/2024 Yes    Bilateral hydronephrosis 7/30/2024 Yes    Normochromic normocytic anemia 7/30/2024 Yes    Iron deficiency anemia 7/30/2024 Yes    Vitamin B 12 deficiency 8/2/2024 Yes    Folate deficiency 7/30/2024 Yes    Bladder cancer (Chronic) 7/30/2024 Yes    Kidney stone 8/1/2024 Yes    Left ureteral calculus 7/31/2024 Yes    GERD (gastroesophageal reflux disease) 7/30/2024 Yes    Acute kidney injury (HCC) 7/30/2024 Yes    Moderate malnutrition (HCC) 7/30/2024 Yes    S/P ileal conduit ([facility]) 7/30/2024 Yes    CKD (chronic kidney disease), stage III ([facility]) 7/30/2024 Yes    Hypotension 7/30/2024 Yes    SIRS (systemic inflammatory response syndrome) ([facility]) 8/2/2024 Yes    Acute pyelonephritis 8/4/2024 Yes    Toxic metabolic encephalopathy 8/4/2024 Yes    Acute cystitis without hematuria 8/5/2024 Yes    Delirium 8/9/2024 Yes    Dementia ([facility]) 8/9/2024 Yes       Plan:        Pseudomonas urinary tract infection-status post cefepime and amoxicillin, ID signed off  KARMA-resolved, repeat BMP tomorrow, follow-up with primary nephrologist Dr. Norwood  Anemia- monitor H/H, has splenomegaly, recommend outpatient work up  Hydronephrosis, left ureteral calculus status post left nephrostomy tube placement, continue Flomax, LEvsin, follow-up with urology outpatient for further treatment of stone  Dementia with episodes of acute delirium-continue Seroquel at night, trazodone, Zyprexa as needed. Much improved on meds  Elevated troponins-flat trend, no chest pain likely type II MI, echocardiogram completed, appreciate cardiology evaluation, EF 50-55%  DVT prophylaxis  PT/OT    Disposition-discharge planning to SNF, Vanessa signed, Med rec  done      Kelsie De La Cruz MD  8/18/2024  8:23 AM

## 2024-08-18 NOTE — PROGRESS NOTES
Physical Therapy        Physical Therapy Cancel Note      DATE: 2024    NAME: Arias Martin  MRN: [id number]   : 1940      Patient not seen this date for Physical Therapy due to:    Patient Declined: Pt states \"I'm cold\", adamantly refused to sit EOB or attempt mobility. Pt educated on the importance of daily mobility, states \"you can come back tomorrow\". RN notified      Electronically signed by Valeria Torres PTA on 2024 at 1:14 PM

## 2024-08-19 PROCEDURE — 6370000000 HC RX 637 (ALT 250 FOR IP): Performed by: STUDENT IN AN ORGANIZED HEALTH CARE EDUCATION/TRAINING PROGRAM

## 2024-08-19 PROCEDURE — 6370000000 HC RX 637 (ALT 250 FOR IP): Performed by: INTERNAL MEDICINE

## 2024-08-19 PROCEDURE — 6360000002 HC RX W HCPCS: Performed by: NURSE PRACTITIONER

## 2024-08-19 PROCEDURE — 97110 THERAPEUTIC EXERCISES: CPT

## 2024-08-19 PROCEDURE — 97530 THERAPEUTIC ACTIVITIES: CPT

## 2024-08-19 PROCEDURE — 97535 SELF CARE MNGMENT TRAINING: CPT

## 2024-08-19 PROCEDURE — 6360000002 HC RX W HCPCS: Performed by: STUDENT IN AN ORGANIZED HEALTH CARE EDUCATION/TRAINING PROGRAM

## 2024-08-19 PROCEDURE — 2580000003 HC RX 258: Performed by: NURSE PRACTITIONER

## 2024-08-19 PROCEDURE — 2060000000 HC ICU INTERMEDIATE R&B

## 2024-08-19 PROCEDURE — 99231 SBSQ HOSP IP/OBS SF/LOW 25: CPT | Performed by: INTERNAL MEDICINE

## 2024-08-19 PROCEDURE — 6370000000 HC RX 637 (ALT 250 FOR IP): Performed by: NURSE PRACTITIONER

## 2024-08-19 RX ADMIN — OXYCODONE HYDROCHLORIDE AND ACETAMINOPHEN 500 MG: 500 TABLET ORAL at 08:50

## 2024-08-19 RX ADMIN — HEPARIN SODIUM 5000 UNITS: 5000 INJECTION INTRAVENOUS; SUBCUTANEOUS at 06:25

## 2024-08-19 RX ADMIN — HEPARIN SODIUM 5000 UNITS: 5000 INJECTION INTRAVENOUS; SUBCUTANEOUS at 06:15

## 2024-08-19 RX ADMIN — TAMSULOSIN HYDROCHLORIDE 0.4 MG: 0.4 CAPSULE ORAL at 08:50

## 2024-08-19 RX ADMIN — TRAZODONE HYDROCHLORIDE 50 MG: 50 TABLET ORAL at 21:07

## 2024-08-19 RX ADMIN — MORPHINE SULFATE 4 MG: 4 INJECTION INTRAVENOUS at 09:08

## 2024-08-19 RX ADMIN — FERROUS SULFATE TAB EC 325 MG (65 MG FE EQUIVALENT) 325 MG: 325 (65 FE) TABLET DELAYED RESPONSE at 08:50

## 2024-08-19 RX ADMIN — QUETIAPINE 25 MG: 25 TABLET ORAL at 12:17

## 2024-08-19 RX ADMIN — SODIUM BICARBONATE 1300 MG: 648 TABLET ORAL at 08:50

## 2024-08-19 RX ADMIN — SODIUM CHLORIDE, PRESERVATIVE FREE 10 ML: 5 INJECTION INTRAVENOUS at 08:51

## 2024-08-19 RX ADMIN — MORPHINE SULFATE 4 MG: 4 INJECTION INTRAVENOUS at 12:25

## 2024-08-19 RX ADMIN — CYANOCOBALAMIN TAB 500 MCG 50 MCG: 500 TAB at 08:49

## 2024-08-19 RX ADMIN — HEPARIN SODIUM 5000 UNITS: 5000 INJECTION INTRAVENOUS; SUBCUTANEOUS at 21:08

## 2024-08-19 RX ADMIN — MORPHINE SULFATE 4 MG: 4 INJECTION INTRAVENOUS at 02:33

## 2024-08-19 RX ADMIN — FOLIC ACID 1 MG: 1 TABLET ORAL at 08:50

## 2024-08-19 RX ADMIN — SODIUM BICARBONATE 1300 MG: 648 TABLET ORAL at 21:07

## 2024-08-19 RX ADMIN — SODIUM CHLORIDE, PRESERVATIVE FREE 10 ML: 5 INJECTION INTRAVENOUS at 22:25

## 2024-08-19 RX ADMIN — HEPARIN SODIUM 5000 UNITS: 5000 INJECTION INTRAVENOUS; SUBCUTANEOUS at 14:13

## 2024-08-19 ASSESSMENT — PAIN SCALES - GENERAL
PAINLEVEL_OUTOF10: 6
PAINLEVEL_OUTOF10: 5
PAINLEVEL_OUTOF10: 7
PAINLEVEL_OUTOF10: 10
PAINLEVEL_OUTOF10: 7
PAINLEVEL_OUTOF10: 0
PAINLEVEL_OUTOF10: 0

## 2024-08-19 ASSESSMENT — PAIN DESCRIPTION - LOCATION
LOCATION: BACK

## 2024-08-19 ASSESSMENT — PAIN SCALES - WONG BAKER: WONGBAKER_NUMERICALRESPONSE: HURTS A LITTLE BIT

## 2024-08-19 NOTE — PROGRESS NOTES
[facility]  Office: [phone]  Mendez Cesar DO, Patrice Zurita, DO, Marco A Rosales DO, Everette Bajwa, DO, Bettina Jasso MD, Kelsie De La Cruz MD, Otilio Lake MD, Fang Cuevas MD,  Akash Warren MD, Pawan Barcenas MD, Jose Isabel MD,  Zach Mantilla DO, Nathalia Clark MD, Neville Timmons MD, Neri Cesar DO, Ebony Cesar MD,  Sonny Edwards DO, Lucita Perez MD, Elvia Arredondo MD, Rosalinda Ritter MD, Korey Gamez MD,  Leonel Breaux MD, Jean-Claude Haas MD, Hien Bradford MD, Maria Eugenia Nobles MD, Royal Marie MD, Sandra Viveros MD, Rahat Cooley DO, Nikita Evans DO, Elise Hayden MD,  Candido Flores MD, Shirley Waterhouse, CNP,  Dasha Haskins CNP, Altaf Galvan, CNP,  Nanette Waite, DNP, Cande Aguiar, CNP, Malu Alvarez, CNP, Billie Burnett CNP, Sadia Casarez CNP, Rosa Taylor, PA-C, Gloria Mendoza PA-C, Mona Agosto, CNP, Ned Greene, CNP, Mary Peacock, CNP, Anel Carlson, CNP, Anuradha Flood, CNS, Ingris Hudson, CNP, Honey Perrin CNP, Tracy Schwab, CNP         [facility]   IN-PATIENT SERVICE   [facility]    Progress Note    8/19/2024    9:49 AM    Name:   Arias Martin  MRN:     [id number]     Acct:      [id number]   Room:   2002/2002-01  IP Day:  20  Admit Date:  7/30/2024  4:30 PM    PCP:   Marcelo Olivia APRN - CNP  Code Status:  Full Code    Subjective:     C/C:   Chief Complaint   Patient presents with    Altered Mental Status     Interval History Status: improved.     Pt is resting in bed.    He is oriented to self, place only, seems a little more confused today.  He didn't eat lunch yet.  He did sit at the side of the bed with PT, but then refused to work with them because he was cold.  He states that his back hurts.    Brief History:     Per my partner:  \"84-year-old male with history of suprapubic catheter with frequent UTIs, recent UTI treated with cefepime/ceftriaxone, bladder cancer s/p cystectomy with  day    tamsulosin  0.4 mg Oral Daily     Continuous Infusions:    sodium chloride       PRN Meds: loperamide, benzocaine-menthol, morphine **OR** morphine, cyclobenzaprine, hyoscyamine, melatonin, oxyCODONE, acetaminophen, sodium chloride flush, sodium chloride, ondansetron **OR** ondansetron, polyethylene glycol    Data:     Past Medical History:   has a past medical history of Arthritis, Caffeine use, Cancer ([facility]), Cancer of kidney, unspecified laterality ([facility]), CKD (chronic kidney disease), Cognitive communication deficit, COPD (chronic obstructive pulmonary disease) ([facility]), Diabetes mellitus ([facility]), DNR (do not resuscitate), Dysphagia, GERD (gastroesophageal reflux disease), GI bleeding, Hernia, inguinal, right, Hyperlipidemia, Hypertension, Ileal conduit stomal stenosis, Kidney stone, Mobility impaired, Muscle weakness, Nursing home resident, PAF (paroxysmal atrial fibrillation) ([facility]), Presence of urostomy ([facility]), and Retinal detachment.    Social History:   reports that he quit smoking about 45 years ago. His smoking use included cigarettes. He started smoking about 65 years ago. He has a 10 pack-year smoking history. He has never used smokeless tobacco. He reports that he does not drink alcohol and does not use drugs.     Family History:   Family History   Problem Relation Age of Onset    Diabetes Mother     Diabetes Sister        Vitals:  /69   Pulse 64   Temp 99.9 °F (37.7 °C) (Oral)   Resp 17   Ht 1.88 m (6' 2.02\")   Wt 77.7 kg (171 lb 4.8 oz)   SpO2 94%   BMI 21.98 kg/m²   Temp (24hrs), Av.4 °F (36.9 °C), Min:97.9 °F (36.6 °C), Max:99.9 °F (37.7 °C)    Recent Labs     24  1213 24  1647   POCGLU 116* 97         I/O (24Hr):    Intake/Output Summary (Last 24 hours) at 2024 0913  Last data filed at 2024 0855  Gross per 24 hour   Intake --   Output 1270 ml   Net -1270 ml       Labs:  Hematology:  No results for input(s): \"WBC\", \"RBC\", \"HGB\", \"HCT\", \"MCV\", \"MCH\", \"MCHC\",  rashes, induration    Assessment:        Hospital Problems             Last Modified POA    * (Principal) Pseudomonas urinary tract infection 8/2/2024 Yes    Acute encephalopathy 8/2/2024 Yes    Bilateral hydronephrosis 7/30/2024 Yes    Normochromic normocytic anemia 7/30/2024 Yes    Iron deficiency anemia 7/30/2024 Yes    Vitamin B 12 deficiency 8/2/2024 Yes    Folate deficiency 7/30/2024 Yes    Bladder cancer (Chronic) 7/30/2024 Yes    Kidney stone 8/1/2024 Yes    Left ureteral calculus 7/31/2024 Yes    GERD (gastroesophageal reflux disease) 7/30/2024 Yes    Acute kidney injury (HCC) 7/30/2024 Yes    Moderate malnutrition ([facility]) 7/30/2024 Yes    S/P ileal conduit ([facility]) 7/30/2024 Yes    CKD (chronic kidney disease), stage III ([facility]) 7/30/2024 Yes    Hypotension 7/30/2024 Yes    SIRS (systemic inflammatory response syndrome) ([facility]) 8/2/2024 Yes    Acute pyelonephritis 8/4/2024 Yes    Toxic metabolic encephalopathy 8/4/2024 Yes    Acute cystitis without hematuria 8/5/2024 Yes    Delirium 8/9/2024 Yes    Dementia ([facility]) 8/9/2024 Yes       Plan:        Pseudomonas urinary tract infection-status post cefepime and amoxicillin, ID signed off  KARMA-resolved,   BMP stable, follow-up with primary nephrologist Dr. Norwood  Anemia- monitor H/H, has splenomegaly, recommend outpatient work up  Hydronephrosis, left ureteral calculus status post left nephrostomy tube placement, continue Flomax, LEvsin, follow-up with urology outpatient for further treatment of stone, pain control  Dementia with episodes of acute delirium-continue Seroquel at night, trazodone, Zyprexa as needed. Much improved on meds  Elevated troponins-flat trend, no chest pain likely type II MI, echocardiogram completed, appreciate cardiology evaluation, EF 50-55%  DVT prophylaxis  PT/OT    Disposition-discharge planning to SNF, Vanessa signed, Med rec done      Kelsie De La Cruz MD  8/19/2024  9:49 AM

## 2024-08-19 NOTE — PROGRESS NOTES
Physical Therapy  Facility/Department: [facility] CAR 2- STEPDOWN  Physical Therapy Treatment Note    Name: Arias Martin  : 1940  MRN: [id number]  Date of Service: 2024    Discharge Recommendations:  Patient would benefit from continued therapy after discharge   PT Equipment Recommendations  Equipment Needed: No      Patient Diagnosis(es): The primary encounter diagnosis was Kidney stone. Diagnoses of Acute cystitis with hematuria and Shortness of breath were also pertinent to this visit.  Past Medical History:  has a past medical history of Arthritis, Caffeine use, Cancer (HCC), Cancer of kidney, unspecified laterality (HCC), CKD (chronic kidney disease), Cognitive communication deficit, COPD (chronic obstructive pulmonary disease) ([facility]), Diabetes mellitus (HCC), DNR (do not resuscitate), Dysphagia, GERD (gastroesophageal reflux disease), GI bleeding, Hernia, inguinal, right, Hyperlipidemia, Hypertension, Ileal conduit stomal stenosis, Kidney stone, Mobility impaired, Muscle weakness, Nursing home resident, PAF (paroxysmal atrial fibrillation) ([facility]), Presence of urostomy ([facility]), and Retinal detachment.  Past Surgical History:  has a past surgical history that includes hernia repair; Bladder removal (); Prostate surgery (); Cataract removal (Bilateral); Colonoscopy; Eye surgery (Right, ); vitrectomy (Left, 16); Incisional hernia repair (2015); Incisional hernia repair (2014); laparoscopy (N/A, 2021); Cystoscopy (N/A, 5/3/2021); IR GUIDED NEPHROSTOMY CATH PLACEMENT LEFT (5/3/2021); IR GUIDED NEPHROSTOMY CATH PLACEMENT RIGHT (2021); eye surgery; knee surgery (Left); other surgical history (2021); IR GUIDED NEPHROSTOMY CATH PLACEMENT LEFT (2021); other surgical history (2021); Cystoscopy (N/A, 2021); CT CRYOABLATION OF RENAL TUMOR (2019); and IR GUIDED NEPHROSTOMY CATH PLACEMENT LEFT (2024).    Assessment   Body Structures, Functions, Activity  from previous session.  Family / Caregiver Present: No  Follows Commands: Impaired  Other (Comment): pt does not follow most commands due to stating inability to complete but will do them with max encouragement.  General Comment  Comments: Pt retired to bed supine with call light, MOE present, and RN notified.  Subjective  Subjective: Pt and RN agreeable to tx session, Pt supine in bed reporting back pain not wanting to mobilize no numericalvalue given, pleasant and cooperative t/o session.    Cognition   Orientation  Overall Orientation Status: Impaired  Orientation Level: Oriented to person;Disoriented to situation;Oriented to time;Disoriented to place  Cognition  Overall Cognitive Status: Exceptions  Arousal/Alertness: Appropriate responses to stimuli  Following Commands: Follows one step commands with repetition;Follows one step commands with increased time  Attention Span: Attends with cues to redirect  Memory: Decreased recall of recent events;Decreased short term memory  Safety Judgement: Decreased awareness of need for assistance;Decreased awareness of need for safety  Problem Solving: Assistance required to identify errors made;Assistance required to correct errors made  Insights: Decreased awareness of deficits  Initiation: Requires cues for some  Sequencing: Requires cues for some  Cognition Comment: Pt confused throughout session, easily agitated. Max encouragement required.     Objective   Bed mobility  Supine to Sit: Minimal assistance (B LE progression and trunk progression)  Sit to Supine: Contact guard assistance;Minimal assistance (MOE performed B LE progression [place], provided on trunk)  Scooting: Contact guard assistance  Bed Mobility Comments: HOB elevated ~35 degrees, Pt utilized log roll technique. Pt required max encouragement to achieve EOB status.  Transfers  Sit to Stand: Moderate Assistance  Stand to Sit: Moderate Assistance  Comment: Assessed with RW, attempted x 2 sit<>stands. Pt  completed x 1 sit<>stand. 1st attempt unsuccessful d/t pain and weakness in B LEs, 2nd required modA with verbal cues to achieve terminal knee extension with moderate return.  Ambulation  Surface: Level tile  Device: Rolling Walker  Assistance: Minimal assistance  Quality of Gait: shuffling, unsteady no true LOB, B knee flexion  Gait Deviations: Slow Nadya;Decreased step height;Shuffles;Staggers  Distance: 2' side stepping  Comments: Pt required max verbal cues for sequencing with good return, pt unable to stand any longer d/t back pain.  More Ambulation?: No  Stairs/Curb  Stairs?: No     Balance  Posture: Fair  Sitting - Static: Fair;+  Sitting - Dynamic: Fair  Standing - Static: Fair;-  Standing - Dynamic: Poor;+  Comments: Sitting EOB, standing with RW.  Exercise Treatment: Seated B LE exercise: LAQs, Hip adduction x 8 reps ea. Pt refusing further exercise.      AM-PAC - Mobility    AM-PAC Basic Mobility - Inpatient   How much help is needed turning from your back to your side while in a flat bed without using bedrails?: A Little  How much help is needed moving from lying on your back to sitting on the side of a flat bed without using bedrails?: A Little  How much help is needed moving to and from a bed to a chair?: A Lot  How much help is needed standing up from a chair using your arms?: A Lot  How much help is needed walking in hospital room?: Total  How much help is needed climbing 3-5 steps with a railing?: Total  AM-PAC Inpatient Mobility Raw Score : 12  AM-PAC Inpatient T-Scale Score : 35.33  Mobility Inpatient CMS 0-100% Score: 68.66  Mobility Inpatient CMS G-Code Modifier : CL    Goals  Short Term Goals  Time Frame for Short Term Goals: 14 visits  Short Term Goal 1: Pt will be John in all bed mobility tasks  Short Term Goal 2: Pt will be John in transfers  Short Term Goal 3: Pt will amb 100' John w/ RW  Additional Goals?: No     Education  Patient Education  Education Given To: Patient  Education

## 2024-08-19 NOTE — CARE COORDINATION
Voicemail left for admissions at [facility] requesting return call to follow up on precert    1415 spoke to Shanel from [facility]. Precert is pending    1704 received call from Shanel from [facility]. Insurance is requesting specifics on patient's baseline ADL status. Spoke to patient's daughter in law, Celine. She requests for me to talk to the nurse at the assisted living where patient resides. Voicemail left for the nurse at [facility] [phone] requesting return call

## 2024-08-19 NOTE — PROGRESS NOTES
Occupational Therapy  Facility/Department: [facility] CAR 2- STEPDOWN   Daily Treatment Note  Patient Name: Arias Martin        MRN: [id number]    : 1940    Date of Service: 2024    Discharge Recommendations  Discharge Recommendations: Patient would benefit from continued therapy after discharge    Assessment  Performance deficits / Impairments: Decreased functional mobility ;Decreased ADL status;Decreased cognition;Decreased endurance;Decreased balance;Decreased strength;Decreased safe awareness;Decreased fine motor control;Decreased coordination  Assessment: Pt would benefit from continued acute care and post acute care OT to address above listed deficits.  Prognosis: Good  Activity Tolerance  Activity Tolerance: Treatment limited secondary to decreased cognition;Patient limited by fatigue;Patient limited by pain  Safety Devices  Type of Devices: Call light within reach;Patient at risk for falls;Nurse notified;Gait belt;Left in bed;Bed alarm in place;All fall risk precautions in place  Restraints  Restraints Initially in Place: No    Restrictions/Precautions  Restrictions/Precautions  Restrictions/Precautions: Contact Precautions;Up as Tolerated;Fall Risk  Required Braces or Orthoses?: No  Position Activity Restriction  Other position/activity restrictions: s/p Left nephrostomy tube placement 24, urostomy; up with assistance.    Subjective  General  Patient assessed for rehabilitation services?: Yes  Pain Rating: Unable to rate w/number  Pain Location: Low back  Non-Pharmaceutical Pain Intervention: Repositioned in bed for comfort at end of tx.      Objective  Orientation  Overall Orientation Status: Impaired  Orientation Level: Oriented to person;Disoriented to situation;Oriented to time;Disoriented to place  Cognition  Overall Cognitive Status: Exceptions  Arousal/Alertness: Appropriate responses to stimuli  Following Commands: Follows one step commands with repetition;Follows one step commands with  increased time  Attention Span: Attends with cues to redirect  Memory: Decreased recall of recent events;Decreased short term memory  Safety Judgement: Decreased awareness of need for assistance;Decreased awareness of need for safety  Problem Solving: Assistance required to identify errors made;Assistance required to correct errors made  Insights: Decreased awareness of deficits  Initiation: Requires cues for all  Sequencing: Requires cues for all  Cognition Comment: Pt confused throughout session, easily agitated. Max encouragement required.    Activities of Daily Living  Feeding: Setup;Verbal cueing;Supervision;Increased time to complete (assist to open containers)  Grooming: Setup;Increased time to complete;Verbal cueing;Minimal assistance (wash face, assist to brush hair)  Toileting: Unable to assess (Urostomy, wearing brief, Nephrostomy tube)  Additional Comments: Pt sitting at EOB upon arrival. A&OX2 to name and year, not to place/situation. Setup for grooming. Pt washed face but adamately refused to brush teeth seated at EOB at tray table. Assist to brush hair and pt continued to refuse self care. STS and side steps along EOB w/mod assist w/RW and max vc's. Pt wanting to lay back down d/t pain in low back. Pt needed min assist to get BLE up onto EOB. Pt situated in bed and HOB elevate for grooming in long sitting at tray table. Pt continued to decline self care. Pt needs increased time and assist d/t fatigue, pain and cognitive deficits.    Balance  Balance  Sitting: With support (seated at EOB ~15 min w/SBA supported by BUE)  Standing: With support (stood ~2 min w/mod assist w/RW for transfers and side stepping along EOB)    Transfers/Mobility  Bed mobility  Supine to Sit: Unable to assess (sitting at EOB upon arrival)  Sit to Supine: Minimal assistance (assist w/lower legs up onto bed)  Transfers  Sit to stand: Moderate assistance  Stand to sit: Moderate assistance  Transfer Comments: w/RW and vc's for hand

## 2024-08-19 NOTE — PLAN OF CARE
Problem: Chronic Conditions and Co-morbidities  Goal: Patient's chronic conditions and co-morbidity symptoms are monitored and maintained or improved  Recent Flowsheet Documentation  Taken 8/18/2024 0800 by Yobany Canada RN  Care Plan - Patient's Chronic Conditions and Co-Morbidity Symptoms are Monitored and Maintained or Improved:   Monitor and assess patient's chronic conditions and comorbid symptoms for stability, deterioration, or improvement   Collaborate with multidisciplinary team to address chronic and comorbid conditions and prevent exacerbation or deterioration     Problem: Discharge Planning  Goal: Discharge to home or other facility with appropriate resources  Recent Flowsheet Documentation  Taken 8/18/2024 0800 by Yobany Canada RN  Discharge to home or other facility with appropriate resources:   Identify barriers to discharge with patient and caregiver   Arrange for needed discharge resources and transportation as appropriate   Identify discharge learning needs (meds, wound care, etc)     Problem: Safety - Adult  Goal: Free from fall injury  Recent Flowsheet Documentation  Taken 8/18/2024 0800 by Yobany Canada RN  Free From Fall Injury: Instruct family/caregiver on patient safety     Problem: ABCDS Injury Assessment  Goal: Absence of physical injury  Recent Flowsheet Documentation  Taken 8/18/2024 0800 by Yobany Canada RN  Absence of Physical Injury: Implement safety measures based on patient assessment

## 2024-08-20 VITALS
HEART RATE: 68 BPM | RESPIRATION RATE: 15 BRPM | DIASTOLIC BLOOD PRESSURE: 57 MMHG | OXYGEN SATURATION: 93 % | WEIGHT: 171.3 LBS | TEMPERATURE: 98.6 F | BODY MASS INDEX: 21.98 KG/M2 | SYSTOLIC BLOOD PRESSURE: 112 MMHG | HEIGHT: 74 IN

## 2024-08-20 PROCEDURE — 6370000000 HC RX 637 (ALT 250 FOR IP)

## 2024-08-20 PROCEDURE — 92526 ORAL FUNCTION THERAPY: CPT

## 2024-08-20 PROCEDURE — 6370000000 HC RX 637 (ALT 250 FOR IP): Performed by: STUDENT IN AN ORGANIZED HEALTH CARE EDUCATION/TRAINING PROGRAM

## 2024-08-20 PROCEDURE — 6360000002 HC RX W HCPCS: Performed by: NURSE PRACTITIONER

## 2024-08-20 PROCEDURE — 6370000000 HC RX 637 (ALT 250 FOR IP): Performed by: NURSE PRACTITIONER

## 2024-08-20 PROCEDURE — 6370000000 HC RX 637 (ALT 250 FOR IP): Performed by: INTERNAL MEDICINE

## 2024-08-20 PROCEDURE — 2580000003 HC RX 258: Performed by: NURSE PRACTITIONER

## 2024-08-20 RX ADMIN — ACETAMINOPHEN 650 MG: 325 TABLET ORAL at 11:42

## 2024-08-20 RX ADMIN — TAMSULOSIN HYDROCHLORIDE 0.4 MG: 0.4 CAPSULE ORAL at 09:11

## 2024-08-20 RX ADMIN — HEPARIN SODIUM 5000 UNITS: 5000 INJECTION INTRAVENOUS; SUBCUTANEOUS at 06:52

## 2024-08-20 RX ADMIN — SODIUM BICARBONATE 1300 MG: 648 TABLET ORAL at 09:11

## 2024-08-20 RX ADMIN — CYANOCOBALAMIN TAB 500 MCG 50 MCG: 500 TAB at 09:11

## 2024-08-20 RX ADMIN — FERROUS SULFATE TAB EC 325 MG (65 MG FE EQUIVALENT) 325 MG: 325 (65 FE) TABLET DELAYED RESPONSE at 09:11

## 2024-08-20 RX ADMIN — SODIUM CHLORIDE, PRESERVATIVE FREE 10 ML: 5 INJECTION INTRAVENOUS at 09:11

## 2024-08-20 RX ADMIN — FOLIC ACID 1 MG: 1 TABLET ORAL at 09:11

## 2024-08-20 RX ADMIN — OXYCODONE HYDROCHLORIDE AND ACETAMINOPHEN 500 MG: 500 TABLET ORAL at 09:11

## 2024-08-20 RX ADMIN — QUETIAPINE 25 MG: 25 TABLET ORAL at 11:42

## 2024-08-20 ASSESSMENT — PAIN SCALES - GENERAL
PAINLEVEL_OUTOF10: 0
PAINLEVEL_OUTOF10: 7

## 2024-08-20 ASSESSMENT — PAIN DESCRIPTION - LOCATION: LOCATION: FLANK

## 2024-08-20 ASSESSMENT — PAIN SCALES - WONG BAKER: WONGBAKER_NUMERICALRESPONSE: NO HURT

## 2024-08-20 NOTE — PROGRESS NOTES
SLP ALL NOTES  Speech Language Pathology  [facility]    Dysphagia Treatment Note    Date: 8/20/2024  Patient’s Name: Arias Martin  MRN: [id number]  Diagnosis:   Patient Active Problem List   Diagnosis Code    Bladder cancer C67.9    DJD (degenerative joint disease) M19.90    Renal mass N28.89    Incisional hernia of anterior abdominal wall without obstruction or gangrene K43.2    Kidney stone N20.0    Left ureteral calculus N20.1    Partial small bowel obstruction (HCC) K56.600    GERD (gastroesophageal reflux disease) K21.9    Acute kidney injury (HCC) N17.9    Normochromic normocytic anemia D64.9    Iron deficiency anemia D50.9    Acute encephalopathy G93.40    Bilateral hydronephrosis N13.30    Vitamin B 12 deficiency E53.8    Folate deficiency E53.8    Acute respiratory failure with hypoxia ([facility]) J96.01    Moderate malnutrition ([facility]) E44.0    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    S/P ileal conduit ([facility]) Z93.6    CKD (chronic kidney disease), stage III ([facility]) N18.30    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    Renal cell carcinoma of right kidney ([facility]) C64.1    Hypotension I95.9    Syncope and collapse R55    Subclinical hypothyroidism E03.8    Left shoulder pain M25.512    Pseudomonas urinary tract infection N39.0, B96.5    SIRS (systemic inflammatory response syndrome) ([facility]) R65.10    Acute pyelonephritis N10    Toxic metabolic encephalopathy G92.8    Acute cystitis without hematuria N30.00    Delirium R41.0    Dementia ([facility]) F03.90       Pain: 0/10    Dysphagia Treatment  Treatment time:[id number]    Subjective: [x] Alert [] Cooperative     [] Confused     [x] Agitated    [] Lethargic    Objective/Assessment:    Pt. Seen for diet tolerance monitoring. Pt currently tolerating regular diet with thin liquids per pt and RN report. Pt ate breakfast meal without difficulty. Pt observed with Easy to Chew and thin liquid trials via straw with no overt s/s of aspiration. Pt required

## 2024-08-20 NOTE — CARE COORDINATION
Transitional Planning  Received call from [facility] they have precert  Hens [id number] completed AVS, ciara faxed to [facility] at [facility]         11:41  Spoke with Autumn informed of 3pm pickup  Updated pt and Missy RN with precert and time    15:20  [facility] here for transport  NURSING REPORT CAN BE CALLED -[phone]      Discharge Report    [facility]  Clinical Case Management Department  Written by: Chela Kent RN    Patient Name: Arias GUERRERO Angelosaul  Attending Provider: Maria Eugenia Nobles*  Admit Date: 2024  4:30 PM  MRN: [id number]  Account: [id number]                     : 1940  Discharge Date: 2024      Disposition: [facility] per [facility]    Chela Kent RN

## 2024-08-20 NOTE — PROGRESS NOTES
Patient was discharged via ambulance with transportation to SNF . Patient was discharged with all personal belongings. Report was given to Aicha at [facility]. Any medications or prescriptions and other paperwork was given to transportation service. Any questions were answered.

## 2024-08-22 ENCOUNTER — HOSPITAL ENCOUNTER (OUTPATIENT)
Age: 84
Setting detail: SPECIMEN
Discharge: HOME | End: 2024-08-22

## 2024-08-22 ENCOUNTER — OFFICE VISIT (OUTPATIENT)
Dept: UROLOGY | Age: 84
End: 2024-08-22
Payer: MEDICARE

## 2024-08-22 DIAGNOSIS — N20.0 STAGHORN RENAL CALCULUS: Primary | ICD-10-CM

## 2024-08-22 DIAGNOSIS — C64.1 RENAL CELL CARCINOMA OF RIGHT KIDNEY (HCC): ICD-10-CM

## 2024-08-22 DIAGNOSIS — N20.1 CALCULUS OF LEFT URETER: ICD-10-CM

## 2024-08-22 PROCEDURE — 99214 OFFICE O/P EST MOD 30 MIN: CPT | Performed by: SPECIALIST

## 2024-08-22 PROCEDURE — 1123F ACP DISCUSS/DSCN MKR DOCD: CPT | Performed by: SPECIALIST

## 2024-08-22 PROCEDURE — 87324 CLOSTRIDIUM AG IA: CPT

## 2024-08-22 PROCEDURE — 87449 NOS EACH ORGANISM AG IA: CPT

## 2024-08-22 RX ORDER — TRAZODONE HYDROCHLORIDE 100 MG/1
TABLET ORAL
COMMUNITY
Start: 2024-06-07

## 2024-08-22 RX ORDER — MIDODRINE HYDROCHLORIDE 10 MG/1
TABLET ORAL
COMMUNITY
Start: 2024-07-19

## 2024-08-22 RX ORDER — ACETAMINOPHEN 325 MG/1
TABLET ORAL
COMMUNITY
Start: 2024-07-10

## 2024-08-22 RX ORDER — QUETIAPINE FUMARATE 50 MG/1
TABLET, FILM COATED ORAL
COMMUNITY
Start: 2024-06-07

## 2024-08-22 NOTE — PROGRESS NOTES
Thomas Frost MD, FACS  [facility] Established Patient office note      Patient:  Arias Martin  YOB: 1940  Date: 8/22/24    HISTORY OF PRESENT ILLNESS:   The patient is a 84 y.o. male  The patient presents with a history of a Left percutaneous nephrostomy tube (PCNT) placed 7/31/24 by IR for a large cluster of obstructing left mid ureteral calculi and Pseudomonas, Enterococcus UTI.  He is not a candidate for Left Percutaneous Nephrolithotomy (PCNL) since the renal parenchyma is very thinned out on this side and this would not allow placement of a large enough sheath to do PCNL.  Left nephroureterectomy is not an option due to his hostile abdomen and diminished renal function.  I recommended LEFT PCNT changes every 6-8 weeks by IR.  I discussed with the patient, his wife and son and they understood the rationale for this approach.    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  9/12/19 BUN/creat = 28/1.54  Left PCNT placed 7/31/24 for large cluster of obstructing left mid ureteral calculi and Pseudomonas, Enterococcus UTI; not a candidate for Left PCNL; recommended LEFT PCNT changes every 6-8 weeks by IR    Additional History: none    Procedures Today: N/A    Urinalysis today:  No results found for this visit on 08/22/24.    Last several PSA's:  Lab Results   Component Value Date    PSA <0.10 01/07/2012       Last total testosterone:  No results found for: \"TESTOSTERONE\"    Last BUN and creatinine:  Lab Results   Component Value Date    BUN 30 (H) 08/18/2024     Lab Results   Component Value Date    CREATININE 2.6 (H)

## 2024-08-23 LAB
C DIFF GDH + TOXINS A+B STL QL IA.RAPID: ABNORMAL
SPECIMEN DESCRIPTION: ABNORMAL

## 2024-08-23 NOTE — PROGRESS NOTES
Physician Progress Note      PATIENT:               BINH BENSON  [facility] #:                  [id number]  :                       1940  ADMIT DATE:       2024 4:30 PM  DISCH DATE:        2024 4:07 PM  RESPONDING  PROVIDER #:        Maria Eugenia Nobles MD          QUERY TEXT:    Patient admitted with  AMS due to Pyelonephritis. Noted documentation of   sepsis secondary to renal calculi in Urology Consult note. In order to support   the diagnosis of sepsis, please include additional clinical indicators in   your documentation.  Or please document if the diagnosis of sepsis has been   ruled out after further study    The medical record reflects the following:  Risk Factors: Age 84. Obstructive uropathy, HX Recent Sepsis  Clinical Indicators: WBC 7.2>13.4 on >7.2, B/P's 84/58, 86/60,87/55, HR   45-95, RR12-23 Room air throughout admission, Temps 99.5, 100.9. H/P Bilateral   hydronephrosis with renal calculi and pyelonephritis: ID consultation placing   on IV ceftriaxone follow-up with urine cultures.  IR consultation for   nephrostomy placement. Bradycardia and low blood pressure: Patient   intermittently heart rate goes into 40. CBC no leukocytosis, Urology Consult   note Sepsis secondary to staghorn calculi. Patient was recently seen at [facility] for sepsis secondary from UTI.  IM PN Pseud    Treatment: IV Cefepime/Amoxicillin, Consults ID/Urology/Nephrology,   Nephrostomy tube placed   Any questions  Please call:  Ashwini Edwards RN,BSN,[facility]  M-F  6am-230pm  [phone]  Options provided:  -- Sepsis present as evidenced by, Please document evidence.  -- Sepsis was ruled out after study  -- Sepsis developed after admission  -- Other - I will add my own diagnosis  -- Disagree - Not applicable / Not valid  -- Disagree - Clinically unable to determine / Unknown  -- Refer to Clinical Documentation Reviewer    PROVIDER RESPONSE TEXT:    Sepsis was ruled out after study.    Query created by:

## 2024-09-10 NOTE — DISCHARGE SUMMARY
regular diet    Activity: As tolerated    Discharge Medications:      Medication List        START taking these medications      ascorbic acid 500 MG tablet  Commonly known as: VITAMIN C  Take 1 tablet by mouth daily     benzocaine-menthol 15-3.6 MG lozenge  Commonly known as: CEPACOL SORE THROAT  Take 1 lozenge by mouth every 2 hours as needed for Sore Throat     cyanocobalamin 50 MCG tablet  Take 1 tablet by mouth daily     ferrous sulfate 325 (65 Fe) MG EC tablet  Commonly known as: FE TABS 325  Take 1 tablet by mouth daily (with breakfast)     folic acid 1 MG tablet  Commonly known as: FOLVITE  Take 1 tablet by mouth daily     hyoscyamine 0.125 MG sublingual tablet  Commonly known as: LEVSIN/SL  Place 1 tablet under the tongue every 4 hours as needed for Cramping     sodium bicarbonate 650 MG tablet  Take 2 tablets by mouth 2 times daily     tamsulosin 0.4 MG capsule  Commonly known as: FLOMAX  Take 1 capsule by mouth daily            CHANGE how you take these medications      acetaminophen 500 MG tablet  Commonly known as: TYLENOL  Take 1 tablet by mouth every 4 hours as needed for Pain  What changed: how much to take            CONTINUE taking these medications      Blood Pressure Kit  1 kit by Does not apply route daily     diclofenac 0.1 % ophthalmic solution  Commonly known as: VOLTAREN     diclofenac sodium 1 % Gel  Commonly known as: VOLTAREN     magnesium hydroxide 400 MG/5ML suspension  Commonly known as: MILK OF MAGNESIA     melatonin 3 MG Tabs tablet     MULTIPLE VITAMINS-MINERALS PO     polyethylene glycol 17 GM/SCOOP powder  Commonly known as: GLYCOLAX     QUEtiapine 25 MG tablet  Commonly known as: SEROQUEL  Take 1 tablet by mouth at bedtime     traZODone 50 MG tablet  Commonly known as: DESYREL  Take 1 tablet by mouth nightly            STOP taking these medications      acetaminophen-codeine 300-30 MG per tablet  Commonly known as: TYLENOL #3     midodrine 5 MG tablet  Commonly known as:

## 2024-09-24 NOTE — PROGRESS NOTES
Physician Progress Note      PATIENT:               BINH BENSON  CSN #:                  [id number]  :                       1940  ADMIT DATE:       2024 4:30 PM  DISCH DATE:        2024 4:07 PM  RESPONDING  PROVIDER #:        Kelsie De La Cruz MD          QUERY TEXT:    Pt admitted with UTI.  Pt noted to have UTI from Ileal Conduit per ID Consult   Note . If possible, please document in the progress notes and discharge   summary if you are evaluating and/or treating any of the following:    The medical record reflects the following:  Risk Factors: Kidney stones, Ileal conduit UTI, Frequent UTI's Age 84  Clinical Indicators: ID Consult note  Pseudomonas ( R levaquin ) and   Enterococcus faecalis UTI, taken from the ileal conduit, infection versus   colonization. Urology Consult note Urinary tract infection Staghorn calculi.   Nephrology Consult  note Ultrasound showed bilateral hydronephrosis.  CT   abdomen showed severe hydronephrosis on the left side moderate hydronephrosis   in the right side, left ureteral stone, bilateral struvite stones, significant   left-sided perinephric stranding and inflammatory changes proximal to the   stone.  Urine culture positive for Pseudomonas a  Acute pyelonephritis, complicated pseudomonas UTI on cefepime BL renal   struvite stone. D/C Summary Pseudomonas urinary tract infection-status post   cefepime and amoxicillin, ID signed off. Hydronephrosis, left ureteral   calculus status post left nephrostomy tube placement, continue Flomax, LEvsin,   follow-up with urology outpatient for further treatment of stone, pain   control    Treatment: IV Cefepime/Amoxicillin, labs, ID/Urology/Nephrology Consults, Left   Nephrostomy tube placed    Any questions  Please Call  Ashwini Edwards RN,BSN,[facility]  -Friday  6am-230pm  [phone]  Options provided:  -- UTI due to ileal conduit  -- UTI not due to ileal conduit  -- Other - I will add my own diagnosis  -- Disagree

## 2024-10-15 ENCOUNTER — TELEPHONE (OUTPATIENT)
Dept: INTERVENTIONAL RADIOLOGY/VASCULAR | Age: 84
End: 2024-10-15

## 2024-12-05 ENCOUNTER — TELEPHONE (OUTPATIENT)
Dept: INTERVENTIONAL RADIOLOGY/VASCULAR | Age: 84
End: 2024-12-05

## (undated) DEVICE — ADAPTER MON OD15X22MM ID15MM STD LUER FIT W/ LIFESAVER

## (undated) DEVICE — GAUZE,SPONGE,FLUFF,6"X6.75",STRL,5/TRAY: Brand: MEDLINE

## (undated) DEVICE — INTENDED FOR TISSUE SEPARATION, AND OTHER PROCEDURES THAT REQUIRE A SHARP SURGICAL BLADE TO PUNCTURE OR CUT.: Brand: BARD-PARKER ® CARBON RIB-BACK BLADES

## (undated) DEVICE — STAPLER INT L75MM CUT LN L73MM STPL LN L77MM BLU B FRM 8

## (undated) DEVICE — SURGICAL PROCEDURE KIT BASIN MAJ SET UP

## (undated) DEVICE — RESERVOIR,SUCTION,100CC,SILICONE: Brand: MEDLINE

## (undated) DEVICE — Device: Brand: SENSURA MIO

## (undated) DEVICE — TUBING, SUCTION, 1/4" X 12', STRAIGHT: Brand: MEDLINE

## (undated) DEVICE — 3M™ STERI-STRIP™ COMPOUND BENZOIN TINCTURE 40 BAGS/CARTON 4 CARTONS/CASE C1544: Brand: 3M™ STERI-STRIP™

## (undated) DEVICE — SYRINGE MED 50ML LUERLOCK TIP

## (undated) DEVICE — SYRINGE CATH TIP 50ML

## (undated) DEVICE — RELOAD STPL L75MM OPN H3.8MM CLS 1.5MM WIRE DIA0.2MM REG

## (undated) DEVICE — 4-PORT MANIFOLD: Brand: NEPTUNE 2

## (undated) DEVICE — Z INACTIVE USE 2635503 SOLUTION IRRIG 3000ML ST H2O USP UROMATIC PLAS CONT

## (undated) DEVICE — RADIFOCUS GLIDEWIRE: Brand: GLIDEWIRE

## (undated) DEVICE — NEEDLE HYPO 25GA L1.5IN BLU POLYPR HUB S STL REG BVL STR

## (undated) DEVICE — SYRINGE, LUER LOCK, 10ML: Brand: MEDLINE

## (undated) DEVICE — SPONGE DRN W4XL4IN RAYON/POLYESTER 6 PLY NONWOVEN PRECUT

## (undated) DEVICE — CRANIOTOMY DRAPE, STERILE: Brand: MEDLINE

## (undated) DEVICE — SUTURE PERMAHAND SZ 3-0 L18IN NONABSORBABLE BLK L26MM SH C013D

## (undated) DEVICE — DRAINBAG,ANTI-REFLUX TOWER,L/F,2000ML,LL: Brand: MEDLINE

## (undated) DEVICE — BLANKET WRM W29.9XL79.1IN UP BODY FORC AIR MISTRAL-AIR

## (undated) DEVICE — CATHETER URETH 16FR BLLN 5CC SIL ALLY W/ SIL HYDRGEL 2 W F

## (undated) DEVICE — GOWN,SIRUS,NONRNF,SETINSLV,XL,20/CS: Brand: MEDLINE

## (undated) DEVICE — STRIP,CLOSURE,WOUND,MEDI-STRIP,1/2X4: Brand: MEDLINE

## (undated) DEVICE — CYSTO PACK: Brand: MEDLINE INDUSTRIES, INC.

## (undated) DEVICE — DRAIN SURG W10XL20CM SIL SMOOTH FLAT 3/4 PERF DBL WRP

## (undated) DEVICE — TOTAL TRAY, DB, 100% SILI FOLEY, 16FR 10: Brand: MEDLINE

## (undated) DEVICE — DRAPE,REIN 53X77,STERILE: Brand: MEDLINE

## (undated) DEVICE — PAD,ABDOMINAL,5"X9",ST,LF,25/BX: Brand: MEDLINE INDUSTRIES, INC.

## (undated) DEVICE — GLOVE SURG SZ 7 CRM LTX FREE POLYISOPRENE POLYMER BEAD ANTI

## (undated) DEVICE — SEALER ENDOSCP NANO COAT OPN DIV CRV L JAW LIGASURE IMPACT

## (undated) DEVICE — TOWEL,OR,DSP,ST,BLUE,DLX,XR,4/PK,20PK/CS: Brand: MEDLINE

## (undated) DEVICE — DRAPE,UNDRBUT,WHT GRAD PCH,CAPPORT,20/CS: Brand: MEDLINE

## (undated) DEVICE — SUTURE VCRL SZ 3-0 L27IN ABSRB UD L26MM SH 1/2 CIR J416H

## (undated) DEVICE — ADAPTER COLOPLAST

## (undated) DEVICE — SUTURE PDS II SZ 0 L60IN ABSRB VLT L65MM TP-1 1/2 CIR Z991G

## (undated) DEVICE — DUAL LUMEN STOMACH TUBE: Brand: SALEM SUMP

## (undated) DEVICE — PACK PROCEDURE SURG CYSTO SVMMC LF

## (undated) DEVICE — GLOVE SURG 7.5 11.7IN BEAD CUF LIGHT BRN SENSICARE LTX FREE

## (undated) DEVICE — GLOVE ORANGE PI 8   MSG9080

## (undated) DEVICE — YANKAUER,FLEXIBLE HANDLE,REGLR CAPACITY: Brand: MEDLINE INDUSTRIES, INC.

## (undated) DEVICE — GLOVE ORANGE PI 7 1/2   MSG9075